# Patient Record
Sex: FEMALE | Race: BLACK OR AFRICAN AMERICAN | Employment: OTHER | URBAN - METROPOLITAN AREA
[De-identification: names, ages, dates, MRNs, and addresses within clinical notes are randomized per-mention and may not be internally consistent; named-entity substitution may affect disease eponyms.]

---

## 2017-01-26 ENCOUNTER — ANTI-COAG VISIT (OUTPATIENT)
Dept: CARDIOLOGY CLINIC | Age: 57
End: 2017-01-26

## 2017-01-26 DIAGNOSIS — Z79.01 LONG TERM (CURRENT) USE OF ANTICOAGULANTS: Primary | ICD-10-CM

## 2017-01-26 DIAGNOSIS — I48.19 PERSISTENT ATRIAL FIBRILLATION (HCC): ICD-10-CM

## 2017-01-26 LAB
INR BLD: 3.3
PT POC: 39.2 SEC
VALID INTERNAL CONTROL?: YES

## 2017-01-26 NOTE — PROGRESS NOTES
Verbal order and read back per Neida Yao NP  The INR is above the therapeutic range. Ask the patient about bleeding complications. Please make the following adjustments to Coumadin dosing: Hold X 1 then change Coumadin to 5mg daily except 2.5mg on Tues, Thur, and Sat  Repeat the INR in 3 weeks. Patient informed of instructions, read back and verbalized understanding. Dosing calendar also provided.  Howard Nguyen LPN

## 2017-02-15 ENCOUNTER — ANTI-COAG VISIT (OUTPATIENT)
Dept: CARDIOLOGY CLINIC | Age: 57
End: 2017-02-15

## 2017-02-15 DIAGNOSIS — Z79.01 LONG TERM (CURRENT) USE OF ANTICOAGULANTS: ICD-10-CM

## 2017-02-15 DIAGNOSIS — I48.19 PERSISTENT ATRIAL FIBRILLATION (HCC): ICD-10-CM

## 2017-02-15 LAB
INR BLD: 3
PT POC: 35.5 SEC
VALID INTERNAL CONTROL?: YES

## 2017-02-15 NOTE — PROGRESS NOTES
The INR is stable and therapeutic but in upper part of range.    Only 2.5mg today then continue Coumadin to 5mg daily except 2.5mg on Tues, Thur, and Sat  Recheck INR in 3 weeks

## 2017-02-21 DIAGNOSIS — I11.0 BENIGN HYPERTENSIVE HEART DISEASE WITH HEART FAILURE (HCC): ICD-10-CM

## 2017-02-21 NOTE — TELEPHONE ENCOUNTER
Patient's last OV: 9/20/16  Patient's next OV: none scheduled  Medication(s) last filled on: 9/7/16    Scheduled appointment for 2/28/17 with Pt  And updated phone number in system

## 2017-02-22 DIAGNOSIS — I11.0 BENIGN HYPERTENSIVE HEART DISEASE WITH HEART FAILURE (HCC): ICD-10-CM

## 2017-02-23 RX ORDER — FUROSEMIDE 40 MG/1
TABLET ORAL
Qty: 30 TAB | Refills: 6 | OUTPATIENT
Start: 2017-02-23

## 2017-02-23 RX ORDER — FUROSEMIDE 40 MG/1
TABLET ORAL
Qty: 30 TAB | Refills: 3 | Status: SHIPPED | OUTPATIENT
Start: 2017-02-23 | End: 2017-03-03 | Stop reason: SDUPTHER

## 2017-03-03 DIAGNOSIS — I11.0 BENIGN HYPERTENSIVE HEART DISEASE WITH HEART FAILURE (HCC): ICD-10-CM

## 2017-03-03 RX ORDER — FUROSEMIDE 40 MG/1
TABLET ORAL
Qty: 30 TAB | Refills: 3 | Status: SHIPPED | OUTPATIENT
Start: 2017-03-03 | End: 2017-06-15 | Stop reason: SDUPTHER

## 2017-03-03 NOTE — TELEPHONE ENCOUNTER
Requested Prescriptions     Pending Prescriptions Disp Refills    furosemide (LASIX) 40 mg tablet 30 Tab 3     Patient is completely out of medication

## 2017-03-08 ENCOUNTER — HOSPITAL ENCOUNTER (OUTPATIENT)
Dept: LAB | Age: 57
Discharge: HOME | End: 2017-03-08
Payer: MEDICARE

## 2017-03-08 DIAGNOSIS — E78.5 HYPERLIPIDEMIA, UNSPECIFIED HYPERLIPIDEMIA TYPE: ICD-10-CM

## 2017-03-08 LAB
CHOLEST SERPL-MCNC: 148 MG/DL
HDLC SERPL-MCNC: 56 MG/DL (ref 40–60)
HDLC SERPL: 2.6 {RATIO} (ref 0–5)
LDLC SERPL CALC-MCNC: 66.4 MG/DL (ref 0–100)
LIPID PROFILE,FLP: NORMAL
TRIGL SERPL-MCNC: 128 MG/DL (ref ?–150)
VLDLC SERPL CALC-MCNC: 25.6 MG/DL

## 2017-03-08 PROCEDURE — 80061 LIPID PANEL: CPT | Performed by: INTERNAL MEDICINE

## 2017-03-08 PROCEDURE — 36415 COLL VENOUS BLD VENIPUNCTURE: CPT | Performed by: INTERNAL MEDICINE

## 2017-03-09 ENCOUNTER — ANTI-COAG VISIT (OUTPATIENT)
Dept: CARDIOLOGY CLINIC | Age: 57
End: 2017-03-09

## 2017-03-09 ENCOUNTER — OFFICE VISIT (OUTPATIENT)
Dept: FAMILY MEDICINE CLINIC | Age: 57
End: 2017-03-09

## 2017-03-09 VITALS
TEMPERATURE: 98.1 F | SYSTOLIC BLOOD PRESSURE: 125 MMHG | OXYGEN SATURATION: 99 % | HEIGHT: 67 IN | WEIGHT: 191 LBS | DIASTOLIC BLOOD PRESSURE: 92 MMHG | BODY MASS INDEX: 29.98 KG/M2 | RESPIRATION RATE: 20 BRPM | HEART RATE: 89 BPM

## 2017-03-09 DIAGNOSIS — Z13.39 SCREENING FOR ALCOHOLISM: ICD-10-CM

## 2017-03-09 DIAGNOSIS — Z71.89 ADVANCED DIRECTIVES, COUNSELING/DISCUSSION: ICD-10-CM

## 2017-03-09 DIAGNOSIS — K21.9 GASTROESOPHAGEAL REFLUX DISEASE WITHOUT ESOPHAGITIS: Primary | ICD-10-CM

## 2017-03-09 DIAGNOSIS — I48.19 PERSISTENT ATRIAL FIBRILLATION (HCC): ICD-10-CM

## 2017-03-09 DIAGNOSIS — I11.0 BENIGN HYPERTENSIVE HEART DISEASE WITH HEART FAILURE (HCC): ICD-10-CM

## 2017-03-09 DIAGNOSIS — Z79.01 LONG TERM (CURRENT) USE OF ANTICOAGULANTS: Primary | ICD-10-CM

## 2017-03-09 DIAGNOSIS — Z12.31 ENCOUNTER FOR SCREENING MAMMOGRAM FOR MALIGNANT NEOPLASM OF BREAST: ICD-10-CM

## 2017-03-09 DIAGNOSIS — Z00.00 ROUTINE GENERAL MEDICAL EXAMINATION AT A HEALTH CARE FACILITY: ICD-10-CM

## 2017-03-09 DIAGNOSIS — E78.2 MIXED HYPERLIPIDEMIA: ICD-10-CM

## 2017-03-09 LAB
INR BLD: 2.3
PT POC: 27.5 SEC
VALID INTERNAL CONTROL?: YES

## 2017-03-09 RX ORDER — HYDROGEN PEROXIDE 3 %
20 SOLUTION, NON-ORAL MISCELLANEOUS DAILY
Qty: 30 CAP | Refills: 3 | Status: ON HOLD | OUTPATIENT
Start: 2017-03-09 | End: 2019-01-07

## 2017-03-09 RX ORDER — HYDROCHLOROTHIAZIDE 25 MG/1
25 TABLET ORAL DAILY
Qty: 30 TAB | Refills: 6 | Status: CANCELLED | OUTPATIENT
Start: 2017-03-09

## 2017-03-09 NOTE — ACP (ADVANCE CARE PLANNING)
Advance Care Planning    Advance Care Planning (ACP) Provider Conversation Snapshot    Date of ACP Conversation: 03/09/17  Persons included in Conversation:  patient  Length of ACP Conversation in minutes:  16 minutes    Authorized Decision Maker (if patient is incapable of making informed decisions): This person is: Other Legally Authorized Decision Maker (e.g. Next of Kin)          For Patients with Decision Making Capacity: \"In these circumstances, what matters most to you? \"  Care focused more on comfort or quality of life.     Conversation Outcomes / Follow-Up Plan:   Other Treatment or Goals of Care Decisions:

## 2017-03-09 NOTE — PROGRESS NOTES
Verbal order and read back per Dr. Raynald Kehr  The INR is stable and therapeutic. Continue same dose of coumadin and recheck in 5 weeks with Dr. Cole Smith appointment  Continue Coumadin 5mg daily except 2.5mg on Tues, Thur, and Sat  Patient informed of instructions, read back and verbalized understanding. Dosing calendar also provided.  Fallon Barrett LPN

## 2017-03-09 NOTE — PATIENT INSTRUCTIONS
Medicare Part B Preventive Services Limitations Recommendation Scheduled   Bone Mass Measurement  (age 72 & older, biennial) Requires diagnosis related to osteoporosis or estrogen deficiency. Biennial benefit unless patient has history of long-term glucocorticoid tx or baseline is needed because initial test was by other method Recommend at age 72    Cardiovascular Screening Blood Tests (every 5 years)  Total cholesterol, HDL, Triglycerides Order as a panel if possible Up todate    Colorectal Cancer Screening  -Fecal occult blood test (annual)  -Flexible sigmoidoscopy (5y)  -Screening colonoscopy (10y)  -Barium Enema  recommend    Counseling to Prevent Tobacco Use (up to 8 sessions per year)  - Counseling greater than 3 and up to 10 minutes  - Counseling greater than 10 minutes Patients must be asymptomatic of tobacco-related conditions to receive as preventive service     Diabetes Screening Tests (at least every 3 years, Medicare covers annually or at 6-month intervals for prediabetic patients)    Fasting blood sugar (FBS) or glucose tolerance test (GTT) Patient must be diagnosed with one of the following:  -Hypertension, Dyslipidemia, obesity, previous impaired FBS or GTT  Or any two of the following: overweight, FH of diabetes, age ? 72, history of gestational diabetes, birth of baby weighing more than 9 pounds Up to date    Diabetes Self-Management Training (DSMT) (no USPSTF recommendation) Requires referral by treating physician for patient with diabetes or renal disease. 10 hours of initial DSMT session of no less than 30 minutes each in a continuous 12-month period. 2 hours of follow-up DSMT in subsequent years.  Not indicated    Glaucoma Screening (no USPSTF recommendation) Diabetes mellitus, family history, , age 48 or over,  American, age 72 or over recommend    Human Immunodeficiency Virus (HIV) Screening (annually for increased risk patients)  HIV-1 and HIV-2 by EIA, AMADOU, rapid antibody test, or oral mucosa transudate Patient must be at increased risk for HIV infection per USPSTF guidelines or pregnant. Tests covered annually for patients at increased risk. Pregnant patients may receive up to 3 test during pregnancy. Medical Nutrition Therapy (MNT) (for diabetes or renal disease not recommended schedule) Requires referral by treating physician for patient with diabetes or renal disease. Can be provided in same year as diabetes self-management training (DSMT), and CMS recommends medical nutrition therapy take place after DSMT. Up to 3 hours for initial year and 2 hours in subsequent years. Not indicated    Shingles Vaccination A shingles vaccine is also recommended once in a lifetime after age 61 recommend    Seasonal Influenza Vaccination (annually)  recommend    Pneumococcal Vaccination (once after 72)      Hepatitis B Vaccinations (if medium/high risk) Medium/high risk factors:  End-stage renal disease,  Hemophiliacs who received Factor VIII or IX concentrates, Clients of institutions for the mentally retarded, Persons who live in the same house as a HepB virus carrier, Homosexual men, Illicit injectable drug abusers. Screening Mammography (biennial age 54-69) Annually (age 36 or over) Recommend and ordered    Screening Pap Tests and Pelvic Examination (up to age 79 and after 79 if unknown history or abnormal study last 10 years) Every 25 months except high risk recommend    Ultrasound Screening for Abdominal Aortic Aneurysm (AAA) (once) Patient must be referred through IPPE and not have had a screening for abdominal aortic aneurysm before under Medicare.   Limited to patients who meet one of the following criteria:  - Men who are 73-68 years old and have smoked more than 100 cigarettes in their lifetime.  -Anyone with a FH of AAA  -Anyone recommended for screening by USPSTF Not indicated

## 2017-03-09 NOTE — PROGRESS NOTES
This is an Initial Medicare Annual Wellness Exam (AWV) (Performed 12 months after IPPE or effective date of Medicare Part B enrollment, Once in a lifetime)    I have reviewed the patient's medical history in detail and updated the computerized patient record. History     Past Medical History:   Diagnosis Date    Aortic valve disorders 1/13/2011    Atrial fibrillation ([address] 1/13/2011    Congestive heart failure, unspecified March 2005    Cleared quickly with Lasix therapy    Echocardiogram 01/10/2012    A-fib. EF 45%. Mild RVE. RVSP 45-50. Massive LAE. Mod TAMI. Mod MS. Severe MR (mean grad 10). Mild-mod AI. Mild TR. Pulmonic systolic flow reversal.  Mild IVCE.  Hypertension     Iron deficiency anemias     Long term (current) use of anticoagulants 2/23/2011    Murmur 1/2011    Grade I-II/VI systolic ejection murmur along left sternal border, with radiation to the pulmonic area.  S/P cardiac cath 02/24/2012    Minimal coronary artery disease. Mild LVE. EF 55%. Mod MR. Mod-severe MS. RA 15.  RV 55/16. PA 58/34. W 36.  CO/CI 3.5/1.9 (TD); 3.61/1.9 ([place]). R to L shunt suggested.  Wears glasses     Weight gain       Past Surgical History:   Procedure Laterality Date    HX HEART CATHETERIZATION  2/24/2012    HX HYSTERECTOMY  2006    HX MITRAL VALVE REPLACEMENT  05/22/2013    25/33 mm On-X mechanical valve with bilateral pulmonary vein isolation and resection of LA appendage     Current Outpatient Prescriptions   Medication Sig Dispense Refill    furosemide (LASIX) 40 mg tablet TAKE 1 TABLET DAILY 30 Tab 3    albuterol (VENTOLIN HFA) 90 mcg/actuation inhaler Take 2 Puffs by inhalation daily. 1 Inhaler 3    potassium chloride (K-DUR, KLOR-CON) 20 mEq tablet Take 1 Tab by mouth daily. 30 Tab 6    warfarin (COUMADIN) 5 mg tablet TAKE 1 TABLET BY MOUTH DAILY 30 Tab 6    fluticasone (FLONASE) 50 mcg/actuation nasal spray 2 Sprays by Both Nostrils route daily.  1 Bottle 3    fluticasone-salmeterol (ADVAIR) 250-50 mcg/dose diskus inhaler Take 1 Puff by inhalation every twelve (12) hours. 1 Inhaler 3    simvastatin (ZOCOR) 20 mg tablet Take 1 Tab by mouth nightly. 30 Tab 6    metoprolol succinate (TOPROL-XL) 100 mg tablet Take 1 Tab by mouth daily. 30 Tab 6    hydrochlorothiazide (HYDRODIURIL) 25 mg tablet Take 1 Tab by mouth daily. 30 Tab 6    ascorbic acid (VITAMIN C) 500 mg tablet Take 500 mg by mouth daily.  multivitamin (ONE A DAY) tablet Take 1 Tab by mouth daily.  aspirin 81 mg tablet Take 81 mg by mouth daily.  melatonin tab tablet Take 10 mg by mouth nightly.  temazepam (RESTORIL) 15 mg capsule Take 15 mg by mouth nightly as needed.  traMADol (ULTRAM) 50 mg tablet Take 50 mg by mouth every six (6) hours as needed.        Allergies   Allergen Reactions    Morphine Rash and Itching     Family History   Problem Relation Age of Onset    Heart Surgery Father      Open-Heart Surgery    Other Father      Venous Thromboembolism    Heart Disease Mother      Enlarged Heart    Hypertension Mother     Diabetes Mother      Social History   Substance Use Topics    Smoking status: Former Smoker     Packs/day: 0.50     Years: 30.00     Quit date: 8/5/2006    Smokeless tobacco: Never Used    Alcohol use 0.6 oz/week     1 Glasses of wine per week      Comment: Rarely     Patient Active Problem List   Diagnosis Code    Rheumatic valvular disease I09.1    Chronic diastolic congestive heart failure (HCC) I50.32    Atrial fibrillation (HCC) I48.91    Long term (current) use of anticoagulants Z79.01    Benign hypertensive heart disease with heart failure (HCC) I11.0, I50.9    Microcytic anemia D50.9    Hyperlipidemia E78.5         Depression Risk Factor Screening:     PHQ 2 / 9, over the last two weeks 3/9/2017   Little interest or pleasure in doing things Not at all   Feeling down, depressed or hopeless Not at all   Total Score PHQ 2 0     Alcohol Risk Factor Screening: On any occasion during the past 3 months, have you had more than 3 drinks containing alcohol? No    Do you average more than 7 drinks per week? No    Functional Ability and Level of Safety:     Hearing Loss   normal-to-mild    Activities of Daily Living   Self-care. Requires assistance with: no ADLs    Fall Risk   No flowsheet data found. Abuse Screen   Patient is not abused    Review of Systems   Constitutional: negative for fevers, chills, sweats and fatigue  Respiratory: negative for cough, sputum or wheezing  Cardiovascular: negative for chest pain, chest pressure/discomfort, dyspnea  Gastrointestinal: negative for nausea, vomiting, diarrhea, constipation and abdominal pain    Physical Examination     Evaluation of Cognitive Function:  Mood/affect:  happy  Appearance: age appropriate  Family member/caregiver input: present alone    General NAD  Heart RRR  Lungs CTAB    Patient Care Team:  Walter Saab MD as PCP - General (Family Practice)  Nicolas Rich DO as Physician (Cardiology)  Nicolas Rich DO (Cardiology)    Advice/Referrals/Counseling   Education and counseling provided:  End-of-Life planning (with patient's consent)  Pneumococcal Vaccine  Influenza Vaccine  Screening Mammography  Screening Pap and pelvic (covered once every 2 years)      Assessment/Plan   current treatment plan is effective, no change in therapy.

## 2017-03-09 NOTE — MR AVS SNAPSHOT
Visit Information Date & Time Provider Department Dept. Phone Encounter #  
 3/9/2017  3:45 PM Josh Mercedes [phone] [id number] Follow-up Instructions Return in about 7 months (around 9/25/2017). Your Appointments 4/14/2017 10:20 AM  
Follow Up with Joretta Favre, DO [facility] ([facility]) Appt Note: f/u  
 [address] [place] [address] [id number] [phone] [address] 4/14/2017 10:40 AM  
ANTICOAG NURSE with Pt Inr Hv Csi [facility] ([facility]) Appt Note: 5 week INR [facility] Dr[facility] [place] [id number] [phone] [facility] [phone] [id number]  
  
    
 9/27/2018 10:00 AM  
[facility]: [facility]/u  
 [address] [facility] [address]  
[facility] [address] Upcoming Health Maintenance Date Due Hepatitis C Screening 1960 Pneumococcal 19-64 Medium Risk (1 of 1 - PPSV23) 7/4/1977 DTaP/Tdap/Td series (1 - Tdap) 7/4/1979 FOBT Q 1 YEAR AGE 50-75 7/4/2008 BREAST CANCER SCRN MAMMOGRAM 12/21/2014 PAP AKA CERVICAL CYTOLOGY 3/9/2020 Allergies as of 3/9/2017  Review Complete On: 3/9/2017 By: Kennedy Villarreal MD  
  
 Severity Noted Reaction Type Reactions Morphine  02/02/2012    Rash, Itching Current Immunizations  Reviewed on 2/15/2013 Name Date Influenza Vaccine 12/15/2012 Influenza Vaccine (Quad) PF 9/20/2016 Not reviewed this visit You Were Diagnosed With   
  
 Codes Comments Gastroesophageal reflux disease without esophagitis    -  Primary ICD-10-CM: K21.9 ICD-9-CM: 530.81   
 Routine general medical examination at a health care facility     ICD-10-CM: Z00.00 ICD-9-CM: V70.0 Screening for alcoholism     ICD-10-CM: Z13.89 ICD-9-CM: V79.1 Advanced directives, counseling/discussion     ICD-10-CM: Z71.89 ICD-9-CM: V65.49 Encounter for screening mammogram for malignant neoplasm of breast     ICD-10-CM: Z12.31 
ICD-9-CM: V76.12 Benign hypertensive heart disease with heart failure (HCC)     ICD-10-CM: I11.0, I50.9 ICD-9-CM: 402.11, 428.9 Mixed hyperlipidemia     ICD-10-CM: E78.2 ICD-9-CM: 272.2 Vitals BP Pulse Temp Resp Height(growth percentile) Weight(growth percentile) (!) 125/92 (BP 1 Location: Left arm, BP Patient Position: Sitting) 89 98.1 °F (36.7 °C) (Oral) 20 5' 7\" (1.702 m) 191 lb (86.6 kg) SpO2 BMI OB Status Smoking Status 99% 29.91 kg/m2 Hysterectomy Former Smoker Vitals History BMI and BSA Data Body Mass Index Body Surface Area  
 29.91 kg/m 2 2.02 m 2 Preferred Pharmacy Pharmacy Name Phone [id number], [address] [place] [address],[address] [facility] [phone] Your Updated Medication List  
  
   
This list is accurate as of: 3/9/17  4:18 PM.  Always use your most recent med list.  
  
  
  
  
 albuterol 90 mcg/actuation inhaler Commonly known as:  VENTOLIN HFA Take 2 Puffs by inhalation daily. aspirin 81 mg tablet Take 81 mg by mouth daily. esomeprazole 20 mg capsule Commonly known as:  NexIUM Take 1 Cap by mouth daily. fluticasone 50 mcg/actuation nasal spray Commonly known as:  Jose R Metro 2 Sprays by Both Nostrils route daily. fluticasone-salmeterol 250-50 mcg/dose diskus inhaler Commonly known as:  ADVAIR Take 1 Puff by inhalation every twelve (12) hours. furosemide 40 mg tablet Commonly known as:  LASIX TAKE 1 TABLET DAILY  
  
 hydroCHLOROthiazide 25 mg tablet Commonly known as:  HYDRODIURIL Take 1 Tab by mouth daily. melatonin Tab tablet Take 10 mg by mouth nightly. metoprolol succinate 100 mg tablet Commonly known as:  TOPROL-XL Take 1 Tab by mouth daily. multivitamin tablet Commonly known as:  ONE A DAY Take 1 Tab by mouth daily. potassium chloride 20 mEq tablet Commonly known as:  K-DUR, KLOR-CON Take 1 Tab by mouth daily. simvastatin 20 mg tablet Commonly known as:  ZOCOR Take 1 Tab by mouth nightly. temazepam 15 mg capsule Commonly known as:  RESTORIL Take 15 mg by mouth nightly as needed. traMADol 50 mg tablet Commonly known as:  ULTRAM  
Take 50 mg by mouth every six (6) hours as needed. VITAMIN C 500 mg tablet Generic drug:  ascorbic acid (vitamin C) Take 500 mg by mouth daily. warfarin 5 mg tablet Commonly known as:  COUMADIN  
TAKE 1 TABLET BY MOUTH DAILY Prescriptions Sent to Pharmacy Refills  
 esomeprazole (NEXIUM) 20 mg capsule 3 Sig: Take 1 Cap by mouth daily. Class: Normal  
 Pharmacy: [facility]/pharmacy [address] [place] [address], [address] [place] [address],[address] [place] [address] Ph #: [phone] Route: Oral  
  
We Performed the Following ADVANCE CARE PLANNING FIRST 30 MINS [47994 CPT(R)] FULL CODE [COD2 Custom] Comments:  
 Discussed and packet given Follow-up Instructions Return in about 7 months (around 9/25/2017). To-Do List   
 03/12/2017 Imaging:  W[facility]OW MAMMO BI SCREENING INCL CAD Patient Instructions Medicare Part B Preventive Services Limitations Recommendation Scheduled Bone Mass Measurement 
(age 72 & older, biennial) Requires diagnosis related to osteoporosis or estrogen deficiency. Biennial benefit unless patient has history of long-term glucocorticoid tx or baseline is needed because initial test was by other method Recommend at age 72 Cardiovascular Screening Blood Tests (every 5 years) Total cholesterol, HDL, Triglycerides Order as a panel if possible Up todate Colorectal Cancer Screening 
-Fecal occult blood test (annual) -Flexible sigmoidoscopy (5y) 
-Screening colonoscopy (10y) -Barium Enema  recommend Counseling to Prevent Tobacco Use (up to 8 sessions per year) - Counseling greater than 3 and up to 10 minutes - Counseling greater than 10 minutes Patients must be asymptomatic of tobacco-related conditions to receive as preventive service Diabetes Screening Tests (at least every 3 years, Medicare covers annually or at 6-month intervals for prediabetic patients) Fasting blood sugar (FBS) or glucose tolerance test (GTT) Patient must be diagnosed with one of the following: 
-Hypertension, Dyslipidemia, obesity, previous impaired FBS or GTT 
Or any two of the following: overweight, FH of diabetes, age ? 72, history of gestational diabetes, birth of baby weighing more than 9 pounds Up to date Diabetes Self-Management Training (DSMT) (no USPSTF recommendation) Requires referral by treating physician for patient with diabetes or renal disease. 10 hours of initial DSMT session of no less than 30 minutes each in a continuous 12-month period. 2 hours of follow-up DSMT in subsequent years. Not indicated Glaucoma Screening (no USPSTF recommendation) Diabetes mellitus, family history, , age 48 or over,  American, age 72 or over recommend Human Immunodeficiency Virus (HIV) Screening (annually for increased risk patients) HIV-1 and HIV-2 by EIA, AMADOU, rapid antibody test, or oral mucosa transudate Patient must be at increased risk for HIV infection per USPSTF guidelines or pregnant. Tests covered annually for patients at increased risk. Pregnant patients may receive up to 3 test during pregnancy. Medical Nutrition Therapy (MNT) (for diabetes or renal disease not recommended schedule) Requires referral by treating physician for patient with diabetes or renal disease.   Can be provided in same year as diabetes self-management training (DSMT), and CMS recommends medical nutrition therapy take place after DSMT. Up to 3 hours for initial year and 2 hours in subsequent years. Not indicated Shingles Vaccination A shingles vaccine is also recommended once in a lifetime after age 61 recommend Seasonal Influenza Vaccination (annually)  recommend Pneumococcal Vaccination (once after 65) Hepatitis B Vaccinations (if medium/high risk) Medium/high risk factors:  End-stage renal disease, Hemophiliacs who received Factor VIII or IX concentrates, Clients of institutions for the mentally retarded, Persons who live in the same house as a HepB virus carrier, Homosexual men, Illicit injectable drug abusers. Screening Mammography (biennial age 54-69) Annually (age 36 or over) Recommend and ordered Screening Pap Tests and Pelvic Examination (up to age 79 and after 79 if unknown history or abnormal study last 10 years) Every 24 months except high risk recommend Ultrasound Screening for Abdominal Aortic Aneurysm (AAA) (once) Patient must be referred through IPPE and not have had a screening for abdominal aortic aneurysm before under Medicare. Limited to patients who meet one of the following criteria: 
- Men who are 73-68 years old and have smoked more than 100 cigarettes in their lifetime. 
-Anyone with a FH of AAA 
-Anyone recommended for screening by USPSTF Not indicated Introducing [facility]! Jennifer Mcmillan introduces [employer] patient portal. Now you can access parts of your medical record, email your doctor's office, and request medication refills online. 1. In your internet browser, go to [email or web address] 2. Click on the First Time User? Click Here link in the Sign In box. You will see the New Member Sign Up page. 3. Enter your [employer] Access Code exactly as it appears below. You will not need to use this code after youve completed the sign-up process.  If you do not sign up before the expiration date, you must request a new code. · [employer] Access Code: [email or web address] Expires: 5/16/2017 10:45 AM 
 
4. Enter the last four digits of your Social Security Number (xxxx) and Date of Birth (mm/dd/yyyy) as indicated and click Submit. You will be taken to the next sign-up page. 5. Create a [employer] ID. This will be your [employer] login ID and cannot be changed, so think of one that is secure and easy to remember. 6. Create a [employer] password. You can change your password at any time. 7. Enter your Password Reset Question and Answer. This can be used at a later time if you forget your password. 8. Enter your e-mail address. You will receive e-mail notification when new information is available in [address] 9. Click Sign Up. You can now view and download portions of your medical record. 10. Click the Download Summary menu link to download a portable copy of your medical information. If you have questions, please visit the Frequently Asked Questions section of the [employer] website. Remember, [employer] is NOT to be used for urgent needs. For medical emergencies, dial 911. Now available from your iPhone and Android! Please provide this summary of care documentation to your next provider. Your primary care clinician is listed as Sandra Shell. If you have any questions after today's visit, please call [phone].

## 2017-03-10 NOTE — PROGRESS NOTES
Chronic Illness Visit    Today's Date: 3/9/2017  Patient's Name: Jay Jay Dawkins   Patient's :  1960     History:     Chief Complaint   Patient presents with   [facility] Visit      pt here for medicare wellness visit    Medication Refill     pt needs medication refill       Jay Jay Dawkins is a 60 y.o. female presenting for a follow up of Chronic Illness. Hypertension/Hyperlipidemia    BP today is at goal and less qmpr988/90. Patients risk factors include: hx of valve replacement  Patient is not checking home BP   Reports compliance and denies side effects to medications  Daily exercise and diet are as follows: report healthy diet and trying to stay active  Weight is stable   Takes the below meds regularly with no side effects. Last LDL: 66    Asthma    Reports: wheezing, cough, SOB. Denies: LIZARRAGA, night time symtpoms  Onset of symptoms: worse with weather changes  Alleviating/aggraving factors: worse with activity  Albuterol inhaler used about 3 times per day  Smoking history: denies  Also takes flonase for allergies    GERD    Reports:  Heartburn. Denies abdominal pain, nausea/vomiting, excessive bleching. Currently not taking any meds  Reports lifestyle modifications  No recent endoscopy or colonoscopy      Past Medical History:   Diagnosis Date    Aortic valve disorders 2011    Atrial fibrillation ([address] 2011    Congestive heart failure, unspecified 2005    Cleared quickly with Lasix therapy    Echocardiogram 01/10/2012    A-fib. EF 45%. Mild RVE. RVSP 45-50. Massive LAE. Mod TAMI. Mod MS. Severe MR (mean grad 10). Mild-mod AI. Mild TR. Pulmonic systolic flow reversal.  Mild IVCE.  Hypertension     Iron deficiency anemias     Long term (current) use of anticoagulants 2011    Murmur 2011    Grade I-II/VI systolic ejection murmur along left sternal border, with radiation to the pulmonic area.     S/P cardiac cath 2012    Minimal coronary artery disease. Mild LVE. EF 55%. Mod MR. Mod-severe MS. RA 15.  RV 55/16. PA 58/34. W 36.  CO/CI 3.5/1.9 (TD); 3.61/1.9 (Dequan). R to L shunt suggested.  Wears glasses     Weight gain      Past Surgical History:   Procedure Laterality Date    HX HEART CATHETERIZATION  2/24/2012    HX HYSTERECTOMY  2006    HX MITRAL VALVE REPLACEMENT  05/22/2013    25/33 mm On-X mechanical valve with bilateral pulmonary vein isolation and resection of LA appendage     Social History     Social History    Marital status:      Spouse name: N/A    Number of children: N/A    Years of education: N/A     Social History Main Topics    Smoking status: Former Smoker     Packs/day: 0.50     Years: 30.00     Quit date: 8/5/2006    Smokeless tobacco: Never Used    Alcohol use 0.6 oz/week     1 Glasses of wine per week      Comment: Rarely    Drug use: No    Sexual activity: Yes     Partners: Male     Birth control/ protection: None     Other Topics Concern    None     Social History Narrative     Family History   Problem Relation Age of Onset    Heart Surgery Father      Open-Heart Surgery    Other Father      Venous Thromboembolism    Heart Disease Mother      Enlarged Heart    Hypertension Mother     Diabetes Mother      Allergies   Allergen Reactions    Morphine Rash and Itching       Problem List:      Patient Active Problem List   Diagnosis Code    Rheumatic valvular disease I09.1    Chronic diastolic congestive heart failure (HCC) I50.32    Atrial fibrillation (HCC) I48.91    Long term (current) use of anticoagulants Z79.01    Benign hypertensive heart disease with heart failure (HCC) I11.0, I50.9    Microcytic anemia D50.9    Hyperlipidemia E78.5    Advanced directives, counseling/discussion Z71.89       Medications:     Current Outpatient Prescriptions   Medication Sig    esomeprazole (NEXIUM) 20 mg capsule Take 1 Cap by mouth daily.     furosemide (LASIX) 40 mg tablet TAKE 1 TABLET DAILY    albuterol (VENTOLIN HFA) 90 mcg/actuation inhaler Take 2 Puffs by inhalation daily.  potassium chloride (K-DUR, KLOR-CON) 20 mEq tablet Take 1 Tab by mouth daily.  warfarin (COUMADIN) 5 mg tablet TAKE 1 TABLET BY MOUTH DAILY    fluticasone (FLONASE) 50 mcg/actuation nasal spray 2 Sprays by Both Nostrils route daily.  fluticasone-salmeterol (ADVAIR) 250-50 mcg/dose diskus inhaler Take 1 Puff by inhalation every twelve (12) hours.  simvastatin (ZOCOR) 20 mg tablet Take 1 Tab by mouth nightly.  metoprolol succinate (TOPROL-XL) 100 mg tablet Take 1 Tab by mouth daily.  hydrochlorothiazide (HYDRODIURIL) 25 mg tablet Take 1 Tab by mouth daily.  ascorbic acid (VITAMIN C) 500 mg tablet Take 500 mg by mouth daily.  multivitamin (ONE A DAY) tablet Take 1 Tab by mouth daily.  aspirin 81 mg tablet Take 81 mg by mouth daily.  melatonin tab tablet Take 10 mg by mouth nightly.  temazepam (RESTORIL) 15 mg capsule Take 15 mg by mouth nightly as needed.  traMADol (ULTRAM) 50 mg tablet Take 50 mg by mouth every six (6) hours as needed. No current facility-administered medications for this visit.           Constitutional: negative for fevers, chills, sweats and fatigue  Respiratory: positive for cough, negative for sputum or wheezing  Cardiovascular: negative for chest pain, chest pressure/discomfort, dyspnea  Gastrointestinal: negative for nausea, vomiting, diarrhea, constipation and abdominal pain  Musculoskeletal:negative for myalgias and arthralgias  Neurological: negative for headaches and dizziness  Behavioral/Psych: negative for anxiety and depression    Physical Assessment:   VS:    Visit Vitals    BP (!) 125/92 (BP 1 Location: Left arm, BP Patient Position: Sitting)    Pulse 89    Temp 98.1 °F (36.7 °C) (Oral)    Resp 20    Ht 5' 7\" (1.702 m)    Wt 191 lb (86.6 kg)    SpO2 99%    BMI 29.91 kg/m2       Lab Results   Component Value Date/Time    Sodium 140 08/22/2016 08:18 AM Potassium 3.6 08/22/2016 08:18 AM    Chloride 103 08/22/2016 08:18 AM    CO2 30 08/22/2016 08:18 AM    Anion gap 7 08/22/2016 08:18 AM    Glucose 93 08/22/2016 08:18 AM    BUN 16 08/22/2016 08:18 AM    Creatinine 0.88 08/22/2016 08:18 AM    BUN/Creatinine ratio 18 08/22/2016 08:18 AM    GFR est AA >60 08/22/2016 08:18 AM    GFR est non-AA >60 08/22/2016 08:18 AM    Calcium 9.3 08/22/2016 08:18 AM       General:   Well-groomed, well-nourished, in no distress, pleasant, alert, appropriate and conversant. Mouth:  Good dentition, oropharynx WNL without membranes, exudates, petechiae or ulcers  Neck:   Neck supple, no swelling, mass or tenderness, no thyromegaly  Cardiovasc:   Irr, Irreg, w/ murmur (stable findings). Pulses 2+ and symmetric at distal extremities. Pulmonary:   Lungs clear bilaterally. Normal respiratory effort. Abdomen:   Abdomen soft, NT, ND, NAB. Extremities:   No edema, no TTP bilateral calves. LEs warm and well-perfused. Neuro:   Alert and oriented, no focal deficits. No facial asymmetry noted. Skin:    No rashes or jaundice  Psych:  No pressured speech or abnormal thought content        Assessment/Plan & Orders:       1. Gastroesophageal reflux disease without esophagitis    2. Routine general medical examination at a health care facility    3. Screening for alcoholism    4. Advanced directives, counseling/discussion    5. Encounter for screening mammogram for malignant neoplasm of breast    6. Benign hypertensive heart disease with heart failure ([address]    7.  Mixed hyperlipidemia        Orders Placed This Encounter    ADVANCE CARE PLANNING FIRST 30 MINS    Bilateral Digital Screening Mammography    FULL CODE    esomeprazole (NEXIUM) 20 mg capsule     HTN/Hyperlipidemia stable continue with current meds  GERD will start nexium  Moderate Asthma  -will add Advair so patient will not have to use rescue inhaler as often    Follow up in 2-3 months    Lm Monterroso MD  Family Medicine  3/10/2017  12:45 PM

## 2017-03-16 ENCOUNTER — TELEPHONE (OUTPATIENT)
Dept: CARDIOLOGY CLINIC | Age: 57
End: 2017-03-16

## 2017-03-16 ENCOUNTER — HOSPITAL ENCOUNTER (OUTPATIENT)
Dept: MAMMOGRAPHY | Age: 57
Discharge: HOME | End: 2017-03-16
Attending: FAMILY MEDICINE
Payer: MEDICARE

## 2017-03-16 DIAGNOSIS — J45.40 MODERATE PERSISTENT ASTHMA WITHOUT COMPLICATION: ICD-10-CM

## 2017-03-16 DIAGNOSIS — Z12.31 ENCOUNTER FOR SCREENING MAMMOGRAM FOR MALIGNANT NEOPLASM OF BREAST: ICD-10-CM

## 2017-03-16 PROCEDURE — 77063 BREAST TOMOSYNTHESIS BI: CPT

## 2017-03-16 PROCEDURE — 77067 SCR MAMMO BI INCL CAD: CPT

## 2017-03-16 RX ORDER — ALBUTEROL SULFATE 90 UG/1
2 AEROSOL, METERED RESPIRATORY (INHALATION) DAILY
Qty: 1 INHALER | Refills: 3 | Status: SHIPPED | OUTPATIENT
Start: 2017-03-16 | End: 2017-08-24 | Stop reason: SDUPTHER

## 2017-03-16 NOTE — TELEPHONE ENCOUNTER
Requested Prescriptions     Pending Prescriptions Disp Refills    albuterol (VENTOLIN HFA) 90 mcg/actuation inhaler 1 Inhaler 3     Sig: Take 2 Puffs by inhalation daily.

## 2017-03-23 ENCOUNTER — OFFICE VISIT (OUTPATIENT)
Dept: FAMILY MEDICINE CLINIC | Age: 57
End: 2017-03-23

## 2017-03-23 VITALS
HEART RATE: 88 BPM | DIASTOLIC BLOOD PRESSURE: 88 MMHG | TEMPERATURE: 97 F | WEIGHT: 197 LBS | OXYGEN SATURATION: 98 % | SYSTOLIC BLOOD PRESSURE: 117 MMHG | HEIGHT: 67 IN | RESPIRATION RATE: 16 BRPM | BODY MASS INDEX: 30.92 KG/M2

## 2017-03-23 DIAGNOSIS — J06.9 VIRAL UPPER RESPIRATORY TRACT INFECTION: Primary | ICD-10-CM

## 2017-03-23 NOTE — MR AVS SNAPSHOT
Visit Information Date & Time Provider Department Dept. Phone Encounter #  
 3/23/2017 11:15 AM Josh Robles [phone] [id number] Your Appointments 4/14/2017 10:20 AM  
Follow Up with Sheila Soto DO [facility] ([facility]) Appt Note: f/u  
 [address] [place] [address] [place] [address]  
[phone] [address] [place] [address] [address] 4/14/2017 10:40 AM  
ANTICOAG NURSE with Pt Inr Hv Csi [facility] ([facility]) Appt Note: 5 week INR with Dr. Emile Mobley appt [address] [address]  
[phone] [place] [id number]  
  
    
 9/27/2018 10:00 AM  
FOLLOW UP EXAM with Damaris Navarro MD  
[facility] Appt Note: 6 month f/u  
 [address] [facility] [address]  
[facility] [address] Upcoming Health Maintenance Date Due Hepatitis C Screening 1960 Pneumococcal 19-64 Medium Risk (1 of 1 - PPSV23) 7/4/1977 DTaP/Tdap/Td series (1 - Tdap) 7/4/1979 FOBT Q 1 YEAR AGE 50-75 7/4/2008 BREAST CANCER SCRN MAMMOGRAM 12/21/2014 PAP AKA CERVICAL CYTOLOGY 3/9/2020 Allergies as of 3/23/2017  Review Complete On: 3/23/2017 By: Seda Prado Severity Noted Reaction Type Reactions Morphine  02/02/2012    Rash, Itching Current Immunizations  Reviewed on 2/15/2013 Name Date Influenza Vaccine 12/15/2012 Influenza Vaccine (Quad) PF 9/20/2016 Not reviewed this visit Vitals BP Pulse Temp Resp Height(growth percentile) Weight(growth percentile) 117/88 88 97 °F (36.1 °C) (Oral) 16 5' 7\" (1.702 m) 197 lb (89.4 kg) SpO2 BMI OB Status Smoking Status 98% 30.85 kg/m2 Hysterectomy Former Smoker Vitals History BMI and BSA Data Body Mass Index Body Surface Area  
 30.85 kg/m 2 2.06 m 2 Preferred Pharmacy Pharmacy Name Phone [id number] Simone Herrera, [address] [facility] [facility] [phone] Your Updated Medication List  
  
   
This list is accurate as of: 3/23/17 11:20 AM.  Always use your most recent med list.  
  
  
  
  
 albuterol 90 mcg/actuation inhaler Commonly known as:  VENTOLIN HFA Take 2 Puffs by inhalation daily. aspirin 81 mg tablet Take 81 mg by mouth daily. esomeprazole 20 mg capsule Commonly known as:  NexIUM Take 1 Cap by mouth daily. fluticasone 50 mcg/actuation nasal spray Commonly known as:  Imagene Poot 2 Sprays by Both Nostrils route daily. fluticasone-salmeterol 250-50 mcg/dose diskus inhaler Commonly known as:  ADVAIR Take 1 Puff by inhalation every twelve (12) hours. furosemide 40 mg tablet Commonly known as:  LASIX TAKE 1 TABLET DAILY  
  
 hydroCHLOROthiazide 25 mg tablet Commonly known as:  HYDRODIURIL Take 1 Tab by mouth daily. melatonin Tab tablet Take 10 mg by mouth nightly. metoprolol succinate 100 mg tablet Commonly known as:  TOPROL-XL Take 1 Tab by mouth daily. multivitamin tablet Commonly known as:  ONE A DAY Take 1 Tab by mouth daily. potassium chloride 20 mEq tablet Commonly known as:  K-DUR, KLOR-CON Take 1 Tab by mouth daily. simvastatin 20 mg tablet Commonly known as:  ZOCOR Take 1 Tab by mouth nightly. temazepam 15 mg capsule Commonly known as:  RESTORIL Take 15 mg by mouth nightly as needed. traMADol 50 mg tablet Commonly known as:  ULTRAM  
Take 50 mg by mouth every six (6) hours as needed. VITAMIN C 500 mg tablet Generic drug:  ascorbic acid (vitamin C) Take 500 mg by mouth daily. warfarin 5 mg tablet Commonly known as:  COUMADIN  
TAKE 1 TABLET BY MOUTH DAILY Introducing [facility]! Eri Garzon introduces [employer] patient portal. Now you can access parts of your medical record, email your doctor's office, and request medication refills online. 1. In your internet browser, go to [email or web address] 2. Click on the First Time User? Click Here link in the Sign In box. You will see the New Member Sign Up page. 3. Enter your [employer] Access Code exactly as it appears below. You will not need to use this code after youve completed the sign-up process. If you do not sign up before the expiration date, you must request a new code. · [employer] Access Code: [email or web address] Expires: 5/16/2017 11:45 AM 
 
4. Enter the last four digits of your Social Security Number (xxxx) and Date of Birth (mm/dd/yyyy) as indicated and click Submit. You will be taken to the next sign-up page. 5. Create a [employer] ID. This will be your [employer] login ID and cannot be changed, so think of one that is secure and easy to remember. 6. Create a [employer] password. You can change your password at any time. 7. Enter your Password Reset Question and Answer. This can be used at a later time if you forget your password. 8. Enter your e-mail address. You will receive e-mail notification when new information is available in [address] 9. Click Sign Up. You can now view and download portions of your medical record. 10. Click the Download Summary menu link to download a portable copy of your medical information. If you have questions, please visit the Frequently Asked Questions section of the [employer] website. Remember, [employer] is NOT to be used for urgent needs. For medical emergencies, dial 911. Now available from your iPhone and Android! Please provide this summary of care documentation to your next provider. Your primary care clinician is listed as Hany Lindsey. If you have any questions after today's visit, please call [phone].

## 2017-03-23 NOTE — PROGRESS NOTES
Patient: Reer Hoffman MRN: [id number]  SSN: [id number]    YOB: 1960  Age: 60 y.o. Sex: female      Date of Service: 3/23/2017   Provider: SAMANTHA Connors   Office Location:   [address] [place] [facility], [address] [place] [address], [address]  Office Phone: [phone]  Office Fax: [phone]        REASON FOR VISIT:   Chief Complaint   Patient presents with    Cold Symptoms     pt states \" that when pt woke up yesterday throat was scratchy pt states \" that pt feel tired pt states \" that pt feels like pt has congestion and pressure in chest area\" pt states \" that pt had pnuemonia about 8 years ago and these are some of the same symptoms\"        VITALS:   Visit Vitals    /88    Pulse 88    Temp 97 °F (36.1 °C) (Oral)    Resp 16    Ht 5' 7\" (1.702 m)    Wt 197 lb (89.4 kg)    SpO2 98%    BMI 30.85 kg/m2       MEDICATIONS:   Current Outpatient Prescriptions   Medication Sig Dispense Refill    albuterol (VENTOLIN HFA) 90 mcg/actuation inhaler Take 2 Puffs by inhalation daily. 1 Inhaler 3    esomeprazole (NEXIUM) 20 mg capsule Take 1 Cap by mouth daily. 30 Cap 3    furosemide (LASIX) 40 mg tablet TAKE 1 TABLET DAILY 30 Tab 3    potassium chloride (K-DUR, KLOR-CON) 20 mEq tablet Take 1 Tab by mouth daily. 30 Tab 6    warfarin (COUMADIN) 5 mg tablet TAKE 1 TABLET BY MOUTH DAILY 30 Tab 6    fluticasone (FLONASE) 50 mcg/actuation nasal spray 2 Sprays by Both Nostrils route daily. 1 Bottle 3    fluticasone-salmeterol (ADVAIR) 250-50 mcg/dose diskus inhaler Take 1 Puff by inhalation every twelve (12) hours. 1 Inhaler 3    simvastatin (ZOCOR) 20 mg tablet Take 1 Tab by mouth nightly. 30 Tab 6    metoprolol succinate (TOPROL-XL) 100 mg tablet Take 1 Tab by mouth daily. 30 Tab 6    hydrochlorothiazide (HYDRODIURIL) 25 mg tablet Take 1 Tab by mouth daily. 30 Tab 6    melatonin tab tablet Take 10 mg by mouth nightly.       temazepam (RESTORIL) 15 mg capsule Take 15 mg by mouth nightly as needed.  traMADol (ULTRAM) 50 mg tablet Take 50 mg by mouth every six (6) hours as needed.  ascorbic acid (VITAMIN C) 500 mg tablet Take 500 mg by mouth daily.  multivitamin (ONE A DAY) tablet Take 1 Tab by mouth daily.  aspirin 81 mg tablet Take 81 mg by mouth daily. ALLERGIES:   Allergies   Allergen Reactions    Morphine Rash and Itching        ACTIVE MEDICAL PROBLEMS:  Patient Active Problem List   Diagnosis Code    Rheumatic valvular disease I09.1    Chronic diastolic congestive heart failure (HCC) I50.32    Atrial fibrillation (HCC) I48.91    Long term (current) use of anticoagulants Z79.01    Benign hypertensive heart disease with heart failure (HCC) I11.0, I50.9    Microcytic anemia D50.9    Hyperlipidemia E78.5    Advanced directives, counseling/discussion Z71.89        MEDICAL/SURGICAL HISTORY:  Past Medical History:   Diagnosis Date    Aortic valve disorders 1/13/2011    Atrial fibrillation ([facility] [address] 1/13/2011    Congestive heart failure, unspecified March 2005    Cleared quickly with Lasix therapy    Echocardiogram 01/10/2012    A-fib. EF 45%. Mild RVE. RVSP 45-50. Massive LAE. Mod TAMI. Mod MS. Severe MR (mean grad 10). Mild-mod AI. Mild TR. Pulmonic systolic flow reversal.  Mild IVCE.  Hypertension     Iron deficiency anemias     Long term (current) use of anticoagulants 2/23/2011    Murmur 1/2011    Grade I-II/VI systolic ejection murmur along left sternal border, with radiation to the pulmonic area.  S/P cardiac cath 02/24/2012    Minimal coronary artery disease. Mild LVE. EF 55%. Mod MR. Mod-severe MS. RA 15.  RV 55/16. PA 58/34. W 36.  CO/CI 3.5/1.9 (TD); 3.61/1.9 (Dequan). R to L shunt suggested.     Wears glasses     Weight gain       Past Surgical History:   Procedure Laterality Date    HX HEART CATHETERIZATION  2/24/2012    HX HYSTERECTOMY  2006    HX MITRAL VALVE REPLACEMENT  05/22/2013    25/33 mm On-X mechanical valve with bilateral pulmonary vein isolation and resection of LA appendage        FAMILY HISTORY:  Family History   Problem Relation Age of Onset    Heart Surgery Father      Open-Heart Surgery    Other Father      Venous Thromboembolism    Heart Disease Mother      Enlarged Heart    Hypertension Mother     Diabetes Mother         SOCIAL HISTORY:  Social History   Substance Use Topics    Smoking status: Former Smoker     Packs/day: 0.50     Years: 30.00     Quit date: 8/5/2006    Smokeless tobacco: Never Used    Alcohol use 0.6 oz/week     1 Glasses of wine per week      Comment: Rarely            HISTORY OF PRESENT ILLNESS:   Anai Rosales is a 60 y.o. female who presents to the office for evaluation of cough, congestion, and post-nasal drip x 2 days. Patient reports symptoms started yesterday morning with sore, scratchy throat and post-nasal drainage. Now reports she feels the drainage \"moving into her lungs,\" and has a slight cough productive of yellow-clear sputum. She has been using her regular allergy medications with minimal relief. She has had pneumonia in the past, so would like to rule this out. Denies fevers, chills, chest pain, SOB, wheezing, n/v/d. She is a former smoker  Had flu shot this season  Has never had pneumovax    REVIEW OF SYSTEMS:  Review of Systems   Constitutional: Negative for chills and fever. HENT: Positive for congestion and sore throat. Negative for ear pain. Respiratory: Positive for cough and sputum production. Negative for hemoptysis, shortness of breath and wheezing. Cardiovascular: Negative for chest pain and palpitations. Gastrointestinal: Negative for abdominal pain, diarrhea, nausea and vomiting. Neurological: Negative for dizziness and headaches. PHYSICAL EXAMINATION:  Physical Exam   Constitutional: She is well-developed, well-nourished, and in no distress. HENT:   Head: Normocephalic and atraumatic.    Right Ear: External ear normal.   Left Ear: External ear normal.   Nose: Mucosal edema and rhinorrhea present. Right sinus exhibits no maxillary sinus tenderness. Left sinus exhibits no maxillary sinus tenderness. Mouth/Throat: Uvula is midline, oropharynx is clear and moist and mucous membranes are normal.   Eyes: Conjunctivae are normal.   Neck: Neck supple. Cardiovascular: Normal rate, regular rhythm and normal heart sounds. Exam reveals no gallop and no friction rub. No murmur heard. Pulmonary/Chest: Effort normal and breath sounds normal. She has no wheezes. She has no rales. Lymphadenopathy:     She has cervical adenopathy. RESULTS:  No results found for this visit on 03/23/17. ASSESSMENT/PLAN:  Florecita Castellon was seen today for cold symptoms. Diagnoses and all orders for this visit:    Viral upper respiratory tract infection  - Reassured patient symptoms likely represent viral URI  - Encouraged continued supportive care with fluids, rest, nasal spray, antihistamines, and mucinex  - Follow up as scheduled or PRN new/worsening symptoms     Patient expresses understanding and is agreeable with the above plan.         PATIENT CARE TEAM:   Patient Care Team:  Asia Poole MD as PCP - General (Family Practice)  Esperanza Melgoza DO as Physician (Cardiology)  Esperanza Melgoza DO (Cardiology)       [place], [place]   March 23, 2017    11:25 AM

## 2017-03-26 ENCOUNTER — HOSPITAL ENCOUNTER (EMERGENCY)
Age: 57
Discharge: HOME | End: 2017-03-26
Attending: EMERGENCY MEDICINE
Payer: MEDICARE

## 2017-03-26 ENCOUNTER — APPOINTMENT (OUTPATIENT)
Dept: GENERAL RADIOLOGY | Age: 57
End: 2017-03-26
Attending: EMERGENCY MEDICINE
Payer: MEDICARE

## 2017-03-26 VITALS
HEART RATE: 76 BPM | SYSTOLIC BLOOD PRESSURE: 127 MMHG | HEIGHT: 67 IN | WEIGHT: 191 LBS | BODY MASS INDEX: 29.98 KG/M2 | TEMPERATURE: 98 F | DIASTOLIC BLOOD PRESSURE: 93 MMHG | RESPIRATION RATE: 18 BRPM | OXYGEN SATURATION: 99 %

## 2017-03-26 DIAGNOSIS — J06.9 ACUTE UPPER RESPIRATORY INFECTION: Primary | ICD-10-CM

## 2017-03-26 LAB
FLUAV AG NPH QL IA: NEGATIVE
FLUBV AG NOSE QL IA: NEGATIVE

## 2017-03-26 PROCEDURE — 87081 CULTURE SCREEN ONLY: CPT | Performed by: EMERGENCY MEDICINE

## 2017-03-26 PROCEDURE — 87804 INFLUENZA ASSAY W/OPTIC: CPT | Performed by: EMERGENCY MEDICINE

## 2017-03-26 PROCEDURE — 99282 EMERGENCY DEPT VISIT SF MDM: CPT

## 2017-03-26 PROCEDURE — 71020 XR CHEST PA LAT: CPT

## 2017-03-26 RX ORDER — CODEINE PHOSPHATE AND GUAIFENESIN 10; 100 MG/5ML; MG/5ML
5 SOLUTION ORAL
Qty: 236 ML | Refills: 0 | Status: SHIPPED | OUTPATIENT
Start: 2017-03-26 | End: 2017-04-04 | Stop reason: SDUPTHER

## 2017-03-26 NOTE — ED PROVIDER NOTES
HPI Comments: 60year old female presents with a 4 day history of a sore throat, nasal congestion, cough productive of scant yellow phlegm. No fever was admitted to. No known exposures. She had a flu shot this year. The patient saw her PCP one day post the onset of symptoms, with Mucinex being prescribed. She has also been taking Claritin with not relief from either medication. There is a history of asthma and mitral valve replacement for which she takes Coumadin. She has used Losartan for years. Patient is a 60 y.o. female presenting with nasal congestion and cough. The history is provided by the patient. Nasal Congestion    Associated symptoms include cough. Pertinent negatives include no congestion, no ear pain, no sinus pressure, no shortness of breath, no neck pain, no neck pain, no headaches and no chest pain. Cough   Pertinent negatives include no chest pain, no eye redness, no ear pain, no headaches, no myalgias, no shortness of breath, no wheezing, no nausea and no vomiting. Past Medical History:   Diagnosis Date    Aortic valve disorders 1/13/2011    Atrial fibrillation ([facility] [address] 1/13/2011    Congestive heart failure, unspecified March 2005    Cleared quickly with Lasix therapy    Echocardiogram 01/10/2012    A-fib. EF 45%. Mild RVE. RVSP 45-50. Massive LAE. Mod TAMI. Mod MS. Severe MR (mean grad 10). Mild-mod AI. Mild TR. Pulmonic systolic flow reversal.  Mild IVCE.  Hypertension     Iron deficiency anemias     Long term (current) use of anticoagulants 2/23/2011    Murmur 1/2011    Grade I-II/VI systolic ejection murmur along left sternal border, with radiation to the pulmonic area.  S/P cardiac cath 02/24/2012    Minimal coronary artery disease. Mild LVE. EF 55%. Mod MR. Mod-severe MS. RA 15.  RV 55/16. PA 58/34. W 36.  CO/CI 3.5/1.9 (TD); 3.61/1.9 (Dequan). R to L shunt suggested.     Wears glasses     Weight gain        Past Surgical History:   Procedure Laterality Date    HX HEART CATHETERIZATION  2/24/2012    HX HYSTERECTOMY  2006    HX MITRAL VALVE REPLACEMENT  05/22/2013    25/33 mm On-X mechanical valve with bilateral pulmonary vein isolation and resection of LA appendage         Family History:   Problem Relation Age of Onset    Heart Surgery Father      Open-Heart Surgery    Other Father      Venous Thromboembolism    Heart Disease Mother      Enlarged Heart    Hypertension Mother     Diabetes Mother        Social History     Social History    Marital status:      Spouse name: N/A    Number of children: N/A    Years of education: N/A     Occupational History    Not on file. Social History Main Topics    Smoking status: Former Smoker     Packs/day: 0.50     Years: 30.00     Quit date: 8/5/2006    Smokeless tobacco: Never Used    Alcohol use 0.6 oz/week     1 Glasses of wine per week      Comment: occassionally    Drug use: No    Sexual activity: Yes     Partners: Male     Birth control/ protection: None     Other Topics Concern    Not on file     Social History Narrative         ALLERGIES: Morphine    Review of Systems   Constitutional: Negative. Negative for activity change, appetite change and fever. HENT: Negative for congestion, ear discharge, ear pain, facial swelling, nosebleeds, postnasal drip, sinus pressure, sneezing and tinnitus. Eyes: Negative for pain, discharge, redness and visual disturbance. Respiratory: Positive for cough. Negative for apnea, choking, chest tightness, shortness of breath, wheezing and stridor. Cardiovascular: Negative for chest pain, palpitations and leg swelling. Gastrointestinal: Negative for abdominal distention, abdominal pain, anal bleeding, blood in stool, constipation, diarrhea, nausea and vomiting.    Genitourinary: Negative for decreased urine volume, difficulty urinating, dyspareunia, dysuria, flank pain, frequency, hematuria, pelvic pain, urgency, vaginal bleeding and vaginal discharge. Musculoskeletal: Negative for arthralgias, back pain, gait problem, joint swelling, myalgias, neck pain and neck stiffness. Skin: Negative for color change. Neurological: Negative for dizziness, tremors, seizures, speech difficulty, weakness, numbness and headaches. Hematological: Negative for adenopathy. Does not bruise/bleed easily. Psychiatric/Behavioral: Negative for agitation, dysphoric mood and self-injury. The patient is not nervous/anxious. Vitals:    03/26/17 0006   BP: (!) 124/93   Pulse: 84   Resp: 18   Temp: 97.4 °F (36.3 °C)   SpO2: 98%   Weight: 86.6 kg (191 lb)   Height: 5' 7\" (1.702 m)            Physical Exam   Constitutional: She is oriented to person, place, and time. She appears well-developed and well-nourished. HENT:   Head: Normocephalic and atraumatic. Right Ear: External ear normal.   Left Ear: External ear normal.   Nose: Nose normal.   Mouth/Throat: Oropharynx is clear and moist.   Eyes: Conjunctivae and EOM are normal. Pupils are equal, round, and reactive to light. Neck: Normal range of motion. Neck supple. Cardiovascular: Regular rhythm, normal heart sounds and intact distal pulses. Midline chest scar   Pulmonary/Chest: Effort normal and breath sounds normal. No respiratory distress. She has no wheezes. She has no rales. She exhibits no tenderness. Abdominal: Soft. Bowel sounds are normal. She exhibits no distension and no mass. There is no tenderness. There is no rebound and no guarding. Musculoskeletal: Normal range of motion. She exhibits deformity. She exhibits no edema or tenderness. Neurological: She is alert and oriented to person, place, and time. Skin: Skin is warm and dry. No rash noted. No erythema. Psychiatric: She has a normal mood and affect. Her behavior is normal. Judgment normal.   Nursing note and vitals reviewed.        MDM  Number of Diagnoses or Management Options  Diagnosis management comments:  cough , not thus far responding to treatment. Differential includes, not limited to viral, bacterial, atypical or higher bacterial infection,tumor, PE, other         Amount and/or Complexity of Data Reviewed  Clinical lab tests: ordered  Tests in the radiology section of CPT®: ordered    Risk of Complications, Morbidity, and/or Mortality  Presenting problems: moderate      ED Course       Procedures      Vitals:  Patient Vitals for the past 12 hrs:   Temp Pulse Resp BP SpO2   03/26/17 0006 97.4 °F (36.3 °C) 84 18 (!) 124/93 98 %     98% on RA, indicating adequate oxygenation. Medications ordered:   Medications - No data to display      Lab findings:  Recent Results (from the past 12 hour(s))   STREP THROAT SCREEN    Collection Time: 03/26/17 12:50 AM   Result Value Ref Range    Special Requests: NO SPECIAL REQUESTS      Strep Screen NEGATIVE       Culture result: PENDING    INFLUENZA A & B AG (RAPID TEST)    Collection Time: 03/26/17 12:50 AM   Result Value Ref Range    Influenza A Antigen NEGATIVE  NEG      Influenza B Antigen NEGATIVE  NEG         X-Ray, CT or other radiology findings or impressions:  XR CHEST PA LAT    (Results Pending)   XR CHEST PA LAT  1:52 AM Independently interpreted by Sabrina Hutchinson MD which shows:  No acute changes    Reevaluation of patient:   1:54 AM Pt rechecked, discussed all results results, diagnosis, and plan for discharge. Pt understands and agrees to plan. All questions addressed. Disposition:  Diagnosis:   1. Acute upper respiratory infection        Disposition: Discharge    Follow-up Information     None            Patient's Medications   Start Taking    No medications on file   Continue Taking    ALBUTEROL (VENTOLIN HFA) 90 MCG/ACTUATION INHALER    Take 2 Puffs by inhalation daily. ASCORBIC ACID (VITAMIN C) 500 MG TABLET    Take 500 mg by mouth daily. ESOMEPRAZOLE (NEXIUM) 20 MG CAPSULE    Take 1 Cap by mouth daily.     FLUTICASONE (FLONASE) 50 MCG/ACTUATION NASAL SPRAY    2 Sprays by Both Nostrils route daily. FLUTICASONE-SALMETEROL (ADVAIR) 250-50 MCG/DOSE DISKUS INHALER    Take 1 Puff by inhalation every twelve (12) hours. FUROSEMIDE (LASIX) 40 MG TABLET    TAKE 1 TABLET DAILY    HYDROCHLOROTHIAZIDE (HYDRODIURIL) 25 MG TABLET    Take 1 Tab by mouth daily. MELATONIN TAB TABLET    Take 10 mg by mouth nightly. METOPROLOL SUCCINATE (TOPROL-XL) 100 MG TABLET    Take 1 Tab by mouth daily. MULTIVITAMIN (ONE A DAY) TABLET    Take 1 Tab by mouth daily. POTASSIUM CHLORIDE (K-DUR, KLOR-CON) 20 MEQ TABLET    Take 1 Tab by mouth daily. SIMVASTATIN (ZOCOR) 20 MG TABLET    Take 1 Tab by mouth nightly. TEMAZEPAM (RESTORIL) 15 MG CAPSULE    Take 15 mg by mouth nightly as needed. TRAMADOL (ULTRAM) 50 MG TABLET    Take 50 mg by mouth every six (6) hours as needed. WARFARIN (COUMADIN) 5 MG TABLET    TAKE 1 TABLET BY MOUTH DAILY   These Medications have changed    No medications on file   Stop Taking    ASPIRIN 81 MG TABLET    Take 81 mg by mouth daily.      Scribe Attestation:   Kailee Mullen am scribing for and in the presence of,  Herbie Petersen MD March 26, 2017 at 1:52 AM     Physician Attestation   I personally performed the services described in this documentation, reviewed and edited the documentation which was dictated to the scribe in my presence, and it accurately records my words and actions  Herbie Petersen MD March 26, 2017 at 1:52 AM

## 2017-03-26 NOTE — ED NOTES
I have reviewed discharge instructions with the patient. The patient verbalized understanding. Patient armband removed and shredded. Pt is ambulatory with no acute distress noted at this time, the patient is alert, oriented and stable at time of discharge. Vital Signs stable. Patient is being discharged with 1 prescription at this time.

## 2017-03-26 NOTE — ED TRIAGE NOTES
Complains of congestions and productive cough of yellowish sputum that started Thursday morning.   mercy was seen by PCP Thursday and told to take Mucinex but it is not workingl

## 2017-03-28 DIAGNOSIS — I11.0 BENIGN HYPERTENSIVE HEART DISEASE WITH HEART FAILURE (HCC): ICD-10-CM

## 2017-03-28 DIAGNOSIS — E78.2 MIXED HYPERLIPIDEMIA: ICD-10-CM

## 2017-03-28 LAB
B-HEM STREP THROAT QL CULT: NEGATIVE
BACTERIA SPEC CULT: NORMAL
SERVICE CMNT-IMP: NORMAL

## 2017-03-28 NOTE — TELEPHONE ENCOUNTER
Requested Prescriptions     Pending Prescriptions Disp Refills    hydroCHLOROthiazide (HYDRODIURIL) 25 mg tablet [Pharmacy Med Name: HYDROCHLOROTHIAZIDE 25 MG TAB] 30 Tab 6     Sig: TAKE 1 TAB BY MOUTH DAILY.  metoprolol succinate (TOPROL-XL) 100 mg tablet 30 Tab 6     Sig: Take 1 Tab by mouth daily.

## 2017-03-30 RX ORDER — HYDROCHLOROTHIAZIDE 25 MG/1
TABLET ORAL
Qty: 30 TAB | Refills: 6 | Status: SHIPPED | OUTPATIENT
Start: 2017-03-30 | End: 2017-09-18 | Stop reason: SDUPTHER

## 2017-03-30 RX ORDER — METOPROLOL SUCCINATE 100 MG/1
100 TABLET, EXTENDED RELEASE ORAL DAILY
Qty: 30 TAB | Refills: 6 | Status: SHIPPED | OUTPATIENT
Start: 2017-03-30 | End: 2017-03-31 | Stop reason: SDUPTHER

## 2017-03-31 RX ORDER — METOPROLOL SUCCINATE 100 MG/1
TABLET, EXTENDED RELEASE ORAL
Qty: 30 TAB | Refills: 6 | Status: SHIPPED | OUTPATIENT
Start: 2017-03-31 | End: 2017-09-18 | Stop reason: SDUPTHER

## 2017-04-04 ENCOUNTER — OFFICE VISIT (OUTPATIENT)
Dept: FAMILY MEDICINE CLINIC | Age: 57
End: 2017-04-04

## 2017-04-04 VITALS
DIASTOLIC BLOOD PRESSURE: 91 MMHG | RESPIRATION RATE: 20 BRPM | BODY MASS INDEX: 30.29 KG/M2 | SYSTOLIC BLOOD PRESSURE: 136 MMHG | HEIGHT: 67 IN | WEIGHT: 193 LBS | TEMPERATURE: 97.9 F | HEART RATE: 81 BPM | OXYGEN SATURATION: 95 %

## 2017-04-04 DIAGNOSIS — J40 BRONCHITIS: Primary | ICD-10-CM

## 2017-04-04 RX ORDER — CODEINE PHOSPHATE AND GUAIFENESIN 10; 100 MG/5ML; MG/5ML
5 SOLUTION ORAL
Qty: 118 ML | Refills: 0 | Status: SHIPPED | OUTPATIENT
Start: 2017-04-10 | End: 2017-07-12 | Stop reason: ALTCHOICE

## 2017-04-04 NOTE — PROGRESS NOTES
Chronic Illness Visit    Today's Date:  2017   Patient's Name: Thomas Andres   Patient's :  1960     History:     Chief Complaint   Patient presents with   [facility] Follow Up     pt here for ER follow up SHIVI       Thomas Andres is a 60 y.o. female presenting for Acute Care    Bronchitis/URI    Onset: 3/23/17 she was started on antihistamine and flonase, then she went to ED on 3/25 and was given Cheratussin symptoms are improving gradually. Reports productive cough w/  Sputum, nasal/chest congestion  Aggravating/Alleviating factors: see above  Treatment at home:  See above  SIck contacts: denies  Smoking history: denies      Past Medical History:   Diagnosis Date    Aortic valve disorders 2011    Atrial fibrillation ([address] 2011    Congestive heart failure, unspecified 2005    Cleared quickly with Lasix therapy    Echocardiogram 01/10/2012    A-fib. EF 45%. Mild RVE. RVSP 45-50. Massive LAE. Mod TAMI. Mod MS. Severe MR (mean grad 10). Mild-mod AI. Mild TR. Pulmonic systolic flow reversal.  Mild IVCE.  Hypertension     Iron deficiency anemias     Long term (current) use of anticoagulants 2011    Murmur 2011    Grade I-II/VI systolic ejection murmur along left sternal border, with radiation to the pulmonic area.  S/P cardiac cath 2012    Minimal coronary artery disease. Mild LVE. EF 55%. Mod MR. Mod-severe MS. RA 15.  RV 55/16. PA 58/34. W 36.  CO/CI 3.5/1.9 (TD); 3.61/1.9 ([place]an). R to L shunt suggested.     Wears glasses     Weight gain      Past Surgical History:   Procedure Laterality Date    HX HEART CATHETERIZATION  2012    HX HYSTERECTOMY  2006    HX MITRAL VALVE REPLACEMENT  2013    25/33 mm On-X mechanical valve with bilateral pulmonary vein isolation and resection of LA appendage     Social History     Social History    Marital status:      Spouse name: N/A    Number of children: N/A    Years of education: N/A Social History Main Topics    Smoking status: Former Smoker     Packs/day: 0.50     Years: 30.00     Quit date: 8/5/2006    Smokeless tobacco: Never Used    Alcohol use 0.6 oz/week     1 Glasses of wine per week      Comment: occassionally    Drug use: No    Sexual activity: Yes     Partners: Male     Birth control/ protection: None     Other Topics Concern    None     Social History Narrative     Family History   Problem Relation Age of Onset    Heart Surgery Father      Open-Heart Surgery    Other Father      Venous Thromboembolism    Heart Disease Mother      Enlarged Heart    Hypertension Mother     Diabetes Mother      Allergies   Allergen Reactions    Morphine Rash and Itching       Problem List:      Patient Active Problem List   Diagnosis Code    Rheumatic valvular disease I09.1    Chronic diastolic congestive heart failure (HCC) I50.32    Atrial fibrillation (HCC) I48.91    Long term (current) use of anticoagulants Z79.01    Benign hypertensive heart disease with heart failure (HCC) I11.0, I50.9    Microcytic anemia D50.9    Hyperlipidemia E78.5    Advanced directives, counseling/discussion Z71.89       Medications:     Current Outpatient Prescriptions   Medication Sig    [START ON 4/10/2017] guaiFENesin-codeine (CHERATUSSIN AC) 100-10 mg/5 mL solution Take 5 mL by mouth three (3) times daily as needed for Cough. Max Daily Amount: 15 mL.  metoprolol succinate (TOPROL-XL) 100 mg tablet TAKE 1 TAB BY MOUTH DAILY.  hydroCHLOROthiazide (HYDRODIURIL) 25 mg tablet TAKE 1 TAB BY MOUTH DAILY.  albuterol (VENTOLIN HFA) 90 mcg/actuation inhaler Take 2 Puffs by inhalation daily.  esomeprazole (NEXIUM) 20 mg capsule Take 1 Cap by mouth daily.  furosemide (LASIX) 40 mg tablet TAKE 1 TABLET DAILY    potassium chloride (K-DUR, KLOR-CON) 20 mEq tablet Take 1 Tab by mouth daily.     warfarin (COUMADIN) 5 mg tablet TAKE 1 TABLET BY MOUTH DAILY    fluticasone (FLONASE) 50 mcg/actuation nasal spray 2 Sprays by Both Nostrils route daily.  fluticasone-salmeterol (ADVAIR) 250-50 mcg/dose diskus inhaler Take 1 Puff by inhalation every twelve (12) hours.  simvastatin (ZOCOR) 20 mg tablet Take 1 Tab by mouth nightly.  melatonin tab tablet Take 10 mg by mouth nightly.  temazepam (RESTORIL) 15 mg capsule Take 15 mg by mouth nightly as needed.  traMADol (ULTRAM) 50 mg tablet Take 50 mg by mouth every six (6) hours as needed.  ascorbic acid (VITAMIN C) 500 mg tablet Take 500 mg by mouth daily.  multivitamin (ONE A DAY) tablet Take 1 Tab by mouth daily. No current facility-administered medications for this visit. Constitutional: negative for fevers, chills and sweats  Ears, nose, mouth, throat, and face: positive for earaches and nasal congestion, negative for sore throat  Respiratory: positive for cough or sputum  Cardiovascular: negative for chest pain, chest pressure/discomfort, dyspnea  Gastrointestinal: negative for nausea, vomiting, constipation and abdominal pain    Physical Assessment:   VS:    Visit Vitals    BP (!) 136/91 (BP 1 Location: Left arm, BP Patient Position: Sitting)    Pulse 81    Temp 97.9 °F (36.6 °C) (Oral)    Resp 20    Ht 5' 7\" (1.702 m)    Wt 193 lb (87.5 kg)    SpO2 95%    BMI 30.23 kg/m2       Lab Results   Component Value Date/Time    Sodium 140 08/22/2016 08:18 AM    Potassium 3.6 08/22/2016 08:18 AM    Chloride 103 08/22/2016 08:18 AM    CO2 30 08/22/2016 08:18 AM    Anion gap 7 08/22/2016 08:18 AM    Glucose 93 08/22/2016 08:18 AM    BUN 16 08/22/2016 08:18 AM    Creatinine 0.88 08/22/2016 08:18 AM    BUN/Creatinine ratio 18 08/22/2016 08:18 AM    GFR est AA >60 08/22/2016 08:18 AM    GFR est non-AA >60 08/22/2016 08:18 AM    Calcium 9.3 08/22/2016 08:18 AM       General:   Well-groomed, well-nourished, in no distress, pleasant, alert, appropriate and conversant.    ENT:  Bilateral cerumen impaction  Mouth:  Good dentition, oropharynx WNL without membranes, exudates, petechiae or ulcers  Neck:   Neck supple, no swelling, mass or tenderness, no thyromegaly  Cardiovasc:   RRR, no MRG. Pulses 2+ and symmetric at distal extremities. Pulmonary:   Lungs clear bilaterally. Normal respiratory effort. Extremities:   No edema, no TTP bilateral calves. LEs warm and well-perfused. Neuro:   Alert and oriented, no focal deficits. No facial asymmetry noted. Skin:    No rashes or jaundice  Psych:  No pressured speech or abnormal thought content        Assessment/Plan & Orders:       1. Bronchitis        Orders Placed This Encounter    guaiFENesin-codeine (CHERATUSSIN AC) 100-10 mg/5 mL solution       Bronchitis  -symptoms improving on antihistamine, cough suppressant and cheratussin at bedtime. Would like patient to continue with current Tx plan cheratussin refill done for next week. Cerumen impaction resolved today s/p warm water lavage    Follow up as needed    *Plan of care reviewed with patient. Patient in agreement with plan and expresses understanding. All questions answered and patient encouraged to call or RTO if further questions or concerns.     Denise Goldberg MD  Family Medicine  4/4/2017  10:30 AM

## 2017-04-14 ENCOUNTER — ANTI-COAG VISIT (OUTPATIENT)
Dept: CARDIOLOGY CLINIC | Age: 57
End: 2017-04-14

## 2017-04-14 ENCOUNTER — OFFICE VISIT (OUTPATIENT)
Dept: CARDIOLOGY CLINIC | Age: 57
End: 2017-04-14

## 2017-04-14 VITALS
SYSTOLIC BLOOD PRESSURE: 118 MMHG | DIASTOLIC BLOOD PRESSURE: 82 MMHG | BODY MASS INDEX: 30.45 KG/M2 | HEIGHT: 67 IN | OXYGEN SATURATION: 98 % | HEART RATE: 70 BPM | WEIGHT: 194 LBS

## 2017-04-14 DIAGNOSIS — E78.5 HYPERLIPIDEMIA, UNSPECIFIED HYPERLIPIDEMIA TYPE: ICD-10-CM

## 2017-04-14 DIAGNOSIS — I50.32 CHRONIC DIASTOLIC CONGESTIVE HEART FAILURE (HCC): Primary | ICD-10-CM

## 2017-04-14 DIAGNOSIS — I48.19 PERSISTENT ATRIAL FIBRILLATION (HCC): ICD-10-CM

## 2017-04-14 DIAGNOSIS — Z79.01 LONG TERM (CURRENT) USE OF ANTICOAGULANTS: Primary | ICD-10-CM

## 2017-04-14 DIAGNOSIS — I09.1 RHEUMATIC VALVULAR DISEASE: ICD-10-CM

## 2017-04-14 LAB
INR BLD: 2.1
PT POC: 25.7 SEC
VALID INTERNAL CONTROL?: YES

## 2017-04-14 NOTE — PROGRESS NOTES
HPI: I saw Laron Stovall in my office today in cardiovascular evaluation regarding her mitral valve disease. Ms. Hilario Wheeler is a very pleasant 60year old  female whom I first saw back in March of 2005 at [facility] with congestive heart failure. Her heart failure cleared quickly at that time with Lasix therapy and subsequent echocardiogram demonstrated mild concentric left ventricular hypertrophy without ventricular dilatation, and a low normal overall left function with ejection fraction in the 55% range, as well as mild to moderate mitral stenosis and moderate mitral regurgitation, with some signs of rheumatic aortic valvular disease as well. She subsequently did reasonably well on Diovan and beta blockers, but she was lost to follow up for some time, and when I saw her again in 2012 she was having signs of worsening mitral stenosis and ultimately underwent a cardiac catheterization by my associate Dr. Jean-Paul Cotton on 2/24/12, which demonstrated what appeared to be moderate to severe mitral stenosis with moderate mitral regurgitation, mild aortic insufficiency and only very mild coronary artery disease. She did have an episode of hypoxemia during that cardiac catheterization and had a cardiac MRI and was further worked up by Dr. Gideon Walker prior to her ultimately getting surgery, which actually was put off for another year because of some personal issues. I saw her last on 3/23/13 and she was reasonably well compensated at that time, and she ultimately was referred for surgery and that was completed by Dr. Gideon Walker on 5/22/13, and consisted of a mitral valve replacement with a 25/23 mm On-X mechanical valve together with bilateral pulmonary vein isolation and resection of the left atrial appendage. She comes in today relates that she is doing quite well currently.   She has had an upper respiratory tract infection but is recently gotten over that and she tells me that from a cardiovascular vantage she is really not having any symptomatology except for her chronic 2 pillow orthopnea. Encounter Diagnoses   Name Primary?  Chronic atrial fibrillation (HCC)     Rheumatic valvular disease, status post mitral valve replacement with an On-X mechanical valve on May 22, 2013     Chronic diastolic congestive heart failure (HCC) Yes    Hyperlipidemia, unspecified hyperlipidemia type        Discussion: This patient appears to be doing about as well as we could expect that I really have no recommendations for change at this time. She appears to be well compensated from a cardiovascular viewpoint and we did do an echocardiogram on her back in August 2016 which demonstrated normal left ventricular function with a normally functioning prosthetic valve in the mitral position. Her latest lipid profile which was completed on March 8, 2017 was reasonably good with total cholesterol 148, triglycerides 129, HDL of 56, LDL of 66.4, and VLDL of 25.6 which I think is good control on Zocor 20 mg daily. We are managing her anticoagulation through the office and her INR today was 2.1, but in general her INRs have been in the 2.5-3.0 range. Her blood pressure is well-controlled today and she is otherwise doing well so I will plan to see her again in several months or as needed if any new cardiovascular symptoms surface. PCP: Regla Arnold MD       Past Medical History:   Diagnosis Date    Aortic valve disorders 1/13/2011    Atrial fibrillation ([address] 1/13/2011    Congestive heart failure, unspecified March 2005    Cleared quickly with Lasix therapy    Echocardiogram 01/10/2012    A-fib. EF 45%. Mild RVE. RVSP 45-50. Massive LAE. Mod TAMI. Mod MS. Severe MR (mean grad 10). Mild-mod AI. Mild TR. Pulmonic systolic flow reversal.  Mild IVCE.     Hypertension     Iron deficiency anemias     Long term (current) use of anticoagulants 2/23/2011    Murmur 1/2011    Grade I-II/VI systolic ejection murmur along left sternal border, with radiation to the pulmonic area.  S/P cardiac cath 02/24/2012    Minimal coronary artery disease. Mild LVE. EF 55%. Mod MR. Mod-severe MS. RA 15.  RV 55/16. PA 58/34. W 36.  CO/CI 3.5/1.9 (TD); 3.61/1.9 ([place]). R to L shunt suggested.  Wears glasses     Weight gain          Past Surgical History:   Procedure Laterality Date    HX HEART CATHETERIZATION  2/24/2012    HX HYSTERECTOMY  2006    HX MITRAL VALVE REPLACEMENT  05/22/2013    25/33 mm On-X mechanical valve with bilateral pulmonary vein isolation and resection of LA appendage         Current Outpatient Rx   Name Route Sig Dispense Refill    LORATADINE 10 MG TAB Oral Take 10 mg by mouth daily.  ASCORBIC ACID 500 MG TAB Oral Take 500 mg by mouth daily.  FLUTICASONE 50 MCG/ACTUATION NASAL SPRAY, SUSP Both Nostrils 2 Sprays by Both Nostrils route daily. 1 Bottle 3    FERROUS SULFATE 325 MG (65 MG ELEMENTAL IRON) TAB Oral Take 65 mg by mouth Daily (before breakfast).  POTASSIUM CHLORIDE SR 20 MEQ TAB, PARTICLES/CRYSTALS Oral Take 2 Tabs by mouth daily. 60 Tab 8    LOSARTAN 100 MG TAB Oral Take 1 Tab by mouth daily. 30 Tab 6    METOPROLOL 50 MG TAB Oral Take 1 Tab by mouth two (2) times a day. 60 Tab 8    FUROSEMIDE 40 MG TAB Oral Take 2 Tabs by mouth daily. 60 Tab 11    AMOXICILLIN 500 MG CAP  Take 4 capsules 1 hour prior to dental appointment 4 Cap 1    MULTIVITAMIN TAB Oral Take 1 Tab by mouth daily.  ASPIRIN 81 MG TAB Oral Take 81 mg by mouth daily.  WARFARIN 5 MG TAB Oral Take 1 Tab by mouth daily. 45 Tab 6       Allergies   Allergen Reactions    Morphine Rash and Itching       Social History:   reports that she quit smoking about 10 years ago. She has a 15.00 pack-year smoking history. She has never used smokeless tobacco. She reports that she drinks about 0.6 oz of alcohol per week  She reports that she does not use illicit drugs.     Family History Problem Relation Age of Onset    Heart Surgery Father      Open-Heart Surgery    Other Father      Venous Thromboembolism    Heart Disease Mother      Enlarged Heart    Hypertension Mother     Diabetes Mother      Review of Systems:    Constitutional: Negative. Respiratory: Positive for sputum production. Negative for cough, hemoptysis, shortness of breath and wheezing. Cardiovascular: Positive for orthopnea. Negative for chest pain, palpitations, claudication, leg swelling and PND. Gastrointestinal: Negative. Musculoskeletal: Negative. Physical Exam:   Visit Vitals    /82    Pulse 70    Ht 5' 7\" (1.702 m)    Wt 88 kg (194 lb)    SpO2 98%    BMI 30.38 kg/m2       The patient is an alert, oriented, well developed, well nourished 60 y.o.  female who was in no acute distress at the time of my examination. HEENT: No icterus or xanthelasmas. Conjunctivae white, mucosa moist, no pallor or cyanosis. Neck: Supple without masses, tenderness or thyromegaly. No jugular venous distention. Carotid upstrokes are full bilaterally, without bruits. Cardiovascular: Chest is symmetrical with good excursion. There is a well healed, but widened mid sternotomy scar. [place] is not displaced. No lifts, heaves or thrills. Mechanical S1 with normal S2 without appreciable murmurs, rubs, clicks or gallops auscultated. Lungs: Clear to auscultation bilaterally. Abdomen: Soft. No masses, tenderness or organomegaly. No abdominal bruits. Extremities: No edema, with full peripheral pulses. No clubbing or cyanosis. Review of Data: Please refer to past medical history for most recent cardiac testing.   Lab Results   Component Value Date/Time    Cholesterol, total 148 03/08/2017 07:26 AM    HDL Cholesterol 56 03/08/2017 07:26 AM    LDL, calculated 66.4 03/08/2017 07:26 AM    Triglyceride 128 03/08/2017 07:26 AM    CHOL/HDL Ratio 2.6 03/08/2017 07:26 AM     Results for orders placed or performed in visit on 08/11/16   AMB POC EKG ROUTINE W/ 12 LEADS, INTER & REP     Status: None    Narrative    Atrial fibrillation with rate in the mid 80's. One PVC on the tracing. Mild diffuse ST-T flattening. This EKG is similar to the EKG of 3/20/2013 although the PVC is new. No Sanchez D.O., F.A.C.C. [facility]  [facility]  [address] [place] [address]. [address]    PLEASE NOTE:  This document has been produced using voice recognition software. Unrecognized errors in transcription may be present.

## 2017-04-14 NOTE — MR AVS SNAPSHOT
Visit Information Date & Time Provider Department Dept. Phone Encounter #  
 4/14/2017 10:20 AM Shameka Bruce DO [facility] [id number] Follow-up Instructions Return in about 6 months (around 10/14/2017), or if symptoms worsen or fail to improve. Your Appointments 5/15/2017 10:40 AM  
ANTICOAG NURSE with Pt Inr [facility]v [facility] [address] ([address] [place] [address]) Appt Note: 4 week INR  
 Kathrin Redd [id number]  
[phone] [address] [facility] [address]  [address] 3/15/2018 11:00 AM  
Office Visit with Bert Bansal MD  
[address]) Appt Note: [address] [facility] [address]  
[facility] [address]  
  
    
 9/27/2018 10:00 AM  
FOLLOW UP EXAM with Bert Bansal MD  
[address]) Appt Note: 6 month f/u  
 [address] [facility] [address]  
[facility] [address] Upcoming Health Maintenance Date Due Hepatitis C Screening 1960 Pneumococcal 19-64 Medium Risk (1 of 1 - PPSV23) 7/4/1977 DTaP/Tdap/Td series (1 - Tdap) 7/4/1979 FOBT Q 1 YEAR AGE 50-75 7/4/2008 BREAST CANCER SCRN MAMMOGRAM 3/16/2019 PAP AKA CERVICAL CYTOLOGY 3/9/2020 Allergies as of 4/14/2017  Review Complete On: 4/4/2017 By: Bert Bansal MD  
  
 Severity Noted Reaction Type Reactions Morphine  02/02/2012    Rash, Itching Current Immunizations  Reviewed on 2/15/2013 Name Date Influenza Vaccine 12/15/2012 Influenza Vaccine (Quad) PF 9/20/2016 Not reviewed this visit You Were Diagnosed With   
  
 Codes Comments Chronic diastolic congestive heart failure (HCC)    -  Primary ICD-10-CM: I50.32 
ICD-9-CM: 428.32, 428.0 Hyperlipidemia, unspecified hyperlipidemia type     ICD-10-CM: E78.5 ICD-9-CM: 272.4 Persistent atrial fibrillation (HCC)     ICD-10-CM: I48.1 ICD-9-CM: 427.31 Rheumatic valvular disease     ICD-10-CM: I09.1 ICD-9-CM: 397.9 Vitals BP Pulse Height(growth percentile) Weight(growth percentile) SpO2 BMI  
 118/82 70 5' 7\" (1.702 m) 194 lb (88 kg) 98% 30.38 kg/m2 OB Status Smoking Status Hysterectomy Former Smoker Vitals History BMI and BSA Data Body Mass Index Body Surface Area  
 30.38 kg/m 2 2.04 m 2 Preferred Pharmacy Pharmacy Name Phone [id number] [address], [address] [facility] [facility] [phone] Your Updated Medication List  
  
   
This list is accurate as of: 4/14/17 12:05 PM.  Always use your most recent med list.  
  
  
  
  
 albuterol 90 mcg/actuation inhaler Commonly known as:  VENTOLIN HFA Take 2 Puffs by inhalation daily. esomeprazole 20 mg capsule Commonly known as:  NexIUM Take 1 Cap by mouth daily. fluticasone 50 mcg/actuation nasal spray Commonly known as:  [employer] Mad 2 Sprays by Both Nostrils route daily. fluticasone-salmeterol 250-50 mcg/dose diskus inhaler Commonly known as:  ADVAIR Take 1 Puff by inhalation every twelve (12) hours. furosemide 40 mg tablet Commonly known as:  LASIX TAKE 1 TABLET DAILY  
  
 guaiFENesin-codeine 100-10 mg/5 mL solution Commonly known as:  Tharon Valdemar Take 5 mL by mouth three (3) times daily as needed for Cough. Max Daily Amount: 15 mL.  
  
 hydroCHLOROthiazide 25 mg tablet Commonly known as:  HYDRODIURIL  
TAKE 1 TAB BY MOUTH DAILY. melatonin Tab tablet Take 10 mg by mouth nightly. metoprolol succinate 100 mg tablet Commonly known as:  TOPROL-XL  
TAKE 1 TAB BY MOUTH DAILY. multivitamin tablet Commonly known as:  ONE A DAY Take 1 Tab by mouth daily. potassium chloride 20 mEq tablet Commonly known as:  K-DUR, KLOR-CON Take 1 Tab by mouth daily. simvastatin 20 mg tablet Commonly known as:  ZOCOR Take 1 Tab by mouth nightly. temazepam 15 mg capsule Commonly known as:  RESTORIL Take 15 mg by mouth nightly as needed. traMADol 50 mg tablet Commonly known as:  ULTRAM  
Take 50 mg by mouth every six (6) hours as needed. VITAMIN C 500 mg tablet Generic drug:  ascorbic acid (vitamin C) Take 500 mg by mouth daily. warfarin 5 mg tablet Commonly known as:  COUMADIN  
TAKE 1 TABLET BY MOUTH DAILY We Performed the Following AMB POC EKG ROUTINE W/ 12 LEADS, INTER & REP [15564 CPT(R)] Follow-up Instructions Return in about 6 months (around 10/14/2017), or if symptoms worsen or fail to improve. Introducing [facility]! [address] [facility] introduces [employer] patient portal. Now you can access parts of your medical record, email your doctor's office, and request medication refills online. 1. In your internet browser, go to [email or web address] 2. Click on the First Time User? Click Here link in the Sign In box. You will see the New Member Sign Up page. 3. Enter your [employer] Access Code exactly as it appears below. You will not need to use this code after youve completed the sign-up process. If you do not sign up before the expiration date, you must request a new code. · [employer] Access Code: [email or web address] Expires: 5/16/2017 11:45 AM 
 
4. Enter the last four digits of your Social Security Number (xxxx) and Date of Birth (mm/dd/yyyy) as indicated and click Submit. You will be taken to the next sign-up page. 5. Create a [employer] ID. This will be your [employer] login ID and cannot be changed, so think of one that is secure and easy to remember. 6. Create a [employer] password. You can change your password at any time. 7. Enter your Password Reset Question and Answer.  This can be used at a later time if you forget your password. 8. Enter your e-mail address. You will receive e-mail notification when new information is available in [address] 9. Click Sign Up. You can now view and download portions of your medical record. 10. Click the Download Summary menu link to download a portable copy of your medical information. If you have questions, please visit the Frequently Asked Questions section of the [employer] website. Remember, [employer] is NOT to be used for urgent needs. For medical emergencies, dial 911. Now available from your iPhone and Android! Please provide this summary of care documentation to your next provider. Your primary care clinician is listed as Cheryl Garcia. If you have any questions after today's visit, please call [phone].

## 2017-04-14 NOTE — PROGRESS NOTES
Review of Systems   Constitutional: Negative. Respiratory: Positive for sputum production. Negative for cough, hemoptysis, shortness of breath and wheezing. Cardiovascular: Positive for orthopnea. Negative for chest pain, palpitations, claudication, leg swelling and PND. Gastrointestinal: Negative. Musculoskeletal: Negative.

## 2017-04-14 NOTE — PROGRESS NOTES
Verbal order and read back per Elaine Freire NP  The INR is stable and therapeutic. Continue same dose of coumadin and recheck in 1 month.   Continue Coumadin 5mg daily except 2.5mg on Tues, Thur, and Sat  Patient informed of instructions, read back and verbalized understanding David Redding LPN

## 2017-04-21 DIAGNOSIS — E78.2 MIXED HYPERLIPIDEMIA: ICD-10-CM

## 2017-04-21 DIAGNOSIS — I11.0 BENIGN HYPERTENSIVE HEART DISEASE WITH HEART FAILURE (HCC): ICD-10-CM

## 2017-04-21 RX ORDER — SIMVASTATIN 20 MG/1
TABLET, FILM COATED ORAL
Qty: 30 TAB | Refills: 6 | Status: SHIPPED | OUTPATIENT
Start: 2017-04-21 | End: 2017-09-18 | Stop reason: SDUPTHER

## 2017-04-25 ENCOUNTER — TELEPHONE (OUTPATIENT)
Dept: FAMILY MEDICINE CLINIC | Age: 57
End: 2017-04-25

## 2017-04-25 DIAGNOSIS — E78.5 HYPERLIPIDEMIA, UNSPECIFIED HYPERLIPIDEMIA TYPE: Primary | ICD-10-CM

## 2017-04-25 DIAGNOSIS — I10 ESSENTIAL HYPERTENSION: ICD-10-CM

## 2017-04-25 NOTE — TELEPHONE ENCOUNTER
Patient states that during her last visit losartan was suppose to be added to her prescription list and it was not. She states that she is now out and needs a refill.

## 2017-04-28 NOTE — TELEPHONE ENCOUNTER
Received call from pt. I asked if this medication was discussed with Dr Sima Piedra during last visit, pt stated \"NO\" I advised pt that because this medication was not prescribed by Dr Sima Piedra and it was not discussed during last visit this may require a office visit. I advised pt that I would send this request to Dr Sima Piedra and if denied or approved I would give her a call back. Pt verbalized understanding.

## 2017-05-01 ENCOUNTER — TELEPHONE (OUTPATIENT)
Dept: FAMILY MEDICINE CLINIC | Age: 57
End: 2017-05-01

## 2017-05-01 RX ORDER — LOSARTAN POTASSIUM 100 MG/1
100 TABLET ORAL DAILY
Qty: 30 TAB | Refills: 1 | Status: SHIPPED | OUTPATIENT
Start: 2017-05-01 | End: 2017-06-15 | Stop reason: SDUPTHER

## 2017-05-01 NOTE — TELEPHONE ENCOUNTER
Pt called today to check status of her Losartan. I verified the dosage, which is 100 mg. I advised pt that I would send request to Dr Governor Vasquez.

## 2017-05-18 ENCOUNTER — HOSPITAL ENCOUNTER (OUTPATIENT)
Dept: LAB | Age: 57
Discharge: HOME | End: 2017-05-18
Payer: MEDICARE

## 2017-05-18 ENCOUNTER — ANTI-COAG VISIT (OUTPATIENT)
Dept: CARDIOLOGY CLINIC | Age: 57
End: 2017-05-18

## 2017-05-18 DIAGNOSIS — I48.19 PERSISTENT ATRIAL FIBRILLATION (HCC): ICD-10-CM

## 2017-05-18 DIAGNOSIS — Z79.01 LONG TERM (CURRENT) USE OF ANTICOAGULANTS: Primary | ICD-10-CM

## 2017-05-18 LAB
ALBUMIN SERPL BCP-MCNC: 3.5 G/DL (ref 3.4–5)
ALBUMIN/GLOB SERPL: 0.8 {RATIO} (ref 0.8–1.7)
ALP SERPL-CCNC: 79 U/L (ref 45–117)
ALT SERPL-CCNC: 22 U/L (ref 13–56)
ANION GAP BLD CALC-SCNC: 6 MMOL/L (ref 3–18)
AST SERPL W P-5'-P-CCNC: 24 U/L (ref 15–37)
BILIRUB SERPL-MCNC: 0.5 MG/DL (ref 0.2–1)
BUN SERPL-MCNC: 13 MG/DL (ref 7–18)
BUN/CREAT SERPL: 14 (ref 12–20)
CALCIUM SERPL-MCNC: 9 MG/DL (ref 8.5–10.1)
CHLORIDE SERPL-SCNC: 101 MMOL/L (ref 100–108)
CHOLEST SERPL-MCNC: 166 MG/DL
CO2 SERPL-SCNC: 33 MMOL/L (ref 21–32)
CREAT SERPL-MCNC: 0.91 MG/DL (ref 0.6–1.3)
GLOBULIN SER CALC-MCNC: 4.3 G/DL (ref 2–4)
GLUCOSE SERPL-MCNC: 97 MG/DL (ref 74–99)
HDLC SERPL-MCNC: 61 MG/DL (ref 40–60)
HDLC SERPL: 2.7 {RATIO} (ref 0–5)
INR BLD: 2.6
LDLC SERPL CALC-MCNC: 80.2 MG/DL (ref 0–100)
LIPID PROFILE,FLP: ABNORMAL
POTASSIUM SERPL-SCNC: 3.4 MMOL/L (ref 3.5–5.5)
PROT SERPL-MCNC: 7.8 G/DL (ref 6.4–8.2)
PT POC: 30.7 SECONDS
SODIUM SERPL-SCNC: 140 MMOL/L (ref 136–145)
TRIGL SERPL-MCNC: 124 MG/DL (ref ?–150)
VALID INTERNAL CONTROL?: YES
VLDLC SERPL CALC-MCNC: 24.8 MG/DL

## 2017-05-18 PROCEDURE — 80053 COMPREHEN METABOLIC PANEL: CPT | Performed by: FAMILY MEDICINE

## 2017-05-18 PROCEDURE — 80061 LIPID PANEL: CPT | Performed by: FAMILY MEDICINE

## 2017-05-18 PROCEDURE — 36415 COLL VENOUS BLD VENIPUNCTURE: CPT | Performed by: FAMILY MEDICINE

## 2017-05-18 NOTE — PROGRESS NOTES
Verbal order and read back per Rob Sullivan NP  The INR is stable and therapeutic. Continue same dose of coumadin and recheck in 1 month.   Continue Coumadin 5mg daily except 2.5mg on Tues, Thur, and Sat  Patient informed of instructions, read back and verbalized understanding Sae Mcclellan LPN

## 2017-06-15 ENCOUNTER — ANTI-COAG VISIT (OUTPATIENT)
Dept: CARDIOLOGY CLINIC | Age: 57
End: 2017-06-15

## 2017-06-15 DIAGNOSIS — I48.19 PERSISTENT ATRIAL FIBRILLATION (HCC): Primary | ICD-10-CM

## 2017-06-15 DIAGNOSIS — I11.0 BENIGN HYPERTENSIVE HEART DISEASE WITH HEART FAILURE (HCC): ICD-10-CM

## 2017-06-15 DIAGNOSIS — I10 ESSENTIAL HYPERTENSION: ICD-10-CM

## 2017-06-15 DIAGNOSIS — E78.5 HYPERLIPIDEMIA, UNSPECIFIED HYPERLIPIDEMIA TYPE: ICD-10-CM

## 2017-06-15 DIAGNOSIS — Z79.01 LONG TERM (CURRENT) USE OF ANTICOAGULANTS: ICD-10-CM

## 2017-06-15 LAB
INR BLD: 2.2
PT POC: 25.9 SECONDS
VALID INTERNAL CONTROL?: YES

## 2017-06-15 NOTE — PROGRESS NOTES
Verbal order and read back per Harley Grant NP  The INR is stable and therapeutic. Continue same dose of coumadin and recheck in 1 month.   Continue Coumadin 5mg daily except 2.5mg on Tues, Thur, and Sat  Patient informed of instructions, read back and verbalized understanding Tiera Bourgeois LPN

## 2017-06-16 RX ORDER — WARFARIN SODIUM 5 MG/1
TABLET ORAL
Qty: 30 TAB | Refills: 6 | Status: SHIPPED | OUTPATIENT
Start: 2017-06-16 | End: 2018-01-26 | Stop reason: SDUPTHER

## 2017-06-16 RX ORDER — FUROSEMIDE 40 MG/1
TABLET ORAL
Qty: 30 TAB | Refills: 3 | Status: SHIPPED | OUTPATIENT
Start: 2017-06-16 | End: 2017-09-18 | Stop reason: SDUPTHER

## 2017-06-16 RX ORDER — LOSARTAN POTASSIUM 100 MG/1
100 TABLET ORAL DAILY
Qty: 30 TAB | Refills: 3 | Status: SHIPPED | OUTPATIENT
Start: 2017-06-16 | End: 2017-09-18 | Stop reason: SDUPTHER

## 2017-06-23 ENCOUNTER — PATIENT OUTREACH (OUTPATIENT)
Dept: FAMILY MEDICINE CLINIC | Age: 57
End: 2017-06-23

## 2017-06-26 ENCOUNTER — TELEPHONE (OUTPATIENT)
Dept: FAMILY MEDICINE CLINIC | Age: 57
End: 2017-06-26

## 2017-06-26 ENCOUNTER — PATIENT OUTREACH (OUTPATIENT)
Dept: FAMILY MEDICINE CLINIC | Age: 57
End: 2017-06-26

## 2017-06-26 DIAGNOSIS — Z12.11 ENCOUNTER FOR FIT (FECAL IMMUNOCHEMICAL TEST) SCREENING: Primary | ICD-10-CM

## 2017-06-26 NOTE — PROGRESS NOTES
[facility] Screening:    Has had total hysterectomy in the past so no more pap smears required. Mammogram up to date and HM updated. Discussed FIT testing vs colonoscopy. Mrs. Misha Toledo wishes to proceed with FIT testing this year and may revisit colonoscopy for next year. Reviewed the protocol for collection of stool sample. Did offer gas card for colonoscopy incentive but she has chosen FIT for now. Is aware should fit come back positive would require colonoscopy.

## 2017-06-30 DIAGNOSIS — E78.5 HYPERLIPIDEMIA, UNSPECIFIED HYPERLIPIDEMIA TYPE: ICD-10-CM

## 2017-06-30 DIAGNOSIS — I10 ESSENTIAL HYPERTENSION: ICD-10-CM

## 2017-06-30 RX ORDER — LOSARTAN POTASSIUM 100 MG/1
100 TABLET ORAL DAILY
Qty: 30 TAB | Refills: 3 | OUTPATIENT
Start: 2017-06-30

## 2017-06-30 NOTE — TELEPHONE ENCOUNTER
Requested Prescriptions     Pending Prescriptions Disp Refills    losartan (COZAAR) 100 mg tablet 30 Tab 3     Sig: Take 1 Tab by mouth daily.

## 2017-06-30 NOTE — TELEPHONE ENCOUNTER
6/30/2017  5:42 PM    Chief Complaint   Patient presents with    Medication Refill       Noted refill request for Losartan. This was sent to the pharmacy on 6/16 by Dr Timothy Ferguson. Patient only needs to call pharmacy to get medication.

## 2017-07-09 ENCOUNTER — APPOINTMENT (OUTPATIENT)
Dept: GENERAL RADIOLOGY | Age: 57
End: 2017-07-09
Attending: PHYSICIAN ASSISTANT
Payer: MEDICARE

## 2017-07-09 ENCOUNTER — HOSPITAL ENCOUNTER (EMERGENCY)
Age: 57
Discharge: HOME | End: 2017-07-09
Attending: EMERGENCY MEDICINE
Payer: MEDICARE

## 2017-07-09 VITALS
RESPIRATION RATE: 16 BRPM | HEIGHT: 67 IN | TEMPERATURE: 97.7 F | DIASTOLIC BLOOD PRESSURE: 79 MMHG | HEART RATE: 72 BPM | WEIGHT: 188 LBS | OXYGEN SATURATION: 99 % | SYSTOLIC BLOOD PRESSURE: 132 MMHG | BODY MASS INDEX: 29.51 KG/M2

## 2017-07-09 DIAGNOSIS — I80.01 THROMBOPHLEBITIS OF SUPERFICIAL VEINS OF RIGHT LOWER EXTREMITY: ICD-10-CM

## 2017-07-09 DIAGNOSIS — M79.671 RIGHT FOOT PAIN: Primary | ICD-10-CM

## 2017-07-09 PROCEDURE — 73630 X-RAY EXAM OF FOOT: CPT

## 2017-07-09 PROCEDURE — 99282 EMERGENCY DEPT VISIT SF MDM: CPT

## 2017-07-09 RX ORDER — HYDROCODONE BITARTRATE AND ACETAMINOPHEN 5; 325 MG/1; MG/1
1 TABLET ORAL
Qty: 12 TAB | Refills: 0 | Status: SHIPPED | OUTPATIENT
Start: 2017-07-09 | End: 2017-07-28 | Stop reason: ALTCHOICE

## 2017-07-09 RX ORDER — HYDROCODONE BITARTRATE AND ACETAMINOPHEN 5; 325 MG/1; MG/1
1 TABLET ORAL
Qty: 12 TAB | Refills: 0 | OUTPATIENT
Start: 2017-07-09

## 2017-07-09 NOTE — ED NOTES
Glenn Rahman is a 59 y.o. female that was discharged in stable condition. The patients diagnosis, condition and treatment were explained to  patient and aftercare instructions were given. The patient verbalized understanding. Patient armband removed and shredded.

## 2017-07-09 NOTE — DISCHARGE INSTRUCTIONS
Foot Pain: Care Instructions  Your Care Instructions  Foot injuries that cause pain and swelling are fairly common. Almost all sports or home repair projects can cause a misstep that ends up as foot pain. Normal wear and tear, especially as you get older, also can cause foot pain. Most minor foot injuries will heal on their own, and home treatment is usually all you need to do. If you have a severe injury, you may need tests and treatment. Follow-up care is a key part of your treatment and safety. Be sure to make and go to all appointments, and call your doctor if you are having problems. Its also a good idea to know your test results and keep a list of the medicines you take. How can you care for yourself at home? · Take pain medicines exactly as directed. ¨ If the doctor gave you a prescription medicine for pain, take it as prescribed. ¨ If you are not taking a prescription pain medicine, ask your doctor if you can take an over-the-counter medicine. · Rest and protect your foot. Take a break from any activity that may cause pain. · Put ice or a cold pack on your foot for 10 to 20 minutes at a time. Put a thin cloth between the ice and your skin. · Prop up the sore foot on a pillow when you ice it or anytime you sit or lie down during the next 3 days. Try to keep it above the level of your heart. This will help reduce swelling. · Your doctor may recommend that you wrap your foot with an elastic bandage. Keep your foot wrapped for as long as your doctor advises. · If your doctor recommends crutches, use them as directed. · Wear roomy footwear. · As soon as pain and swelling end, begin gentle exercises of your foot. Your doctor can tell you which exercises will help. When should you call for help? Call 911 anytime you think you may need emergency care. For example, call if:  · Your foot turns pale, white, blue, or cold.   Call your doctor now or seek immediate medical care if:  · You cannot move or stand on your foot. · Your foot looks twisted or out of its normal position. · Your foot is not stable when you step down. · You have signs of infection, such as:  ¨ Increased pain, swelling, warmth, or redness. ¨ Red streaks leading from the sore area. ¨ Pus draining from a place on your foot. ¨ A fever. · Your foot is numb or tingly. Watch closely for changes in your health, and be sure to contact your doctor if:  · You do not get better as expected. · You have bruises from an injury that last longer than 2 weeks. Where can you learn more? Go to http://greyson-andrea.info/. Enter T608 in the search box to learn more about \"Foot Pain: Care Instructions. \"  Current as of: March 21, 2017  Content Version: 11.3  © 1649-6111 [employer]. Care instructions adapted under license by [employer] (which disclaims liability or warranty for this information). If you have questions about a medical condition or this instruction, always ask your healthcare professional. [place] [address] any warranty or liability for your use of this information. Superficial Thrombophlebitis: Care Instructions  Your Care Instructions  Superficial thrombophlebitis is inflammation in a vein where a blood clot has formed close to the surface of the skin. You may be able to feel the clot as a firm lump under the skin. The skin over the clot can become red, tender, and warm to the touch. Blood clots in veins close to the skin's surface usually are not serious and often can be treated at home. Sometimes superficial thrombophlebitis spreads to a deeper vein (deep vein thrombosis, or DVT). These deeper clots can be serious, even life-threatening. It is very important that you follow your doctor's instructions, keep all follow-up appointments, and watch for new or worsening symptoms of a clot. Follow-up care is a key part of your treatment and safety.  Be sure to make and go to all appointments, and call your doctor if you are having problems. It's also a good idea to know your test results and keep a list of the medicines you take. How can you care for yourself at home? · Take your medicines exactly as prescribed. Call your doctor if you think you are having a problem with your medicine. You will get more details on the specific medicines your doctor prescribes. · Prop up the sore leg or arm on a pillow anytime you sit or lie down. Try to keep it above the level of your heart. To prevent thrombophlebitis  · Exercise. Keep blood moving in your legs to keep new clots from forming. · Get up out of bed as soon as possible after an illness or surgery. · Do not smoke. If you need help quitting, talk to your doctor about stop-smoking programs and medicines. These can increase your chances of quitting for good. · Ask your doctor about compression stockings. These may help prevent blood clots from forming in your legs. But there are different types of stockings, and they need to fit right. So your doctor will recommend what you need. When should you call for help? Call 911 anytime you think you may need emergency care. For example, call if:  · You have sudden chest pain and shortness of breath, or you cough up blood. · You vomit blood or what looks like coffee grounds. · You pass maroon or very bloody stools. · You passed out (lost consciousness). Call your doctor now or seek immediate medical care if:  · You have signs of a blood clot, such as:  ¨ Pain in your calf, back of the knee, thigh, or groin. ¨ Redness and swelling in your leg or groin. · You notice a new hard, red, or tender area in your leg. · Your stools are black and tarlike or have streaks of blood. Watch closely for changes in your health, and be sure to contact your doctor if:  · You do not get better as expected. Where can you learn more? Go to http://greyson-andrea.info/.   Enter [phone] in the search box to learn more about \"Superficial Thrombophlebitis: Care Instructions. \"  Current as of: March 20, 2017  Content Version: 11.3  © 8434-4275 [employer]. Care instructions adapted under license by [employer] (which disclaims liability or warranty for this information). If you have questions about a medical condition or this instruction, always ask your healthcare professional. [place] [address] any warranty or liability for your use of this information.

## 2017-07-09 NOTE — ED PROVIDER NOTES
HPI Comments: Pt is a 61yo female presenting to ED for evaluation of right foot pain x1 week. Pt denies trauma or known injury, states pain is located at top of right foot and is worse with palpation and weight bearing. Pt denies swelling in foot, ankle or calf. States she noted small area of redness on top of foot this morning and is concerned about a spider bite. Denies f/c, weakness, dizziness/headache, LOC, confusion, neck pain, stiff neck, cp, palps, cough, abd pain, n/v/d, back pain, extremity (unilateral) numbness/tingling/weakness, calf swelling/pain. Has used ice and tylenol for pain. +anticoagulation. Past Medical History:  1/13/2011: Aortic valve disorders  1/13/2011: Atrial fibrillation [facility])  March 2005: Congestive heart failure, unspecified      Comment: Cleared quickly with Lasix therapy  01/10/2012: Echocardiogram      Comment: A-fib. EF 45%. Mild RVE. RVSP 45-50. Massive LAE. Mod TAMI. Mod MS. Severe MR                (mean grad 10). Mild-mod AI. Mild TR. Pulmonic systolic flow reversal.  Mild IVCE. No date: Hypertension  No date: Iron deficiency anemias  2/23/2011: Long term (current) use of anticoagulants  1/2011: Murmur      Comment: Grade I-II/VI systolic ejection murmur along                left sternal border, with radiation to the                pulmonic area. 02/24/2012: S/P cardiac cath      Comment: Minimal coronary artery disease. Mild LVE. EF 55%. Mod MR. Mod-severe MS. RA 15.  RV                55/16. PA 58/34. W 36.  CO/CI 3.5/1.9 (TD);                3.61/1.9 (Dequan). R to L shunt suggested. No date: Wears glasses  No date: Weight gain   Eliazar Wakefield MD PCP      The history is provided by the patient.         Past Medical History:   Diagnosis Date    Aortic valve disorders 1/13/2011    Atrial fibrillation ([address] 1/13/2011    Congestive heart failure, unspecified March 2005    Cleared quickly with Lasix therapy  Echocardiogram 01/10/2012    A-fib. EF 45%. Mild RVE. RVSP 45-50. Massive LAE. Mod TAMI. Mod MS. Severe MR (mean grad 10). Mild-mod AI. Mild TR. Pulmonic systolic flow reversal.  Mild IVCE.  Hypertension     Iron deficiency anemias     Long term (current) use of anticoagulants 2/23/2011    Murmur 1/2011    Grade I-II/VI systolic ejection murmur along left sternal border, with radiation to the pulmonic area.  S/P cardiac cath 02/24/2012    Minimal coronary artery disease. Mild LVE. EF 55%. Mod MR. Mod-severe MS. RA 15.  RV 55/16. PA 58/34. W 36.  CO/CI 3.5/1.9 (TD); 3.61/1.9 (Dequan). R to L shunt suggested.  Wears glasses     Weight gain        Past Surgical History:   Procedure Laterality Date    HX HEART CATHETERIZATION  2/24/2012    HX HYSTERECTOMY  2006    HX MITRAL VALVE REPLACEMENT  05/22/2013    25/33 mm On-X mechanical valve with bilateral pulmonary vein isolation and resection of LA appendage         Family History:   Problem Relation Age of Onset    Heart Surgery Father      Open-Heart Surgery    Other Father      Venous Thromboembolism    Heart Disease Mother      Enlarged Heart    Hypertension Mother     Diabetes Mother        Social History     Social History    Marital status:      Spouse name: N/A    Number of children: N/A    Years of education: N/A     Occupational History    Not on file. Social History Main Topics    Smoking status: Former Smoker     Packs/day: 0.50     Years: 30.00     Quit date: 8/5/2006    Smokeless tobacco: Never Used    Alcohol use 0.6 oz/week     1 Glasses of wine per week      Comment: occassionally    Drug use: No    Sexual activity: Yes     Partners: Male     Birth control/ protection: None     Other Topics Concern    Not on file     Social History Narrative         ALLERGIES: Morphine    Review of Systems   Constitutional: Negative for chills and fever. HENT: Negative.   Negative for congestion and facial swelling. Eyes: Negative for discharge and redness. Respiratory: Negative for cough and shortness of breath. Cardiovascular: Negative for chest pain. Gastrointestinal: Negative for abdominal pain, nausea and vomiting. Endocrine: Negative. Genitourinary: Negative. Musculoskeletal: Negative for back pain, gait problem and neck pain. Foot pain   Skin: Negative for rash and wound. Allergic/Immunologic: Negative. Neurological: Negative for dizziness, light-headedness and headaches. Hematological: Negative. Psychiatric/Behavioral: Negative. Vitals:    07/09/17 1054   BP: 132/79   Pulse: 72   Resp: 16   Temp: 97.7 °F (36.5 °C)   SpO2: 99%   Weight: 85.3 kg (188 lb)   Height: 5' 7\" (1.702 m)            Physical Exam   Constitutional: She is oriented to person, place, and time. She appears well-developed and well-nourished. No distress. HENT:   Head: Normocephalic and atraumatic. Mouth/Throat: Oropharynx is clear and moist.   Eyes: Conjunctivae are normal.   Neck: Normal range of motion. Neck supple. Cardiovascular: Normal rate and regular rhythm. Pulmonary/Chest: Effort normal. No respiratory distress. She has no wheezes. She exhibits no tenderness. Abdominal: Soft. She exhibits no distension. There is no tenderness. Musculoskeletal:        Right foot: There is bony tenderness. There is normal range of motion, no tenderness, no swelling, normal capillary refill, no crepitus, no deformity and no laceration. Feet:    Local TTP to dorsum for foot. Very mild erythema noted to area that is tender (over blood vessel ). No swelling, no redness, no warmth to ankle. Pt has no calf tenderness. B/l ankles same size. FROM of all LE joints. Normal sensation, normal cap refill. No signs of trauma or insect bite. Neurological: She is alert and oriented to person, place, and time. No cranial nerve deficit. Coordination normal.   Skin: Skin is warm and dry.  No rash noted. She is not diaphoretic. Psychiatric: She has a normal mood and affect. Nursing note and vitals reviewed. MDM  Number of Diagnoses or Management Options  Diagnosis management comments: Xray unremarkable by self. Based upon the patients presentation with noted HPI and PE, along with the work up done in the emergency department today, I believe the patient's symptoms are a result of superficial thrombophlebitis vs contusion. No concern for cellulitis, DVT (wells criteria 0). No indication for further ED work up. Pt instructed to ice, will give norco and pcp f/u by wed.            Amount and/or Complexity of Data Reviewed  Tests in the radiology section of CPT®: ordered and reviewed      ED Course       Procedures

## 2017-07-12 ENCOUNTER — OFFICE VISIT (OUTPATIENT)
Dept: FAMILY MEDICINE CLINIC | Age: 57
End: 2017-07-12

## 2017-07-12 VITALS
RESPIRATION RATE: 18 BRPM | SYSTOLIC BLOOD PRESSURE: 132 MMHG | WEIGHT: 190 LBS | HEART RATE: 65 BPM | OXYGEN SATURATION: 95 % | BODY MASS INDEX: 29.82 KG/M2 | HEIGHT: 67 IN | TEMPERATURE: 96.6 F | DIASTOLIC BLOOD PRESSURE: 89 MMHG

## 2017-07-12 DIAGNOSIS — M79.671 RIGHT FOOT PAIN: Primary | ICD-10-CM

## 2017-07-12 RX ORDER — TRAMADOL HYDROCHLORIDE 50 MG/1
50 TABLET ORAL
Qty: 15 TAB | Refills: 0 | Status: SHIPPED | OUTPATIENT
Start: 2017-07-12 | End: 2017-07-28 | Stop reason: SDUPTHER

## 2017-07-12 RX ORDER — PREDNISONE 20 MG/1
20 TABLET ORAL DAILY
Qty: 5 TAB | Refills: 0 | Status: SHIPPED | OUTPATIENT
Start: 2017-07-12 | End: 2017-07-17

## 2017-07-12 NOTE — PROGRESS NOTES
Patient: Carline Brown MRN: [id number]  SSN: [id number]    YOB: 1960  Age: 59 y.o. Sex: female      Date of Service: 7/12/2017   Provider: SAMANTHA Rose   Office Location:   [address] [facility], [address] [place] [address], [address]  Office Phone: [phone]  Office Fax: [phone]        REASON FOR VISIT:   Chief Complaint   Patient presents with    Follow-up     pt presents for follow up for right foot pain pt states \" that pt is having a little pain today pt was given Hydrocodone at ER but pt states \" that makes her itch and would like to get Tramadol refilled\"    Medication Refill     Tramadol Simivasatin        VITALS:   Visit Vitals    /89    Pulse 65    Temp 96.6 °F (35.9 °C) (Oral)    Resp 18    Ht 5' 7\" (1.702 m)    Wt 190 lb (86.2 kg)    SpO2 95%    BMI 29.76 kg/m2       MEDICATIONS:   Current Outpatient Prescriptions   Medication Sig Dispense Refill    HYDROcodone-acetaminophen (NORCO) 5-325 mg per tablet Take 1 Tab by mouth every four (4) hours as needed for Pain. Max Daily Amount: 6 Tabs. 12 Tab 0    losartan (COZAAR) 100 mg tablet Take 1 Tab by mouth daily. 30 Tab 3    furosemide (LASIX) 40 mg tablet TAKE 1 TABLET DAILY 30 Tab 3    warfarin (COUMADIN) 5 mg tablet Take 5mg daily except 2.5mg on Tues, Thur, and Sat. Or as directed by our office 30 Tab 6    simvastatin (ZOCOR) 20 mg tablet TAKE 1 TAB BY MOUTH NIGHTLY. 30 Tab 6    metoprolol succinate (TOPROL-XL) 100 mg tablet TAKE 1 TAB BY MOUTH DAILY. 30 Tab 6    hydroCHLOROthiazide (HYDRODIURIL) 25 mg tablet TAKE 1 TAB BY MOUTH DAILY. 30 Tab 6    albuterol (VENTOLIN HFA) 90 mcg/actuation inhaler Take 2 Puffs by inhalation daily. 1 Inhaler 3    esomeprazole (NEXIUM) 20 mg capsule Take 1 Cap by mouth daily. 30 Cap 3    potassium chloride (K-DUR, KLOR-CON) 20 mEq tablet Take 1 Tab by mouth daily.  30 Tab 6    fluticasone (FLONASE) 50 mcg/actuation nasal spray 2 Sprays by Both Nostrils route daily. 1 Bottle 3    fluticasone-salmeterol (ADVAIR) 250-50 mcg/dose diskus inhaler Take 1 Puff by inhalation every twelve (12) hours. 1 Inhaler 3    melatonin tab tablet Take 10 mg by mouth nightly.  traMADol (ULTRAM) 50 mg tablet Take 50 mg by mouth every six (6) hours as needed.  ascorbic acid (VITAMIN C) 500 mg tablet Take 500 mg by mouth daily.  multivitamin (ONE A DAY) tablet Take 1 Tab by mouth daily. ALLERGIES:   Allergies   Allergen Reactions    Morphine Rash and Itching        ACTIVE MEDICAL PROBLEMS:  Patient Active Problem List   Diagnosis Code    Rheumatic valvular disease I09.1    Chronic diastolic congestive heart failure (HCC) I50.32    Atrial fibrillation (HCC) I48.91    Long term (current) use of anticoagulants Z79.01    Benign hypertensive heart disease with heart failure (HCC) I11.0, I50.9    Microcytic anemia D50.9    Hyperlipidemia E78.5    Advanced directives, counseling/discussion Z71.89        MEDICAL/SURGICAL HISTORY:  Past Medical History:   Diagnosis Date    Aortic valve disorders 1/13/2011    Atrial fibrillation [address] [address] 1/13/2011    Congestive heart failure, unspecified March 2005    Cleared quickly with Lasix therapy    Echocardiogram 01/10/2012    A-fib. EF 45%. Mild RVE. RVSP 45-50. Massive LAE. Mod TAMI. Mod MS. Severe MR (mean grad 10). Mild-mod AI. Mild TR. Pulmonic systolic flow reversal.  Mild IVCE.  Hypertension     Iron deficiency anemias     Long term (current) use of anticoagulants 2/23/2011    Murmur 1/2011    Grade I-II/VI systolic ejection murmur along left sternal border, with radiation to the pulmonic area.  S/P cardiac cath 02/24/2012    Minimal coronary artery disease. Mild LVE. EF 55%. Mod MR. Mod-severe MS. RA 15.  RV 55/16. PA 58/34. W 36.  CO/CI 3.5/1.9 (TD); 3.61/1.9 (Dequan). R to L shunt suggested.     Wears glasses     Weight gain       Past Surgical History:   Procedure Laterality Date    HX HEART CATHETERIZATION  2/24/2012    HX HYSTERECTOMY  2006    HX MITRAL VALVE REPLACEMENT  05/22/2013    25/33 mm On-X mechanical valve with bilateral pulmonary vein isolation and resection of LA appendage        FAMILY HISTORY:  Family History   Problem Relation Age of Onset    Heart Surgery Father      Open-Heart Surgery    Other Father      Venous Thromboembolism    Heart Disease Mother      Enlarged Heart    Hypertension Mother     Diabetes Mother         SOCIAL HISTORY:  Social History   Substance Use Topics    Smoking status: Former Smoker     Packs/day: 0.50     Years: 30.00     Quit date: 8/5/2006    Smokeless tobacco: Never Used    Alcohol use 0.6 oz/week     1 Glasses of wine per week      Comment: occassionally            HISTORY OF PRESENT ILLNESS:   Rita Burroughs is a 59 y.o. female who presents to the office for acute care. PCP: Dr. Alicia Cheney. Patient is here today in follow up of recent ER visit to [facility] on 7/9/17 for acute R foot pain. X-ray was normal, and patient was ultimately diagnosed with a contusion vs. possible superficial thrombophlebitis. She was treated with Norco, and advised to follow up with her PCP. Today, patient reports pain is unchanged. She has not been taking the Norco, as it caused side effects of itching. She has done well with tramadol in the past, and is requesting this instead. REVIEW OF SYSTEMS:  Review of Systems   Constitutional: Negative for chills and fever. Respiratory: Negative for cough, shortness of breath and wheezing. Cardiovascular: Negative for chest pain and palpitations. Skin: Positive for itching ( reaction to norco). Negative for rash. Neurological: Negative for tingling and focal weakness. PHYSICAL EXAMINATION:  Physical Exam   Constitutional: She is well-developed, well-nourished, and in no distress. Cardiovascular: Normal rate and regular rhythm. Exam reveals no gallop and no friction rub.     No murmur heard.  Pulmonary/Chest: Effort normal and breath sounds normal. She has no wheezes. She has no rales. Musculoskeletal: She exhibits no edema. R  to palpation over dorsal aspect, mid-foot. Full active ROM of foot and ankle, but patient reports pain with dorsiflexion. Antalgic gait noted, favoring the right. Skin: Skin is warm and dry. No rash noted. No erythema. Psychiatric: Mood, memory and affect normal.        RESULTS:  No results found for this visit on 07/12/17. ASSESSMENT/PLAN:  Chanel Sharma was seen today for follow-up and medication refill. Diagnoses and all orders for this visit:    Right foot pain  - Exact etiology unclear, I see no evidence of infection at this point. Suspect possible tendonitis  - Encouraged rest, ice, elevation. Will send tramadol for pain, and short course of medium dose prednisone as anti-inflammatory as pt cannot take NSAIDs  Orders:   -     traMADol (ULTRAM) 50 mg tablet; Take 1 Tab by mouth every six (6) hours as needed. Max Daily Amount: 200 mg.  -     predniSONE (DELTASONE) 20 mg tablet; Take 1 Tab by mouth daily for 5 days. Follow up if symptoms persist or worsen    Otherwise, follow up as scheduled with PCP for routine follow up visit. Patient expresses understanding and is agreeable with the above plan.       PATIENT CARE TEAM:   Patient Care Team:  Ming Baird MD as PCP - General (Family Practice)  Alisia Clarke DO as Physician (Cardiology)  Alisia Clarke DO (Cardiology)       SAMANTHA Rose   July 12, 2017    8:50 AM

## 2017-07-12 NOTE — MR AVS SNAPSHOT
Visit Information Date & Time Provider Department Dept. Phone Encounter #  
 7/12/2017  8:00 AM Erin Lazo, [address] [facility] [id number] Your Appointments 7/14/2017  9:20 AM  
ANTICOAG NURSE with [facility]"" ([facility]) Appt Note: 4 week INR; Rescheduling Appointment From 07/10/2017 [place] [address] [address] [address] [phone] [address] [facility] [address] 3/15/2018 11:00 AM  
Office Visit with Dangelo Saha MD  
[facility]) Appt Note: [address] [facility] [address]  
[facility] [address]  
  
    
 9/27/2018 10:00 AM  
FOLLOW UP EXAM with Dangelo Saha MD  
[facility]) Appt Note: 6 month f/u  
 [address] [facility] [address]  
[facility] [address] Upcoming Health Maintenance Date Due Hepatitis C Screening 1960 Pneumococcal 19-64 Medium Risk (1 of 1 - PPSV23) 7/4/1977 DTaP/Tdap/Td series (1 - Tdap) 7/4/1979 FOBT Q 1 YEAR AGE 50-75 7/4/2008 INFLUENZA AGE 9 TO ADULT 8/1/2017 BREAST CANCER SCRN MAMMOGRAM 3/16/2019 PAP AKA CERVICAL CYTOLOGY 3/9/2020 Allergies as of 7/12/2017  Review Complete On: 7/12/2017 By: Reji Cabrera Severity Noted Reaction Type Reactions Morphine  02/02/2012    Rash, Itching Current Immunizations  Reviewed on 2/15/2013 Name Date Influenza Vaccine 12/15/2012 Influenza Vaccine (Quad) PF 9/20/2016 Not reviewed this visit You Were Diagnosed With   
  
 Codes Comments Right foot pain    -  Primary ICD-10-CM: H45.784 ICD-9-CM: 729.5 Vitals BP Pulse Temp Resp Height(growth percentile) Weight(growth percentile) 132/89 65 96.6 °F (35.9 °C) (Oral) 18 5' 7\" (1.702 m) 190 lb (86.2 kg) SpO2 BMI OB Status Smoking Status 95% 29.76 kg/m2 Hysterectomy Former Smoker Vitals History BMI and BSA Data Body Mass Index Body Surface Area  
 29.76 kg/m 2 2.02 m 2 Preferred Pharmacy Pharmacy Name Phone [facility] #[id number] Kenji Deleon, [address] [facility] [facility] [phone] Your Updated Medication List  
  
   
This list is accurate as of: 7/12/17  8:28 AM.  Always use your most recent med list.  
  
  
  
  
 albuterol 90 mcg/actuation inhaler Commonly known as:  VENTOLIN HFA Take 2 Puffs by inhalation daily. esomeprazole 20 mg capsule Commonly known as:  NexIUM Take 1 Cap by mouth daily. fluticasone 50 mcg/actuation nasal spray Commonly known as:  Cheryl Coffee 2 Sprays by Both Nostrils route daily. fluticasone-salmeterol 250-50 mcg/dose diskus inhaler Commonly known as:  ADVAIR Take 1 Puff by inhalation every twelve (12) hours. furosemide 40 mg tablet Commonly known as:  LASIX TAKE 1 TABLET DAILY  
  
 hydroCHLOROthiazide 25 mg tablet Commonly known as:  HYDRODIURIL  
TAKE 1 TAB BY MOUTH DAILY. HYDROcodone-acetaminophen 5-325 mg per tablet Commonly known as:  Meghana Leyland Take 1 Tab by mouth every four (4) hours as needed for Pain. Max Daily Amount: 6 Tabs. losartan 100 mg tablet Commonly known as:  COZAAR Take 1 Tab by mouth daily. melatonin Tab tablet Take 10 mg by mouth nightly. metoprolol succinate 100 mg tablet Commonly known as:  TOPROL-XL  
TAKE 1 TAB BY MOUTH DAILY. multivitamin tablet Commonly known as:  ONE A DAY Take 1 Tab by mouth daily. potassium chloride 20 mEq tablet Commonly known as:  K-DUR, KLOR-CON Take 1 Tab by mouth daily. predniSONE 20 mg tablet Commonly known as:  Annabelle Pierre Take 1 Tab by mouth daily for 5 days. simvastatin 20 mg tablet Commonly known as:  ZOCOR  
 TAKE 1 TAB BY MOUTH NIGHTLY.  
  
 traMADol 50 mg tablet Commonly known as:  ULTRAM  
Take 1 Tab by mouth every six (6) hours as needed. Max Daily Amount: 200 mg. VITAMIN C 500 mg tablet Generic drug:  ascorbic acid (vitamin C) Take 500 mg by mouth daily. warfarin 5 mg tablet Commonly known as:  COUMADIN Take 5mg daily except 2.5mg on Tues, Thur, and Sat. Or as directed by our office Prescriptions Printed Refills  
 traMADol (ULTRAM) 50 mg tablet 0 Sig: Take 1 Tab by mouth every six (6) hours as needed. Max Daily Amount: 200 mg. Class: Print Route: Oral  
  
Prescriptions Sent to Pharmacy Refills  
 predniSONE (DELTASONE) 20 mg tablet 0 Sig: Take 1 Tab by mouth daily for 5 days. Class: Normal  
 Pharmacy: [facility]/pharmacy [address] [place] [address],[address] #: [phone] Route: Oral  
  
Introducing [facility]! [facility] introduces Quick2LAUNCH patient portal. Now you can access parts of your medical record, email your doctor's office, and request medication refills online. 1. In your internet browser, go to [email or web address] 2. Click on the First Time User? Click Here link in the Sign In box. You will see the New Member Sign Up page. 3. Enter your Quick2LAUNCH Access Code exactly as it appears below. You will not need to use this code after youve completed the sign-up process. If you do not sign up before the expiration date, you must request a new code. · Quick2LAUNCH Access Code: [email or web address] Expires: 8/16/2017 10:25 AM 
 
4. Enter the last four digits of your Social Security Number (xxxx) and Date of Birth (mm/dd/yyyy) as indicated and click Submit. You will be taken to the next sign-up page. 5. Create a Psonart ID. This will be your Psonart login ID and cannot be changed, so think of one that is secure and easy to remember. 6. Create a Psonart password. You can change your password at any time. 7. Enter your Password Reset Question and Answer. This can be used at a later time if you forget your password. 8. Enter your e-mail address. You will receive e-mail notification when new information is available in [address] 9. Click Sign Up. You can now view and download portions of your medical record. 10. Click the Download Summary menu link to download a portable copy of your medical information. If you have questions, please visit the Frequently Asked Questions section of the [employer] website. Remember, [employer] is NOT to be used for urgent needs. For medical emergencies, dial 911. Now available from your iPhone and Android! Please provide this summary of care documentation to your next provider. Your primary care clinician is listed as Bailey Shipley. If you have any questions after today's visit, please call [phone].

## 2017-07-14 ENCOUNTER — ANTI-COAG VISIT (OUTPATIENT)
Dept: CARDIOLOGY CLINIC | Age: 57
End: 2017-07-14

## 2017-07-14 ENCOUNTER — PATIENT OUTREACH (OUTPATIENT)
Dept: FAMILY MEDICINE CLINIC | Age: 57
End: 2017-07-14

## 2017-07-14 DIAGNOSIS — I48.19 PERSISTENT ATRIAL FIBRILLATION (HCC): Primary | ICD-10-CM

## 2017-07-14 DIAGNOSIS — Z79.01 LONG TERM (CURRENT) USE OF ANTICOAGULANTS: ICD-10-CM

## 2017-07-14 LAB
INR BLD: 2.6
PT POC: 30.9 SECONDS
VALID INTERNAL CONTROL?: YES

## 2017-07-14 NOTE — PROGRESS NOTES
[facility] ED follow up  Patient admitted to [facility] ED, 7/9/17 to 7/9/17. Presenting symptoms:   Right foot pain      ED admissions in the past 6 months:   7/9/17  3/26/17        Patient reports:  Patient reports that the top of her foot is still sore. Patient reports she wears sneakers and loosen the strings over the top of her foot. Patient states that her balance is good. Patient reports that she has obtained her new medications. Patient reports that she takes pain medication at night and Prednisone in the morning. Patient states that she is resting, elevating foot, and using ice as needed. ADL's:  Feeds self: Independently  Ambulates: Independently     Self grooming: Independ  Toileting: Independent    DME:     None       Support:     Sister    Appointment(s):    Patient followed up with PCP office, [address] [place] [address],  on 7-12-17. Offered scheduling of another follow up appointment; patient declined. Patient stated that she will finish taking her medication and see how she is feeling and will follow up with PCP as needed. Nurse Navigator encouraged patient to follow up with PCP/Emergency services as needed as needed. Patient agreeable with continued follow up via telephone call. Staff message sent to HIPOLITO Salas, [phone] with update.

## 2017-07-14 NOTE — Clinical Note
Hi Ms. Michelle Mayfield: I spoke with Ms. Edgar Ott today. She states she is still having soreness on the top of her foot. She is taking her new meds. Prefers to wait until finishes her med. and will see how she feels. She stated she will call for appt. If needed. Thank you  Tarik Leahy - Nurse Navigator.

## 2017-07-14 NOTE — PROGRESS NOTES
Nurse Navigator attempted to contact patient post discharge from [facility] ED 7/9/17  for c/o right foot pain. Message left with a female who stated she was patient's sister, and giving my contact information. Female indicated that this was patient's phone number. Nurse Navigator indicated this was not an emergency telephone call. Per review of EMR patient  has attended a follow up appointment with a  health care provider at [facility] on 7/12/17.

## 2017-07-17 ENCOUNTER — HOSPITAL ENCOUNTER (OUTPATIENT)
Dept: LAB | Age: 57
Discharge: HOME | End: 2017-07-17
Payer: MEDICARE

## 2017-07-17 ENCOUNTER — PATIENT OUTREACH (OUTPATIENT)
Dept: FAMILY MEDICINE CLINIC | Age: 57
End: 2017-07-17

## 2017-07-17 DIAGNOSIS — Z12.11 ENCOUNTER FOR FIT (FECAL IMMUNOCHEMICAL TEST) SCREENING: ICD-10-CM

## 2017-07-17 PROCEDURE — 82274 ASSAY TEST FOR BLOOD FECAL: CPT | Performed by: NURSE PRACTITIONER

## 2017-07-17 NOTE — PROGRESS NOTES
[facility] ED Follow UP    Patient DC from [facility] ED for evaluation of Right foot pain on 7-9-17. [facility] on 7-12-17      Received telephone call from patient on 7-17-17. Patient provided update as follows:     Patient stated that she has been staying off of her foot. The inflammation is gone but her foot is still sore. Patient states that her foot is not red anymore. Patient has taken the Prednisone and has some pain pills left. Patient stated that \"it seems to be going away\". Nurse Navigator inquired if patient would like to have a follow up appointment scheduled with her Primary Care Physician's office.  Patient stated \"No I will call if it is not getting better\"

## 2017-07-21 LAB — HEMOCCULT STL QL IA: NEGATIVE

## 2017-07-25 ENCOUNTER — PATIENT OUTREACH (OUTPATIENT)
Dept: FAMILY MEDICINE CLINIC | Age: 57
End: 2017-07-25

## 2017-07-26 RX ORDER — POTASSIUM CHLORIDE 1500 MG/1
TABLET, EXTENDED RELEASE ORAL
Qty: 30 TAB | Refills: 6 | Status: SHIPPED | OUTPATIENT
Start: 2017-07-26 | End: 2018-02-23 | Stop reason: SDUPTHER

## 2017-07-27 ENCOUNTER — PATIENT OUTREACH (OUTPATIENT)
Dept: FAMILY MEDICINE CLINIC | Age: 57
End: 2017-07-27

## 2017-07-28 ENCOUNTER — OFFICE VISIT (OUTPATIENT)
Dept: FAMILY MEDICINE CLINIC | Age: 57
End: 2017-07-28

## 2017-07-28 VITALS
DIASTOLIC BLOOD PRESSURE: 86 MMHG | BODY MASS INDEX: 29.47 KG/M2 | RESPIRATION RATE: 18 BRPM | OXYGEN SATURATION: 95 % | HEART RATE: 72 BPM | TEMPERATURE: 97.7 F | SYSTOLIC BLOOD PRESSURE: 126 MMHG | HEIGHT: 67 IN | WEIGHT: 187.8 LBS

## 2017-07-28 DIAGNOSIS — M79.671 RIGHT FOOT PAIN: Primary | ICD-10-CM

## 2017-07-28 DIAGNOSIS — M25.474 SWELLING OF FOOT JOINT, RIGHT: ICD-10-CM

## 2017-07-28 RX ORDER — TRAMADOL HYDROCHLORIDE 50 MG/1
50 TABLET ORAL
Qty: 20 TAB | Refills: 0 | Status: SHIPPED | OUTPATIENT
Start: 2017-07-28 | End: 2017-08-21 | Stop reason: SDUPTHER

## 2017-07-28 NOTE — PROGRESS NOTES
HISTORY OF PRESENT ILLNESS  Gina Alejandra is a 59 y.o. female. 7/28/2017  3:05 PM    Chief Complaint   Patient presents with    Foot Pain     Pt presents today for right foot and leg pain. completed corese of antiinflamatory and pain medication but the pain came back     Foot Swelling     right foot swelling       HPI: Here today for persisting right foot pain that has been happening for about a month. PCP Dr Raza Mohan. Saw HIPOLITO Salas PA-C recently for pain and given steroid and pain medication- pain resolved; however, no recurring once steroid stopped. Having swelling of the top of right foot as well. No recent travel or bed ridden status. Evaluated in ER on 7/9 and xray normal- diagnosed with contusion vs. Possible superficial thrombophlebitis. No new symptoms today. Review of Systems   Gastrointestinal: Negative for abdominal pain, diarrhea, nausea and vomiting. Genitourinary: Negative for dysuria, frequency, hematuria and urgency. Musculoskeletal: Positive for joint pain and myalgias. Negative for back pain. Neurological: Negative for dizziness, tingling, sensory change, weakness and headaches. PHQ Screening   PHQ over the last two weeks 4/4/2017   Little interest or pleasure in doing things Not at all   Feeling down, depressed or hopeless Not at all   Total Score PHQ 2 0         History  Past Medical History:   Diagnosis Date    Aortic valve disorders 1/13/2011    Atrial fibrillation ([facility] [address] 1/13/2011    Congestive heart failure, unspecified March 2005    Cleared quickly with Lasix therapy    Echocardiogram 01/10/2012    A-fib. EF 45%. Mild RVE. RVSP 45-50. Massive LAE. Mod TAMI. Mod MS. Severe MR (mean grad 10). Mild-mod AI. Mild TR. Pulmonic systolic flow reversal.  Mild IVCE.     Hypertension     Iron deficiency anemias     Long term (current) use of anticoagulants 2/23/2011    Murmur 1/2011    Grade I-II/VI systolic ejection murmur along left sternal border, with radiation to the pulmonic area.  S/P cardiac cath 02/24/2012    Minimal coronary artery disease. Mild LVE. EF 55%. Mod MR. Mod-severe MS. RA 15.  RV 55/16. PA 58/34. W 36.  CO/CI 3.5/1.9 (TD); 3.61/1.9 (Dequan). R to L shunt suggested.  Wears glasses     Weight gain        Past Surgical History:   Procedure Laterality Date    HX HEART CATHETERIZATION  2/24/2012    HX HYSTERECTOMY  2006    HX MITRAL VALVE REPLACEMENT  05/22/2013    25/33 mm On-X mechanical valve with bilateral pulmonary vein isolation and resection of LA appendage       Social History     Social History    Marital status:      Spouse name: N/A    Number of children: N/A    Years of education: N/A     Occupational History    Not on file. Social History Main Topics    Smoking status: Former Smoker    Smokeless tobacco: Never Used    Alcohol use 0.6 oz/week     1 Glasses of wine per week      Comment: occassionally    Drug use: No    Sexual activity: Yes     Partners: Male     Birth control/ protection: None     Other Topics Concern    Not on file     Social History Narrative       Allergies   Allergen Reactions    Morphine Rash and Itching    Norco [Hydrocodone-Acetaminophen] Itching       Current Outpatient Prescriptions   Medication Sig Dispense Refill    KLOR-CON M20 20 mEq tablet TAKE 1 TAB BY MOUTH DAILY. 30 Tab 6    traMADol (ULTRAM) 50 mg tablet Take 1 Tab by mouth every six (6) hours as needed. Max Daily Amount: 200 mg. 15 Tab 0    losartan (COZAAR) 100 mg tablet Take 1 Tab by mouth daily. 30 Tab 3    furosemide (LASIX) 40 mg tablet TAKE 1 TABLET DAILY 30 Tab 3    warfarin (COUMADIN) 5 mg tablet Take 5mg daily except 2.5mg on Tues, Thur, and Sat. Or as directed by our office 30 Tab 6    simvastatin (ZOCOR) 20 mg tablet TAKE 1 TAB BY MOUTH NIGHTLY. 30 Tab 6    metoprolol succinate (TOPROL-XL) 100 mg tablet TAKE 1 TAB BY MOUTH DAILY.  30 Tab 6    hydroCHLOROthiazide (HYDRODIURIL) 25 mg tablet TAKE 1 TAB BY MOUTH DAILY. 30 Tab 6    albuterol (VENTOLIN HFA) 90 mcg/actuation inhaler Take 2 Puffs by inhalation daily. 1 Inhaler 3    esomeprazole (NEXIUM) 20 mg capsule Take 1 Cap by mouth daily. 30 Cap 3    fluticasone (FLONASE) 50 mcg/actuation nasal spray 2 Sprays by Both Nostrils route daily. 1 Bottle 3    fluticasone-salmeterol (ADVAIR) 250-50 mcg/dose diskus inhaler Take 1 Puff by inhalation every twelve (12) hours. 1 Inhaler 3    melatonin tab tablet Take 10 mg by mouth nightly.  ascorbic acid (VITAMIN C) 500 mg tablet Take 500 mg by mouth daily.  multivitamin (ONE A DAY) tablet Take 1 Tab by mouth daily. Patient Care Team:  Patient Care Team:  Kiera Felix MD as PCP - General (Family Practice)  Elvira Elena DO as Physician (Cardiology)  Elvira Elena DO (Cardiology)  Lesley Pedro, RN as Ambulatory Care Navigator        LABS:  None new to review    RADIOLOGY:    Right foot, 3 views:  No fracture or dislocation. Minimal Achilles enthesopathy. No erosion. No focal  soft tissue swelling. Mild hyperflexion of the metatarsophalangeal joints  IMPRESSION  IMPRESSION: No acute abnormalities      Physical Exam   Constitutional: She is oriented to person, place, and time. She appears well-developed and well-nourished. No distress. Cardiovascular:   Pulses:       Dorsalis pedis pulses are 2+ on the right side, and 2+ on the left side. Pulmonary/Chest: Effort normal. No respiratory distress. Musculoskeletal:        Right foot: There is tenderness and swelling. There is normal range of motion, normal capillary refill, no crepitus and no deformity. Feet:    -dorsal aspect of right foot with mild swelling, TTP; PPP; no erythema   Neurological: She is alert and oriented to person, place, and time. She exhibits normal muscle tone. Coordination normal.   Skin: Skin is warm and dry.         Vitals:    07/28/17 1501   BP: 126/86   Pulse: 72   Resp: 18   Temp: 97.7 °F (36.5 °C)   TempSrc: Oral   SpO2: 95%   Weight: 187 lb 12.8 oz (85.2 kg)   Height: 5' 7\" (1.702 m)   PainSc:   5   PainLoc: Foot     Acute Pain Review  Lindsey Evangelista presents due to pain of her right foot that is secondary to unknown cause. Since this event her pain has not changed. She describes the pain as aching. Since this event she is able to do  her normal daily activities. She reports the following adverse side effects from treatment: none. Least pain over the last week has been 0/10. Worst pain over the last week has been 7/10. Prior records: available records have been reviewed. Personal or family history of psychiatric, addiction, or substance abuse: no    Aberrant behaviors: None. Urine Drug Screen: Not needed- acute treatment  Pain Management Contract: Not of file       reviewed: yes. Concomitant use of a benzodiazepine: no        ASSESSMENT and PLAN  Diagnoses and all orders for this visit:    Right foot pain/ Swelling of foot joint, right  *Discussed with patient. Advised on venous US to r/o DVT, as well as MRI foot to evaluate further- declines. Will refer to orthopedics for further evaluation and management- she is in agreement. *Refilled on tramadol for acute pain.  reviewed- noted 7/9 Rosalia #12 tabs from ER, 7/12 Tramadol #15 tabs from HIPOLITO Salas PA-C.   -     REFERRAL TO ORTHOPEDICS  -     traMADol (ULTRAM) 50 mg tablet; Take 1 Tab by mouth every eight (8) hours as needed. Max Daily Amount: 150 mg.      *Plan of care reviewed with patient. Patient in agreement with plan and expresses understanding. All questions answered and patient encouraged to call or RTO if further questions or concerns. Follow-up Disposition:  Return if symptoms worsen or fail to improve.

## 2017-07-28 NOTE — MR AVS SNAPSHOT
Visit Information Date & Time Provider Department Dept. Phone Encounter #  
 7/28/2017  3:00 PM Gerber Chadwick [facility] [phone] [id number] Follow-up Instructions Return if symptoms worsen or fail to improve. Your Appointments 8/15/2017  9:00 AM  
ANTICOAG NURSE with Pt [facility] ([facility]) Appt Note: 4 week INR  
 [place] [address] [address] [address] [phone] 1212 [facility] [address] 3/15/2018 11:00 AM  
Office Visit with Fanta Rivero MD  
[facility], located [facility]) Appt Note: [address] [facility] [address]  
[facility] [address]  
  
    
 9/27/2018 10:00 AM  
FOLLOW UP EXAM with Fanta Rivero MD  
[facility]) Appt Note: 6 month f/u  
 [address] [facility] [address]  
[facility] [address] Upcoming Health Maintenance Date Due Hepatitis C Screening 1960 Pneumococcal 19-64 Medium Risk (1 of 1 - PPSV23) 7/4/1977 DTaP/Tdap/Td series (1 - Tdap) 7/4/1979 INFLUENZA AGE 9 TO ADULT 8/1/2017 FOBT Q 1 YEAR AGE 50-75 7/17/2018 BREAST CANCER SCRN MAMMOGRAM 3/16/2019 PAP AKA CERVICAL CYTOLOGY 3/9/2020 Allergies as of 7/28/2017  Review Complete On: 7/28/2017 By: Gerber Chadwick NP Severity Noted Reaction Type Reactions Morphine  02/02/2012    Rash, Itching Norco [Hydrocodone-acetaminophen]  07/12/2017    Itching Current Immunizations  Reviewed on 2/15/2013 Name Date Influenza Vaccine 12/15/2012 Influenza Vaccine (Quad) PF 9/20/2016 Not reviewed this visit You Were Diagnosed With   
  
 Codes Comments Right foot pain    -  Primary ICD-10-CM: I97.522 ICD-9-CM: 729.5 Swelling of foot joint, right     ICD-10-CM: M25.474 ICD-9-CM: 719.07 Vitals BP Pulse Temp Resp Height(growth percentile) Weight(growth percentile) 126/86 (BP 1 Location: Right arm, BP Patient Position: Sitting) 72 97.7 °F (36.5 °C) (Oral) 18 5' 7\" (1.702 m) 187 lb 12.8 oz (85.2 kg) SpO2 BMI OB Status Smoking Status 95% 29.41 kg/m2 Hysterectomy Former Smoker BMI and BSA Data Body Mass Index Body Surface Area  
 29.41 kg/m 2 2.01 m 2 Preferred Pharmacy Pharmacy Name Phone [id number] Aleisha [facility], [address] [facility] [phone] Your Updated Medication List  
  
   
This list is accurate as of: 7/28/17  3:15 PM.  Always use your most recent med list.  
  
  
  
  
 albuterol 90 mcg/actuation inhaler Commonly known as:  VENTOLIN HFA Take 2 Puffs by inhalation daily. esomeprazole 20 mg capsule Commonly known as:  NexIUM Take 1 Cap by mouth daily. fluticasone 50 mcg/actuation nasal spray Commonly known as:  Elvira Sale 2 Sprays by Both Nostrils route daily. fluticasone-salmeterol 250-50 mcg/dose diskus inhaler Commonly known as:  ADVAIR Take 1 Puff by inhalation every twelve (12) hours. furosemide 40 mg tablet Commonly known as:  LASIX TAKE 1 TABLET DAILY  
  
 hydroCHLOROthiazide 25 mg tablet Commonly known as:  HYDRODIURIL  
TAKE 1 TAB BY MOUTH DAILY. KLOR-CON M20 20 mEq tablet Generic drug:  potassium chloride TAKE 1 TAB BY MOUTH DAILY. losartan 100 mg tablet Commonly known as:  COZAAR Take 1 Tab by mouth daily. melatonin Tab tablet Take 10 mg by mouth nightly. metoprolol succinate 100 mg tablet Commonly known as:  TOPROL-XL  
TAKE 1 TAB BY MOUTH DAILY. multivitamin tablet Commonly known as:  ONE A DAY Take 1 Tab by mouth daily. simvastatin 20 mg tablet Commonly known as:  ZOCOR  
TAKE 1 TAB BY MOUTH NIGHTLY.  
  
 traMADol 50 mg tablet Commonly known as:  ULTRAM  
Take 1 Tab by mouth every eight (8) hours as needed. Max Daily Amount: 150 mg. VITAMIN C 500 mg tablet Generic drug:  ascorbic acid (vitamin C) Take 500 mg by mouth daily. warfarin 5 mg tablet Commonly known as:  COUMADIN Take 5mg daily except 2.5mg on Tues, Thur, and Sat. Or as directed by our office Prescriptions Printed Refills  
 traMADol (ULTRAM) 50 mg tablet 0 Sig: Take 1 Tab by mouth every eight (8) hours as needed. Max Daily Amount: 150 mg.  
 Class: Print Route: Oral  
  
We Performed the Following REFERRAL TO ORTHOPEDICS [NXJ703 Custom] Comments:  
 Please evaluate for persistent right foot pain and swelling. Follow-up Instructions Return if symptoms worsen or fail to improve. Referral Information Referral ID Referred By Referred To  
  
 [id number] [address], [place]   
   [facility] [address] [place], [facility] [address] Phone: [phone] Fax: [phone] Visits Status Start Date End Date 1 New Request 7/28/17 7/28/18 If your referral has a status of pending review or denied, additional information will be sent to support the outcome of this decision. Introducing [facility]! Katie Schroeder introduces Quosis patient portal. Now you can access parts of your medical record, email your doctor's office, and request medication refills online. 1. In your internet browser, go to [email or web address]t 2. Click on the First Time User? Click Here link in the Sign In box. You will see the New Member Sign Up page. 3. Enter your Quosis Access Code exactly as it appears below. You will not need to use this code after youve completed the sign-up process. If you do not sign up before the expiration date, you must request a new code. · Quosis Access Code: [email or web address] Expires: 8/16/2017 10:25 AM 
 
 4. Enter the last four digits of your Social Security Number (xxxx) and Date of Birth (mm/dd/yyyy) as indicated and click Submit. You will be taken to the next sign-up page. 5. Create a Open Silicon ID. This will be your Open Silicon login ID and cannot be changed, so think of one that is secure and easy to remember. 6. Create a Open Silicon password. You can change your password at any time. 7. Enter your Password Reset Question and Answer. This can be used at a later time if you forget your password. 8. Enter your e-mail address. You will receive e-mail notification when new information is available in [address] 9. Click Sign Up. You can now view and download portions of your medical record. 10. Click the Download Summary menu link to download a portable copy of your medical information. If you have questions, please visit the Frequently Asked Questions section of the Open Silicon website. Remember, Open Silicon is NOT to be used for urgent needs. For medical emergencies, dial 911. Now available from your iPhone and Android! Please provide this summary of care documentation to your next provider. Your primary care clinician is listed as Coy Hawkins. If you have any questions after today's visit, please call [phone].

## 2017-08-11 ENCOUNTER — PATIENT OUTREACH (OUTPATIENT)
Dept: FAMILY MEDICINE CLINIC | Age: 57
End: 2017-08-11

## 2017-08-14 ENCOUNTER — PATIENT OUTREACH (OUTPATIENT)
Dept: FAMILY MEDICINE CLINIC | Age: 57
End: 2017-08-14

## 2017-08-14 NOTE — PROGRESS NOTES
Transition of Care  ED To Home     Patient admitted to [facility] ED on 7-9-17 with complaint of right foot pain. Discharge diagnosis, per ED note by SAMANTHA Russo as follows :     Right foot pain [address], [place] 7/9/2017 12:22 PM Physician Assistant Emergency Medicine   Thrombophlebitis of superficial veins of right lower extremity [I80.01]  Betzy Workman PA-C 7/9/2017 12:24 PM Physician Assistant Emergency Medicine       PCP: Follow up held 7-28-17 with YOSSI Villavicencio    Subjective:   Patient states - \"I am pretty much the same\"\"  \"Taking It one Day at a time\"  \"Taking pain medication at night\"   \"Taking it easy\"      Medication Reconciliation completed with patient. Follow Up Appointments:     8/15/17 - Cardiology follow up -  to provide transportation  9/5/17   - Orthopedic Follow Up   Patient states that orthopedic appointment was rescheduld by the doctor's office. Patient stated she will call the orthopedic office to see if she can arrange a sooner appointment with another provider. Patient states she follows with Dr. Ciro Jacobsen  (cardiologist) monthly re: Coumadin lab work. Nurse Navigator encouraged patient to call primary care office if symptoms worsen or do not improve. Patient voiced agreement with this plan.

## 2017-08-16 DIAGNOSIS — M79.671 RIGHT FOOT PAIN: ICD-10-CM

## 2017-08-16 DIAGNOSIS — M25.474 SWELLING OF FOOT JOINT, RIGHT: ICD-10-CM

## 2017-08-16 NOTE — TELEPHONE ENCOUNTER
Requested Prescriptions     Pending Prescriptions Disp Refills    traMADol (ULTRAM) 50 mg tablet 20 Tab 0     Sig: Take 1 Tab by mouth every eight (8) hours as needed. Max Daily Amount: 150 mg. Explained to patient that we typically do not call in refills on pain medication. She would need to be seen. Patient states she has an appointment with her Podiatrist 9/5/17 and no appointment here was necessary. I told patient I would put in her refill as requested however, it was likely to be denied. Patient understood.

## 2017-08-18 ENCOUNTER — PATIENT OUTREACH (OUTPATIENT)
Dept: FAMILY MEDICINE CLINIC | Age: 57
End: 2017-08-18

## 2017-08-18 RX ORDER — TRAMADOL HYDROCHLORIDE 50 MG/1
50 TABLET ORAL
Qty: 20 TAB | Refills: 0 | OUTPATIENT
Start: 2017-08-18

## 2017-08-18 NOTE — TELEPHONE ENCOUNTER
Call made to pt to confirm appointment for Monday and that she needed to be assessed by the provider. Pt understands. Encouraged pt to keep foot elevated as much as possible and apply ice alternated with heat. Pt states she can not take aspirin product, encouraged to take Tylenol as needed. Pt has an appointment for 8/21.

## 2017-08-18 NOTE — PROGRESS NOTES
Transition of Care  ED Follow UP     Nurse Navigator (SNEHA) received telephone call from patient stating that she had tried to reschedule her orthopedic appointment with another provider at the practice. There was not another provider available. Patient stated she will \"have to wait it out\". Patient stated she called PCP office, [facility], and requested a refill of her Tramadol. Patient stated she was told that she would have to be seen in the office. Patient stated she could come in on Monday. NN scheduled follow up appointment for patient for Monday 8/21/17 @ 3:15. Patient c/o foot swollen and throbbing at night.

## 2017-08-21 ENCOUNTER — OFFICE VISIT (OUTPATIENT)
Dept: FAMILY MEDICINE CLINIC | Age: 57
End: 2017-08-21

## 2017-08-21 VITALS
OXYGEN SATURATION: 97 % | HEART RATE: 82 BPM | SYSTOLIC BLOOD PRESSURE: 119 MMHG | TEMPERATURE: 97.1 F | WEIGHT: 188 LBS | BODY MASS INDEX: 29.51 KG/M2 | RESPIRATION RATE: 20 BRPM | HEIGHT: 67 IN | DIASTOLIC BLOOD PRESSURE: 77 MMHG

## 2017-08-21 DIAGNOSIS — M79.671 RIGHT FOOT PAIN: ICD-10-CM

## 2017-08-21 DIAGNOSIS — M25.474 SWELLING OF FOOT JOINT, RIGHT: ICD-10-CM

## 2017-08-21 RX ORDER — TRAMADOL HYDROCHLORIDE 50 MG/1
50 TABLET ORAL
Qty: 20 TAB | Refills: 0 | Status: SHIPPED | OUTPATIENT
Start: 2017-08-21 | End: 2017-09-14 | Stop reason: SDUPTHER

## 2017-08-22 ENCOUNTER — TELEPHONE (OUTPATIENT)
Dept: FAMILY MEDICINE CLINIC | Age: 57
End: 2017-08-22

## 2017-08-22 ENCOUNTER — ANTI-COAG VISIT (OUTPATIENT)
Dept: CARDIOLOGY CLINIC | Age: 57
End: 2017-08-22

## 2017-08-22 DIAGNOSIS — M79.671 FOOT PAIN, RIGHT: Primary | ICD-10-CM

## 2017-08-22 DIAGNOSIS — Z79.01 LONG TERM (CURRENT) USE OF ANTICOAGULANTS: Primary | ICD-10-CM

## 2017-08-22 DIAGNOSIS — I48.19 PERSISTENT ATRIAL FIBRILLATION (HCC): ICD-10-CM

## 2017-08-22 LAB
INR BLD: 2.4
PT POC: 28.4 SECONDS
VALID INTERNAL CONTROL?: YES

## 2017-08-22 NOTE — PROGRESS NOTES
Patient: Gabby Munoz MRN: [id number]  SSN: [id number]    YOB: 1960  Age: 59 y.o. Sex: female      Date of Service: 8/22/2017   Provider: SAMANTHA Serrato   Office Location:   [address] [place] Syed [facility], [facility], [address]  Office Phone: [phone]  Office Fax: [phone]        REASON FOR VISIT:   Chief Complaint   Patient presents with    Foot Pain     pt here with c/o right foot pain    Medication Refill     pt here for medication refill        VITALS:   Visit Vitals    /77 (BP 1 Location: Right arm, BP Patient Position: Sitting)    Pulse 82    Temp 97.1 °F (36.2 °C) (Oral)    Resp 20    Ht 5' 7\" (1.702 m)    Wt 188 lb (85.3 kg)    SpO2 97%    BMI 29.44 kg/m2       MEDICATIONS:   Current Outpatient Prescriptions   Medication Sig Dispense Refill    traMADol (ULTRAM) 50 mg tablet Take 1 Tab by mouth every eight (8) hours as needed. Max Daily Amount: 150 mg. 20 Tab 0    KLOR-CON M20 20 mEq tablet TAKE 1 TAB BY MOUTH DAILY. 30 Tab 6    losartan (COZAAR) 100 mg tablet Take 1 Tab by mouth daily. 30 Tab 3    furosemide (LASIX) 40 mg tablet TAKE 1 TABLET DAILY 30 Tab 3    warfarin (COUMADIN) 5 mg tablet Take 5mg daily except 2.5mg on Tues, Thur, and Sat. Or as directed by our office 30 Tab 6    simvastatin (ZOCOR) 20 mg tablet TAKE 1 TAB BY MOUTH NIGHTLY. 30 Tab 6    metoprolol succinate (TOPROL-XL) 100 mg tablet TAKE 1 TAB BY MOUTH DAILY. 30 Tab 6    hydroCHLOROthiazide (HYDRODIURIL) 25 mg tablet TAKE 1 TAB BY MOUTH DAILY. 30 Tab 6    albuterol (VENTOLIN HFA) 90 mcg/actuation inhaler Take 2 Puffs by inhalation daily. 1 Inhaler 3    esomeprazole (NEXIUM) 20 mg capsule Take 1 Cap by mouth daily. 30 Cap 3    fluticasone (FLONASE) 50 mcg/actuation nasal spray 2 Sprays by Both Nostrils route daily. 1 Bottle 3    fluticasone-salmeterol (ADVAIR) 250-50 mcg/dose diskus inhaler Take 1 Puff by inhalation every twelve (12) hours.  1 Inhaler 3  melatonin tab tablet Take 10 mg by mouth nightly.  ascorbic acid (VITAMIN C) 500 mg tablet Take 500 mg by mouth daily.  multivitamin (ONE A DAY) tablet Take 1 Tab by mouth daily. ALLERGIES:   Allergies   Allergen Reactions    Morphine Rash and Itching    Norco [Hydrocodone-Acetaminophen] Itching        ACTIVE MEDICAL PROBLEMS:  Patient Active Problem List   Diagnosis Code    Rheumatic valvular disease I09.1    Chronic diastolic congestive heart failure (HCC) I50.32    Atrial fibrillation (HCC) I48.91    Long term (current) use of anticoagulants Z79.01    Benign hypertensive heart disease with heart failure (HCC) I11.0, I50.9    Microcytic anemia D50.9    Hyperlipidemia E78.5    Advanced directives, counseling/discussion Z71.89        MEDICAL/SURGICAL HISTORY:  Past Medical History:   Diagnosis Date    Aortic valve disorders 1/13/2011    Atrial fibrillation ([facility] [address] 1/13/2011    Congestive heart failure, unspecified March 2005    Cleared quickly with Lasix therapy    Echocardiogram 01/10/2012    A-fib. EF 45%. Mild RVE. RVSP 45-50. Massive LAE. Mod TAMI. Mod MS. Severe MR (mean grad 10). Mild-mod AI. Mild TR. Pulmonic systolic flow reversal.  Mild IVCE.  Hypertension     Iron deficiency anemias     Long term (current) use of anticoagulants 2/23/2011    Murmur 1/2011    Grade I-II/VI systolic ejection murmur along left sternal border, with radiation to the pulmonic area.  S/P cardiac cath 02/24/2012    Minimal coronary artery disease. Mild LVE. EF 55%. Mod MR. Mod-severe MS. RA 15.  RV 55/16. PA 58/34. W 36.  CO/CI 3.5/1.9 (TD); 3.61/1.9 (Dequan). R to L shunt suggested.     Wears glasses     Weight gain       Past Surgical History:   Procedure Laterality Date    HX HEART CATHETERIZATION  2/24/2012    HX HYSTERECTOMY  2006    HX MITRAL VALVE REPLACEMENT  05/22/2013    25/33 mm On-X mechanical valve with bilateral pulmonary vein isolation and resection of LA appendage        FAMILY HISTORY:  Family History   Problem Relation Age of Onset    Heart Surgery Father      Open-Heart Surgery    Other Father      Venous Thromboembolism    Heart Disease Mother      Enlarged Heart    Hypertension Mother     Diabetes Mother         SOCIAL HISTORY:  Social History   Substance Use Topics    Smoking status: Former Smoker    Smokeless tobacco: Never Used    Alcohol use 0.6 oz/week     1 Glasses of wine per week      Comment: occassionally        HISTORY OF PRESENT ILLNESS:   Yolie Boyer is a 59 y.o. female who presents to the office for acute care. PCP: Dr. Naz Boyle. Patient presents complaining of persistent pain and swelling along the medial dorsal aspect of her right foot. She was initially seen in the emergency room for this on 7/9. X-rays were performed at that time and were WNL. Pain was thought to be due to a sprain/strain or possibly superficial thrombophlebitis. Patient was treated with NSAIDs and percocet, which she could not tolerate due to adverse side effects. She then saw me on 7/12, and was given prednisone and tramadol, which she took with some relief. However, once she had completed these medications, the pain returned, and she was again evaluated in our office on 7/28. At that time, ultrasound and/or MRI were recommended for further evaluation of the foot, but patient declined. She was given another small supply of tramadol, and referred to orthopedics. She presents today requesting a refill of tramadol to last until her ortho appt scheduled for 9/5. She reports pain is essentially unchanged. She continues to experience some minor swelling and redness which worsen throughout the day. Pain seems to be worse at night, and she reports that tramadol helps her at bedtime. She has also been applying ice to her foot in the evening. She admits to some intermittent \"tingling\" in her foot but no numbness or weakness.     REVIEW OF SYSTEMS:  Review of Systems   Constitutional: Negative for chills and fever. Respiratory: Negative for cough, shortness of breath and wheezing. Cardiovascular: Negative for chest pain and palpitations. Musculoskeletal: Positive for joint pain ( R foot). Negative for back pain. Neurological: Positive for tingling. PHYSICAL EXAMINATION:  Physical Exam   Constitutional: She is well-developed, well-nourished, and in no distress. Cardiovascular: Normal rate, regular rhythm and normal heart sounds. Exam reveals no gallop and no friction rub. No murmur heard. Pulmonary/Chest: Effort normal and breath sounds normal. She has no wheezes. She has no rales. Musculoskeletal: She exhibits no edema. Feet:    Tenderness to palpation noted over dorsal aspect of R mid foot - roughly 1st-3rd metatarsals. Pain reproducible with plantar and dorsiflexion. Slight dusky discoloration of R foot as compared to L. Pulses 2+ bilaterally. Skin: Skin is warm and dry. Psychiatric: Mood, memory and affect normal.        RESULTS:  No results found for this visit on 08/21/17. ASSESSMENT/PLAN:  Diagnoses and all orders for this visit:    1. Right foot pain  2. Swelling of foot joint, right  - Cause of patient's pain remains unclear. Will refill tramadol as below and order ultrasound to r/o possible thrombus though clinical suspicion is low. Agree with ortho evaluation as scheduled   -     traMADol (ULTRAM) 50 mg tablet; Take 1 Tab by mouth every eight (8) hours as needed. Max Daily Amount: 150 mg.  -     DUPLEX LOWER EXT VENOUS LEFT; Future      Follow up here as scheduled/as needed    Patient expresses understanding and is agreeable with the above plan.       PATIENT CARE TEAM:   Patient Care Team:  Sherine Callahan MD as PCP - General (Family Practice)  Екатерина Ashley DO as Physician (Cardiology)  Екатерина Ashley DO (Cardiology)  Rahat Montelongo, RN as Ambulatory Care Navigator       Shirley Sanchez, [address]   August 22, 2017 7:55 AM

## 2017-08-22 NOTE — TELEPHONE ENCOUNTER
Central scheduling called the patient to schedule the imaging. Patient informed the young lady that the imaging was suppose to be done on the right and not the left. Central scheduling states that it needs to be corrected before they can schedule the patient.

## 2017-08-22 NOTE — PROGRESS NOTES
Verbal order and read back per Elaine Garcia NP  The INR is stable and therapeutic. Continue same dose of coumadin and recheck in 1 month.   Continue Coumadin 5mg daily except 2.5mg on Tues, Thur, and Sat  Patient informed of instructions, read back and verbalized understanding Marcine Boast, LPN

## 2017-08-24 DIAGNOSIS — J45.40 MODERATE PERSISTENT ASTHMA WITHOUT COMPLICATION: ICD-10-CM

## 2017-08-24 RX ORDER — ALBUTEROL SULFATE 90 UG/1
2 AEROSOL, METERED RESPIRATORY (INHALATION) DAILY
Qty: 1 INHALER | Refills: 3 | Status: SHIPPED | OUTPATIENT
Start: 2017-08-24 | End: 2018-08-10 | Stop reason: SDUPTHER

## 2017-08-24 NOTE — TELEPHONE ENCOUNTER
Call made to Central Scheduling and they were made aware that the order has been correct in the system and they verbalized understanding.

## 2017-08-24 NOTE — TELEPHONE ENCOUNTER
Received a faxed refill request from [facility] for refill on Ventolin HFA 90MCG Inhaler.     Patient's last OV: 8/21/17  Patient's next OV: 9/18/17  Medication(s) last filled on: 3/9/17

## 2017-08-24 NOTE — TELEPHONE ENCOUNTER
Noted. Noted that pt is correct looking at the last office notes. Message forwarded to the provider to get order changed.

## 2017-08-31 ENCOUNTER — HOSPITAL ENCOUNTER (OUTPATIENT)
Dept: VASCULAR SURGERY | Age: 57
Discharge: HOME | End: 2017-08-31
Attending: PHYSICIAN ASSISTANT
Payer: MEDICARE

## 2017-08-31 DIAGNOSIS — M79.671 FOOT PAIN, RIGHT: ICD-10-CM

## 2017-08-31 PROCEDURE — 93971 EXTREMITY STUDY: CPT

## 2017-09-05 ENCOUNTER — OFFICE VISIT (OUTPATIENT)
Dept: ORTHOPEDIC SURGERY | Age: 57
End: 2017-09-05

## 2017-09-05 VITALS
OXYGEN SATURATION: 99 % | TEMPERATURE: 97.8 F | DIASTOLIC BLOOD PRESSURE: 79 MMHG | WEIGHT: 190.4 LBS | HEIGHT: 67 IN | SYSTOLIC BLOOD PRESSURE: 119 MMHG | BODY MASS INDEX: 29.88 KG/M2 | HEART RATE: 61 BPM

## 2017-09-05 DIAGNOSIS — M79.671 RIGHT FOOT PAIN: Primary | ICD-10-CM

## 2017-09-05 NOTE — PATIENT INSTRUCTIONS
Please follow up after CT. You are advised to contact us if your condition worsens. An MRI or CT has been ordered for you. A [employer] will be contacting you to schedule the appointment as soon as it has been approved with your insurance company. Please schedule an appointment to follow up with the doctor or the physicians assistant after the MRI or CT has been conducted. Foot Pain: Care Instructions  Your Care Instructions  Foot injuries that cause pain and swelling are fairly common. Almost all sports or home repair projects can cause a misstep that ends up as foot pain. Normal wear and tear, especially as you get older, also can cause foot pain. Most minor foot injuries will heal on their own, and home treatment is usually all you need to do. If you have a severe injury, you may need tests and treatment. Follow-up care is a key part of your treatment and safety. Be sure to make and go to all appointments, and call your doctor if you are having problems. Its also a good idea to know your test results and keep a list of the medicines you take. How can you care for yourself at home? · Take pain medicines exactly as directed. ¨ If the doctor gave you a prescription medicine for pain, take it as prescribed. ¨ If you are not taking a prescription pain medicine, ask your doctor if you can take an over-the-counter medicine. · Rest and protect your foot. Take a break from any activity that may cause pain. · Put ice or a cold pack on your foot for 10 to 20 minutes at a time. Put a thin cloth between the ice and your skin. · Prop up the sore foot on a pillow when you ice it or anytime you sit or lie down during the next 3 days. Try to keep it above the level of your heart. This will help reduce swelling. · Your doctor may recommend that you wrap your foot with an elastic bandage. Keep your foot wrapped for as long as your doctor advises.   · If your doctor recommends crutches, use them as directed. · Wear roomy footwear. · As soon as pain and swelling end, begin gentle exercises of your foot. Your doctor can tell you which exercises will help. When should you call for help? Call 911 anytime you think you may need emergency care. For example, call if:  · Your foot turns pale, white, blue, or cold. Call your doctor now or seek immediate medical care if:  · You cannot move or stand on your foot. · Your foot looks twisted or out of its normal position. · Your foot is not stable when you step down. · You have signs of infection, such as:  ¨ Increased pain, swelling, warmth, or redness. ¨ Red streaks leading from the sore area. ¨ Pus draining from a place on your foot. ¨ A fever. · Your foot is numb or tingly. Watch closely for changes in your health, and be sure to contact your doctor if:  · You do not get better as expected. · You have bruises from an injury that last longer than 2 weeks. Where can you learn more? Go to http://greyson-andrea.info/. Enter P048 in the search box to learn more about \"Foot Pain: Care Instructions. \"  Current as of: March 21, 2017  Content Version: 11.3  © 2855-8907 [employer]. Care instructions adapted under license by [employer] (which disclaims liability or warranty for this information). If you have questions about a medical condition or this instruction, always ask your healthcare professional. [place] [address] any warranty or liability for your use of this information.

## 2017-09-05 NOTE — PROGRESS NOTES
AMBULATORY PROGRESS NOTE      Patient: Wanda Pavon             MRN: [id number]     SSN: [id number] Body mass index is 29.82 kg/(m^2). YOB: 1960     AGE: 59 y.o. EX: female    PCP: Tony Krishnan MD    IMPRESSION/DIAGNOSIS AND TREATMENT PLAN     DIAGNOSES  1. Right foot pain        No orders of the defined types were placed in this encounter. Wanda Pavon understands her diagnoses and the proposed plan. In this individual who has reproducible point tenderness to the second, third, and fourth metatarsal basilar regions, the first metatarsal basilar region, plantar medially, as well as the abductor hallucis muscle region of her right foot, in this individual who has had pain and discomfort for about a month and a half now, recommendation is for a CT scan of her right foot. This study is an item of medical necessity and is allowing me to assess her midfoot, TMT joints, Lisfranc ligaments, and first metatarsal base, as well as her tarsal tunnel component of her hindfoot. She cannot have an MRI due to her having a metal mitral valve replacement that she had in place for the last three or four years. Plan:    1) Continue activity as tolerated    RTO - After CT    HPI AND EXAMINATION     Wanda Pavon IS A 59 y.o. female who presents to my outpatient office complaining of right foot pain. The patient presents to the office today with a CC of right foot pain. Patient recalls that on July 5, 2017 she noticed that her foot was red and swollen. Patient saw her PCP, who prescribed antibiotics and pain medication to treat it. She had an PVL venous study done on 9/1/2017. Patient notes that by night time her right foot is swollen and causes enough pain where she has to take pain medication. Patient reports that her skin has darkened over time since her foot condition has started.      X ray 7/9/17: HV: foot Xray: neg for fx: right foot           PVL 8/31/17: negative    [facility] FINAL REPORT       Name: Juma Drake  MRN: [id number]    Outpatient  : 1960  HIS Order #: [id number]  [id number] Visit #: [id number]  Date: 31 Aug 2017     TYPE OF TEST: Peripheral Venous Testing     REASON FOR TEST  Limb swelling     Right Leg:-  Deep venous thrombosis:           No  Superficial venous thrombosis:    No  Deep venous insufficiency:        Not examined  Superficial venous insufficiency: Not examined        INTERPRETATION/FINDINGS    Duplex images were obtained using 2-D gray scale, color flow, and  spectral Doppler analysis. Right leg :  1. Deep vein(s) visualized include the common femoral, proximal  femoral, mid femoral, distal femoral, popliteal(above knee),  popliteal(fossa), popliteal(below knee), posterior tibial and peroneal   veins. 2. No evidence of deep venous thrombosis detected in the veins  visualized. 3. No evidence of deep vein thrombosis in the contralateral common  femoral vein. 4. Superficial vein(s) visualized include the great saphenous vein. 5. No evidence of superficial thrombosis detected.            Patient reports taking Coumadin. Patient has a h/o of Mitral Valve replacement. Patient has a history of high blood pressure and takes medication for it. 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     ANKLE/FOOT right    Psychiatry: Alert, Oriented x 3; Speech normal in context and clarity,            Memory intact grossly, no involuntary movements - tremors, no dementia  Gait: slow  Tenderness: mild tenderness to dorsal aspect of foot (Proximal second metatarsal)>> 3/4 dorsal met base regions  Cutaneous: Darkened skin over dorsal aspect of foot  Joint Motion: WNL  Joint / Tendon Stability: No Ankle or Subtalar instability or joint laxity.                        No peroneal sublux ability or dislocation  Alignment:  Normal Foot Alignment and  Flexible  Neuro Motor/Sensory: NL/NL,   Vascular: NL foot/ankle pulses  Lymphatics: No extremity lymphedema, No calf swelling, no tenderness to calf muscles. CHART REVIEW     Past Medical History:   Diagnosis Date    Aortic valve disorders 1/13/2011    Atrial fibrillation ([address] 1/13/2011    Congestive heart failure, unspecified March 2005    Cleared quickly with Lasix therapy    Echocardiogram 01/10/2012    A-fib. EF 45%. Mild RVE. RVSP 45-50. Massive LAE. Mod TAMI. Mod MS. Severe MR (mean grad 10). Mild-mod AI. Mild TR. Pulmonic systolic flow reversal.  Mild IVCE.  Hypertension     Iron deficiency anemias     Long term (current) use of anticoagulants 2/23/2011    Murmur 1/2011    Grade I-II/VI systolic ejection murmur along left sternal border, with radiation to the pulmonic area.  S/P cardiac cath 02/24/2012    Minimal coronary artery disease. Mild LVE. EF 55%. Mod MR. Mod-severe MS. RA 15.  RV 55/16. PA 58/34. W 36.  CO/CI 3.5/1.9 (TD); 3.61/1.9 ([place]). R to L shunt suggested.  Wears glasses     Weight gain      Current Outpatient Prescriptions   Medication Sig    albuterol (VENTOLIN HFA) 90 mcg/actuation inhaler Take 2 Puffs by inhalation daily.  traMADol (ULTRAM) 50 mg tablet Take 1 Tab by mouth every eight (8) hours as needed. Max Daily Amount: 150 mg.    KLOR-CON M20 20 mEq tablet TAKE 1 TAB BY MOUTH DAILY.  losartan (COZAAR) 100 mg tablet Take 1 Tab by mouth daily.  furosemide (LASIX) 40 mg tablet TAKE 1 TABLET DAILY    warfarin (COUMADIN) 5 mg tablet Take 5mg daily except 2.5mg on Tues, Thur, and Sat. Or as directed by our office    simvastatin (ZOCOR) 20 mg tablet TAKE 1 TAB BY MOUTH NIGHTLY.  metoprolol succinate (TOPROL-XL) 100 mg tablet TAKE 1 TAB BY MOUTH DAILY.  hydroCHLOROthiazide (HYDRODIURIL) 25 mg tablet TAKE 1 TAB BY MOUTH DAILY.  esomeprazole (NEXIUM) 20 mg capsule Take 1 Cap by mouth daily.  fluticasone (FLONASE) 50 mcg/actuation nasal spray 2 Sprays by Both Nostrils route daily.     fluticasone-salmeterol (ADVAIR) 250-50 mcg/dose diskus inhaler Take 1 Puff by inhalation every twelve (12) hours.  melatonin tab tablet Take 10 mg by mouth nightly.  ascorbic acid (VITAMIN C) 500 mg tablet Take 500 mg by mouth daily.  multivitamin (ONE A DAY) tablet Take 1 Tab by mouth daily. No current facility-administered medications for this visit. Allergies   Allergen Reactions    Morphine Rash and Itching    Norco [Hydrocodone-Acetaminophen] Itching     Past Surgical History:   Procedure Laterality Date    HX HEART CATHETERIZATION  2/24/2012    HX HYSTERECTOMY  2006    HX MITRAL VALVE REPLACEMENT  05/22/2013    25/33 mm On-X mechanical valve with bilateral pulmonary vein isolation and resection of LA appendage     Social History     Occupational History    Not on file. Social History Main Topics    Smoking status: Former Smoker    Smokeless tobacco: Never Used    Alcohol use 0.6 oz/week     1 Glasses of wine per week      Comment: occassionally    Drug use: No    Sexual activity: Yes     Partners: Male     Birth control/ protection: None     Family History   Problem Relation Age of Onset    Heart Surgery Father      Open-Heart Surgery    Other Father      Venous Thromboembolism    Heart Disease Mother      Enlarged Heart    Hypertension Mother     Diabetes Mother        REVIEW OF SYSTEMS : 9/5/2017  ALL BELOW ARE Negative except : SEE HPI       Constitutional: Negative for fever, chills and weight loss. Neg Weigh Loss  Cardiovascular: Negative for chest pain, claudication and leg swelling. SOB, LIZARRAGA   Gastrointestinal: Negative for  pain, N/V/D/C, Blood in stool or urine,dysuria, hematuria,        Incontinence, pelvic pain  Musculoskeletal: see HPI. Neurological: Negative for dizziness and weakness. Negative for headaches,Visual Changes, Confusion, Seizures,   Psychiatric/Behavioral: Negative for depression, memory loss and substance abuse.    Extremities:  Negative for  hair changes, rash or skin lesion changes. Hematologic: Negative for Bleeding problems, bruising, pallor or swollen lymph nodes. Peripheral Vascular: No calf pain, vascular vein tenderness to calf pain              No calf throbbing, posterior knee throbbing pain    DIAGNOSTIC IMAGING     XR Results (most recent):    Results from Hospital Encounter encounter on 17   XR FOOT RT MIN 3 V   Narrative Right foot, 3 views:    No fracture or dislocation. Minimal Achilles enthesopathy. No erosion. No focal  soft tissue swelling. Mild hyperflexion of the metatarsophalangeal joints         Impression IMPRESSION: No acute abnormalities         [address]    FINAL REPORT       Name: Yanet Lara  MRN: [id number]    Outpatient  : 1960  HIS Order #: [id number]  [id number] [facility] Visit #: [id number]  Date: 31 Aug 2017     TYPE OF TEST: Peripheral Venous Testing     REASON FOR TEST  Limb swelling     Right Leg:-  Deep venous thrombosis:           No  Superficial venous thrombosis:    No  Deep venous insufficiency:        Not examined  Superficial venous insufficiency: Not examined        INTERPRETATION/FINDINGS  Duplex images were obtained using 2-D gray scale, color flow, and  spectral Doppler analysis. Right leg :  1. Deep vein(s) visualized include the common femoral, proximal  femoral, mid femoral, distal femoral, popliteal(above knee),  popliteal(fossa), popliteal(below knee), posterior tibial and peroneal   veins. 2. No evidence of deep venous thrombosis detected in the veins  visualized. 3. No evidence of deep vein thrombosis in the contralateral common  femoral vein. 4. Superficial vein(s) visualized include the great saphenous vein. 5. No evidence of superficial thrombosis detected.     Written by Fran Neumann, as dictated by Marybeth Allen MD. Dr. CHERYL, Marybeth Allen MD, confirm that all documentation is accurate.

## 2017-09-05 NOTE — MR AVS SNAPSHOT
Visit Information Date & Time Provider Department Dept. Phone Encounter #  
 9/5/2017  1:30 PM Sarai Ross, [address] [facility] [phone] Your Appointments 9/18/2017 10:30 AM  
FOLLOW UP EXAM with Glo Kidd MD  
[facility]) Appt Note: chronic disease follow up; chronic disease follow up  
 [address] [facility] [address]  
[facility] [address] 9/19/2017 10:20 AM  
ANTICOAG NURSE with Pt Inr Hv Csi [facility] ([facility]) Appt Note: 4 week INR  
 [place] [address] [address] [address] [phone] Fransisco Dooley  
  
    
 10/20/2017  1:00 PM  
Follow Up with Zulma Mello DO [facility] ([facility]) Appt Note: 6 mth f/up [place] [address] [address] [id number] [phone] [address] [facility] [address] 3/15/2018 11:00 AM  
Office Visit with Glo Kidd MD  
[facility]) Appt Note: [address] [facility] [address]  
[facility] [address] Upcoming Health Maintenance Date Due Hepatitis C Screening 1960 Pneumococcal 19-64 Medium Risk (1 of 1 - PPSV23) 7/4/1977 DTaP/Tdap/Td series (1 - Tdap) 7/4/1979 INFLUENZA AGE 9 TO ADULT 8/1/2017 FOBT Q 1 YEAR AGE 50-75 7/17/2018 BREAST CANCER SCRN MAMMOGRAM 3/16/2019 PAP AKA CERVICAL CYTOLOGY 3/9/2020 Allergies as of 9/5/2017  Review Complete On: 9/5/2017 By: Sarai Ross MD  
  
 Severity Noted Reaction Type Reactions Morphine  02/02/2012    Rash, Itching Norco [Hydrocodone-acetaminophen]  07/12/2017    Itching Current Immunizations  Reviewed on 2/15/2013 Name Date Influenza Vaccine 12/15/2012 Influenza Vaccine (Quad) PF 9/20/2016 Not reviewed this visit You Were Diagnosed With   
  
 Codes Comments Right foot pain    -  Primary ICD-10-CM: G24.482 ICD-9-CM: 729.5 Vitals BP Pulse Temp Height(growth percentile) Weight(growth percentile) SpO2  
 119/79 (BP 1 Location: Left arm, BP Patient Position: Sitting) 61 97.8 °F (36.6 °C) (Oral) 5' 7\" (1.702 m) 190 lb 6.4 oz (86.4 kg) 99% BMI OB Status Smoking Status 29.82 kg/m2 Hysterectomy Former Smoker BMI and BSA Data Body Mass Index Body Surface Area  
 29.82 kg/m 2 2.02 m 2 Preferred Pharmacy Pharmacy Name Phone [id number] [place] [address] [facility] [phone] Your Updated Medication List  
  
   
This list is accurate as of: 9/5/17  1:53 PM.  Always use your most recent med list.  
  
  
  
  
 albuterol 90 mcg/actuation inhaler Commonly known as:  VENTOLIN HFA Take 2 Puffs by inhalation daily. esomeprazole 20 mg capsule Commonly known as:  NexIUM Take 1 Cap by mouth daily. fluticasone 50 mcg/actuation nasal spray Commonly known as:  Genie Bassam 2 Sprays by Both Nostrils route daily. fluticasone-salmeterol 250-50 mcg/dose diskus inhaler Commonly known as:  ADVAIR Take 1 Puff by inhalation every twelve (12) hours. furosemide 40 mg tablet Commonly known as:  LASIX TAKE 1 TABLET DAILY  
  
 hydroCHLOROthiazide 25 mg tablet Commonly known as:  HYDRODIURIL  
TAKE 1 TAB BY MOUTH DAILY. KLOR-CON M20 20 mEq tablet Generic drug:  potassium chloride TAKE 1 TAB BY MOUTH DAILY. losartan 100 mg tablet Commonly known as:  COZAAR Take 1 Tab by mouth daily. melatonin Tab tablet Take 10 mg by mouth nightly. metoprolol succinate 100 mg tablet Commonly known as:  TOPROL-XL  
TAKE 1 TAB BY MOUTH DAILY. multivitamin tablet Commonly known as:  ONE A DAY Take 1 Tab by mouth daily. simvastatin 20 mg tablet Commonly known as:  ZOCOR  
TAKE 1 TAB BY MOUTH NIGHTLY.  
  
 traMADol 50 mg tablet Commonly known as:  ULTRAM  
Take 1 Tab by mouth every eight (8) hours as needed. Max Daily Amount: 150 mg. VITAMIN C 500 mg tablet Generic drug:  ascorbic acid (vitamin C) Take 500 mg by mouth daily. warfarin 5 mg tablet Commonly known as:  COUMADIN Take 5mg daily except 2.5mg on Tues, Thur, and Sat. Or as directed by our office To-Do List   
 09/05/2017 Imaging:  CT FOOT RT WO CONT Patient Instructions Please follow up after CT. You are advised to contact us if your condition worsens. An MRI or CT has been ordered for you. A [email or web address] will be contacting you to schedule the appointment as soon as it has been approved with your insurance company. Please schedule an appointment to follow up with the doctor or the physicians assistant after the MRI or CT has been conducted. Foot Pain: Care Instructions Your Care Instructions Foot injuries that cause pain and swelling are fairly common. Almost all sports or home repair projects can cause a misstep that ends up as foot pain. Normal wear and tear, especially as you get older, also can cause foot pain. Most minor foot injuries will heal on their own, and home treatment is usually all you need to do. If you have a severe injury, you may need tests and treatment. Follow-up care is a key part of your treatment and safety. Be sure to make and go to all appointments, and call your doctor if you are having problems. Its also a good idea to know your test results and keep a list of the medicines you take. How can you care for yourself at home? · Take pain medicines exactly as directed. ¨ If the doctor gave you a prescription medicine for pain, take it as prescribed. ¨ If you are not taking a prescription pain medicine, ask your doctor if you can take an over-the-counter medicine. · Rest and protect your foot. Take a break from any activity that may cause pain. · Put ice or a cold pack on your foot for 10 to 20 minutes at a time. Put a thin cloth between the ice and your skin. · Prop up the sore foot on a pillow when you ice it or anytime you sit or lie down during the next 3 days. Try to keep it above the level of your heart. This will help reduce swelling. · Your doctor may recommend that you wrap your foot with an elastic bandage. Keep your foot wrapped for as long as your doctor advises. · If your doctor recommends crutches, use them as directed. · Wear roomy footwear. · As soon as pain and swelling end, begin gentle exercises of your foot. Your doctor can tell you which exercises will help. When should you call for help? Call 911 anytime you think you may need emergency care. For example, call if: 
· Your foot turns pale, white, blue, or cold. Call your doctor now or seek immediate medical care if: 
· You cannot move or stand on your foot. · Your foot looks twisted or out of its normal position. · Your foot is not stable when you step down. · You have signs of infection, such as: 
¨ Increased pain, swelling, warmth, or redness. ¨ Red streaks leading from the sore area. ¨ Pus draining from a place on your foot. ¨ A fever. · Your foot is numb or tingly. Watch closely for changes in your health, and be sure to contact your doctor if: 
· You do not get better as expected. · You have bruises from an injury that last longer than 2 weeks. Where can you learn more? Go to http://greyson-andrea.info/. Enter Z839 in the search box to learn more about \"Foot Pain: Care Instructions. \" Current as of: March 21, 2017 Content Version: 11.3 © 3546-8617 [employer].  Care instructions adapted under license by [address] (which disclaims liability or warranty for this information). If you have questions about a medical condition or this instruction, always ask your healthcare professional. [address] any warranty or liability for your use of this information. Introducing [facility] [place] [facility]! Michael Vasquez introduces [employer] patient portal. Now you can access parts of your medical record, email your doctor's office, and request medication refills online. 1. In your internet browser, go to [email or web address] 2. Click on the First Time User? Click Here link in the Sign In box. You will see the New Member Sign Up page. 3. Enter your [employer] Access Code exactly as it appears below. You will not need to use this code after youve completed the sign-up process. If you do not sign up before the expiration date, you must request a new code. · [employer] Access Code: [email or web address] Expires: 11/20/2017  9:56 AM 
 
4. Enter the last four digits of your Social Security Number (xxxx) and Date of Birth (mm/dd/yyyy) as indicated and click Submit. You will be taken to the next sign-up page. 5. Create a [employer] ID. This will be your [employer] login ID and cannot be changed, so think of one that is secure and easy to remember. 6. Create a [employer] password. You can change your password at any time. 7. Enter your Password Reset Question and Answer. This can be used at a later time if you forget your password. 8. Enter your e-mail address. You will receive e-mail notification when new information is available in [address] 9. Click Sign Up. You can now view and download portions of your medical record. 10. Click the Download Summary menu link to download a portable copy of your medical information. If you have questions, please visit the Frequently Asked Questions section of the [employer] website.  Remember, [employer] is NOT to be used for urgent needs. For medical emergencies, dial 911. Now available from your iPhone and Android! Please provide this summary of care documentation to your next provider. Your primary care clinician is listed as Prabhjot Mendoza. If you have any questions after today's visit, please call [phone].

## 2017-09-06 ENCOUNTER — PATIENT OUTREACH (OUTPATIENT)
Dept: FAMILY MEDICINE CLINIC | Age: 57
End: 2017-09-06

## 2017-09-06 NOTE — PROGRESS NOTES
ED Follow Up     Per EMR review, patient has not been admitted to Emergency Department or the ED since  7-9-17. Patient has attended PCP and specialty follow up appointments. Goal resolved. Episode Closed for further ambulatory care navigation services at this time.

## 2017-09-11 ENCOUNTER — HOSPITAL ENCOUNTER (OUTPATIENT)
Dept: CT IMAGING | Age: 57
Discharge: HOME | End: 2017-09-11
Attending: ORTHOPAEDIC SURGERY
Payer: MEDICARE

## 2017-09-11 DIAGNOSIS — M79.671 RIGHT FOOT PAIN: ICD-10-CM

## 2017-09-11 PROCEDURE — 73700 CT LOWER EXTREMITY W/O DYE: CPT

## 2017-09-14 ENCOUNTER — OFFICE VISIT (OUTPATIENT)
Dept: FAMILY MEDICINE CLINIC | Age: 57
End: 2017-09-14

## 2017-09-14 VITALS
OXYGEN SATURATION: 95 % | HEART RATE: 67 BPM | TEMPERATURE: 96.8 F | HEIGHT: 67 IN | SYSTOLIC BLOOD PRESSURE: 130 MMHG | RESPIRATION RATE: 18 BRPM | DIASTOLIC BLOOD PRESSURE: 89 MMHG | WEIGHT: 187 LBS | BODY MASS INDEX: 29.35 KG/M2

## 2017-09-14 DIAGNOSIS — M25.474 SWELLING OF FOOT JOINT, RIGHT: ICD-10-CM

## 2017-09-14 DIAGNOSIS — M79.671 RIGHT FOOT PAIN: ICD-10-CM

## 2017-09-14 RX ORDER — TRAMADOL HYDROCHLORIDE 50 MG/1
50 TABLET ORAL
Qty: 30 TAB | Refills: 0 | Status: SHIPPED | OUTPATIENT
Start: 2017-09-14 | End: 2018-08-17

## 2017-09-14 NOTE — PATIENT INSTRUCTIONS

## 2017-09-14 NOTE — PROGRESS NOTES
Acute Care Visit    Today's Date:  2017   Patient's Name: Gina Alejandra   Patient's :  1960     History:     Chief Complaint   Patient presents with    Follow-up     pt presents fro follow up for foot pain pt states \" that pt is having pain in right foot \"        Gina Alejandra is a 59 y.o. female presenting for Acute Care    Right Foot Pain     Onset:   Acute onset no provoking factors. Soreness/swelling presented. Worst after excessive walking or extended standing. Pain is worse at the end of day. Character: achy/sore  Since last visit she has been followed by Dr. Robyn Carranza scan shows no specific findings  Denies acute injuries or falls. Patient usually takes pain meds and NSAID for pain relief  Home Treatment: ice pack at bedtime and tramadol no improvements  At worst pain can be 10/10. Past Medical History:   Diagnosis Date    Aortic valve disorders 2011    Atrial fibrillation ([address] 2011    Congestive heart failure, unspecified 2005    Cleared quickly with Lasix therapy    Echocardiogram 01/10/2012    A-fib. EF 45%. Mild RVE. RVSP 45-50. Massive LAE. Mod TAMI. Mod MS. Severe MR (mean grad 10). Mild-mod AI. Mild TR. Pulmonic systolic flow reversal.  Mild IVCE.  Hypertension     Iron deficiency anemias     Long term (current) use of anticoagulants 2011    Murmur 2011    Grade I-II/VI systolic ejection murmur along left sternal border, with radiation to the pulmonic area.  S/P cardiac cath 2012    Minimal coronary artery disease. Mild LVE. EF 55%. Mod MR. Mod-severe MS. RA 15.  RV 55/16. PA 58/34. W 36.  CO/CI 3.5/1.9 (TD); 3.61/1.9 (Dequan). R to L shunt suggested.     Wears glasses     Weight gain      Past Surgical History:   Procedure Laterality Date    HX HEART CATHETERIZATION  2012    HX HYSTERECTOMY      HX MITRAL VALVE REPLACEMENT  2013    25/33 mm On-X mechanical valve with bilateral pulmonary vein isolation and resection of LA appendage     Social History     Social History    Marital status:      Spouse name: N/A    Number of children: N/A    Years of education: N/A     Social History Main Topics    Smoking status: Former Smoker    Smokeless tobacco: Never Used    Alcohol use 0.6 oz/week     1 Glasses of wine per week      Comment: occassionally    Drug use: No    Sexual activity: Yes     Partners: Male     Birth control/ protection: None     Other Topics Concern    None     Social History Narrative     Family History   Problem Relation Age of Onset    Heart Surgery Father      Open-Heart Surgery    Other Father      Venous Thromboembolism    Heart Disease Mother      Enlarged Heart    Hypertension Mother     Diabetes Mother      Allergies   Allergen Reactions    Morphine Rash and Itching    Norco [Hydrocodone-Acetaminophen] Itching       Problem List:      Patient Active Problem List   Diagnosis Code    Rheumatic valvular disease I09.1    Chronic diastolic congestive heart failure (HCC) I50.32    Atrial fibrillation (HCC) I48.91    Long term (current) use of anticoagulants Z79.01    Benign hypertensive heart disease with heart failure (HCC) I11.0, I50.9    Microcytic anemia D50.9    Hyperlipidemia E78.5    Advanced directives, counseling/discussion Z71.89       Medications:     Current Outpatient Prescriptions   Medication Sig    traMADol (ULTRAM) 50 mg tablet Take 1 Tab by mouth every eight (8) hours as needed. Max Daily Amount: 150 mg.    albuterol (VENTOLIN HFA) 90 mcg/actuation inhaler Take 2 Puffs by inhalation daily.  KLOR-CON M20 20 mEq tablet TAKE 1 TAB BY MOUTH DAILY.  losartan (COZAAR) 100 mg tablet Take 1 Tab by mouth daily.  furosemide (LASIX) 40 mg tablet TAKE 1 TABLET DAILY    warfarin (COUMADIN) 5 mg tablet Take 5mg daily except 2.5mg on Tues, Thur, and Sat.  Or as directed by our office    simvastatin (ZOCOR) 20 mg tablet TAKE 1 TAB BY MOUTH NIGHTLY.  metoprolol succinate (TOPROL-XL) 100 mg tablet TAKE 1 TAB BY MOUTH DAILY.  hydroCHLOROthiazide (HYDRODIURIL) 25 mg tablet TAKE 1 TAB BY MOUTH DAILY.  esomeprazole (NEXIUM) 20 mg capsule Take 1 Cap by mouth daily.  fluticasone (FLONASE) 50 mcg/actuation nasal spray 2 Sprays by Both Nostrils route daily.  fluticasone-salmeterol (ADVAIR) 250-50 mcg/dose diskus inhaler Take 1 Puff by inhalation every twelve (12) hours.  melatonin tab tablet Take 10 mg by mouth nightly.  ascorbic acid (VITAMIN C) 500 mg tablet Take 500 mg by mouth daily.  multivitamin (ONE A DAY) tablet Take 1 Tab by mouth daily. No current facility-administered medications for this visit. Constitutional: negative for fevers, chills, sweats and fatigue  Respiratory: negative for cough, sputum or wheezing  Cardiovascular: negative for chest pain, chest pressure/discomfort, dyspnea  Gastrointestinal: negative for nausea, vomiting, diarrhea, constipation and abdominal pain  Musculoskeletal:positive for myalgias and arthralgias    Physical Assessment:   VS:    Visit Vitals    /89    Pulse 67    Temp 96.8 °F (36 °C) (Oral)    Resp 18    Ht 5' 7\" (1.702 m)    Wt 187 lb (84.8 kg)    SpO2 95%    BMI 29.29 kg/m2       Lab Results   Component Value Date/Time    Sodium 140 05/18/2017 09:27 AM    Potassium 3.4 05/18/2017 09:27 AM    Chloride 101 05/18/2017 09:27 AM    CO2 33 05/18/2017 09:27 AM    Anion gap 6 05/18/2017 09:27 AM    Glucose 97 05/18/2017 09:27 AM    BUN 13 05/18/2017 09:27 AM    Creatinine 0.91 05/18/2017 09:27 AM    BUN/Creatinine ratio 14 05/18/2017 09:27 AM    GFR est AA >60 05/18/2017 09:27 AM    GFR est non-AA >60 05/18/2017 09:27 AM    Calcium 9.0 05/18/2017 09:27 AM       General:   Well-groomed, well-nourished, in no distress, pleasant, alert, appropriate and conversant.    Mouth:  Good dentition, oropharynx WNL without membranes, exudates, petechiae or ulcers  Neck:   Neck supple, no swelling, mass or tenderness, no thyromegaly  Cardiovasc:   irregular, with murmur. Pulses 2+ and symmetric at distal extremities. Pulmonary:   Lungs clear bilaterally. Normal respiratory effort. Extremities:   No edema, no TTP bilateral calves. LEs warm and well-perfused. Neuro:   Alert and oriented, no focal deficits. No facial asymmetry noted. Skin:    No rashes or jaundice  MSK:   Normal ROM, 5/5 muscle strength, tenderness of the right dorsal portion of the foot. Psych:  No pressured speech or abnormal thought content        Assessment/Plan & Orders:       1. Right foot pain    2. Swelling of foot joint, right        Orders Placed This Encounter    traMADol (ULTRAM) 50 mg tablet       Right Foot Pain/Swelling  -will give 7 day supply of tramadol and have patient continue with Ortho. Unsure what is causes pain maybe musculoskeletal. No specific pathology on CT report. Follow up as needed    *Plan of care reviewed with patient. Patient in agreement with plan and expresses understanding. All questions answered and patient encouraged to call or RTO if further questions or concerns.     Dangelo Saha MD  Family Medicine  9/14/2017  8:13 AM

## 2017-09-18 ENCOUNTER — OFFICE VISIT (OUTPATIENT)
Dept: FAMILY MEDICINE CLINIC | Age: 57
End: 2017-09-18

## 2017-09-18 VITALS
RESPIRATION RATE: 18 BRPM | WEIGHT: 189 LBS | TEMPERATURE: 97.1 F | HEART RATE: 79 BPM | HEIGHT: 67 IN | OXYGEN SATURATION: 98 % | BODY MASS INDEX: 29.66 KG/M2 | SYSTOLIC BLOOD PRESSURE: 126 MMHG | DIASTOLIC BLOOD PRESSURE: 89 MMHG

## 2017-09-18 DIAGNOSIS — E78.5 HYPERLIPIDEMIA, UNSPECIFIED HYPERLIPIDEMIA TYPE: ICD-10-CM

## 2017-09-18 DIAGNOSIS — I48.19 PERSISTENT ATRIAL FIBRILLATION (HCC): ICD-10-CM

## 2017-09-18 DIAGNOSIS — I10 ESSENTIAL HYPERTENSION: ICD-10-CM

## 2017-09-18 DIAGNOSIS — Z23 ENCOUNTER FOR IMMUNIZATION: ICD-10-CM

## 2017-09-18 DIAGNOSIS — E78.2 MIXED HYPERLIPIDEMIA: ICD-10-CM

## 2017-09-18 DIAGNOSIS — I11.0 BENIGN HYPERTENSIVE HEART DISEASE WITH HEART FAILURE (HCC): Primary | ICD-10-CM

## 2017-09-18 RX ORDER — FUROSEMIDE 40 MG/1
TABLET ORAL
Qty: 30 TAB | Refills: 3 | Status: SHIPPED | OUTPATIENT
Start: 2017-09-18 | End: 2018-02-26 | Stop reason: SDUPTHER

## 2017-09-18 RX ORDER — HYDROCHLOROTHIAZIDE 25 MG/1
25 TABLET ORAL DAILY
Qty: 30 TAB | Refills: 6 | Status: SHIPPED | OUTPATIENT
Start: 2017-09-18 | End: 2018-04-27 | Stop reason: SDUPTHER

## 2017-09-18 RX ORDER — METOPROLOL SUCCINATE 100 MG/1
100 TABLET, EXTENDED RELEASE ORAL DAILY
Qty: 30 TAB | Refills: 6 | Status: SHIPPED | OUTPATIENT
Start: 2017-09-18 | End: 2018-04-27 | Stop reason: SDUPTHER

## 2017-09-18 RX ORDER — LOSARTAN POTASSIUM 100 MG/1
100 TABLET ORAL DAILY
Qty: 30 TAB | Refills: 3 | Status: SHIPPED | OUTPATIENT
Start: 2017-09-18 | End: 2018-02-26 | Stop reason: SDUPTHER

## 2017-09-18 RX ORDER — SIMVASTATIN 20 MG/1
20 TABLET, FILM COATED ORAL
Qty: 30 TAB | Refills: 6 | Status: SHIPPED | OUTPATIENT
Start: 2017-09-18 | End: 2019-02-01 | Stop reason: ALTCHOICE

## 2017-09-18 NOTE — PROGRESS NOTES
Patient presents today for Influenza vaccine. Administered without complaints, patient observed for  5 minutes no adverse reactions noted. Patient left office in stable condition.

## 2017-09-18 NOTE — MR AVS SNAPSHOT
Visit Information Date & Time Provider Department Dept. Phone Encounter #  
 9/18/2017 10:30 AM Maris Carlton MD [facility] [phone] [id number] Your Appointments 9/19/2017 10:20 AM  
ANTICOAG NURSE with Pt Inr Hv [facility] ([facility]) Appt Note: 4 week INR  
 [place] [address] [address] [address] [phone] [phone]  
  
    
 9/20/2017 10:20 AM  
DIAG TEST F/U with Immanuel Kirk MD  
[address] ([facility]) Appt Note: CT RT FOOT F/U  
 [address], [address] [facility]  
[phone] [address], [address]  
  
    
 10/20/2017  1:00 PM  
Follow Up with Laisha Jensen DO [facility] ([facility]) Appt Note: 6 mth f/up [place] [address] [address] [address] [phone] [address] [facility] [address] [facility] [address] 3/15/2018 11:00 AM  
Office Visit with Maris Carlton MD  
[facility]) Appt Note: [address] [facility] [address]  
[facility] [address] Upcoming Health Maintenance Date Due Hepatitis C Screening 1960 Pneumococcal 19-64 Medium Risk (1 of 1 - PPSV23) 7/4/1977 DTaP/Tdap/Td series (1 - Tdap) 7/4/1979 INFLUENZA AGE 9 TO ADULT 8/1/2017 FOBT Q 1 YEAR AGE 50-75 7/17/2018 BREAST CANCER SCRN MAMMOGRAM 3/16/2019 PAP AKA CERVICAL CYTOLOGY 3/9/2020 Allergies as of 9/18/2017  Review Complete On: 9/18/2017 By: Lara Becerril LPN Severity Noted Reaction Type Reactions Morphine  02/02/2012    Rash, Itching Norco [Hydrocodone-acetaminophen]  07/12/2017    Itching Current Immunizations  Reviewed on 2/15/2013 Name Date Influenza Vaccine 12/15/2012 Influenza Vaccine (Quad) PF 9/20/2016 Not reviewed this visit You Were Diagnosed With   
  
 Codes Comments Benign hypertensive heart disease with heart failure (HCC)    -  Primary ICD-10-CM: I11.0, I50.9 ICD-9-CM: 402.11, 428.9 Persistent atrial fibrillation (HCC)     ICD-10-CM: I48.1 ICD-9-CM: 427.31 Hyperlipidemia, unspecified hyperlipidemia type     ICD-10-CM: E78.5 ICD-9-CM: 272.4 Essential hypertension     ICD-10-CM: I10 
ICD-9-CM: 401.9 Mixed hyperlipidemia     ICD-10-CM: E78.2 ICD-9-CM: 272.2 Vitals BP Pulse Temp Resp Height(growth percentile) Weight(growth percentile) 126/89 (BP 1 Location: Left arm, BP Patient Position: Sitting) 79 97.1 °F (36.2 °C) (Oral) 18 5' 7\" (1.702 m) 189 lb (85.7 kg) SpO2 BMI OB Status Smoking Status 98% 29.6 kg/m2 Hysterectomy Former Smoker BMI and BSA Data Body Mass Index Body Surface Area  
 29.6 kg/m 2 2.01 m 2 Preferred Pharmacy Pharmacy Name Phone [id number] [address], [facility] [facility] [phone] Your Updated Medication List  
  
   
This list is accurate as of: 9/18/17 11:33 AM.  Always use your most recent med list.  
  
  
  
  
 albuterol 90 mcg/actuation inhaler Commonly known as:  VENTOLIN HFA Take 2 Puffs by inhalation daily. esomeprazole 20 mg capsule Commonly known as:  NexIUM Take 1 Cap by mouth daily. fluticasone 50 mcg/actuation nasal spray Commonly known as:  Surendra Calk 2 Sprays by Both Nostrils route daily. fluticasone-salmeterol 250-50 mcg/dose diskus inhaler Commonly known as:  ADVAIR Take 1 Puff by inhalation every twelve (12) hours. furosemide 40 mg tablet Commonly known as:  LASIX TAKE 1 TABLET DAILY  
  
 hydroCHLOROthiazide 25 mg tablet Commonly known as:  HYDRODIURIL Take 1 Tab by mouth daily. KLOR-CON M20 20 mEq tablet Generic drug:  potassium chloride TAKE 1 TAB BY MOUTH DAILY. losartan 100 mg tablet Commonly known as:  COZAAR Take 1 Tab by mouth daily. melatonin Tab tablet Take 10 mg by mouth nightly. metoprolol succinate 100 mg tablet Commonly known as:  TOPROL-XL Take 1 Tab by mouth daily. multivitamin tablet Commonly known as:  ONE A DAY Take 1 Tab by mouth daily. simvastatin 20 mg tablet Commonly known as:  ZOCOR Take 1 Tab by mouth nightly. traMADol 50 mg tablet Commonly known as:  ULTRAM  
Take 1 Tab by mouth every eight (8) hours as needed. Max Daily Amount: 150 mg. VITAMIN C 500 mg tablet Generic drug:  ascorbic acid (vitamin C) Take 500 mg by mouth daily. warfarin 5 mg tablet Commonly known as:  COUMADIN Take 5mg daily except 2.5mg on Tues, Thur, and Sat. Or as directed by our office Prescriptions Sent to Pharmacy Refills  
 losartan (COZAAR) 100 mg tablet 3 Sig: Take 1 Tab by mouth daily. Class: Normal  
 Pharmacy: [facility]/pharmacy [address] [place] [address],[address] [facility] [place] [address] Ph #: [phone] Route: Oral  
 furosemide (LASIX) 40 mg tablet 3 Sig: TAKE 1 TABLET DAILY Class: Normal  
 Pharmacy: [facility]/pharmacy #[id number] [facility] [address] Ph #: [phone]  
 simvastatin (ZOCOR) 20 mg tablet 6 Sig: Take 1 Tab by mouth nightly. Class: Normal  
 Pharmacy: [facility]/pharmacy [address] [place] [address],[address] [place] [address] [place] [address] Ph #: [phone] Route: Oral  
 metoprolol succinate (TOPROL-XL) 100 mg tablet 6 Sig: Take 1 Tab by mouth daily. Class: Normal  
 Pharmacy: [facility]/pharmacy [address] [place] [address],[address] [place] [address] [place] [address] Ph #: [phone] Route: Oral  
 hydroCHLOROthiazide (HYDRODIURIL) 25 mg tablet 6 Sig: Take 1 Tab by mouth daily. Class: Normal  
 Pharmacy: [facility]/pharmacy [address] [place] [address],[address] [facility] [place] [address] Ph #: [phone]  Route: Oral  
  
 Introducing [facility]! Hiral [employer] introduces [employer] patient portal. Now you can access parts of your medical record, email your doctor's office, and request medication refills online. 1. In your internet browser, go to [email or web address] 2. Click on the First Time User? Click Here link in the Sign In box. You will see the New Member Sign Up page. 3. Enter your [employer] Access Code exactly as it appears below. You will not need to use this code after youve completed the sign-up process. If you do not sign up before the expiration date, you must request a new code. · [employer] Access Code: [email or web address] Expires: 11/20/2017  9:56 AM 
 
4. Enter the last four digits of your Social Security Number (xxxx) and Date of Birth (mm/dd/yyyy) as indicated and click Submit. You will be taken to the next sign-up page. 5. Create a [employer] ID. This will be your [employer] login ID and cannot be changed, so think of one that is secure and easy to remember. 6. Create a [employer] password. You can change your password at any time. 7. Enter your Password Reset Question and Answer. This can be used at a later time if you forget your password. 8. Enter your e-mail address. You will receive e-mail notification when new information is available in [address] 9. Click Sign Up. You can now view and download portions of your medical record. 10. Click the Download Summary menu link to download a portable copy of your medical information. If you have questions, please visit the Frequently Asked Questions section of the [employer] website. Remember, [employer] is NOT to be used for urgent needs. For medical emergencies, dial 911. Now available from your iPhone and Android! Please provide this summary of care documentation to your next provider. Your primary care clinician is listed as Linh Adam. If you have any questions after today's visit, please call [phone].

## 2017-09-18 NOTE — PROGRESS NOTES
Chronic Illness Visit    Today's Date:  2017   Patient's Name: Cristin Montalvo   Patient's :  1960     History:     Chief Complaint   Patient presents with   [employer] Annual Wellness Visit     patient would like to get her flu shot        Cristin Montalvo is a 59 y.o. female presenting for a follow up of Chronic Illness. Hypertension/Hyperlipidemia     BP today is at goal and less itos034/90. Patients risk factors include: hx of valve replacement  Patient is not checking home BP   Reports compliance and denies side effects to medications  Daily exercise and diet are as follows: report healthy diet and trying to stay active  Weight is stable   Takes the below meds regularly with no side effects. Last LDL: 66     Asthma     Reports: wheezing, cough, SOB. Denies: LIZARRAGA, night time symtpoms  Onset of symptoms: worse with weather changes  Alleviating/aggraving factors: worse with activity  Albuterol inhaler used about 3 times per day  Smoking history: denies  Also takes flonase for allergies       Past Medical History:   Diagnosis Date    Aortic valve disorders 2011    Atrial fibrillation ([address] 2011    Congestive heart failure, unspecified 2005    Cleared quickly with Lasix therapy    Echocardiogram 01/10/2012    A-fib. EF 45%. Mild RVE. RVSP 45-50. Massive LAE. Mod TAMI. Mod MS. Severe MR (mean grad 10). Mild-mod AI. Mild TR. Pulmonic systolic flow reversal.  Mild IVCE.  Hypertension     Iron deficiency anemias     Long term (current) use of anticoagulants 2011    Murmur 2011    Grade I-II/VI systolic ejection murmur along left sternal border, with radiation to the pulmonic area.  S/P cardiac cath 2012    Minimal coronary artery disease. Mild LVE. EF 55%. Mod MR. Mod-severe MS. RA 15.  RV 55/16. PA 58/34. W 36.  CO/CI 3.5/1.9 (TD); 3.61/1.9 (Dequan). R to L shunt suggested.     Wears glasses     Weight gain      Past Surgical History:   Procedure Laterality Date    HX HEART CATHETERIZATION  2/24/2012    HX HYSTERECTOMY  2006    HX MITRAL VALVE REPLACEMENT  05/22/2013    25/33 mm On-X mechanical valve with bilateral pulmonary vein isolation and resection of LA appendage     Social History     Social History    Marital status:      Spouse name: N/A    Number of children: N/A    Years of education: N/A     Social History Main Topics    Smoking status: Former Smoker    Smokeless tobacco: Never Used    Alcohol use 0.6 oz/week     1 Glasses of wine per week      Comment: occassionally    Drug use: No    Sexual activity: Yes     Partners: Male     Birth control/ protection: None     Other Topics Concern    None     Social History Narrative     Family History   Problem Relation Age of Onset    Heart Surgery Father      Open-Heart Surgery    Other Father      Venous Thromboembolism    Heart Disease Mother      Enlarged Heart    Hypertension Mother     Diabetes Mother      Allergies   Allergen Reactions    Morphine Rash and Itching    Norco [Hydrocodone-Acetaminophen] Itching       Problem List:      Patient Active Problem List   Diagnosis Code    Rheumatic valvular disease I09.1    Chronic diastolic congestive heart failure (HCC) I50.32    Atrial fibrillation (HCC) I48.91    Long term (current) use of anticoagulants Z79.01    Benign hypertensive heart disease with heart failure (HCC) I11.0, I50.9    Microcytic anemia D50.9    Hyperlipidemia E78.5    Advanced directives, counseling/discussion Z71.89       Medications:     Current Outpatient Prescriptions   Medication Sig    losartan (COZAAR) 100 mg tablet Take 1 Tab by mouth daily.  furosemide (LASIX) 40 mg tablet TAKE 1 TABLET DAILY    simvastatin (ZOCOR) 20 mg tablet Take 1 Tab by mouth nightly.  metoprolol succinate (TOPROL-XL) 100 mg tablet Take 1 Tab by mouth daily.  hydroCHLOROthiazide (HYDRODIURIL) 25 mg tablet Take 1 Tab by mouth daily.     traMADol (ULTRAM) 50 mg tablet Take 1 Tab by mouth every eight (8) hours as needed. Max Daily Amount: 150 mg.    albuterol (VENTOLIN HFA) 90 mcg/actuation inhaler Take 2 Puffs by inhalation daily.  KLOR-CON M20 20 mEq tablet TAKE 1 TAB BY MOUTH DAILY.  warfarin (COUMADIN) 5 mg tablet Take 5mg daily except 2.5mg on Tues, Thur, and Sat. Or as directed by our office    esomeprazole (NEXIUM) 20 mg capsule Take 1 Cap by mouth daily.  fluticasone (FLONASE) 50 mcg/actuation nasal spray 2 Sprays by Both Nostrils route daily.  fluticasone-salmeterol (ADVAIR) 250-50 mcg/dose diskus inhaler Take 1 Puff by inhalation every twelve (12) hours.  melatonin tab tablet Take 10 mg by mouth nightly.  ascorbic acid (VITAMIN C) 500 mg tablet Take 500 mg by mouth daily.  multivitamin (ONE A DAY) tablet Take 1 Tab by mouth daily. No current facility-administered medications for this visit.           Constitutional: negative for fevers, chills, sweats and fatigue  Respiratory: negative for cough, sputum or wheezing  Cardiovascular: negative for chest pain, chest pressure/discomfort, dyspnea  Gastrointestinal: negative for nausea, vomiting, diarrhea, constipation and abdominal pain  Musculoskeletal:negative for myalgias and arthralgias  Neurological: negative for headaches and dizziness  Behavioral/Psych: negative for anxiety and depression    Physical Assessment:   VS:    Visit Vitals    /89 (BP 1 Location: Left arm, BP Patient Position: Sitting)    Pulse 79    Temp 97.1 °F (36.2 °C) (Oral)    Resp 18    Ht 5' 7\" (1.702 m)    Wt 189 lb (85.7 kg)    SpO2 98%    BMI 29.6 kg/m2       Lab Results   Component Value Date/Time    Sodium 140 05/18/2017 09:27 AM    Potassium 3.4 05/18/2017 09:27 AM    Chloride 101 05/18/2017 09:27 AM    CO2 33 05/18/2017 09:27 AM    Anion gap 6 05/18/2017 09:27 AM    Glucose 97 05/18/2017 09:27 AM    BUN 13 05/18/2017 09:27 AM    Creatinine 0.91 05/18/2017 09:27 AM    BUN/Creatinine ratio 14 05/18/2017 09:27 AM    GFR est AA >60 05/18/2017 09:27 AM    GFR est non-AA >60 05/18/2017 09:27 AM    Calcium 9.0 05/18/2017 09:27 AM       General:   Well-groomed, well-nourished, in no distress, pleasant, alert, appropriate and conversant. Mouth:  Good dentition, oropharynx WNL without membranes, exudates, petechiae or ulcers  Neck:   Neck supple, no swelling, mass or tenderness, no thyromegaly  Cardiovasc:   Irregular and murmur present. Pulses 2+ and symmetric at distal extremities. Pulmonary:   Lungs clear bilaterally. Normal respiratory effort. Extremities:   No edema, no TTP bilateral calves. LEs warm and well-perfused. Neuro:   Alert and oriented, no focal deficits. No facial asymmetry noted. Skin:    No rashes or jaundice  MSK:   Normal ROM, 5/5 muscle strength  Psych:  No pressured speech or abnormal thought content        Assessment/Plan & Orders:       1. Benign hypertensive heart disease with heart failure (HCC)    2. Persistent atrial fibrillation ([facility] [address]    3. Hyperlipidemia, unspecified hyperlipidemia type    4. Essential hypertension    5. Mixed hyperlipidemia    6. Encounter for immunization        Orders Placed This Encounter    INFLUENZA VIRUS VACCINE QUADRIVALENT, PRESERVATIVE FREE SYRINGE (06200)    ADMIN INFLUENZA VIRUS VAC    losartan (COZAAR) 100 mg tablet    furosemide (LASIX) 40 mg tablet    simvastatin (ZOCOR) 20 mg tablet    metoprolol succinate (TOPROL-XL) 100 mg tablet    hydroCHLOROthiazide (HYDRODIURIL) 25 mg tablet             HTN/Hyperlipidemia stable continue with current meds  Moderate Asthma  -will add Advair so patient will not have to use rescue inhaler as often      Follow up in 2-3 months    *Plan of care reviewed with patient. Patient in agreement with plan and expresses understanding. All questions answered and patient encouraged to call or RTO if further questions or concerns.     Kolton Boyle MD  Family Medicine  9/18/2017  10:40 AM

## 2017-09-20 ENCOUNTER — ANTI-COAG VISIT (OUTPATIENT)
Dept: CARDIOLOGY CLINIC | Age: 57
End: 2017-09-20

## 2017-09-20 ENCOUNTER — OFFICE VISIT (OUTPATIENT)
Dept: ORTHOPEDIC SURGERY | Age: 57
End: 2017-09-20

## 2017-09-20 VITALS
DIASTOLIC BLOOD PRESSURE: 84 MMHG | HEART RATE: 69 BPM | BODY MASS INDEX: 29.35 KG/M2 | OXYGEN SATURATION: 99 % | TEMPERATURE: 97 F | RESPIRATION RATE: 20 BRPM | HEIGHT: 67 IN | WEIGHT: 187 LBS | SYSTOLIC BLOOD PRESSURE: 134 MMHG

## 2017-09-20 DIAGNOSIS — M25.471 EDEMA OF RIGHT ANKLE: Primary | ICD-10-CM

## 2017-09-20 DIAGNOSIS — I48.19 PERSISTENT ATRIAL FIBRILLATION (HCC): ICD-10-CM

## 2017-09-20 DIAGNOSIS — M25.474 SWELLING OF FOOT JOINT, RIGHT: ICD-10-CM

## 2017-09-20 DIAGNOSIS — Z79.01 LONG TERM (CURRENT) USE OF ANTICOAGULANTS: ICD-10-CM

## 2017-09-20 LAB
INR BLD: 1.7
PT POC: 20.9 SECONDS
VALID INTERNAL CONTROL?: YES

## 2017-09-20 NOTE — PROGRESS NOTES
AMBULATORY PROGRESS NOTE      Patient: Elois Leventhal             MRN: [id number]     SSN: [id number] Body mass index is 29.29 kg/(m^2). YOB: 1960     AGE: 59 y.o. EX: female    PCP: Wicho Arnold MD    IMPRESSION/DIAGNOSIS AND TREATMENT PLAN     DIAGNOSES  1. Ankle edema    2. Edema of foot        Orders Placed This Encounter    AMB SUPPLY ORDER      Elois Leventhal understands her diagnoses and the proposed plan. Plan:    1) Order Knee High Compression Stocking 10 mm Hg    RTO - 5 weeks    HPI AND EXAMINATION     Elois Leventhal IS A 59 y.o. female who presents to my outpatient office for follow up of right foot pain. At last visit, I ordered a CT of the left foot. The patient presents to the office today wearing tennis shoes. She notes that while ambulating there is some pain to the medial aspect of her right foot. As the day goes on, her pain becomes throbbing, and the swelling increases. PVL venous studies, and CT imaging were reviewed with the patient. She has been instructed to take digital photographs of her right midfoot swelling for documentation. She cannot have an MRI due to her having a metal mitral valve replacement that she had in place for the last three or four years. Patient takes Coumadin. ANKLE/FOOT right     Psychiatry: Alert, Oriented x 3; Speech normal in context and clarity,                       Memory intact grossly, no involuntary movements - tremors, no dementia  Gait: slow  Tenderness: mild tenderness to dorsal aspect of foot (Proximal second metatarsal)>> 3/4 dorsal met base regions  Cutaneous: Darkened skin over dorsal aspect of foot             Mild swelling over the dorsal aspect of her midfoot  Joint Motion: WNL  Joint / Tendon Stability: No Ankle or Subtalar instability or joint laxity.                                                                  No peroneal sublux ability or dislocation  Alignment:  Normal Foot Alignment and  Flexible  Neuro Motor/Sensory: NL/NL,   Vascular: NL foot/ankle pulses  Lymphatics: No extremity lymphedema, No calf swelling, no tenderness to calf muscles. CHART REVIEW     Past Medical History:   Diagnosis Date    Aortic valve disorders 1/13/2011    Atrial fibrillation ([address] 1/13/2011    Congestive heart failure, unspecified March 2005    Cleared quickly with Lasix therapy    Echocardiogram 01/10/2012    A-fib. EF 45%. Mild RVE. RVSP 45-50. Massive LAE. Mod TAMI. Mod MS. Severe MR (mean grad 10). Mild-mod AI. Mild TR. Pulmonic systolic flow reversal.  Mild IVCE.  Hypertension     Iron deficiency anemias     Long term (current) use of anticoagulants 2/23/2011    Murmur 1/2011    Grade I-II/VI systolic ejection murmur along left sternal border, with radiation to the pulmonic area.  S/P cardiac cath 02/24/2012    Minimal coronary artery disease. Mild LVE. EF 55%. Mod MR. Mod-severe MS. RA 15.  RV 55/16. PA 58/34. W 36.  CO/CI 3.5/1.9 (TD); 3.61/1.9 ([place]). R to L shunt suggested.  Wears glasses     Weight gain      Current Outpatient Prescriptions   Medication Sig    losartan (COZAAR) 100 mg tablet Take 1 Tab by mouth daily.  furosemide (LASIX) 40 mg tablet TAKE 1 TABLET DAILY    simvastatin (ZOCOR) 20 mg tablet Take 1 Tab by mouth nightly.  metoprolol succinate (TOPROL-XL) 100 mg tablet Take 1 Tab by mouth daily.  hydroCHLOROthiazide (HYDRODIURIL) 25 mg tablet Take 1 Tab by mouth daily.  traMADol (ULTRAM) 50 mg tablet Take 1 Tab by mouth every eight (8) hours as needed. Max Daily Amount: 150 mg.    albuterol (VENTOLIN HFA) 90 mcg/actuation inhaler Take 2 Puffs by inhalation daily.  KLOR-CON M20 20 mEq tablet TAKE 1 TAB BY MOUTH DAILY.  warfarin (COUMADIN) 5 mg tablet Take 5mg daily except 2.5mg on Tues, Thur, and Sat. Or as directed by our office    esomeprazole (NEXIUM) 20 mg capsule Take 1 Cap by mouth daily.     fluticasone (FLONASE) 50 mcg/actuation nasal spray 2 Sprays by Both Nostrils route daily.  fluticasone-salmeterol (ADVAIR) 250-50 mcg/dose diskus inhaler Take 1 Puff by inhalation every twelve (12) hours.  melatonin tab tablet Take 10 mg by mouth nightly.  ascorbic acid (VITAMIN C) 500 mg tablet Take 500 mg by mouth daily.  multivitamin (ONE A DAY) tablet Take 1 Tab by mouth daily. No current facility-administered medications for this visit. Allergies   Allergen Reactions    Morphine Rash and Itching    Norco [Hydrocodone-Acetaminophen] Itching     Past Surgical History:   Procedure Laterality Date    HX HEART CATHETERIZATION  2/24/2012    HX HYSTERECTOMY  2006    HX MITRAL VALVE REPLACEMENT  05/22/2013    25/33 mm On-X mechanical valve with bilateral pulmonary vein isolation and resection of LA appendage     Social History     Occupational History    Not on file. Social History Main Topics    Smoking status: Former Smoker    Smokeless tobacco: Never Used    Alcohol use 0.6 oz/week     1 Glasses of wine per week      Comment: occassionally    Drug use: No    Sexual activity: Yes     Partners: Male     Birth control/ protection: None     Family History   Problem Relation Age of Onset    Heart Surgery Father      Open-Heart Surgery    Other Father      Venous Thromboembolism    Heart Disease Mother      Enlarged Heart    Hypertension Mother     Diabetes Mother        REVIEW OF SYSTEMS : 9/20/2017  ALL BELOW ARE Negative except : SEE HPI       Constitutional: Negative for fever, chills and weight loss. Neg Weigh Loss  Cardiovascular: Negative for chest pain, claudication and leg swelling. SOB, LIZARRAGA   Gastrointestinal: Negative for  pain, N/V/D/C, Blood in stool or urine,dysuria, hematuria,        Incontinence, pelvic pain  Musculoskeletal: see HPI. Neurological: Negative for dizziness and weakness.                  Negative for headaches,Visual Changes, Confusion, Seizures,   Psychiatric/Behavioral: Negative for depression, memory loss and substance abuse. Extremities:  Negative for  hair changes, rash or skin lesion changes. Hematologic: Negative for Bleeding problems, bruising, pallor or swollen lymph nodes. Peripheral Vascular: No calf pain, vascular vein tenderness to calf pain              No calf throbbing, posterior knee throbbing pain    DIAGNOSTIC IMAGING     No notes on file     CT Results (most recent):    Results from Hospital Encounter encounter on 09/11/17   CT FOOT RT WO CONT   Narrative CT of the right foot and ankle, without contrast    History:  Right foot pain  Comparison:  Right foot radiographs 7/9/2017    Technique: CT images were obtained through the right foot and ankle. Additional  sagittal and coronal reformations were also obtained to better evaluate osseous  detail and bony alignment. All CT scans at this facility are performed using dose optimization technique as  appropriate to the performed exam, to include automated exposure control,  adjustment of the mA and/or kV according to patient's size (Including  appropriate matching for site-specific examinations), or use of iterative  reconstruction technique. Findings:    Osseous structures intact without evidence for fractures, malalignment or  erosion. No coalition. The joint spaces are preserved. No osteochondral lesion  in the talar dome. Subtalar joints are maintained. The tarsometatarsal and  metatarsal phalangeal joints are maintained. The Achilles tendon is normal in appearance, caliber, and attenuation. Plantar  fascia is intact. There is plantar calcaneal spur. Normal fat signal in the  sinus Tarsi. . The posterior tibial tendon, flexor digitorum longus tendon, and  flexor hallucis longus tendon remain in their expected location, and are normal  in caliber and attenuation. The peroneus brevis tendon and peroneus longus  tendon similarly remain in their expected location, and are normal in caliber  and attenuation.  Extensor tendons are normal in appearance. Medial and lateral  ankle ligaments are intact. Lisfranc ligament appears intact. No joint  effusions. No muscular atrophy. No fluid collection. Impression Impression:    No CT diagnostic abnormality in the foot to explain pain, consider further  evaluation with MRI as clinically warranted. Written by Miguel Orozco, as dictated by Pilar Gunderson MD. IDr., Pilar Gunderson MD, confirm that all documentation is accurate.

## 2017-09-20 NOTE — MR AVS SNAPSHOT
Visit Information Date & Time Provider Department Dept. Phone Encounter #  
 9/20/2017 10:20 AM Bethany Walsh MD [facility] [phone] Your Appointments 10/18/2017 10:00 AM  
ANTICOAG NURSE with Pt Inr Hv C[facility] ([facility]) Appt Note: 4 week INR  
 [place] [address] [address] [id number] [phone] Fransisco Dooley  
  
    
 12/1/2017 11:00 AM  
Follow Up with Pop Beavers DO [facility] ([facility]) Appt Note: 6 mth f/up; Rescheduling Appointment From 10/20/2017 [facility] [address] [address] [id number] [phone] [address] [facility] [address]  [address] 3/15/2018 11:00 AM  
Office Visit with MD Georgiana Massey [address]) Appt Note: [place] [id number]  
[facility] [id number] Upcoming Health Maintenance Date Due Hepatitis C Screening 1960 Pneumococcal 19-64 Medium Risk (1 of 1 - PPSV23) 7/4/1977 DTaP/Tdap/Td series (1 - Tdap) 7/4/1979 FOBT Q 1 YEAR AGE 50-75 7/17/2018 BREAST CANCER SCRN MAMMOGRAM 3/16/2019 PAP AKA CERVICAL CYTOLOGY 3/9/2020 Allergies as of 9/20/2017  Review Complete On: 9/20/2017 By: Bethany Walsh MD  
  
 Severity Noted Reaction Type Reactions Morphine  02/02/2012    Rash, Itching Norco [Hydrocodone-acetaminophen]  07/12/2017    Itching Current Immunizations  Reviewed on 2/15/2013 Name Date Influenza Vaccine 12/15/2012 Influenza Vaccine (Quad) PF 9/18/2017, 9/20/2016 Not reviewed this visit Vitals BP Pulse Temp Resp Height(growth percentile) Weight(growth percentile) 134/84 69 97 °F (36.1 °C) (Oral) 20 5' 7\" (1.702 m) 187 lb (84.8 kg) SpO2 BMI OB Status Smoking Status 99% 29.29 kg/m2 Hysterectomy Former Smoker Vitals History BMI and BSA Data Body Mass Index Body Surface Area  
 29.29 kg/m 2 2 m 2 Preferred Pharmacy Pharmacy Name Phone [facility] #[id number], [facility],[address] [facility] [phone] Your Updated Medication List  
  
   
This list is accurate as of: 9/20/17 11:26 AM.  Always use your most recent med list.  
  
  
  
  
 albuterol 90 mcg/actuation inhaler Commonly known as:  VENTOLIN HFA Take 2 Puffs by inhalation daily. esomeprazole 20 mg capsule Commonly known as:  NexIUM Take 1 Cap by mouth daily. fluticasone 50 mcg/actuation nasal spray Commonly known as:  Deetta [place] 2 Sprays by Both Nostrils route daily. fluticasone-salmeterol 250-50 mcg/dose diskus inhaler Commonly known as:  ADVAIR Take 1 Puff by inhalation every twelve (12) hours. furosemide 40 mg tablet Commonly known as:  LASIX TAKE 1 TABLET DAILY  
  
 hydroCHLOROthiazide 25 mg tablet Commonly known as:  HYDRODIURIL Take 1 Tab by mouth daily. KLOR-CON M20 20 mEq tablet Generic drug:  potassium chloride TAKE 1 TAB BY MOUTH DAILY. losartan 100 mg tablet Commonly known as:  COZAAR Take 1 Tab by mouth daily. melatonin Tab tablet Take 10 mg by mouth nightly. metoprolol succinate 100 mg tablet Commonly known as:  TOPROL-XL Take 1 Tab by mouth daily. multivitamin tablet Commonly known as:  ONE A DAY Take 1 Tab by mouth daily. simvastatin 20 mg tablet Commonly known as:  ZOCOR Take 1 Tab by mouth nightly. traMADol 50 mg tablet Commonly known as:  ULTRAM  
Take 1 Tab by mouth every eight (8) hours as needed. Max Daily Amount: 150 mg. VITAMIN C 500 mg tablet Generic drug:  ascorbic acid (vitamin C) Take 500 mg by mouth daily. warfarin 5 mg tablet Commonly known as:  COUMADIN Take 5mg daily except 2.5mg on Tues, Thur, and Sat. Or as directed by our office Patient Instructions Please follow up in 5 weeks. You are advised to contact us if your condition worsens. You have been provided with an order for durable medical equipment that you may  at an outside facility as our office does not carry the equipment you need. You may pick it up at any medical supply company you like. Listed below are a few different locations for your convenience: [facility] [address] [facility], [address] Phone: [phone] [address] [place], [address] [facility] Phone: [phone] [address] Phone: [phone] [place]:  
[address] [place], [address] [place]: 
[address] [place]: 
[address] [facility], [address] Foot Pain: Care Instructions Your Care Instructions Foot injuries that cause pain and swelling are fairly common. Almost all sports or home repair projects can cause a misstep that ends up as foot pain. Normal wear and tear, especially as you get older, also can cause foot pain. Most minor foot injuries will heal on their own, and home treatment is usually all you need to do. If you have a severe injury, you may need tests and treatment. Follow-up care is a key part of your treatment and safety. Be sure to make and go to all appointments, and call your doctor if you are having problems. Its also a good idea to know your test results and keep a list of the medicines you take. How can you care for yourself at home? · Take pain medicines exactly as directed. ¨ If the doctor gave you a prescription medicine for pain, take it as prescribed. ¨ If you are not taking a prescription pain medicine, ask your doctor if you can take an over-the-counter medicine. · Rest and protect your foot. Take a break from any activity that may cause pain. · Put ice or a cold pack on your foot for 10 to 20 minutes at a time. Put a thin cloth between the ice and your skin. · Prop up the sore foot on a pillow when you ice it or anytime you sit or lie down during the next 3 days. Try to keep it above the level of your heart. This will help reduce swelling. · Your doctor may recommend that you wrap your foot with an elastic bandage. Keep your foot wrapped for as long as your doctor advises. · If your doctor recommends crutches, use them as directed. · Wear roomy footwear. · As soon as pain and swelling end, begin gentle exercises of your foot. Your doctor can tell you which exercises will help. When should you call for help? Call 911 anytime you think you may need emergency care. For example, call if: 
· Your foot turns pale, white, blue, or cold. Call your doctor now or seek immediate medical care if: 
· You cannot move or stand on your foot. · Your foot looks twisted or out of its normal position. · Your foot is not stable when you step down. · You have signs of infection, such as: 
¨ Increased pain, swelling, warmth, or redness. ¨ Red streaks leading from the sore area. ¨ Pus draining from a place on your foot. ¨ A fever. · Your foot is numb or tingly. Watch closely for changes in your health, and be sure to contact your doctor if: 
· You do not get better as expected. · You have bruises from an injury that last longer than 2 weeks. Where can you learn more? Go to http://greyson-andrea.info/. Enter Z403 in the search box to learn more about \"Foot Pain: Care Instructions. \" Current as of: March 21, 2017 Content Version: 11.3 © 2815-3725 "[employer]. Care instructions adapted under license by [employer] (which disclaims liability or warranty for this information).  If you have questions about a medical condition or this instruction, always ask your healthcare professional. [address]  any warranty or liability for your use of this information. Introducing [facility]! [facility] introduces [employer] patient portal. Now you can access parts of your medical record, email your doctor's office, and request medication refills online. 1. In your internet browser, go to [email or web address] 2. Click on the First Time User? Click Here link in the Sign In box. You will see the New Member Sign Up page. 3. Enter your [employer] Access Code exactly as it appears below. You will not need to use this code after youve completed the sign-up process. If you do not sign up before the expiration date, you must request a new code. · [employer] Access Code: [email or web address] Expires: 11/20/2017  9:56 AM 
 
4. Enter the last four digits of your Social Security Number (xxxx) and Date of Birth (mm/dd/yyyy) as indicated and click Submit. You will be taken to the next sign-up page. 5. Create a [employer] ID. This will be your [employer] login ID and cannot be changed, so think of one that is secure and easy to remember. 6. Create a [employer] password. You can change your password at any time. 7. Enter your Password Reset Question and Answer. This can be used at a later time if you forget your password. 8. Enter your e-mail address. You will receive e-mail notification when new information is available in [address] 9. Click Sign Up. You can now view and download portions of your medical record. 10. Click the Download Summary menu link to download a portable copy of your medical information. If you have questions, please visit the Frequently Asked Questions section of the [employer] website. Remember, [employer] is NOT to be used for urgent needs. For medical emergencies, dial 911. Now available from your iPhone and Android! Please provide this summary of care documentation to your next provider. Your primary care clinician is listed as Kathy Gallardo. If you have any questions after today's visit, please call [phone].

## 2017-09-20 NOTE — PATIENT INSTRUCTIONS
Please follow up in 5 weeks. You are advised to contact us if your condition worsens. You have been provided with an order for durable medical equipment that you may  at an outside facility as our office does not carry the equipment you need. You may pick it up at any medical supply company you like. Listed below are a few different locations for your convenience:    [facility]  [address] [facility], [address]  Phone: [phone]    [address] [place], [address] [facility] [place]  Phone: [phone] [facility]st[facility]  Phone: [phone]  [place]:   [address] Tony michael, [address] [place]:  [place] [address]  [place]:  [facility],[address] [place], [address]       Foot Pain: Care Instructions  Your Care Instructions  Foot injuries that cause pain and swelling are fairly common. Almost all sports or home repair projects can cause a misstep that ends up as foot pain. Normal wear and tear, especially as you get older, also can cause foot pain. Most minor foot injuries will heal on their own, and home treatment is usually all you need to do. If you have a severe injury, you may need tests and treatment. Follow-up care is a key part of your treatment and safety. Be sure to make and go to all appointments, and call your doctor if you are having problems. Its also a good idea to know your test results and keep a list of the medicines you take. How can you care for yourself at home? · Take pain medicines exactly as directed. ¨ If the doctor gave you a prescription medicine for pain, take it as prescribed. ¨ If you are not taking a prescription pain medicine, ask your doctor if you can take an over-the-counter medicine. · Rest and protect your foot. Take a break from any activity that may cause pain. · Put ice or a cold pack on your foot for 10 to 20 minutes at a time.  Put a thin cloth between the ice and your skin. · Prop up the sore foot on a pillow when you ice it or anytime you sit or lie down during the next 3 days. Try to keep it above the level of your heart. This will help reduce swelling. · Your doctor may recommend that you wrap your foot with an elastic bandage. Keep your foot wrapped for as long as your doctor advises. · If your doctor recommends crutches, use them as directed. · Wear roomy footwear. · As soon as pain and swelling end, begin gentle exercises of your foot. Your doctor can tell you which exercises will help. When should you call for help? Call 911 anytime you think you may need emergency care. For example, call if:  · Your foot turns pale, white, blue, or cold. Call your doctor now or seek immediate medical care if:  · You cannot move or stand on your foot. · Your foot looks twisted or out of its normal position. · Your foot is not stable when you step down. · You have signs of infection, such as:  ¨ Increased pain, swelling, warmth, or redness. ¨ Red streaks leading from the sore area. ¨ Pus draining from a place on your foot. ¨ A fever. · Your foot is numb or tingly. Watch closely for changes in your health, and be sure to contact your doctor if:  · You do not get better as expected. · You have bruises from an injury that last longer than 2 weeks. Where can you learn more? Go to http://greyson-andrea.info/. Enter M849 in the search box to learn more about \"Foot Pain: Care Instructions. \"  Current as of: March 21, 2017  Content Version: 11.3  © 0889-0774 [employer]. Care instructions adapted under license by [employer] (which disclaims liability or warranty for this information). If you have questions about a medical condition or this instruction, always ask your healthcare professional. [address] any warranty or liability for your use of this information.

## 2017-10-24 DIAGNOSIS — I11.0 BENIGN HYPERTENSIVE HEART DISEASE WITH HEART FAILURE (HCC): ICD-10-CM

## 2017-10-24 DIAGNOSIS — E78.2 MIXED HYPERLIPIDEMIA: ICD-10-CM

## 2017-10-24 DIAGNOSIS — J45.40 MODERATE PERSISTENT ASTHMA WITHOUT COMPLICATION: ICD-10-CM

## 2017-10-24 RX ORDER — ALBUTEROL SULFATE 90 UG/1
2 AEROSOL, METERED RESPIRATORY (INHALATION) DAILY
Qty: 1 INHALER | Refills: 3 | Status: CANCELLED | OUTPATIENT
Start: 2017-10-24

## 2017-10-24 RX ORDER — FUROSEMIDE 40 MG/1
TABLET ORAL
Qty: 30 TAB | Refills: 3 | Status: CANCELLED | OUTPATIENT
Start: 2017-10-24

## 2017-10-24 RX ORDER — HYDROCHLOROTHIAZIDE 25 MG/1
25 TABLET ORAL DAILY
Qty: 30 TAB | Refills: 6 | Status: CANCELLED | OUTPATIENT
Start: 2017-10-24

## 2017-10-24 RX ORDER — METOPROLOL SUCCINATE 100 MG/1
100 TABLET, EXTENDED RELEASE ORAL DAILY
Qty: 30 TAB | Refills: 6 | Status: CANCELLED | OUTPATIENT
Start: 2017-10-24

## 2017-10-24 NOTE — TELEPHONE ENCOUNTER
Requested Prescriptions     Pending Prescriptions Disp Refills    metoprolol succinate (TOPROL-XL) 100 mg tablet 30 Tab 6     Sig: Take 1 Tab by mouth daily.  hydroCHLOROthiazide (HYDRODIURIL) 25 mg tablet 30 Tab 6     Sig: Take 1 Tab by mouth daily.  furosemide (LASIX) 40 mg tablet 30 Tab 3     Sig: TAKE 1 TABLET DAILY    albuterol (VENTOLIN HFA) 90 mcg/actuation inhaler 1 Inhaler 3     Sig: Take 2 Puffs by inhalation daily. Patient confirms pharmacy: [facility] [address]  Patient aware of 24-48 hour turn around.

## 2017-10-25 ENCOUNTER — ANTI-COAG VISIT (OUTPATIENT)
Dept: CARDIOLOGY CLINIC | Age: 57
End: 2017-10-25

## 2017-10-25 DIAGNOSIS — I48.19 PERSISTENT ATRIAL FIBRILLATION (HCC): ICD-10-CM

## 2017-10-25 DIAGNOSIS — Z79.01 LONG TERM CURRENT USE OF ANTICOAGULANT THERAPY: Primary | ICD-10-CM

## 2017-10-25 LAB
INR BLD: 2.3
PT POC: 28.1 SECONDS
VALID INTERNAL CONTROL?: YES

## 2017-10-25 NOTE — TELEPHONE ENCOUNTER
Call made to pt using two identifiers, name and . Noted her refill request on several medications. Pt made aware that she just need to call her pharmacy because she has refills left. Pt verbalized understanding.

## 2017-10-30 ENCOUNTER — OFFICE VISIT (OUTPATIENT)
Dept: ORTHOPEDIC SURGERY | Age: 57
End: 2017-10-30

## 2017-10-30 VITALS
HEIGHT: 67 IN | WEIGHT: 192.4 LBS | HEART RATE: 79 BPM | TEMPERATURE: 97.3 F | OXYGEN SATURATION: 95 % | DIASTOLIC BLOOD PRESSURE: 106 MMHG | BODY MASS INDEX: 30.2 KG/M2 | SYSTOLIC BLOOD PRESSURE: 137 MMHG

## 2017-10-30 DIAGNOSIS — M25.474 SWELLING OF FOOT JOINT, RIGHT: ICD-10-CM

## 2017-10-30 DIAGNOSIS — M25.471 EDEMA OF RIGHT ANKLE: Primary | ICD-10-CM

## 2017-10-30 NOTE — PROGRESS NOTES
AMBULATORY PROGRESS NOTE      Patient: Luiza Carranza             MRN: [id number]     SSN: [id number] Body mass index is 30.13 kg/(m^2). YOB: 1960     AGE: 59 y.o. EX: female    PCP: Juni Christiansen MD    IMPRESSION/DIAGNOSIS AND TREATMENT PLAN     DIAGNOSES  1. Edema of right ankle    2. Swelling of foot joint, right        No orders of the defined types were placed in this encounter. Luiza Carranza understands her diagnoses and the proposed plan. Plan:    1) Continue using Compression stocking as directed. RTO - PRN, as she is doing well at this time. HPI AND EXAMINATION     Luiza Carranza IS A 59 y.o. female who presents to my outpatient office for follow up of right foot pain, and ankle edema. At last visit, I ordered a Knee high compression stocking 10 mm Hg. The patient presents to the office today rating her right foot pain at 0/10. The patient notes that her condition has improved considerably since she started using knee high compression stockings. Ms. Lashell Kingsley denies any pain, or major swelling along the BLE. The patient worked as a CNA for 30 years, and she has now retired. She cannot have an MRI due to her having a metal mitral valve replacement that she had in place for the last three or four years. Patient takes Coumadin.      ANKLE/FOOT right      Psychiatry: Alert, Oriented x 3; Speech normal in context and clarity,                       Memory intact grossly, no involuntary movements - tremors, no dementia  Gait: slow  Tenderness: no tenderness to dorsal aspect of foot at this time  Cutaneous: slight Darkened skin over dorsal aspect of foot                                  Mild swelling over the dorsal aspect of her midfoot  Joint Motion: WNL  Joint / Tendon Stability: No Ankle or Subtalar instability or joint laxity.                                                                  No peroneal sublux ability or dislocation  Alignment:  Normal Foot Alignment and  Flexible  Neuro Motor/Sensory: NL/NL,   Vascular: NL foot/ankle pulses  Lymphatics: No extremity lymphedema, No calf swelling, no tenderness to calf muscles. CHART REVIEW     Past Medical History:   Diagnosis Date    Aortic valve disorders 1/13/2011    Atrial fibrillation ([address] 1/13/2011    Congestive heart failure, unspecified March 2005    Cleared quickly with Lasix therapy    Echocardiogram 01/10/2012    A-fib. EF 45%. Mild RVE. RVSP 45-50. Massive LAE. Mod TAMI. Mod MS. Severe MR (mean grad 10). Mild-mod AI. Mild TR. Pulmonic systolic flow reversal.  Mild IVCE.  Hypertension     Iron deficiency anemias     Long term (current) use of anticoagulants 2/23/2011    Murmur 1/2011    Grade I-II/VI systolic ejection murmur along left sternal border, with radiation to the pulmonic area.  S/P cardiac cath 02/24/2012    Minimal coronary artery disease. Mild LVE. EF 55%. Mod MR. Mod-severe MS. RA 15.  RV 55/16. PA 58/34. W 36.  CO/CI 3.5/1.9 (TD); 3.61/1.9 (Dequan). R to L shunt suggested.  Wears glasses     Weight gain      Current Outpatient Prescriptions   Medication Sig    losartan (COZAAR) 100 mg tablet Take 1 Tab by mouth daily.  furosemide (LASIX) 40 mg tablet TAKE 1 TABLET DAILY    simvastatin (ZOCOR) 20 mg tablet Take 1 Tab by mouth nightly.  metoprolol succinate (TOPROL-XL) 100 mg tablet Take 1 Tab by mouth daily.  hydroCHLOROthiazide (HYDRODIURIL) 25 mg tablet Take 1 Tab by mouth daily.  traMADol (ULTRAM) 50 mg tablet Take 1 Tab by mouth every eight (8) hours as needed. Max Daily Amount: 150 mg.    albuterol (VENTOLIN HFA) 90 mcg/actuation inhaler Take 2 Puffs by inhalation daily.  KLOR-CON M20 20 mEq tablet TAKE 1 TAB BY MOUTH DAILY.  warfarin (COUMADIN) 5 mg tablet Take 5mg daily except 2.5mg on Tues, Thur, and Sat. Or as directed by our office    esomeprazole (NEXIUM) 20 mg capsule Take 1 Cap by mouth daily.     fluticasone (FLONASE) 50 mcg/actuation nasal spray 2 Sprays by Both Nostrils route daily.  fluticasone-salmeterol (ADVAIR) 250-50 mcg/dose diskus inhaler Take 1 Puff by inhalation every twelve (12) hours.  melatonin tab tablet Take 10 mg by mouth nightly.  ascorbic acid (VITAMIN C) 500 mg tablet Take 500 mg by mouth daily.  multivitamin (ONE A DAY) tablet Take 1 Tab by mouth daily. No current facility-administered medications for this visit. Allergies   Allergen Reactions    Morphine Rash and Itching    Norco [Hydrocodone-Acetaminophen] Itching     Past Surgical History:   Procedure Laterality Date    HX HEART CATHETERIZATION  2/24/2012    HX HYSTERECTOMY  2006    HX MITRAL VALVE REPLACEMENT  05/22/2013    25/33 mm On-X mechanical valve with bilateral pulmonary vein isolation and resection of LA appendage     Social History     Occupational History    Not on file. Social History Main Topics    Smoking status: Former Smoker    Smokeless tobacco: Never Used    Alcohol use 0.6 oz/week     1 Glasses of wine per week      Comment: occassionally    Drug use: No    Sexual activity: Yes     Partners: Male     Birth control/ protection: None     Family History   Problem Relation Age of Onset    Heart Surgery Father      Open-Heart Surgery    Other Father      Venous Thromboembolism    Heart Disease Mother      Enlarged Heart    Hypertension Mother     Diabetes Mother        REVIEW OF SYSTEMS : 10/30/2017  ALL BELOW ARE Negative except : SEE HPI       Constitutional: Negative for fever, chills and weight loss. Neg Weigh Loss  Cardiovascular: Negative for chest pain, claudication and leg swelling. SOB, LIZARRAGA   Gastrointestinal: Negative for  pain, N/V/D/C, Blood in stool or urine,dysuria, hematuria,        Incontinence, pelvic pain  Musculoskeletal: see HPI. Neurological: Negative for dizziness and weakness.                  Negative for headaches,Visual Changes, Confusion, Seizures,   Psychiatric/Behavioral: Negative for depression, memory loss and substance abuse. Extremities:  Negative for  hair changes, rash or skin lesion changes. Hematologic: Negative for Bleeding problems, bruising, pallor or swollen lymph nodes. Peripheral Vascular: No calf pain, vascular vein tenderness to calf pain              No calf throbbing, posterior knee throbbing pain    DIAGNOSTIC IMAGING     No notes on file    Written by Tacos Mcgrath, as dictated by Eliz Chand MD. IDr., Eliz Chand MD, confirm that all documentation is accurate.

## 2017-10-30 NOTE — PATIENT INSTRUCTIONS
Please follow up as needed. You are advised to contact us if your condition worsens. Foot Pain: Care Instructions  Your Care Instructions  Foot injuries that cause pain and swelling are fairly common. Almost all sports or home repair projects can cause a misstep that ends up as foot pain. Normal wear and tear, especially as you get older, also can cause foot pain. Most minor foot injuries will heal on their own, and home treatment is usually all you need to do. If you have a severe injury, you may need tests and treatment. Follow-up care is a key part of your treatment and safety. Be sure to make and go to all appointments, and call your doctor if you are having problems. It's also a good idea to know your test results and keep a list of the medicines you take. How can you care for yourself at home? · Take pain medicines exactly as directed. ¨ If the doctor gave you a prescription medicine for pain, take it as prescribed. ¨ If you are not taking a prescription pain medicine, ask your doctor if you can take an over-the-counter medicine. · Rest and protect your foot. Take a break from any activity that may cause pain. · Put ice or a cold pack on your foot for 10 to 20 minutes at a time. Put a thin cloth between the ice and your skin. · Prop up the sore foot on a pillow when you ice it or anytime you sit or lie down during the next 3 days. Try to keep it above the level of your heart. This will help reduce swelling. · Your doctor may recommend that you wrap your foot with an elastic bandage. Keep your foot wrapped for as long as your doctor advises. · If your doctor recommends crutches, use them as directed. · Wear roomy footwear. · As soon as pain and swelling end, begin gentle exercises of your foot. Your doctor can tell you which exercises will help. When should you call for help? Call 911 anytime you think you may need emergency care.  For example, call if:  ? · Your foot turns pale, white, blue, or cold. ?Call your doctor now or seek immediate medical care if:  ? · You cannot move or stand on your foot. ? · Your foot looks twisted or out of its normal position. ? · Your foot is not stable when you step down. ? · You have signs of infection, such as:  ¨ Increased pain, swelling, warmth, or redness. ¨ Red streaks leading from the sore area. ¨ Pus draining from a place on your foot. ¨ A fever. ? · Your foot is numb or tingly. ? Watch closely for changes in your health, and be sure to contact your doctor if:  ? · You do not get better as expected. ? · You have bruises from an injury that last longer than 2 weeks. Where can you learn more? Go to http://rgeyson-andrea.info/. Enter M633 in the search box to learn more about \"Foot Pain: Care Instructions. \"  Current as of: March 21, 2017  Content Version: 11.4  © 3923-0505 [employer]. Care instructions adapted under license by [employer] (which disclaims liability or warranty for this information). If you have questions about a medical condition or this instruction, always ask your healthcare professional. [place] [address] any warranty or liability for your use of this information.

## 2017-12-12 ENCOUNTER — HOSPITAL ENCOUNTER (EMERGENCY)
Age: 57
Discharge: HOME | End: 2017-12-13
Attending: EMERGENCY MEDICINE | Admitting: EMERGENCY MEDICINE
Payer: MEDICARE

## 2017-12-12 VITALS
OXYGEN SATURATION: 97 % | TEMPERATURE: 98.3 F | HEART RATE: 77 BPM | WEIGHT: 190 LBS | HEIGHT: 67 IN | DIASTOLIC BLOOD PRESSURE: 86 MMHG | RESPIRATION RATE: 18 BRPM | SYSTOLIC BLOOD PRESSURE: 128 MMHG | BODY MASS INDEX: 29.82 KG/M2

## 2017-12-12 DIAGNOSIS — J06.9 ACUTE UPPER RESPIRATORY INFECTION: Primary | ICD-10-CM

## 2017-12-12 PROCEDURE — 99282 EMERGENCY DEPT VISIT SF MDM: CPT

## 2017-12-13 RX ORDER — PROMETHAZINE HYDROCHLORIDE, PHENYLEPHRINE HYDROCHLORIDE AND CODEINE PHOSPHATE 6.25; 5; 1 MG/5ML; MG/5ML; MG/5ML
5 SOLUTION ORAL
Qty: 118 ML | Refills: 0 | Status: SHIPPED | OUTPATIENT
Start: 2017-12-13 | End: 2018-02-09 | Stop reason: ALTCHOICE

## 2017-12-13 NOTE — DISCHARGE INSTRUCTIONS
Upper Respiratory Infection (Cold): Care Instructions  Your Care Instructions    An upper respiratory infection, or URI, is an infection of the nose, sinuses, or throat. URIs are spread by coughs, sneezes, and direct contact. The common cold is the most frequent kind of URI. The flu and sinus infections are other kinds of URIs. Almost all URIs are caused by viruses. Antibiotics won't cure them. But you can treat most infections with home care. This may include drinking lots of fluids and taking over-the-counter pain medicine. You will probably feel better in 4 to 10 days. The doctor has checked you carefully, but problems can develop later. If you notice any problems or new symptoms, get medical treatment right away. Follow-up care is a key part of your treatment and safety. Be sure to make and go to all appointments, and call your doctor if you are having problems. It's also a good idea to know your test results and keep a list of the medicines you take. How can you care for yourself at home? · To prevent dehydration, drink plenty of fluids, enough so that your urine is light yellow or clear like water. Choose water and other caffeine-free clear liquids until you feel better. If you have kidney, heart, or liver disease and have to limit fluids, talk with your doctor before you increase the amount of fluids you drink. · Take an over-the-counter pain medicine, such as acetaminophen (Tylenol), ibuprofen (Advil, Motrin), or naproxen (Aleve). Read and follow all instructions on the label. · Before you use cough and cold medicines, check the label. These medicines may not be safe for young children or for people with certain health problems. · Be careful when taking over-the-counter cold or flu medicines and Tylenol at the same time. Many of these medicines have acetaminophen, which is Tylenol. Read the labels to make sure that you are not taking more than the recommended dose.  Too much acetaminophen (Tylenol) can be harmful. · Get plenty of rest.  · Do not smoke or allow others to smoke around you. If you need help quitting, talk to your doctor about stop-smoking programs and medicines. These can increase your chances of quitting for good. When should you call for help? Call 911 anytime you think you may need emergency care. For example, call if:  ? · You have severe trouble breathing. ?Call your doctor now or seek immediate medical care if:  ? · You seem to be getting much sicker. ? · You have new or worse trouble breathing. ? · You have a new or higher fever. ? · You have a new rash. ? Watch closely for changes in your health, and be sure to contact your doctor if:  ? · You have a new symptom, such as a sore throat, an earache, or sinus pain. ? · You cough more deeply or more often, especially if you notice more mucus or a change in the color of your mucus. ? · You do not get better as expected. Where can you learn more? Go to http://greyson-andrea.info/. Enter K333 in the search box to learn more about \"Upper Respiratory Infection (Cold): Care Instructions. \"  Current as of: May 12, 2017  Content Version: 11.4  © 8166-3517 [employer]. Care instructions adapted under license by [employer] (which disclaims liability or warranty for this information). If you have questions about a medical condition or this instruction, always ask your healthcare professional. [place] [address] any warranty or liability for your use of this information.

## 2017-12-13 NOTE — ED PROVIDER NOTES
EMERGENCY DEPARTMENT HISTORY AND PHYSICAL EXAM    12:21 AM      Date: 12/12/2017  Patient Name: Eva Mae    History of Presenting Illness     Chief Complaint   Patient presents with    Flu         History Provided By: Patient    Chief Complaint: URI  Duration:  Days  Timing:  Acute  Location: respiratory  Quality: n/a  Severity: Mild  Modifying Factors: n/a  Associated Symptoms: fever, diarrhea, and dry cough. Additional History (Context): 12:21 AM Eva Mae is a 59 y.o. female with a h/o A-Fib, CHF, HTN, HYST, Heart Cath, Mitral Valve Replacement, and Smoking presents to the ED with c/o URI onset several days ago. Pt states she has been taking OTC cough medication without any relief. Pt reported fever, diarrhea, and dry cough. Pt denied n/v/d, CP, SOB, fever, urinary sx's, numbness, weakness, or cough. All other sx denied. No other complaints at this time. PCP: Jason Monreal MD        Past History     Past Medical History:  Past Medical History:   Diagnosis Date    Aortic valve disorders 1/13/2011    Atrial fibrillation ([address] 1/13/2011    Congestive heart failure, unspecified March 2005    Cleared quickly with Lasix therapy    Echocardiogram 01/10/2012    A-fib. EF 45%. Mild RVE. RVSP 45-50. Massive LAE. Mod TAMI. Mod MS. Severe MR (mean grad 10). Mild-mod AI. Mild TR. Pulmonic systolic flow reversal.  Mild IVCE.  Hypertension     Iron deficiency anemias     Long term (current) use of anticoagulants 2/23/2011    Murmur 1/2011    Grade I-II/VI systolic ejection murmur along left sternal border, with radiation to the pulmonic area.  S/P cardiac cath 02/24/2012    Minimal coronary artery disease. Mild LVE. EF 55%. Mod MR. Mod-severe MS. RA 15.  RV 55/16. PA 58/34. W 36.  CO/CI 3.5/1.9 (TD); 3.61/1.9 (Dequan). R to L shunt suggested.     Wears glasses     Weight gain        Past Surgical History:  Past Surgical History:   Procedure Laterality Date    HX HEART CATHETERIZATION  2/24/2012    HX HYSTERECTOMY  2006    HX MITRAL VALVE REPLACEMENT  05/22/2013    25/33 mm On-X mechanical valve with bilateral pulmonary vein isolation and resection of LA appendage       Family History:  Family History   Problem Relation Age of Onset    Heart Surgery Father      Open-Heart Surgery    Other Father      Venous Thromboembolism    Heart Disease Mother      Enlarged Heart    Hypertension Mother     Diabetes Mother        Social History:  Social History   Substance Use Topics    Smoking status: Former Smoker    Smokeless tobacco: Never Used    Alcohol use 0.6 oz/week     1 Glasses of wine per week      Comment: occassionally       Allergies: Allergies   Allergen Reactions    Morphine Rash and Itching    Norco [Hydrocodone-Acetaminophen] Itching         Review of Systems     Review of Systems   Constitutional: Negative. HENT: Negative. Respiratory: Positive for cough. Cardiovascular: Negative. Gastrointestinal: Negative. Genitourinary: Negative. Musculoskeletal: Negative. Neurological: Negative. Psychiatric/Behavioral: Negative. Physical Exam     Visit Vitals    /86 (BP 1 Location: Left arm, BP Patient Position: At rest)    Pulse 77    Temp 98.3 °F (36.8 °C)    Resp 18    Ht 5' 7\" (1.702 m)    Wt 86.2 kg (190 lb)    SpO2 97%    BMI 29.76 kg/m2       Physical Exam   Constitutional: She is oriented to person, place, and time. She appears well-developed. HENT:   Head: Normocephalic and atraumatic. Mouth/Throat: Oropharynx is clear and moist.   Eyes: Conjunctivae and EOM are normal. Pupils are equal, round, and reactive to light. Neck: Normal range of motion. Neck supple. Cardiovascular: Normal rate and regular rhythm. Pulmonary/Chest: Effort normal and breath sounds normal.   Abdominal: Soft. Musculoskeletal: Normal range of motion. Neurological: She is alert and oriented to person, place, and time.    Skin: Skin is warm and dry. Psychiatric: She has a normal mood and affect. Nursing note and vitals reviewed. Diagnostic Study Results     Labs -  No results found for this or any previous visit (from the past 12 hour(s)). Radiologic Studies -   No orders to display         Medical Decision Making   I am the first provider for this patient. I reviewed the vital signs, available nursing notes, past medical history, past surgical history, family history and social history. Vital Signs-Reviewed the patient's vital signs. Pulse Oximetry Analysis -  97 on room air (Interpretation)NL. Records Reviewed: Nursing Notes (Time of Review: 12:21 AM)    ED Course: Progress Notes, Reevaluation, and Consults:      Provider Notes (Medical Decision Making):   MDM  Number of Diagnoses or Management Options  Acute upper respiratory infection:       Medications - No data to display      Diagnosis     Clinical Impression:   1. Acute upper respiratory infection        Disposition: d/c    Follow-up Information     Follow up With Details Comments Contact Justin Emmanuel MD Go in 1 week [facility]  [phone]      [facility] EMERGENCY DEPT Go to As needed, If symptoms worsen [address] [facility]  [phone]          Patient's Medications   Start Taking    No medications on file   Continue Taking    ALBUTEROL (VENTOLIN HFA) 90 MCG/ACTUATION INHALER    Take 2 Puffs by inhalation daily. ASCORBIC ACID (VITAMIN C) 500 MG TABLET    Take 500 mg by mouth daily. ESOMEPRAZOLE (NEXIUM) 20 MG CAPSULE    Take 1 Cap by mouth daily. FLUTICASONE (FLONASE) 50 MCG/ACTUATION NASAL SPRAY    2 Sprays by Both Nostrils route daily. FLUTICASONE-SALMETEROL (ADVAIR) 250-50 MCG/DOSE DISKUS INHALER    Take 1 Puff by inhalation every twelve (12) hours.     FUROSEMIDE (LASIX) 40 MG TABLET    TAKE 1 TABLET DAILY    HYDROCHLOROTHIAZIDE (HYDRODIURIL) 25 MG TABLET    Take 1 Tab by mouth daily. KLOR-CON M20 20 MEQ TABLET    TAKE 1 TAB BY MOUTH DAILY. LOSARTAN (COZAAR) 100 MG TABLET    Take 1 Tab by mouth daily. MELATONIN TAB TABLET    Take 10 mg by mouth nightly. METOPROLOL SUCCINATE (TOPROL-XL) 100 MG TABLET    Take 1 Tab by mouth daily. MULTIVITAMIN (ONE A DAY) TABLET    Take 1 Tab by mouth daily. SIMVASTATIN (ZOCOR) 20 MG TABLET    Take 1 Tab by mouth nightly. TRAMADOL (ULTRAM) 50 MG TABLET    Take 1 Tab by mouth every eight (8) hours as needed. Max Daily Amount: 150 mg.    WARFARIN (COUMADIN) 5 MG TABLET    Take 5mg daily except 2.5mg on Tues, Thur, and Sat. Or as directed by our office   These Medications have changed    No medications on file   Stop Taking    No medications on file       _______________________________    Attestations:  [address] [place] [address] acting as a scribe for and in the presence of Georgiana Foy MD      December 13, 2017 at 12:25 AM       Provider Attestation:      I personally performed the services described in the documentation, reviewed the documentation, as recorded by the scribe in my presence, and it accurately and completely records my words and actions.  December 13, 2017 at 12:25 AM - Georgiana Foy MD    _______________________________

## 2017-12-21 ENCOUNTER — ANTI-COAG VISIT (OUTPATIENT)
Dept: CARDIOLOGY CLINIC | Age: 57
End: 2017-12-21

## 2017-12-21 DIAGNOSIS — I48.19 PERSISTENT ATRIAL FIBRILLATION (HCC): Primary | ICD-10-CM

## 2017-12-21 DIAGNOSIS — Z79.01 LONG TERM CURRENT USE OF ANTICOAGULANT THERAPY: ICD-10-CM

## 2017-12-21 LAB
INR BLD: 2.4
PT POC: 29 SECONDS
VALID INTERNAL CONTROL?: YES

## 2017-12-21 NOTE — PROGRESS NOTES
Verbal order and read back per Eugene Cabral NP  The INR is stable and therapeutic. Continue same dose of coumadin and recheck in 1 month.   Continue Coumadin 5mg daily except 2.5mg on Tues, Thur, and Sat  Patient informed of instructions, read back and verbalized understanding Edu Monte LPN

## 2018-01-26 ENCOUNTER — ANTI-COAG VISIT (OUTPATIENT)
Dept: CARDIOLOGY CLINIC | Age: 58
End: 2018-01-26

## 2018-01-26 DIAGNOSIS — Z79.01 LONG TERM CURRENT USE OF ANTICOAGULANT THERAPY: ICD-10-CM

## 2018-01-26 DIAGNOSIS — I48.19 PERSISTENT ATRIAL FIBRILLATION (HCC): Primary | ICD-10-CM

## 2018-01-26 LAB
INR BLD: 2.6
PT POC: 30.7 SECONDS
VALID INTERNAL CONTROL?: YES

## 2018-01-26 RX ORDER — WARFARIN SODIUM 5 MG/1
TABLET ORAL
Qty: 30 TAB | Refills: 6 | Status: SHIPPED | OUTPATIENT
Start: 2018-01-26 | End: 2018-08-23 | Stop reason: SDUPTHER

## 2018-01-26 NOTE — PROGRESS NOTES
Verbal order and read back per Toni Newberry NP  The INR is stable and therapeutic. Continue same dose of coumadin and recheck in 1 month.   Continue Coumadin 5mg daily except 2.5mg on Tues, Thur, and Sat  Patient informed of instructions, read back and verbalized understanding Alexa De Jesus LPN

## 2018-02-09 ENCOUNTER — OFFICE VISIT (OUTPATIENT)
Dept: CARDIOLOGY CLINIC | Age: 58
End: 2018-02-09

## 2018-02-09 VITALS
WEIGHT: 193 LBS | SYSTOLIC BLOOD PRESSURE: 130 MMHG | BODY MASS INDEX: 30.29 KG/M2 | HEIGHT: 67 IN | HEART RATE: 85 BPM | DIASTOLIC BLOOD PRESSURE: 76 MMHG | OXYGEN SATURATION: 97 %

## 2018-02-09 DIAGNOSIS — I48.19 PERSISTENT ATRIAL FIBRILLATION (HCC): Primary | ICD-10-CM

## 2018-02-09 DIAGNOSIS — E78.5 HYPERLIPIDEMIA, UNSPECIFIED HYPERLIPIDEMIA TYPE: ICD-10-CM

## 2018-02-09 DIAGNOSIS — Z95.2 S/P MVR (MITRAL VALVE REPLACEMENT): ICD-10-CM

## 2018-02-09 DIAGNOSIS — I50.32 CHRONIC DIASTOLIC CONGESTIVE HEART FAILURE (HCC): ICD-10-CM

## 2018-02-09 NOTE — PROGRESS NOTES
Review of Systems   Constitutional: Positive for malaise/fatigue. Negative for chills, fever and weight loss. Respiratory: Negative for cough, hemoptysis, shortness of breath and wheezing. Cardiovascular: Positive for orthopnea. Negative for chest pain, palpitations and leg swelling. Gastrointestinal: Negative. Musculoskeletal: Negative for falls, joint pain and myalgias. Neurological: Negative for dizziness.

## 2018-02-09 NOTE — MR AVS SNAPSHOT
[address] [address] [address] [id number] [phone] Patient: Debra Osgood MRN: [id number] Visit Information Date & Time Provider Department Dept. Phone Encounter #  
 2/9/2018 10:20 AM Lashaun Wu DO [facility] [id number] Follow-up Instructions Return in about 6 months (around 8/9/2018). Your Appointments 2/27/2018  1:00 PM  
ANTICOAG NURSE with Pt Inr Hv C[facility] [address] [facility] Appt Note: 4 week INR  
 [place] [address] [address] [address] [phone] 2300 [facility] [address]  [address] 3/15/2018 11:00 AM  
Office Visit with MD Josh Murphy [facility]) Appt Note: [address] [facility] [address]  
[facility] [address] Upcoming Health Maintenance Date Due Hepatitis C Screening 1960 Pneumococcal 19-64 Medium Risk (1 of 1 - PPSV23) 7/4/1977 DTaP/Tdap/Td series (1 - Tdap) 7/4/1979 FOBT Q 1 YEAR AGE 50-75 7/17/2018 BREAST CANCER SCRN MAMMOGRAM 3/16/2019 PAP AKA CERVICAL CYTOLOGY 3/9/2020 Allergies as of 2/9/2018  Review Complete On: 2/9/2018 By: Lashaun Wu DO Severity Noted Reaction Type Reactions Morphine  02/02/2012    Rash, Itching Norco [Hydrocodone-acetaminophen]  07/12/2017    Itching Current Immunizations  Reviewed on 2/15/2013 Name Date Influenza Vaccine 12/15/2012 Influenza Vaccine (Quad) PF 9/18/2017, 9/20/2016 Not reviewed this visit You Were Diagnosed With   
  
 Codes Comments Persistent atrial fibrillation (HCC)    -  Primary ICD-10-CM: I48.1 ICD-9-CM: 427.31  Chronic diastolic congestive heart failure (HCC)     ICD-10-CM: I50.32 
 ICD-9-CM: 428.32, 428.0 Fatigue, unspecified type     ICD-10-CM: R53.83 ICD-9-CM: 780.79 Hyperlipidemia, unspecified hyperlipidemia type     ICD-10-CM: E78.5 ICD-9-CM: 272.4 S/P MVR (mitral valve replacement)     ICD-10-CM: Z99.5 ICD-9-CM: V43.3 Vitals BP Pulse Height(growth percentile) Weight(growth percentile) SpO2 BMI  
 130/76 85 5' 7\" (1.702 m) 193 lb (87.5 kg) 97% 30.23 kg/m2 OB Status Smoking Status Hysterectomy Former Smoker Vitals History BMI and BSA Data Body Mass Index Body Surface Area  
 30.23 kg/m 2 2.03 m 2 Preferred Pharmacy Pharmacy Name Phone [id number] [address] [facility] [phone] Your Updated Medication List  
  
   
This list is accurate as of: 2/9/18 11:08 AM.  Always use your most recent med list.  
  
  
  
  
 albuterol 90 mcg/actuation inhaler Commonly known as:  VENTOLIN HFA Take 2 Puffs by inhalation daily. esomeprazole 20 mg capsule Commonly known as:  NexIUM Take 1 Cap by mouth daily. fluticasone 50 mcg/actuation nasal spray Commonly known as:  Aubery Cables 2 Sprays by Both Nostrils route daily. fluticasone-salmeterol 250-50 mcg/dose diskus inhaler Commonly known as:  ADVAIR Take 1 Puff by inhalation every twelve (12) hours. furosemide 40 mg tablet Commonly known as:  LASIX TAKE 1 TABLET DAILY  
  
 hydroCHLOROthiazide 25 mg tablet Commonly known as:  HYDRODIURIL Take 1 Tab by mouth daily. KLOR-CON M20 20 mEq tablet Generic drug:  potassium chloride TAKE 1 TAB BY MOUTH DAILY. losartan 100 mg tablet Commonly known as:  COZAAR Take 1 Tab by mouth daily. melatonin Tab tablet Take 10 mg by mouth nightly. metoprolol succinate 100 mg tablet Commonly known as:  TOPROL-XL Take 1 Tab by mouth daily. multivitamin tablet Commonly known as:  ONE A DAY Take 1 Tab by mouth daily. simvastatin 20 mg tablet Commonly known as:  ZOCOR Take 1 Tab by mouth nightly. traMADol 50 mg tablet Commonly known as:  ULTRAM  
Take 1 Tab by mouth every eight (8) hours as needed. Max Daily Amount: 150 mg. VITAMIN C 500 mg tablet Generic drug:  ascorbic acid (vitamin C) Take 500 mg by mouth daily. warfarin 5 mg tablet Commonly known as:  COUMADIN  
TAKE 5MG DAILY EXCEPT 2.5MG ON TUES, THUR, AND SAT. OR AS DIRECTED BY OUR OFFICE We Performed the Following AMB POC EKG ROUTINE W/ 12 LEADS, INTER & REP [58090 CPT(R)] Follow-up Instructions Return in about 6 months (around 8/9/2018). Introducing [facility]! Gala Castillo introduces "[employer] patient portal. Now you can access parts of your medical record, email your doctor's office, and request medication refills online. 1. In your internet browser, go to [email or web address] Rank 2. Click on the First Time User? Click Here link in the Sign In box. You will see the New Member Sign Up page. 3. Enter your "[employer] Access Code exactly as it appears below. You will not need to use this code after youve completed the sign-up process. If you do not sign up before the expiration date, you must request a new code. · "[employer] Access Code: [email or web address] Expires: 3/13/2018 12:25 AM 
 
4. Enter the last four digits of your Social Security Number (xxxx) and Date of Birth (mm/dd/yyyy) as indicated and click Submit. You will be taken to the next sign-up page. 5. Create a [employer] ID. This will be your "[employer] login ID and cannot be changed, so think of one that is secure and easy to remember. 6. Create a "[employer] password. You can change your password at any time. 7. Enter your Password Reset Question and Answer. This can be used at a later time if you forget your password. 8. Enter your e-mail address. You will receive e-mail notification when new information is available in [address] 9. Click Sign Up. You can now view and download portions of your medical record. 10. Click the Download Summary menu link to download a portable copy of your medical information. If you have questions, please visit the Frequently Asked Questions section of the [employer] website. Remember, [employer] is NOT to be used for urgent needs. For medical emergencies, dial 911. Now available from your iPhone and Android! Please provide this summary of care documentation to your next provider. Your primary care clinician is listed as Nati Cochran. If you have any questions after today's visit, please call [phone].

## 2018-02-09 NOTE — PROGRESS NOTES
1. Have you been to the ER, urgent care clinic since your last visit? Hospitalized since your last visit? DKW,[birth year] Flu, 7-9-17 right foot pain    2. Have you seen or consulted any other health care providers outside of the [facility] since your last visit? Include any pap smears or colon screening.  no

## 2018-02-09 NOTE — PROGRESS NOTES
HPI:  I saw Luz Roque in my office today in cardiovascular evaluation regarding her mitral valve disease. Ms. Salomón Peters is a very pleasant 59year old  female whom I first saw back in March of 2005 at [facility] with congestive heart failure. Her heart failure cleared quickly at that time with Lasix therapy and subsequent echocardiogram demonstrated mild concentric left ventricular hypertrophy without ventricular dilatation, and a low normal overall left function with ejection fraction in the 55% range, as well as mild to moderate mitral stenosis and moderate mitral regurgitation, with some signs of rheumatic aortic valvular disease as well.      She subsequently did reasonably well on Diovan and beta blockers, but she was lost to follow up for some time, and when I saw her again in 2012 she was having signs of worsening mitral stenosis and ultimately underwent a cardiac catheterization by my associate Dr. Mark Lora on 2/24/12, which demonstrated what appeared to be moderate to severe mitral stenosis with moderate mitral regurgitation, mild aortic insufficiency and only very mild coronary artery disease. She did have an episode of hypoxemia during that cardiac catheterization and had a cardiac MRI and was further worked up by Dr. Toña Pardo prior to her ultimately getting surgery, which actually was put off for another year because of some personal issues. I saw her last on 3/23/13 and she was reasonably well compensated at that time, and she ultimately was referred for surgery and that was completed by Dr. Toña Pardo on 5/22/13, and consisted of a mitral valve replacement with a 25/23 mm On-X mechanical valve together with bilateral pulmonary vein isolation and resection of the left atrial appendage.     She comes in today relates that she is doing reasonably well.   She has been remaining fairly active and except for chronic two-pillow orthopnea really denies any cardiovascular symptomatology except for some mild fatigue which is just developed recently in which she thinks may be related to a new diet that she is put herself on. Encounter Diagnoses   Name Primary?  Chronic atrial fibrillation (HCC) Yes    S/P MVR (mitral valve replacement) 5/23/2013     Chronic diastolic congestive heart failure (HCC)     Hyperlipidemia, unspecified hyperlipidemia type        Discussion: This patient appears to be doing about as well as we could expect and really of no recommendations for change at this time. She has a well-controlled rate in atrial fibrillation in the mid 80s and has a crisp first heart sound consistent with a normally functioning prosthetic valve in the mitral position without any significant murmurs. Her latest lipid profile that I have a copy of was completed back on May 18, 2017 was reasonably good with total cholesterol 166, triglycerides 124, HDL of 61, LDL of 80.2, and VLDL of 24.8 which I think is good control on her Zocor 20 mg daily. We did do an echocardiogram on her back in the summer 2016 and that study appeared to be completely normal with normal left ventricular function and normal functioning mechanical prosthesis in the mitral position. Her blood pressure appears to be well-controlled today and since she is otherwise doing well without any signs of overt heart failure I am simply going to plan to see her again in several months. PCP: Albino Esteban MD       Past Medical History:   Diagnosis Date    Aortic valve disorders 1/13/2011    Atrial fibrillation ([address] 1/13/2011    Congestive heart failure, unspecified March 2005    Cleared quickly with Lasix therapy    Echocardiogram 01/10/2012    A-fib. EF 45%. Mild RVE. RVSP 45-50. Massive LAE. Mod TAMI. Mod MS. Severe MR (mean grad 10). Mild-mod AI. Mild TR. Pulmonic systolic flow reversal.  Mild IVCE.     Hypertension     Iron deficiency anemias     Long term (current) use of anticoagulants 2/23/2011    Murmur 1/2011    Grade I-II/VI systolic ejection murmur along left sternal border, with radiation to the pulmonic area.  S/P cardiac cath 02/24/2012    Minimal coronary artery disease. Mild LVE. EF 55%. Mod MR. Mod-severe MS. RA 15.  RV 55/16. PA 58/34. W 36.  CO/CI 3.5/1.9 (TD); 3.61/1.9 ([place]). R to L shunt suggested.  Wears glasses     Weight gain          Past Surgical History:   Procedure Laterality Date    HX HEART CATHETERIZATION  2/24/2012    HX HYSTERECTOMY  2006    HX MITRAL VALVE REPLACEMENT  05/22/2013    25/33 mm On-X mechanical valve with bilateral pulmonary vein isolation and resection of LA appendage         Current Outpatient Rx   Name Route Sig Dispense Refill    LORATADINE 10 MG TAB Oral Take 10 mg by mouth daily.  ASCORBIC ACID 500 MG TAB Oral Take 500 mg by mouth daily.  FLUTICASONE 50 MCG/ACTUATION NASAL SPRAY, SUSP Both Nostrils 2 Sprays by Both Nostrils route daily. 1 Bottle 3    FERROUS SULFATE 325 MG (65 MG ELEMENTAL IRON) TAB Oral Take 65 mg by mouth Daily (before breakfast).  POTASSIUM CHLORIDE SR 20 MEQ TAB, PARTICLES/CRYSTALS Oral Take 2 Tabs by mouth daily. 60 Tab 8    LOSARTAN 100 MG TAB Oral Take 1 Tab by mouth daily. 30 Tab 6    METOPROLOL 50 MG TAB Oral Take 1 Tab by mouth two (2) times a day. 60 Tab 8    FUROSEMIDE 40 MG TAB Oral Take 2 Tabs by mouth daily. 60 Tab 11    AMOXICILLIN 500 MG CAP  Take 4 capsules 1 hour prior to dental appointment 4 Cap 1    MULTIVITAMIN TAB Oral Take 1 Tab by mouth daily.  ASPIRIN 81 MG TAB Oral Take 81 mg by mouth daily.  WARFARIN 5 MG TAB Oral Take 1 Tab by mouth daily. 45 Tab 6       Allergies   Allergen Reactions    Morphine Rash and Itching    Norco [Hydrocodone-Acetaminophen] Itching       Social History:   reports that she has quit smoking.  She has never used smokeless tobacco. She reports that she drinks about 0.6 oz of alcohol per week  She reports that she does not use illicit drugs. Family History   Problem Relation Age of Onset    Heart Surgery Father      Open-Heart Surgery    Other Father      Venous Thromboembolism    Heart Disease Mother      Enlarged Heart    Hypertension Mother     Diabetes Mother      Review of Systems:    Constitutional: Positive for malaise/fatigue. Negative for chills, fever and weight loss. Respiratory: Negative for cough, hemoptysis, shortness of breath and wheezing. Cardiovascular: Positive for orthopnea. Negative for chest pain, palpitations and leg swelling. Gastrointestinal: Negative. Musculoskeletal: Negative for falls, joint pain and myalgias. Neurological: Negative for dizziness. Physical Exam:   Visit Vitals    /76    Pulse 85    Ht 5' 7\" (1.702 m)    Wt 87.5 kg (193 lb)    SpO2 97%    BMI 30.23 kg/m2       The patient is an alert, oriented, well developed, well nourished 59 y.o.  female who was in no acute distress at the time of my examination. HEENT: No icterus or xanthelasmas. Conjunctivae white, mucosa moist, no pallor or cyanosis. Neck: Supple without masses, tenderness or thyromegaly. No jugular venous distention. Carotid upstrokes are full bilaterally, without bruits. Cardiovascular: Chest is symmetrical with good excursion. There is a well healed, but widened mid sternotomy scar. [place] is not displaced. No lifts, heaves or thrills. Mechanical S1 with normal S2 without appreciable murmurs, rubs, clicks or gallops auscultated. Lungs: Clear to auscultation bilaterally. Abdomen: Soft. No masses, tenderness or organomegaly. No abdominal bruits. Extremities: No edema, with full peripheral pulses. No clubbing or cyanosis. Review of Data: Please refer to past medical history for most recent cardiac testing.   Lab Results   Component Value Date/Time    Cholesterol, total 166 05/18/2017 09:27 AM    HDL Cholesterol 61 (H) 05/18/2017 09:27 AM    LDL, calculated 80.2 05/18/2017 09:27 AM    Triglyceride 124 05/18/2017 09:27 AM    CHOL/HDL Ratio 2.7 05/18/2017 09:27 AM     Results for orders placed or performed in visit on 02/09/18   AMB POC EKG ROUTINE W/ 12 LEADS, INTER & REP     Status: None    Narrative    Course atrial fibrillation with an overall ventricular response of 85. Minor nonspecific inferolateral and high lateral ST-T changes. Compared to the EKG of April 14, 2017 there was little interval change. Pina Kaye D.O., F.A.C.C. [facility]  [facility] [place]  [address]    PLEASE NOTE:  This document has been produced using voice recognition software. Unrecognized errors in transcription may be present.

## 2018-02-23 RX ORDER — POTASSIUM CHLORIDE 1500 MG/1
TABLET, EXTENDED RELEASE ORAL
Qty: 30 TAB | Refills: 6 | Status: SHIPPED | OUTPATIENT
Start: 2018-02-23 | End: 2018-09-22 | Stop reason: SDUPTHER

## 2018-02-26 DIAGNOSIS — I11.0 BENIGN HYPERTENSIVE HEART DISEASE WITH HEART FAILURE (HCC): ICD-10-CM

## 2018-02-26 DIAGNOSIS — I10 ESSENTIAL HYPERTENSION: ICD-10-CM

## 2018-02-26 DIAGNOSIS — E78.5 HYPERLIPIDEMIA, UNSPECIFIED HYPERLIPIDEMIA TYPE: ICD-10-CM

## 2018-02-27 RX ORDER — LOSARTAN POTASSIUM 100 MG/1
100 TABLET ORAL DAILY
Qty: 30 TAB | Refills: 3 | OUTPATIENT
Start: 2018-02-27

## 2018-02-27 RX ORDER — FUROSEMIDE 40 MG/1
TABLET ORAL
Qty: 30 TAB | Refills: 1 | Status: SHIPPED | OUTPATIENT
Start: 2018-02-27 | End: 2018-04-24 | Stop reason: SDUPTHER

## 2018-02-27 RX ORDER — LOSARTAN POTASSIUM 100 MG/1
100 TABLET ORAL DAILY
Qty: 30 TAB | Refills: 1 | Status: SHIPPED | OUTPATIENT
Start: 2018-02-27 | End: 2018-04-24 | Stop reason: SDUPTHER

## 2018-02-28 ENCOUNTER — PATIENT OUTREACH (OUTPATIENT)
Dept: FAMILY MEDICINE CLINIC | Age: 58
End: 2018-02-28

## 2018-03-02 ENCOUNTER — ANTI-COAG VISIT (OUTPATIENT)
Dept: CARDIOLOGY CLINIC | Age: 58
End: 2018-03-02

## 2018-03-02 DIAGNOSIS — I48.91 ATRIAL FIBRILLATION, UNSPECIFIED TYPE (HCC): ICD-10-CM

## 2018-03-02 DIAGNOSIS — Z79.01 LONG TERM CURRENT USE OF ANTICOAGULANT THERAPY: ICD-10-CM

## 2018-03-02 LAB
INR BLD: 2.5
PT POC: 30.3 SECONDS
VALID INTERNAL CONTROL?: YES

## 2018-03-02 NOTE — PROGRESS NOTES
Verbal order and read back per Castillo Madison NP  The INR is stable and therapeutic. Continue same dose of coumadin and recheck in 1 month.

## 2018-04-24 DIAGNOSIS — I11.0 BENIGN HYPERTENSIVE HEART DISEASE WITH HEART FAILURE (HCC): ICD-10-CM

## 2018-04-24 DIAGNOSIS — I10 ESSENTIAL HYPERTENSION: ICD-10-CM

## 2018-04-24 DIAGNOSIS — E78.5 HYPERLIPIDEMIA, UNSPECIFIED HYPERLIPIDEMIA TYPE: ICD-10-CM

## 2018-04-24 RX ORDER — LOSARTAN POTASSIUM 100 MG/1
TABLET ORAL
Qty: 30 TAB | Refills: 1 | Status: SHIPPED | OUTPATIENT
Start: 2018-04-24 | End: 2018-06-22 | Stop reason: SDUPTHER

## 2018-04-24 RX ORDER — FUROSEMIDE 40 MG/1
TABLET ORAL
Qty: 30 TAB | Refills: 1 | Status: SHIPPED | OUTPATIENT
Start: 2018-04-24 | End: 2018-06-22 | Stop reason: SDUPTHER

## 2018-04-27 ENCOUNTER — OFFICE VISIT (OUTPATIENT)
Dept: FAMILY MEDICINE CLINIC | Age: 58
End: 2018-04-27

## 2018-04-27 ENCOUNTER — ANTI-COAG VISIT (OUTPATIENT)
Dept: CARDIOLOGY CLINIC | Age: 58
End: 2018-04-27

## 2018-04-27 VITALS
TEMPERATURE: 98.3 F | DIASTOLIC BLOOD PRESSURE: 84 MMHG | HEIGHT: 67 IN | RESPIRATION RATE: 20 BRPM | HEART RATE: 83 BPM | BODY MASS INDEX: 29.98 KG/M2 | WEIGHT: 191 LBS | OXYGEN SATURATION: 97 % | SYSTOLIC BLOOD PRESSURE: 113 MMHG

## 2018-04-27 DIAGNOSIS — I11.0 BENIGN HYPERTENSIVE HEART DISEASE WITH HEART FAILURE (HCC): ICD-10-CM

## 2018-04-27 DIAGNOSIS — Z13.39 SCREENING FOR ALCOHOLISM: ICD-10-CM

## 2018-04-27 DIAGNOSIS — I48.91 ATRIAL FIBRILLATION, UNSPECIFIED TYPE (HCC): ICD-10-CM

## 2018-04-27 DIAGNOSIS — E78.2 MIXED HYPERLIPIDEMIA: ICD-10-CM

## 2018-04-27 DIAGNOSIS — Z79.01 LONG TERM CURRENT USE OF ANTICOAGULANT THERAPY: Primary | ICD-10-CM

## 2018-04-27 DIAGNOSIS — Z12.31 ENCOUNTER FOR SCREENING MAMMOGRAM FOR MALIGNANT NEOPLASM OF BREAST: ICD-10-CM

## 2018-04-27 DIAGNOSIS — Z00.00 MEDICARE ANNUAL WELLNESS VISIT, SUBSEQUENT: Primary | ICD-10-CM

## 2018-04-27 DIAGNOSIS — Z13.31 SCREENING FOR DEPRESSION: ICD-10-CM

## 2018-04-27 DIAGNOSIS — Z71.89 ADVANCED DIRECTIVES, COUNSELING/DISCUSSION: ICD-10-CM

## 2018-04-27 DIAGNOSIS — Z11.59 NEED FOR HEPATITIS C SCREENING TEST: ICD-10-CM

## 2018-04-27 LAB
INR BLD: 2.6
PT POC: 30.8 SECONDS
VALID INTERNAL CONTROL?: YES

## 2018-04-27 RX ORDER — METOPROLOL SUCCINATE 100 MG/1
100 TABLET, EXTENDED RELEASE ORAL DAILY
Qty: 30 TAB | Refills: 6 | Status: SHIPPED | OUTPATIENT
Start: 2018-04-27 | End: 2018-12-18 | Stop reason: SDUPTHER

## 2018-04-27 RX ORDER — HYDROCHLOROTHIAZIDE 25 MG/1
25 TABLET ORAL DAILY
Qty: 30 TAB | Refills: 6 | Status: SHIPPED | OUTPATIENT
Start: 2018-04-27 | End: 2018-12-18 | Stop reason: SDUPTHER

## 2018-04-27 NOTE — ACP (ADVANCE CARE PLANNING)
Advance Care Planning    Advance Care Planning (ACP) Provider Note - Comprehensive     Date of ACP Conversation: 04/27/18  Persons included in Conversation:  patient  Length of ACP Conversation in minutes:  16 minutes    Authorized Decision Maker (if patient is incapable of making informed decisions): This person is:  Healthcare Agent/Medical Power of  under Advance Directive          General ACP for ALL Patients with Decision Making Capacity:   Importance of advance care planning, including choosing a healthcare agent to communicate patient's healthcare decisions if patient lost the ability to make decisions, such as after a sudden illness or accident    Review of Existing Advance Directive:  What information were you given about medical decisions to consider before completing your advance directive? Package    For Serious or Chronic Illness:  Understanding of medical condition    Understanding of CPR, goals and expected outcomes, benefits and burdens discussed.     Interventions Provided:  Recommended completion of Advance Directive form after review of ACP materials and conversation with prospective healthcare agent

## 2018-04-27 NOTE — PROGRESS NOTES
Verbal order and read back per Marvin Rausch NP  Patient is within therapeutic range  Continue Coumadin 5mg daily except 2.5mg on Tues, Thur, and Sat  Recheck 4 weeks  This has been fully explained to the patient, who indicates understanding.

## 2018-04-27 NOTE — MR AVS SNAPSHOT
[address] [address] 
[phone] Patient: Jami Schroeder MRN: [id number]:[id number] Visit Information Date & Time Provider Department Dept. Phone Encounter #  
 4/27/2018  3:00 PM YOSSI Gambino [facility] [phone] [id number] Follow-up Instructions Return in about 1 year (around 4/27/2019), or if symptoms worsen or fail to improve. Your Appointments 5/25/2018  3:30 PM  
ANTICOAG NURSE with Pt Inr Hv C[facility] ([address]) Appt Note: 4 weeks [address] [place] [id number] [phone] [place] [id number]  
  
    
 8/10/2018  8:40 AM  
Follow Up with Rg Jonas DO [facility] ([address]) Appt Note: 6 month f/up [place] [id number]  
[phone] [address] [place] [address] [address] Upcoming Health Maintenance Date Due Hepatitis C Screening 1960 Pneumococcal 19-64 Medium Risk (1 of 1 - PPSV23) 7/4/1977 DTaP/Tdap/Td series (1 - Tdap) 7/4/1979 MEDICARE YEARLY EXAM 3/14/2018 FOBT Q 1 YEAR AGE 50-75 7/17/2018 Influenza Age 5 to Adult 8/1/2018 BREAST CANCER SCRN MAMMOGRAM 3/16/2019 PAP AKA CERVICAL CYTOLOGY 3/9/2020 Allergies as of 4/27/2018  Review Complete On: 4/27/2018 By: YOSSI Gambino Severity Noted Reaction Type Reactions Morphine  02/02/2012    Rash, Itching Norco [Hydrocodone-acetaminophen]  07/12/2017    Itching Current Immunizations  Reviewed on 2/15/2013 Name Date Influenza Vaccine 12/15/2012 Influenza Vaccine (Quad) PF 9/18/2017, 9/20/2016 Not reviewed this visit You Were Diagnosed With   
  
 Codes Comments Medicare annual wellness visit, subsequent    -  Primary ICD-10-CM: Z00.00 ICD-9-CM: V70.0 Benign hypertensive heart disease with heart failure (HCC)     ICD-10-CM: I11.0 ICD-9-CM: 402.11, 428.9 Mixed hyperlipidemia     ICD-10-CM: E78.2 ICD-9-CM: 272.2 Advanced directives, counseling/discussion     ICD-10-CM: Z71.89 ICD-9-CM: V65.49 Screening for alcoholism     ICD-10-CM: Z13.89 ICD-9-CM: V79.1 Screening for depression     ICD-10-CM: Z13.89 ICD-9-CM: V79.0 Need for hepatitis C screening test     ICD-10-CM: Z11.59 
ICD-9-CM: V73.89 Encounter for screening mammogram for malignant neoplasm of breast     ICD-10-CM: Z12.31 
ICD-9-CM: V76.12 Vitals BP Pulse Temp Resp Height(growth percentile) Weight(growth percentile) 113/84 83 98.3 °F (36.8 °C) (Oral) 20 5' 7\" (1.702 m) 191 lb (86.6 kg) SpO2 BMI OB Status Smoking Status 97% 29.91 kg/m2 Hysterectomy Former Smoker Vitals History BMI and BSA Data Body Mass Index Body Surface Area  
 29.91 kg/m 2 2.02 m 2 Preferred Pharmacy Pharmacy Name Phone [id number] [address] [facility] [phone] Your Updated Medication List  
  
   
This list is accurate as of 4/27/18  3:39 PM.  Always use your most recent med list.  
  
  
  
  
 albuterol 90 mcg/actuation inhaler Commonly known as:  VENTOLIN HFA Take 2 Puffs by inhalation daily. esomeprazole 20 mg capsule Commonly known as:  NexIUM Take 1 Cap by mouth daily. fluticasone 50 mcg/actuation nasal spray Commonly known as:  [place] 2 Sprays by Both Nostrils route daily. fluticasone-salmeterol 250-50 mcg/dose diskus inhaler Commonly known as:  ADVAIR Take 1 Puff by inhalation every twelve (12) hours. furosemide 40 mg tablet Commonly known as:  LASIX TAKE 1 TABLET BY MOUTH DAILY  
  
 hydroCHLOROthiazide 25 mg tablet Commonly known as:  HYDRODIURIL  
 Take 1 Tab by mouth daily. KLOR-CON M20 20 mEq tablet Generic drug:  potassium chloride TAKE 1 TAB BY MOUTH DAILY. losartan 100 mg tablet Commonly known as:  COZAAR  
TAKE 1 TAB BY MOUTH DAILY. melatonin Tab tablet Take 10 mg by mouth nightly. metoprolol succinate 100 mg tablet Commonly known as:  TOPROL-XL Take 1 Tab by mouth daily. multivitamin tablet Commonly known as:  ONE A DAY Take 1 Tab by mouth daily. simvastatin 20 mg tablet Commonly known as:  ZOCOR Take 1 Tab by mouth nightly. traMADol 50 mg tablet Commonly known as:  ULTRAM  
Take 1 Tab by mouth every eight (8) hours as needed. Max Daily Amount: 150 mg. VITAMIN C 500 mg tablet Generic drug:  ascorbic acid (vitamin C) Take 500 mg by mouth daily. warfarin 5 mg tablet Commonly known as:  COUMADIN  
TAKE 5MG DAILY EXCEPT 2.5MG ON TUES, THUR, AND SAT. OR AS DIRECTED BY OUR OFFICE Prescriptions Sent to Pharmacy Refills  
 metoprolol succinate (TOPROL-XL) 100 mg tablet 6 Sig: Take 1 Tab by mouth daily. Class: Normal  
 Pharmacy: [facility]/pharmacy [address] [place] [address],[address] [facility] [place] [address] Ph #: [phone] Route: Oral  
 hydroCHLOROthiazide (HYDRODIURIL) 25 mg tablet 6 Sig: Take 1 Tab by mouth daily. Class: Normal  
 Pharmacy: [facility]/pharmacy [address] [place] [address],[address] [place] [address] Ph #: [phone] Route: Oral  
  
We Performed the Following ADVANCE CARE PLANNING FIRST 30 MINS [27219 CPT(R)] [facility] [[id number] \[facility]\""] [place] ANNUAL ALCOHOL SCREEN 15 MIN [id number] \[facility]\""] Follow-up Instructions Return in about 1 year (around 4/27/2019), or if symptoms worsen or fail to improve. To-Do List   
 04/27/2018 Lab:  HEPATITIS C AB   
  
 04/30/2018 Imaging:  WILLOW MAMMO BI SCREENING INCL CAD Patient Instructions Body Mass Index: Care Instructions Your Care Instructions Body mass index (BMI) can help you see if your weight is raising your risk for health problems. It uses a formula to compare how much you weigh with how tall you are. · A BMI lower than 18.5 is considered underweight. · A BMI between 18.5 and 24.9 is considered healthy. · A BMI between 25 and 29.9 is considered overweight. A BMI of 30 or higher is considered obese. If your BMI is in the normal range, it means that you have a lower risk for weight-related health problems. If your BMI is in the overweight or obese range, you may be at increased risk for weight-related health problems, such as high blood pressure, heart disease, stroke, arthritis or joint pain, and diabetes. If your BMI is in the underweight range, you may be at increased risk for health problems such as fatigue, lower protection (immunity) against illness, muscle loss, bone loss, hair loss, and hormone problems. BMI is just one measure of your risk for weight-related health problems. You may be at higher risk for health problems if you are not active, you eat an unhealthy diet, or you drink too much alcohol or use tobacco products. Follow-up care is a key part of your treatment and safety. Be sure to make and go to all appointments, and call your doctor if you are having problems. It's also a good idea to know your test results and keep a list of the medicines you take. How can you care for yourself at home? · Practice healthy eating habits. This includes eating plenty of fruits, vegetables, whole grains, lean protein, and low-fat dairy. · If your doctor recommends it, get more exercise. Walking is a good choice. Bit by bit, increase the amount you walk every day. Try for at least 30 minutes on most days of the week. · Do not smoke. Smoking can increase your risk for health problems. If you need help quitting, talk to your doctor about stop-smoking programs and medicines. These can increase your chances of quitting for good. · Limit alcohol to 2 drinks a day for men and 1 drink a day for women. Too much alcohol can cause health problems. If you have a BMI higher than 25 · Your doctor may do other tests to check your risk for weight-related health problems. This may include measuring the distance around your waist. A waist measurement of more than 40 inches in men or 35 inches in women can increase the risk of weight-related health problems. · Talk with your doctor about steps you can take to stay healthy or improve your health. You may need to make lifestyle changes to lose weight and stay healthy, such as changing your diet and getting regular exercise. If you have a BMI lower than 18.5 · Your doctor may do other tests to check your risk for health problems. · Talk with your doctor about steps you can take to stay healthy or improve your health. You may need to make lifestyle changes to gain or maintain weight and stay healthy, such as getting more healthy foods in your diet and doing exercises to build muscle. Where can you learn more? Go to http://greyson-andrea.info/. Enter S176 in the search box to learn more about \"Body Mass Index: Care Instructions. \" Current as of: October 13, 2016 Content Version: 11.4 © 7341-5769 [employer]. Care instructions adapted under license by [employer] (which disclaims liability or warranty for this information). If you have questions about a medical condition or this instruction, always ask your healthcare professional. [place] [address] any warranty or liability for your use of this information. Medicare Wellness Visit, Female The best way to live healthy is to have a healthy lifestyle by eating a well-balanced diet, exercising regularly, limiting alcohol and stopping smoking. Regular physical exams and screening tests are another way to keep healthy.  Preventive exams provided by your health care provider can find health problems before they become diseases or illnesses. Preventive services including immunizations, screening tests, monitoring and exams can help you take care of your own health. All people over age 72 should have a pneumovax  and and a prevnar shot to prevent pneumonia. These are once in a lifetime unless you and your provider decide differently. All people over 65 should have a yearly flu shot and a tetanus vaccine every 10 years. A bone mass density to screen for osteoporosis or thinning of the bones should be done every 2 years after 65. Screening for diabetes mellitus with a blood sugar test should be done every year. Glaucoma is a disease of the eye due to increased ocular pressure that can lead to blindness and it should be done every year by an eye professional. 
 
Cardiovascular screening tests that check for elevated lipids (fatty part of blood) which can lead to heart disease and strokes should be done every 5 years. Colorectal screening that evaluates for blood or polyps in your colon should be done yearly as a stool test or every five years as a flexible sigmoidoscope or every 10 years as a colonoscopy up to age 76. Breast cancer screening with a mammogram is recommended biennially  for women age 54-69. Screening for cervical cancer with a pap smear and pelvic exam is recommended for women after age 72 years every 2 years up to age 79 or when the provider and patient decide to stop. If there is a history of cervical abnormalities or other increased risk for cancer then the test is recommended yearly. Hepatitis C screening is also recommended for anyone born between 80 through [address]. A shingles vaccine is also recommended once in a lifetime after age 61. Your Medicare Wellness Exam is recommended annually. Here is a list of your current Health Maintenance items with a due date: 
Health Maintenance Due Topic Date Due  
 Hepatitis C Test  1960  Pneumococcal Vaccine (1 of 1 - PPSV23) 07/04/1977  
 DTaP/Tdap/Td  (1 - Tdap) 07/04/1979 Bryson Hood Annual Well Visit  03/14/2018 Plantar Fasciitis: Exercises Your Care Instructions Here are some examples of typical rehabilitation exercises for your condition. Start each exercise slowly. Ease off the exercise if you start to have pain. Your doctor or physical therapist will tell you when you can start these exercises and which ones will work best for you. How to do the exercises Towel stretch 1. Sit with your legs extended and knees straight. 2. Place a towel around your foot just under the toes. 3. Hold each end of the towel in each hand, with your hands above your knees. 4. Pull back with the towel so that your foot stretches toward you. 5. Hold the position for at least 15 to 30 seconds. 6. Repeat 2 to 4 times a session, up to 5 sessions a day. Calf stretch This exercise stretches the muscles at the back of the lower leg (the calf) and the Achilles tendon. Do this exercise 3 or 4 times a day, 5 days a week. 1. Stand facing a wall with your hands on the wall at about eye level. Put the leg you want to stretch about a step behind your other leg. 2. Keeping your back heel on the floor, bend your front knee until you feel a stretch in the back leg. 3. Hold the stretch for 15 to 30 seconds. Repeat 2 to 4 times. Plantar fascia and calf stretch Stretching the plantar fascia and calf muscles can increase flexibility and decrease heel pain. You can do this exercise several times each day and before and after activity. 1. Stand on a step as shown above. Be sure to hold on to the banister. 2. Slowly let your heels down over the edge of the step as you relax your calf muscles. You should feel a gentle stretch across the bottom of your foot and up the back of your leg to your knee.  
3. Hold the stretch about 15 to 30 seconds, and then tighten your calf muscle a little to bring your heel back up to the level of the step. Repeat 2 to 4 times. Towel curls Make this exercise more challenging by placing a weighted object, such as a soup can, on the other end of the towel. 1. While sitting, place your foot on a towel on the floor and scrunch the towel toward you with your toes. 2. Then, also using your toes, push the towel away from you. [place] pickups 1. Put marbles on the floor next to a cup. 
2. Using your toes, try to lift the marbles up from the floor and put them in the cup. Follow-up care is a key part of your treatment and safety. Be sure to make and go to all appointments, and call your doctor if you are having problems. It's also a good idea to know your test results and keep a list of the medicines you take. Where can you learn more? Go to http://greyson-andrea.info/. Lamont Doyle in the search box to learn more about \"Plantar Fasciitis: Exercises. \" Current as of: March 21, 2017 Content Version: 11.4 © 3129-8115 [employer]. Care instructions adapted under license by [employer] (which disclaims liability or warranty for this information). If you have questions about a medical condition or this instruction, always ask your healthcare professional. [address] any warranty or liability for your use of this information. Introducing hospitals & HEALTH SERVICES! [employer] introduces [employer] patient portal. Now you can access parts of your medical record, email your doctor's office, and request medication refills online. 1. In your internet browser, go to [email or web address] 2. Click on the First Time User? Click Here link in the Sign In box. You will see the New Member Sign Up page. 3. Enter your [employer] Access Code exactly as it appears below. You will not need to use this code after youve completed the sign-up process.  If you do not sign up before the expiration date, you must request a new code. · [employer] Access Code: [email or web address] Expires: 7/26/2018 10:47 AM 
 
4. Enter the last four digits of your Social Security Number (xxxx) and Date of Birth (mm/dd/yyyy) as indicated and click Submit. You will be taken to the next sign-up page. 5. Create a [employer] ID. This will be your [employer] login ID and cannot be changed, so think of one that is secure and easy to remember. 6. Create a [employer] password. You can change your password at any time. 7. Enter your Password Reset Question and Answer. This can be used at a later time if you forget your password. 8. Enter your e-mail address. You will receive e-mail notification when new information is available in [address] 9. Click Sign Up. You can now view and download portions of your medical record. 10. Click the Download Summary menu link to download a portable copy of your medical information. If you have questions, please visit the Frequently Asked Questions section of the [employer] website. Remember, [employer] is NOT to be used for urgent needs. For medical emergencies, dial 911. Now available from your iPhone and Android! Please provide this summary of care documentation to your next provider. Your primary care clinician is listed as Demond Javed. If you have any questions after today's visit, please call [phone].

## 2018-04-27 NOTE — PROGRESS NOTES
This is the Subsequent Medicare Annual Wellness Exam, performed 12 months or more after the Initial AWV or the last Subsequent AWV    I have reviewed the patient's medical history in detail and updated the computerized patient record. History     Past Medical History:   Diagnosis Date    Aortic valve disorders 1/13/2011    Atrial fibrillation ([address] 1/13/2011    Congestive heart failure, unspecified March 2005    Cleared quickly with Lasix therapy    Echocardiogram 01/10/2012    A-fib. EF 45%. Mild RVE. RVSP 45-50. Massive LAE. Mod TAMI. Mod MS. Severe MR (mean grad 10). Mild-mod AI. Mild TR. Pulmonic systolic flow reversal.  Mild IVCE.  Hypertension     Iron deficiency anemias     Long term (current) use of anticoagulants 2/23/2011    Murmur 1/2011    Grade I-II/VI systolic ejection murmur along left sternal border, with radiation to the pulmonic area.  S/P cardiac cath 02/24/2012    Minimal coronary artery disease. Mild LVE. EF 55%. Mod MR. Mod-severe MS. RA 15.  RV 55/16. PA 58/34. W 36.  CO/CI 3.5/1.9 (TD); 3.61/1.9 ([place]). R to L shunt suggested.  Wears glasses     Weight gain       Past Surgical History:   Procedure Laterality Date    HX HEART CATHETERIZATION  2/24/2012    HX HYSTERECTOMY  2006    HX MITRAL VALVE REPLACEMENT  05/22/2013    25/33 mm On-X mechanical valve with bilateral pulmonary vein isolation and resection of LA appendage     Current Outpatient Prescriptions   Medication Sig Dispense Refill    furosemide (LASIX) 40 mg tablet TAKE 1 TABLET BY MOUTH DAILY 30 Tab 1    losartan (COZAAR) 100 mg tablet TAKE 1 TAB BY MOUTH DAILY. 30 Tab 1    KLOR-CON M20 20 mEq tablet TAKE 1 TAB BY MOUTH DAILY. 30 Tab 6    warfarin (COUMADIN) 5 mg tablet TAKE 5MG DAILY EXCEPT 2.5MG ON TUES, THUR, AND SAT. OR AS DIRECTED BY OUR OFFICE 30 Tab 6    simvastatin (ZOCOR) 20 mg tablet Take 1 Tab by mouth nightly.  30 Tab 6    metoprolol succinate (TOPROL-XL) 100 mg tablet Take 1 Tab by mouth daily. 30 Tab 6    hydroCHLOROthiazide (HYDRODIURIL) 25 mg tablet Take 1 Tab by mouth daily. 30 Tab 6    traMADol (ULTRAM) 50 mg tablet Take 1 Tab by mouth every eight (8) hours as needed. Max Daily Amount: 150 mg. 30 Tab 0    albuterol (VENTOLIN HFA) 90 mcg/actuation inhaler Take 2 Puffs by inhalation daily. 1 Inhaler 3    esomeprazole (NEXIUM) 20 mg capsule Take 1 Cap by mouth daily. 30 Cap 3    fluticasone (FLONASE) 50 mcg/actuation nasal spray 2 Sprays by Both Nostrils route daily. 1 Bottle 3    fluticasone-salmeterol (ADVAIR) 250-50 mcg/dose diskus inhaler Take 1 Puff by inhalation every twelve (12) hours. 1 Inhaler 3    melatonin tab tablet Take 10 mg by mouth nightly.  ascorbic acid (VITAMIN C) 500 mg tablet Take 500 mg by mouth daily.  multivitamin (ONE A DAY) tablet Take 1 Tab by mouth daily.          Allergies   Allergen Reactions    Morphine Rash and Itching    Norco [Hydrocodone-Acetaminophen] Itching     Family History   Problem Relation Age of Onset    Heart Surgery Father      Open-Heart Surgery    Other Father      Venous Thromboembolism    Heart Disease Mother      Enlarged Heart    Hypertension Mother     Diabetes Mother      Social History   Substance Use Topics    Smoking status: Former Smoker    Smokeless tobacco: Never Used    Alcohol use 0.6 oz/week     1 Glasses of wine per week      Comment: occassionally     Patient Active Problem List   Diagnosis Code    Rheumatic valvular disease I09.1    Chronic diastolic congestive heart failure (HCC) I50.32    Atrial fibrillation (HCC) I48.91    Long term current use of anticoagulant therapy Z79.01    Benign hypertensive heart disease with heart failure (HCC) I11.0    Microcytic anemia D50.9    Hyperlipidemia E78.5    Advanced directives, counseling/discussion Z71.89       Depression Risk Factor Screening:     PHQ over the last two weeks 4/27/2018   Little interest or pleasure in doing things Not at all Feeling down, depressed or hopeless Not at all   Total Score PHQ 2 0     Alcohol Risk Factor Screening: On any occasion in the past three months you have had more than 3 drinks containing alcohol. Functional Ability and Level of Safety:   Hearing Loss  Hearing is good. Activities of Daily Living  The home contains: no safety equipment. Patient does total self care    Fall Risk  No flowsheet data found. Abuse Screen  Patient is not abused    Cognitive Screening   Evaluation of Cognitive Function:  Has your family/caregiver stated any concerns about your memory: no  Normal    Patient Care Team   Patient Care Team:  Josh Hurst MD as PCP - General (Family Practice)  Fernandez Burr DO as Physician (Cardiology)  Fernandez Burr DO (Cardiology)  Naseem Mehta RN as Ambulatory Care Navigator    Assessment/Plan   Education and counseling provided:  Are appropriate based on today's review and evaluation  Pneumococcal Vaccine  Screening Mammography    Diagnoses and all orders for this visit:    1. Medicare annual wellness visit, subsequent    2. Benign hypertensive heart disease with heart failure (HCC)  -     hydroCHLOROthiazide (HYDRODIURIL) 25 mg tablet; Take 1 Tab by mouth daily. 3. Mixed hyperlipidemia  -     hydroCHLOROthiazide (HYDRODIURIL) 25 mg tablet; Take 1 Tab by mouth daily. 4. Advanced directives, counseling/discussion  -     ADVANCE CARE PLANNING FIRST 30 MINS    5. Screening for alcoholism  -     Annual  Alcohol Screen 15 min ()    6. Screening for depression  -     Depression Screen Annual    7. Need for hepatitis C screening test  -     HEPATITIS C AB; Future    8. Encounter for screening mammogram for malignant neoplasm of breast  -     Bilateral Digital Screening Mammography; Future    Other orders  -     metoprolol succinate (TOPROL-XL) 100 mg tablet; Take 1 Tab by mouth daily.    - Discussed the patient's BMI with her.   The BMI follow up plan is as follows:   dietary management education, guidance, and counseling  encourage exercise  monitor weight  prescribed dietary intake  An After Visit Summary was printed and given to the patient. Health Maintenance Due   Topic Date Due    Hepatitis C Screening  1960    Pneumococcal 19-64 Medium Risk (1 of 1 - PPSV23) 07/04/1977    DTaP/Tdap/Td series (1 - Tdap) 07/04/1979    MEDICARE YEARLY EXAM  03/14/2018       Plan was reviewed with patient, education material was explained and given. Patient verbalized understanding. Follow-up Disposition:  Return in about 1 year (around 4/27/2019), or if symptoms worsen or fail to improve.   YOSSI Meek  4/27/2018  3:34 PM

## 2018-04-27 NOTE — PATIENT INSTRUCTIONS
Body Mass Index: Care Instructions  Your Care Instructions    Body mass index (BMI) can help you see if your weight is raising your risk for health problems. It uses a formula to compare how much you weigh with how tall you are. · A BMI lower than 18.5 is considered underweight. · A BMI between 18.5 and 24.9 is considered healthy. · A BMI between 25 and 29.9 is considered overweight. A BMI of 30 or higher is considered obese. If your BMI is in the normal range, it means that you have a lower risk for weight-related health problems. If your BMI is in the overweight or obese range, you may be at increased risk for weight-related health problems, such as high blood pressure, heart disease, stroke, arthritis or joint pain, and diabetes. If your BMI is in the underweight range, you may be at increased risk for health problems such as fatigue, lower protection (immunity) against illness, muscle loss, bone loss, hair loss, and hormone problems. BMI is just one measure of your risk for weight-related health problems. You may be at higher risk for health problems if you are not active, you eat an unhealthy diet, or you drink too much alcohol or use tobacco products. Follow-up care is a key part of your treatment and safety. Be sure to make and go to all appointments, and call your doctor if you are having problems. It's also a good idea to know your test results and keep a list of the medicines you take. How can you care for yourself at home? · Practice healthy eating habits. This includes eating plenty of fruits, vegetables, whole grains, lean protein, and low-fat dairy. · If your doctor recommends it, get more exercise. Walking is a good choice. Bit by bit, increase the amount you walk every day. Try for at least 30 minutes on most days of the week. · Do not smoke. Smoking can increase your risk for health problems. If you need help quitting, talk to your doctor about stop-smoking programs and medicines. These can increase your chances of quitting for good. · Limit alcohol to 2 drinks a day for men and 1 drink a day for women. Too much alcohol can cause health problems. If you have a BMI higher than 25  · Your doctor may do other tests to check your risk for weight-related health problems. This may include measuring the distance around your waist. A waist measurement of more than 40 inches in men or 35 inches in women can increase the risk of weight-related health problems. · Talk with your doctor about steps you can take to stay healthy or improve your health. You may need to make lifestyle changes to lose weight and stay healthy, such as changing your diet and getting regular exercise. If you have a BMI lower than 18.5  · Your doctor may do other tests to check your risk for health problems. · Talk with your doctor about steps you can take to stay healthy or improve your health. You may need to make lifestyle changes to gain or maintain weight and stay healthy, such as getting more healthy foods in your diet and doing exercises to build muscle. Where can you learn more? Go to http://greyson-andrea.info/. Enter S176 in the search box to learn more about \"Body Mass Index: Care Instructions. \"  Current as of: October 13, 2016  Content Version: 11.4  © 9885-8818 Healthwise, Incorporated. Care instructions adapted under license by "[employer]" (which disclaims liability or warranty for this information). If you have questions about a medical condition or this instruction, always ask your healthcare professional. [place] [address] any warranty or liability for your use of this information. Medicare Wellness Visit, Female    The best way to live healthy is to have a healthy lifestyle by eating a well-balanced diet, exercising regularly, limiting alcohol and stopping smoking. Regular physical exams and screening tests are another way to keep healthy.  Preventive exams provided by your health care provider can find health problems before they become diseases or illnesses. Preventive services including immunizations, screening tests, monitoring and exams can help you take care of your own health. All people over age 72 should have a pneumovax  and and a prevnar shot to prevent pneumonia. These are once in a lifetime unless you and your provider decide differently. All people over 65 should have a yearly flu shot and a tetanus vaccine every 10 years. A bone mass density to screen for osteoporosis or thinning of the bones should be done every 2 years after 65. Screening for diabetes mellitus with a blood sugar test should be done every year. Glaucoma is a disease of the eye due to increased ocular pressure that can lead to blindness and it should be done every year by an eye professional.    Cardiovascular screening tests that check for elevated lipids (fatty part of blood) which can lead to heart disease and strokes should be done every 5 years. Colorectal screening that evaluates for blood or polyps in your colon should be done yearly as a stool test or every five years as a flexible sigmoidoscope or every 10 years as a colonoscopy up to age 76. Breast cancer screening with a mammogram is recommended biennially  for women age 54-69. Screening for cervical cancer with a pap smear and pelvic exam is recommended for women after age 72 years every 2 years up to age 79 or when the provider and patient decide to stop. If there is a history of cervical abnormalities or other increased risk for cancer then the test is recommended yearly. Hepatitis C screening is also recommended for anyone born between [birth year] through [address]. A shingles vaccine is also recommended once in a lifetime after age 61. Your Medicare Wellness Exam is recommended annually.     Here is a list of your current Health Maintenance items with a due date:  Health Maintenance Due   Topic Date Due    Hepatitis C Test  1960    Pneumococcal Vaccine (1 of 1 - PPSV23) 07/04/1977    DTaP/Tdap/Td  (1 - Tdap) 07/04/1979    Annual Well Visit  03/14/2018          Plantar Fasciitis: Exercises  Your Care Instructions  Here are some examples of typical rehabilitation exercises for your condition. Start each exercise slowly. Ease off the exercise if you start to have pain. Your doctor or physical therapist will tell you when you can start these exercises and which ones will work best for you. How to do the exercises  Towel stretch    1. Sit with your legs extended and knees straight. 2. Place a towel around your foot just under the toes. 3. Hold each end of the towel in each hand, with your hands above your knees. 4. Pull back with the towel so that your foot stretches toward you. 5. Hold the position for at least 15 to 30 seconds. 6. Repeat 2 to 4 times a session, up to 5 sessions a day. Calf stretch    This exercise stretches the muscles at the back of the lower leg (the calf) and the Achilles tendon. Do this exercise 3 or 4 times a day, 5 days a week. 1. Stand facing a wall with your hands on the wall at about eye level. Put the leg you want to stretch about a step behind your other leg. 2. Keeping your back heel on the floor, bend your front knee until you feel a stretch in the back leg. 3. Hold the stretch for 15 to 30 seconds. Repeat 2 to 4 times. Plantar fascia and calf stretch    Stretching the plantar fascia and calf muscles can increase flexibility and decrease heel pain. You can do this exercise several times each day and before and after activity. 1. Stand on a step as shown above. Be sure to hold on to the banister. 2. Slowly let your heels down over the edge of the step as you relax your calf muscles. You should feel a gentle stretch across the bottom of your foot and up the back of your leg to your knee.   3. Hold the stretch about 15 to 30 seconds, and then tighten your calf muscle a little to bring your heel back up to the level of the step. Repeat 2 to 4 times. Towel curls    Make this exercise more challenging by placing a weighted object, such as a soup can, on the other end of the towel. 1. While sitting, place your foot on a towel on the floor and scrunch the towel toward you with your toes. 2. Then, also using your toes, push the towel away from you. [place] pickups    1. Put marbles on the floor next to a cup.  2. Using your toes, try to lift the marbles up from the floor and put them in the cup. Follow-up care is a key part of your treatment and safety. Be sure to make and go to all appointments, and call your doctor if you are having problems. It's also a good idea to know your test results and keep a list of the medicines you take. Where can you learn more? Go to http://greyson-andrea.info/. Deion Mathews in the search box to learn more about \"Plantar Fasciitis: Exercises. \"  Current as of: March 21, 2017  Content Version: 11.4  © 0151-5404 Healthwise, [employer]. Care instructions adapted under license by [employer] (which disclaims liability or warranty for this information). If you have questions about a medical condition or this instruction, always ask your healthcare professional. [address] any warranty or liability for your use of this information.

## 2018-05-18 ENCOUNTER — HOSPITAL ENCOUNTER (OUTPATIENT)
Dept: MAMMOGRAPHY | Age: 58
Discharge: HOME | End: 2018-05-18
Attending: FAMILY MEDICINE
Payer: MEDICARE

## 2018-05-18 DIAGNOSIS — Z12.39 BREAST CANCER SCREENING: ICD-10-CM

## 2018-05-18 PROCEDURE — 77063 BREAST TOMOSYNTHESIS BI: CPT

## 2018-06-15 ENCOUNTER — ANTI-COAG VISIT (OUTPATIENT)
Dept: CARDIOLOGY CLINIC | Age: 58
End: 2018-06-15

## 2018-06-15 DIAGNOSIS — Z79.01 LONG TERM CURRENT USE OF ANTICOAGULANT THERAPY: Primary | ICD-10-CM

## 2018-06-15 DIAGNOSIS — I48.91 ATRIAL FIBRILLATION, UNSPECIFIED TYPE (HCC): ICD-10-CM

## 2018-06-15 LAB
INR BLD: 1.8
PT POC: 21.8 SECONDS
VALID INTERNAL CONTROL?: YES

## 2018-06-15 NOTE — PROGRESS NOTES
Verbal order and read back per Dr Nohemi Mckinley  Patient is not with therapeutic range  Take 7.5 mg today then Continue Coumadin 5mg daily except 2.5mg on Tues, Thur, and Sat  Recheck in 3 weeks  Will submit info for home monitor  This has been fully explained to the patient, who indicates understanding.

## 2018-08-02 DIAGNOSIS — J45.40 MODERATE PERSISTENT ASTHMA WITHOUT COMPLICATION: ICD-10-CM

## 2018-08-02 NOTE — TELEPHONE ENCOUNTER
Requested Prescriptions     Pending Prescriptions Disp Refills    fluticasone (FLONASE) 50 mcg/actuation nasal spray 1 Bottle 3     Si Sprays by Both Nostrils route daily.

## 2018-08-03 ENCOUNTER — HOSPITAL ENCOUNTER (EMERGENCY)
Age: 58
Discharge: HOME | End: 2018-08-03
Attending: EMERGENCY MEDICINE
Payer: MEDICARE

## 2018-08-03 VITALS
RESPIRATION RATE: 18 BRPM | OXYGEN SATURATION: 97 % | WEIGHT: 190 LBS | BODY MASS INDEX: 29.82 KG/M2 | TEMPERATURE: 98.7 F | HEIGHT: 67 IN | HEART RATE: 93 BPM | SYSTOLIC BLOOD PRESSURE: 119 MMHG | DIASTOLIC BLOOD PRESSURE: 88 MMHG

## 2018-08-03 DIAGNOSIS — J01.01 ACUTE RECURRENT MAXILLARY SINUSITIS: Primary | ICD-10-CM

## 2018-08-03 PROCEDURE — 99282 EMERGENCY DEPT VISIT SF MDM: CPT

## 2018-08-03 RX ORDER — AMOXICILLIN 875 MG/1
875 TABLET, FILM COATED ORAL 2 TIMES DAILY
Qty: 14 TAB | Refills: 0 | Status: SHIPPED | OUTPATIENT
Start: 2018-08-03 | End: 2018-08-10

## 2018-08-03 NOTE — ED PROVIDER NOTES
EMERGENCY DEPARTMENT HISTORY AND PHYSICAL EXAM 
 
2:21 PM 
 
 
Date: 8/3/2018 Patient Name: Asher Boudreaux History of Presenting Illness Chief Complaint Patient presents with  Sinus Pain  Sneezing History Provided By: Patient Chief Complaint: sinus pain Duration: 1 Weeks Timing:  Gradual 
Location: facial  
Quality: Aching and Pressure Severity: 6 out of 10 Modifying Factors: nasal spray, inhaler, sudafed Associated Symptoms:  headache, sneezing, rhinorrhea, post nasal drip, dry cough Additional History (Context): Asher Boudreaux is a 59 y.o. female with hypertension and CHF and Afib  who presents with sinus pain and pressure x 1 week. This is recurrent. She was seen by PCP early in the week and tried flonase and allergy medicine without improvement. She has pain and headaches due to the sinus pressure. She denies fevers. She has postnasal drip and sore throat which is only present in the morning. Dry cough in the morning also. PCP: Saul Keyes MD 
 
Current Outpatient Prescriptions Medication Sig Dispense Refill  amoxicillin (AMOXIL) 875 mg tablet Take 1 Tab by mouth two (2) times a day for 7 days. 14 Tab 0  
 furosemide (LASIX) 40 mg tablet TAKE 1 TABLET BY MOUTH DAILY 30 Tab 1  
 losartan (COZAAR) 100 mg tablet TAKE 1 TAB BY MOUTH DAILY. 30 Tab 1  
 metoprolol succinate (TOPROL-XL) 100 mg tablet Take 1 Tab by mouth daily. 30 Tab 6  
 hydroCHLOROthiazide (HYDRODIURIL) 25 mg tablet Take 1 Tab by mouth daily. 30 Tab 6  
 KLOR-CON M20 20 mEq tablet TAKE 1 TAB BY MOUTH DAILY. 30 Tab 6  warfarin (COUMADIN) 5 mg tablet TAKE 5MG DAILY EXCEPT 2.5MG ON TUES, THUR, AND SAT. OR AS DIRECTED BY OUR OFFICE 30 Tab 6  
 simvastatin (ZOCOR) 20 mg tablet Take 1 Tab by mouth nightly. 30 Tab 6  
 traMADol (ULTRAM) 50 mg tablet Take 1 Tab by mouth every eight (8) hours as needed.  Max Daily Amount: 150 mg. 30 Tab 0  
 albuterol (VENTOLIN HFA) 90 mcg/actuation inhaler Take 2 Puffs by inhalation daily. 1 Inhaler 3  
 esomeprazole (NEXIUM) 20 mg capsule Take 1 Cap by mouth daily. 30 Cap 3  
 fluticasone (FLONASE) 50 mcg/actuation nasal spray 2 Sprays by Both Nostrils route daily. 1 Bottle 3  
 fluticasone-salmeterol (ADVAIR) 250-50 mcg/dose diskus inhaler Take 1 Puff by inhalation every twelve (12) hours. 1 Inhaler 3  
 melatonin tab tablet Take 10 mg by mouth nightly.  ascorbic acid (VITAMIN C) 500 mg tablet Take 500 mg by mouth daily.  multivitamin (ONE A DAY) tablet Take 1 Tab by mouth daily. Past History Past Medical History: 
Past Medical History:  
Diagnosis Date  Aortic valve disorders 1/13/2011  Atrial fibrillation ([facility] [address] 1/13/2011  Congestive heart failure, unspecified March 2005 Cleared quickly with Lasix therapy  Echocardiogram 01/10/2012 A-fib. EF 45%. Mild RVE. RVSP 45-50. Massive LAE. Mod TAMI. Mod MS. Severe MR (mean grad 10). Mild-mod AI. Mild TR. Pulmonic systolic flow reversal.  Mild IVCE.  Hypertension  Iron deficiency anemias  Long term (current) use of anticoagulants 2/23/2011  Murmur 1/2011 Grade I-II/VI systolic ejection murmur along left sternal border, with radiation to the pulmonic area.  S/P cardiac cath 02/24/2012 Minimal coronary artery disease. Mild LVE. EF 55%. Mod MR. Mod-severe MS. RA 15.  RV 55/16. PA 58/34. W 36.  CO/CI 3.5/1.9 (TD); 3.61/1.9 ([place]). R to L shunt suggested.  Wears glasses  Weight gain Past Surgical History: 
Past Surgical History:  
Procedure Laterality Date  HX HEART CATHETERIZATION  2/24/2012  HX HYSTERECTOMY  2006  HX MITRAL VALVE REPLACEMENT  05/22/2013  
 25/33 mm On-X mechanical valve with bilateral pulmonary vein isolation and resection of LA appendage Family History: 
Family History Problem Relation Age of Onset  Heart Surgery Father Open-Heart Surgery  Other Father   Venous Thromboembolism  Heart Disease Mother Enlarged Heart  Hypertension Mother  Diabetes Mother Social History: 
Social History Substance Use Topics  Smoking status: Former Smoker  Smokeless tobacco: Never Used  Alcohol use 0.6 oz/week 1 Glasses of wine per week Comment: occassionally Allergies: Allergies Allergen Reactions  Morphine Rash and Itching  Norco [Hydrocodone-Acetaminophen] Itching Review of Systems Review of Systems Constitutional: Negative for fever. HENT: Positive for congestion, postnasal drip, rhinorrhea, sinus pressure and sore throat. Negative for facial swelling. Eyes: Negative for visual disturbance. Respiratory: Positive for cough. Negative for shortness of breath. Cardiovascular: Negative for chest pain. Gastrointestinal: Negative for abdominal pain, diarrhea and vomiting. Genitourinary: Negative for dysuria. Musculoskeletal: Negative for neck pain. Skin: Negative for rash. Neurological: Positive for headaches. Negative for dizziness. Psychiatric/Behavioral: Negative for confusion. All other systems reviewed and are negative. Physical Exam  
 
Visit Vitals  /88 (BP 1 Location: Left arm, BP Patient Position: At rest)  Pulse 93  Temp 98.7 °F (37.1 °C)  Resp 18  Ht 5' 7\" (1.702 m)  Wt 86.2 kg (190 lb)  SpO2 97%  BMI 29.76 kg/m2 Physical Exam  
Constitutional: She is oriented to person, place, and time. She appears well-developed and well-nourished. No distress. HENT:  
Head: Normocephalic and atraumatic. Right Ear: Tympanic membrane, external ear and ear canal normal.  
Left Ear: Tympanic membrane, external ear and ear canal normal.  
Nose: Right sinus exhibits maxillary sinus tenderness. Right sinus exhibits no frontal sinus tenderness. Left sinus exhibits maxillary sinus tenderness. Left sinus exhibits no frontal sinus tenderness.   
Mouth/Throat: Uvula is midline, oropharynx is clear and moist and mucous membranes are normal. No oropharyngeal exudate, posterior oropharyngeal edema, posterior oropharyngeal erythema or tonsillar abscesses. Eyes: Conjunctivae are normal.  
Neck: Normal range of motion. Neck supple. Cardiovascular: Normal rate, regular rhythm and normal heart sounds. Pulmonary/Chest: Effort normal and breath sounds normal.  
Abdominal: She exhibits no distension. Musculoskeletal: Normal range of motion. Lymphadenopathy:  
  She has no cervical adenopathy. Neurological: She is alert and oriented to person, place, and time. Skin: Skin is warm and dry. She is not diaphoretic. Psychiatric: She has a normal mood and affect. Nursing note and vitals reviewed. Diagnostic Study Results Labs - No results found for this or any previous visit (from the past 12 hour(s)). Radiologic Studies - No orders to display Medical Decision Making I am the first provider for this patient. I reviewed the vital signs, available nursing notes, past medical history, past surgical history, family history and social history. Vital Signs-Reviewed the patient's vital signs. Records Reviewed: Nursing Notes (Time of Review: 2:21 PM) 
 
ED Course: Progress Notes, Reevaluation, and Consults: 
 
 
Provider Notes (Medical Decision Making): MDM Number of Diagnoses or Management Options Acute recurrent maxillary sinusitis:  
Diagnosis management comments: Sinus tenderness and pain x 1 week. No relief with conservative measures. Will prescribe abx due to duration of symptoms and tenderness. Afebrile, vitals normal.  Lungs clear. ENT exam otherwise normal.   
 
  
 
Diagnosis Clinical Impression: 1. Acute recurrent maxillary sinusitis Disposition:  
 
Follow-up Information Follow up With Details Comments Contact Info Hany Lindsey MD In 3 days If symptoms do not improve  [facility] EMERGENCY DEPT  Immediately if symptoms worsen [address] [place] [id number] [phone] Patient's Medications Start Taking AMOXICILLIN (AMOXIL) 875 MG TABLET    Take 1 Tab by mouth two (2) times a day for 7 days. Continue Taking ALBUTEROL (VENTOLIN HFA) 90 MCG/ACTUATION INHALER    Take 2 Puffs by inhalation daily. ASCORBIC ACID (VITAMIN C) 500 MG TABLET    Take 500 mg by mouth daily. ESOMEPRAZOLE (NEXIUM) 20 MG CAPSULE    Take 1 Cap by mouth daily. FLUTICASONE (FLONASE) 50 MCG/ACTUATION NASAL SPRAY    2 Sprays by Both Nostrils route daily. FLUTICASONE-SALMETEROL (ADVAIR) 250-50 MCG/DOSE DISKUS INHALER    Take 1 Puff by inhalation every twelve (12) hours. FUROSEMIDE (LASIX) 40 MG TABLET    TAKE 1 TABLET BY MOUTH DAILY HYDROCHLOROTHIAZIDE (HYDRODIURIL) 25 MG TABLET    Take 1 Tab by mouth daily. KLOR-CON M20 20 MEQ TABLET    TAKE 1 TAB BY MOUTH DAILY. LOSARTAN (COZAAR) 100 MG TABLET    TAKE 1 TAB BY MOUTH DAILY. MELATONIN TAB TABLET    Take 10 mg by mouth nightly. METOPROLOL SUCCINATE (TOPROL-XL) 100 MG TABLET    Take 1 Tab by mouth daily. MULTIVITAMIN (ONE A DAY) TABLET    Take 1 Tab by mouth daily. SIMVASTATIN (ZOCOR) 20 MG TABLET    Take 1 Tab by mouth nightly. TRAMADOL (ULTRAM) 50 MG TABLET    Take 1 Tab by mouth every eight (8) hours as needed. Max Daily Amount: 150 mg.  
 WARFARIN (COUMADIN) 5 MG TABLET    TAKE 5MG DAILY EXCEPT 2.5MG ON TUES, THUR, AND SAT. OR AS DIRECTED BY OUR OFFICE These Medications have changed No medications on file Stop Taking No medications on file  
 
_______________________________ Attestations: 
Scribe Attestation Joe Hi PA-C acting as a scribe for and in the presence of [employer], [employer] August 03, 2018 at 2:34 PM 
    
Provider Attestation:     
I personally performed the services described in the documentation, reviewed the documentation, as recorded by the scribe in my presence, and it accurately and completely records my words and actions. August 03, 2018 at 2:34 PM - Nat Pearson PA-C 
_______________________________

## 2018-08-03 NOTE — DISCHARGE INSTRUCTIONS
Sinusitis: Care Instructions  Your Care Instructions    Sinusitis is an infection of the lining of the sinus cavities in your head. Sinusitis often follows a cold. It causes pain and pressure in your head and face. In most cases, sinusitis gets better on its own in 1 to 2 weeks. But some mild symptoms may last for several weeks. Sometimes antibiotics are needed. Follow-up care is a key part of your treatment and safety. Be sure to make and go to all appointments, and call your doctor if you are having problems. It's also a good idea to know your test results and keep a list of the medicines you take. How can you care for yourself at home? · Take an over-the-counter pain medicine, such as acetaminophen (Tylenol), ibuprofen (Advil, Motrin), or naproxen (Aleve). Read and follow all instructions on the label. · If the doctor prescribed antibiotics, take them as directed. Do not stop taking them just because you feel better. You need to take the full course of antibiotics. · Be careful when taking over-the-counter cold or flu medicines and Tylenol at the same time. Many of these medicines have acetaminophen, which is Tylenol. Read the labels to make sure that you are not taking more than the recommended dose. Too much acetaminophen (Tylenol) can be harmful. · Breathe warm, moist air from a steamy shower, a hot bath, or a sink filled with hot water. Avoid cold, dry air. Using a humidifier in your home may help. Follow the directions for cleaning the machine. · Use saline (saltwater) nasal washes to help keep your nasal passages open and wash out mucus and bacteria. You can buy saline nose drops at a grocery store or drugstore. Or you can make your own at home by adding 1 teaspoon of salt and 1 teaspoon of baking soda to 2 cups of distilled water. If you make your own, fill a bulb syringe with the solution, insert the tip into your nostril, and squeeze gently. Premilyn Ply your nose.   · Put a hot, wet towel or a warm gel pack on your face 3 or 4 times a day for 5 to 10 minutes each time. · Try a decongestant nasal spray like oxymetazoline (Afrin). Do not use it for more than 3 days in a row. Using it for more than 3 days can make your congestion worse. When should you call for help? Call your doctor now or seek immediate medical care if:    · You have new or worse swelling or redness in your face or around your eyes.     · You have a new or higher fever.    Watch closely for changes in your health, and be sure to contact your doctor if:    · You have new or worse facial pain.     · The mucus from your nose becomes thicker (like pus) or has new blood in it.     · You are not getting better as expected. Where can you learn more? Go to http://greyson-andrea.info/. Enter C136 in the search box to learn more about \"Sinusitis: Care Instructions. \"  Current as of: May 12, 2017  Content Version: 11.7  © 1888-3367 [employer]. Care instructions adapted under license by [employer] (which disclaims liability or warranty for this information). If you have questions about a medical condition or this instruction, always ask your healthcare professional. [place] [address] any warranty or liability for your use of this information. Saline Nasal Washes: Care Instructions  Your Care Instructions  Saline nasal washes help keep the nasal passages open by washing out thick or dried mucus. This simple remedy can help relieve symptoms of allergies, sinusitis, and colds. It also can make the nose feel more comfortable by keeping the mucous membranes moist. You may notice a little burning sensation in your nose the first few times you use the solution, but this usually gets better in a few days. Follow-up care is a key part of your treatment and safety. Be sure to make and go to all appointments, and call your doctor if you are having problems.  It's also a good idea to know your test results and keep a list of the medicines you take. How can you care for yourself at home? · You can buy premixed saline solution in a squeeze bottle or other sinus rinse products at a drugstore. Read and follow the instructions on the label. · You also can make your own saline solution by adding 1 teaspoon of salt and 1 teaspoon of baking soda to 2 cups of distilled water. · If you use a homemade solution, pour a small amount into a clean bowl. Using a rubber bulb syringe, squeeze the syringe and place the tip in the salt water. Pull a small amount of the salt water into the syringe by relaxing your hand. · Sit down with your head tilted slightly back. Do not lie down. Put the tip of the bulb syringe or the squeeze bottle a little way into one of your nostrils. Gently drip or squirt a few drops into the nostril. Repeat with the other nostril. Some sneezing and gagging are normal at first.  · Gently blow your nose. · Wipe the syringe or bottle tip clean after each use. · Repeat this 2 or 3 times a day. · Use nasal washes gently if you have nosebleeds often. When should you call for help? Watch closely for changes in your health, and be sure to contact your doctor if:    · You often get nosebleeds.     · You have problems doing the nasal washes. Where can you learn more? Go to http://greyson-andrea.info/. Enter [id number] in the search box to learn more about \"Saline Nasal Washes: Care Instructions. \"  Current as of: May 12, 2017  Content Version: 11.7  © 0060-1856 [employer]. Care instructions adapted under license by [employer] (which disclaims liability or warranty for this information). If you have questions about a medical condition or this instruction, always ask your healthcare professional. [address] any warranty or liability for your use of this information.

## 2018-08-03 NOTE — ED NOTES
Current Discharge Medication List  
  
START taking these medications Details  
amoxicillin (AMOXIL) 875 mg tablet Take 1 Tab by mouth two (2) times a day for 7 days. Qty: 14 Tab, Refills: 0 CONTINUE these medications which have NOT CHANGED Details  
furosemide (LASIX) 40 mg tablet TAKE 1 TABLET BY MOUTH DAILY Qty: 30 Tab, Refills: 1 Associated Diagnoses: Benign hypertensive heart disease with heart failure (HCC)  
  
losartan (COZAAR) 100 mg tablet TAKE 1 TAB BY MOUTH DAILY. Qty: 30 Tab, Refills: 1 Associated Diagnoses: Hyperlipidemia, unspecified hyperlipidemia type; Essential hypertension  
  
metoprolol succinate (TOPROL-XL) 100 mg tablet Take 1 Tab by mouth daily. Qty: 30 Tab, Refills: 6 Associated Diagnoses: Benign hypertensive heart disease with heart failure (HCC)  
  
hydroCHLOROthiazide (HYDRODIURIL) 25 mg tablet Take 1 Tab by mouth daily. Qty: 30 Tab, Refills: 6 Associated Diagnoses: Benign hypertensive heart disease with heart failure ([facility] [address] KLOR-CON M20 20 mEq tablet TAKE 1 TAB BY MOUTH DAILY. Qty: 30 Tab, Refills: 6  
  
warfarin (COUMADIN) 5 mg tablet TAKE 5MG DAILY EXCEPT 2.5MG ON TUES, THUR, AND SAT. OR AS DIRECTED BY OUR OFFICE Qty: 30 Tab, Refills: 6  
  
simvastatin (ZOCOR) 20 mg tablet Take 1 Tab by mouth nightly. Qty: 30 Tab, Refills: 6 Associated Diagnoses: Benign hypertensive heart disease with heart failure ([address] Mixed hyperlipidemia  
  
traMADol (ULTRAM) 50 mg tablet Take 1 Tab by mouth every eight (8) hours as needed. Max Daily Amount: 150 mg. 
Qty: 30 Tab, Refills: 0 Associated Diagnoses: Right foot pain; Swelling of foot joint, right  
  
albuterol (VENTOLIN HFA) 90 mcg/actuation inhaler Take 2 Puffs by inhalation daily. Qty: 1 Inhaler, Refills: 3 Associated Diagnoses: Moderate persistent asthma without complication  
  
esomeprazole (NEXIUM) 20 mg capsule Take 1 Cap by mouth daily. Qty: 30 Cap, Refills: 3  Associated Diagnoses: Gastroesophageal reflux disease without esophagitis  
  
fluticasone (FLONASE) 50 mcg/actuation nasal spray 2 Sprays by Both Nostrils route daily. Qty: 1 Bottle, Refills: 3 Associated Diagnoses: Moderate persistent asthma without complication  
  
fluticasone-salmeterol (ADVAIR) 250-50 mcg/dose diskus inhaler Take 1 Puff by inhalation every twelve (12) hours. Qty: 1 Inhaler, Refills: 3 Associated Diagnoses: Moderate persistent asthma without complication  
  
melatonin tab tablet Take 10 mg by mouth nightly. Associated Diagnoses: Benign hypertensive heart disease with heart failure ([facility] [address] Mixed hyperlipidemia  
  
ascorbic acid (VITAMIN C) 500 mg tablet Take 500 mg by mouth daily. multivitamin (ONE A DAY) tablet Take 1 Tab by mouth daily. Prescriptions was sent to pharmacy patient is aware.

## 2018-08-06 ENCOUNTER — PATIENT OUTREACH (OUTPATIENT)
Dept: FAMILY MEDICINE CLINIC | Age: 58
End: 2018-08-06

## 2018-08-06 RX ORDER — FLUTICASONE PROPIONATE 50 MCG
2 SPRAY, SUSPENSION (ML) NASAL DAILY
Qty: 1 BOTTLE | Refills: 0 | Status: ON HOLD | OUTPATIENT
Start: 2018-08-06 | End: 2019-01-07

## 2018-08-09 ENCOUNTER — PATIENT OUTREACH (OUTPATIENT)
Dept: FAMILY MEDICINE CLINIC | Age: 58
End: 2018-08-09

## 2018-08-09 RX ORDER — LORATADINE AND PSEUDOEPHEDRINE 10; 240 MG/1; MG/1
1 TABLET, EXTENDED RELEASE ORAL DAILY
COMMUNITY
End: 2019-01-11

## 2018-08-10 ENCOUNTER — ANTI-COAG VISIT (OUTPATIENT)
Dept: CARDIOLOGY CLINIC | Age: 58
End: 2018-08-10

## 2018-08-10 ENCOUNTER — OFFICE VISIT (OUTPATIENT)
Dept: CARDIOLOGY CLINIC | Age: 58
End: 2018-08-10

## 2018-08-10 ENCOUNTER — PATIENT OUTREACH (OUTPATIENT)
Dept: FAMILY MEDICINE CLINIC | Age: 58
End: 2018-08-10

## 2018-08-10 VITALS
WEIGHT: 193 LBS | HEART RATE: 73 BPM | BODY MASS INDEX: 30.29 KG/M2 | DIASTOLIC BLOOD PRESSURE: 84 MMHG | SYSTOLIC BLOOD PRESSURE: 132 MMHG | HEIGHT: 67 IN | OXYGEN SATURATION: 97 %

## 2018-08-10 DIAGNOSIS — I48.91 ATRIAL FIBRILLATION, UNSPECIFIED TYPE (HCC): ICD-10-CM

## 2018-08-10 DIAGNOSIS — I09.1 RHEUMATIC VALVULAR DISEASE: ICD-10-CM

## 2018-08-10 DIAGNOSIS — Z79.01 LONG TERM CURRENT USE OF ANTICOAGULANT THERAPY: Primary | ICD-10-CM

## 2018-08-10 DIAGNOSIS — J45.40 MODERATE PERSISTENT ASTHMA WITHOUT COMPLICATION: ICD-10-CM

## 2018-08-10 DIAGNOSIS — I50.32 CHRONIC DIASTOLIC CONGESTIVE HEART FAILURE (HCC): Primary | ICD-10-CM

## 2018-08-10 DIAGNOSIS — Z79.01 LONG TERM CURRENT USE OF ANTICOAGULANT THERAPY: ICD-10-CM

## 2018-08-10 LAB
INR BLD: 2.8
PT POC: NORMAL SECONDS
VALID INTERNAL CONTROL?: YES

## 2018-08-10 RX ORDER — ALBUTEROL SULFATE 90 UG/1
2 AEROSOL, METERED RESPIRATORY (INHALATION) DAILY
Qty: 1 INHALER | Refills: 0 | Status: SHIPPED | OUTPATIENT
Start: 2018-08-10 | End: 2018-10-22 | Stop reason: SDUPTHER

## 2018-08-10 NOTE — PROGRESS NOTES
1. Have you been to the ER, urgent care clinic since your last visit? Hospitalized since your last visit?no    2. Have you seen or consulted any other health care providers outside of the [address] [place] [address] since your last visit? Include any pap smears or colon screening.  no

## 2018-08-10 NOTE — TELEPHONE ENCOUNTER
Requested Prescriptions     Pending Prescriptions Disp Refills    albuterol (VENTOLIN HFA) 90 mcg/actuation inhaler 1 Inhaler 3     Sig: Take 2 Puffs by inhalation daily.      Pt was advice to get new PCP in June 2018, she states she has a appt on 08/17/18 with new PCP

## 2018-08-10 NOTE — MR AVS SNAPSHOT
[address] [address] [place] [address] 
[phone] Patient: Larissa Berry MRN: [id number] ENS:[birth year] Visit Information Date & Time Provider Department Dept. Phone Encounter #  
 8/10/2018  8:40 AM Josselin Grace DO [facility] [address] [id number] Your Appointments 8/17/2018  1:30 PM  
New Patient with Guzman Zaragoza MD  
[facility] Appt Note: np est care please be sure to bring pic id insurance info and any list of meds [address] [facility] [address]  
[address] [facility] [address] [facility] [address] Upcoming Health Maintenance Date Due Hepatitis C Screening 1960 Pneumococcal 19-64 Medium Risk (1 of 1 - PPSV23) 7/4/1977 DTaP/Tdap/Td series (1 - Tdap) 7/4/1979 ZOSTER VACCINE AGE 60> 5/4/2018 FOBT Q 1 YEAR AGE 50-75 7/17/2018 Influenza Age 5 to Adult 8/1/2018 MEDICARE YEARLY EXAM 4/28/2019 PAP AKA CERVICAL CYTOLOGY 3/9/2020 BREAST CANCER SCRN MAMMOGRAM 5/18/2020 Allergies as of 8/10/2018  Review Complete On: 8/10/2018 By: Josselin Grace DO Severity Noted Reaction Type Reactions Morphine  02/02/2012    Rash, Itching Norco [Hydrocodone-acetaminophen]  07/12/2017    Itching Current Immunizations  Reviewed on 2/15/2013 Name Date Influenza Vaccine 12/15/2012 Influenza Vaccine (Quad) PF 9/18/2017, 9/20/2016 Not reviewed this visit You Were Diagnosed With   
  
 Codes Comments Chronic diastolic congestive heart failure (HCC)    -  Primary ICD-10-CM: I50.32 
ICD-9-CM: 428.32, 428.0 Atrial fibrillation, unspecified type ([facility] [address]     ICD-10-CM: I48.91 
ICD-9-CM: 427.31 Long term current use of anticoagulant therapy     ICD-10-CM: Z79.01 
ICD-9-CM: V58.61 Rheumatic valvular disease     ICD-10-CM: I09.1 ICD-9-CM: 397.9 Vitals BP Pulse Height(growth percentile) Weight(growth percentile) SpO2 BMI  
 132/84 73 5' 7\" (1.702 m) 193 lb (87.5 kg) 97% 30.23 kg/m2 OB Status Smoking Status Hysterectomy Former Smoker Vitals History BMI and BSA Data Body Mass Index Body Surface Area  
 30.23 kg/m 2 2.03 m 2 Preferred Pharmacy Pharmacy Name Phone [id number] [address] [facility] [phone] Your Updated Medication List  
  
   
This list is accurate as of 8/10/18  9:43 AM.  Always use your most recent med list.  
  
  
  
  
 albuterol 90 mcg/actuation inhaler Commonly known as:  VENTOLIN HFA Take 2 Puffs by inhalation daily. amoxicillin 875 mg tablet Commonly known as:  AMOXIL Take 1 Tab by mouth two (2) times a day for 7 days. CLARITIN-D 24 HOUR  mg per tablet Generic drug:  loratadine-pseudoephedrine Take 1 Tab by mouth daily. esomeprazole 20 mg capsule Commonly known as:  NexIUM Take 1 Cap by mouth daily. fluticasone 50 mcg/actuation nasal spray Commonly known as:  Jarret Merles 2 Sprays by Both Nostrils route daily. fluticasone-salmeterol 250-50 mcg/dose diskus inhaler Commonly known as:  ADVAIR Take 1 Puff by inhalation every twelve (12) hours. furosemide 40 mg tablet Commonly known as:  LASIX TAKE 1 TABLET BY MOUTH DAILY  
  
 hydroCHLOROthiazide 25 mg tablet Commonly known as:  HYDRODIURIL Take 1 Tab by mouth daily. KLOR-CON M20 20 mEq tablet Generic drug:  potassium chloride TAKE 1 TAB BY MOUTH DAILY. losartan 100 mg tablet Commonly known as:  COZAAR  
TAKE 1 TAB BY MOUTH DAILY. melatonin Tab tablet Take 10 mg by mouth nightly. metoprolol succinate 100 mg tablet Commonly known as:  TOPROL-XL Take 1 Tab by mouth daily. multivitamin tablet Commonly known as:  ONE A DAY Take 1 Tab by mouth daily. simvastatin 20 mg tablet Commonly known as:  ZOCOR Take 1 Tab by mouth nightly. traMADol 50 mg tablet Commonly known as:  ULTRAM  
Take 1 Tab by mouth every eight (8) hours as needed. Max Daily Amount: 150 mg. VITAMIN C 500 mg tablet Generic drug:  ascorbic acid (vitamin C) Take 500 mg by mouth daily. warfarin 5 mg tablet Commonly known as:  COUMADIN  
TAKE 5MG DAILY EXCEPT 2.5MG ON TUES, THUR, AND SAT. OR AS DIRECTED BY OUR OFFICE We Performed the Following AMB POC EKG ROUTINE W/ 12 LEADS, INTER & REP [31137 CPT(R)] To-Do List   
 08/10/2018 Echocardiography:  ECHO ADULT COMPLETE Introducing [facility]! [facility] [employer] introduces [employer] patient portal. Now you can access parts of your medical record, email your doctor's office, and request medication refills online. 1. In your internet browser, go to [email or web address] 2. Click on the First Time User? Click Here link in the Sign In box. You will see the New Member Sign Up page. 3. Enter your [employer] Access Code exactly as it appears below. You will not need to use this code after youve completed the sign-up process. If you do not sign up before the expiration date, you must request a new code. · [employer] Access Code: [email or web address] Expires: 11/1/2018  2:23 PM 
 
4. Enter the last four digits of your Social Security Number (xxxx) and Date of Birth (mm/dd/yyyy) as indicated and click Submit. You will be taken to the next sign-up page. 5. Create a mohchit ID. This will be your [employer] login ID and cannot be changed, so think of one that is secure and easy to remember. 6. Create a [employer] password. You can change your password at any time. 7. Enter your Password Reset Question and Answer. This can be used at a later time if you forget your password. 8. Enter your e-mail address. You will receive e-mail notification when new information is available in [address] 9. Click Sign Up. You can now view and download portions of your medical record. 10. Click the Download Summary menu link to download a portable copy of your medical information. If you have questions, please visit the Frequently Asked Questions section of the [employer] website. Remember, [employer] is NOT to be used for urgent needs. For medical emergencies, dial 911. Now available from your iPhone and Android! Please provide this summary of care documentation to your next provider. Your primary care clinician is listed as Elena Ang. If you have any questions after today's visit, please call [phone].

## 2018-08-10 NOTE — PROGRESS NOTES
HPI:  I saw Jareth Ferreira in my office today in cardiovascular evaluation regarding her mitral valve disease. Ms. Glenis Harman is a very pleasant 59year old  female whom I first saw back in March of 2005 at [facility] with congestive heart failure. Her heart failure cleared quickly at that time with Lasix therapy and subsequent echocardiogram demonstrated mild concentric left ventricular hypertrophy without ventricular dilatation, and a low normal overall left function with ejection fraction in the 55% range, as well as mild to moderate mitral stenosis and moderate mitral regurgitation, with some signs of rheumatic aortic valvular disease as well. She subsequently did reasonably well on Diovan and beta blockers, but she was lost to follow up for some time, and when I saw her again in 2012 she was having signs of worsening mitral stenosis and ultimately underwent a cardiac catheterization by my associate Dr. Jae Wallace on 2/24/12, which demonstrated what appeared to be moderate to severe mitral stenosis with moderate mitral regurgitation, mild aortic insufficiency and only very mild coronary artery disease.      She did have an episode of hypoxemia during that cardiac catheterization and had a cardiac MRI and was further worked up by Dr. Karyle Clear prior to her ultimately getting surgery, which actually was put off for another year because of some personal issues. I saw her last on 3/23/13 and she was reasonably well compensated at that time, and she ultimately was referred for surgery and that was completed by Dr. Karyle Clear on 5/22/13, and consisted of a mitral valve replacement with a 25/23 mm On-X mechanical valve together with bilateral pulmonary vein isolation and resection of the left atrial appendage. She comes in today and relates that she is doing reasonably well. She is staying fairly active and denies any cardiovascular symptomatology at this time.     Encounter Diagnoses   Name Primary? 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 It is been a couple of years since we have done an echocardiogram on her and I would like to get one completed if her co-pay is not too high. Her latest lipid profile that I have a copy of was actually done back in May 2017 and showed total cholesterol 166 with triglycerides of 124, HDL of 61, LDL of 80.2 and VLDL of 24.8 which I think is mildly elevated on Zocor 20 mg daily. Will check with her family physician and get a copy of her latest lipid profile. I should note that her INR's for the most part have been doing well and is been in the 2.5 range for the most part although it was 1.8 back on June 2018 and we just checked it again today and it was 2.8 well within the therapeutic range. Her blood pressure is well-controlled today and her EKG shows a controlled ventricular response and atrial fibrillation and since she is otherwise doing well I will simply plan to see her again in several months. PCP: Zhao Gutierrez MD       Past Medical History:   Diagnosis Date    Aortic valve disorders 1/13/2011    Atrial fibrillation ([address] 1/13/2011    Congestive heart failure, unspecified March 2005    Cleared quickly with Lasix therapy    Echocardiogram 01/10/2012    A-fib. EF 45%. Mild RVE. RVSP 45-50. Massive LAE. Mod TAMI. Mod MS. Severe MR (mean grad 10). Mild-mod AI. Mild TR. Pulmonic systolic flow reversal.  Mild IVCE.     Hypertension     Iron deficiency anemias     Long term (current) use of anticoagulants 2/23/2011    Murmur 1/2011    Grade I-II/VI systolic ejection murmur along left sternal border, with radiation to the pulmonic area.  S/P cardiac cath 02/24/2012    Minimal coronary artery disease. Mild LVE. EF 55%. Mod MR. Mod-severe MS. RA 15.  RV 55/16. PA 58/34. W 36.  CO/CI 3.5/1.9 (TD); 3.61/1.9 ([place]). R to L shunt suggested.  Wears glasses     Weight gain          Past Surgical History:   Procedure Laterality Date    HX HEART CATHETERIZATION  2/24/2012    HX HYSTERECTOMY  2006    HX MITRAL VALVE REPLACEMENT  05/22/2013    25/33 mm On-X mechanical valve with bilateral pulmonary vein isolation and resection of LA appendage         Current Outpatient Rx   Name Route Sig Dispense Refill    LORATADINE 10 MG TAB Oral Take 10 mg by mouth daily.  ASCORBIC ACID 500 MG TAB Oral Take 500 mg by mouth daily.  FLUTICASONE 50 MCG/ACTUATION NASAL SPRAY, SUSP Both Nostrils 2 Sprays by Both Nostrils route daily. 1 Bottle 3    FERROUS SULFATE 325 MG (65 MG ELEMENTAL IRON) TAB Oral Take 65 mg by mouth Daily (before breakfast).  POTASSIUM CHLORIDE SR 20 MEQ TAB, PARTICLES/CRYSTALS Oral Take 2 Tabs by mouth daily. 60 Tab 8    LOSARTAN 100 MG TAB Oral Take 1 Tab by mouth daily. 30 Tab 6    METOPROLOL 50 MG TAB Oral Take 1 Tab by mouth two (2) times a day. 60 Tab 8    FUROSEMIDE 40 MG TAB Oral Take 2 Tabs by mouth daily. 60 Tab 11    AMOXICILLIN 500 MG CAP  Take 4 capsules 1 hour prior to dental appointment 4 Cap 1    MULTIVITAMIN TAB Oral Take 1 Tab by mouth daily.  ASPIRIN 81 MG TAB Oral Take 81 mg by mouth daily.  WARFARIN 5 MG TAB Oral Take 1 Tab by mouth daily. 45 Tab 6       Allergies   Allergen Reactions    Morphine Rash and Itching    Norco [Hydrocodone-Acetaminophen] Itching       Social History:   reports that she has quit smoking.  She has never used smokeless tobacco. She reports that she drinks about 0.6 oz of alcohol per week  She reports that she does not use illicit drugs. Family History   Problem Relation Age of Onset    Heart Surgery Father      Open-Heart Surgery    Other Father      Venous Thromboembolism    Heart Disease Mother      Enlarged Heart    Hypertension Mother     Diabetes Mother      Review of Systems:    Constitutional: Negative for chills, fever, malaise/fatigue and weight loss. Respiratory: Negative for cough, hemoptysis, shortness of breath and wheezing. Cardiovascular: Negative for chest pain, palpitations, orthopnea and leg swelling. Gastrointestinal: Negative. Musculoskeletal: Negative for falls, joint pain and myalgias. Neurological: Negative for dizziness. Physical Exam:   Visit Vitals    /84    Pulse 73    Ht 5' 7\" (1.702 m)    Wt 87.5 kg (193 lb)    SpO2 97%    BMI 30.23 kg/m2       The patient is an alert, oriented, well developed, well nourished 59 y.o.  female who was in no acute distress at the time of my examination. HEENT: No icterus or xanthelasmas. Conjunctivae white, mucosa moist, no pallor or cyanosis. Neck: Supple without masses, tenderness or thyromegaly. No jugular venous distention. Carotid upstrokes are full bilaterally, without bruits. Cardiovascular: Chest is symmetrical with good excursion. There is a well healed, but widened mid sternotomy scar. [place] is not displaced. No lifts, heaves or thrills. Mechanical S1 with normal S2 without appreciable murmurs, rubs, clicks or gallops auscultated. Lungs: Clear to auscultation bilaterally. Abdomen: Soft. No masses, tenderness or organomegaly. No abdominal bruits. Extremities: No edema, with full peripheral pulses. No clubbing or cyanosis. Review of Data: Please refer to past medical history for most recent cardiac testing.   Lab Results   Component Value Date/Time    Cholesterol, total 166 05/18/2017 09:27 AM    HDL Cholesterol 61 (H) 05/18/2017 09:27 AM    LDL, calculated 80.2 05/18/2017 09:27 AM    Triglyceride 124 05/18/2017 09:27 AM    CHOL/HDL Ratio 2.7 05/18/2017 09:27 AM     Results for orders placed or performed in visit on 08/10/18   AMB POC EKG ROUTINE W/ 12 LEADS, INTER & REP     Status: None    Narrative    Course atrial fibrillation with controlled ventricular response rate in the mid 70's. There is some mild T-wave inversion in the high lateral leads consistent with ischemia or strain. This EKG is similar to the EKG of February 9, 2018. Eli Tomas D.O., F.A.C.C. [facility]  [facility] [place]  [facility]. [address]    PLEASE NOTE:  This document has been produced using voice recognition software. Unrecognized errors in transcription may be present.

## 2018-08-10 NOTE — PROGRESS NOTES
Patient was seen by Dr Vanessa Love today  Verbal order and read back per Jaydon Martinez,   Patient is within therapeutic range  Continue Coumadin 5mg daily except 2.5mg on Tues, Thur, and Sat    Patient was not given an INR follow up after office visit, called patient to schedule a 4 week INR follow up

## 2018-08-15 ENCOUNTER — HOSPITAL ENCOUNTER (EMERGENCY)
Age: 58
Discharge: HOME | End: 2018-08-15
Attending: EMERGENCY MEDICINE
Payer: MEDICARE

## 2018-08-15 VITALS
TEMPERATURE: 98.6 F | DIASTOLIC BLOOD PRESSURE: 92 MMHG | HEART RATE: 91 BPM | BODY MASS INDEX: 29.98 KG/M2 | OXYGEN SATURATION: 94 % | WEIGHT: 191 LBS | RESPIRATION RATE: 16 BRPM | HEIGHT: 67 IN | SYSTOLIC BLOOD PRESSURE: 130 MMHG

## 2018-08-15 DIAGNOSIS — J01.11 ACUTE RECURRENT FRONTAL SINUSITIS: Primary | ICD-10-CM

## 2018-08-15 PROCEDURE — 99282 EMERGENCY DEPT VISIT SF MDM: CPT

## 2018-08-15 RX ORDER — AMOXICILLIN AND CLAVULANATE POTASSIUM 875; 125 MG/1; MG/1
1 TABLET, FILM COATED ORAL 2 TIMES DAILY
Qty: 20 TAB | Refills: 0 | Status: SHIPPED | OUTPATIENT
Start: 2018-08-15 | End: 2018-08-25

## 2018-08-15 NOTE — ED TRIAGE NOTES
Patient states being evaluated and tx on August 3rd for sinus infection. States using abx, claritin, and saline spray without relief of pain.

## 2018-08-15 NOTE — ED NOTES
Abdulkadir Cruz is a 59 y.o. female that was discharged in stable condition. The patients diagnosis, condition and treatment were explained to  patient and aftercare instructions were given. The patient verbalized understanding. Patient armband removed and shredded.

## 2018-08-15 NOTE — DISCHARGE INSTRUCTIONS
Sinusitis: Care Instructions  Your Care Instructions    Sinusitis is an infection of the lining of the sinus cavities in your head. Sinusitis often follows a cold. It causes pain and pressure in your head and face. In most cases, sinusitis gets better on its own in 1 to 2 weeks. But some mild symptoms may last for several weeks. Sometimes antibiotics are needed. Follow-up care is a key part of your treatment and safety. Be sure to make and go to all appointments, and call your doctor if you are having problems. It's also a good idea to know your test results and keep a list of the medicines you take. How can you care for yourself at home? · Take an over-the-counter pain medicine, such as acetaminophen (Tylenol), ibuprofen (Advil, Motrin), or naproxen (Aleve). Read and follow all instructions on the label. · If the doctor prescribed antibiotics, take them as directed. Do not stop taking them just because you feel better. You need to take the full course of antibiotics. · Be careful when taking over-the-counter cold or flu medicines and Tylenol at the same time. Many of these medicines have acetaminophen, which is Tylenol. Read the labels to make sure that you are not taking more than the recommended dose. Too much acetaminophen (Tylenol) can be harmful. · Breathe warm, moist air from a steamy shower, a hot bath, or a sink filled with hot water. Avoid cold, dry air. Using a humidifier in your home may help. Follow the directions for cleaning the machine. · Use saline (saltwater) nasal washes to help keep your nasal passages open and wash out mucus and bacteria. You can buy saline nose drops at a grocery store or drugstore. Or you can make your own at home by adding 1 teaspoon of salt and 1 teaspoon of baking soda to 2 cups of distilled water. If you make your own, fill a bulb syringe with the solution, insert the tip into your nostril, and squeeze gently. Premilyn Ply your nose.   · Put a hot, wet towel or a warm gel pack on your face 3 or 4 times a day for 5 to 10 minutes each time. · Try a decongestant nasal spray like oxymetazoline (Afrin). Do not use it for more than 3 days in a row. Using it for more than 3 days can make your congestion worse. When should you call for help? Call your doctor now or seek immediate medical care if:    · You have new or worse swelling or redness in your face or around your eyes.     · You have a new or higher fever.    Watch closely for changes in your health, and be sure to contact your doctor if:    · You have new or worse facial pain.     · The mucus from your nose becomes thicker (like pus) or has new blood in it.     · You are not getting better as expected. Where can you learn more? Go to http://greyson-andrea.info/. Enter W039 in the search box to learn more about \"Sinusitis: Care Instructions. \"  Current as of: May 12, 2017  Content Version: 11.7  © 8943-9595 [employer]. Care instructions adapted under license by [employer] (which disclaims liability or warranty for this information). If you have questions about a medical condition or this instruction, always ask your healthcare professional. [place] [address] any warranty or liability for your use of this information. [employer] Activation    Thank you for requesting access to [employer]. Please follow the instructions below to securely access and download your online medical record. [employer] allows you to send messages to your doctor, view your test results, renew your prescriptions, schedule appointments, and more. How Do I Sign Up? 1. In your internet browser, go to [email or web address]  2. Click on the First Time User? Click Here link in the Sign In box. You will be redirect to the New Member Sign Up page. 3. Enter your [employer] Access Code exactly as it appears below. You will not need to use this code after youve completed the sign-up process.  If you do not sign up before the expiration date, you must request a new code. [employer] Access Code: [email or web address]  Expires: 2018  2:23 PM (This is the date your [employer] access code will )    4. Enter the last four digits of your Social Security Number (xxxx) and Date of Birth (mm/dd/yyyy) as indicated and click Submit. You will be taken to the next sign-up page. 5. Create a [employer] ID. This will be your [employer] login ID and cannot be changed, so think of one that is secure and easy to remember. 6. Create a [employer] password. You can change your password at any time. 7. Enter your Password Reset Question and Answer. This can be used at a later time if you forget your password. 8. Enter your e-mail address. You will receive e-mail notification when new information is available in [address] 9. Click Sign Up. You can now view and download portions of your medical record. 10. Click the Download Summary menu link to download a portable copy of your medical information. Additional Information    If you have questions, please visit the Frequently Asked Questions section of the [employer] website at [email or web address] Remember, [employer] is NOT to be used for urgent needs. For medical emergencies, dial 911.

## 2018-08-15 NOTE — ED PROVIDER NOTES
EMERGENCY DEPARTMENT HISTORY AND PHYSICAL EXAM    3:02 PM      Date: 8/15/2018  Patient Name: Franklyn Greenfield    History of Presenting Illness     Chief Complaint   Patient presents with    Sinus Pain         History Provided By: Patient    Chief Complaint: sinus congestion, rhinorrhea  Duration:  Days  Timing:  Progressive  Location: frontal, maxillary  Quality: Pressure  Severity: Moderate  Modifying Factors: improved with abx and returned over the last few days  Associated Symptoms: headache, frontal pressure      Additional History (Context): Franklyn Greenfield is a 59 y.o. female with noted [address] who presents with c/o frontal and maxillary sinus congestion x 2 weeks. Pt notes she was evaluated 8/3 in the ED and diagnosed with a sinus infection. Notes she completed the abx but the symptoms returned. Notes she has been using Flonase as well for the symptoms. Notes frontal HA and copious nasal discharge. Notes hx of recurrent sinus infection, this feels  Similar. Denies fever/chills, ear pain, sore throat, dizziness, neck apin. Colleen Lawton PCP: Sage Bahena MD    Current Outpatient Prescriptions   Medication Sig Dispense Refill    amoxicillin-clavulanate (AUGMENTIN) 875-125 mg per tablet Take 1 Tab by mouth two (2) times a day for 10 days. 20 Tab 0    albuterol (VENTOLIN HFA) 90 mcg/actuation inhaler Take 2 Puffs by inhalation daily. 1 Inhaler 0    loratadine-pseudoephedrine (CLARITIN-D 24 HOUR)  mg per tablet Take 1 Tab by mouth daily.  fluticasone (FLONASE) 50 mcg/actuation nasal spray 2 Sprays by Both Nostrils route daily. 1 Bottle 0    furosemide (LASIX) 40 mg tablet TAKE 1 TABLET BY MOUTH DAILY 30 Tab 1    losartan (COZAAR) 100 mg tablet TAKE 1 TAB BY MOUTH DAILY. 30 Tab 1    metoprolol succinate (TOPROL-XL) 100 mg tablet Take 1 Tab by mouth daily. 30 Tab 6    hydroCHLOROthiazide (HYDRODIURIL) 25 mg tablet Take 1 Tab by mouth daily. 30 Tab 6    KLOR-CON M20 20 mEq tablet TAKE 1 TAB BY MOUTH DAILY. 30 Tab 6    warfarin (COUMADIN) 5 mg tablet TAKE 5MG DAILY EXCEPT 2.5MG ON TUES, THUR, AND SAT. OR AS DIRECTED BY OUR OFFICE 30 Tab 6    simvastatin (ZOCOR) 20 mg tablet Take 1 Tab by mouth nightly. 30 Tab 6    traMADol (ULTRAM) 50 mg tablet Take 1 Tab by mouth every eight (8) hours as needed. Max Daily Amount: 150 mg. 30 Tab 0    esomeprazole (NEXIUM) 20 mg capsule Take 1 Cap by mouth daily. 30 Cap 3    fluticasone-salmeterol (ADVAIR) 250-50 mcg/dose diskus inhaler Take 1 Puff by inhalation every twelve (12) hours. 1 Inhaler 3    melatonin tab tablet Take 10 mg by mouth nightly.  ascorbic acid (VITAMIN C) 500 mg tablet Take 500 mg by mouth daily.  multivitamin (ONE A DAY) tablet Take 1 Tab by mouth daily. Past History     Past Medical History:  Past Medical History:   Diagnosis Date    Aortic valve disorders 1/13/2011    Atrial fibrillation ([address] 1/13/2011    Congestive heart failure, unspecified March 2005    Cleared quickly with Lasix therapy    Echocardiogram 01/10/2012    A-fib. EF 45%. Mild RVE. RVSP 45-50. Massive LAE. Mod TAMI. Mod MS. Severe MR (mean grad 10). Mild-mod AI. Mild TR. Pulmonic systolic flow reversal.  Mild IVCE.  Hypertension     Iron deficiency anemias     Long term (current) use of anticoagulants 2/23/2011    Murmur 1/2011    Grade I-II/VI systolic ejection murmur along left sternal border, with radiation to the pulmonic area.  S/P cardiac cath 02/24/2012    Minimal coronary artery disease. Mild LVE. EF 55%. Mod MR. Mod-severe MS. RA 15.  RV 55/16. PA 58/34. W 36.  CO/CI 3.5/1.9 (TD); 3.61/1.9 ([place]). R to L shunt suggested.     Wears glasses     Weight gain        Past Surgical History:  Past Surgical History:   Procedure Laterality Date    HX HEART CATHETERIZATION  2/24/2012    HX HYSTERECTOMY  2006    HX MITRAL VALVE REPLACEMENT  05/22/2013    25/33 mm On-X mechanical valve with bilateral pulmonary vein isolation and resection of LA appendage       Family History:  Family History   Problem Relation Age of Onset    Heart Surgery Father      Open-Heart Surgery    Other Father      Venous Thromboembolism    Heart Disease Mother      Enlarged Heart    Hypertension Mother     Diabetes Mother        Social History:  Social History   Substance Use Topics    Smoking status: Former Smoker    Smokeless tobacco: Never Used    Alcohol use 0.6 oz/week     1 Glasses of wine per week      Comment: occassionally       Allergies: Allergies   Allergen Reactions    Morphine Rash and Itching    Norco [Hydrocodone-Acetaminophen] Itching         Review of Systems       Review of Systems   Constitutional: Negative for chills and fever. HENT: Positive for facial swelling, postnasal drip, rhinorrhea, sinus pain and sinus pressure. Negative for ear discharge and ear pain. Respiratory: Negative for shortness of breath. Cardiovascular: Negative for chest pain. Gastrointestinal: Negative for abdominal pain, nausea and vomiting. Skin: Negative for rash. Neurological: Negative for weakness. All other systems reviewed and are negative. Physical Exam     Visit Vitals    BP (!) 130/92 (BP 1 Location: Left arm, BP Patient Position: At rest)    Pulse 91    Temp 98.6 °F (37 °C)    Resp 16    Ht 5' 7\" (1.702 m)    Wt 86.6 kg (191 lb)    SpO2 94%    BMI 29.91 kg/m2         Physical Exam   Constitutional: She appears well-developed and well-nourished. No distress. HENT:   Head: Normocephalic and atraumatic. Right Ear: Tympanic membrane, external ear and ear canal normal.   Left Ear: Tympanic membrane, external ear and ear canal normal.   Nose: Mucosal edema and rhinorrhea present. Right sinus exhibits maxillary sinus tenderness and frontal sinus tenderness. Left sinus exhibits maxillary sinus tenderness and frontal sinus tenderness (most significant). Mouth/Throat: Uvula is midline and oropharynx is clear and moist. Normal dentition. No oropharyngeal exudate. Neck: Normal range of motion. Neck supple. Cardiovascular: Normal rate, regular rhythm, normal heart sounds and intact distal pulses. Exam reveals no gallop and no friction rub. No murmur heard. Pulmonary/Chest: Effort normal and breath sounds normal. No stridor. No respiratory distress. She has no wheezes. She has no rales. Lymphadenopathy:     She has no cervical adenopathy. Neurological: She is alert. Skin: Skin is warm. No rash noted. She is not diaphoretic. Nursing note and vitals reviewed. Diagnostic Study Results     Labs -  No results found for this or any previous visit (from the past 12 hour(s)). Radiologic Studies -   No orders to display         Medical Decision Making   I am the first provider for this patient. I reviewed the vital signs, available nursing notes, past medical history, past surgical history, family history and social history. Vital Signs-Reviewed the patient's vital signs. Records Reviewed: Nursing Notes and Old Medical Records (Time of Review: 3:02 PM)    ED Course: Progress Notes, Reevaluation, and Consults:  14:50: Reviewed plan with patient. Discussed importance of close outpatient follow-up with ENT as well. Discussed strict return precautions for any new, worsening, or persistent symptoms, including fever, increased pain, or any other medical concerns. Provider Notes (Medical Decision Making): 58 yo F with hx of recurrent sinus infections who presents due to nasal drainage, frontal HA, and sinus pressure x weeks. Afebrile, VS stable, looks well. Pain reproducible with palpation of sinuses. Moderate nasal edema. Will prescribe Augmentin and have patient continue Flonase. Referral provided to ENT as well. Diagnosis     Clinical Impression:   1.  Acute recurrent frontal sinusitis        Disposition: home     Follow-up Information     Follow up With Details Comments Contact Info    [facility] EMERGENCY DEPT  If symptoms worsen [address] Escobar MD Schedule an appointment as soon as possible for a visit  [facility]  [phone]             Patient's Medications   Start Taking    AMOXICILLIN-CLAVULANATE (AUGMENTIN) 875-125 MG PER TABLET    Take 1 Tab by mouth two (2) times a day for 10 days. Continue Taking    ALBUTEROL (VENTOLIN HFA) 90 MCG/ACTUATION INHALER    Take 2 Puffs by inhalation daily. ASCORBIC ACID (VITAMIN C) 500 MG TABLET    Take 500 mg by mouth daily. ESOMEPRAZOLE (NEXIUM) 20 MG CAPSULE    Take 1 Cap by mouth daily. FLUTICASONE (FLONASE) 50 MCG/ACTUATION NASAL SPRAY    2 Sprays by Both Nostrils route daily. FLUTICASONE-SALMETEROL (ADVAIR) 250-50 MCG/DOSE DISKUS INHALER    Take 1 Puff by inhalation every twelve (12) hours. FUROSEMIDE (LASIX) 40 MG TABLET    TAKE 1 TABLET BY MOUTH DAILY    HYDROCHLOROTHIAZIDE (HYDRODIURIL) 25 MG TABLET    Take 1 Tab by mouth daily. KLOR-CON M20 20 MEQ TABLET    TAKE 1 TAB BY MOUTH DAILY. LORATADINE-PSEUDOEPHEDRINE (CLARITIN-D 24 HOUR)  MG PER TABLET    Take 1 Tab by mouth daily. LOSARTAN (COZAAR) 100 MG TABLET    TAKE 1 TAB BY MOUTH DAILY. MELATONIN TAB TABLET    Take 10 mg by mouth nightly. METOPROLOL SUCCINATE (TOPROL-XL) 100 MG TABLET    Take 1 Tab by mouth daily. MULTIVITAMIN (ONE A DAY) TABLET    Take 1 Tab by mouth daily. SIMVASTATIN (ZOCOR) 20 MG TABLET    Take 1 Tab by mouth nightly. TRAMADOL (ULTRAM) 50 MG TABLET    Take 1 Tab by mouth every eight (8) hours as needed. Max Daily Amount: 150 mg.    WARFARIN (COUMADIN) 5 MG TABLET    TAKE 5MG DAILY EXCEPT 2.5MG ON TUES, THUR, AND SAT.  OR AS DIRECTED BY OUR OFFICE   These Medications have changed    No medications on file   Stop Taking    No medications on file

## 2018-08-17 ENCOUNTER — OFFICE VISIT (OUTPATIENT)
Dept: FAMILY MEDICINE CLINIC | Age: 58
End: 2018-08-17

## 2018-08-17 VITALS
RESPIRATION RATE: 16 BRPM | TEMPERATURE: 98.5 F | HEIGHT: 67 IN | SYSTOLIC BLOOD PRESSURE: 128 MMHG | BODY MASS INDEX: 29.82 KG/M2 | WEIGHT: 190 LBS | DIASTOLIC BLOOD PRESSURE: 86 MMHG | OXYGEN SATURATION: 98 % | HEART RATE: 85 BPM

## 2018-08-17 DIAGNOSIS — Z12.11 COLON CANCER SCREENING: ICD-10-CM

## 2018-08-17 DIAGNOSIS — E78.5 HYPERLIPIDEMIA, UNSPECIFIED HYPERLIPIDEMIA TYPE: ICD-10-CM

## 2018-08-17 DIAGNOSIS — J45.20 MILD INTERMITTENT ASTHMA WITHOUT COMPLICATION: ICD-10-CM

## 2018-08-17 DIAGNOSIS — I48.91 ATRIAL FIBRILLATION, UNSPECIFIED TYPE (HCC): ICD-10-CM

## 2018-08-17 DIAGNOSIS — I11.0 BENIGN HYPERTENSIVE HEART DISEASE WITH HEART FAILURE (HCC): Primary | ICD-10-CM

## 2018-08-17 DIAGNOSIS — I09.1 RHEUMATIC VALVULAR DISEASE: ICD-10-CM

## 2018-08-17 DIAGNOSIS — Z11.59 ENCOUNTER FOR HEPATITIS C SCREENING TEST FOR LOW RISK PATIENT: ICD-10-CM

## 2018-08-17 DIAGNOSIS — Z86.2 HISTORY OF ANEMIA: ICD-10-CM

## 2018-08-17 DIAGNOSIS — Z79.01 LONG TERM CURRENT USE OF ANTICOAGULANT THERAPY: ICD-10-CM

## 2018-08-17 NOTE — MR AVS SNAPSHOT
[address] 
[phone] Patient: Jose Monte MRN: [id number] MBO:[id number] Visit Information Date & Time Provider Department Dept. Phone Encounter #  
 8/17/2018  1:30 PM Satya Rich, [address] [id number] Follow-up Instructions Return if symptoms worsen or fail to improve. Your Appointments 9/7/2018  2:00 PM  
ANTICOAG NURSE with Pt In[facility] ([address]) Appt Note: [address] [address]  
[phone] Moody Soumya  
  
    
 2/18/2019 10:00 AM  
Follow Up with Greg Beavers DO [facility] ([facility]) Appt Note: 6 month follow up [address] [address]  
[phone] [address] [facility] [address]  [address] Upcoming Health Maintenance Date Due Hepatitis C Screening 1960 Pneumococcal 19-64 Medium Risk (1 of 1 - PPSV23) 7/4/1977 DTaP/Tdap/Td series (1 - Tdap) 7/4/1979 ZOSTER VACCINE AGE 60> 5/4/2018 FOBT Q 1 YEAR AGE 50-75 7/17/2018 Influenza Age 5 to Adult 8/1/2018 MEDICARE YEARLY EXAM 4/28/2019 PAP AKA CERVICAL CYTOLOGY 3/9/2020 BREAST CANCER SCRN MAMMOGRAM 5/18/2020 Allergies as of 8/17/2018  Review Complete On: 8/17/2018 By: Satya Rich MD  
  
 Severity Noted Reaction Type Reactions Morphine  02/02/2012    Rash, Itching Norco [Hydrocodone-acetaminophen]  07/12/2017    Itching Current Immunizations  Reviewed on 2/15/2013 Name Date Influenza Vaccine 12/15/2012 Influenza Vaccine (Quad) PF 9/18/2017, 9/20/2016 Not reviewed this visit You Were Diagnosed With   
  
 Codes Comments Benign hypertensive heart disease with heart failure (HCC)    -  Primary ICD-10-CM: I11.0 ICD-9-CM: 402.11, 428.9 Atrial fibrillation, unspecified type ([facility]     ICD-10-CM: I48.91 
ICD-9-CM: 427.31 Microcytic anemia     ICD-10-CM: D50.9 ICD-9-CM: 280.9 Hyperlipidemia, unspecified hyperlipidemia type     ICD-10-CM: E78.5 ICD-9-CM: 272.4 Long term current use of anticoagulant therapy     ICD-10-CM: Z79.01 
ICD-9-CM: V58.61 Rheumatic valvular disease     ICD-10-CM: I09.1 ICD-9-CM: 397.9 History of anemia     ICD-10-CM: Z86.2 ICD-9-CM: V12.3 Colon cancer screening     ICD-10-CM: Z12.11 ICD-9-CM: V76.51 Vitals BP Pulse Temp Resp Height(growth percentile) Weight(growth percentile) 128/86 (BP 1 Location: Left arm, BP Patient Position: At rest) 85 98.5 °F (36.9 °C) (Oral) 16 5' 7\" (1.702 m) 190 lb (86.2 kg) SpO2 BMI OB Status Smoking Status 98% 29.76 kg/m2 Hysterectomy Former Smoker Vitals History BMI and BSA Data Body Mass Index Body Surface Area  
 29.76 kg/m 2 2.02 m 2 Preferred Pharmacy Pharmacy Name Phone [id number] [place] , [facility] [facility] [phone] Your Updated Medication List  
  
   
This list is accurate as of 8/17/18  2:23 PM.  Always use your most recent med list.  
  
  
  
  
 albuterol 90 mcg/actuation inhaler Commonly known as:  VENTOLIN HFA Take 2 Puffs by inhalation daily. amoxicillin-clavulanate 875-125 mg per tablet Commonly known as:  AUGMENTIN Take 1 Tab by mouth two (2) times a day for 10 days. CLARITIN-D 24 HOUR  mg per tablet Generic drug:  loratadine-pseudoephedrine Take 1 Tab by mouth daily. esomeprazole 20 mg capsule Commonly known as:  NexIUM Take 1 Cap by mouth daily. fluticasone 50 mcg/actuation nasal spray Commonly known as:  Zaid Manriquez 2 Sprays by Both Nostrils route daily. furosemide 40 mg tablet Commonly known as:  LASIX TAKE 1 TABLET BY MOUTH DAILY  
  
 hydroCHLOROthiazide 25 mg tablet Commonly known as:  HYDRODIURIL Take 1 Tab by mouth daily. KLOR-CON M20 20 mEq tablet Generic drug:  potassium chloride TAKE 1 TAB BY MOUTH DAILY. losartan 100 mg tablet Commonly known as:  COZAAR  
TAKE 1 TAB BY MOUTH DAILY. melatonin Tab tablet Take 10 mg by mouth nightly. metoprolol succinate 100 mg tablet Commonly known as:  TOPROL-XL Take 1 Tab by mouth daily. SALINE NASAL 0.65 % nasal squeeze bottle Generic drug:  sodium chloride 1 Spray as needed for Congestion. simvastatin 20 mg tablet Commonly known as:  ZOCOR Take 1 Tab by mouth nightly. warfarin 5 mg tablet Commonly known as:  COUMADIN  
TAKE 5MG DAILY EXCEPT 2.5MG ON TUES, THUR, AND SAT. OR AS DIRECTED BY OUR OFFICE Follow-up Instructions Return if symptoms worsen or fail to improve. To-Do List   
 08/17/2018 Lab:  CBC WITH AUTOMATED DIFF   
  
 08/17/2018 Lab:  LIPID PANEL   
  
 08/17/2018 Lab:  METABOLIC PANEL, COMPREHENSIVE   
  
 08/17/2018 Lab:  OCCULT BLOOD IMMUNOASSAY,DIAGNOSTIC   
  
 08/31/2018 9:00 AM  
  Appointment with [employer] S70 at [facility] NON-INVASIVE CARD ([phone]) Age Limit for ALL Heart procedures @ [facility] facilities: 18 yrs and older only. Under the age of 25, refer to [address] [place] [address] ([phone]). Wt Limit: 350lbs. This study requires patient to bring a written physician's order or MD office may fax the order to Central Scheduling at [phone]. Patient needs to bring a current list of all medications. No preparation is required for this study. Patients should report 15 minutes prior to their appointment time to the [facility], [address] [place] [address]/[address]. Patient Instructions Heart-Healthy Diet: Care Instructions Your Care Instructions A heart-healthy diet has lots of vegetables, fruits, nuts, beans, and whole grains, and is low in salt. It limits foods that are high in saturated fat, such as meats, cheeses, and fried foods. It may be hard to change your diet, but even small changes can lower your risk of heart attack and heart disease. Follow-up care is a key part of your treatment and safety. Be sure to make and go to all appointments, and call your doctor if you are having problems. It's also a good idea to know your test results and keep a list of the medicines you take. How can you care for yourself at home? Watch your portions · Learn what a serving is. A \"serving\" and a \"portion\" are not always the same thing. Make sure that you are not eating larger portions than are recommended. For example, a serving of pasta is ½ cup. A serving size of meat is 2 to 3 ounces. A 3-ounce serving is about the size of a deck of cards. Measure serving sizes until you are good at [place]" them. Keep in mind that restaurants often serve portions that are 2 or 3 times the size of one serving. · To keep your energy level up and keep you from feeling hungry, eat often but in smaller portions. · Eat only the number of calories you need to stay at a healthy weight. If you need to lose weight, eat fewer calories than your body burns (through exercise and other physical activity). Eat more fruits and vegetables · Eat a variety of fruit and vegetables every day. Dark green, deep orange, red, or yellow fruits and vegetables are especially good for you. Examples include spinach, carrots, peaches, and berries. · Keep carrots, celery, and other veggies handy for snacks. Buy fruit that is in season and store it where you can see it so that you will be tempted to eat it. · Cook dishes that have a lot of veggies in them, such as stir-fries and soups. Limit saturated and trans fat · Read food labels, and try to avoid saturated and trans fats.  They increase your risk of heart disease. Trans fat is found in many processed foods such as cookies and crackers. · Use olive or canola oil when you cook. Try cholesterol-lowering spreads, such as Benecol or Take Control. · Bake, broil, grill, or steam foods instead of frying them. · Choose lean meats instead of high-fat meats such as hot dogs and sausages. Cut off all visible fat when you prepare meat. · Eat fish, skinless poultry, and meat alternatives such as soy products instead of high-fat meats. Soy products, such as tofu, may be especially good for your heart. · Choose low-fat or fat-free milk and dairy products. Eat fish · Eat at least two servings of fish a week. Certain fish, such as salmon and tuna, contain omega-3 fatty acids, which may help reduce your risk of heart attack. Eat foods high in fiber · Eat a variety of grain products every day. Include whole-grain foods that have lots of fiber and nutrients. Examples of whole-grain foods include oats, whole wheat bread, and brown rice. · Buy whole-grain breads and cereals, instead of white bread or pastries. Limit salt and sodium · Limit how much salt and sodium you eat to help lower your blood pressure. · Taste food before you salt it. Add only a little salt when you think you need it. With time, your taste buds will adjust to less salt. · Eat fewer snack items, fast foods, and other high-salt, processed foods. Check food labels for the amount of sodium in packaged foods. · Choose low-sodium versions of canned goods (such as soups, vegetables, and beans). Limit sugar · Limit drinks and foods with added sugar. These include candy, desserts, and soda pop. Limit alcohol · Limit alcohol to no more than 2 drinks a day for men and 1 drink a day for women. Too much alcohol can cause health problems. When should you call for help? Watch closely for changes in your health, and be sure to contact your doctor if:   · You would like help planning heart-healthy meals. Where can you learn more? Go to http://greyson-andrea.info/. Enter V137 in the search box to learn more about \"Heart-Healthy Diet: Care Instructions. \" Current as of: December 6, 2017 Content Version: 11.7 © 7193-4499 [facility]. Care instructions adapted under license by [employer] (which disclaims liability or warranty for this information). If you have questions about a medical condition or this instruction, always ask your healthcare professional. [address] any warranty or liability for your use of this information. Introducing [facility]! Litzy Villanueva introduces [employer] patient portal. Now you can access parts of your medical record, email your doctor's office, and request medication refills online. 1. In your internet browser, go to [email or web address] [employer] 2. Click on the First Time User? Click Here link in the Sign In box. You will see the New Member Sign Up page. 3. Enter your [employer] Access Code exactly as it appears below. You will not need to use this code after youve completed the sign-up process. If you do not sign up before the expiration date, you must request a new code. · [employer] Access Code: [email or web address] Expires: 11/1/2018  2:23 PM 
 
4. Enter the last four digits of your Social Security Number (xxxx) and Date of Birth (mm/dd/yyyy) as indicated and click Submit. You will be taken to the next sign-up page. 5. Create a [employer] ID. This will be your [employer] login ID and cannot be changed, so think of one that is secure and easy to remember. 6. Create a [employer] password. You can change your password at any time. 7. Enter your Password Reset Question and Answer. This can be used at a later time if you forget your password. 8. Enter your e-mail address.  You will receive e-mail notification when new information is available in [employer]. 9. Click Sign Up. You can now view and download portions of your medical record. 10. Click the Download Summary menu link to download a portable copy of your medical information. If you have questions, please visit the Frequently Asked Questions section of the [employer] website. Remember, [employer] is NOT to be used for urgent needs. For medical emergencies, dial 911. Now available from your iPhone and Android! Please provide this summary of care documentation to your next provider. Your primary care clinician is listed as Isaac Rahman. If you have any questions after today's visit, please call [phone].

## 2018-08-17 NOTE — PROGRESS NOTES
Rere Bernabeman, 59 y.o.,  female    SUBJECTIVE  Establish care    Pt with significant cardiac history. Chronic Afib, MVstenosis s/p  valve replacement 2013, CHF. Doing fairly well, denies cp, sob, leg edema, syncope. She is on chronic anticoagulation with warfarin, and rate controlled on BB. Reviewed dr. Bravo Hiss note 2/18, no changes. Asthma- reports to be fairly well controlled, says uses albuterol during URI's, sinus infections. She did mention wheezing episode yesterday. She reports to recent dx of sinusitis, currently on augmentin, she states to be feeling better with less congestion, cough past few days already. She was on advair few years ago, reports no longer using. GERD- takes nexium otc prn for heartburn    ROS:  See HPI, all others negative        Patient Active Problem List   Diagnosis Code    Rheumatic valvular disease I09.1    Chronic diastolic congestive heart failure (HCC) I50.32    Atrial fibrillation (HCC) I48.91    Long term current use of anticoagulant therapy Z79.01    Benign hypertensive heart disease with heart failure (HCC) I11.0    Hyperlipidemia E78.5    Advanced directives, counseling/discussion Z71.89    Mild intermittent asthma without complication V21.94       Current Outpatient Prescriptions   Medication Sig Dispense Refill    sodium chloride (SALINE NASAL) 0.65 % nasal squeeze bottle 1 Spray as needed for Congestion.  amoxicillin-clavulanate (AUGMENTIN) 875-125 mg per tablet Take 1 Tab by mouth two (2) times a day for 10 days. 20 Tab 0    albuterol (VENTOLIN HFA) 90 mcg/actuation inhaler Take 2 Puffs by inhalation daily. 1 Inhaler 0    loratadine-pseudoephedrine (CLARITIN-D 24 HOUR)  mg per tablet Take 1 Tab by mouth daily.  fluticasone (FLONASE) 50 mcg/actuation nasal spray 2 Sprays by Both Nostrils route daily.  1 Bottle 0    furosemide (LASIX) 40 mg tablet TAKE 1 TABLET BY MOUTH DAILY 30 Tab 1    losartan (COZAAR) 100 mg tablet TAKE 1 TAB BY MOUTH DAILY. 30 Tab 1    metoprolol succinate (TOPROL-XL) 100 mg tablet Take 1 Tab by mouth daily. 30 Tab 6    hydroCHLOROthiazide (HYDRODIURIL) 25 mg tablet Take 1 Tab by mouth daily. 30 Tab 6    KLOR-CON M20 20 mEq tablet TAKE 1 TAB BY MOUTH DAILY. 30 Tab 6    warfarin (COUMADIN) 5 mg tablet TAKE 5MG DAILY EXCEPT 2.5MG ON TUES, THUR, AND SAT. OR AS DIRECTED BY OUR OFFICE 30 Tab 6    simvastatin (ZOCOR) 20 mg tablet Take 1 Tab by mouth nightly. 30 Tab 6    esomeprazole (NEXIUM) 20 mg capsule Take 1 Cap by mouth daily. 30 Cap 3    melatonin tab tablet Take 10 mg by mouth nightly. Allergies   Allergen Reactions    Morphine Rash and Itching    Norco [Hydrocodone-Acetaminophen] Itching       Past Medical History:   Diagnosis Date    Aortic valve disorders 1/13/2011    Asthma     Atrial fibrillation ([facility] [address] 1/13/2011    Congestive heart failure, unspecified March 2005    Cleared quickly with Lasix therapy    Echocardiogram 01/10/2012    A-fib. EF 45%. Mild RVE. RVSP 45-50. Massive LAE. Mod TAMI. Mod MS. Severe MR (mean grad 10). Mild-mod AI. Mild TR. Pulmonic systolic flow reversal.  Mild IVCE.  Hypertension     Iron deficiency anemias     Long term (current) use of anticoagulants 2/23/2011    Murmur 1/2011    Grade I-II/VI systolic ejection murmur along left sternal border, with radiation to the pulmonic area.  S/P cardiac cath 02/24/2012    Minimal coronary artery disease. Mild LVE. EF 55%. Mod MR. Mod-severe MS. RA 15.  RV 55/16. PA 58/34. W 36.  CO/CI 3.5/1.9 (TD); 3.61/1.9 ([place]). R to L shunt suggested.  Wears glasses     Weight gain        Social History     Social History    Marital status:      Spouse name: N/A    Number of children: N/A    Years of education: N/A     Occupational History    Not on file.      Social History Main Topics    Smoking status: Former Smoker    Smokeless tobacco: Never Used    Alcohol use 0.6 oz/week     1 Glasses of wine per week      Comment: occassionally    Drug use: No    Sexual activity: Yes     Partners: Male     Birth control/ protection: None     Other Topics Concern    Not on file     Social History Narrative       Family History   Problem Relation Age of Onset    Heart Surgery Father      Open-Heart Surgery    Other Father      Venous Thromboembolism    Heart Disease Mother      Enlarged Heart    Hypertension Mother     Diabetes Mother          OBJECTIVE    Physical Exam:     Visit Vitals    /86 (BP 1 Location: Left arm, BP Patient Position: At rest)    Pulse 85    Temp 98.5 °F (36.9 °C) (Oral)    Resp 16    Ht 5' 7\" (1.702 m)    Wt 190 lb (86.2 kg)    SpO2 98%    BMI 29.76 kg/m2       General: alert, well-appearing,  in no apparent distress or pain  Head: atraumatic. Non-tender maxillary and frontal sinuses  Eyes: Lids with no discharge, no matting, conjunctivae clear and non injected, full EOMs, PERLLA  Ears: pinna non-tender, external auditory canal patent, TM intact  Mouth/throat:tonsils non enlarged, pharynx non erythematous and no lesion, nasal mucosa normal  Neck: supple, no adenopathy palpated  CVS: irregular, mech valve click, + murmur  Lungs:clear to ausculation bilaterally, no crackles, wheezing or rhonchi noted  Abdomen: normoactive bowel sounds, soft, non-tender  Extremities: no edema, no cyanosis, MSK grossly normal  Skin: warm, no lesions, rashes noted  Psych:  mood and affect normal        ASSESSMENT/PLAN  Diagnoses and all orders for this visit:    Benign hypertensive heart disease with heart failure (HCC)  Controlled  Cont cozaar, metoprolol, hctz  -     METABOLIC PANEL, COMPREHENSIVE; Future  -     LIPID PANEL; Future    Atrial fibrillation, unspecified type ([address]  Rate controlled, INR therapeutic 2.8  (8.18)  On BB, coumadin    Hyperlipidemia, unspecified hyperlipidemia type  Currently on zocor  -     LIPID PANEL;  Future    Long term current use of anticoagulant therapy  Cards following    Rheumatic valvular disease     Mild intermittent asthma without complication  On prn albuterol  Will monitor     History of anemia  -     CBC WITH AUTOMATED DIFF; Future    Colon cancer screening  -     OCCULT BLOOD IMMUNOASSAY,DIAGNOSTIC; Future     Encounter for hepatitis C screening test for low risk patient  -     HEPATITIS C AB; Future    BMI 29  Encourage wt loss    Follow-up Disposition:  Return if symptoms worsen or fail to improve. Patient understands plan of care. Patient has provided input and agrees with goals.

## 2018-08-17 NOTE — PATIENT INSTRUCTIONS

## 2018-08-17 NOTE — PROGRESS NOTES
1. Have you been to the ER, urgent care clinic since your last visit? Hospitalized since your last visit? No    2. Have you seen or consulted any other health care providers outside of the [address] [place] [address] since your last visit? Include any pap smears or colon screening.  No

## 2018-08-20 ENCOUNTER — PATIENT OUTREACH (OUTPATIENT)
Dept: FAMILY MEDICINE CLINIC | Age: 58
End: 2018-08-20

## 2018-08-20 NOTE — PROGRESS NOTES
Goals Addressed      Establish PCP relationships and regularly scheduled appointments. Plan:  Establish care with Dr. Merly Hernandes with [facility]  Target Date:  8-17-18 8-20-18 Update  Patient attended PCP appointment and established care with Dr. Merly Hernandes @ [facility] to follow case.

## 2018-08-21 ENCOUNTER — HOSPITAL ENCOUNTER (OUTPATIENT)
Dept: LAB | Age: 58
Discharge: HOME | End: 2018-08-21
Payer: MEDICARE

## 2018-08-21 DIAGNOSIS — Z12.11 COLON CANCER SCREENING: ICD-10-CM

## 2018-08-21 PROCEDURE — 82274 ASSAY TEST FOR BLOOD FECAL: CPT | Performed by: FAMILY MEDICINE

## 2018-08-23 RX ORDER — WARFARIN SODIUM 5 MG/1
TABLET ORAL
Qty: 30 TAB | Refills: 5 | Status: SHIPPED | OUTPATIENT
Start: 2018-08-23 | End: 2019-02-01 | Stop reason: SDUPTHER

## 2018-08-24 ENCOUNTER — HOSPITAL ENCOUNTER (OUTPATIENT)
Dept: LAB | Age: 58
Discharge: HOME | End: 2018-08-24
Payer: MEDICARE

## 2018-08-24 DIAGNOSIS — Z86.2 HISTORY OF ANEMIA: ICD-10-CM

## 2018-08-24 DIAGNOSIS — E78.5 HYPERLIPIDEMIA, UNSPECIFIED HYPERLIPIDEMIA TYPE: ICD-10-CM

## 2018-08-24 DIAGNOSIS — I11.0 BENIGN HYPERTENSIVE HEART DISEASE WITH HEART FAILURE (HCC): ICD-10-CM

## 2018-08-24 DIAGNOSIS — Z11.59 ENCOUNTER FOR HEPATITIS C SCREENING TEST FOR LOW RISK PATIENT: ICD-10-CM

## 2018-08-24 LAB
ALBUMIN SERPL-MCNC: 3.6 G/DL (ref 3.4–5)
ALBUMIN/GLOB SERPL: 0.8 {RATIO} (ref 0.8–1.7)
ALP SERPL-CCNC: 81 U/L (ref 45–117)
ALT SERPL-CCNC: 35 U/L (ref 13–56)
ANION GAP SERPL CALC-SCNC: 6 MMOL/L (ref 3–18)
AST SERPL-CCNC: 29 U/L (ref 15–37)
BASOPHILS # BLD: 0 K/UL (ref 0–0.1)
BASOPHILS NFR BLD: 1 % (ref 0–2)
BILIRUB SERPL-MCNC: 1.3 MG/DL (ref 0.2–1)
BUN SERPL-MCNC: 16 MG/DL (ref 7–18)
BUN/CREAT SERPL: 17 (ref 12–20)
CALCIUM SERPL-MCNC: 9 MG/DL (ref 8.5–10.1)
CHLORIDE SERPL-SCNC: 103 MMOL/L (ref 100–108)
CHOLEST SERPL-MCNC: 202 MG/DL
CO2 SERPL-SCNC: 32 MMOL/L (ref 21–32)
CREAT SERPL-MCNC: 0.92 MG/DL (ref 0.6–1.3)
DIFFERENTIAL METHOD BLD: ABNORMAL
EOSINOPHIL # BLD: 0.1 K/UL (ref 0–0.4)
EOSINOPHIL NFR BLD: 2 % (ref 0–5)
ERYTHROCYTE [DISTWIDTH] IN BLOOD BY AUTOMATED COUNT: 13.2 % (ref 11.6–14.5)
GLOBULIN SER CALC-MCNC: 4.5 G/DL (ref 2–4)
GLUCOSE SERPL-MCNC: 100 MG/DL (ref 74–99)
HCT VFR BLD AUTO: 38.2 % (ref 35–45)
HCV AB SER IA-ACNC: 0.13 INDEX
HCV AB SERPL QL IA: NEGATIVE
HCV COMMENT,HCGAC: NORMAL
HDLC SERPL-MCNC: 52 MG/DL (ref 40–60)
HDLC SERPL: 3.9 {RATIO} (ref 0–5)
HGB BLD-MCNC: 12.2 G/DL (ref 12–16)
LDLC SERPL CALC-MCNC: 122.6 MG/DL (ref 0–100)
LIPID PROFILE,FLP: ABNORMAL
LYMPHOCYTES # BLD: 2.4 K/UL (ref 0.9–3.6)
LYMPHOCYTES NFR BLD: 38 % (ref 21–52)
MCH RBC QN AUTO: 26.6 PG (ref 24–34)
MCHC RBC AUTO-ENTMCNC: 31.9 G/DL (ref 31–37)
MCV RBC AUTO: 83.4 FL (ref 74–97)
MONOCYTES # BLD: 0.7 K/UL (ref 0.05–1.2)
MONOCYTES NFR BLD: 11 % (ref 3–10)
NEUTS SEG # BLD: 3.1 K/UL (ref 1.8–8)
NEUTS SEG NFR BLD: 48 % (ref 40–73)
PLATELET # BLD AUTO: 183 K/UL (ref 135–420)
PMV BLD AUTO: 12 FL (ref 9.2–11.8)
POTASSIUM SERPL-SCNC: 3.7 MMOL/L (ref 3.5–5.5)
PROT SERPL-MCNC: 8.1 G/DL (ref 6.4–8.2)
RBC # BLD AUTO: 4.58 M/UL (ref 4.2–5.3)
SODIUM SERPL-SCNC: 141 MMOL/L (ref 136–145)
TRIGL SERPL-MCNC: 137 MG/DL (ref ?–150)
VLDLC SERPL CALC-MCNC: 27.4 MG/DL
WBC # BLD AUTO: 6.4 K/UL (ref 4.6–13.2)

## 2018-08-24 PROCEDURE — 80061 LIPID PANEL: CPT | Performed by: FAMILY MEDICINE

## 2018-08-24 PROCEDURE — 80053 COMPREHEN METABOLIC PANEL: CPT | Performed by: FAMILY MEDICINE

## 2018-08-24 PROCEDURE — 36415 COLL VENOUS BLD VENIPUNCTURE: CPT | Performed by: FAMILY MEDICINE

## 2018-08-24 PROCEDURE — 86803 HEPATITIS C AB TEST: CPT | Performed by: FAMILY MEDICINE

## 2018-08-24 PROCEDURE — 85025 COMPLETE CBC W/AUTO DIFF WBC: CPT | Performed by: FAMILY MEDICINE

## 2018-08-28 NOTE — PROGRESS NOTES
Contacted patient and verified identity using name and date of birth (2- identifiers). Patient advised cholesterol and fasting glucose slightly elevated. Appointment scheduled for 9/7 for follow-up.

## 2018-08-29 LAB — HEMOCCULT STL QL IA: NEGATIVE

## 2018-08-30 ENCOUNTER — PATIENT OUTREACH (OUTPATIENT)
Dept: FAMILY MEDICINE CLINIC | Age: 58
End: 2018-08-30

## 2018-08-30 NOTE — PROGRESS NOTES
Patient attended follow up appointment with Dr. Lorenza Downey to establish care as a new patient. Patient has graduated from the Transitions of Care Coordination  program on 8-30-18. Care management goals have been completed at this time. No further nurse navigator follow up scheduled. Goals Addressed  COMPLETED: Establish PCP relationships and regularly scheduled appointments. Plan:  Establish care with Dr. Lorenza Downey with [facility] Target Date:  8-17-18 8-20-18 Update Patient attended PCP appointment and established care with Dr. Lorenza Downey @ [facility]  COMPLETED: Reduce ED Utilization Plan:  Review Red Flags with patient Target Date:  8-8-18 and ongoing as needed 8-8-18 Update: - Red Flags reviewed with patient as follows: - Fever - Increased facial pain - Redness or swelling of face -  The mucus in your nose becomes thicker (like pus) or has blood in it. - Not getting better as expected. Patient encouraged to follow up with Urgent Care or ED as needed prior to PCP new patient  follow up. Patient advised she can still call [facility] for assistance and they can offer referrals or assist her as they are able. Patient referred to her pharmacy for refill of any home medications. Pt has nurse navigator's contact information for any further questions, concerns, or needs. Patients upcoming visits:   
Future Appointments Date Time Provider Manpreet Stoddard 8/31/2018 9:00 AM [facility]  
9/7/2018 11:30 AM Robert Figueroa MD [facility] SO SCHED  
9/7/2018 2:00 PM Pt Inr Hv [facility]  
2/18/2019 10:00 AM Robbie Randall DO [address] [place] [address]

## 2018-08-31 ENCOUNTER — HOSPITAL ENCOUNTER (OUTPATIENT)
Dept: NON INVASIVE DIAGNOSTICS | Age: 58
Discharge: HOME | End: 2018-08-31
Attending: INTERNAL MEDICINE
Payer: MEDICARE

## 2018-08-31 VITALS
WEIGHT: 190 LBS | BODY MASS INDEX: 29.82 KG/M2 | SYSTOLIC BLOOD PRESSURE: 128 MMHG | HEIGHT: 67 IN | DIASTOLIC BLOOD PRESSURE: 86 MMHG

## 2018-08-31 DIAGNOSIS — I09.1 RHEUMATIC VALVULAR DISEASE: ICD-10-CM

## 2018-08-31 LAB
ECHO AO ROOT DIAM: 3.21 CM
ECHO LA VOL BP: 124.99 ML (ref 22–52)
ECHO LA VOL/BSA BIPLANE: 63.18 ML/M2

## 2018-08-31 PROCEDURE — 93306 TTE W/DOPPLER COMPLETE: CPT

## 2018-09-02 NOTE — PROGRESS NOTES
This lady's echocardiogram looks fine her prosthetic valve in the mitral position is working normally. Please let her know that this looks good and we probably will not have to repeat it for couple of years unless she has new cardiac issues.  ES

## 2018-09-06 ENCOUNTER — TELEPHONE (OUTPATIENT)
Dept: CARDIOLOGY CLINIC | Age: 58
End: 2018-09-06

## 2018-09-06 NOTE — TELEPHONE ENCOUNTER
----- Message from Greg Osullivan DO sent at 9/2/2018  9:32 AM EDT -----  This lady's echocardiogram looks fine her prosthetic valve in the mitral position is working normally. Please let her know that this looks good and we probably will not have to repeat it for couple of years unless she has new cardiac issues.  ES

## 2018-09-24 RX ORDER — POTASSIUM CHLORIDE 1500 MG/1
TABLET, EXTENDED RELEASE ORAL
Qty: 30 TAB | Refills: 6 | Status: SHIPPED | OUTPATIENT
Start: 2018-09-24 | End: 2019-04-29 | Stop reason: SDUPTHER

## 2018-10-03 ENCOUNTER — ANTI-COAG VISIT (OUTPATIENT)
Dept: CARDIOLOGY CLINIC | Age: 58
End: 2018-10-03

## 2018-10-03 DIAGNOSIS — I48.91 ATRIAL FIBRILLATION, UNSPECIFIED TYPE (HCC): ICD-10-CM

## 2018-10-03 DIAGNOSIS — Z79.01 LONG TERM CURRENT USE OF ANTICOAGULANT THERAPY: ICD-10-CM

## 2018-10-03 LAB
INR BLD: 2
PT POC: 23.9 SECONDS
VALID INTERNAL CONTROL?: YES

## 2018-10-03 NOTE — PROGRESS NOTES
The INR is stable and therapeutic. Continue Coumadin 5mg daily except 2.5mg on Tues, Thur, and Sat Recheck INR in 5 weeks

## 2018-10-05 ENCOUNTER — OFFICE VISIT (OUTPATIENT)
Dept: FAMILY MEDICINE CLINIC | Age: 58
End: 2018-10-05

## 2018-10-05 VITALS
HEART RATE: 83 BPM | OXYGEN SATURATION: 98 % | SYSTOLIC BLOOD PRESSURE: 128 MMHG | DIASTOLIC BLOOD PRESSURE: 74 MMHG | TEMPERATURE: 97.8 F | HEIGHT: 67 IN | BODY MASS INDEX: 29.82 KG/M2 | WEIGHT: 190 LBS | RESPIRATION RATE: 16 BRPM

## 2018-10-05 DIAGNOSIS — I11.0 BENIGN HYPERTENSIVE HEART DISEASE WITH HEART FAILURE (HCC): Primary | ICD-10-CM

## 2018-10-05 DIAGNOSIS — J45.20 MILD INTERMITTENT ASTHMA WITHOUT COMPLICATION: ICD-10-CM

## 2018-10-05 DIAGNOSIS — I50.32 CHRONIC DIASTOLIC CONGESTIVE HEART FAILURE (HCC): ICD-10-CM

## 2018-10-05 DIAGNOSIS — Z23 ENCOUNTER FOR IMMUNIZATION: ICD-10-CM

## 2018-10-05 DIAGNOSIS — E78.5 HYPERLIPIDEMIA, UNSPECIFIED HYPERLIPIDEMIA TYPE: ICD-10-CM
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
## 2018-10-05 DIAGNOSIS — I48.91 ATRIAL FIBRILLATION, UNSPECIFIED TYPE (HCC): ICD-10-CM

## 2018-10-05 RX ORDER — TRIAMCINOLONE ACETONIDE 0.25 MG/G
OINTMENT TOPICAL 2 TIMES DAILY
Qty: 30 G | Refills: 0 | Status: ON HOLD | OUTPATIENT
Start: 2018-10-05 | End: 2019-01-07

## 2018-10-05 RX ORDER — FLUTICASONE PROPIONATE 110 UG/1
2 AEROSOL, METERED RESPIRATORY (INHALATION) EVERY 12 HOURS
Qty: 1 INHALER | Refills: 0 | Status: SHIPPED | OUTPATIENT
Start: 2018-10-05 | End: 2018-10-11

## 2018-10-05 NOTE — MR AVS SNAPSHOT
[address] 
[phone] Patient: Alysia Mendes MRN: [id number] Visit Information Date & Time Provider Department Dept. Phone Encounter #  
 10/5/2018  2:45 PM Jesse Mosher, [facility] [id number] Follow-up Instructions Return in about 4 weeks (around 11/2/2018), or if symptoms worsen or fail to improve. Your Appointments 11/9/2018  1:00 PM  
ANTICOAG NURSE with Pt Inr H[facility] [place] ([facility]) Appt Note: 5 week INR  
 [place] [address] [address] [address] [phone] Moody Soumya  
  
    
 2/18/2019 10:00 AM  
Follow Up with Néstor Burdick DO [facility] [place] ([facility]) Appt Note: 6 month follow up [facility] [address] [address] [address] [phone] [address] Upcoming Health Maintenance Date Due Pneumococcal 19-64 Medium Risk (1 of 1 - PPSV23) 7/4/1977 DTaP/Tdap/Td series (1 - Tdap) 7/4/1979 Shingrix Vaccine Age 50> (1 of 2) 7/4/2008 Influenza Age 5 to Adult 8/1/2018 MEDICARE YEARLY EXAM 4/28/2019 FOBT Q 1 YEAR AGE 50-75 8/21/2019 PAP AKA CERVICAL CYTOLOGY 3/9/2020 BREAST CANCER SCRN MAMMOGRAM 5/18/2020 Allergies as of 10/5/2018  Review Complete On: 10/5/2018 By: Jesse Mosher MD  
  
 Severity Noted Reaction Type Reactions Morphine  02/02/2012    Rash, Itching Norco [Hydrocodone-acetaminophen]  07/12/2017    Itching Current Immunizations  Reviewed on 10/5/2018 Name Date Influenza Vaccine 11/10/2014 12:00 AM, 12/15/2012, 10/30/2012 12:00 AM  
 Influenza Vaccine (Quad) PF 9/18/2017, 9/20/2016 Influenza Vaccine (Tri) Adjuvanted 10/5/2018 Reviewed by Lexi Emmanuel LPN on 49/5/2239 at  3:43 PM  
You Were Diagnosed With   
  
 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 
ICD-9-CM: 428.32, 428.0 Atrial fibrillation, unspecified type ([facility] [address]     ICD-10-CM: I48.91 
ICD-9-CM: 427.31 Mild intermittent asthma without complication     [facility]60-Icelandic: J45.20 ICD-9-CM: 493.90 Hyperlipidemia, unspecified hyperlipidemia type     ICD-10-CM: E78.5 ICD-9-CM: 272.4 Insect bite, initial encounter     ICD-10-CM: W57. Rex Ramos ICD-9-CM: 919.4, E906.4 Vitals BP Pulse Temp Resp Height(growth percentile) Weight(growth percentile) 128/74 (BP 1 Location: Left arm, BP Patient Position: Sitting) 83 97.8 °F (36.6 °C) (Oral) 16 5' 7\" (1.702 m) 190 lb (86.2 kg) SpO2 BMI OB Status Smoking Status 98% 29.76 kg/m2 Hysterectomy Former Smoker Vitals History BMI and BSA Data Body Mass Index Body Surface Area  
 29.76 kg/m 2 2.02 m 2 Preferred Pharmacy Pharmacy Name Phone [facility]/PHARMACY #[id number] [address] [facility] [phone] Your Updated Medication List  
  
   
This list is accurate as of 10/5/18  3:54 PM.  Always use your most recent med list.  
  
  
  
  
 albuterol 90 mcg/actuation inhaler Commonly known as:  VENTOLIN HFA Take 2 Puffs by inhalation daily. CLARITIN-D 24 HOUR  mg per tablet Generic drug:  loratadine-pseudoephedrine Take 1 Tab by mouth daily. esomeprazole 20 mg capsule Commonly known as:  NexIUM Take 1 Cap by mouth daily. * fluticasone 50 mcg/actuation nasal spray Commonly known as:  Kenji Lei 2 Sprays by Both Nostrils route daily. * fluticasone 110 mcg/actuation inhaler Commonly known as:  FLOVENT HFA  
 Take 2 Puffs by inhalation every twelve (12) hours. furosemide 40 mg tablet Commonly known as:  LASIX TAKE 1 TABLET BY MOUTH EVERY DAY  
  
 hydroCHLOROthiazide 25 mg tablet Commonly known as:  HYDRODIURIL Take 1 Tab by mouth daily. KLOR-CON M20 20 mEq tablet Generic drug:  potassium chloride TAKE 1 TAB BY MOUTH DAILY. losartan 100 mg tablet Commonly known as:  COZAAR  
TAKE 1 TABLET BY MOUTH EVERY DAY  
  
 melatonin Tab tablet Take 10 mg by mouth nightly. metoprolol succinate 100 mg tablet Commonly known as:  TOPROL-XL Take 1 Tab by mouth daily. SALINE NASAL 0.65 % nasal squeeze bottle Generic drug:  sodium chloride 1 Spray as needed for Congestion. simvastatin 20 mg tablet Commonly known as:  ZOCOR Take 1 Tab by mouth nightly. triamcinolone acetonide 0.025 % ointment Commonly known as:  KENALOG Apply  to affected area two (2) times a day. use thin layer  
  
 warfarin 5 mg tablet Commonly known as:  COUMADIN  
TAKE 5MG DAILY EXCEPT 2.5MG ON TUES, THUR, AND SAT. OR AS DIRECTED BY OUR OFFICE * Notice: This list has 2 medication(s) that are the same as other medications prescribed for you. Read the directions carefully, and ask your doctor or other care provider to review them with you. Prescriptions Sent to Pharmacy Refills  
 triamcinolone acetonide (KENALOG) 0.025 % ointment 0 Sig: Apply  to affected area two (2) times a day. use thin layer Class: Normal  
 Pharmacy: [facility]/pharmacy [address] [place] [address],[address] [place] [address] Ph #: [phone] Route: Topical  
 fluticasone (FLOVENT HFA) 110 mcg/actuation inhaler 0 Sig: Take 2 Puffs by inhalation every twelve (12) hours. Class: Normal  
 Pharmacy: [facility]/pharmacy [address] [place] [address],[address] [place] [address] Ph #: [phone] Route: Inhalation We Performed the Following  INFLUENZA VACCINE INACTIVATED (IIV), SUBUNIT, ADJUVANTED, IM [id number] CPT(R)] Follow-up Instructions Return in about 4 weeks (around 11/2/2018), or if symptoms worsen or fail to improve. Patient Instructions Vaccine Information Statement Influenza (Flu) Vaccine (Inactivated or Recombinant): What you need to know Many Vaccine Information Statements are available in Croatian and other languages. See www.immunize.org/vis Hojas de Información Sobre Vacunas están disponibles en Español y en muchos otros idiomas. Visite www.immunize.org/vis 1. Why get vaccinated? Influenza (flu) is a contagious disease that spreads around the [place] every year, usually between October and May. Flu is caused by influenza viruses, and is spread mainly by coughing, sneezing, and close contact. Anyone can get flu. Flu strikes suddenly and can last several days. Symptoms vary by age, but can include: 
 fever/chills  sore throat  muscle aches  fatigue  cough  headache  runny or stuffy nose Flu can also lead to pneumonia and blood infections, and cause diarrhea and seizures in children. If you have a medical condition, such as heart or lung disease, flu can make it worse. Flu is more dangerous for some people. Infants and young children, people 72years of age and older, pregnant women, and people with certain health conditions or a weakened immune system are at greatest risk. Each year thousands of people in the [facility] die from flu, and many more are hospitalized. Flu vaccine can: 
 keep you from getting flu, 
 make flu less severe if you do get it, and 
 keep you from spreading flu to your family and other people. 2. Inactivated and recombinant flu vaccines A dose of flu vaccine is recommended every flu season. Children 6 months through 6years of age may need two doses during the same flu season. Everyone else needs only one dose each flu season. Some inactivated flu vaccines contain a very small amount of a mercury-based preservative called thimerosal. Studies have not shown thimerosal in vaccines to be harmful, but flu vaccines that do not contain thimerosal are available. There is no live flu virus in flu shots. They cannot cause the flu. There are many flu viruses, and they are always changing. Each year a new flu vaccine is made to protect against three or four viruses that are likely to cause disease in the upcoming flu season. But even when the vaccine doesnt exactly match these viruses, it may still provide some protection Flu vaccine cannot prevent: 
 flu that is caused by a virus not covered by the vaccine, or 
 illnesses that look like flu but are not. It takes about 2 weeks for protection to develop after vaccination, and protection lasts through the flu season. 3. Some people should not get this vaccine Tell the person who is giving you the vaccine:  If you have any severe, life-threatening allergies. If you ever had a life-threatening allergic reaction after a dose of flu vaccine, or have a severe allergy to any part of this vaccine, you may be advised not to get vaccinated. Most, but not all, types of flu vaccine contain a small amount of egg protein.  If you ever had Guillain-Barré Syndrome (also called GBS). Some people with a history of GBS should not get this vaccine. This should be discussed with your doctor.  If you are not feeling well. It is usually okay to get flu vaccine when you have a mild illness, but you might be asked to come back when you feel better. 4. Risks of a vaccine reaction With any medicine, including vaccines, there is a chance of reactions. These are usually mild and go away on their own, but serious reactions are also possible. Most people who get a flu shot do not have any problems with it. Minor problems following a flu shot include:  soreness, redness, or swelling where the shot was given  hoarseness  sore, red or itchy eyes  cough  fever  aches  headache  itching  fatigue If these problems occur, they usually begin soon after the shot and last 1 or 2 days. More serious problems following a flu shot can include the following:  There may be a small increased risk of Guillain-Barré Syndrome (GBS) after inactivated flu vaccine. This risk has been estimated at 1 or 2 additional cases per million people vaccinated. This is much lower than the risk of severe complications from flu, which can be prevented by flu vaccine.  Young children who get the flu shot along with pneumococcal vaccine (PCV13) and/or DTaP vaccine at the same time might be slightly more likely to have a seizure caused by fever. Ask your doctor for more information. Tell your doctor if a child who is getting flu vaccine has ever had a seizure. Problems that could happen after any injected vaccine:  People sometimes faint after a medical procedure, including vaccination. Sitting or lying down for about 15 minutes can help prevent fainting, and injuries caused by a fall. Tell your doctor if you feel dizzy, or have vision changes or ringing in the ears.  Some people get severe pain in the shoulder and have difficulty moving the arm where a shot was given. This happens very rarely.  Any medication can cause a severe allergic reaction. Such reactions from a vaccine are very rare, estimated at about 1 in a million doses, and would happen within a few minutes to a few hours after the vaccination. As with any medicine, there is a very remote chance of a vaccine causing a serious injury or death. The safety of vaccines is always being monitored. For more information, visit: www.cdc.gov/vaccinesafety/ 
 
5. What if there is a serious reaction? What should I look for?  Look for anything that concerns you, such as signs of a severe allergic reaction, very high fever, or unusual behavior. Signs of a severe allergic reaction can include hives, swelling of the face and throat, difficulty breathing, a fast heartbeat, dizziness, and weakness  usually within a few minutes to a few hours after the vaccination. What should I do?  If you think it is a severe allergic reaction or other emergency that cant wait, call 9-1-1 and get the person to the nearest hospital. Otherwise, call your doctor.  Reactions should be reported to the Vaccine Adverse Event Reporting System (VAERS). Your doctor should file this report, or you can do it yourself through  the VAERS web site at www.vaers. [facility].gov, or by calling 2-227.960.1757. VAERS does not give medical advice. 6. The National Vaccine Injury Compensation Program 
 
The [facility] Vaccine Injury Compensation Program (VICP) is a federal program that was created to compensate people who may have been injured by certain vaccines. Persons who believe they may have been injured by a vaccine can learn about the program and about filing a claim by calling 1-881.261.8873 or visiting the [employer] [place] website at www.[facility].gov/vaccinecompensation. There is a time limit to file a claim for compensation. 7. How can I learn more?  Ask your healthcare provider. He or she can give you the vaccine package insert or suggest other sources of information.  Call your local or state health department.  Contact the Centers for Disease Control and Prevention (CDC): 
- Call 3-520.533.8634 (1-800-CDC-INFO) or 
- Visit CDCs website at www.cdc.gov/flu Vaccine Information Statement Inactivated Influenza Vaccine 8/7/2015 
[address] [place] [place] [address]-20 Department of [facility] and [employer] Centers for Disease Control and Prevention Office Use Only [facility] SERVICES! [facility] introduces Collective IP patient portal. Now you can access parts of your medical record, email your doctor's office, and request medication refills online. 1. In your internet browser, go to [email or web address] 2. Click on the First Time User? Click Here link in the Sign In box. You will see the New Member Sign Up page. 3. Enter your Collective IP Access Code exactly as it appears below. You will not need to use this code after youve completed the sign-up process. If you do not sign up before the expiration date, you must request a new code. · Collective IP Access Code: [email or web address] Expires: 11/1/2018  2:23 PM 
 
4. Enter the last four digits of your Social Security Number (xxxx) and Date of Birth (mm/dd/yyyy) as indicated and click Submit. You will be taken to the next sign-up page. 5. Create a Collective IP ID. This will be your Collective IP login ID and cannot be changed, so think of one that is secure and easy to remember. 6. Create a Collective IP password. You can change your password at any time. 7. Enter your Password Reset Question and Answer. This can be used at a later time if you forget your password. 8. Enter your e-mail address. You will receive e-mail notification when new information is available in [address] 9. Click Sign Up. You can now view and download portions of your medical record. 10. Click the Download Summary menu link to download a portable copy of your medical information. If you have questions, please visit the Frequently Asked Questions section of the Collect[employer] website. Remember, Collective IP is NOT to be used for urgent needs. For medical emergencies, dial 911. Now available from your iPhone and Android! Please provide this summary of care documentation to your next provider. Your primary care clinician is listed as Caffie Slice. If you have any questions after today's visit, please call [phone].

## 2018-10-05 NOTE — PROGRESS NOTES
1. Have you been to the ER, urgent care clinic since your last visit? Hospitalized since your last visit? No    2. Have you seen or consulted any other health care providers outside of the [address] [place] [address] since your last visit? Include any pap smears or colon screening. No  Physician order obtained. Patient completed adult immunization consent form. Allergies, contraindications and recommendations reviewed with patient. High dose seasonal influenza vaccine administered IM right deltoid. Patient tolerated well. Patient remained in office for 15 minutes after injection and no adverse reactions were noted.     Lot# [id number]  Exp: 05/31/2019     NDC# [id number]

## 2018-10-05 NOTE — PATIENT INSTRUCTIONS
Vaccine Information Statement    Influenza (Flu) Vaccine (Inactivated or Recombinant): What you need to know    Many Vaccine Information Statements are available in Slovak and other languages. See www.immunize.org/vis  Hojas de Información Sobre Vacunas están disponibles en Español y en muchos otros idiomas. Visite www.immunize.org/vis    1. Why get vaccinated? Influenza (flu) is a contagious disease that spreads around the [place] every year, usually between October and May. Flu is caused by influenza viruses, and is spread mainly by coughing, sneezing, and close contact. Anyone can get flu. Flu strikes suddenly and can last several days. Symptoms vary by age, but can include:   fever/chills   sore throat   muscle aches   fatigue   cough   headache    runny or stuffy nose    Flu can also lead to pneumonia and blood infections, and cause diarrhea and seizures in children. If you have a medical condition, such as heart or lung disease, flu can make it worse. Flu is more dangerous for some people. Infants and young children, people 72years of age and older, pregnant women, and people with certain health conditions or a weakened immune system are at greatest risk. Each year thousands of people in the [facility] die from flu, and many more are hospitalized. Flu vaccine can:   keep you from getting flu,   make flu less severe if you do get it, and   keep you from spreading flu to your family and other people. 2. Inactivated and recombinant flu vaccines    A dose of flu vaccine is recommended every flu season. Children 6 months through 6years of age may need two doses during the same flu season. Everyone else needs only one dose each flu season.        Some inactivated flu vaccines contain a very small amount of a mercury-based preservative called thimerosal. Studies have not shown thimerosal in vaccines to be harmful, but flu vaccines that do not contain thimerosal are available. There is no live flu virus in flu shots. They cannot cause the flu. There are many flu viruses, and they are always changing. Each year a new flu vaccine is made to protect against three or four viruses that are likely to cause disease in the upcoming flu season. But even when the vaccine doesnt exactly match these viruses, it may still provide some protection    Flu vaccine cannot prevent:   flu that is caused by a virus not covered by the vaccine, or   illnesses that look like flu but are not. It takes about 2 weeks for protection to develop after vaccination, and protection lasts through the flu season. 3. Some people should not get this vaccine    Tell the person who is giving you the vaccine:     If you have any severe, life-threatening allergies. If you ever had a life-threatening allergic reaction after a dose of flu vaccine, or have a severe allergy to any part of this vaccine, you may be advised not to get vaccinated. Most, but not all, types of flu vaccine contain a small amount of egg protein.  If you ever had Guillain-Barré Syndrome (also called GBS). Some people with a history of GBS should not get this vaccine. This should be discussed with your doctor.  If you are not feeling well. It is usually okay to get flu vaccine when you have a mild illness, but you might be asked to come back when you feel better. 4. Risks of a vaccine reaction    With any medicine, including vaccines, there is a chance of reactions. These are usually mild and go away on their own, but serious reactions are also possible. Most people who get a flu shot do not have any problems with it.      Minor problems following a flu shot include:    soreness, redness, or swelling where the shot was given     hoarseness   sore, red or itchy eyes   cough   fever   aches   headache   itching   fatigue  If these problems occur, they usually begin soon after the shot and last 1 or 2 days. More serious problems following a flu shot can include the following:     There may be a small increased risk of Guillain-Barré Syndrome (GBS) after inactivated flu vaccine. This risk has been estimated at 1 or 2 additional cases per million people vaccinated. This is much lower than the risk of severe complications from flu, which can be prevented by flu vaccine.  Young children who get the flu shot along with pneumococcal vaccine (PCV13) and/or DTaP vaccine at the same time might be slightly more likely to have a seizure caused by fever. Ask your doctor for more information. Tell your doctor if a child who is getting flu vaccine has ever had a seizure. Problems that could happen after any injected vaccine:      People sometimes faint after a medical procedure, including vaccination. Sitting or lying down for about 15 minutes can help prevent fainting, and injuries caused by a fall. Tell your doctor if you feel dizzy, or have vision changes or ringing in the ears.  Some people get severe pain in the shoulder and have difficulty moving the arm where a shot was given. This happens very rarely.  Any medication can cause a severe allergic reaction. Such reactions from a vaccine are very rare, estimated at about 1 in a million doses, and would happen within a few minutes to a few hours after the vaccination. As with any medicine, there is a very remote chance of a vaccine causing a serious injury or death. The safety of vaccines is always being monitored. For more information, visit: www.cdc.gov/vaccinesafety/    5. What if there is a serious reaction? What should I look for?  Look for anything that concerns you, such as signs of a severe allergic reaction, very high fever, or unusual behavior.     Signs of a severe allergic reaction can include hives, swelling of the face and throat, difficulty breathing, a fast heartbeat, dizziness, and weakness - usually within a few minutes to a few hours after the vaccination. What should I do?  If you think it is a severe allergic reaction or other emergency that cant wait, call 9-1-1 and get the person to the nearest hospital. Otherwise, call your doctor.  Reactions should be reported to the Vaccine Adverse Event Reporting System (VAERS). Your doctor should file this report, or you can do it yourself through  the VAERS web site at www.vaers. [facility].gov, or by calling 1-771.765.9783. VAERS does not give medical advice. 6. The National Vaccine Injury Compensation Program    The [facility] Vaccine Injury Compensation Program (VICP) is a federal program that was created to compensate people who may have been injured by certain vaccines. Persons who believe they may have been injured by a vaccine can learn about the program and about filing a claim by calling 3-266.419.3578 or visiting the [employer] website at www.[facility].gov/vaccinecompensation. There is a time limit to file a claim for compensation. 7. How can I learn more?  Ask your healthcare provider. He or she can give you the vaccine package insert or suggest other sources of information.  Call your local or state health department.  Contact the Centers for Disease Control and Prevention (CDC):  - Call 9-524.659.5104 (1-800-CDC-INFO) or  - Visit CDCs website at www.cdc.gov/flu    Vaccine Information Statement   Inactivated Influenza Vaccine   8/7/2015  42 JAD Juarez 437GV-50    Department of Health and Human Services  Centers for Disease Control and Prevention    Office Use Only

## 2018-10-05 NOTE — PROGRESS NOTES
Portia Marks, 59 y.o.,  female    SUBJECTIVE  Ff-up, 2nd visit. Pt with significant cardiac history. Chronic Afib, MVstenosis s/p  valve replacement 2013, CHF. Doing fairly well, denies cp, sob, leg edema, syncope. She is on chronic anticoagulation with warfarin, and rate controlled on BB. Following dr. Pieter Angeles. Asthma- she reports using albuterol about twice a day, has wheezing episodes and sob. She was on unrecalled disk maintenance inhaler previously which she discontinued due to sensation of granules in her mouth. GERD- takes nexium otc prn for heartburn    HL- reports some med noncompliance with simvastatin, forgets to take, LDL trending up    C/o mosquito bites on R chest with itching, reports otc hydrocortisone ineffective. ROS:  See HPI, all others negative        Patient Active Problem List   Diagnosis Code    Rheumatic valvular disease I09.1    Chronic diastolic congestive heart failure (HCC) I50.32    Atrial fibrillation (HCC) I48.91    Long term current use of anticoagulant therapy Z79.01    Benign hypertensive heart disease with heart failure (HCC) I11.0    Hyperlipidemia E78.5    Advanced directives, counseling/discussion Z71.89    Mild intermittent asthma without complication L83.02       Current Outpatient Prescriptions   Medication Sig Dispense Refill    triamcinolone acetonide (KENALOG) 0.025 % ointment Apply  to affected area two (2) times a day. use thin layer 30 g 0    fluticasone (FLOVENT HFA) 110 mcg/actuation inhaler Take 2 Puffs by inhalation every twelve (12) hours. 1 Inhaler 0    KLOR-CON M20 20 mEq tablet TAKE 1 TAB BY MOUTH DAILY. 30 Tab 6    losartan (COZAAR) 100 mg tablet TAKE 1 TABLET BY MOUTH EVERY DAY 90 Tab 0    furosemide (LASIX) 40 mg tablet TAKE 1 TABLET BY MOUTH EVERY DAY 90 Tab 0    warfarin (COUMADIN) 5 mg tablet TAKE 5MG DAILY EXCEPT 2.5MG ON TUES, THUR, AND SAT.  OR AS DIRECTED BY OUR OFFICE 30 Tab 5    sodium chloride (SALINE NASAL) 0.65 % nasal squeeze bottle 1 Spray as needed for Congestion.  albuterol (VENTOLIN HFA) 90 mcg/actuation inhaler Take 2 Puffs by inhalation daily. 1 Inhaler 0    loratadine-pseudoephedrine (CLARITIN-D 24 HOUR)  mg per tablet Take 1 Tab by mouth daily.  metoprolol succinate (TOPROL-XL) 100 mg tablet Take 1 Tab by mouth daily. 30 Tab 6    hydroCHLOROthiazide (HYDRODIURIL) 25 mg tablet Take 1 Tab by mouth daily. 30 Tab 6    simvastatin (ZOCOR) 20 mg tablet Take 1 Tab by mouth nightly. 30 Tab 6    esomeprazole (NEXIUM) 20 mg capsule Take 1 Cap by mouth daily. 30 Cap 3    fluticasone (FLONASE) 50 mcg/actuation nasal spray 2 Sprays by Both Nostrils route daily. 1 Bottle 0    melatonin tab tablet Take 10 mg by mouth nightly. Allergies   Allergen Reactions    Morphine Rash and Itching    Norco [Hydrocodone-Acetaminophen] Itching       Past Medical History:   Diagnosis Date    Aortic valve disorders 1/13/2011    Asthma     Atrial fibrillation ([facility] [address] 1/13/2011    Congestive heart failure, unspecified March 2005    Cleared quickly with Lasix therapy    Echocardiogram 01/10/2012    A-fib. EF 45%. Mild RVE. RVSP 45-50. Massive LAE. Mod TAMI. Mod MS. Severe MR (mean grad 10). Mild-mod AI. Mild TR. Pulmonic systolic flow reversal.  Mild IVCE.  Hypertension     Iron deficiency anemias     Long term (current) use of anticoagulants 2/23/2011    Murmur 1/2011    Grade I-II/VI systolic ejection murmur along left sternal border, with radiation to the pulmonic area.  S/P cardiac cath 02/24/2012    Minimal coronary artery disease. Mild LVE. EF 55%. Mod MR. Mod-severe MS. RA 15.  RV 55/16. PA 58/34. W 36.  CO/CI 3.5/1.9 (TD); 3.61/1.9 ([place]). R to L shunt suggested.     Wears glasses     Weight gain        Social History     Social History    Marital status:      Spouse name: N/A    Number of children: N/A    Years of education: N/A     Occupational History    Not on file. Social History Main Topics    Smoking status: Former Smoker    Smokeless tobacco: Never Used    Alcohol use 0.6 oz/week     1 Glasses of wine per week      Comment: occassionally    Drug use: No    Sexual activity: Yes     Partners: Male     Birth control/ protection: None     Other Topics Concern    Not on file     Social History Narrative       Family History   Problem Relation Age of Onset    Heart Surgery Father      Open-Heart Surgery    Other Father      Venous Thromboembolism    Heart Disease Mother      Enlarged Heart    Hypertension Mother     Diabetes Mother          OBJECTIVE    Physical Exam:     Visit Vitals    /74 (BP 1 Location: Left arm, BP Patient Position: Sitting)    Pulse 83    Temp 97.8 °F (36.6 °C) (Oral)    Resp 16    Ht 5' 7\" (1.702 m)    Wt 190 lb (86.2 kg)    SpO2 98%    BMI 29.76 kg/m2       General: alert, well-appearing,  in no apparent distress or pain  Head: atraumatic.  Non-tender maxillary and frontal sinuses  Eyes: Lids with no discharge, no matting, conjunctivae clear and non injected, full EOMs, PERLLA  Ears: pinna non-tender, external auditory canal patent, TM intact  Mouth/throat:tonsils non enlarged, pharynx non erythematous and no lesion, nasal mucosa normal  Neck: supple, no adenopathy palpated  CVS: irregular, mech valve click, + murmur  Lungs:clear to ausculation bilaterally, no crackles, wheezing or rhonchi noted  Abdomen: normoactive bowel sounds, soft, non-tender  Extremities: no edema, no cyanosis, MSK grossly normal  Skin:+ pinkish papules on R breast  Psych:  mood and affect normal        ASSESSMENT/PLAN  Diagnoses and all orders for this visit:    Benign hypertensive heart disease with heart failure (HCC)  Controlled  Cont cozaar, metoprolol, hctz    Atrial fibrillation, unspecified type (HCC)  Rate controlled, INR therapeutic 2.8  (8.18)  On BB, coumadin    Hyperlipidemia, unspecified hyperlipidemia type  Suboptimal, med compliance  Reminded to take simvastatin nightly  Will plan to recheck in 3 months 1/2019    Long term current use of anticoagulant therapy  Cards following    Rheumatic valvular disease     Moderate persistent asthma without complication  stast ICS flovent   Cont prn albuterol  Will monitor    Impaired fasting glucose  Tlcs, monitoring  Plan for a1c/cmp in 3 months, to be ordered on next visit    Encounter for vaccine  Flu vaccine given    Insect bite  Triamcinolone ointment bid    BMI 29  Encourage wt loss    Follow-up Disposition:  Return in about 4 weeks (around 11/2/2018), or if symptoms worsen or fail to improve. for asthma ff-up     Patient understands plan of care. Patient has provided input and agrees with goals.

## 2018-10-11 ENCOUNTER — TELEPHONE (OUTPATIENT)
Dept: FAMILY MEDICINE CLINIC | Age: 58
End: 2018-10-11

## 2018-10-11 NOTE — TELEPHONE ENCOUNTER
Pt states that the RX flovent is too expensive and would like to know if there is something cheaper that can be called in. Please advise.

## 2018-10-12 NOTE — TELEPHONE ENCOUNTER
Patient identified with 2 identifiers (name and ).   Patient aware that medication was switched to qvar

## 2018-10-17 NOTE — TELEPHONE ENCOUNTER
[facility] pharmacy faxed request stating alternative requested for QVAR as not available. Request to change to Redihaler.

## 2018-10-18 NOTE — TELEPHONE ENCOUNTER
ST DAILY TREATMENT NOTE    Patient Name: Suellen Luna  Date:2017  : 1933  [x]  Patient  Verified  [place]or: [place] MED[place] / Plan: [place] MEDICARE PART A & B / Product Type: Medicare /   In time:11:04  Out time: 11:30     Patient was late for her 10:45 appointment  Total Treatment Time (min): 24  Visit #: 3 of 16    Treatment Diagnosis: Dysphagia, unspecified [R13.10]    SUBJECTIVE  Pain Level (0-10 scale): 0  Any medication changes, allergies to medications, adverse drug reactions, diagnosis change, or new procedure performed?: [] No    [x] Yes (see summary sheet for update)    Subjective functional status/changes:   [] No changes reported  She completed her antibiotic and she said that she did not think that it did not do any good. She said that she has lost 20 pounds since Dec 1st. She had greatly decreased citrus and is not eating spicy foods she stated. OBJECTIVE  Treatment provided includes:  Increase/Improve:  []  Voice Quality []  Cognitive Linguistic Skills []  Laryngeal/Pharyngeal Exercises   []  Vocal Loudness []  Reading Comprehension [x]  Swallowing Skills    []  Vocal Cord Function []  Auditory Comprehension []  Oral Motor Skills   []  Resonance []  Writing Skills [x]  Compensatory strategies    []  Speech Intelligibility []  Expressive Language []     []  Breath Support/Coord. []  Receptive language []    []  Articulation [x]  Safety Awareness []    []  Fluency []  Word Retrieval []        Treatment Provided:  Teaching about GERD/LPR along with handouts and answering her questions about individual foods. Teaching about safe swallowing guidelines. Patient/Caregiver  Education: [] Review HEP      Teaching about GERD/LPR along with handouts and answering her questions about individual foods     HEP/Handouts given:  Use the safe swallowing guidelines and continue to follow GERD guidelines.    Other Objective/Functional Measures:    Pain Level (0-10 scale) post treatment: erx redihaler. pls notify pt. 0    ASSESSMENT  Patient is making positive changes regarding her diet to decrease foods that aggravate GERD. All of her questions were answered today. []  See Plan of Care  []  See progress note/recertification  []  See Discharge Summary    Patient will continue to benefit from skilled therapy to address remaining functional deficits: pharyngeal dysphagia    Progress towards goals / Updated goals:  1. Patient will be able to state and follow compensatory swallowing strategies (sitting upright for all oral intake and for at least 30 minutes afterward meals, decrease bolus size, extras dry swallows alternate solids and liquids, meds in applesauce cyclical ingestion, ) for po intake for liquids and solids with 8/10 trials - 80% accuracy with min cues    progressing  3. Patient will be able to state GERD diet and lifestyle guidelines with 80% acc with questions and answer format with min cues. progressing       Long Term Goals: To be accomplished in 10-12 weeks   patient will:          1. Tolerate the least restrictive diet consistency utilizing compensatory swallow techniques((sitting upright for all oral intake and for at least 30 minutes afterward meals, decrease bolus size, extras dry swallows alternate solids and liquids, meds in applesauce cyclical ingestion) , without overt s/sx aspiration/distress in 8/10 trials with mod I        2. Demonstrate the ability to adequately self-monitor swallowing skills and perform   appropriate compensatory techniques to reduce s/sx of aspiration and to safely consume least-restrictive diet 8/10 trials 80% accuracy.       PLAN  [x]  Continue plan of care  []  Modify Goals/Treatment Plan      []  Discharge due to:  [] Other: slp called Dr. Fermin Fabian office regarding POC not signed and second request for [facility]ANNA vu. MD is still out of   town from the holidays and won't be back until next week. The message will be given to her.    Aaron Le, CRIS 1/4/2017  11:05 AM    Future Appointments  Date Time Provider Roverto Meza   1/6/2017 11:45 AM CRIS Rocha [facility]   1/12/2017 10:00 AM CRIS Rocha [facility]   1/18/2017 10:00 AM CRIS Rocha [facility]   1/25/2017 10:00 AM CRIS Rocha [facility]   2/1/2017 10:00 AM CRIS Rocha [facility]   2/8/2017 10:00 AM CRIS Rocha [facility]   2/15/2017 10:00 AM CRIS Rocha [facility]   2/22/2017 10:00 AM CRIS Rocha [facility]

## 2018-10-19 ENCOUNTER — TELEPHONE (OUTPATIENT)
Dept: FAMILY MEDICINE CLINIC | Age: 58
End: 2018-10-19

## 2018-10-19 NOTE — TELEPHONE ENCOUNTER
Pt states that the inhaler that was sent in for her is out of stock and would like to know if something else can be called in. [phone].  Please advise

## 2018-10-22 DIAGNOSIS — J45.40 MODERATE PERSISTENT ASTHMA WITHOUT COMPLICATION: ICD-10-CM

## 2018-10-22 RX ORDER — ALBUTEROL SULFATE 90 UG/1
2 AEROSOL, METERED RESPIRATORY (INHALATION) DAILY
Qty: 1 INHALER | Refills: 0 | Status: SHIPPED | OUTPATIENT
Start: 2018-10-22 | End: 2019-02-20 | Stop reason: SDUPTHER

## 2018-10-22 NOTE — TELEPHONE ENCOUNTER
Patient requesting refill of her Ventolin inhaler. Please send to [facility] pharmacy. LOV: 10/5/18. Next appt: 11/2/2018.

## 2018-10-22 NOTE — TELEPHONE ENCOUNTER
Contacted patient and verified identity using name and date of birth (2- identifiers). Patient advised Martha Spears rx sent to pharmacy. Patient voiced understanding.

## 2018-11-02 ENCOUNTER — OFFICE VISIT (OUTPATIENT)
Dept: FAMILY MEDICINE CLINIC | Age: 58
End: 2018-11-02

## 2018-11-02 VITALS
RESPIRATION RATE: 16 BRPM | WEIGHT: 189 LBS | HEART RATE: 75 BPM | DIASTOLIC BLOOD PRESSURE: 62 MMHG | OXYGEN SATURATION: 98 % | HEIGHT: 67 IN | BODY MASS INDEX: 29.66 KG/M2 | TEMPERATURE: 98.2 F | SYSTOLIC BLOOD PRESSURE: 106 MMHG

## 2018-11-02 DIAGNOSIS — E78.5 HYPERLIPIDEMIA, UNSPECIFIED HYPERLIPIDEMIA TYPE: ICD-10-CM

## 2018-11-02 DIAGNOSIS — I50.32 CHRONIC DIASTOLIC CONGESTIVE HEART FAILURE (HCC): ICD-10-CM

## 2018-11-02 DIAGNOSIS — J45.20 MILD INTERMITTENT ASTHMA WITHOUT COMPLICATION: Primary | ICD-10-CM

## 2018-11-02 DIAGNOSIS — I48.91 ATRIAL FIBRILLATION, UNSPECIFIED TYPE (HCC): ICD-10-CM

## 2018-11-02 DIAGNOSIS — I09.1 RHEUMATIC VALVULAR DISEASE: ICD-10-CM

## 2018-11-02 NOTE — PATIENT INSTRUCTIONS
Hyperlipidemia: After Your Visit  Your Care Instructions  Hyperlipidemia is too much fat in your blood. The body has several kinds of fat, including cholesterol and triglycerides. Your body needs fat for many things, such as making new cells. But too much fat in your blood increases your chances of having a heart attack or stroke. You may be able to lower your cholesterol and triglycerides with a heart-healthy diet, exercise, and if needed, medicine. Your doctor may want you to try lifestyle changes first to see whether they lower the fat in your blood. You may need to take medicine if lifestyle changes do not lower the fat in your blood enough. Follow-up care is a key part of your treatment and safety. Be sure to make and go to all appointments, and call your doctor if you are having problems. Its also a good idea to know your test results and keep a list of the medicines you take. How can you care for yourself at home? Take your medicines  · Take your medicines exactly as prescribed. Call your doctor if you think you are having a problem with your medicine. · If you take medicine to lower your cholesterol, go to follow-up visits. You will need to have blood tests. · Do not take large doses of niacin, which is a B vitamin, while taking medicine called statins. It may increase the chance of muscle pain and liver problems. · Talk to your doctor about avoiding grapefruit juice if you are taking statins. Grapefruit juice can raise the level of this medicine in your blood. This could increase side effects. Eat more fruits, vegetables, and fiber  · Fruits and vegetables have lots of nutrients that help protect against heart disease, and they have little--if any--fat. Try to eat at least five servings a day. Dark green, deep orange, or yellow fruits and vegetables are healthy choices. · Keep carrots, celery, and other veggies handy for snacks.  Buy fruit that is in season and store it where you can see it so that you will be tempted to eat it. Cook dishes that have a lot of veggies in them, such as stir-fries and soups. · Foods high in fiber may reduce your cholesterol and provide important vitamins and minerals. High-fiber foods include whole-grain cereals and breads, oatmeal, beans, brown rice, citrus fruits, and apples. · Buy whole-grain breads and cereals instead of white bread and pastries. Limit saturated fat  · Read food labels and try to avoid saturated fat and trans fat. They increase your risk of heart disease. · Use olive or canola oil when you cook. Try cholesterol-lowering spreads, such as Benecol or Take Control. · Bake, broil, grill, or steam foods instead of frying them. · Limit the amount of high-fat meats you eat, including hot dogs and sausages. Cut out all visible fat when you prepare meat. · Eat fish, skinless poultry, and soy products such as tofu instead of high-fat meats. Soybeans may be especially good for your heart. Eat at least two servings of fish a week. Certain fish, such as salmon, contain omega-3 fatty acids, which may help reduce your risk of heart attack. · Choose low-fat or fat-free milk and dairy products. Get exercise, limit alcohol, and quit smoking  · Get more exercise. Work with your doctor to set up an exercise program. Even if you can do only a small amount, exercise will help you get stronger, have more energy, and manage your weight and your stress. Walking is an easy way to get exercise. Gradually increase the amount you walk every day. Aim for at least 30 minutes on most days of the week. You also may want to swim, bike, or do other activities. · Limit alcohol to no more than 2 drinks a day for men and 1 drink a day for women. · Do not smoke. If you need help quitting, talk to your doctor about stop-smoking programs and medicines. These can increase your chances of quitting for good. When should you call for help?   Call 911 anytime you think you may need emergency care. For example, call if:  · You have symptoms of a heart attack. These may include:  ¨ Chest pain or pressure, or a strange feeling in the chest.  ¨ Sweating. ¨ Shortness of breath. ¨ Nausea or vomiting. ¨ Pain, pressure, or a strange feeling in the back, neck, jaw, or upper belly or in one or both shoulders or arms. ¨ Lightheadedness or sudden weakness. ¨ A fast or irregular heartbeat. After you call 911, the  may tell you to chew 1 adult-strength or 2 to 4 low-dose aspirin. Wait for an ambulance. Do not try to drive yourself. · You have signs of a stroke. These may include:  ¨ Sudden numbness, paralysis, or weakness in your face, arm, or leg, especially on only one side of your body. ¨ New problems with walking or balance. ¨ Sudden vision changes. ¨ Drooling or slurred speech. ¨ New problems speaking or understanding simple statements, or feeling confused. ¨ A sudden, severe headache that is different from past headaches. · You passed out (lost consciousness). Call your doctor now or seek immediate medical care if:  · You have muscle pain or weakness. Watch closely for changes in your health, and be sure to contact your doctor if:  · You are very tired. · You have an upset stomach, gas, constipation, or belly pain or cramps. Where can you learn more? Go to REALTIME.CO.be  Enter C406 in the search box to learn more about \"Hyperlipidemia: After Your Visit. \"   © 3001-6166 [employer]. Care instructions adapted under license by [employer] (which disclaims liability or warranty for this information). This care instruction is for use with your licensed healthcare professional. If you have questions about a medical condition or this instruction, always ask your healthcare professional. [place] [address] any warranty or liability for your use of this information.   Content Version: 0.0.803065; Last Revised: October 13, 2011

## 2018-11-02 NOTE — PROGRESS NOTES
[place], 59 y.o.,  female    SUBJECTIVE  Ff-up     Asthma- started qvar for maintenance and feels much better, less frequent albuterol. Pt with significant cardiac history. Chronic Afib, MVstenosis s/p  valve replacement 2013, CHF. Doing fairly well, denies cp, sob, leg edema, syncope. She is on chronic anticoagulation with warfarin, and rate controlled on BB. Following dr. Antonina Tamayo. GERD- takes nexium otc prn for heartburn    HL- reports better compliance with simvastatin    ROS:  See HPI, all others negative        Patient Active Problem List   Diagnosis Code    Rheumatic valvular disease I09.1    Chronic diastolic congestive heart failure (HCC) I50.32    Atrial fibrillation (HCC) I48.91    Long term current use of anticoagulant therapy Z79.01    Benign hypertensive heart disease with heart failure (HCC) I11.0    Hyperlipidemia E78.5    Advanced directives, counseling/discussion Z71.89    Mild intermittent asthma without complication K68.80       Current Outpatient Medications   Medication Sig Dispense Refill    flunisolide (AEROSPAN) 80 mcg/actuation HFAA Take 2 Puffs by inhalation two (2) times a day. 1 Inhaler 0    albuterol (VENTOLIN HFA) 90 mcg/actuation inhaler Take 2 Puffs by inhalation daily. 1 Inhaler 0    beclomethasone dipropionate (QVAR REDIHALER HFA) 40 mcg/actuation HFAb inhaler Take 2 Puffs by inhalation two (2) times a day. 1 Inhaler 0    triamcinolone acetonide (KENALOG) 0.025 % ointment Apply  to affected area two (2) times a day. use thin layer 30 g 0    KLOR-CON M20 20 mEq tablet TAKE 1 TAB BY MOUTH DAILY. 30 Tab 6    losartan (COZAAR) 100 mg tablet TAKE 1 TABLET BY MOUTH EVERY DAY 90 Tab 0    furosemide (LASIX) 40 mg tablet TAKE 1 TABLET BY MOUTH EVERY DAY 90 Tab 0    warfarin (COUMADIN) 5 mg tablet TAKE 5MG DAILY EXCEPT 2.5MG ON TUES, THUR, AND SAT.  OR AS DIRECTED BY OUR OFFICE 30 Tab 5    sodium chloride (SALINE NASAL) 0.65 % nasal squeeze bottle 1 Spray as needed for Congestion.  loratadine-pseudoephedrine (CLARITIN-D 24 HOUR)  mg per tablet Take 1 Tab by mouth daily.  fluticasone (FLONASE) 50 mcg/actuation nasal spray 2 Sprays by Both Nostrils route daily. 1 Bottle 0    metoprolol succinate (TOPROL-XL) 100 mg tablet Take 1 Tab by mouth daily. 30 Tab 6    hydroCHLOROthiazide (HYDRODIURIL) 25 mg tablet Take 1 Tab by mouth daily. 30 Tab 6    simvastatin (ZOCOR) 20 mg tablet Take 1 Tab by mouth nightly. 30 Tab 6    esomeprazole (NEXIUM) 20 mg capsule Take 1 Cap by mouth daily. 30 Cap 3    melatonin tab tablet Take 10 mg by mouth nightly.  simvastatin (ZOCOR) 20 mg tablet TAKE 1 TAB BY MOUTH NIGHTLY. 90 Tab 0       Allergies   Allergen Reactions    Morphine Rash and Itching    Norco [Hydrocodone-Acetaminophen] Itching       Past Medical History:   Diagnosis Date    Aortic valve disorders 1/13/2011    Asthma     Atrial fibrillation ([facility] [address] 1/13/2011    Congestive heart failure, unspecified March 2005    Cleared quickly with Lasix therapy    Echocardiogram 01/10/2012    A-fib. EF 45%. Mild RVE. RVSP 45-50. Massive LAE. Mod TAMI. Mod MS. Severe MR (mean grad 10). Mild-mod AI. Mild TR. Pulmonic systolic flow reversal.  Mild IVCE.  Hypertension     Iron deficiency anemias     Long term (current) use of anticoagulants 2/23/2011    Murmur 1/2011    Grade I-II/VI systolic ejection murmur along left sternal border, with radiation to the pulmonic area.  S/P cardiac cath 02/24/2012    Minimal coronary artery disease. Mild LVE. EF 55%. Mod MR. Mod-severe MS. RA 15.  RV 55/16. PA 58/34. W 36.  CO/CI 3.5/1.9 (TD); 3.61/1.9 (Dequan). R to L shunt suggested.     Wears glasses     Weight gain        Social History     Socioeconomic History    Marital status:      Spouse name: Not on file    Number of children: Not on file    Years of education: Not on file    Highest education level: Not on file   Social Needs    Financial resource strain: Not on file    Food insecurity - worry: Not on file    Food insecurity - inability: Not on file   Speakap needs - medical: Not on file   Speakap needs - non-medical: Not on file   Occupational History    Not on file   Tobacco Use    Smoking status: Former Smoker    Smokeless tobacco: Never Used   Substance and Sexual Activity    Alcohol use:  Yes     Alcohol/week: 0.6 oz     Types: 1 Glasses of wine per week     Comment: occassionally    Drug use: No    Sexual activity: Yes     Partners: Male     Birth control/protection: None   Other Topics Concern    Not on file   Social History Narrative    Not on file       Family History   Problem Relation Age of Onset    Heart Surgery Father         Open-Heart Surgery    Other Father         Venous Thromboembolism    Heart Disease Mother         Enlarged Heart    Hypertension Mother     Diabetes Mother          OBJECTIVE    Physical Exam:     Visit Vitals  /62 (BP 1 Location: Left arm, BP Patient Position: Sitting)   Pulse 75   Temp 98.2 °F (36.8 °C) (Oral)   Resp 16   Ht 5' 7\" (1.702 m)   Wt 189 lb (85.7 kg)   SpO2 98%   BMI 29.60 kg/m²       General: alert, well-appearing,  in no apparent distress or pain  CVS: irregular, mech valve click, + murmur  Lungs:clear to ausculation bilaterally, no crackles, wheezing or rhonchi noted  Extremities: no edema, no cyanosis, MSK grossly normal  Psych:  mood and affect normal      ASSESSMENT/PLAN  Diagnoses and all orders for this visit:    Asthma mild intermittent   Improved   Cont qvar, prn albuterol  Flu vaccine utd  Offer PCV vaccine on next visit    Benign hypertensive heart disease with heart failure (HCC)  Controlled  Cont cozaar, metoprolol, hctz    Atrial fibrillation, unspecified type (HCC)  Rate controlled, INR therapeutic 2.8  (8.18)  On BB, coumadin    Hyperlipidemia, unspecified hyperlipidemia type  Suboptimal, improved  med compliance  Cont simvastatin  Recheck cmp/lipid panel in 3 months    Long term current use of anticoagulant therapy  Cards following    Rheumatic valvular disease     Moderate persistent asthma without complication  stast ICS flovent   Cont prn albuterol  Will monitor    Impaired fasting glucose  Tlcs, monitoring  CMP in 3 months    BMI 29  Encourage wt loss    Follow-up Disposition:  Return in about 3 months (around 2/2/2019), or if symptoms worsen or fail to improve. for asthma ff-up     Patient understands plan of care. Patient has provided input and agrees with goals.

## 2018-11-09 ENCOUNTER — ANTI-COAG VISIT (OUTPATIENT)
Dept: CARDIOLOGY CLINIC | Age: 58
End: 2018-11-09

## 2018-11-09 DIAGNOSIS — Z79.01 LONG TERM CURRENT USE OF ANTICOAGULANT THERAPY: Primary | ICD-10-CM

## 2018-11-09 DIAGNOSIS — I48.91 ATRIAL FIBRILLATION, UNSPECIFIED TYPE (HCC): ICD-10-CM

## 2018-11-09 LAB
INR BLD: 2.3
PT POC: 27.7 SECONDS
VALID INTERNAL CONTROL?: YES

## 2018-11-09 NOTE — PROGRESS NOTES
Verbal order and read back per Eliana Phan NP Patient is within therapeutic range Continue Coumadin 5mg daily except 2.5mg on Tues, Thur, and Sat Recheck 4 weeks This has been fully explained to the patient, who indicates understanding.

## 2018-12-14 ENCOUNTER — TELEPHONE (OUTPATIENT)
Dept: CARDIOLOGY CLINIC | Age: 58
End: 2018-12-14

## 2018-12-14 DIAGNOSIS — Z79.01 LONG TERM CURRENT USE OF ANTICOAGULANT THERAPY: Primary | ICD-10-CM

## 2018-12-14 DIAGNOSIS — I48.91 ATRIAL FIBRILLATION, UNSPECIFIED TYPE (HCC): ICD-10-CM

## 2018-12-18 DIAGNOSIS — I11.0 BENIGN HYPERTENSIVE HEART DISEASE WITH HEART FAILURE (HCC): ICD-10-CM

## 2018-12-18 NOTE — TELEPHONE ENCOUNTER
This patient contacted office for the following prescriptions to be filled:    Medication requested :   Requested Prescriptions     Pending Prescriptions Disp Refills    metoprolol succinate (TOPROL-XL) 100 mg tablet 30 Tab 6     Sig: Take 1 Tab by mouth daily.  hydroCHLOROthiazide (HYDRODIURIL) 25 mg tablet 30 Tab 6     Sig: Take 1 Tab by mouth daily. PCP:  [address] [place] or Print: [facility]  Mail order or Local pharmacy: [phone]    Scheduled appointment if not seen by current providers in office: LOV 11/2/18, next appt 2/1/19    Patient states she does not know the NP who last filled it, she cherri she was in the middle of transitioning between doctors and believes that was a covering provider.

## 2018-12-20 RX ORDER — HYDROCHLOROTHIAZIDE 25 MG/1
25 TABLET ORAL DAILY
Qty: 30 TAB | Refills: 6 | Status: SHIPPED | OUTPATIENT
Start: 2018-12-20 | End: 2019-01-11

## 2018-12-20 RX ORDER — METOPROLOL SUCCINATE 100 MG/1
100 TABLET, EXTENDED RELEASE ORAL DAILY
Qty: 30 TAB | Refills: 6 | Status: SHIPPED | OUTPATIENT
Start: 2018-12-20 | End: 2019-07-25 | Stop reason: SDUPTHER

## 2018-12-20 NOTE — PROGRESS NOTES
1. Have you been to the ER, urgent care clinic since your last visit? Hospitalized since your last visit? No     2. Have you seen or consulted any other health care providers outside of the [address] [place] [address] since your last visit? Include any pap smears or colon screening.  No No

## 2018-12-20 NOTE — TELEPHONE ENCOUNTER
Patient called again to check on this refill, she is not completely out but only has 4 or 5 left but is going out of town and this will not last her.

## 2019-01-05 ENCOUNTER — APPOINTMENT (OUTPATIENT)
Dept: GENERAL RADIOLOGY | Age: 59
DRG: 292 | End: 2019-01-05
Attending: EMERGENCY MEDICINE
Payer: MEDICARE

## 2019-01-05 ENCOUNTER — HOSPITAL ENCOUNTER (INPATIENT)
Age: 59
LOS: 6 days | Discharge: HOME HEALTH | DRG: 292 | End: 2019-01-11
Attending: EMERGENCY MEDICINE | Admitting: INTERNAL MEDICINE
Payer: MEDICARE

## 2019-01-05 DIAGNOSIS — I48.20 CHRONIC ATRIAL FIBRILLATION (HCC): ICD-10-CM

## 2019-01-05 DIAGNOSIS — I21.4 NSTEMI (NON-ST ELEVATED MYOCARDIAL INFARCTION) (HCC): Primary | ICD-10-CM

## 2019-01-05 PROBLEM — R07.9 CHEST PAIN: Status: ACTIVE | Noted: 2019-01-05

## 2019-01-05 LAB
ALBUMIN SERPL-MCNC: 3.6 G/DL (ref 3.4–5)
ALBUMIN/GLOB SERPL: 0.8 {RATIO} (ref 0.8–1.7)
ALP SERPL-CCNC: 69 U/L (ref 45–117)
ALT SERPL-CCNC: 35 U/L (ref 13–56)
ANION GAP SERPL CALC-SCNC: 7 MMOL/L (ref 3–18)
APTT PPP: 32.7 SEC (ref 23–36.4)
AST SERPL-CCNC: 33 U/L (ref 15–37)
ATRIAL RATE: 375 BPM
BASOPHILS # BLD: 0 K/UL (ref 0–0.1)
BASOPHILS NFR BLD: 1 % (ref 0–2)
BILIRUB SERPL-MCNC: 0.6 MG/DL (ref 0.2–1)
BUN SERPL-MCNC: 17 MG/DL (ref 7–18)
BUN/CREAT SERPL: 15 (ref 12–20)
CALCIUM SERPL-MCNC: 9.1 MG/DL (ref 8.5–10.1)
CALCULATED R AXIS, ECG10: 53 DEGREES
CALCULATED T AXIS, ECG11: 128 DEGREES
CHLORIDE SERPL-SCNC: 101 MMOL/L (ref 100–108)
CK MB CFR SERPL CALC: 0.7 % (ref 0–4)
CK MB SERPL-MCNC: 1.2 NG/ML (ref 5–25)
CK SERPL-CCNC: 162 U/L (ref 26–192)
CO2 SERPL-SCNC: 30 MMOL/L (ref 21–32)
CREAT SERPL-MCNC: 1.11 MG/DL (ref 0.6–1.3)
DIAGNOSIS, 93000: NORMAL
DIFFERENTIAL METHOD BLD: ABNORMAL
EOSINOPHIL # BLD: 0.1 K/UL (ref 0–0.4)
EOSINOPHIL NFR BLD: 2 % (ref 0–5)
ERYTHROCYTE [DISTWIDTH] IN BLOOD BY AUTOMATED COUNT: 13.8 % (ref 11.6–14.5)
GLOBULIN SER CALC-MCNC: 4.6 G/DL (ref 2–4)
GLUCOSE SERPL-MCNC: 120 MG/DL (ref 74–99)
HCT VFR BLD AUTO: 36.4 % (ref 35–45)
HGB BLD-MCNC: 11.6 G/DL (ref 12–16)
INR PPP: 1.6 (ref 0.8–1.2)
LYMPHOCYTES # BLD: 1.8 K/UL (ref 0.9–3.6)
LYMPHOCYTES NFR BLD: 31 % (ref 21–52)
MCH RBC QN AUTO: 27 PG (ref 24–34)
MCHC RBC AUTO-ENTMCNC: 31.9 G/DL (ref 31–37)
MCV RBC AUTO: 84.8 FL (ref 74–97)
MONOCYTES # BLD: 0.9 K/UL (ref 0.05–1.2)
MONOCYTES NFR BLD: 16 % (ref 3–10)
NEUTS SEG # BLD: 2.9 K/UL (ref 1.8–8)
NEUTS SEG NFR BLD: 50 % (ref 40–73)
PLATELET # BLD AUTO: 167 K/UL (ref 135–420)
PMV BLD AUTO: 11.9 FL (ref 9.2–11.8)
POTASSIUM SERPL-SCNC: 3.6 MMOL/L (ref 3.5–5.5)
PROT SERPL-MCNC: 8.2 G/DL (ref 6.4–8.2)
PROTHROMBIN TIME: 18.8 SEC (ref 11.5–15.2)
Q-T INTERVAL, ECG07: 408 MS
QRS DURATION, ECG06: 98 MS
QTC CALCULATION (BEZET), ECG08: 485 MS
RBC # BLD AUTO: 4.29 M/UL (ref 4.2–5.3)
SODIUM SERPL-SCNC: 138 MMOL/L (ref 136–145)
TROPONIN I SERPL-MCNC: 0.12 NG/ML (ref 0–0.04)
TROPONIN I SERPL-MCNC: 0.14 NG/ML (ref 0–0.06)
TROPONIN I SERPL-MCNC: 0.15 NG/ML (ref 0–0.06)
VENTRICULAR RATE, ECG03: 85 BPM
WBC # BLD AUTO: 5.8 K/UL (ref 4.6–13.2)

## 2019-01-05 PROCEDURE — 82550 ASSAY OF CK (CPK): CPT

## 2019-01-05 PROCEDURE — 71045 X-RAY EXAM CHEST 1 VIEW: CPT

## 2019-01-05 PROCEDURE — 74011250636 HC RX REV CODE- 250/636: Performed by: EMERGENCY MEDICINE

## 2019-01-05 PROCEDURE — 99285 EMERGENCY DEPT VISIT HI MDM: CPT

## 2019-01-05 PROCEDURE — 74011250637 HC RX REV CODE- 250/637: Performed by: INTERNAL MEDICINE

## 2019-01-05 PROCEDURE — 85730 THROMBOPLASTIN TIME PARTIAL: CPT

## 2019-01-05 PROCEDURE — 80053 COMPREHEN METABOLIC PANEL: CPT

## 2019-01-05 PROCEDURE — 93005 ELECTROCARDIOGRAM TRACING: CPT

## 2019-01-05 PROCEDURE — 65660000000 HC RM CCU STEPDOWN

## 2019-01-05 PROCEDURE — 74011250637 HC RX REV CODE- 250/637: Performed by: EMERGENCY MEDICINE

## 2019-01-05 PROCEDURE — 85025 COMPLETE CBC W/AUTO DIFF WBC: CPT

## 2019-01-05 PROCEDURE — 36415 COLL VENOUS BLD VENIPUNCTURE: CPT

## 2019-01-05 PROCEDURE — 85610 PROTHROMBIN TIME: CPT

## 2019-01-05 PROCEDURE — 96360 HYDRATION IV INFUSION INIT: CPT

## 2019-01-05 RX ORDER — ALBUTEROL SULFATE 0.83 MG/ML
2.5 SOLUTION RESPIRATORY (INHALATION) DAILY
Status: DISCONTINUED | OUTPATIENT
Start: 2019-01-06 | End: 2019-01-06

## 2019-01-05 RX ORDER — HYDROCHLOROTHIAZIDE 25 MG/1
25 TABLET ORAL DAILY
Status: DISCONTINUED | OUTPATIENT
Start: 2019-01-06 | End: 2019-01-08

## 2019-01-05 RX ORDER — HYDROCORTISONE 0.5 %
1 OINTMENT (GRAM) TOPICAL 2 TIMES DAILY
Status: DISCONTINUED | OUTPATIENT
Start: 2019-01-05 | End: 2019-01-11 | Stop reason: HOSPADM

## 2019-01-05 RX ORDER — SIMVASTATIN 10 MG/1
20 TABLET, FILM COATED ORAL
Status: DISCONTINUED | OUTPATIENT
Start: 2019-01-05 | End: 2019-01-11 | Stop reason: HOSPADM

## 2019-01-05 RX ORDER — POTASSIUM CHLORIDE 20 MEQ/1
20 TABLET, EXTENDED RELEASE ORAL DAILY
Status: DISCONTINUED | OUTPATIENT
Start: 2019-01-06 | End: 2019-01-11 | Stop reason: HOSPADM

## 2019-01-05 RX ORDER — METOPROLOL SUCCINATE 100 MG/1
100 TABLET, EXTENDED RELEASE ORAL DAILY
Status: DISCONTINUED | OUTPATIENT
Start: 2019-01-06 | End: 2019-01-11 | Stop reason: HOSPADM

## 2019-01-05 RX ORDER — PANTOPRAZOLE SODIUM 40 MG/1
40 TABLET, DELAYED RELEASE ORAL
Status: DISCONTINUED | OUTPATIENT
Start: 2019-01-06 | End: 2019-01-11 | Stop reason: HOSPADM

## 2019-01-05 RX ORDER — ALBUTEROL SULFATE 90 UG/1
2 AEROSOL, METERED RESPIRATORY (INHALATION) DAILY
Status: DISCONTINUED | OUTPATIENT
Start: 2019-01-06 | End: 2019-01-05

## 2019-01-05 RX ORDER — WARFARIN SODIUM 5 MG/1
5 TABLET ORAL ONCE
Status: COMPLETED | OUTPATIENT
Start: 2019-01-05 | End: 2019-01-05

## 2019-01-05 RX ORDER — CHOLECALCIFEROL (VITAMIN D3) 125 MCG
10 CAPSULE ORAL
Status: DISCONTINUED | OUTPATIENT
Start: 2019-01-05 | End: 2019-01-11 | Stop reason: HOSPADM

## 2019-01-05 RX ORDER — FLUTICASONE PROPIONATE 50 MCG
2 SPRAY, SUSPENSION (ML) NASAL DAILY
Status: DISCONTINUED | OUTPATIENT
Start: 2019-01-06 | End: 2019-01-11 | Stop reason: HOSPADM

## 2019-01-05 RX ORDER — FUROSEMIDE 40 MG/1
40 TABLET ORAL DAILY
Status: DISCONTINUED | OUTPATIENT
Start: 2019-01-06 | End: 2019-01-06

## 2019-01-05 RX ORDER — WARFARIN SODIUM 5 MG/1
5 TABLET ORAL EVERY EVENING
Status: DISCONTINUED | OUTPATIENT
Start: 2019-01-05 | End: 2019-01-05

## 2019-01-05 RX ORDER — LOSARTAN POTASSIUM 50 MG/1
100 TABLET ORAL DAILY
Status: DISCONTINUED | OUTPATIENT
Start: 2019-01-06 | End: 2019-01-08

## 2019-01-05 RX ORDER — GUAIFENESIN 100 MG/5ML
324 LIQUID (ML) ORAL
Status: COMPLETED | OUTPATIENT
Start: 2019-01-05 | End: 2019-01-05

## 2019-01-05 RX ORDER — ACETAMINOPHEN 325 MG/1
650 TABLET ORAL
Status: DISCONTINUED | OUTPATIENT
Start: 2019-01-05 | End: 2019-01-11 | Stop reason: HOSPADM

## 2019-01-05 RX ORDER — FAMOTIDINE 10 MG/ML
20 INJECTION INTRAVENOUS
Status: COMPLETED | OUTPATIENT
Start: 2019-01-05 | End: 2019-01-05

## 2019-01-05 RX ADMIN — FAMOTIDINE 20 MG: 10 INJECTION INTRAVENOUS at 14:29

## 2019-01-05 RX ADMIN — ASPIRIN 81 MG 324 MG: 81 TABLET ORAL at 10:41

## 2019-01-05 RX ADMIN — NITROGLYCERIN 0.5 INCH: 20 OINTMENT TOPICAL at 10:41

## 2019-01-05 RX ADMIN — WARFARIN SODIUM 5 MG: 5 TABLET ORAL at 21:57

## 2019-01-05 RX ADMIN — SIMVASTATIN 20 MG: 10 TABLET, FILM COATED ORAL at 21:57

## 2019-01-05 RX ADMIN — SODIUM CHLORIDE 1000 ML: 900 INJECTION, SOLUTION INTRAVENOUS at 10:07

## 2019-01-05 RX ADMIN — MELATONIN TAB 5 MG 10 MG: 5 TAB at 21:57

## 2019-01-05 NOTE — PROGRESS NOTES
recived report on pt coming to 0 from wan ferrera rn. Pt c/o headache. Explained to pt most likely d/t nitro. Will move patch to abdomen. Dr notified that pt arrived to [address] Awaiting orders from dr. Stephany Hernandez in lowest position. and call bell in reach. Bedside shift change report given to Chance (oncoming nurse) by Suresh Mohan (offgoing nurse). Report included the following information SBAR, Kardex and MAR.

## 2019-01-05 NOTE — ED NOTES
Patient c/o epigastric/chest discomfort after eating. Dr. Kerri Lu at bedside. Verbal order given for Pepcid 20mg.

## 2019-01-05 NOTE — ROUTINE PROCESS
TRANSFER - OUT REPORT: 
 
Verbal report given to Manas Dee (name) on Mei Irwin  being transferred to [address] [place] [address] (unit) for routine progression of care Report consisted of patients Situation, Background, Assessment and  
Recommendations(SBAR). Information from the following report(s) ED Summary was reviewed with the receiving nurse. Lines:  
Peripheral IV 01/05/19 Right Antecubital (Active) Site Assessment Clean, dry, & intact 1/5/2019  9:39 AM  
Dressing Status Clean, dry, & intact 1/5/2019  9:39 AM  
Dressing Type Transparent 1/5/2019  9:39 AM  
Hub Color/Line Status Flushed 1/5/2019  9:39 AM  
  
 
Opportunity for questions and clarification was provided. Patient transported with: 
 Monitor

## 2019-01-05 NOTE — ED NOTES
Patient resting comfortably; spouse at bedside. VS stable. Troponin repeat drawn. Call bell within reach. Another warm blanket given.

## 2019-01-05 NOTE — ED PROVIDER NOTES
EMERGENCY DEPARTMENT HISTORY AND PHYSICAL EXAM 
 
9:43 AM 
 
 
Date: 1/5/2019 Patient Name: Lemuel Caballero History of Presenting Illness Chief Complaint Patient presents with  Palpitations  Fatigue  Back Pain History Provided By: Patient Chief Complaint: Palpitations; Back Pain Duration:  Hours Timing:  Intermittent Location: Left upper back Quality: Raghav White Severity: N/A Modifying Factors: None Associated Symptoms: Fatigue Additional History (Context): Lemuel Caballero is a 59 y.o. female with PMHx of HTN, A-fib, and CHF presenting to the ED c/o intermittent palpitations for the past 10 hours. Pt also c/o intermittent sharp left upper back pain. Associated symptoms include fatigue. Reports no modifying factors for her symptoms. Notes she is on Coumadin. Notes her cardiologist is Dr. Mariama Quezada. Denies chest pain and any other symptoms or complaints. PCP: Srinivasan Alvares MD 
 
Current Facility-Administered Medications Medication Dose Route Frequency Provider Last Rate Last Dose  nitroglycerin (NITROBID) 2 % ointment 0.5 Inch  0.5 Inch Topical BID Fabiana March MD   0.5 Inch at 01/05/19 1041 Current Outpatient Medications Medication Sig Dispense Refill  metoprolol succinate (TOPROL-XL) 100 mg tablet Take 1 Tab by mouth daily. 30 Tab 6  
 hydroCHLOROthiazide (HYDRODIURIL) 25 mg tablet Take 1 Tab by mouth daily. 30 Tab 6  
 losartan (COZAAR) 100 mg tablet TAKE 1 TABLET BY MOUTH EVERY DAY 90 Tab 0  
 furosemide (LASIX) 40 mg tablet TAKE 1 TABLET BY MOUTH EVERY DAY 90 Tab 0  
 flunisolide (AEROSPAN) 80 mcg/actuation HFAA Take 2 Puffs by inhalation two (2) times a day. 1 Inhaler 0  
 albuterol (VENTOLIN HFA) 90 mcg/actuation inhaler Take 2 Puffs by inhalation daily. 1 Inhaler 0  
 beclomethasone dipropionate (QVAR REDIHALER HFA) 40 mcg/actuation HFAb inhaler Take 2 Puffs by inhalation two (2) times a day.  1 Inhaler 0  
  triamcinolone acetonide (KENALOG) 0.025 % ointment Apply  to affected area two (2) times a day. use thin layer 30 g 0  
 KLOR-CON M20 20 mEq tablet TAKE 1 TAB BY MOUTH DAILY. 30 Tab 6  warfarin (COUMADIN) 5 mg tablet TAKE 5MG DAILY EXCEPT 2.5MG ON TUES, THUR, AND SAT. OR AS DIRECTED BY OUR OFFICE 30 Tab 5  
 sodium chloride (SALINE NASAL) 0.65 % nasal squeeze bottle 1 Spray as needed for Congestion.  loratadine-pseudoephedrine (CLARITIN-D 24 HOUR)  mg per tablet Take 1 Tab by mouth daily.  fluticasone (FLONASE) 50 mcg/actuation nasal spray 2 Sprays by Both Nostrils route daily. 1 Bottle 0  
 simvastatin (ZOCOR) 20 mg tablet Take 1 Tab by mouth nightly. 30 Tab 6  esomeprazole (NEXIUM) 20 mg capsule Take 1 Cap by mouth daily. 30 Cap 3  
 melatonin tab tablet Take 10 mg by mouth nightly. Past History Past Medical History: 
Past Medical History:  
Diagnosis Date  Aortic valve disorders 1/13/2011  Asthma  Atrial fibrillation ([facility] [address] 1/13/2011  Congestive heart failure, unspecified March 2005 Cleared quickly with Lasix therapy  Echocardiogram 01/10/2012 A-fib. EF 45%. Mild RVE. RVSP 45-50. Massive LAE. Mod TAMI. Mod MS. Severe MR (mean grad 10). Mild-mod AI. Mild TR. Pulmonic systolic flow reversal.  Mild IVCE.  Hypertension  Iron deficiency anemias  Long term (current) use of anticoagulants 2/23/2011  Murmur 1/2011 Grade I-II/VI systolic ejection murmur along left sternal border, with radiation to the pulmonic area.  S/P cardiac cath 02/24/2012 Minimal coronary artery disease. Mild LVE. EF 55%. Mod MR. Mod-severe MS. RA 15.  RV 55/16. PA 58/34. W 36.  CO/CI 3.5/1.9 (TD); 3.61/1.9 (Dequan). R to L shunt suggested.  Wears glasses  Weight gain Past Surgical History: 
Past Surgical History:  
Procedure Laterality Date  HX HEART CATHETERIZATION  2/24/2012  HX HYSTERECTOMY  2006  HX MITRAL VALVE REPLACEMENT  05/22/2013  
 25/33 mm On-X mechanical valve with bilateral pulmonary vein isolation and resection of LA appendage Family History: 
Family History Problem Relation Age of Onset  Heart Surgery Father Open-Heart Surgery  Other Father Venous Thromboembolism  Heart Disease Mother Enlarged Heart  Hypertension Mother  Diabetes Mother Social History: 
Social History Tobacco Use  Smoking status: Former Smoker  Smokeless tobacco: Never Used Substance Use Topics  Alcohol use: Yes Alcohol/week: 0.6 oz Types: 1 Glasses of wine per week Comment: occassionally  Drug use: No  
 
 
Allergies: Allergies Allergen Reactions  Morphine Rash and Itching  Norco [Hydrocodone-Acetaminophen] Itching Review of Systems Review of Systems Constitutional: Positive for fatigue. Negative for fever. HENT: Negative. Eyes: Negative. Respiratory: Negative. Cardiovascular: Positive for palpitations. Negative for chest pain. Gastrointestinal: Negative. Endocrine: Negative. Genitourinary: Negative. Musculoskeletal: Positive for back pain. Skin: Negative. Allergic/Immunologic: Negative. Neurological: Negative. Hematological: Negative. Psychiatric/Behavioral: Negative. All other systems reviewed and are negative. Physical Exam  
 
Visit Vitals /80 Pulse 72 Resp 18 Ht 5' 7\" (1.702 m) Wt 86.2 kg (190 lb) SpO2 96% BMI 29.76 kg/m² Physical Exam  
Constitutional: She is oriented to person, place, and time. She appears well-developed and well-nourished. No distress. HENT:  
Head: Normocephalic. Right Ear: External ear normal.  
Left Ear: External ear normal.  
Mouth/Throat: No oropharyngeal exudate. Eyes: Conjunctivae and EOM are normal. Pupils are equal, round, and reactive to light. Right eye exhibits no discharge.  Left eye exhibits no discharge. No scleral icterus. Neck: Normal range of motion. Neck supple. No JVD present. No tracheal deviation present. No thyromegaly present. Cardiovascular: Normal rate and intact distal pulses. An irregularly irregular rhythm present. Exam reveals no gallop and no friction rub. Murmur heard. Systolic murmur is present with a grade of 2/6. Pulmonary/Chest: Effort normal and breath sounds normal. No stridor. No respiratory distress. She has no wheezes. She has no rales. She exhibits no tenderness. Abdominal: Soft. Bowel sounds are normal. She exhibits no distension and no mass. There is no tenderness. There is no rebound and no guarding. Musculoskeletal: Normal range of motion. She exhibits no edema or tenderness. Lymphadenopathy:  
  She has no cervical adenopathy. Neurological: She is alert and oriented to person, place, and time. She displays normal reflexes. No cranial nerve deficit. She exhibits normal muscle tone. Coordination normal.  
Skin: Skin is warm and dry. No rash noted. She is not diaphoretic. No erythema. No pallor. Nursing note and vitals reviewed. Diagnostic Study Results Labs - Recent Results (from the past 12 hour(s)) EKG, 12 LEAD, INITIAL Collection Time: 01/05/19  9:41 AM  
Result Value Ref Range Ventricular Rate 85 BPM  
 Atrial Rate 375 BPM  
 QRS Duration 98 ms Q-T Interval 408 ms QTC Calculation (Bezet) 485 ms Calculated R Axis 53 degrees Calculated T Axis 128 degrees Diagnosis Atrial fibrillation Nonspecific T wave abnormality , probably digitalis effect Prolonged QT Abnormal ECG When compared with ECG of 27-AUG-2012 08:23, 
Nonspecific T wave abnormality, improved in Inferior leads Confirmed by Noman Colvin ([id number]) on 1/5/2019 12:11:35 PM 
  
CBC WITH AUTOMATED DIFF Collection Time: 01/05/19  9:45 AM  
Result Value Ref Range WBC 5.8 4.6 - 13.2 K/uL  
 RBC 4.29 4. 20 - 5.30 M/uL  
 HGB 11.6 (L) 12.0 - 16.0 g/dL HCT 36.4 35.0 - 45.0 % MCV 84.8 74.0 - 97.0 FL  
 MCH 27.0 24.0 - 34.0 PG  
 MCHC 31.9 31.0 - 37.0 g/dL  
 RDW 13.8 11.6 - 14.5 % PLATELET 983 108 - 822 K/uL MPV 11.9 (H) 9.2 - 11.8 FL  
 NEUTROPHILS 50 40 - 73 % LYMPHOCYTES 31 21 - 52 % MONOCYTES 16 (H) 3 - 10 % EOSINOPHILS 2 0 - 5 % BASOPHILS 1 0 - 2 %  
 ABS. NEUTROPHILS 2.9 1.8 - 8.0 K/UL  
 ABS. LYMPHOCYTES 1.8 0.9 - 3.6 K/UL  
 ABS. MONOCYTES 0.9 0.05 - 1.2 K/UL  
 ABS. EOSINOPHILS 0.1 0.0 - 0.4 K/UL  
 ABS. BASOPHILS 0.0 0.0 - 0.1 K/UL  
 DF AUTOMATED METABOLIC PANEL, COMPREHENSIVE Collection Time: 01/05/19  9:45 AM  
Result Value Ref Range Sodium 138 136 - 145 mmol/L Potassium 3.6 3.5 - 5.5 mmol/L Chloride 101 100 - 108 mmol/L  
 CO2 30 21 - 32 mmol/L Anion gap 7 3.0 - 18 mmol/L Glucose 120 (H) 74 - 99 mg/dL BUN 17 7.0 - 18 MG/DL Creatinine 1.11 0.6 - 1.3 MG/DL  
 BUN/Creatinine ratio 15 12 - 20 GFR est AA >60 >60 ml/min/1.73m2 GFR est non-AA 50 (L) >60 ml/min/1.73m2 Calcium 9.1 8.5 - 10.1 MG/DL Bilirubin, total 0.6 0.2 - 1.0 MG/DL  
 ALT (SGPT) 35 13 - 56 U/L  
 AST (SGOT) 33 15 - 37 U/L Alk. phosphatase 69 45 - 117 U/L Protein, total 8.2 6.4 - 8.2 g/dL Albumin 3.6 3.4 - 5.0 g/dL Globulin 4.6 (H) 2.0 - 4.0 g/dL A-G Ratio 0.8 0.8 - 1.7 CARDIAC PANEL,(CK, CKMB & TROPONIN) Collection Time: 01/05/19  9:45 AM  
Result Value Ref Range  26 - 192 U/L  
 CK - MB 1.2 <3.6 ng/ml CK-MB Index 0.7 0.0 - 4.0 % Troponin-I, QT 0.14 (H) 0.00 - 0.06 NG/ML  
PROTHROMBIN TIME + INR Collection Time: 01/05/19  9:45 AM  
Result Value Ref Range Prothrombin time 18.8 (H) 11.5 - 15.2 sec INR 1.6 (H) 0.8 - 1.2 PTT Collection Time: 01/05/19  9:45 AM  
Result Value Ref Range aPTT 32.7 23.0 - 36.4 SEC Radiologic Studies -  
XR CHEST PORT Final Result IMPRESSION:   
  
No evidence of acute pulmonary disease. Mild cardiomegaly. Cardiomegaly. Old cardiac surgical scars. Wire from previous open heart surgery. No effusion. Interpreted by Moshe Mobley MD  
 
 
Medical Decision Making I am the first provider for this patient. I reviewed the vital signs, available nursing notes, past medical history, past surgical history, family history and social history. Vital Signs-Reviewed the patient's vital signs. Pulse Oximetry Analysis -  98% on room air, normal 
 
Cardiac Monitor: 
Rate: 82 Rhythm:  Atrial Fibrillation EKG: Interpreted by the EP. Time Interpreted: 9:41 AM  
 Rate: 85 Rhythm: Atrial Fibrillation Interpretation: No STEMI. Records Reviewed: Nursing Notes and Old Medical Records (Time of Review: 9:43 AM) ED Course: Progress Notes, Reevaluation, and Consults: 
10:36 AM Consult: I discussed care with Dr. Damon Bruno (Hospitalist). It was a standard discussion including patient history, chief complaint, available diagnostic results, and predicted treatment course. Accepts patient for admission. 10:45 AM Consult: I discussed care with Dr. Nghia Bnun (Cardiology). It was a standard discussion including patient history, chief complaint, available diagnostic results, and predicted treatment course. Recommended holding heparin at this time and she will see the patient. States if patient starts experiencing chest pain, then start heparin. Provider Notes (Medical Decision Making): atria fib, MI, dehydration, hypoglycemia For Hospitalized Patients: 
 
1. Hospitalization Decision Time: The decision to hospitalize the patient was made by Dr. Adrianne Gilbert at 10:36 AM on 1/5/2019 2. Aspirin: Aspirin was given Diagnosis Clinical Impression: 1. NSTEMI (non-ST elevated myocardial infarction) ([facility] [address] 2. Chronic atrial fibrillation (HCC) Disposition: Admitted Follow-up Information None Medication List  
  
ASK your doctor about these medications albuterol 90 mcg/actuation inhaler Commonly known as:  VENTOLIN HFA Take 2 Puffs by inhalation daily. beclomethasone dipropionate 40 mcg/actuation Hfab inhaler Commonly known as:  QVAR REDIHALER HFA Take 2 Puffs by inhalation two (2) times a day. CLARITIN-D 24 HOUR  mg per tablet Generic drug:  loratadine-pseudoephedrine 
  
esomeprazole 20 mg capsule Commonly known as:  NexIUM Take 1 Cap by mouth daily. flunisolide 80 mcg/actuation Hfaa Commonly known as:  [place] Plaster Take 2 Puffs by inhalation two (2) times a day. fluticasone 50 mcg/actuation nasal spray Commonly known as:  Deetta [place] 2 Sprays by Both Nostrils route daily. furosemide 40 mg tablet Commonly known as:  LASIX TAKE 1 TABLET BY MOUTH EVERY DAY 
  
hydroCHLOROthiazide 25 mg tablet Commonly known as:  HYDRODIURIL Take 1 Tab by mouth daily. KLOR-CON M20 20 mEq tablet Generic drug:  potassium chloride TAKE 1 TAB BY MOUTH DAILY. losartan 100 mg tablet Commonly known as:  COZAAR 
TAKE 1 TABLET BY MOUTH EVERY DAY 
  
melatonin Tab tablet 
  
metoprolol succinate 100 mg tablet Commonly known as:  TOPROL-XL Take 1 Tab by mouth daily. SALINE NASAL 0.65 % nasal squeeze bottle Generic drug:  sodium chloride 
  
simvastatin 20 mg tablet Commonly known as:  ZOCOR Take 1 Tab by mouth nightly. triamcinolone acetonide 0.025 % ointment Commonly known as:  KENALOG Apply  to affected area two (2) times a day. use thin layer 
  
warfarin 5 mg tablet Commonly known as:  COUMADIN 
TAKE 5MG DAILY EXCEPT 2.5MG ON TUES, THUR, AND SAT. OR AS DIRECTED BY OUR OFFICE 
  
  
 
_______________________________ Scribe Attestation Bryan Harmon acting as a scribe for and in the presence of Destiny Morales MD     
January 05, 2019 at 9:43 AM 
    
Provider Attestation:     
I personally performed the services described in the documentation, reviewed the documentation, as recorded by the scribe in my presence, and it accurately and completely records my words and actions. January 05, 2019 at 9:43 AM - Fabiana March MD   
 
_______________________________

## 2019-01-05 NOTE — ED TRIAGE NOTES
Pt c/o sharp left upper back pain with palpitations that started at midnight. Also feels tired. Has hx of valve replacement in 2013

## 2019-01-05 NOTE — ED NOTES
Patient resting comfortably, spouse at bedside. VS stable. Patient has no complaints at this time. Call bell within reach. Diet gingerale given per patient request; spouse is going to [employer] for meal for patient.

## 2019-01-06 LAB
ALBUMIN SERPL-MCNC: 3.1 G/DL (ref 3.4–5)
ALBUMIN/GLOB SERPL: 0.8 {RATIO} (ref 0.8–1.7)
ALP SERPL-CCNC: 60 U/L (ref 45–117)
ALT SERPL-CCNC: 25 U/L (ref 13–56)
ANION GAP SERPL CALC-SCNC: 5 MMOL/L (ref 3–18)
APTT PPP: 132 SEC (ref 23–36.4)
APTT PPP: >180 SEC (ref 23–36.4)
AST SERPL-CCNC: 24 U/L (ref 15–37)
BASOPHILS # BLD: 0 K/UL (ref 0–0.1)
BASOPHILS NFR BLD: 0 % (ref 0–2)
BILIRUB SERPL-MCNC: 0.4 MG/DL (ref 0.2–1)
BNP SERPL-MCNC: 1530 PG/ML (ref 0–900)
BUN SERPL-MCNC: 18 MG/DL (ref 7–18)
BUN/CREAT SERPL: 20 (ref 12–20)
CALCIUM SERPL-MCNC: 8.1 MG/DL (ref 8.5–10.1)
CHLORIDE SERPL-SCNC: 108 MMOL/L (ref 100–108)
CO2 SERPL-SCNC: 28 MMOL/L (ref 21–32)
CREAT SERPL-MCNC: 0.92 MG/DL (ref 0.6–1.3)
DIFFERENTIAL METHOD BLD: ABNORMAL
EOSINOPHIL # BLD: 0.1 K/UL (ref 0–0.4)
EOSINOPHIL NFR BLD: 1 % (ref 0–5)
ERYTHROCYTE [DISTWIDTH] IN BLOOD BY AUTOMATED COUNT: 13.9 % (ref 11.6–14.5)
GLOBULIN SER CALC-MCNC: 3.8 G/DL (ref 2–4)
GLUCOSE SERPL-MCNC: 85 MG/DL (ref 74–99)
HCT VFR BLD AUTO: 32.6 % (ref 35–45)
HGB BLD-MCNC: 10.4 G/DL (ref 12–16)
INR PPP: 1.6 (ref 0.8–1.2)
INR PPP: 1.8 (ref 0.8–1.2)
LYMPHOCYTES # BLD: 1.9 K/UL (ref 0.9–3.6)
LYMPHOCYTES NFR BLD: 34 % (ref 21–52)
MCH RBC QN AUTO: 26.7 PG (ref 24–34)
MCHC RBC AUTO-ENTMCNC: 31.9 G/DL (ref 31–37)
MCV RBC AUTO: 83.6 FL (ref 74–97)
MONOCYTES # BLD: 0.8 K/UL (ref 0.05–1.2)
MONOCYTES NFR BLD: 14 % (ref 3–10)
NEUTS SEG # BLD: 2.8 K/UL (ref 1.8–8)
NEUTS SEG NFR BLD: 51 % (ref 40–73)
PLATELET # BLD AUTO: 163 K/UL (ref 135–420)
PMV BLD AUTO: 12.2 FL (ref 9.2–11.8)
POTASSIUM SERPL-SCNC: 3.6 MMOL/L (ref 3.5–5.5)
PROT SERPL-MCNC: 6.9 G/DL (ref 6.4–8.2)
PROTHROMBIN TIME: 19.3 SEC (ref 11.5–15.2)
PROTHROMBIN TIME: 20.6 SEC (ref 11.5–15.2)
RBC # BLD AUTO: 3.9 M/UL (ref 4.2–5.3)
SODIUM SERPL-SCNC: 141 MMOL/L (ref 136–145)
TROPONIN I SERPL-MCNC: 0.13 NG/ML (ref 0–0.04)
TROPONIN I SERPL-MCNC: 0.13 NG/ML (ref 0–0.04)
WBC # BLD AUTO: 5.5 K/UL (ref 4.6–13.2)

## 2019-01-06 PROCEDURE — 36415 COLL VENOUS BLD VENIPUNCTURE: CPT

## 2019-01-06 PROCEDURE — 74011250637 HC RX REV CODE- 250/637: Performed by: PHYSICIAN ASSISTANT

## 2019-01-06 PROCEDURE — 85730 THROMBOPLASTIN TIME PARTIAL: CPT

## 2019-01-06 PROCEDURE — 80053 COMPREHEN METABOLIC PANEL: CPT

## 2019-01-06 PROCEDURE — 65660000000 HC RM CCU STEPDOWN

## 2019-01-06 PROCEDURE — 85025 COMPLETE CBC W/AUTO DIFF WBC: CPT

## 2019-01-06 PROCEDURE — 74011000250 HC RX REV CODE- 250: Performed by: INTERNAL MEDICINE

## 2019-01-06 PROCEDURE — 84484 ASSAY OF TROPONIN QUANT: CPT

## 2019-01-06 PROCEDURE — 85610 PROTHROMBIN TIME: CPT

## 2019-01-06 PROCEDURE — 94761 N-INVAS EAR/PLS OXIMETRY MLT: CPT

## 2019-01-06 PROCEDURE — 74011250637 HC RX REV CODE- 250/637: Performed by: INTERNAL MEDICINE

## 2019-01-06 PROCEDURE — 74011250636 HC RX REV CODE- 250/636: Performed by: PHYSICIAN ASSISTANT

## 2019-01-06 PROCEDURE — 94640 AIRWAY INHALATION TREATMENT: CPT

## 2019-01-06 PROCEDURE — 83880 ASSAY OF NATRIURETIC PEPTIDE: CPT

## 2019-01-06 RX ORDER — FUROSEMIDE 10 MG/ML
40 INJECTION INTRAMUSCULAR; INTRAVENOUS EVERY 12 HOURS
Status: DISCONTINUED | OUTPATIENT
Start: 2019-01-06 | End: 2019-01-08

## 2019-01-06 RX ORDER — ASPIRIN 81 MG/1
81 TABLET ORAL DAILY
Status: DISCONTINUED | OUTPATIENT
Start: 2019-01-06 | End: 2019-01-11 | Stop reason: HOSPADM

## 2019-01-06 RX ORDER — HEPARIN SODIUM 1000 [USP'U]/ML
80 INJECTION, SOLUTION INTRAVENOUS; SUBCUTANEOUS ONCE
Status: COMPLETED | OUTPATIENT
Start: 2019-01-06 | End: 2019-01-06

## 2019-01-06 RX ORDER — WARFARIN 6 MG/1
6 TABLET ORAL ONCE
Status: ACTIVE | OUTPATIENT
Start: 2019-01-06 | End: 2019-01-07

## 2019-01-06 RX ORDER — CODEINE PHOSPHATE AND GUAIFENESIN 10; 100 MG/5ML; MG/5ML
5 SOLUTION ORAL
Status: DISCONTINUED | OUTPATIENT
Start: 2019-01-06 | End: 2019-01-11 | Stop reason: HOSPADM

## 2019-01-06 RX ORDER — ALBUTEROL SULFATE 0.83 MG/ML
2.5 SOLUTION RESPIRATORY (INHALATION)
Status: DISCONTINUED | OUTPATIENT
Start: 2019-01-06 | End: 2019-01-11 | Stop reason: HOSPADM

## 2019-01-06 RX ORDER — HEPARIN SODIUM 10000 [USP'U]/100ML
18-36 INJECTION, SOLUTION INTRAVENOUS
Status: DISCONTINUED | OUTPATIENT
Start: 2019-01-06 | End: 2019-01-11

## 2019-01-06 RX ADMIN — ALBUTEROL SULFATE 2.5 MG: 2.5 SOLUTION RESPIRATORY (INHALATION) at 09:53

## 2019-01-06 RX ADMIN — PANTOPRAZOLE SODIUM 40 MG: 40 TABLET, DELAYED RELEASE ORAL at 10:11

## 2019-01-06 RX ADMIN — SIMVASTATIN 20 MG: 10 TABLET, FILM COATED ORAL at 21:39

## 2019-01-06 RX ADMIN — MELATONIN TAB 5 MG 10 MG: 5 TAB at 23:19

## 2019-01-06 RX ADMIN — FUROSEMIDE 40 MG: 10 INJECTION, SOLUTION INTRAVENOUS at 12:03

## 2019-01-06 RX ADMIN — FUROSEMIDE 40 MG: 10 INJECTION, SOLUTION INTRAVENOUS at 21:39

## 2019-01-06 RX ADMIN — LOSARTAN POTASSIUM 100 MG: 50 TABLET ORAL at 10:11

## 2019-01-06 RX ADMIN — HEPARIN SODIUM AND DEXTROSE 18 UNITS/KG/HR: 10000; 5 INJECTION INTRAVENOUS at 11:07

## 2019-01-06 RX ADMIN — ASPIRIN 81 MG: 81 TABLET, COATED ORAL at 11:01

## 2019-01-06 RX ADMIN — HYDROCHLOROTHIAZIDE 25 MG: 25 TABLET ORAL at 10:11

## 2019-01-06 RX ADMIN — METOPROLOL SUCCINATE 100 MG: 100 TABLET, EXTENDED RELEASE ORAL at 10:11

## 2019-01-06 RX ADMIN — POTASSIUM CHLORIDE 20 MEQ: 20 TABLET, EXTENDED RELEASE ORAL at 10:11

## 2019-01-06 RX ADMIN — FLUTICASONE FUROATE 2 PUFF: 100 POWDER RESPIRATORY (INHALATION) at 09:53

## 2019-01-06 RX ADMIN — HEPARIN SODIUM 6900 UNITS: 1000 INJECTION INTRAVENOUS; SUBCUTANEOUS at 11:01

## 2019-01-06 RX ADMIN — ACETAMINOPHEN 650 MG: 325 TABLET ORAL at 14:45

## 2019-01-06 NOTE — CONSULTS
[facility] - Consult Note    Consultation request by Darby Ma MD for advice/opinion related to evaluating NSTEMI (non-ST elevated myocardial infarction) [facility])  Atrial fibrillation ([facility] [address]  Chest pain    Date of  Admission: 1/5/2019  9:36 AM   Primary Care Physician:  Cindi Ames MD     Assessment:     Patient Active Problem List   Diagnosis Code    Rheumatic valvular disease I09.1    Chronic diastolic congestive heart failure ([address] I50.32    Atrial fibrillation ([address] I48.91    Long term current use of anticoagulant therapy Z79.01    Benign hypertensive heart disease with heart failure (HCC) I11.0    Hyperlipidemia E78.5    Advanced directives, counseling/discussion Z71.89    Mild intermittent asthma without complication X14.40    NSTEMI (non-ST elevated myocardial infarction) ([facility] [address] I21.4    Chest pain R07.9         -Acute on chronic heart failure with preserved EF 55% by echo 8/2018. Although CXR without significant vascular congestion, patient presents with symptoms consistent with heart failure in setting of recent dietary changes.  -Chronic atrial fibrillation with controlled ventricular response, presented with palpitations. On lopressor and warfarin as outpatient.  -Chest pain atypical with indeterminate troponin (not consistent with ACS). Cath 2/2012 with minimal coronary artery disease. No recent ischemic evaluation.  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 Echo 8/2018 with normal function. Mild mitral valve stenosis. Mean transmitral gradient of 5 mmHg. No significant regurgitation. Mild to moderate AI. -Hypertension. Stable. -Dyslipidemia. On statin. -Recent stress. Primary cardiologist Dr. Igor Ruvalcaba. Plan: Will diurese with IV lasix as renal function and hemodynamics allow. Will check BNP.   Will review echocardiogram once complete. Do not suspect ACS, will tentatively plan nuclear stress test tomorrow AM pending above testing. Patient is anticoagulated with warfarin, but INR subtherapeutic 1.6, will cover with heparin drip given mechanical valve. Continue ASA, statin, BB. History of Present Illness: This is a 59 y.o. female admitted for NSTEMI (non-ST elevated myocardial infarction) ([facility] [address]  Atrial fibrillation ([facility] [address]  Chest pain. Patient complains of:  CP, SOB. Patient is a 59year old female who presents with above complaints since Friday. Patient reports she had left sided rib sharp discomfort on Friday. She felt mildly SOB. She could not sleep due to orthopnea. She had palpitations. She reports the next day the symptoms continued. She went to ER. Hemodynamics were stable. She was treated with nitro paste and IVF. She does not feel the left chest discomfort at present. She feels abdominal and chest fullness this AM. She reports she has had recent weight gain. She admits to poor diet recently as she was traveling during the holidays. She also had recent loss of her sister and has had increased stress.       Cardiac risk factors: dyslipidemia, hypertension      Review of Symptoms:   Constitutional: negative for fevers and chills  Eyes: negative for visual disturbance  Ears, nose, mouth, throat, and face: positive for nasal congestion  Respiratory: positive for cough  Cardiovascular: positive for chest pressure/discomfort, dyspnea, palpitations, orthopnea  Gastrointestinal: negative for vomiting and diarrhea  Genitourinary:negative for dysuria  Hematologic/lymphatic: negative for bleeding  Musculoskeletal:negative for muscle weakness  Neurological: negative for dizziness     Past Medical History:     Past Medical History:   Diagnosis Date    Aortic valve disorders 1/13/2011    Asthma     Atrial fibrillation ([facility] [address] 1/13/2011    Congestive heart failure, unspecified March 2005    Cleared quickly with Lasix therapy    Echocardiogram 01/10/2012    A-fib. EF 45%. Mild RVE. RVSP 45-50. Massive LAE. Mod TAMI. Mod MS. Severe MR (mean grad 10). Mild-mod AI. Mild TR. Pulmonic systolic flow reversal.  Mild IVCE.  Hypertension     Iron deficiency anemias     Long term (current) use of anticoagulants 2/23/2011    Murmur 1/2011    Grade I-II/VI systolic ejection murmur along left sternal border, with radiation to the pulmonic area.  S/P cardiac cath 02/24/2012    Minimal coronary artery disease. Mild LVE. EF 55%. Mod MR. Mod-severe MS. RA 15.  RV 55/16. PA 58/34. W 36.  CO/CI 3.5/1.9 (TD); 3.61/1.9 (Dequan). R to L shunt suggested.  Wears glasses     Weight gain          Social History:     Social History     Socioeconomic History    Marital status:      Spouse name: Not on file    Number of children: Not on file    Years of education: Not on file    Highest education level: Not on file   Tobacco Use    Smoking status: Former Smoker    Smokeless tobacco: Never Used   Substance and Sexual Activity    Alcohol use: Yes     Alcohol/week: 0.6 oz     Types: 1 Glasses of wine per week     Comment: occassionally    Drug use: No    Sexual activity: Yes     Partners: Male     Birth control/protection: None        Family History:     Family History   Problem Relation Age of Onset    Heart Surgery Father         Open-Heart Surgery    Other Father         Venous Thromboembolism    Heart Disease Mother         Enlarged Heart    Hypertension Mother     Diabetes Mother         Medications:      Allergies   Allergen Reactions    Morphine Rash and Itching    Norco [Hydrocodone-Acetaminophen] Itching        Current Facility-Administered Medications   Medication Dose Route Frequency    melatonin tablet 10 mg  10 mg Oral QHS    pantoprazole (PROTONIX) tablet 40 mg  40 mg Oral ACB    simvastatin (ZOCOR) tablet 20 mg  20 mg Oral QHS    fluticasone (FLONASE) 50 mcg/actuation nasal spray 2 Spray  2 Spray Both Nostrils DAILY    potassium chloride (K-DUR, KLOR-CON) SR tablet 20 mEq  20 mEq Oral DAILY    hydrocortisone (CORTIZONE) 0.5 % ointment 1 Each  1 Each Topical BID    fluticasone furoate (ARNUITY ELLIPTA) 100 mcg/puff  2 Puff Inhalation DAILY    losartan (COZAAR) tablet 100 mg  100 mg Oral DAILY    furosemide (LASIX) tablet 40 mg  40 mg Oral DAILY    metoprolol succinate (TOPROL-XL) tablet 100 mg  100 mg Oral DAILY    hydroCHLOROthiazide (HYDRODIURIL) tablet 25 mg  25 mg Oral DAILY    acetaminophen (TYLENOL) tablet 650 mg  650 mg Oral Q4H PRN    WARFARIN INFORMATION NOTE-PHYSICIAN DOSING   Other Q24H    albuterol (PROVENTIL VENTOLIN) nebulizer solution 2.5 mg  2.5 mg Nebulization DAILY         Physical Exam:     Visit Vitals  /80 (BP 1 Location: Right arm)   Pulse 73   Temp 97.7 °F (36.5 °C)   Resp 16   Ht 5' 7\" (1.702 m)   Wt 86.2 kg (190 lb)   SpO2 93%   BMI 29.76 kg/m²     BP Readings from Last 3 Encounters:   01/06/19 130/80   11/02/18 106/62   10/05/18 128/74     Pulse Readings from Last 3 Encounters:   01/06/19 73   11/02/18 75   10/05/18 83     Wt Readings from Last 3 Encounters:   01/05/19 86.2 kg (190 lb)   11/02/18 85.7 kg (189 lb)   10/05/18 86.2 kg (190 lb)       General:  alert, cooperative, no distress, appears stated age  Neck:  mild JVD  Lungs:  clear to auscultation bilaterally  Heart:  regular rate and rhythm crisp mechanical valve sounds. Abdomen:  abdomen is soft without significant tenderness, masses, organomegaly or guarding  Extremities:  extremities normal, atraumatic, no cyanosis or edema  Skin: Warm and dry.  no hyperpigmentation, vitiligo, or suspicious lesions  Neuro: alert, oriented x3, affect appropriate  Psych: non focal     Data Review:     Recent Labs     01/05/19  0945   WBC 5.8   HGB 11.6*   HCT 36.4        Recent Labs     01/06/19  0109 01/05/19  0945    138   K 3.6 3.6    101   CO2 28 30   GLU 85 120*   BUN 18 17   CREA 0.92 1.11   CA 8.1* 9.1 ALB 3.1* 3.6   SGOT 24 33   ALT 25 35   INR 1.6* 1.6*       Results for orders placed or performed during the hospital encounter of 01/05/19   EKG, 12 LEAD, INITIAL   Result Value Ref Range    Ventricular Rate 85 BPM    Atrial Rate 375 BPM    QRS Duration 98 ms    Q-T Interval 408 ms    QTC Calculation (Bezet) 485 ms    Calculated R Axis 53 degrees    Calculated T Axis 128 degrees    Diagnosis       Atrial fibrillation  Nonspecific T wave abnormality , probably digitalis effect  Prolonged QT  Abnormal ECG  When compared with ECG of 27-AUG-2012 08:23,  Nonspecific T wave abnormality, improved in Inferior leads  Confirmed by Jim Castro ([id number]) on 1/5/2019 12:11:35 PM     Results for orders placed or performed in visit on 08/10/18   AMB POC EKG ROUTINE W/ 12 LEADS, INTER & REP    Narrative    Course atrial fibrillation with controlled ventricular response rate in the mid 70's. There is some mild T-wave inversion in the high lateral leads consistent with ischemia or strain. This EKG is similar to the EKG of February 9, 2018.        All Cardiac Markers in the last 24 hours:    Lab Results   Component Value Date/Time     01/05/2019 09:45 AM    CKMB 1.2 01/05/2019 09:45 AM    CKND1 0.7 01/05/2019 09:45 AM    TROIQ 0.13 (H) 01/06/2019 01:09 AM    TROIQ 0.12 (H) 01/05/2019 07:57 PM    TROIQ 0.15 (H) 01/05/2019 02:00 PM    TROIQ 0.14 (H) 01/05/2019 09:45 AM       Last Lipid:    Lab Results   Component Value Date/Time    Cholesterol, total 202 (H) 08/24/2018 08:16 AM    HDL Cholesterol 52 08/24/2018 08:16 AM    LDL, calculated 122.6 (H) 08/24/2018 08:16 AM    Triglyceride 137 08/24/2018 08:16 AM    CHOL/HDL Ratio 3.9 08/24/2018 08:16 AM       Signed By: SAMANTHA Harper     January 6, 2019

## 2019-01-06 NOTE — PROGRESS NOTES
Bedside shift change report given to [address] [place] [address] Avenue (oncoming nurse) by  Eunice esquivel). Report included the following information SBAR and Kardex.

## 2019-01-06 NOTE — H&P
History & Physical 
Patient: Lidia Flowers MRN: [id number]  CSN: [id number] YOB: 1960  Age: 59 y.o. Sex: female DOA: 1/5/2019 Chief Complaint Patient presents with  Palpitations  Fatigue  Back Pain HPI:  
 
Lidia Flowers is a 59 y.o. female with PMHx of HTN, A-fib, and CHF presenting to the ED c/o intermittent palpitations for the past 10 hours. Pt also c/o intermittent sharp left upper back pain. Associated symptoms include fatigue. Reports no modifying factors for her symptoms. Notes she is on Coumadin. Notes her cardiologist is Dr. Emmy White. Denies chest pain and any other symptoms or complaints. She also had severe cold sweats. Her troponin level was 0.14 Past Medical History:  
Diagnosis Date  Aortic valve disorders 1/13/2011  Asthma  Atrial fibrillation ([facility] [address] 1/13/2011  Congestive heart failure, unspecified March 2005 Cleared quickly with Lasix therapy  Echocardiogram 01/10/2012 A-fib. EF 45%. Mild RVE. RVSP 45-50. Massive LAE. Mod TAMI. Mod MS. Severe MR (mean grad 10). Mild-mod AI. Mild TR. Pulmonic systolic flow reversal.  Mild IVCE.  Hypertension  Iron deficiency anemias  Long term (current) use of anticoagulants 2/23/2011  Murmur 1/2011 Grade I-II/VI systolic ejection murmur along left sternal border, with radiation to the pulmonic area.  S/P cardiac cath 02/24/2012 Minimal coronary artery disease. Mild LVE. EF 55%. Mod MR. Mod-severe MS. RA 15.  RV 55/16. PA 58/34. W 36.  CO/CI 3.5/1.9 (TD); 3.61/1.9 (Dequan). R to L shunt suggested.  Wears glasses  Weight gain Past Surgical History:  
Procedure Laterality Date  HX HEART CATHETERIZATION  2/24/2012  HX HYSTERECTOMY  2006  HX MITRAL VALVE REPLACEMENT  05/22/2013  
 25/33 mm On-X mechanical valve with bilateral pulmonary vein isolation and resection of LA appendage Family History Problem Relation Age of Onset  Heart Surgery Father Open-Heart Surgery  Other Father Venous Thromboembolism  Heart Disease Mother Enlarged Heart  Hypertension Mother  Diabetes Mother Social History Socioeconomic History  Marital status:  Spouse name: Not on file  Number of children: Not on file  Years of education: Not on file  Highest education level: Not on file Tobacco Use  Smoking status: Former Smoker  Smokeless tobacco: Never Used Substance and Sexual Activity  Alcohol use: Yes Alcohol/week: 0.6 oz Types: 1 Glasses of wine per week Comment: occassionally  Drug use: No  
 Sexual activity: Yes  
  Partners: Male Birth control/protection: None Prior to Admission medications Medication Sig Start Date End Date Taking? Authorizing Provider  
metoprolol succinate (TOPROL-XL) 100 mg tablet Take 1 Tab by mouth daily. 12/20/18   Gerson Bunn MD  
hydroCHLOROthiazide (HYDRODIURIL) 25 mg tablet Take 1 Tab by mouth daily. 12/20/18   Gerson Bunn MD  
losartan (COZAAR) 100 mg tablet TAKE 1 TABLET BY MOUTH EVERY DAY 11/26/18   Gerson Bunn MD  
furosemide (LASIX) 40 mg tablet TAKE 1 TABLET BY MOUTH EVERY DAY 11/26/18   Gerson Bunn MD  
flunisolide (AEROSPAN) 80 mcg/actuation HFAA Take 2 Puffs by inhalation two (2) times a day. 10/22/18   Gerson Bunn MD  
albuterol (VENTOLIN HFA) 90 mcg/actuation inhaler Take 2 Puffs by inhalation daily. 10/22/18   Gerson Bunn MD  
beclomethasone dipropionate (QVAR REDIHALER HFA) 40 mcg/actuation HFAb inhaler Take 2 Puffs by inhalation two (2) times a day. 10/18/18   Gerson Bunn MD  
triamcinolone acetonide (KENALOG) 0.025 % ointment Apply  to affected area two (2) times a day. use thin layer 10/5/18   David Coronado MD  
KLOR-CON M20 20 mEq tablet TAKE 1 TAB BY MOUTH DAILY.  9/24/18   Candance Heal, DO  
 warfarin (COUMADIN) 5 mg tablet TAKE 5MG DAILY EXCEPT 2.5MG ON TUES, THUR, AND SAT. OR AS DIRECTED BY OUR OFFICE 8/23/18   Mark Morel, DO  
sodium chloride (SALINE NASAL) 0.65 % nasal squeeze bottle 1 Spray as needed for Congestion. Provider, Historical  
loratadine-pseudoephedrine (CLARITIN-D 24 HOUR)  mg per tablet Take 1 Tab by mouth daily. Provider, Historical  
fluticasone (FLONASE) 50 mcg/actuation nasal spray 2 Sprays by Both Nostrils route daily. 8/6/18   YOSSI Jimenez  
simvastatin (ZOCOR) 20 mg tablet Take 1 Tab by mouth nightly. 9/18/17   Gisela Quezada MD  
esomeprazole (NEXIUM) 20 mg capsule Take 1 Cap by mouth daily. 3/9/17   Gisela Quezada MD  
melatonin tab tablet Take 10 mg by mouth nightly. Provider, Historical  
 
 
Allergies Allergen Reactions  Morphine Rash and Itching  Norco [Hydrocodone-Acetaminophen] Itching Review of Systems GENERAL: No fevers or chills. HEENT: No change in vision, no earache, tinnitus, sore throat or sinus congestion. NECK: No pain or stiffness. CARDIOVASCULAR: + chest pain or pressure. and palpitations. PULMONARY: No shortness of breath, cough or wheeze. GASTROINTESTINAL: No abdominal pain, nausea, vomiting or diarrhea, melena or       bright red blood per rectum. GENITOURINARY: No urinary frequency, urgency, hesitancy or dysuria. MUSCULOSKELETAL: No joint or muscle pain, no back pain, no recent trauma. DERMATOLOGIC: No rash, no itching, no lesions. ENDOCRINE: No polyuria, polydipsia, no heat or cold intolerance. No recent change in    weight. HEMATOLOGICAL: No anemia or easy bruising or bleeding. NEUROLOGIC: No headache, seizures, numbness, tingling or weakness. PSYCHIATRIC: No depression, anxiety, mood disorder, no loss of interest in normal       activity or change in sleep pattern. Physical Exam:  
 
Physical Exam: 
Visit Vitals /77 (BP 1 Location: Left arm) Pulse 75 Temp 97.7 °F (36.5 °C) Resp 16 Ht 5' 7\" (1.702 m) Wt 86.2 kg (190 lb) SpO2 96% BMI 29.76 kg/m² O2 Device: Room air Temp (24hrs), Av.9 °F (36.6 °C), Min:97.7 °F (36.5 °C), Max:98 °F (36.7 °C) No intake/output data recorded. No intake/output data recorded. General:  Alert, cooperative, no distress, appears stated age. Head:  Normocephalic, without obvious abnormality, atraumatic. Eyes:  Conjunctivae/corneas clear. PERRL, EOMs intact. Nose: Nares normal. No drainage or sinus tenderness. Throat: Lips, mucosa, and tongue normal. Teeth and gums normal.  
Neck: Supple, symmetrical, trachea midline, no adenopathy, thyroid: no enlargement/tenderness/nodules, no carotid bruit and no JVD. Back:   ROM normal. No CVA tenderness. Lungs:   Clear to auscultation bilaterally. Chest wall:  No tenderness or deformity. Heart:  Regular rate and rhythm, S1, S2 normal, no murmur, click, rub or gallop. Abdomen: Soft, non-tender. Bowel sounds normal. No masses,  No organomegaly. Extremities: Extremities normal, atraumatic, no cyanosis or edema. Pulses: 2+ and symmetric all extremities. Skin: Skin color, texture, turgor normal. No rashes or lesions Neurologic: CNII-XII intact. No focal motor or sensory deficit. Labs Reviewed: 
 
Recent Results (from the past 24 hour(s)) EKG, 12 LEAD, INITIAL Collection Time: 19  9:41 AM  
Result Value Ref Range Ventricular Rate 85 BPM  
 Atrial Rate 375 BPM  
 QRS Duration 98 ms Q-T Interval 408 ms QTC Calculation (Bezet) 485 ms Calculated R Axis 53 degrees Calculated T Axis 128 degrees Diagnosis Atrial fibrillation Nonspecific T wave abnormality , probably digitalis effect Prolonged QT Abnormal ECG When compared with ECG of 27-AUG-2012 08:23, 
Nonspecific T wave abnormality, improved in Inferior leads Confirmed by Yuval Mac ([id number]) on 2019 12:11:35 PM 
  
CBC WITH AUTOMATED DIFF  
 Collection Time: 01/05/19  9:45 AM  
Result Value Ref Range WBC 5.8 4.6 - 13.2 K/uL  
 RBC 4.29 4. 20 - 5.30 M/uL  
 HGB 11.6 (L) 12.0 - 16.0 g/dL HCT 36.4 35.0 - 45.0 % MCV 84.8 74.0 - 97.0 FL  
 MCH 27.0 24.0 - 34.0 PG  
 MCHC 31.9 31.0 - 37.0 g/dL  
 RDW 13.8 11.6 - 14.5 % PLATELET 185 060 - 727 K/uL MPV 11.9 (H) 9.2 - 11.8 FL  
 NEUTROPHILS 50 40 - 73 % LYMPHOCYTES 31 21 - 52 % MONOCYTES 16 (H) 3 - 10 % EOSINOPHILS 2 0 - 5 % BASOPHILS 1 0 - 2 %  
 ABS. NEUTROPHILS 2.9 1.8 - 8.0 K/UL  
 ABS. LYMPHOCYTES 1.8 0.9 - 3.6 K/UL  
 ABS. MONOCYTES 0.9 0.05 - 1.2 K/UL  
 ABS. EOSINOPHILS 0.1 0.0 - 0.4 K/UL  
 ABS. BASOPHILS 0.0 0.0 - 0.1 K/UL  
 DF AUTOMATED METABOLIC PANEL, COMPREHENSIVE Collection Time: 01/05/19  9:45 AM  
Result Value Ref Range Sodium 138 136 - 145 mmol/L Potassium 3.6 3.5 - 5.5 mmol/L Chloride 101 100 - 108 mmol/L  
 CO2 30 21 - 32 mmol/L Anion gap 7 3.0 - 18 mmol/L Glucose 120 (H) 74 - 99 mg/dL BUN 17 7.0 - 18 MG/DL Creatinine 1.11 0.6 - 1.3 MG/DL  
 BUN/Creatinine ratio 15 12 - 20 GFR est AA >60 >60 ml/min/1.73m2 GFR est non-AA 50 (L) >60 ml/min/1.73m2 Calcium 9.1 8.5 - 10.1 MG/DL Bilirubin, total 0.6 0.2 - 1.0 MG/DL  
 ALT (SGPT) 35 13 - 56 U/L  
 AST (SGOT) 33 15 - 37 U/L Alk. phosphatase 69 45 - 117 U/L Protein, total 8.2 6.4 - 8.2 g/dL Albumin 3.6 3.4 - 5.0 g/dL Globulin 4.6 (H) 2.0 - 4.0 g/dL A-G Ratio 0.8 0.8 - 1.7 CARDIAC PANEL,(CK, CKMB & TROPONIN) Collection Time: 01/05/19  9:45 AM  
Result Value Ref Range  26 - 192 U/L  
 CK - MB 1.2 <3.6 ng/ml CK-MB Index 0.7 0.0 - 4.0 % Troponin-I, QT 0.14 (H) 0.00 - 0.06 NG/ML  
PROTHROMBIN TIME + INR Collection Time: 01/05/19  9:45 AM  
Result Value Ref Range Prothrombin time 18.8 (H) 11.5 - 15.2 sec INR 1.6 (H) 0.8 - 1.2 PTT Collection Time: 01/05/19  9:45 AM  
Result Value Ref Range  aPTT 32.7 23.0 - 36.4 SEC  
TROPONIN I  
 Collection Time: 01/05/19  2:00 PM  
Result Value Ref Range Troponin-I, QT 0.15 (H) 0.00 - 0.06 NG/ML  
TROPONIN I Collection Time: 01/05/19  7:57 PM  
Result Value Ref Range Troponin-I, QT 0.12 (H) 0.0 - 0.045 NG/ML Procedures/imaging: see electronic medical records for all procedures/Xrays and details which were not copied into this note but were reviewed prior to creation of Plan Assessment/Plan Active Problems: 1. Atrial fibrillation with RVR ([facility] [address] (1/20/2011) - continue metoprolol. 2. Chest pain with elevated troponin (1/5/2019) - cardiology consult. - serial troponin levels. 3. HTN 
- on metoprolol, losartan and HCTZ 
- monitor the BP. 4. Palpitations - due to rapid A. Fib 
- controlled at this time. 5. Code status 
- full code 6. DVT prophylaxis 
- on coumadin Seth Christianson MD 
January 5, 2019

## 2019-01-06 NOTE — PROGRESS NOTES
Albuterol HHN was therapeutically interchanged for albuterol MDI per the P&T Committee approved Therapeutic Interchanges Policy.

## 2019-01-06 NOTE — PROGRESS NOTES
Bedside shift change report given to Andres Higuera (oncoming nurse) by Rex Ross (offgoing nurse). Report included the following information SBAR, Kardex and MAR.  
0800 Patient resting in room with  at bedside. C/o abdominal fullness which she states is from not receiving her dose of lasix yet this am. No c/o pain or distress. 56 Pt voiding clear yellow urine. Continue strict I and O per protocol. heparin drip started at 15/units/kg/hr. Resulting PTT was greater then 180 so decreased rate and will hold infusion for one hour . Pt says her feeling of fullness has decreased substantially since receiving lasix. Ambulated to the bathroom X2 . Bed in lowest position and call bell in reach. [address] [place] [facility] at bedside. Educated on heparin. Pt has headache which was treated with tylenol. No other c./o pain or distress Bedside shift change report given to Libra Silva (oncoming nurse) by Andres Higuera (offgoing nurse). Report included the following information SBAR, Kardex and MAR.

## 2019-01-06 NOTE — PROGRESS NOTES
Problem: Falls - Risk of 
Goal: *Absence of Falls Document Celestina Feliz Fall Risk and appropriate interventions in the flowsheet. Outcome: Progressing Towards Goal 
Fall Risk Interventions: 
  
 
  
 
Medication Interventions: Teach patient to arise slowly

## 2019-01-06 NOTE — PROGRESS NOTES
SUBJECTIVE: 
 
Feeling better. No chest pain currently. LIZARRAGA better. No nausea or vomiting. No cough. OBJECTIVE: 
 
/84   Pulse 85   Temp 97.8 °F (36.6 °C)   Resp 18   Ht 5' 7\" (1.702 m)   Wt 86.2 kg (190 lb)   SpO2 98%   BMI 29.76 kg/m² CVS: RRR 
RS: CTA Bilaterally GI: NT, BS + Extremities: no pedal edema CNS: moves all extremities well General: NAD, Awake ASSESSMENT: 
 
1. Acute on chronic diastolic heart failure with preserved EF 55%  2. indeterminate elevation of troponin sec to demand ischemia due to CHF 3. chronic atrial fibrillation, rate controlled 4. Chest pain atypical, resolved 5. 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 6. Hypertension. 7. Dyslipidemia PLAN: 
 
Cont current management Cont heparin drip per protocol for # 5 as has subtherapeutic INR Cardiology input noted about ECHO and Lexiscan in am 
 
BMP:  
Lab Results Component Value Date/Time  01/06/2019 01:09 AM  
 K 3.6 01/06/2019 01:09 AM  
  01/06/2019 01:09 AM  
 CO2 28 01/06/2019 01:09 AM  
 AGAP 5 01/06/2019 01:09 AM  
 GLU 85 01/06/2019 01:09 AM  
 BUN 18 01/06/2019 01:09 AM  
 CREA 0.92 01/06/2019 01:09 AM  
 GFRAA >60 01/06/2019 01:09 AM  
 GFRNA >60 01/06/2019 01:09 AM  
 
CBC:  
Lab Results Component Value Date/Time  WBC 5.5 01/06/2019 11:10 AM  
 HGB 10.4 (L) 01/06/2019 11:10 AM  
 HCT 32.6 (L) 01/06/2019 11:10 AM  
  01/06/2019 11:10 AM

## 2019-01-07 ENCOUNTER — APPOINTMENT (OUTPATIENT)
Dept: NON INVASIVE DIAGNOSTICS | Age: 59
DRG: 292 | End: 2019-01-07
Attending: PHYSICIAN ASSISTANT
Payer: MEDICARE

## 2019-01-07 LAB
ANION GAP SERPL CALC-SCNC: 7 MMOL/L (ref 3–18)
APTT PPP: 126.7 SEC (ref 23–36.4)
APTT PPP: 71.7 SEC (ref 23–36.4)
APTT PPP: >180 SEC (ref 23–36.4)
BASOPHILS # BLD: 0 K/UL (ref 0–0.1)
BASOPHILS NFR BLD: 0 % (ref 0–2)
BUN SERPL-MCNC: 16 MG/DL (ref 7–18)
BUN/CREAT SERPL: 16 (ref 12–20)
CALCIUM SERPL-MCNC: 8.7 MG/DL (ref 8.5–10.1)
CHLORIDE SERPL-SCNC: 103 MMOL/L (ref 100–108)
CO2 SERPL-SCNC: 30 MMOL/L (ref 21–32)
CREAT SERPL-MCNC: 1.01 MG/DL (ref 0.6–1.3)
DIFFERENTIAL METHOD BLD: ABNORMAL
ECHO AO ROOT DIAM: 3.56 CM
ECHO LA AREA 4C: 42 CM2
ECHO LA VOL 4C: 168.75 ML (ref 22–52)
ECHO LA VOLUME INDEX A4C: 83.63 ML/M2 (ref 16–28)
ECHO LV INTERNAL DIMENSION DIASTOLIC: 4.79 CM (ref 3.9–5.3)
ECHO LV INTERNAL DIMENSION SYSTOLIC: 3.67 CM
ECHO LV IVSD: 1.44 CM (ref 0.6–0.9)
ECHO LV MASS 2D: 335.2 G (ref 67–162)
ECHO LV MASS INDEX 2D: 166.1 G/M2 (ref 43–95)
ECHO LV POSTERIOR WALL DIASTOLIC: 1.4 CM (ref 0.6–0.9)
ECHO LVOT DIAM: 2.01 CM
ECHO LVOT PEAK GRADIENT: 2 MMHG
ECHO LVOT PEAK VELOCITY: 71.3 CM/S
ECHO LVOT VTI: 15.55 CM
ECHO MV AREA VTI: 1.4 CM2
ECHO MV MAX VELOCITY: 173.55 CM/S
ECHO MV MEAN GRADIENT: 4.4 MMHG
ECHO MV PEAK GRADIENT: 12 MMHG
ECHO MV VTI: 35.84 CM
ECHO TV REGURGITANT MAX VELOCITY: 242.16 CM/S
ECHO TV REGURGITANT PEAK GRADIENT: 23.5 MMHG
EOSINOPHIL # BLD: 0.1 K/UL (ref 0–0.4)
EOSINOPHIL NFR BLD: 2 % (ref 0–5)
ERYTHROCYTE [DISTWIDTH] IN BLOOD BY AUTOMATED COUNT: 13.6 % (ref 11.6–14.5)
GLUCOSE SERPL-MCNC: 98 MG/DL (ref 74–99)
HCT VFR BLD AUTO: 35.3 % (ref 35–45)
HGB BLD-MCNC: 11.4 G/DL (ref 12–16)
INR PPP: 1.7 (ref 0.8–1.2)
LYMPHOCYTES # BLD: 2.4 K/UL (ref 0.9–3.6)
LYMPHOCYTES NFR BLD: 36 % (ref 21–52)
MAGNESIUM SERPL-MCNC: 2.1 MG/DL (ref 1.6–2.6)
MCH RBC QN AUTO: 26.8 PG (ref 24–34)
MCHC RBC AUTO-ENTMCNC: 32.3 G/DL (ref 31–37)
MCV RBC AUTO: 82.9 FL (ref 74–97)
MONOCYTES # BLD: 0.8 K/UL (ref 0.05–1.2)
MONOCYTES NFR BLD: 12 % (ref 3–10)
NEUTS SEG # BLD: 3.5 K/UL (ref 1.8–8)
NEUTS SEG NFR BLD: 50 % (ref 40–73)
PLATELET # BLD AUTO: 184 K/UL (ref 135–420)
PMV BLD AUTO: 12.4 FL (ref 9.2–11.8)
POTASSIUM SERPL-SCNC: 3.5 MMOL/L (ref 3.5–5.5)
PROTHROMBIN TIME: 20.1 SEC (ref 11.5–15.2)
RBC # BLD AUTO: 4.26 M/UL (ref 4.2–5.3)
SODIUM SERPL-SCNC: 140 MMOL/L (ref 136–145)
STRESS BASELINE HR: 85 BPM
STRESS ESTIMATED WORKLOAD: 1 METS
STRESS EXERCISE DUR MIN: NORMAL
STRESS PEAK DIAS BP: 92 MMHG
STRESS PEAK SYS BP: 132 MMHG
STRESS PERCENT HR ACHIEVED: 74 %
STRESS POST PEAK HR: 101 BPM
STRESS RATE PRESSURE PRODUCT: NORMAL BPM*MMHG
STRESS ST DEPRESSION: 0 MM
STRESS ST ELEVATION: 0 MM
STRESS TARGET HR: 136 BPM
WBC # BLD AUTO: 6.9 K/UL (ref 4.6–13.2)

## 2019-01-07 PROCEDURE — 74011250637 HC RX REV CODE- 250/637: Performed by: INTERNAL MEDICINE

## 2019-01-07 PROCEDURE — 74011250637 HC RX REV CODE- 250/637: Performed by: PHYSICIAN ASSISTANT

## 2019-01-07 PROCEDURE — 85025 COMPLETE CBC W/AUTO DIFF WBC: CPT

## 2019-01-07 PROCEDURE — 74011250636 HC RX REV CODE- 250/636: Performed by: PHYSICIAN ASSISTANT

## 2019-01-07 PROCEDURE — 36415 COLL VENOUS BLD VENIPUNCTURE: CPT

## 2019-01-07 PROCEDURE — A9500 TC99M SESTAMIBI: HCPCS

## 2019-01-07 PROCEDURE — 85730 THROMBOPLASTIN TIME PARTIAL: CPT

## 2019-01-07 PROCEDURE — 74011250637 HC RX REV CODE- 250/637: Performed by: HOSPITALIST

## 2019-01-07 PROCEDURE — 80048 BASIC METABOLIC PNL TOTAL CA: CPT

## 2019-01-07 PROCEDURE — 83735 ASSAY OF MAGNESIUM: CPT

## 2019-01-07 PROCEDURE — 93308 TTE F-UP OR LMTD: CPT

## 2019-01-07 PROCEDURE — 65660000000 HC RM CCU STEPDOWN

## 2019-01-07 PROCEDURE — 74011250636 HC RX REV CODE- 250/636: Performed by: INTERNAL MEDICINE

## 2019-01-07 PROCEDURE — 74011250637 HC RX REV CODE- 250/637: Performed by: EMERGENCY MEDICINE

## 2019-01-07 PROCEDURE — 85610 PROTHROMBIN TIME: CPT

## 2019-01-07 RX ORDER — WARFARIN 1 MG/1
1 TABLET ORAL
Status: COMPLETED | OUTPATIENT
Start: 2019-01-07 | End: 2019-01-07

## 2019-01-07 RX ORDER — FLUTICASONE PROPIONATE 50 MCG
2 SPRAY, SUSPENSION (ML) NASAL
COMMUNITY
End: 2020-01-23 | Stop reason: SDUPTHER

## 2019-01-07 RX ORDER — HYDROGEN PEROXIDE 3 %
20 SOLUTION, NON-ORAL MISCELLANEOUS
COMMUNITY

## 2019-01-07 RX ORDER — WARFARIN SODIUM 5 MG/1
5 TABLET ORAL ONCE
Status: COMPLETED | OUTPATIENT
Start: 2019-01-07 | End: 2019-01-07

## 2019-01-07 RX ORDER — SODIUM CHLORIDE 9 MG/ML
250 INJECTION, SOLUTION INTRAVENOUS ONCE
Status: COMPLETED | OUTPATIENT
Start: 2019-01-07 | End: 2019-01-07

## 2019-01-07 RX ORDER — WARFARIN 3 MG/1
6 TABLET ORAL
Status: DISCONTINUED | OUTPATIENT
Start: 2019-01-07 | End: 2019-01-07

## 2019-01-07 RX ADMIN — HYDROCHLOROTHIAZIDE 25 MG: 25 TABLET ORAL at 10:31

## 2019-01-07 RX ADMIN — HEPARIN SODIUM AND DEXTROSE 12 UNITS/KG/HR: 10000; 5 INJECTION INTRAVENOUS at 13:10

## 2019-01-07 RX ADMIN — LOSARTAN POTASSIUM 100 MG: 50 TABLET ORAL at 10:31

## 2019-01-07 RX ADMIN — GUAIFENESIN AND CODEINE PHOSPHATE 5 ML: 100; 10 SOLUTION ORAL at 10:39

## 2019-01-07 RX ADMIN — MELATONIN TAB 5 MG 10 MG: 5 TAB at 21:48

## 2019-01-07 RX ADMIN — SODIUM CHLORIDE 250 ML: 900 INJECTION, SOLUTION INTRAVENOUS at 08:30

## 2019-01-07 RX ADMIN — GUAIFENESIN AND CODEINE PHOSPHATE 5 ML: 100; 10 SOLUTION ORAL at 01:59

## 2019-01-07 RX ADMIN — METOPROLOL SUCCINATE 100 MG: 100 TABLET, EXTENDED RELEASE ORAL at 10:30

## 2019-01-07 RX ADMIN — SIMVASTATIN 20 MG: 10 TABLET, FILM COATED ORAL at 21:49

## 2019-01-07 RX ADMIN — GUAIFENESIN AND CODEINE PHOSPHATE 5 ML: 100; 10 SOLUTION ORAL at 16:04

## 2019-01-07 RX ADMIN — WARFARIN SODIUM 5 MG: 5 TABLET ORAL at 17:55

## 2019-01-07 RX ADMIN — PANTOPRAZOLE SODIUM 40 MG: 40 TABLET, DELAYED RELEASE ORAL at 10:31

## 2019-01-07 RX ADMIN — FUROSEMIDE 40 MG: 10 INJECTION, SOLUTION INTRAVENOUS at 10:31

## 2019-01-07 RX ADMIN — ACETAMINOPHEN 650 MG: 325 TABLET ORAL at 16:04

## 2019-01-07 RX ADMIN — FUROSEMIDE 40 MG: 10 INJECTION, SOLUTION INTRAVENOUS at 21:49

## 2019-01-07 RX ADMIN — WARFARIN SODIUM 1 MG: 1 TABLET ORAL at 21:48

## 2019-01-07 RX ADMIN — POTASSIUM CHLORIDE 20 MEQ: 20 TABLET, EXTENDED RELEASE ORAL at 10:31

## 2019-01-07 RX ADMIN — ASPIRIN 81 MG: 81 TABLET, COATED ORAL at 10:31

## 2019-01-07 RX ADMIN — GUAIFENESIN AND CODEINE PHOSPHATE 5 ML: 100; 10 SOLUTION ORAL at 20:13

## 2019-01-07 RX ADMIN — REGADENOSON 0.4 MG: 0.08 INJECTION, SOLUTION INTRAVENOUS at 08:30

## 2019-01-07 NOTE — PROGRESS NOTES
[facility]  -  Progress Note Patient: Yolie Boyer MRN: [id number]  SSN: [id number] YOB: 1960  Age: 59 y.o. Sex: female Admit Date: 1/5/2019 Assessment:  
 
Hospital Problems  Date Reviewed: 11/2/2018 Codes Class Noted POA  
 NSTEMI (non-ST elevated myocardial infarction) ([facility] ICD-10-CM: I21.4 ICD-9-CM: 410.70  1/5/2019 Unknown Chest pain ICD-10-CM: R07.9 ICD-9-CM: 786.50  1/5/2019 Unknown Atrial fibrillation [facility]) ICD-10-CM: I48.91 
ICD-9-CM: 427.31  1/20/2011 Unknown  
   
  
  
-Acute on chronic heart failure with preserved EF 55% by echo 8/2018. Although CXR without significant vascular congestion, patient presents with symptoms consistent with heart failure in setting of recent dietary changes. 
-Chronic atrial fibrillation with controlled ventricular response, presented with palpitations. On lopressor and warfarin as outpatient. 
-Chest pain atypical with indeterminate troponin (not consistent with ACS). Cath 2/2012 with minimal coronary artery disease. No recent ischemic evaluation. 
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 Echo 8/2018 with normal function. Mild mitral valve stenosis. Mean transmitral gradient of 5 mmHg. No significant regurgitation. Mild to moderate AI. -Hypertension. Stable. -Dyslipidemia. On statin. -Recent stress. 
  
Primary cardiologist Dr. Bishop Crow. Plan:  
 
Stable Her volume status appears to be reasonable this am but may benefit with an additional day of IV diuretics and switching to po Lasix tomorrow. Nuclear stress done and echo today with results pending. More recommendations pending results. Activity as tolerated. Subjective:  
 
Patient without complaints when lying flat but does get short of breath when ambulating still. No chest pain. Objective: Patient Vitals for the past 8 hrs: 
 Temp Pulse Resp BP SpO2  
01/07/19 0910    103/67   
01/07/19 0835 99.2 °F (37.3 °C) 75 18 103/67 96 % 01/07/19 0425 98 °F (36.7 °C) 78 18 (!) 141/99 98 % Patient Vitals for the past 96 hrs: 
 Weight 01/07/19 0910 90.3 kg (199 lb) 01/07/19 0425 90.3 kg (199 lb 1.2 oz) 01/05/19 0940 86.2 kg (190 lb) Intake/Output Summary (Last 24 hours) at 1/7/2019 6360 Last data filed at 1/7/2019 5423 Gross per 24 hour Intake 560 ml Output 1900 ml Net -1340 ml Physical Exam: 
General:  alert, cooperative, no distress, appears stated age Neck:  nontender, no JVD Lungs:  clear to auscultation bilaterally Heart:  regular rate and rhythm, S1, S2 normal, ejection click present Abdomen:  abdomen is soft without significant tenderness, masses, organomegaly or guarding Extremities:  extremities normal, atraumatic, no cyanosis or edema Data Review:  
 
Labs: Results:  
   
Chemistry Recent Labs 01/07/19 
0120 01/06/19 
0109 01/05/19 
0945 GLU 98 85 120*  141 138  
K 3.5 3.6 3.6  108 101 CO2 30 28 30 BUN 16 18 17 CREA 1.01 0.92 1.11  
CA 8.7 8.1* 9.1 MG 2.1  --   --   
AGAP 7 5 7 BUCR 16 20 15 AP  --  60 69 TP  --  6.9 8.2 ALB  --  3.1* 3.6 GLOB  --  3.8 4.6* AGRAT  --  0.8 0.8 CBC w/Diff Recent Labs 01/07/19 
0120 01/06/19 
1110 01/05/19 
0945 WBC 6.9 5.5 5.8  
RBC 4.26 3.90* 4.29 HGB 11.4* 10.4* 11.6* HCT 35.3 32.6* 36.4  163 167 GRANS 50 51 50 LYMPH 36 34 31 EOS 2 1 2 Cardiac Enzymes No results found for: CPK, CK, CKMMB, CKMB, RCK3, CKMBT, CKNDX, CKND1, GONZÁLEZ, TROPT, TROIQ, RUTH, TROPT, TNIPOC, BNP, BNPP Coagulation Recent Labs 01/07/19 
0120 01/06/19 
1705 PTP 20.1* 20.6* INR 1.7* 1.8* APTT >180.0* 132.0* Lipid Panel Lab Results Component Value Date/Time  Cholesterol, total 202 (H) 08/24/2018 08:16 AM  
 HDL Cholesterol 52 08/24/2018 08:16 AM  
 LDL, calculated 122.6 (H) 08/24/2018 08:16 AM  
 VLDL, calculated 27.4 08/24/2018 08:16 AM  
 Triglyceride 137 08/24/2018 08:16 AM  
 CHOL/HDL Ratio 3.9 08/24/2018 08:16 AM  
  
BNP No results found for: BNP, BNPP, XBNPT Liver Enzymes Recent Labs 01/06/19 
0109 TP 6.9 ALB 3.1* AP 60 SGOT 24  
  
Digoxin Thyroid Studies Lab Results Component Value Date/Time  TSH 3.950 01/25/2013 12:00 AM

## 2019-01-07 NOTE — PROGRESS NOTES
conducted an initial consultation and Spiritual Assessment for Elder Nicolas, who is a 59 y.o.,female. Patients Primary Language is: Minerva Mckinnon. According to the patients EMR Religion Affiliation is: Jorgei. The reason the Patient came to the hospital is:  
Patient Active Problem List  
 Diagnosis Date Noted  NSTEMI (non-ST elevated myocardial infarction) ([facility] 01/05/2019  Chest pain 01/05/2019  Mild intermittent asthma without complication 92/28/1262  Advanced directives, counseling/discussion 03/09/2017  Hyperlipidemia 02/02/2012  Benign hypertensive heart [facility] with [facility] 08/05/2011  Long term current use of anticoagulant therapy 02/23/2011  Atrial fibrillation ([facility] 01/20/2011  Rheumatic valvular disease  Chronic diastolic congestive heart failure ([facility] 03/01/2005 The  provided the following Interventions: 
Initiated a relationship of care and support. Explored issues of orquidea, belief, spirituality and Mandaeism/ritual needs while hospitalized. Listened empathically. Provided chaplaincy education. Provided information about Spiritual Care Services. Offered prayer and assurance of continued prayers on patient's behalf. Chart reviewed. The following outcomes where achieved: 
Patient shared limited information about both their medical narrative and spiritual journey/beliefs.  confirmed Patient's Religion Affiliation. Patient processed feeling about current hospitalization. Patient expressed gratitude for 's visit. Assessment: 
Patient does not have any Mandaeism/cultural needs that will affect patients preferences in health care. There are no spiritual or Mandaeism issues which require intervention at this time. Plan: 
Chaplains will continue to follow and will provide pastoral care on an as needed/requested basis.  
 recommends bedside caregivers page  on duty if patient shows signs of acute spiritual or emotional distress.  [facility] Spiritual Care  
[phone]

## 2019-01-07 NOTE — PROGRESS NOTES
Patient was given 10.97 mCi of Sestamibi for the Resting pictures. Patient received 0.4 mg of Lexiscan for the exercise portion of the Stress test. Patient was then given 33 mCi of Sestamibi for the Stress pictures. Patient went back to room with armband still on.

## 2019-01-07 NOTE — PROGRESS NOTES
Reason for Admission:  NSTEMI (non-ST elevated myocardial infarction) ([facility] Atrial fibrillation ([facility] Chest pain RRAT Score:    13 Plan for utilizing home health: There are no home health orders in place for patient. This writer will continue to closely monitor for potential home health needs. Likelihood of Readmission:   Moderate Do you (patient/family) have any concerns for transition/discharge? Patient has no concerns regarding her discharge. Transition of Care Plan:   Patient plans to return home with help from her family. Family will transport home at the time of discharge. Initial assessment completed with patient. Face sheet information confirmed:  YES. The patient requests we communicate with her , in reference to medical care. This patient lives in a house with her . Patient is able to navigate steps as needed. Prior to hospitalization, patient was considered to be independent : YES. If not independent,  patient needs assist with : N/A. Cognitive status of patient:   Alert and oriented. Patient has a current ACP document on file: NO 
 
Patient's family will be available to transport patient home upon discharge. The patient has no DME available in the home. Patient is not currently active with home health. If active, agency name is N/A. Patient has never stayed in a skilled nursing facility or rehab. Currently, the discharge plan is to return home with help from her family. Family will transport home at the time of discharge. Patient's  is Melody Orr, MR#[phone]). Patient's PCP is Dr. Eli Shahid. Patient is insured through [facility]. This writer will continue to closely monitor for discharge planning to ensure a safe discharge home from [facility]. Care Management Interventions PCP Verified by CM: Yes(Dr. Eli Shahid) Palliative Care Criteria Met (RRAT>21 & CHF Dx)?: No 
 Mode of Transport at Discharge: Other (see comment)(Family will transport home at the time of discharge.) Transition of Care Consult (CM Consult): Discharge Planning Current Support Network: Lives with Spouse, Family Lives [place] Confirm Follow Up Transport: Family Plan discussed with Pt/Family/Caregiver: Yes Discharge Location Discharge Placement: Home with family assistance Lennox Chino. MSW Care Manager [phone]

## 2019-01-07 NOTE — ROUTINE PROCESS
Received report from [facility]. Pt AAOx3, NAD, breathing non labored, on room air, HOB up. IV site clean, dry and intact. Heparin drip going per order. Rate verified. Pt's for stress test in the morning. Instructed pt to have nothing by mouth after midnight for the test. Pt verbalized understanding of instruction given. Bed at the lowest level on lock position, call bell w/i reach. Bedside and Verbal shift change report given to CUONG Goldstein (oncoming nurse) by me (offgoing nurse).  Report included the following information SBAR, Kardex, Intake/Output, MAR, Recent Results and Cardiac Rhythm SR.

## 2019-01-07 NOTE — PROGRESS NOTES
Problem: Falls - Risk of 
Goal: *Absence of Falls Document Ayush Vogel Fall Risk and appropriate interventions in the flowsheet. Outcome: Progressing Towards Goal 
Fall Risk Interventions: 
  
 
  
 
Medication Interventions: Teach patient to arise slowly Elimination Interventions: Call light in reach

## 2019-01-08 LAB
ANION GAP SERPL CALC-SCNC: 8 MMOL/L (ref 3–18)
APTT PPP: 114.2 SEC (ref 23–36.4)
APTT PPP: 82.3 SEC (ref 23–36.4)
BASOPHILS # BLD: 0 K/UL (ref 0–0.1)
BASOPHILS NFR BLD: 0 % (ref 0–2)
BUN SERPL-MCNC: 20 MG/DL (ref 7–18)
BUN/CREAT SERPL: 19 (ref 12–20)
CALCIUM SERPL-MCNC: 8.7 MG/DL (ref 8.5–10.1)
CHLORIDE SERPL-SCNC: 101 MMOL/L (ref 100–108)
CO2 SERPL-SCNC: 29 MMOL/L (ref 21–32)
CREAT SERPL-MCNC: 1.03 MG/DL (ref 0.6–1.3)
DIFFERENTIAL METHOD BLD: ABNORMAL
EOSINOPHIL # BLD: 0 K/UL (ref 0–0.4)
EOSINOPHIL NFR BLD: 1 % (ref 0–5)
ERYTHROCYTE [DISTWIDTH] IN BLOOD BY AUTOMATED COUNT: 13.5 % (ref 11.6–14.5)
GLUCOSE SERPL-MCNC: 106 MG/DL (ref 74–99)
HCT VFR BLD AUTO: 36.8 % (ref 35–45)
HGB BLD-MCNC: 11.8 G/DL (ref 12–16)
INR PPP: 1.4 (ref 0.8–1.2)
LYMPHOCYTES # BLD: 1.2 K/UL (ref 0.9–3.6)
LYMPHOCYTES NFR BLD: 21 % (ref 21–52)
MAGNESIUM SERPL-MCNC: 2 MG/DL (ref 1.6–2.6)
MCH RBC QN AUTO: 26.3 PG (ref 24–34)
MCHC RBC AUTO-ENTMCNC: 32.1 G/DL (ref 31–37)
MCV RBC AUTO: 82.1 FL (ref 74–97)
MONOCYTES # BLD: 1 K/UL (ref 0.05–1.2)
MONOCYTES NFR BLD: 18 % (ref 3–10)
NEUTS SEG # BLD: 3.4 K/UL (ref 1.8–8)
NEUTS SEG NFR BLD: 60 % (ref 40–73)
PHOSPHATE SERPL-MCNC: 3.5 MG/DL (ref 2.5–4.9)
PLATELET # BLD AUTO: 172 K/UL (ref 135–420)
PMV BLD AUTO: 11.8 FL (ref 9.2–11.8)
POTASSIUM SERPL-SCNC: 3.3 MMOL/L (ref 3.5–5.5)
PROTHROMBIN TIME: 17.4 SEC (ref 11.5–15.2)
RBC # BLD AUTO: 4.48 M/UL (ref 4.2–5.3)
SODIUM SERPL-SCNC: 138 MMOL/L (ref 136–145)
WBC # BLD AUTO: 5.7 K/UL (ref 4.6–13.2)

## 2019-01-08 PROCEDURE — 97162 PT EVAL MOD COMPLEX 30 MIN: CPT

## 2019-01-08 PROCEDURE — 84100 ASSAY OF PHOSPHORUS: CPT

## 2019-01-08 PROCEDURE — 85730 THROMBOPLASTIN TIME PARTIAL: CPT

## 2019-01-08 PROCEDURE — 74011250637 HC RX REV CODE- 250/637: Performed by: EMERGENCY MEDICINE

## 2019-01-08 PROCEDURE — 83735 ASSAY OF MAGNESIUM: CPT

## 2019-01-08 PROCEDURE — 74011250637 HC RX REV CODE- 250/637: Performed by: HOSPITALIST

## 2019-01-08 PROCEDURE — 80048 BASIC METABOLIC PNL TOTAL CA: CPT

## 2019-01-08 PROCEDURE — 85610 PROTHROMBIN TIME: CPT

## 2019-01-08 PROCEDURE — 36415 COLL VENOUS BLD VENIPUNCTURE: CPT

## 2019-01-08 PROCEDURE — 85025 COMPLETE CBC W/AUTO DIFF WBC: CPT

## 2019-01-08 PROCEDURE — 65660000000 HC RM CCU STEPDOWN

## 2019-01-08 PROCEDURE — 94640 AIRWAY INHALATION TREATMENT: CPT

## 2019-01-08 PROCEDURE — 74011250637 HC RX REV CODE- 250/637: Performed by: PHYSICIAN ASSISTANT

## 2019-01-08 PROCEDURE — 74011250637 HC RX REV CODE- 250/637: Performed by: INTERNAL MEDICINE

## 2019-01-08 PROCEDURE — 74011250636 HC RX REV CODE- 250/636: Performed by: PHYSICIAN ASSISTANT

## 2019-01-08 RX ORDER — BENZONATATE 100 MG/1
100 CAPSULE ORAL
Status: DISCONTINUED | OUTPATIENT
Start: 2019-01-08 | End: 2019-01-11 | Stop reason: HOSPADM

## 2019-01-08 RX ORDER — FUROSEMIDE 40 MG/1
40 TABLET ORAL DAILY
Status: DISCONTINUED | OUTPATIENT
Start: 2019-01-09 | End: 2019-01-11 | Stop reason: HOSPADM

## 2019-01-08 RX ORDER — LOSARTAN POTASSIUM 50 MG/1
50 TABLET ORAL DAILY
Status: DISCONTINUED | OUTPATIENT
Start: 2019-01-09 | End: 2019-01-11 | Stop reason: HOSPADM

## 2019-01-08 RX ORDER — WARFARIN 7.5 MG/1
7.5 TABLET ORAL ONCE
Status: COMPLETED | OUTPATIENT
Start: 2019-01-08 | End: 2019-01-08

## 2019-01-08 RX ADMIN — SIMVASTATIN 20 MG: 10 TABLET, FILM COATED ORAL at 21:25

## 2019-01-08 RX ADMIN — MELATONIN TAB 5 MG 10 MG: 5 TAB at 21:25

## 2019-01-08 RX ADMIN — GUAIFENESIN AND CODEINE PHOSPHATE 5 ML: 100; 10 SOLUTION ORAL at 14:17

## 2019-01-08 RX ADMIN — METOPROLOL SUCCINATE 100 MG: 100 TABLET, EXTENDED RELEASE ORAL at 08:37

## 2019-01-08 RX ADMIN — BENZONATATE 100 MG: 100 CAPSULE ORAL at 18:53

## 2019-01-08 RX ADMIN — LOSARTAN POTASSIUM 100 MG: 50 TABLET ORAL at 08:37

## 2019-01-08 RX ADMIN — HYDROCHLOROTHIAZIDE 25 MG: 25 TABLET ORAL at 08:37

## 2019-01-08 RX ADMIN — POTASSIUM CHLORIDE 20 MEQ: 20 TABLET, EXTENDED RELEASE ORAL at 08:36

## 2019-01-08 RX ADMIN — PANTOPRAZOLE SODIUM 40 MG: 40 TABLET, DELAYED RELEASE ORAL at 08:37

## 2019-01-08 RX ADMIN — FUROSEMIDE 40 MG: 10 INJECTION, SOLUTION INTRAVENOUS at 08:40

## 2019-01-08 RX ADMIN — HEPARIN SODIUM AND DEXTROSE 8 UNITS/KG/HR: 10000; 5 INJECTION INTRAVENOUS at 03:30

## 2019-01-08 RX ADMIN — GUAIFENESIN AND CODEINE PHOSPHATE 5 ML: 100; 10 SOLUTION ORAL at 00:26

## 2019-01-08 RX ADMIN — GUAIFENESIN AND CODEINE PHOSPHATE 5 ML: 100; 10 SOLUTION ORAL at 18:53

## 2019-01-08 RX ADMIN — GUAIFENESIN AND CODEINE PHOSPHATE 5 ML: 100; 10 SOLUTION ORAL at 04:28

## 2019-01-08 RX ADMIN — GUAIFENESIN AND CODEINE PHOSPHATE 5 ML: 100; 10 SOLUTION ORAL at 08:36

## 2019-01-08 RX ADMIN — FLUTICASONE FUROATE 2 PUFF: 100 POWDER RESPIRATORY (INHALATION) at 07:45

## 2019-01-08 RX ADMIN — HEPARIN SODIUM AND DEXTROSE 8 UNITS/KG/HR: 10000; 5 INJECTION INTRAVENOUS at 21:31

## 2019-01-08 RX ADMIN — ASPIRIN 81 MG: 81 TABLET, COATED ORAL at 08:37

## 2019-01-08 RX ADMIN — WARFARIN SODIUM 7.5 MG: 7.5 TABLET ORAL at 18:53

## 2019-01-08 RX ADMIN — ACETAMINOPHEN 650 MG: 325 TABLET ORAL at 09:10

## 2019-01-08 NOTE — ROUTINE PROCESS
Received report and Assumed patient care. Patient in bed, awake, alert and oriented x4. Denies pain at this time. Heparin drip infusing, rate changed and verified with off going RN [address] Cheng. 7:40 AM - Bedside shift change report given to [address] Cheng (oncoming nurse) by Betty Choudhury RN (offgoing nurse). Report given with SBAR, Kardex, Intake/Output, MAR and Recent Results.

## 2019-01-08 NOTE — PROGRESS NOTES
Discharge planning: 
 
Patient now has home health orders in place. This writer completed freedom of choice with patient. She has elected to use [facility]. Referral was made to [facility]. This writer will continue to monitor for discharge planning. Estuardo Alvarez. MS Care Manager [phone]

## 2019-01-08 NOTE — PROGRESS NOTES
Problem: Falls - Risk of 
Goal: *Absence of Falls Document [facility] Fall Risk and appropriate interventions in the flowsheet. Outcome: Progressing Towards Goal 
Fall Risk Interventions: 
  
 
  
 
Medication Interventions: Teach patient to arise slowly Elimination Interventions: Call light in reach

## 2019-01-08 NOTE — PROGRESS NOTES
SUBJECTIVE: 
 
Patient sitting in bed in NAD, awake, alert, denies cp. Has cough OBJECTIVE: 
 
/67   Pulse 77   Temp 98 °F (36.7 °C)   Resp 18   Ht 5' 7\" (1.702 m)   Wt 89.9 kg (198 lb 1.6 oz)   SpO2 98%   BMI 31.03 kg/m² General:  Awake, alert Cardiovascular:  S1S2+, RRR Pulmonary:  CTA b/l GI:  Soft, BS+, NT, ND Extremities:  No edema ASSESSMENT: 
 
1. Acute on chronic diastolic heart failure with preserved EF 55%  2. indeterminate elevation of troponin sec to demand ischemia due to CHF 3. chronic atrial fibrillation, rate controlled 4. Chest pain atypical, resolved 5. 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 6. Hypertension. 7. Dyslipidemia PLAN: 
 
Cardio input noted, po diuretics Adjust BP meds On coumadin, heparin while subtherapeutic PT, OT Replete K Discharge planning D/w patient BMP:  
Lab Results Component Value Date/Time  01/08/2019 01:58 AM  
 K 3.3 (L) 01/08/2019 01:58 AM  
  01/08/2019 01:58 AM  
 CO2 29 01/08/2019 01:58 AM  
 AGAP 8 01/08/2019 01:58 AM  
  (H) 01/08/2019 01:58 AM  
 BUN 20 (H) 01/08/2019 01:58 AM  
 CREA 1.03 01/08/2019 01:58 AM  
 GFRAA >60 01/08/2019 01:58 AM  
 GFRNA 55 (L) 01/08/2019 01:58 AM  
 
CBC:  
Lab Results Component Value Date/Time  WBC 5.7 01/08/2019 01:58 AM  
 HGB 11.8 (L) 01/08/2019 01:58 AM  
 HCT 36.8 01/08/2019 01:58 AM  
  01/08/2019 01:58 AM  
 
 
 
Basiilo Funez MD

## 2019-01-08 NOTE — PROGRESS NOTES
[facility]  -  Progress Note Patient: Edwar Vance MRN: [id number]  SSN: [id number] YOB: 1960  Age: 59 y.o. Sex: female Admit Date: 1/5/2019 Assessment:  
 
-Acute on chronic heart failure with preserved EF 56-60% by echo 01/2019. Although CXR without significant vascular congestion, patient presents with symptoms consistent with heart failure in setting of recent dietary changes. 
-Chronic atrial fibrillation with controlled ventricular response, presented with palpitations. On lopressor and warfarin as outpatient. 
-Chest pain atypical with indeterminate troponin (not consistent with ACS). Cath 2/2012 with minimal coronary artery disease.  Nuclear stress test 1/7/19 with small reversible defect with a mild reduction in uptake in inferolateral region, medical management recommended unless significant recurrent exertional angina. 
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 Echo 8/2018 with normal function. Mild mitral valve stenosis. Mean transmitral gradient of 5 mmHg. No significant regurgitation. Mild to moderate AI. -Hypertension. Stable. -Dyslipidemia. On statin. -Recent stress. 
  
Primary cardiologist Dr. Fay Delatorre. Plan:  
 
-May consider switch to PO Lasix today or tomorrow, Cr remains stable. -Continue medical management with ASA, Toprol, Zocor, Cozaar.   
-Continue Coumadin with Heparin gtts with subtherapeutic INR 1.4 today. ADDENDUM: Pt became lightheaded and weak while on her way back to her room with PT this morning. Denies shortness of breath with ambulation. Received both Toprol and Losartan, along with IV Lasix as scheduled this AM.  Will d/c IV Lasix. Further recommendations per attending.  
 
Subjective:  
 
Continues to have some shortness of breath when walking back from bathroom, no c/o chest pain/discomfort. Reports productive cough, intermittent headaches. Asking for something for constipation. Objective:  
  
Patient Vitals for the past 8 hrs: 
 Temp Pulse Resp BP SpO2  
01/08/19 0805 97.9 °F (36.6 °C) 91 19 107/76 96 % 01/08/19 0334 98.3 °F (36.8 °C) 95 18 118/76 94 % Patient Vitals for the past 96 hrs: 
 Weight 01/08/19 0334 198 lb 1.6 oz (89.9 kg) 01/07/19 0952 199 lb (90.3 kg) 01/07/19 0910 199 lb (90.3 kg) 01/07/19 0425 199 lb 1.2 oz (90.3 kg) 01/05/19 0940 190 lb (86.2 kg) Intake/Output Summary (Last 24 hours) at 1/8/2019 8153 Last data filed at 1/8/2019 3904 Gross per 24 hour Intake 987.8 ml Output 2800 ml Net -1812.2 ml Physical Exam: 
General:  alert, cooperative, no distress, appears stated age Neck:  No JVD Lungs:  clear to auscultation bilaterally Heart:  Regular rate and rhythm Abdomen:  abdomen is soft without significant tenderness, masses, organomegaly or guarding Extremities:  Atraumatic, no edema Data Review:  
 
Labs: Results:  
   
Chemistry Recent Labs 01/08/19 0158 01/07/19 
0120 01/06/19 
0109 * 98 85  140 141  
K 3.3* 3.5 3.6  103 108 CO2 29 30 28 BUN 20* 16 18 CREA 1.03 1.01 0.92  
CA 8.7 8.7 8.1*  
MG 2.0 2.1  --   
PHOS 3.5  --   --   
AGAP 8 7 5 BUCR 19 16 20 AP  --   --  60  
TP  --   --  6.9 ALB  --   --  3.1*  
GLOB  --   --  3.8 AGRAT  --   --  0.8 CBC w/Diff Recent Labs 01/08/19 0158 01/07/19 
0120 01/06/19 
1110 WBC 5.7 6.9 5.5  
RBC 4.48 4.26 3.90* HGB 11.8* 11.4* 10.4* HCT 36.8 35.3 32.6*  
 184 163 GRANS 60 50 51 LYMPH 21 36 34 EOS 1 2 1 Coagulation Recent Labs 01/08/19 
0158 01/07/19 
1909 01/07/19 
0120 PTP 17.4*  --   --  20.1* INR 1.4*  --   --  1.7* APTT 114.2* 126.7*   < > >180.0*  
 < > = values in this interval not displayed. Lipid Panel Lab Results Component Value Date/Time Cholesterol, total 202 (H) 08/24/2018 08:16 AM  
 HDL Cholesterol 52 08/24/2018 08:16 AM  
 LDL, calculated 122.6 (H) 08/24/2018 08:16 AM  
 VLDL, calculated 27.4 08/24/2018 08:16 AM  
 Triglyceride 137 08/24/2018 08:16 AM  
 CHOL/HDL Ratio 3.9 08/24/2018 08:16 AM  
  
Liver Enzymes Recent Labs 01/06/19 
0109 TP 6.9 ALB 3.1* AP 60 SGOT 24 Thyroid Studies Lab Results Component Value Date/Time  TSH 3.950 01/25/2013 12:00 AM

## 2019-01-08 NOTE — PROGRESS NOTES
SUBJECTIVE: 
 
Patient sitting in bed in NAD, awake, follows commands, denies cp or sob OBJECTIVE: 
 
BP (!) 131/97 (BP 1 Location: Right arm, BP Patient Position: At rest)   Pulse 82   Temp 98 °F (36.7 °C)   Resp 18   Ht 5' 7\" (1.702 m)   Wt 90.3 kg (199 lb)   SpO2 94%   BMI 31.17 kg/m² General:  Awake, alert Cardiovascular:  S1S2+, RRR Pulmonary:  CTA b/l GI:  Soft, BS+, NT, ND Extremities:  No edema ASSESSMENT: 
 
1. Acute on chronic diastolic heart failure with preserved EF 55%  2. indeterminate elevation of troponin sec to demand ischemia due to CHF 3. chronic atrial fibrillation, rate controlled 4. Chest pain atypical, resolved 5. 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 6. Hypertension. 7. Dyslipidemia PLAN: 
 
Cardio input noted, diuretics per caardio Medical management per cardio On coumadin, heparin while subtherapeutic PT, OT Discharge planning D/w patient BMP:  
Lab Results Component Value Date/Time  01/07/2019 01:20 AM  
 K 3.5 01/07/2019 01:20 AM  
  01/07/2019 01:20 AM  
 CO2 30 01/07/2019 01:20 AM  
 AGAP 7 01/07/2019 01:20 AM  
 GLU 98 01/07/2019 01:20 AM  
 BUN 16 01/07/2019 01:20 AM  
 CREA 1.01 01/07/2019 01:20 AM  
 GFRAA >60 01/07/2019 01:20 AM  
 GFRNA 56 (L) 01/07/2019 01:20 AM  
 
CBC:  
Lab Results Component Value Date/Time  WBC 6.9 01/07/2019 01:20 AM  
 HGB 11.4 (L) 01/07/2019 01:20 AM  
 HCT 35.3 01/07/2019 01:20 AM  
  01/07/2019 01:20 AM  
 
 
 
Adrianne Hanson MD

## 2019-01-08 NOTE — PROGRESS NOTES
Problem: Mobility Impaired (Adult and Pediatric) Goal: *Acute Goals and Plan of Care (Insert Text) Physical Therapy Goals Initiated 1/8/2019 and to be accomplished within 7 day(s) 1. Patient will move from supine to sit and sit to supine , scoot up and down and roll side to side in bed with modified independence. 2.  Patient will transfer from bed to chair and chair to bed with supervision/set-up using the least restrictive device. 3.  Patient will perform sit to stand with supervision/set-up. 4.  Patient will ambulate with supervision/set-up for 250feet with the least restrictive device. 5.  Patient will ascend/descend 10 stairs with 1 handrail(s) with supervision/set-up. Outcome: Progressing Towards Goal 
physical Therapy EVALUATION Patient: Gina Alejandra [de-identified]59 y.o. female) Date: 1/8/2019 Primary Diagnosis: NSTEMI (non-ST elevated myocardial infarction) ([facility] [address] Atrial fibrillation ([facility] [address] Chest pain Precautions:   Fall ASSESSMENT : 
PT orders received and patient cleared by nursing to participate with therapy. Patient is a 59 y.o. female admitted to the hospital due to palpations, fatigue, and back pain. Patient consents to PT evaluation and treatment. Pt performs bed mobility SBA. Sit to stands CGA for increase safety. Gait training using RW 40 feet with CGA/min A for increased safety. Pt had narrow MARY and decreased balance at times which RW improved. Pt usually ambulates with no AD. Pt also feeling lightheaded. BP checked after PT and 91/63. Advised pt to be OOB with nursing assistance for increased safety at this time. Pt ended therapy supine in bed with all needs met. Patient will benefit from skilled intervention to address the above impairments and increase functional independence. Patients rehabilitation potential is considered to be Good Factors which may influence rehabilitation potential include:  
[]         None noted 
[]         Mental ability/status []         Medical condition 
[]         Home/family situation and support systems 
[]         Safety awareness 
[]         Pain tolerance/management 
[]         Other: PLAN : 
Recommendations and Planned Interventions: 
[x]           Bed Mobility Training             [x]    Neuromuscular Re-Education 
[x]           Transfer Training                   []    Orthotic/Prosthetic Training 
[x]           Gait Training                          []    Modalities [x]           Therapeutic Exercises          []    Edema Management/Control 
[x]           Therapeutic Activities            [x]    Patient and Family Training/Education 
[]           Other (comment): Frequency/Duration: Patient will be followed by physical therapy 1-2 times per day to address goals. Discharge Recommendations: Home Health Further Equipment Recommendations for Discharge: rolling walker SUBJECTIVE:  
Patient stated I've been to the bathroom.  OBJECTIVE DATA SUMMARY:  
 
Past Medical History:  
Diagnosis Date  Aortic valve disorders 1/13/2011  Asthma  Atrial fibrillation ([facility] [address] 1/13/2011  Congestive heart failure, unspecified March 2005 Cleared quickly with Lasix therapy  Echocardiogram 01/10/2012 A-fib. EF 45%. Mild RVE. RVSP 45-50. Massive LAE. Mod TAMI. Mod MS. Severe MR (mean grad 10). Mild-mod AI. Mild TR. Pulmonic systolic flow reversal.  Mild IVCE.  Hypertension  Iron deficiency anemias  Long term (current) use of anticoagulants 2/23/2011  Murmur 1/2011 Grade I-II/VI systolic ejection murmur along left sternal border, with radiation to the pulmonic area.  S/P cardiac cath 02/24/2012 Minimal coronary artery disease. Mild LVE. EF 55%. Mod MR. Mod-severe MS. RA 15.  RV 55/16. PA 58/34. W 36.  CO/CI 3.5/1.9 (TD); 3.61/1.9 (Dequan). R to L shunt suggested.  Wears glasses  Weight gain Past Surgical History:  
Procedure Laterality Date  HX HEART CATHETERIZATION  2/24/2012  HX HYSTERECTOMY  2006  HX MITRAL VALVE REPLACEMENT  05/22/2013  
 25/33 mm On-X mechanical valve with bilateral pulmonary vein isolation and resection of LA appendage Barriers to Learning/Limitations: None Compensate with: N/A Prior Level of Function/Home Situation: Independent with all mobility including gait no AD. Home Situation Home Environment: Private residence # Steps to Enter: 2 Rails to Enter: No 
One/Two Story Residence: Two story # of Interior Steps: 15 Interior Rails: Right Living Alone: No 
Support Systems: (lives with ) Patient Expects to be Discharged to[de-identified] Private residence Current DME Used/Available at Home: NoneCritical Behavior: 
Neurologic State: Alert;Drowsy Psychosocial 
Patient Behaviors: Calm; CooperativeStrength:   
Strength: Generally decreased, functional(B LE) Tone & Sensation:  
Tone: Normal(B LE) Sensation: Intact(B LE) Range Of Motion: 
AROM: Within functional limits(B LE) Functional Mobility: 
Bed Mobility: 
Supine to Sit: Stand-by assistance Sit to Supine: Stand-by assistance Transfers: 
Sit to Stand: Contact guard assistance Stand to Sit: Contact guard assistance Balance:  
Sitting: Intact Standing: Impaired; With support Standing - Static: Good Standing - Dynamic : FairAmbulation/Gait Training: 
Distance (ft): 40 Feet (ft) Assistive Device: Walker, rolling Ambulation - Level of Assistance: Contact guard assistance;Minimal assistance Base of Support: Center of gravity altered Speed/Silvia: Pace decreased (<100 feet/min) Therapeutic Exercises:  
Reviewed ankle pumps Pain: 
Pre: 0/10 Post: 0/10 Activity Tolerance:  
fair Please refer to the flowsheet for vital signs taken during this treatment. After treatment:  
[] Patient left in no apparent distress sitting up in chair 
[] Patient left sitting on EOB [x] Patient left in no apparent distress in bed [] Patient declined to be OOB at this time due to   
[x] Call bell left within reach [x] Nursing notified(Belle) 
[] Caregiver present 
[] Bed alarm activated 
[x] Personal items in reach COMMUNICATION/EDUCATION:  
[x]         Fall prevention education was provided and the patient/caregiver indicated understanding. [x]         Patient/family have participated as able in goal setting and plan of care. [x]         Patient/family agree to work toward stated goals and plan of care. []         Patient understands intent and goals of therapy, but is neutral about his/her participation. []         Patient is unable to participate in goal setting and plan of care. Thank you for this referral. 
Yasmin Winn, PT, DPT Time Calculation: 17 mins Mobility [id number] Current  CJ= 20-39%   Goal  CI= 1-19%. The severity rating is based on the Level of Assistance required for Functional Mobility and ADLs.  
 
Eval Complexity: History: MEDIUM  Complexity : 1-2 comorbidities / personal factors will impact the outcome/ POC Exam:HIGH Complexity : 4+ Standardized tests and measures addressing body structure, function, activity limitation and / or participation in recreation  Presentation: MEDIUM Complexity : Evolving with changing characteristics  Clinical Decision Making:Medium Complexity   Overall Complexity:MEDIUM

## 2019-01-09 ENCOUNTER — HOME HEALTH ADMISSION (OUTPATIENT)
Dept: HOME HEALTH SERVICES | Facility: HOME HEALTH | Age: 59
End: 2019-01-09
Payer: MEDICARE

## 2019-01-09 LAB
ANION GAP SERPL CALC-SCNC: 8 MMOL/L (ref 3–18)
APTT PPP: 130.3 SEC (ref 23–36.4)
APTT PPP: 67.1 SEC (ref 23–36.4)
APTT PPP: >180 SEC (ref 23–36.4)
BASOPHILS # BLD: 0 K/UL (ref 0–0.1)
BASOPHILS NFR BLD: 0 % (ref 0–2)
BUN SERPL-MCNC: 21 MG/DL (ref 7–18)
BUN/CREAT SERPL: 18 (ref 12–20)
CALCIUM SERPL-MCNC: 8.6 MG/DL (ref 8.5–10.1)
CHLORIDE SERPL-SCNC: 104 MMOL/L (ref 100–108)
CO2 SERPL-SCNC: 27 MMOL/L (ref 21–32)
CREAT SERPL-MCNC: 1.15 MG/DL (ref 0.6–1.3)
DIFFERENTIAL METHOD BLD: ABNORMAL
EOSINOPHIL # BLD: 0.1 K/UL (ref 0–0.4)
EOSINOPHIL NFR BLD: 1 % (ref 0–5)
ERYTHROCYTE [DISTWIDTH] IN BLOOD BY AUTOMATED COUNT: 13.7 % (ref 11.6–14.5)
GLUCOSE SERPL-MCNC: 103 MG/DL (ref 74–99)
HCT VFR BLD AUTO: 36.3 % (ref 35–45)
HGB BLD-MCNC: 12 G/DL (ref 12–16)
INR PPP: 1.6 (ref 0.8–1.2)
LYMPHOCYTES # BLD: 1.6 K/UL (ref 0.9–3.6)
LYMPHOCYTES NFR BLD: 33 % (ref 21–52)
MAGNESIUM SERPL-MCNC: 1.9 MG/DL (ref 1.6–2.6)
MCH RBC QN AUTO: 27.1 PG (ref 24–34)
MCHC RBC AUTO-ENTMCNC: 33.1 G/DL (ref 31–37)
MCV RBC AUTO: 82.1 FL (ref 74–97)
MONOCYTES # BLD: 1 K/UL (ref 0.05–1.2)
MONOCYTES NFR BLD: 20 % (ref 3–10)
NEUTS SEG # BLD: 2.2 K/UL (ref 1.8–8)
NEUTS SEG NFR BLD: 46 % (ref 40–73)
PLATELET # BLD AUTO: 162 K/UL (ref 135–420)
PMV BLD AUTO: 12.3 FL (ref 9.2–11.8)
POTASSIUM SERPL-SCNC: 3.5 MMOL/L (ref 3.5–5.5)
PROTHROMBIN TIME: 18.8 SEC (ref 11.5–15.2)
RBC # BLD AUTO: 4.42 M/UL (ref 4.2–5.3)
SODIUM SERPL-SCNC: 139 MMOL/L (ref 136–145)
WBC # BLD AUTO: 4.9 K/UL (ref 4.6–13.2)

## 2019-01-09 PROCEDURE — 97165 OT EVAL LOW COMPLEX 30 MIN: CPT

## 2019-01-09 PROCEDURE — 77030012890

## 2019-01-09 PROCEDURE — 74011250637 HC RX REV CODE- 250/637: Performed by: PHYSICIAN ASSISTANT

## 2019-01-09 PROCEDURE — 74011250637 HC RX REV CODE- 250/637: Performed by: INTERNAL MEDICINE

## 2019-01-09 PROCEDURE — 94640 AIRWAY INHALATION TREATMENT: CPT

## 2019-01-09 PROCEDURE — 85025 COMPLETE CBC W/AUTO DIFF WBC: CPT

## 2019-01-09 PROCEDURE — 97535 SELF CARE MNGMENT TRAINING: CPT

## 2019-01-09 PROCEDURE — 74011250637 HC RX REV CODE- 250/637: Performed by: EMERGENCY MEDICINE

## 2019-01-09 PROCEDURE — 85730 THROMBOPLASTIN TIME PARTIAL: CPT

## 2019-01-09 PROCEDURE — 74011250637 HC RX REV CODE- 250/637: Performed by: HOSPITALIST

## 2019-01-09 PROCEDURE — 65660000000 HC RM CCU STEPDOWN

## 2019-01-09 PROCEDURE — 85610 PROTHROMBIN TIME: CPT

## 2019-01-09 PROCEDURE — 83735 ASSAY OF MAGNESIUM: CPT

## 2019-01-09 PROCEDURE — 97116 GAIT TRAINING THERAPY: CPT

## 2019-01-09 PROCEDURE — 36415 COLL VENOUS BLD VENIPUNCTURE: CPT

## 2019-01-09 PROCEDURE — 74011000250 HC RX REV CODE- 250: Performed by: EMERGENCY MEDICINE

## 2019-01-09 PROCEDURE — 80048 BASIC METABOLIC PNL TOTAL CA: CPT

## 2019-01-09 PROCEDURE — 74011250636 HC RX REV CODE- 250/636: Performed by: INTERNAL MEDICINE

## 2019-01-09 PROCEDURE — 74011250636 HC RX REV CODE- 250/636: Performed by: PHYSICIAN ASSISTANT

## 2019-01-09 RX ORDER — DOCUSATE SODIUM 100 MG/1
100 CAPSULE, LIQUID FILLED ORAL 2 TIMES DAILY
Status: DISCONTINUED | OUTPATIENT
Start: 2019-01-09 | End: 2019-01-11 | Stop reason: HOSPADM

## 2019-01-09 RX ORDER — WARFARIN 3 MG/1
9 TABLET ORAL
Status: COMPLETED | OUTPATIENT
Start: 2019-01-09 | End: 2019-01-09

## 2019-01-09 RX ORDER — HEPARIN SODIUM 1000 [USP'U]/ML
40 INJECTION, SOLUTION INTRAVENOUS; SUBCUTANEOUS ONCE
Status: COMPLETED | OUTPATIENT
Start: 2019-01-09 | End: 2019-01-09

## 2019-01-09 RX ORDER — POLYETHYLENE GLYCOL 3350 17 G/17G
17 POWDER, FOR SOLUTION ORAL DAILY
Status: DISCONTINUED | OUTPATIENT
Start: 2019-01-09 | End: 2019-01-11 | Stop reason: HOSPADM

## 2019-01-09 RX ADMIN — GUAIFENESIN AND CODEINE PHOSPHATE 5 ML: 100; 10 SOLUTION ORAL at 09:14

## 2019-01-09 RX ADMIN — LOSARTAN POTASSIUM 50 MG: 50 TABLET ORAL at 09:14

## 2019-01-09 RX ADMIN — BENZONATATE 100 MG: 100 CAPSULE ORAL at 12:54

## 2019-01-09 RX ADMIN — WARFARIN SODIUM 9 MG: 3 TABLET ORAL at 18:12

## 2019-01-09 RX ADMIN — POLYETHYLENE GLYCOL 3350 17 G: 17 POWDER, FOR SOLUTION ORAL at 17:17

## 2019-01-09 RX ADMIN — POTASSIUM CHLORIDE 20 MEQ: 20 TABLET, EXTENDED RELEASE ORAL at 09:15

## 2019-01-09 RX ADMIN — GUAIFENESIN AND CODEINE PHOSPHATE 5 ML: 100; 10 SOLUTION ORAL at 12:54

## 2019-01-09 RX ADMIN — FUROSEMIDE 40 MG: 40 TABLET ORAL at 09:14

## 2019-01-09 RX ADMIN — METOPROLOL SUCCINATE 100 MG: 100 TABLET, EXTENDED RELEASE ORAL at 09:14

## 2019-01-09 RX ADMIN — DOCUSATE SODIUM 100 MG: 100 CAPSULE, LIQUID FILLED ORAL at 17:16

## 2019-01-09 RX ADMIN — GUAIFENESIN AND CODEINE PHOSPHATE 5 ML: 100; 10 SOLUTION ORAL at 17:16

## 2019-01-09 RX ADMIN — HEPARIN SODIUM 3580 UNITS: 1000 INJECTION INTRAVENOUS; SUBCUTANEOUS at 14:37

## 2019-01-09 RX ADMIN — BENZONATATE 100 MG: 100 CAPSULE ORAL at 22:38

## 2019-01-09 RX ADMIN — GUAIFENESIN AND CODEINE PHOSPHATE 5 ML: 100; 10 SOLUTION ORAL at 03:55

## 2019-01-09 RX ADMIN — PANTOPRAZOLE SODIUM 40 MG: 40 TABLET, DELAYED RELEASE ORAL at 09:15

## 2019-01-09 RX ADMIN — FLUTICASONE PROPIONATE 2 SPRAY: 50 SPRAY, METERED NASAL at 09:14

## 2019-01-09 RX ADMIN — HEPARIN SODIUM AND DEXTROSE 8 UNITS/KG/HR: 10000; 5 INJECTION INTRAVENOUS at 14:38

## 2019-01-09 RX ADMIN — FLUTICASONE FUROATE 2 PUFF: 100 POWDER RESPIRATORY (INHALATION) at 07:40

## 2019-01-09 RX ADMIN — SIMVASTATIN 20 MG: 10 TABLET, FILM COATED ORAL at 22:38

## 2019-01-09 RX ADMIN — ASPIRIN 81 MG: 81 TABLET, COATED ORAL at 09:14

## 2019-01-09 RX ADMIN — BENZONATATE 100 MG: 100 CAPSULE ORAL at 03:55

## 2019-01-09 NOTE — PROGRESS NOTES
Problem: Falls - Risk of 
Goal: *Absence of Falls Document Hiro Leon Fall Risk and appropriate interventions in the flowsheet. Outcome: Progressing Towards Goal 
Fall Risk Interventions: 
  
 
  
 
Medication Interventions: Bed/chair exit alarm, Evaluate medications/consider consulting pharmacy, Patient to call before getting OOB, Teach patient to arise slowly Elimination Interventions: Call light in reach, Patient to call for help with toileting needs, Toilet paper/wipes in reach, Toileting schedule/hourly rounds

## 2019-01-09 NOTE — HOME CARE
Received HH referral, [facility] will follow for SN,PT,OT;  DME: RW ordered from DME rep Adore Washburn) of [employer]; [facility] will follow. JEREMIAH MURILLO.

## 2019-01-09 NOTE — PROGRESS NOTES
Problem: Falls - Risk of 
Goal: *Absence of Falls Document Meghana White Fall Risk and appropriate interventions in the flowsheet. Fall Risk Interventions: 
  
 
  
 
Medication Interventions: Teach patient to arise slowly Elimination Interventions: Call light in reach

## 2019-01-09 NOTE — ROUTINE PROCESS
1931 Assumed care of patient from off going nurse. Patient resting in bed. No distress noted. Call bell within reach, siderails up x 3, bed in lowest position, and patient instructed to use call bell for assistance. Heparin gtt verified with off going nurse, Gigi Powers RN. Infusing at 8 units/kg/hr at 6.9 ml/hr based on a wt of 86.2 kg in RAC. No bleeding or infiltration noted. Will continue to monitor. [id number] Bedside and Verbal shift change report given to [address] [place] [address] (oncoming nurse) by Thais Hoang RN(offgoing nurse). Report included the following information Kardex, Intake/Output, MAR, Recent Results and Cardiac Rhythm Afib/aflutter tele box#[id number].

## 2019-01-09 NOTE — PROGRESS NOTES
Problem: Self Care Deficits Care Plan (Adult) Goal: *Acute Goals and Plan of Care (Insert Text) Occupational Therapy Goals Initiated 1/9/2019 within7 day(s). 1.  Patient will perform toileting with modified independence 2. Patient will perform toilet transfer with modified independence. 3.  Patient will participate in upper extremity therapeutic exercise/activities with supervision/set-up for 5-7 minutes. 4.  Patient will utilize energy conservation techniques during functional activities with minimal verbal cues. Outcome: Progressing Towards Goal 
Occupational Therapy EVALUATION Patient: Edwar Vance [de-identified]59 y.o. female) Date: 1/9/2019 Primary Diagnosis: NSTEMI (non-ST elevated myocardial infarction) ([facility] [address] Atrial fibrillation ([facility] [address] Chest pain Precautions:  Fall, Aspiration ASSESSMENT : 
Pt cleared to participate in OT evaluation by RN. Upon entering the room, pt was supine with HOB elevated, alert, and agreeable to participate in OT evaluation with family members present. Based on the objective data described below, the patient presents with decreased energy, decreased strength, decreased endurance, decreased functional balance, and decreased functional mobility affecting her performance in basic self-care/ADL tasks. Pt's BUEs ROM demonstrate to be [place] [place], but with decreased strength however still function. Pt demonstrates Fair dynamic standing balance with use of a RW, although she states she does not have a RW at home. Pt was able to ambulate to the bathroom for toileting tasks with CGA during transfers. After toileting, pt returned to bed and stated her legs felt weak. Educated the patient on the role of OT, evaluation process, adaptive equipment (shower chair for added safety as she states her legs feel weak and a rolling walker for stability during mobility to the bathroom) with pt demonstrating good understanding. The pt lives with her  at home who can assist her PRN. Patient will benefit from skilled intervention to address the above impairments. Patients rehabilitation potential is considered to be Good Factors which may influence rehabilitation potential include:  
[x]             None noted []             Mental ability/status []             Medical condition []             Home/family situation and support systems []             Safety awareness []             Pain tolerance/management 
[]             Other: PLAN : 
Recommendations and Planned Interventions: 
[x]               Self Care Training                  [x]        Therapeutic Activities [x]               Functional Mobility Training    []        Cognitive Retraining 
[x]               Therapeutic Exercises           [x]        Endurance Activities [x]               Balance Training                   []        Neuromuscular Re-Education []               Visual/Perceptual Training     [x]   Home Safety Training 
[x]               Patient Education                 [x]        Family Training/Education []               Other (comment): Frequency/Duration: Patient will be followed by occupational therapy 1-2 times per day/4-7 days per week to address goals. Discharge Recommendations: Home Health Further Equipment Recommendations for Discharge: shower chair for added safety Barriers to Learning/Limitations: None Compensate with: visual, verbal, tactile, kinesthetic cues/model PATIENT COMPLEXITY Eval Complexity: History: MEDIUM Complexity : Expanded review of history including physical, cognitive and psychosocial  history ; Examination: LOW Complexity : 1-3 performance deficits relating to physical, cognitive , or psychosocial skils that result in activity limitations and / or participation restrictions ; Decision Making:LOW Complexity : No comorbidities that affect functional and no verbal or physical assistance needed to complete eval tasks  Assessment: Low Complexity G-CODES:  
 
 Self Care  Current  CJ= 20-39%  Goal  CI= 1-19%. The severity rating is based on the Level of Assistance required for Functional Mobility and ADLs. SUBJECTIVE:  
Patient stated My legs feel weak OBJECTIVE DATA SUMMARY:  
 
Past Medical History:  
Diagnosis Date  Aortic valve disorders 1/13/2011  Asthma  Atrial fibrillation ([facility] [address] 1/13/2011  Congestive heart failure, unspecified March 2005 Cleared quickly with Lasix therapy  Echocardiogram 01/10/2012 A-fib. EF 45%. Mild RVE. RVSP 45-50. Massive LAE. Mod TAMI. Mod MS. Severe MR (mean grad 10). Mild-mod AI. Mild TR. Pulmonic systolic flow reversal.  Mild IVCE.  Hypertension  Iron deficiency anemias  Long term (current) use of anticoagulants 2/23/2011  Murmur 1/2011 Grade I-II/VI systolic ejection murmur along left sternal border, with radiation to the pulmonic area.  S/P cardiac cath 02/24/2012 Minimal coronary artery disease. Mild LVE. EF 55%. Mod MR. Mod-severe MS. RA 15.  RV 55/16. PA 58/34. W 36.  CO/CI 3.5/1.9 (TD); 3.61/1.9 (Dequan). R to L shunt suggested.  Wears glasses  Weight gain Past Surgical History:  
Procedure Laterality Date  HX HEART CATHETERIZATION  2/24/2012  HX HYSTERECTOMY  2006  HX MITRAL VALVE REPLACEMENT  05/22/2013  
 25/33 mm On-X mechanical valve with bilateral pulmonary vein isolation and resection of LA appendage Prior Level of Function/Home Situation: Pt lives in a [facility] with her  and was (I) with ADL/IADLs (cooking, cleaning, laundry, and driving) PTA. Home Situation Home Environment: Private residence # Steps to Enter: 2 Rails to Enter: No 
One/Two Story Residence: Two story # of Interior Steps: 15 Interior Rails: Right Living Alone: No 
Support Systems: (lives with ) Patient Expects to be Discharged to[de-identified] Private residence Current DME Used/Available at Home: [employer] Tub or Shower Type: Tub/Shower combination [x]  Right hand dominant   []  Left hand dominantCognitive/Behavioral Status: 
Neurologic State: Alert Orientation Level: Oriented X4 Cognition: Follows commands; Appropriate decision making Safety/Judgement: Awareness of environment; Fall prevention Skin: Visible skin appeared intact Edema: None noted Coordination: 
Coordination: Within functional limits(BUEs) Fine Motor Skills-Upper: Left Intact; Right Intact Gross Motor Skills-Upper: Left Intact; Right Intact(BUEs) Balance: 
Sitting: Intact Standing: Impaired; With support Standing - Static: Good Standing - Dynamic : Fair Strength: 
Strength: Generally decreased, functional(BUEs) Tone & Sensation: 
Tone: Normal(BUEs) Sensation: Intact(BUEs) Range of Motion: 
AROM: Within functional limits(BUEs) Functional Mobility and Transfers for ADLs: 
Bed Mobility: 
Supine to Sit: Independent Sit to Supine: Independent Transfers: 
Sit to Stand: Supervision Bathroom Mobility: Contact guard assistance(with RW) ADL Assessment: 
Feeding: Independent Oral Facial Hygiene/Grooming: Modified Independent (Standing at sink level) Bathing: Stand-by assistance Upper Body Dressing: Stand-by assistance Lower Body Dressing: Stand-by assistance Toileting: Contact guard assistance ADL Intervention: Toileting Toileting Assistance: Contact guard assistance Bladder Hygiene: Supervision/set-up Cognitive Retraining Safety/Judgement: Awareness of environment; Fall prevention Pain: 
Pt reports 0/10 pain or discomfort prior to treatment.   
Pt reports 0/10 pain or discomfort post treatment. Activity Tolerance:  
Fair Please refer to the flowsheet for vital signs taken during this treatment. After treatment:  
[] Patient left in no apparent distress sitting up in chair 
[x] Patient left in no apparent distress in bed 
[x] Call bell left within reach 
[] Nursing notified 
[x] Caregiver present 
[] Bed alarm activated COMMUNICATION/EDUCATION:  
[x] Home safety education was provided and the patient/caregiver indicated understanding. [x] Patient/family have participated as able in goal setting and plan of care. [x] Patient/family agree to work toward stated goals and plan of care. [] Patient understands intent and goals of therapy, but is neutral about his/her participation. [] Patient is unable to participate in goal setting and plan of care. Thank you for this referral. 
Matias Lund OT Time Calculation: 24 mins

## 2019-01-09 NOTE — PROGRESS NOTES
[facility] - Progress Note Admit Date: 1/5/2019 Assessment:  
 
Hospital Problems  Date Reviewed: 11/2/2018 Codes Class Noted POA  
 [facility]STEMI (non-ST elevated [facility] ICD-10-CM: I21.4 ICD-9-CM: 410.70  1/5/2019 Unknown Chest pain ICD-10-CM: R07.9 ICD-9-CM: 786.50  1/5/2019 Unknown Atrial fibrillation [facility]) ICD-10-CM: I48.91 
ICD-9-CM: 427.31  1/20/2011 Unknown  
   
  
 
 
-Acute on chronic heart failure with preserved EF 56-60% by echo 01/2019. Although CXR without significant vascular congestion, patient presents with symptoms consistent with heart failure in setting of recent dietary changes. 
-Chronic atrial fibrillation with controlled ventricular response, presented with palpitations. On lopressor and warfarin as outpatient. 
-Chest pain atypical with indeterminate troponin (not consistent with ACS). Cath 2/2012 with minimal coronary artery disease.  Nuclear stress test 1/7/19 with small reversible defect with a mild reduction in uptake in inferolateral region, medical management recommended unless significant recurrent exertional angina. 
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 Echo 8/2018 with normal function. Mild mitral valve stenosis. Mean transmitral gradient of 5 mmHg. No significant regurgitation. Mild to moderate AI. -Hypertension. Stable. -Dyslipidemia. On statin. -Recent stress. 
  
Primary cardiologist Dr. Ciro Jacobsen. Plan:  
 
Hemodynamics improved, IV lasix stopped and antihypertensives decreased. Continued on maintenance lasix. Continued on BB and Arb. Continued on ASA, statin. Continued on warfarin with hep bridge, unfortunately INR remains subtherapeutic 1.6, would continue hep until INR 2.5. Subjective:  
 
Complains of cough. Up out of bed this AM, took it slow but denied any dizziness. Objective: Patient Vitals for the past 8 hrs: 
 Temp Pulse Resp BP SpO2  
01/09/19 0809 97.9 °F (36.6 °C) 84 16 114/81 93 % 01/09/19 0350 98.3 °F (36.8 °C) 87 19 126/85 94 % Patient Vitals for the past 96 hrs: 
 Weight 01/09/19 0350 89.6 kg (197 lb 8 oz) 01/08/19 0334 89.9 kg (198 lb 1.6 oz) 01/07/19 0952 90.3 kg (199 lb) 01/07/19 0910 90.3 kg (199 lb) 01/07/19 0425 90.3 kg (199 lb 1.2 oz) 01/05/19 0940 86.2 kg (190 lb) Intake/Output Summary (Last 24 hours) at 1/9/2019 0830 Last data filed at 1/9/2019 4634 Gross per 24 hour Intake 681.55 ml Output 1500 ml Net -818.45 ml Physical Exam: 
General:  alert, cooperative, no distress, appears stated age Neck:  no JVD Lungs:  clear to auscultation bilaterally Heart:  irregular rate and rhythm crisp mechanical valve sounds Abdomen:  abdomen is soft without significant tenderness, masses, organomegaly or guarding Extremities:  extremities normal, atraumatic, no cyanosis or edema Data Review:  
 
Labs: Results:  
   
Chemistry Recent Labs 01/09/19 0450 01/08/19 0158 01/07/19 
0120 * 106* 98  138 140  
K 3.5 3.3* 3.5  101 103 CO2 27 29 30 BUN 21* 20* 16 CREA 1.15 1.03 1.01  
CA 8.6 8.7 8.7 MG 1.9 2.0 2.1 PHOS  --  3.5  --   
AGAP 8 8 7 BUCR 18 19 16 CBC w/Diff Recent Labs 01/09/19 0450 01/08/19 
0158 01/07/19 
0120 WBC 4.9 5.7 6.9  
RBC 4.42 4.48 4.26  
HGB 12.0 11.8* 11.4* HCT 36.3 36.8 35.3  172 184 GRANS 46 60 50 LYMPH 33 21 36 EOS 1 1 2 Cardiac Enzymes No results found for: CPK, CK, CKMMB, CKMB, RCK3, CKMBT, CKNDX, CKND1, GONZÁLEZ, TROPT, TROIQ, RUTH, TROPT, TNIPOC, BNP, BNPP Coagulation Recent Labs 01/09/19 
0450 01/08/19 
1003 01/08/19 
0158 PTP 18.8*  --  17.4* INR 1.6*  --  1.4* APTT 130.3* 82.3* 114.2* Lipid Panel Lab Results Component Value Date/Time  Cholesterol, total 202 (H) 08/24/2018 08:16 AM  
 HDL Cholesterol 52 08/24/2018 08:16 AM  
 LDL, calculated 122.6 (H) 08/24/2018 08:16 AM  
 VLDL, calculated 27.4 08/24/2018 08:16 AM  
 Triglyceride 137 08/24/2018 08:16 AM  
 CHOL/HDL Ratio 3.9 08/24/2018 08:16 AM  
  
BNP No results found for: BNP, BNPP, XBNPT Liver Enzymes No results for input(s): TP, ALB, TBIL, AP, SGOT, GPT in the last 72 hours. No lab exists for component: DBIL Digoxin Thyroid Studies Lab Results Component Value Date/Time TSH 3.950 01/25/2013 12:00 AM  
    
 
Signed By: SAMANTHA Jacinto   
 January 9, 2019

## 2019-01-09 NOTE — PROGRESS NOTES
Warfarin dosage discussed with Dr. Johanna Mcintosh. 9mg dose to be given. MD to see patient. Signed By: Mariela Chawla RN   
 January 9, 2019

## 2019-01-09 NOTE — PROGRESS NOTES
Problem: Mobility Impaired (Adult and Pediatric) Goal: *Acute Goals and Plan of Care (Insert Text) Physical Therapy Goals Initiated 1/8/2019 and to be accomplished within 7 day(s) 1. Patient will move from supine to sit and sit to supine , scoot up and down and roll side to side in bed with modified independence. (MET 1/9/2019) 2. Patient will transfer from bed to chair and chair to bed with supervision/set-up using the least restrictive device. (MET 1/9/2019) 3. Patient will perform sit to stand with supervision/set-up. (MET 1/9/2019) 4. Patient will ambulate with supervision/set-up for 250feet with the least restrictive device. 5.  Patient will ascend/descend 10 stairs with 1 handrail(s) with supervision/set-up. physical Therapy TREATMENT Patient: Shahzad Kebede [de-identified]59 y.o. female) Date: 1/9/2019 Diagnosis: NSTEMI (non-ST elevated myocardial infarction) ([address] Atrial fibrillation ([address] Chest pain <principal problem not specified> Precautions: Fall, Aspiration Chart, physical therapy assessment, plan of care and goals were reviewed. ASSESSMENT: 
Patient presents today alert and agreeable to therapy and was supine in bed upon arrival. Patient transferred to sitting EOB and was donned pants and gown in standing. Patient then ambulated at slow pace 110ft before taking seated rest break. Patient administered meds from nursing and was educated on role and goals of therapy and equipment and therapy recc for D/C. Patient agreeable to 2nd ambulation trial and ambulated 70ft with RW at supervision and negotiated 8 steps with RHR at slow pace, supervision. At conclusion of session patient transferred to supine in bed and was left resting with call bell by her side and denied further need for assist.  
Progression toward goals: 
[x]      Improving appropriately and progressing toward goals 
[]      Improving slowly and progressing toward goals []      Not making progress toward goals and plan of care will be adjusted PLAN: 
Patient continues to benefit from skilled intervention to address the above impairments. Continue treatment per established plan of care. Discharge Recommendations:  Home Health Further Equipment Recommendations for Discharge:  rolling walker G-CODES:  
 
Mobility [id number] Current  CJ= 20-39%   Goal  CI= 1-19%. The severity rating is based on the Level of Assistance required for Functional Mobility and ADLs. SUBJECTIVE:  
Patient stated I feel good knowing I'll do that.  OBJECTIVE DATA SUMMARY:  
Critical Behavior: 
Neurologic State: Alert Orientation Level: Oriented X4 Cognition: Follows commands, Appropriate decision making Safety/Judgement: Awareness of environment, Fall prevention Functional Mobility Training: 
Bed Mobility: 
 Supine to Sit: Independent Sit to Supine: Independent Transfers: 
Sit to Stand: Modified independent(x2 trasnfers) Stand to Sit: Modified independent Balance: 
Sitting: Intact Standing: Impaired; With support Standing - Static: Good Standing - Dynamic : FairAmbulation/Gait Training: 
Distance (ft): 180 Feet (ft)(110ft, 70ft) Assistive Device: Walker, rolling Ambulation - Level of Assistance: Supervision Gait Abnormalities: Decreased step clearance Speed/Silvia: Slow Step Length: Left shortened;Right shortened Interventions: Verbal cues; Visual/Demos Stairs: 
Number of Stairs Trained: 8 Stairs - Level of Assistance: Supervision Rail Use: Right Education: 
[x]         Bed mobility [x]         Transfers 
[x]         Ambulation 
[x]         Assistive device management 
[x]         Stairs [x]         Body mechanics [x]         Position change 
[x]         Activity pacing/energy conservation 
[]         Other: 
Pain: 
Pt reports 0/10 pain or discomfort prior to treatment.   
Pt reports 0/10 pain or discomfort post treatment. Activity Tolerance: Patient tolerated activity well and was pleased with progress during session. Please refer to the flowsheet for vital signs taken during this treatment. After treatment:  
[] Patient left in no apparent distress sitting up in chair 
[x] Patient left in no apparent distress in bed 
[x] Call bell left within reach [x] Nursing notified 
[] Caregiver present 
[] Bed alarm activated Armond Wilson PT Time Calculation: 32 mins

## 2019-01-09 NOTE — PROGRESS NOTES
SUBJECTIVE: 
 
Patient sitting in bed in NAD, awake, alert, denies sob, denies any bleeding, c/o constipation OBJECTIVE: 
 
BP 99/65 (BP 1 Location: Right arm, BP Patient Position: At rest)   Pulse 85   Temp 97.9 °F (36.6 °C)   Resp 19   Ht 5' 7\" (1.702 m)   Wt 89.6 kg (197 lb 8 oz)   SpO2 95%   BMI 30.93 kg/m² General:  Awake, alert Cardiovascular:  S1S2+, RRR Pulmonary:  CTA b/l GI:  Soft, BS+, NT, ND Extremities:  No edema ASSESSMENT: 
 
1. Acute on chronic diastolic heart failure with preserved EF 55%  2. indeterminate elevation of troponin sec to demand ischemia due to CHF 3. chronic atrial fibrillation, rate controlled 4. Chest pain atypical, resolved 5. 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 6. Hypertension. 7. Dyslipidemia PLAN: 
 
Po lasix, cardio following On cozaar On coumadin, heparin while subtherapeutic PT, OT Bowel regimen Discharge planning D/w patient BMP:  
Lab Results Component Value Date/Time  01/09/2019 04:50 AM  
 K 3.5 01/09/2019 04:50 AM  
  01/09/2019 04:50 AM  
 CO2 27 01/09/2019 04:50 AM  
 AGAP 8 01/09/2019 04:50 AM  
  (H) 01/09/2019 04:50 AM  
 BUN 21 (H) 01/09/2019 04:50 AM  
 CREA 1.15 01/09/2019 04:50 AM  
 GFRAA 58 (L) 01/09/2019 04:50 AM  
 GFRNA 48 (L) 01/09/2019 04:50 AM  
 
CBC:  
Lab Results Component Value Date/Time  WBC 4.9 01/09/2019 04:50 AM  
 HGB 12.0 01/09/2019 04:50 AM  
 HCT 36.3 01/09/2019 04:50 AM  
  01/09/2019 04:50 AM  
 
 
 
You Delaney MD

## 2019-01-10 LAB
APTT PPP: 64.9 SEC (ref 23–36.4)
APTT PPP: 70.9 SEC (ref 23–36.4)
APTT PPP: 84.7 SEC (ref 23–36.4)
INR PPP: 1.9 (ref 0.8–1.2)
PROTHROMBIN TIME: 21.9 SEC (ref 11.5–15.2)

## 2019-01-10 PROCEDURE — 65660000000 HC RM CCU STEPDOWN

## 2019-01-10 PROCEDURE — 74011250636 HC RX REV CODE- 250/636: Performed by: PHYSICIAN ASSISTANT

## 2019-01-10 PROCEDURE — 74011250637 HC RX REV CODE- 250/637: Performed by: PHYSICIAN ASSISTANT

## 2019-01-10 PROCEDURE — 85730 THROMBOPLASTIN TIME PARTIAL: CPT

## 2019-01-10 PROCEDURE — 94640 AIRWAY INHALATION TREATMENT: CPT

## 2019-01-10 PROCEDURE — 74011250637 HC RX REV CODE- 250/637: Performed by: INTERNAL MEDICINE

## 2019-01-10 PROCEDURE — 74011000250 HC RX REV CODE- 250: Performed by: EMERGENCY MEDICINE

## 2019-01-10 PROCEDURE — 85610 PROTHROMBIN TIME: CPT

## 2019-01-10 PROCEDURE — 36415 COLL VENOUS BLD VENIPUNCTURE: CPT

## 2019-01-10 PROCEDURE — 74011250636 HC RX REV CODE- 250/636

## 2019-01-10 PROCEDURE — 74011250637 HC RX REV CODE- 250/637: Performed by: EMERGENCY MEDICINE

## 2019-01-10 PROCEDURE — 74011250636 HC RX REV CODE- 250/636: Performed by: EMERGENCY MEDICINE

## 2019-01-10 PROCEDURE — 74011250637 HC RX REV CODE- 250/637: Performed by: HOSPITALIST

## 2019-01-10 PROCEDURE — 97530 THERAPEUTIC ACTIVITIES: CPT

## 2019-01-10 RX ORDER — HEPARIN SODIUM 1000 [USP'U]/ML
40 INJECTION, SOLUTION INTRAVENOUS; SUBCUTANEOUS ONCE
Status: COMPLETED | OUTPATIENT
Start: 2019-01-10 | End: 2019-01-10

## 2019-01-10 RX ORDER — HEPARIN SODIUM 1000 [USP'U]/ML
INJECTION, SOLUTION INTRAVENOUS; SUBCUTANEOUS
Status: COMPLETED
Start: 2019-01-10 | End: 2019-01-10

## 2019-01-10 RX ORDER — WARFARIN 10 MG/1
10 TABLET ORAL ONCE
Status: COMPLETED | OUTPATIENT
Start: 2019-01-10 | End: 2019-01-10

## 2019-01-10 RX ADMIN — GUAIFENESIN AND CODEINE PHOSPHATE 5 ML: 100; 10 SOLUTION ORAL at 00:04

## 2019-01-10 RX ADMIN — DOCUSATE SODIUM 100 MG: 100 CAPSULE, LIQUID FILLED ORAL at 10:51

## 2019-01-10 RX ADMIN — WARFARIN SODIUM 10 MG: 10 TABLET ORAL at 18:44

## 2019-01-10 RX ADMIN — POLYETHYLENE GLYCOL 3350 17 G: 17 POWDER, FOR SOLUTION ORAL at 10:52

## 2019-01-10 RX ADMIN — HEPARIN SODIUM 3650 UNITS: 1000 INJECTION INTRAVENOUS; SUBCUTANEOUS at 20:52

## 2019-01-10 RX ADMIN — MELATONIN TAB 5 MG 10 MG: 5 TAB at 00:04

## 2019-01-10 RX ADMIN — BENZONATATE 100 MG: 100 CAPSULE ORAL at 13:56

## 2019-01-10 RX ADMIN — FLUTICASONE FUROATE 2 PUFF: 100 POWDER RESPIRATORY (INHALATION) at 08:08

## 2019-01-10 RX ADMIN — HEPARIN SODIUM 3650 UNITS: 1000 INJECTION, SOLUTION INTRAVENOUS; SUBCUTANEOUS at 20:52

## 2019-01-10 RX ADMIN — METOPROLOL SUCCINATE 100 MG: 100 TABLET, EXTENDED RELEASE ORAL at 10:51

## 2019-01-10 RX ADMIN — LOSARTAN POTASSIUM 50 MG: 50 TABLET ORAL at 10:51

## 2019-01-10 RX ADMIN — HEPARIN SODIUM AND DEXTROSE 5 UNITS/KG/HR: 10000; 5 INJECTION INTRAVENOUS at 07:22

## 2019-01-10 RX ADMIN — HEPARIN SODIUM 3650 UNITS: 1000 INJECTION INTRAVENOUS; SUBCUTANEOUS at 11:26

## 2019-01-10 RX ADMIN — POTASSIUM CHLORIDE 20 MEQ: 20 TABLET, EXTENDED RELEASE ORAL at 10:51

## 2019-01-10 RX ADMIN — FLUTICASONE PROPIONATE 2 SPRAY: 50 SPRAY, METERED NASAL at 11:26

## 2019-01-10 RX ADMIN — SIMVASTATIN 20 MG: 10 TABLET, FILM COATED ORAL at 21:01

## 2019-01-10 RX ADMIN — PANTOPRAZOLE SODIUM 40 MG: 40 TABLET, DELAYED RELEASE ORAL at 10:52

## 2019-01-10 RX ADMIN — ASPIRIN 81 MG: 81 TABLET, COATED ORAL at 10:51

## 2019-01-10 RX ADMIN — GUAIFENESIN AND CODEINE PHOSPHATE 5 ML: 100; 10 SOLUTION ORAL at 13:56

## 2019-01-10 RX ADMIN — ACETAMINOPHEN 650 MG: 325 TABLET ORAL at 06:25

## 2019-01-10 RX ADMIN — FUROSEMIDE 40 MG: 40 TABLET ORAL at 10:52

## 2019-01-10 RX ADMIN — MELATONIN TAB 5 MG 10 MG: 5 TAB at 21:01

## 2019-01-10 RX ADMIN — GUAIFENESIN AND CODEINE PHOSPHATE 5 ML: 100; 10 SOLUTION ORAL at 21:01

## 2019-01-10 NOTE — PROGRESS NOTES
SUBJECTIVE: 
 
Patient sitting in be din NAD, awake, alert, denies cp, cough is improving, therapist in room OBJECTIVE: 
 
/81 (BP 1 Location: Right arm, BP Patient Position: At rest)   Pulse 87   Temp 97.4 °F (36.3 °C)   Resp 18   Ht 5' 7\" (1.702 m)   Wt 91.2 kg (201 lb 1.6 oz)   SpO2 93%   BMI 31.50 kg/m² General:  Awake, alert Cardiovascular:  S1S2+, RRR Pulmonary:  CTA b/l GI:  Soft, BS+, NT, ND Extremities:  No edema ASSESSMENT: 
 
1. Acute on chronic diastolic heart failure with preserved EF 55%  2. indeterminate elevation of troponin sec to demand ischemia due to CHF 3. chronic atrial fibrillation, rate controlled 4. Chest pain atypical, resolved 5. 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 6. Hypertension. 7. Dyslipidemia PLAN: 
 
Lasix, po, cardio following INR subtherapeutic On coumadin, heparin drip PT, OT Bowel regimen Discharge planning D/w patient Dispo- home soon when INR therapeutic BMP:  
No results found for: NA, K, CL, CO2, AGAP, GLU, BUN, CREA, GFRAA, GFRNA 
CBC:  
No results found for: WBC, HGB, HGBEXT, HCT, HCTEXT, PLT, PLTEXT, HGBEXT, HCTEXT, PLTEXT Justin Choi MD

## 2019-01-10 NOTE — PROGRESS NOTES
Problem: Falls - Risk of 
Goal: *Absence of Falls Document Garcia Quick Fall Risk and appropriate interventions in the flowsheet. Outcome: Progressing Towards Goal 
Fall Risk Interventions: 
  
 
  
 
Medication Interventions: Assess postural VS orthostatic hypotension, Bed/chair exit alarm, Evaluate medications/consider consulting pharmacy, Patient to call before getting OOB, Teach patient to arise slowly Elimination Interventions: Bed/chair exit alarm, Call light in reach, Elevated toilet seat, Patient to call for help with toileting needs, Toilet paper/wipes in reach, Toileting schedule/hourly rounds

## 2019-01-10 NOTE — ROUTINE PROCESS
Received report from [facility]. Pt AAOx3, NAD, breathing non labored, on room air, HOB up. IV site clean, dry and intact. Heparin drip going per ordered protocol-rate verified. Bed at the lowest level on lock position, call bell w/i reach. Bedside and Verbal shift change report given to CUONG Goldstein (oncoming nurse) by me (offgoing nurse). Report included the following information SBAR, Kardex, Intake/Output, MAR, Recent Results and Cardiac Rhythm A-Fib.

## 2019-01-10 NOTE — PROGRESS NOTES
Problem: Mobility Impaired (Adult and Pediatric) Goal: *Acute Goals and Plan of Care (Insert Text) Physical Therapy Goals Initiated 1/8/2019 and to be accomplished within 7 day(s) 1. Patient will move from supine to sit and sit to supine , scoot up and down and roll side to side in bed with modified independence. (MET 1/9/2019) 2. Patient will transfer from bed to chair and chair to bed with supervision/set-up using the least restrictive device. (MET 1/9/2019) 3. Patient will perform sit to stand with supervision/set-up. (MET 1/9/2019) 4. Patient will ambulate with supervision/set-up for 250feet with the least restrictive device. 5.  Patient will ascend/descend 10 stairs with 1 handrail(s) with supervision/set-up. 1406 - Pt heading to bathroom at this time. Will f/u later in day.

## 2019-01-10 NOTE — PROGRESS NOTES
[facility] - Progress Note Admit Date: 1/5/2019 Assessment:  
 
Hospital Problems  Date Reviewed: 11/2/2018 Codes Class Noted POA  
 N[facility]non[facility]ST elevated [facility] ICD-10-CM: I21.4 ICD-9-CM: 410.70  1/5/2019 Unknown Chest pain ICD-10-CM: R07.9 ICD-9-CM: 786.50  1/5/2019 Unknown Atrial fibrillation [facility]) ICD-10-CM: I48.91 
ICD-9-CM: 427.31  1/20/2011 Unknown  
   
  
 
 
  
  
  
-Acute on chronic heart failure with preserved EF 56-60% by echo 01/2019. Although CXR without significant vascular congestion, patient presents with symptoms consistent with heart failure in setting of recent dietary changes. 
-Chronic atrial fibrillation with controlled ventricular response, presented with palpitations. On lopressor and warfarin as outpatient. 
-Chest pain atypical with indeterminate troponin (not consistent with ACS). Cath 2/2012 with minimal coronary artery disease.  Nuclear stress test 1/7/19 with small reversible defect with a mild reduction in uptake in inferolateral region, medical management recommended unless significant recurrent exertional angina. 
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 Echo 8/2018 with normal function. Mild mitral valve stenosis. Mean transmitral gradient of 5 mmHg. No significant regurgitation. Mild to moderate AI. -Hypertension. Stable. -Dyslipidemia. On statin. -Recent stress. 
  
Primary cardiologist Dr. Maria L Campbell. Plan:  
 
Hemodynamics stable on current regimen. Volume status stable on maintenance diuretics. Continue warfarin and hep bridge until INR therapeutic, goal 2.5-3.5, INR 1.9 today. Subjective:  
 
Still with cough but overall feeling better. Increasing activity. Objective:  
  
Patient Vitals for the past 8 hrs: 
 Temp Pulse Resp BP SpO2 01/10/19 0836 97.4 °F (36.3 °C) 87 18 118/81 93 % 01/10/19 0425 98.9 °F (37.2 °C) 70 20 124/81 93 % Patient Vitals for the past 96 hrs: 
 Weight 01/10/19 0425 91.2 kg (201 lb 1.6 oz) 01/09/19 0350 89.6 kg (197 lb 8 oz) 01/08/19 0334 89.9 kg (198 lb 1.6 oz) 01/07/19 0952 90.3 kg (199 lb) 01/07/19 0910 90.3 kg (199 lb) 01/07/19 0425 90.3 kg (199 lb 1.2 oz) Intake/Output Summary (Last 24 hours) at 1/10/2019 1934 Last data filed at 1/10/2019 9343 Gross per 24 hour Intake 372.67 ml Output 1300 ml Net -927.33 ml Physical Exam: 
General:  alert, cooperative, no distress, appears stated age Neck:  no JVD Lungs:  clear to auscultation bilaterally Heart:  irregularly irregular rhythm crisp mech valve sound Abdomen:  abdomen is soft without significant tenderness, masses, organomegaly or guarding Extremities:  extremities normal, atraumatic, no cyanosis or edema Data Review:  
 
Labs: Results:  
   
Chemistry Recent Labs 01/09/19 
0450 01/08/19 
0158 * 106*  138  
K 3.5 3.3*  
 101 CO2 27 29 BUN 21* 20* CREA 1.15 1.03  
CA 8.6 8.7 MG 1.9 2.0 PHOS  --  3.5 AGAP 8 8 BUCR 18 19 CBC w/Diff Recent Labs 01/09/19 
0450 01/08/19 
0158 WBC 4.9 5.7  
RBC 4.42 4.48 HGB 12.0 11.8* HCT 36.3 36.8  172 GRANS 46 60 LYMPH 33 21 EOS 1 1 Cardiac Enzymes No results found for: CPK, CK, CKMMB, CKMB, RCK3, CKMBT, CKNDX, CKND1, GONZÁLEZ, TROPT, TROIQ, RUTH, TROPT, TNIPOC, BNP, BNPP Coagulation Recent Labs  
  01/10/19 
0248 01/09/19 
2100  01/09/19 
0450 PTP 21.9*  --   --  18.8* INR 1.9*  --   --  1.6* APTT 84.7* >180.0*   < > 130.3*  
 < > = values in this interval not displayed. Lipid Panel Lab Results Component Value Date/Time  Cholesterol, total 202 (H) 08/24/2018 08:16 AM  
 HDL Cholesterol 52 08/24/2018 08:16 AM  
 LDL, calculated 122.6 (H) 08/24/2018 08:16 AM  
 VLDL, calculated 27.4 08/24/2018 08:16 AM  
 Triglyceride 137 08/24/2018 08:16 AM  
 CHOL/HDL Ratio 3.9 08/24/2018 08:16 AM  
  
BNP No results found for: BNP, BNPP, XBNPT Liver Enzymes No results for input(s): TP, ALB, TBIL, AP, SGOT, GPT in the last 72 hours. No lab exists for component: DBIL Digoxin Thyroid Studies Lab Results Component Value Date/Time TSH 3.950 01/25/2013 12:00 AM  
    
 
Signed By: SAMANTHA Brandt   
 January 10, 2019

## 2019-01-11 VITALS
TEMPERATURE: 97.3 F | OXYGEN SATURATION: 92 % | HEIGHT: 67 IN | RESPIRATION RATE: 18 BRPM | SYSTOLIC BLOOD PRESSURE: 126 MMHG | HEART RATE: 73 BPM | DIASTOLIC BLOOD PRESSURE: 85 MMHG | BODY MASS INDEX: 30.83 KG/M2 | WEIGHT: 196.4 LBS

## 2019-01-11 LAB
APTT PPP: >180 SEC (ref 23–36.4)
INR PPP: 2.6 (ref 0.8–1.2)
PROTHROMBIN TIME: 28.2 SEC (ref 11.5–15.2)

## 2019-01-11 PROCEDURE — 74011000250 HC RX REV CODE- 250: Performed by: EMERGENCY MEDICINE

## 2019-01-11 PROCEDURE — 74011250637 HC RX REV CODE- 250/637: Performed by: INTERNAL MEDICINE

## 2019-01-11 PROCEDURE — 36415 COLL VENOUS BLD VENIPUNCTURE: CPT

## 2019-01-11 PROCEDURE — 74011250637 HC RX REV CODE- 250/637: Performed by: HOSPITALIST

## 2019-01-11 PROCEDURE — 74011250636 HC RX REV CODE- 250/636: Performed by: PHYSICIAN ASSISTANT

## 2019-01-11 PROCEDURE — 74011250637 HC RX REV CODE- 250/637: Performed by: EMERGENCY MEDICINE

## 2019-01-11 PROCEDURE — 85730 THROMBOPLASTIN TIME PARTIAL: CPT

## 2019-01-11 PROCEDURE — 94640 AIRWAY INHALATION TREATMENT: CPT

## 2019-01-11 PROCEDURE — 74011250637 HC RX REV CODE- 250/637: Performed by: PHYSICIAN ASSISTANT

## 2019-01-11 PROCEDURE — 85610 PROTHROMBIN TIME: CPT

## 2019-01-11 RX ORDER — HYDROCORTISONE 0.5 %
1 OINTMENT (GRAM) TOPICAL 2 TIMES DAILY
Qty: 29 G | Refills: 0 | Status: SHIPPED
Start: 2019-01-11 | End: 2019-02-01 | Stop reason: ALTCHOICE

## 2019-01-11 RX ORDER — LOSARTAN POTASSIUM 50 MG/1
50 TABLET ORAL DAILY
Qty: 30 TAB | Refills: 0 | Status: SHIPPED | OUTPATIENT
Start: 2019-01-11 | End: 2019-02-01 | Stop reason: DRUGHIGH

## 2019-01-11 RX ORDER — POLYETHYLENE GLYCOL 3350 17 G/17G
17 POWDER, FOR SOLUTION ORAL DAILY
Qty: 30 PACKET | Refills: 0 | Status: SHIPPED | OUTPATIENT
Start: 2019-01-11

## 2019-01-11 RX ORDER — DOCUSATE SODIUM 100 MG/1
100 CAPSULE, LIQUID FILLED ORAL 2 TIMES DAILY
Qty: 60 CAP | Refills: 0 | Status: SHIPPED | OUTPATIENT
Start: 2019-01-11 | End: 2019-04-11

## 2019-01-11 RX ORDER — ASPIRIN 81 MG/1
81 TABLET ORAL DAILY
Qty: 30 TAB | Refills: 0 | Status: SHIPPED | OUTPATIENT
Start: 2019-01-11

## 2019-01-11 RX ADMIN — HEPARIN SODIUM AND DEXTROSE 6.96 UNITS/KG/HR: 10000; 5 INJECTION INTRAVENOUS at 03:13

## 2019-01-11 RX ADMIN — GUAIFENESIN AND CODEINE PHOSPHATE 5 ML: 100; 10 SOLUTION ORAL at 07:08

## 2019-01-11 RX ADMIN — FUROSEMIDE 40 MG: 40 TABLET ORAL at 09:15

## 2019-01-11 RX ADMIN — ASPIRIN 81 MG: 81 TABLET, COATED ORAL at 09:14

## 2019-01-11 RX ADMIN — DOCUSATE SODIUM 100 MG: 100 CAPSULE, LIQUID FILLED ORAL at 09:14

## 2019-01-11 RX ADMIN — PANTOPRAZOLE SODIUM 40 MG: 40 TABLET, DELAYED RELEASE ORAL at 09:15

## 2019-01-11 RX ADMIN — FLUTICASONE FUROATE 2 PUFF: 100 POWDER RESPIRATORY (INHALATION) at 08:14

## 2019-01-11 RX ADMIN — GUAIFENESIN AND CODEINE PHOSPHATE 5 ML: 100; 10 SOLUTION ORAL at 02:30

## 2019-01-11 RX ADMIN — POTASSIUM CHLORIDE 20 MEQ: 20 TABLET, EXTENDED RELEASE ORAL at 09:15

## 2019-01-11 RX ADMIN — METOPROLOL SUCCINATE 100 MG: 100 TABLET, EXTENDED RELEASE ORAL at 09:15

## 2019-01-11 RX ADMIN — LOSARTAN POTASSIUM 50 MG: 50 TABLET ORAL at 09:15

## 2019-01-11 RX ADMIN — POLYETHYLENE GLYCOL 3350 17 G: 17 POWDER, FOR SOLUTION ORAL at 09:15

## 2019-01-11 NOTE — PROGRESS NOTES
[facility] - Progress Note Admit Date: 1/5/2019 Assessment:  
 
Hospital Problems  Date Reviewed: 11/2/2018 Codes Class Noted POA  
 [facility] ICD-10-CM: I21.4 ICD-9-CM: 410.70  1/5/2019 Unknown Chest pain ICD-10-CM: R07.9 ICD-9-CM: 786.50  1/5/2019 Unknown Atrial fibrillation [facility]) ICD-10-CM: I48.91 
ICD-9-CM: 427.31  1/20/2011 Unknown  
   
  
 
 
  
-Acute on chronic heart failure with preserved EF 56-60% by echo 01/2019. Although CXR without significant vascular congestion, patient presents with symptoms consistent with heart failure in setting of recent dietary changes. 
-Chronic atrial fibrillation with controlled ventricular response, presented with palpitations. On lopressor and warfarin as outpatient. 
-Chest pain atypical with indeterminate troponin (not consistent with ACS). Cath 2/2012 with minimal coronary artery disease.  Nuclear stress test 1/7/19 with small reversible defect with a mild reduction in uptake in inferolateral region, medical management recommended unless significant recurrent exertional angina. 
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 Echo 8/2018 with normal function. Mild mitral valve stenosis. Mean transmitral gradient of 5 mmHg. No significant regurgitation. Mild to moderate AI. -Hypertension. Stable. -Dyslipidemia. On statin. -Recent stress. 
  
Primary cardiologist Dr. Leland Deleon. Plan:  
 
INR 2.6 today. Continue warfarin for goal INR 2.5-3.5. Cardiac status stable. Continued on maintenance lasix. Continued on cozaar and toprol. Continued on ASA and statin. Plan for discharge home today. Subjective: No CP. No SOB Objective:  
  
Patient Vitals for the past 8 hrs: 
 Temp Pulse Resp BP SpO2  
01/11/19 0319 97.3 °F (36.3 °C) 73 18 126/85 92 % Patient Vitals for the past 96 hrs: 
 Weight 01/11/19 0319 89.1 kg (196 lb 6.4 oz) 01/10/19 0425 91.2 kg (201 lb 1.6 oz) 01/09/19 0350 89.6 kg (197 lb 8 oz) 01/08/19 0334 89.9 kg (198 lb 1.6 oz) Intake/Output Summary (Last 24 hours) at 1/11/2019 1018 Last data filed at 1/11/2019 0326 Gross per 24 hour Intake 900.91 ml Output 1700 ml Net -799.09 ml Physical Exam: 
General:  alert, cooperative, no distress, appears stated age Neck:  no JVD Lungs:  clear to auscultation bilaterally Heart:  regular rate and rhythm crisp mechanical valave sounds Abdomen:  abdomen is soft without significant tenderness, masses, organomegaly or guarding Extremities:  extremities normal, atraumatic, no cyanosis or edema Data Review:  
 
Labs: Results:  
   
Chemistry Recent Labs 01/09/19 
0450 *   
K 3.5  CO2 27 BUN 21* CREA 1.15  
CA 8.6 MG 1.9 AGAP 8  
BUCR 18 CBC w/Diff Recent Labs 01/09/19 
0450 WBC 4.9  
RBC 4.42  
HGB 12.0 HCT 36.3  GRANS 46 LYMPH 33 EOS 1 Cardiac Enzymes No results found for: CPK, CK, CKMMB, CKMB, RCK3, CKMBT, CKNDX, CKND1, GONZÁLEZ, TROPT, TROIQ, RUTH, TROPT, TNIPOC, BNP, BNPP Coagulation Recent Labs  
  01/11/19 
0243 01/10/19 
1818  01/10/19 
0248 PTP 28.2*  --   --  21.9* INR 2.6*  --   --  1.9* APTT >180.0* 70.9*   < > 84.7*  
 < > = values in this interval not displayed. Lipid Panel Lab Results Component Value Date/Time Cholesterol, total 202 (H) 08/24/2018 08:16 AM  
 HDL Cholesterol 52 08/24/2018 08:16 AM  
 LDL, calculated 122.6 (H) 08/24/2018 08:16 AM  
 VLDL, calculated 27.4 08/24/2018 08:16 AM  
 Triglyceride 137 08/24/2018 08:16 AM  
 CHOL/HDL Ratio 3.9 08/24/2018 08:16 AM  
  
BNP No results found for: BNP, BNPP, XBNPT Liver Enzymes No results for input(s): TP, ALB, TBIL, AP, SGOT, GPT in the last 72 hours. No lab exists for component: DBIL Digoxin Thyroid Studies Lab Results Component Value Date/Time TSH 3.950 01/25/2013 12:00 AM  
    
 
Signed By: SAMANTHA Avendano   
 January 11, 2019

## 2019-01-11 NOTE — PROGRESS NOTES
Patient discharged home via private vehicle,discharge instructions,medications and follow up appointments reviewed. patient verbalizes understanding,belongings packed and sent with patient.

## 2019-01-11 NOTE — ROUTINE PROCESS
Received report from [facility]. Pt AAOx3, NAD, breathing non labored, on room air, HOB up. IV site clean, dry and intact. Heparin drip going per ordered protocol-rate verified. Bed at the lowest level on lock position, call bell w/i reach. Bedside and Verbal shift change report given to CUONG Brody (oncoming nurse) by me (offgoing nurse). Report included the following information SBAR, Kardex, Intake/Output, MAR, Recent Results and Cardiac Rhythm A-Fib.

## 2019-01-11 NOTE — PROGRESS NOTES
Problem: Falls - Risk of 
Goal: *Absence of Falls Document Addie Villanueva Fall Risk and appropriate interventions in the flowsheet. Outcome: Progressing Towards Goal 
Fall Risk Interventions: 
  
 
  
 
Medication Interventions: Assess postural VS orthostatic hypotension, Evaluate medications/consider consulting pharmacy, Teach patient to arise slowly Elimination Interventions: Call light in reach, Elevated toilet seat, Patient to call for help with toileting needs, Toilet paper/wipes in reach, Toileting schedule/hourly rounds

## 2019-01-11 NOTE — PROGRESS NOTES
Bedside and Verbal shift change report given to Iveth Traore RN (oncoming nurse) by Indu Cardenas RN (offgoing nurse). Report given with Navid KIM and MAR.

## 2019-01-11 NOTE — DISCHARGE INSTRUCTIONS
DISCHARGE SUMMARY from Nurse    PATIENT INSTRUCTIONS:    After general anesthesia or intravenous sedation, for 24 hours or while taking prescription Narcotics:  · Limit your activities  · Do not drive and operate hazardous machinery  · Do not make important personal or business decisions  · Do  not drink alcoholic beverages  · If you have not urinated within 8 hours after discharge, please contact your surgeon on call. Report the following to your surgeon:  · Excessive pain, swelling, redness or odor of or around the surgical area  · Temperature over 100.5  · Nausea and vomiting lasting longer than 4 hours or if unable to take medications  · Any signs of decreased circulation or nerve impairment to extremity: change in color, persistent  numbness, tingling, coldness or increase pain  · Any questions    What to do at Home:  Recommended activity: Activity as tolerated,     If you experience any of the following symptoms increased chest pain,shortness of breath, please follow up with emergency department. *  Please give a list of your current medications to your Primary Care Provider. *  Please update this list whenever your medications are discontinued, doses are      changed, or new medications (including over-the-counter products) are added. *  Please carry medication information at all times in case of emergency situations. These are general instructions for a healthy lifestyle:    No smoking/ No tobacco products/ Avoid exposure to second hand smoke  Surgeon General's Warning:  Quitting smoking now greatly reduces serious risk to your health.     Obesity, smoking, and sedentary lifestyle greatly increases your risk for illness    A healthy diet, regular physical exercise & weight monitoring are important for maintaining a healthy lifestyle    You may be retaining fluid if you have a history of heart failure or if you experience any of the following symptoms:  Weight gain of 3 pounds or more overnight or 5 pounds in a week, increased swelling in our hands or feet or shortness of breath while lying flat in bed. Please call your doctor as soon as you notice any of these symptoms; do not wait until your next office visit. Recognize signs and symptoms of STROKE:    F-face looks uneven    A-arms unable to move or move unevenly    S-speech slurred or non-existent    T-time-call 911 as soon as signs and symptoms begin-DO NOT go       Back to bed or wait to see if you get better-TIME IS BRAIN. Warning Signs of HEART ATTACK     Call 911 if you have these symptoms:   Chest discomfort. Most heart attacks involve discomfort in the center of the chest that lasts more than a few minutes, or that goes away and comes back. It can feel like uncomfortable pressure, squeezing, fullness, or pain.  Discomfort in other areas of the upper body. Symptoms can include pain or discomfort in one or both arms, the back, neck, jaw, or stomach.  Shortness of breath with or without chest discomfort.  Other signs may include breaking out in a cold sweat, nausea, or lightheadedness. Don't wait more than five minutes to call 911 - MINUTES MATTER! Fast action can save your life. Calling 911 is almost always the fastest way to get lifesaving treatment. Emergency Medical Services staff can begin treatment when they arrive -- up to an hour sooner than if someone gets to the hospital by car. The discharge information has been reviewed with the patient. The patient verbalized understanding. Discharge medications reviewed with the patient and appropriate educational materials and side effects teaching were provided.   ___________________________________________________________________________________________________________________________________

## 2019-01-11 NOTE — PROGRESS NOTES
This writer spoke with Yanelis COBIAN to notify of today's d/c order. [facility] notified of rolling walker.

## 2019-01-12 ENCOUNTER — HOME CARE VISIT (OUTPATIENT)
Dept: HOME HEALTH SERVICES | Facility: HOME HEALTH | Age: 59
End: 2019-01-12

## 2019-01-14 ENCOUNTER — PATIENT OUTREACH (OUTPATIENT)
Dept: FAMILY MEDICINE CLINIC | Age: 59
End: 2019-01-14

## 2019-01-14 ENCOUNTER — HOME CARE VISIT (OUTPATIENT)
Dept: SCHEDULING | Facility: HOME HEALTH | Age: 59
End: 2019-01-14
Payer: MEDICARE

## 2019-01-14 ENCOUNTER — HOME CARE VISIT (OUTPATIENT)
Dept: HOME HEALTH SERVICES | Facility: HOME HEALTH | Age: 59
End: 2019-01-14

## 2019-01-14 ENCOUNTER — TELEPHONE ANTICOAG (OUTPATIENT)
Dept: CARDIOLOGY CLINIC | Age: 59
End: 2019-01-14

## 2019-01-14 DIAGNOSIS — I48.91 ATRIAL FIBRILLATION, UNSPECIFIED TYPE (HCC): ICD-10-CM

## 2019-01-14 DIAGNOSIS — Z79.01 LONG TERM CURRENT USE OF ANTICOAGULANT THERAPY: ICD-10-CM

## 2019-01-14 LAB — INR, EXTERNAL: 3.9

## 2019-01-14 PROCEDURE — 400013 HH SOC

## 2019-01-14 PROCEDURE — G0299 HHS/HOSPICE OF RN EA 15 MIN: HCPCS

## 2019-01-14 PROCEDURE — 3331090002 HH PPS REVENUE DEBIT

## 2019-01-14 PROCEDURE — 3331090001 HH PPS REVENUE CREDIT

## 2019-01-14 NOTE — PROGRESS NOTES
Verbal order and read back per Whitney Mcdowell NP  The INR is above the therapeutic range. Ask the patient about bleeding complications. Please make the following adjustments to Coumadin dosing: HOLD TODAY ONLY (1/14/2019) then Continue Coumadin 5mg daily except 2.5mg on Tues, Thur, and Sat. Repeat the INR in 1 week. This has been fully explained to the patient's home health nurse hemant, who indicates understanding.

## 2019-01-14 NOTE — PROGRESS NOTES
Hospital Discharge Follow-Up      Date/Time:  1/14/2019 12:36 PM    Patient was admitted to [facility] on 1/[facility]/19 and discharged on 1/11/19 for NSTEMI. The physician discharge summary was not available at the time of outreach. Nurse Navigator attempted to contact patient. Message left introducing myself, the purpose of the call and giving my contact information. Requested that patient call back at her earliest convenience    Current Outpatient Medications   Medication Sig    fluticasone furoate (ARNUITY ELLIPTA) 100 mcg/actuation dsdv inhaler Take 2 Puffs by inhalation daily.  losartan (COZAAR) 50 mg tablet Take 1 Tab by mouth daily.  hydrocortisone (CORTIZONE) 0.5 % ointment Apply 1 Each to affected area two (2) times a day. use thin layer    aspirin delayed-release 81 mg tablet Take 1 Tab by mouth daily.  docusate sodium (COLACE) 100 mg capsule Take 1 Cap by mouth two (2) times a day for 90 days.  polyethylene glycol (MIRALAX) 17 gram packet Take 1 Packet by mouth daily.  OTHER Check PT, INR on 01/14/19, results to cardiologist immediately, Diagnosis- HTN    OTHER Graded compression Stockings b/l LE- Use as directed    OTHER Incentive Spirometry- use as directed    esomeprazole (NEXIUM) 20 mg capsule Take 20 mg by mouth daily as needed.  fluticasone (FLONASE ALLERGY RELIEF) 50 mcg/actuation nasal spray 2 Sprays by Both Nostrils route daily as needed for Rhinitis.  metoprolol succinate (TOPROL-XL) 100 mg tablet Take 1 Tab by mouth daily.  furosemide (LASIX) 40 mg tablet TAKE 1 TABLET BY MOUTH EVERY DAY    albuterol (VENTOLIN HFA) 90 mcg/actuation inhaler Take 2 Puffs by inhalation daily.  KLOR-CON M20 20 mEq tablet TAKE 1 TAB BY MOUTH DAILY.  warfarin (COUMADIN) 5 mg tablet TAKE 5MG DAILY EXCEPT 2.5MG ON TUES, THUR, AND SAT. OR AS DIRECTED BY OUR OFFICE    sodium chloride (SALINE NASAL) 0.65 % nasal squeeze bottle 1 Spray as needed for Congestion.     simvastatin (ZOCOR) 20 mg tablet Take 1 Tab by mouth nightly.  melatonin tab tablet Take 10 mg by mouth nightly. No current facility-administered medications for this visit. There are no discontinued medications.     [facility] follow up appointment(s):   Future Appointments   Date Time Provider Manpreet Arlyn   1/18/2019  1:45 PM Wong Kim MD hospitals SO SCHED   1/21/2019 10:15 AM Csi, Pt [facility] SO SCHED   1/25/2019 10:30 AM Bhavin Mazariegos, NP [address] [place] [address]   2/1/2019 11:00 AM Wong Kim MD hospitals SO SCHED   2/18/2019 10:00 AM Ratna García DO [address] [place] [address]

## 2019-01-15 ENCOUNTER — HOME CARE VISIT (OUTPATIENT)
Dept: HOME HEALTH SERVICES | Facility: HOME HEALTH | Age: 59
End: 2019-01-15
Payer: MEDICARE

## 2019-01-15 ENCOUNTER — PATIENT OUTREACH (OUTPATIENT)
Dept: CARDIOLOGY CLINIC | Age: 59
End: 2019-01-15

## 2019-01-15 PROCEDURE — 3331090001 HH PPS REVENUE CREDIT

## 2019-01-15 PROCEDURE — 3331090002 HH PPS REVENUE DEBIT

## 2019-01-15 NOTE — PROGRESS NOTES
Heart Failure Follow Up Call NN contacted the patient by telephone to perform CHF Follow Up. Verified  and address as identifiers. Jamshid Cancer reports \"I'm maintaining\". Daily Weight (document daily weights in flowsheets):   Patient has not weighed since discharge from hospital  
Amount:  196 lbs last hospital weight Provider Notified:   No  
 
Zone:(Pt Reported)  green Goals  Maintains daily weight. Patient to provide teach back on daily weights and what to do when gain is 3 lbs in a day or 5 lbs in a week on/by 19  Patient verbalizes understanding of self-management goals of living with Congestive Heart Failure Patient will verbalize heart failure zones and report red flags to physician/NN on/by 3/15/19.  Understands and adheres to diet. Patient to be able to teach back cardiac diet of no salt added, on/by 19. Needs addressed from pathway:   
24-48 Hours- or initial contact Review/Verification: 
? Identify care team 
? Disposition (Home, SNF, IRF LTC, JOSE, Hospice) ? DC Instructions, Education, and HF Zones ? Pablito Quintana in place. [facility], Dialysis center) ? Evaluate DME needs; arrange home equipment ? Type of monitoring ? Labs/Diagnostics to follow ? Follow-up apts are arranged ? Identify transportation Med Rec* ? Ace/Arb,  
? Diuretic,  
? Beta Blocker,  
? Potassium,  
? Anticoagulant Baseline Labs* 
? BMP 
? BUN/Creat. 
? PT/INR 
? Hgb/Hct ? WBC 
 
TIEN Documentation:? Goals ? Challenges/Plan ? Weight   
? Edema ? Symptoms Education Disease Management: 
? Cardiac Low-sodium Diet (No added sodium; 1500mg as indicated). If Diabetic:  
?  include carb-controlled ? Fluid restriction (if indicated) ? Comorbidity Management ? Daily Weights PCP/Specialist follow up:  
Future Appointments Date Time Provider Manpreet Stoddard 1/17/2019 To Be Determined Buzz Aranda LPN [facility] HR [facility] Camila [facility]  
1/18/2019  1:45 PM Pasha Monk MD [facility] SCHED  
1/21/2019 To Be Determined Buzz [facility]EREMIAH [facility]  
1/21/2019 10:15 AM Csi, Pt [facility] SCHED  
1/24/2019 To Be Determined Buzz JEREMIAH [facility]anda [address] [facility]  
1/25/2019 10:30 AM Thor Mazariegos,  [facility]  
1/28/2019 To Be Determined Buzz Aranda LPN [facility] Camila [facility]  
2/1/2019 11:00 AM Pasha Monk MD [facility]  
2/18/2019 10:00 AM Jacob Owens  [facility] Cardiologist consult while IP: yes Palliative consult while IP:    No  
 
EF: 56-60% on 1/2019 Type of HF:   HFpEF Cardiac Device present: none Heart Failure Medications: ACE/ARB, Betablocker, Diuretic, Potassium, Anticoagulat Disposition of patient:  Home, Home Health [facility] Services/Tele Monitoring:  Home Health/[address] Social support: family Do you have a Scale:    yes How often do you weigh: Will begin weighing daily Does patient have an Advance Directive:  not on file Advance Directive scanned into patients chart:  No  
 
Patient reminded that there are physicians on call 24 hours a day / 7 days a week (M-F 5pm to 8am and from Friday 5pm until Monday 8a for the weekend) should the patient have questions or concerns. Patient reminded to call 911 if situation is emergent or patient feels the situation is emergent. Pt verbalizes understanding.

## 2019-01-16 ENCOUNTER — HOME CARE VISIT (OUTPATIENT)
Dept: SCHEDULING | Facility: HOME HEALTH | Age: 59
End: 2019-01-16
Payer: MEDICARE

## 2019-01-16 VITALS
HEART RATE: 86 BPM | RESPIRATION RATE: 20 BRPM | DIASTOLIC BLOOD PRESSURE: 68 MMHG | SYSTOLIC BLOOD PRESSURE: 126 MMHG | OXYGEN SATURATION: 98 % | TEMPERATURE: 97.6 F

## 2019-01-16 VITALS
OXYGEN SATURATION: 97 % | HEART RATE: 58 BPM | TEMPERATURE: 97.6 F | DIASTOLIC BLOOD PRESSURE: 82 MMHG | SYSTOLIC BLOOD PRESSURE: 100 MMHG

## 2019-01-16 PROCEDURE — G0151 HHCP-SERV OF PT,EA 15 MIN: HCPCS

## 2019-01-16 PROCEDURE — 3331090001 HH PPS REVENUE CREDIT

## 2019-01-16 PROCEDURE — 3331090002 HH PPS REVENUE DEBIT

## 2019-01-17 ENCOUNTER — HOME CARE VISIT (OUTPATIENT)
Dept: HOME HEALTH SERVICES | Facility: HOME HEALTH | Age: 59
End: 2019-01-17
Payer: MEDICARE

## 2019-01-17 ENCOUNTER — HOME CARE VISIT (OUTPATIENT)
Dept: SCHEDULING | Facility: HOME HEALTH | Age: 59
End: 2019-01-17
Payer: MEDICARE

## 2019-01-17 VITALS
HEART RATE: 96 BPM | SYSTOLIC BLOOD PRESSURE: 132 MMHG | TEMPERATURE: 97.8 F | OXYGEN SATURATION: 97 % | DIASTOLIC BLOOD PRESSURE: 82 MMHG

## 2019-01-17 VITALS
OXYGEN SATURATION: 97 % | DIASTOLIC BLOOD PRESSURE: 94 MMHG | TEMPERATURE: 97.3 F | SYSTOLIC BLOOD PRESSURE: 137 MMHG | HEART RATE: 68 BPM

## 2019-01-17 PROCEDURE — G0300 HHS/HOSPICE OF LPN EA 15 MIN: HCPCS

## 2019-01-17 PROCEDURE — G0157 HHC PT ASSISTANT EA 15: HCPCS

## 2019-01-17 PROCEDURE — 3331090001 HH PPS REVENUE CREDIT

## 2019-01-17 PROCEDURE — 3331090002 HH PPS REVENUE DEBIT

## 2019-01-17 PROCEDURE — G0152 HHCP-SERV OF OT,EA 15 MIN: HCPCS

## 2019-01-18 ENCOUNTER — OFFICE VISIT (OUTPATIENT)
Dept: FAMILY MEDICINE CLINIC | Age: 59
End: 2019-01-18

## 2019-01-18 VITALS
RESPIRATION RATE: 16 BRPM | TEMPERATURE: 97.9 F | DIASTOLIC BLOOD PRESSURE: 78 MMHG | WEIGHT: 188 LBS | HEIGHT: 67 IN | HEART RATE: 79 BPM | BODY MASS INDEX: 29.51 KG/M2 | SYSTOLIC BLOOD PRESSURE: 110 MMHG | OXYGEN SATURATION: 98 %

## 2019-01-18 DIAGNOSIS — J45.20 MILD INTERMITTENT ASTHMA WITHOUT COMPLICATION: ICD-10-CM

## 2019-01-18 DIAGNOSIS — E78.5 HYPERLIPIDEMIA, UNSPECIFIED HYPERLIPIDEMIA TYPE: ICD-10-CM

## 2019-01-18 DIAGNOSIS — I50.32 CHRONIC DIASTOLIC CONGESTIVE HEART FAILURE (HCC): ICD-10-CM

## 2019-01-18 DIAGNOSIS — I11.0 BENIGN HYPERTENSIVE HEART DISEASE WITH HEART FAILURE (HCC): ICD-10-CM

## 2019-01-18 DIAGNOSIS — Z79.01 LONG TERM CURRENT USE OF ANTICOAGULANT THERAPY: ICD-10-CM

## 2019-01-18 DIAGNOSIS — I48.91 ATRIAL FIBRILLATION, UNSPECIFIED TYPE (HCC): Primary | ICD-10-CM

## 2019-01-18 PROCEDURE — 3331090001 HH PPS REVENUE CREDIT

## 2019-01-18 PROCEDURE — 3331090002 HH PPS REVENUE DEBIT

## 2019-01-18 RX ORDER — AZITHROMYCIN 250 MG/1
TABLET, FILM COATED ORAL
Qty: 6 TAB | Refills: 0 | Status: SHIPPED | OUTPATIENT
Start: 2019-01-18 | End: 2019-02-01 | Stop reason: ALTCHOICE

## 2019-01-18 NOTE — PROGRESS NOTES
1. Have you been to the ER, urgent care clinic since your last visit? Hospitalized since your last visit? No    2. Have you seen or consulted any other health care providers outside of the [address] [place] [address] since your last visit? Include any pap smears or colon screening.  No

## 2019-01-18 NOTE — PATIENT INSTRUCTIONS
DASH Diet: Care Instructions  Your Care Instructions    The DASH diet is an eating plan that can help lower your blood pressure. DASH stands for Dietary Approaches to Stop Hypertension. Hypertension is high blood pressure. The DASH diet focuses on eating foods that are high in calcium, potassium, and magnesium. These nutrients can lower blood pressure. The foods that are highest in these nutrients are fruits, vegetables, low-fat dairy products, nuts, seeds, and legumes. But taking calcium, potassium, and magnesium supplements instead of eating foods that are high in those nutrients does not have the same effect. The DASH diet also includes whole grains, fish, and poultry. The DASH diet is one of several lifestyle changes your doctor may recommend to lower your high blood pressure. Your doctor may also want you to decrease the amount of sodium in your diet. Lowering sodium while following the DASH diet can lower blood pressure even further than just the DASH diet alone. Follow-up care is a key part of your treatment and safety. Be sure to make and go to all appointments, and call your doctor if you are having problems. It's also a good idea to know your test results and keep a list of the medicines you take. How can you care for yourself at home? Following the DASH diet  · Eat 4 to 5 servings of fruit each day. A serving is 1 medium-sized piece of fruit, ½ cup chopped or canned fruit, 1/4 cup dried fruit, or 4 ounces (½ cup) of fruit juice. Choose fruit more often than fruit juice. · Eat 4 to 5 servings of vegetables each day. A serving is 1 cup of lettuce or raw leafy vegetables, ½ cup of chopped or cooked vegetables, or 4 ounces (½ cup) of vegetable juice. Choose vegetables more often than vegetable juice. · Get 2 to 3 servings of low-fat and fat-free dairy each day. A serving is 8 ounces of milk, 1 cup of yogurt, or 1 ½ ounces of cheese. · Eat 6 to 8 servings of grains each day.  A serving is 1 slice of bread, 1 ounce of dry cereal, or ½ cup of cooked rice, pasta, or cooked cereal. Try to choose whole-grain products as much as possible. · Limit lean meat, poultry, and fish to 2 servings each day. A serving is 3 ounces, about the size of a deck of cards. · Eat 4 to 5 servings of nuts, seeds, and legumes (cooked dried beans, lentils, and split peas) each week. A serving is 1/3 cup of nuts, 2 tablespoons of seeds, or ½ cup of cooked beans or peas. · Limit fats and oils to 2 to 3 servings each day. A serving is 1 teaspoon of vegetable oil or 2 tablespoons of salad dressing. · Limit sweets and added sugars to 5 servings or less a week. A serving is 1 tablespoon jelly or jam, ½ cup sorbet, or 1 cup of lemonade. · Eat less than 2,300 milligrams (mg) of sodium a day. If you limit your sodium to 1,500 mg a day, you can lower your blood pressure even more. Tips for success  · Start small. Do not try to make dramatic changes to your diet all at once. You might feel that you are missing out on your favorite foods and then be more likely to not follow the plan. Make small changes, and stick with them. Once those changes become habit, add a few more changes. · Try some of the following:  ? Make it a goal to eat a fruit or vegetable at every meal and at snacks. This will make it easy to get the recommended amount of fruits and vegetables each day. ? Try yogurt topped with fruit and nuts for a snack or healthy dessert. ? Add lettuce, tomato, cucumber, and onion to sandwiches. ? Combine a ready-made pizza crust with low-fat mozzarella cheese and lots of vegetable toppings. Try using tomatoes, squash, spinach, broccoli, carrots, cauliflower, and onions. ? Have a variety of cut-up vegetables with a low-fat dip as an appetizer instead of chips and dip. ? Sprinkle sunflower seeds or chopped almonds over salads. Or try adding chopped walnuts or almonds to cooked vegetables.   ? Try some vegetarian meals using beans and peas. Add garbanzo or kidney beans to salads. Make burritos and tacos with mashed curtis beans or black beans. Where can you learn more? Go to http://greyson-andrea.info/. Enter W510 in the search box to learn more about \"DASH Diet: Care Instructions. \"  Current as of: July 22, 2018  Content Version: 11.9  © 7993-2470 [employer]. Care instructions adapted under license by [employer] (which disclaims liability or warranty for this information). If you have questions about a medical condition or this instruction, always ask your healthcare professional. [address] any warranty or liability for your use of this information.

## 2019-01-19 PROCEDURE — 3331090001 HH PPS REVENUE CREDIT

## 2019-01-19 PROCEDURE — 3331090002 HH PPS REVENUE DEBIT

## 2019-01-20 PROCEDURE — 3331090001 HH PPS REVENUE CREDIT

## 2019-01-20 PROCEDURE — 3331090002 HH PPS REVENUE DEBIT

## 2019-01-21 ENCOUNTER — HOME CARE VISIT (OUTPATIENT)
Dept: SCHEDULING | Facility: HOME HEALTH | Age: 59
End: 2019-01-21
Payer: MEDICARE

## 2019-01-21 ENCOUNTER — HOME CARE VISIT (OUTPATIENT)
Dept: HOME HEALTH SERVICES | Facility: HOME HEALTH | Age: 59
End: 2019-01-21
Payer: MEDICARE

## 2019-01-21 ENCOUNTER — PATIENT OUTREACH (OUTPATIENT)
Dept: CARDIOLOGY CLINIC | Age: 59
End: 2019-01-21

## 2019-01-21 ENCOUNTER — TELEPHONE ANTICOAG (OUTPATIENT)
Dept: CARDIOLOGY CLINIC | Age: 59
End: 2019-01-21

## 2019-01-21 DIAGNOSIS — E78.2 MIXED HYPERLIPIDEMIA: ICD-10-CM

## 2019-01-21 DIAGNOSIS — I11.0 BENIGN HYPERTENSIVE HEART DISEASE WITH HEART FAILURE (HCC): ICD-10-CM

## 2019-01-21 DIAGNOSIS — I48.91 ATRIAL FIBRILLATION, UNSPECIFIED TYPE (HCC): ICD-10-CM

## 2019-01-21 DIAGNOSIS — Z79.01 LONG TERM CURRENT USE OF ANTICOAGULANT THERAPY: ICD-10-CM

## 2019-01-21 LAB — INR, EXTERNAL: 1.5

## 2019-01-21 PROCEDURE — 3331090002 HH PPS REVENUE DEBIT

## 2019-01-21 PROCEDURE — 3331090001 HH PPS REVENUE CREDIT

## 2019-01-21 PROCEDURE — G0299 HHS/HOSPICE OF RN EA 15 MIN: HCPCS

## 2019-01-21 NOTE — PROGRESS NOTES
Verbal order and read back per Lidia Meredith, DO The INR is below the therapeutic range. Please make the following adjustments to Coumadin dosin.5mg today, then continue Coumadin 5mg daily except 2.5mg on Tues, [address], and Sat Repeat the INR on 19. This has been fully explained to the patient's home health nurse, who indicates understanding. jt Saunders home health nurse stated the patient reported to her she started the z pack on 19 and her last day will be 19.

## 2019-01-21 NOTE — PROGRESS NOTES
NN contacted the patient by telephone to perform CHF follow Up. Noted Priorities/Plan: To attend cardiology appointment on Friday 1/25/19. Daily Weight: 186 (stable) Zone: green Signs/Symptoms: Edema none at this time; SOB none at this time; orthopnea no reports sleeping with 2 pillows Goals  Maintains daily weight. Patient to provide teach back on daily weights and what to do when gain is 3 lbs in a day or 5 lbs in a week on/by 2/27/19  Patient verbalizes understanding of self-management goals of living with Congestive Heart Failure Patient will verbalize heart failure zones and report red flags to physician/NN on/by 3/15/19.  Understands and adheres to diet. Patient to be able to teach back cardiac diet of no salt added, on/by 2/11/19. Needs addressed from pathway:  
Week 1-4 Provide Daily Disease Management 
(NN initiated) ? Daily weight (Before Breakfast- 
Daily Zone Identification (symptom management; weight, edema, SOB, activity/sleep changes)-notify provider immediately as indicated ? Cardiac Low-sodium Diet (No added sodium; 1500mg as indicated). If 
Diabetic: include carbohydrate controlled ? Fluid restriction (if indicated) ? Comorbidity Management ? Confirm follow-up appointments/transportation. Reschedule if needed. Additional assessment ? Fall precautions ? Activity tolerance assessment  
(eg: Vital signs; level of consciousness; dyspnea on exertion; pillow usage; recliner vs bed) ? Energy conservation management (balance activity with rest) ? Labs/diagnostics *as indicated ? Medication Therapy *as directed ? Diet/appetite assessment 
? ED/Hospital utilization ? Home assessment/modifications * as indicated ? Home health services Psychosocial: Reassurance/emotional support Monitoring: ? Home health ? My Chart Education: 
? Advanced care planning status ? Support system identification ( eg: Caregiver, meal planning, community resources, family, friends, Synagogue, support group) ? Health literacy for heart failure Have you been to an ER/Hospital since discharge from [facility]? No   
 
Have you followed up with PCP/Cardiologist/Specialist? Has attended PCP appt on 1/18/19. Cardiology appt is on 1/25/19. Transportation: self/spouse Diet: cardiac/no salt added Activity/ADLs: able to perform ADL's. Medications Reconciled at this time:  at PCP on 1/18/19 Home health:  Company/Completion: [employer] [facility] started on 1/14/19 Social Support: family Advanced Care Plan: (not addressed) Patient reminded that there is a physician on call 24 hours a day / 7 days a week (M-F 5pm to 8am and from Friday 5pm until Monday 8a for the weekend) should the patient have questions or concerns. Patient reminded to call 911 if situation is emergent or patient feels the situation is emergent. Pt verbalizes understanding.

## 2019-01-22 ENCOUNTER — HOME CARE VISIT (OUTPATIENT)
Dept: SCHEDULING | Facility: HOME HEALTH | Age: 59
End: 2019-01-22
Payer: MEDICARE

## 2019-01-22 VITALS
TEMPERATURE: 97.4 F | DIASTOLIC BLOOD PRESSURE: 84 MMHG | SYSTOLIC BLOOD PRESSURE: 122 MMHG | OXYGEN SATURATION: 99 % | HEART RATE: 87 BPM

## 2019-01-22 VITALS
RESPIRATION RATE: 18 BRPM | TEMPERATURE: 98.2 F | OXYGEN SATURATION: 98 % | SYSTOLIC BLOOD PRESSURE: 130 MMHG | DIASTOLIC BLOOD PRESSURE: 84 MMHG | HEART RATE: 80 BPM

## 2019-01-22 PROCEDURE — 3331090001 HH PPS REVENUE CREDIT

## 2019-01-22 PROCEDURE — 3331090002 HH PPS REVENUE DEBIT

## 2019-01-22 PROCEDURE — G0157 HHC PT ASSISTANT EA 15: HCPCS

## 2019-01-23 ENCOUNTER — HOME CARE VISIT (OUTPATIENT)
Dept: HOME HEALTH SERVICES | Facility: HOME HEALTH | Age: 59
End: 2019-01-23
Payer: MEDICARE

## 2019-01-23 PROCEDURE — 3331090002 HH PPS REVENUE DEBIT

## 2019-01-23 PROCEDURE — 3331090001 HH PPS REVENUE CREDIT

## 2019-01-24 ENCOUNTER — HOME CARE VISIT (OUTPATIENT)
Dept: SCHEDULING | Facility: HOME HEALTH | Age: 59
End: 2019-01-24
Payer: MEDICARE

## 2019-01-24 PROCEDURE — G0157 HHC PT ASSISTANT EA 15: HCPCS

## 2019-01-24 PROCEDURE — 3331090002 HH PPS REVENUE DEBIT

## 2019-01-24 PROCEDURE — 3331090001 HH PPS REVENUE CREDIT

## 2019-01-24 RX ORDER — SIMVASTATIN 20 MG/1
TABLET, FILM COATED ORAL
Qty: 90 TAB | Refills: 0 | Status: SHIPPED | OUTPATIENT
Start: 2019-01-24 | End: 2019-02-25 | Stop reason: ALTCHOICE

## 2019-01-25 ENCOUNTER — HOME CARE VISIT (OUTPATIENT)
Dept: HOME HEALTH SERVICES | Facility: HOME HEALTH | Age: 59
End: 2019-01-25
Payer: MEDICARE

## 2019-01-25 VITALS
SYSTOLIC BLOOD PRESSURE: 120 MMHG | DIASTOLIC BLOOD PRESSURE: 82 MMHG | HEART RATE: 86 BPM | OXYGEN SATURATION: 96 % | TEMPERATURE: 97.4 F

## 2019-01-25 PROCEDURE — 3331090002 HH PPS REVENUE DEBIT

## 2019-01-25 PROCEDURE — 3331090001 HH PPS REVENUE CREDIT

## 2019-01-26 ENCOUNTER — HOME CARE VISIT (OUTPATIENT)
Dept: SCHEDULING | Facility: HOME HEALTH | Age: 59
End: 2019-01-26
Payer: MEDICARE

## 2019-01-26 PROCEDURE — G0299 HHS/HOSPICE OF RN EA 15 MIN: HCPCS

## 2019-01-26 PROCEDURE — 3331090002 HH PPS REVENUE DEBIT

## 2019-01-26 PROCEDURE — 3331090001 HH PPS REVENUE CREDIT

## 2019-01-26 NOTE — PROGRESS NOTES
Ivan Torres presents today for a post-hospital follow-up. She was hospitalized on 19 for complaints of chest pain, shortness of breath, orthopnea, and palpitations. She admitted to increased stress as her sister recently  and she admitted to a poor diet over the holidays. Her troponins were indeterminate at 0.14, 0.15, and 0.12. Her NT pro-BNP was 1530. She was treated with IV Lasix. She underwent a pharmacologic nuclear stress test on 19 and it showed a small reversible defect with mild reduction in uptake in the inferolateral region. Medical management was recommended unless she has recurrent exertional angina. She also had an echo done which showed an EF of 56-60%, no WMA, and mild concentric LVH. She reports that her losartan was decreased as her blood pressure was low at times in the hospital especially after she was diuresed. She is a 59year old female with a history of rheumatic valvular heart disease (s/p mitral valve replacement with a 25/23 mm On-X mechanical valve in May 2013), chronic atrial fibrillation, CHF, dyslipidemia, and hypertension. She was last seen by Dr Leland Deleon in 2018. She has not had any recurrent chest pain. She admits to increased stressors at home as her mother-in-law and sister-in-law moved in with them prior to the holidays. Denies chest pain, tightness, heaviness, and palpitations. Denies shortness of breath at rest, dyspnea on exertion, orthopnea and PND. Denies abdominal bloating. Denies lightheadedness, dizziness, and syncope. Denies lower extremity edema and claudication. Denies nausea, vomiting, diarrhea, melena, hematochezia. Denies hematuria, urgency, frequency. Denies fever, chills. Past Medical History:  
Diagnosis Date  Aortic valve disorders 2011  Asthma  Atrial fibrillation ([facility] [address] 2011  Congestive heart failure, unspecified 2005 Cleared quickly with Lasix therapy  Echocardiogram 01/10/2012 A-fib. EF 45%. Mild RVE. RVSP 45-50. Massive LAE. Mod TAMI. Mod MS. Severe MR (mean grad 10). Mild-mod AI. Mild TR. Pulmonic systolic flow reversal.  Mild IVCE.  Hypertension  Iron deficiency anemias  Long term (current) use of anticoagulants 2/23/2011  Murmur 1/2011 Grade I-II/VI systolic ejection murmur along left sternal border, with radiation to the pulmonic area.  S/P cardiac cath 02/24/2012 Minimal coronary artery disease. Mild LVE. EF 55%. Mod MR. Mod-severe MS. RA 15.  RV 55/16. PA 58/34. W 36.  CO/CI 3.5/1.9 (TD); 3.61/1.9 ([place]). R to L shunt suggested.  Wears glasses  Weight gain Past Surgical History:  
Procedure Laterality Date  HX HEART CATHETERIZATION  2/24/2012  HX HYSTERECTOMY  2006  HX MITRAL VALVE REPLACEMENT  05/22/2013  
 25/33 mm On-X mechanical valve with bilateral pulmonary vein isolation and resection of LA appendage Family History Problem Relation Age of Onset  Heart Surgery Father Open-Heart Surgery  Other Father Venous Thromboembolism  Heart Disease Mother Enlarged Heart  Hypertension Mother  Diabetes Mother Social History Tobacco Use  Smoking status: Former Smoker  Smokeless tobacco: Never Used Substance Use Topics  Alcohol use: Yes Alcohol/week: 0.6 oz Types: 1 Glasses of wine per week Comment: occassionally  Drug use: No  
 
 
Current Medications: 
Current Outpatient Medications Medication Sig  
 simvastatin (ZOCOR) 20 mg tablet TAKE 1 TABLET BY MOUTH EVERY DAY AT NIGHT  chlorpheniramine-acetaminophen (CORICIDIN HBP COLD AND FLU) 2-325 mg tab Take 325 mg by mouth two (2) times daily as needed for Cough (Cold symptoms).  fluticasone furoate (ARNUITY ELLIPTA) 100 mcg/actuation dsdv inhaler Take 2 Puffs by inhalation daily.  losartan (COZAAR) 50 mg tablet Take 1 Tab by mouth daily.  aspirin delayed-release 81 mg tablet Take 1 Tab by mouth daily.  docusate sodium (COLACE) 100 mg capsule Take 1 Cap by mouth two (2) times a day for 90 days.  polyethylene glycol (MIRALAX) 17 gram packet Take 1 Packet by mouth daily. (Patient taking differently: Take 1 Packet by mouth daily as needed for Other (Constipation). )  esomeprazole (NEXIUM) 20 mg capsule Take 20 mg by mouth daily as needed (heart burn).  fluticasone (FLONASE ALLERGY RELIEF) 50 mcg/actuation nasal spray 2 Sprays by Both Nostrils route daily as needed for Rhinitis.  metoprolol succinate (TOPROL-XL) 100 mg tablet Take 1 Tab by mouth daily.  furosemide (LASIX) 40 mg tablet TAKE 1 TABLET BY MOUTH EVERY DAY  albuterol (VENTOLIN HFA) 90 mcg/actuation inhaler Take 2 Puffs by inhalation daily.  KLOR-CON M20 20 mEq tablet TAKE 1 TAB BY MOUTH DAILY.  warfarin (COUMADIN) 5 mg tablet TAKE 5MG DAILY EXCEPT 2.5MG ON TUES, THUR, AND SAT. OR AS DIRECTED BY OUR OFFICE  
 sodium chloride (SALINE NASAL) 0.65 % nasal squeeze bottle 1 Spray as needed for Congestion.  melatonin tab tablet Take 10 mg by mouth nightly.  OTHER Check PT, INR on 01/14/19, results to cardiologist immediately, Diagnosis- HTN  
 OTHER Graded compression Stockings b/l LE- Use as directed  OTHER Incentive Spirometry- use as directed No current facility-administered medications for this visit. Allergies Allergen Reactions  Morphine Rash and Itching  Norco [Hydrocodone-Acetaminophen] Itching Physical: 
Visit Vitals /84 Pulse 88 Ht 5' 7\" (1.702 m) Wt 86.6 kg (191 lb) SpO2 99% BMI 29.91 kg/m² Her weight is down 2 pounds since her last appointment. Neck:  Supple, no JVD, no carotid bruits CV:  Normal S1 and crisp mechanical valve sounds, no murmurs, rubs, or gallops noted. Irregularly, irregular rhythm Lungs:  Clear to ausculation throughout, no wheezes or rales Abd:  Soft, non-tender, non-distended with good bowel sounds. No hepatosplenomegaly Extremities:  No edema EKG:  Read by Floridalma Rooney, DO - Atrial fibrillation, irregular conduction.  Nonspecific T-abnormality LABS: 
Lab Results Component Value Date/Time Sodium 139 01/09/2019 04:50 AM  
 Potassium 3.5 01/09/2019 04:50 AM  
 Chloride 104 01/09/2019 04:50 AM  
 CO2 27 01/09/2019 04:50 AM  
 Glucose 103 (H) 01/09/2019 04:50 AM  
 BUN 21 (H) 01/09/2019 04:50 AM  
 Creatinine 1.15 01/09/2019 04:50 AM  
 
Lab Results Component Value Date/Time Cholesterol, total 202 (H) 08/24/2018 08:16 AM  
 HDL Cholesterol 52 08/24/2018 08:16 AM  
 LDL, calculated 122.6 (H) 08/24/2018 08:16 AM  
 Triglyceride 137 08/24/2018 08:16 AM  
 CHOL/HDL Ratio 3.9 08/24/2018 08:16 AM  
 
 
No results found for: GPT Impression / Plan: 1. Chronic diastolic heart failure, appears compensated at present 2. Chronic Atrial fibrillation, rate controlled and on Coumadin managed by our office 3. Rheumatic valvular heart disease, s/p mitral valve replacement with a 25/23 mm On-X mechanical valve in May 2013 4. Non-STEMI Mrs. Chava Mcqueen was seen today for a post-hospital follow-up. She states that she is feeling better and has not had any further episodes of chest pain. She admits to increased stress at home since her elderly mother-in-law and sister-in-law moved in with them before the holidays. She admits to some anxiety and is considering asking her PCP for a mild anxiolytic. She states that her blood pressure was well controlled when she was on losartan 100mg daily. Her blood pressure has been somewhat elevated since it was decreased to 50mg daily. Will increase the losartan back to 100mg daily and she will return for follow-up with Dr. Maria L Campbell on 2/18/19, as previously scheduled. Her blood pressure will be re-evaluated at that time. Her INR was checked today and it was 2.2. It will be rechecked when she returns to see Dr Jaison Patrick. Marta Haque MSN, FNP-BC Please note:  Portions of this chart were created with Dragon medical speech to text program.  Unrecognized errors may be present.

## 2019-01-27 VITALS
HEART RATE: 78 BPM | WEIGHT: 186 LBS | OXYGEN SATURATION: 97 % | BODY MASS INDEX: 29.13 KG/M2 | DIASTOLIC BLOOD PRESSURE: 90 MMHG | SYSTOLIC BLOOD PRESSURE: 140 MMHG | TEMPERATURE: 97 F

## 2019-01-27 PROCEDURE — 3331090001 HH PPS REVENUE CREDIT

## 2019-01-27 PROCEDURE — 3331090002 HH PPS REVENUE DEBIT

## 2019-01-28 ENCOUNTER — HOME CARE VISIT (OUTPATIENT)
Dept: HOME HEALTH SERVICES | Facility: HOME HEALTH | Age: 59
End: 2019-01-28
Payer: MEDICARE

## 2019-01-28 PROCEDURE — 3331090002 HH PPS REVENUE DEBIT

## 2019-01-28 PROCEDURE — 3331090001 HH PPS REVENUE CREDIT

## 2019-01-29 ENCOUNTER — TELEPHONE (OUTPATIENT)
Dept: CARDIAC REHAB | Age: 59
End: 2019-01-29

## 2019-01-29 ENCOUNTER — HOME CARE VISIT (OUTPATIENT)
Dept: SCHEDULING | Facility: HOME HEALTH | Age: 59
End: 2019-01-29
Payer: MEDICARE

## 2019-01-29 VITALS
DIASTOLIC BLOOD PRESSURE: 70 MMHG | TEMPERATURE: 98.4 F | SYSTOLIC BLOOD PRESSURE: 100 MMHG | OXYGEN SATURATION: 98 % | HEART RATE: 89 BPM

## 2019-01-29 PROCEDURE — 3331090001 HH PPS REVENUE CREDIT

## 2019-01-29 PROCEDURE — 3331090003 HH PPS REVENUE ADJ

## 2019-01-29 PROCEDURE — G0151 HHCP-SERV OF PT,EA 15 MIN: HCPCS

## 2019-01-29 PROCEDURE — 3331090002 HH PPS REVENUE DEBIT

## 2019-01-30 ENCOUNTER — PATIENT OUTREACH (OUTPATIENT)
Dept: CARDIOLOGY CLINIC | Age: 59
End: 2019-01-30

## 2019-01-30 PROCEDURE — 3331090001 HH PPS REVENUE CREDIT

## 2019-01-30 PROCEDURE — 3331090002 HH PPS REVENUE DEBIT

## 2019-01-30 NOTE — PROGRESS NOTES
NN contacted the patient by telephone to perform CHF follow Up. Noted Priorities/Plan: To attend cardiology appointment on Friday 2/1/19. Daily Weight: 187 (stable) Zone: green Signs/Symptoms: Edema none at this time; SOB none at this time; orthopnea no reports sleeping with 2 pillows Goals  Maintains daily weight. Patient to provide teach back on daily weights and what to do when gain is 3 lbs in a day or 5 lbs in a week on/by 2/27/19  Patient verbalizes understanding of self-management goals of living with Congestive Heart Failure Patient will verbalize heart failure zones and report red flags to physician/NN on/by 3/15/19.  Understands and adheres to diet. Patient to be able to teach back cardiac diet of no salt added, on/by 2/11/19. Needs addressed from pathway:  
Week 1-4 Provide Daily Disease Management 
(NN initiated) ? Daily weight (Before Breakfast- 
Daily Zone Identification (symptom management; weight, edema, SOB, activity/sleep changes)-notify provider immediately as indicated ? Cardiac Low-sodium Diet (No added sodium; 1500mg as indicated). If 
Diabetic: include carbohydrate controlled ? Fluid restriction (if indicated) ? Comorbidity Management ? Confirm follow-up appointments/transportation. Reschedule if needed. Additional assessment ? Fall precautions ? Activity tolerance assessment  
(eg: Vital signs; level of consciousness; dyspnea on exertion; pillow usage; recliner vs bed) ? Energy conservation management (balance activity with rest) ? Labs/diagnostics *as indicated ? Medication Therapy *as directed ? Diet/appetite assessment 
? ED/Hospital utilization ? Home assessment/modifications * as indicated ? Home health services Psychosocial: Reassurance/emotional support Monitoring: ? Home health ? My Chart Education: 
? Advanced care planning status ? Support system identification ( eg: Caregiver, meal planning, community resources, family, friends, Islam, support group) ? Health literacy for heart failure Have you been to an ER/Hospital since discharge from [facility]? No   
 
Have you followed up with PCP/Cardiologist/Specialist? Has attended PCP appt on 1/18/19. Cardiology appt is on 2/1/19. Transportation: self/spouse Diet: cardiac/no salt added Activity/ADLs: able to perform ADL's. Medications Reconciled at this time:  at PCP on 1/18/19 Home health:  Company/Completion: [id number] [employer] started on 1/14/19 and completed on 1/29/19 Social Support: family Advanced Care Plan: (not addressed) Patient reminded that there is a physician on call 24 hours a day / 7 days a week (M-F 5pm to 8am and from Friday 5pm until Monday 8a for the weekend) should the patient have questions or concerns. Patient reminded to call 911 if situation is emergent or patient feels the situation is emergent. Pt verbalizes understanding.

## 2019-01-31 PROCEDURE — 3331090001 HH PPS REVENUE CREDIT

## 2019-01-31 PROCEDURE — 3331090002 HH PPS REVENUE DEBIT

## 2019-02-01 ENCOUNTER — OFFICE VISIT (OUTPATIENT)
Dept: CARDIOLOGY CLINIC | Age: 59
End: 2019-02-01

## 2019-02-01 VITALS
HEIGHT: 67 IN | WEIGHT: 191 LBS | SYSTOLIC BLOOD PRESSURE: 126 MMHG | BODY MASS INDEX: 29.98 KG/M2 | OXYGEN SATURATION: 99 % | HEART RATE: 88 BPM | DIASTOLIC BLOOD PRESSURE: 90 MMHG

## 2019-02-01 DIAGNOSIS — I11.0 BENIGN HYPERTENSIVE HEART DISEASE WITH HEART FAILURE (HCC): ICD-10-CM

## 2019-02-01 DIAGNOSIS — I50.32 CHRONIC DIASTOLIC CONGESTIVE HEART FAILURE (HCC): ICD-10-CM

## 2019-02-01 DIAGNOSIS — Z79.01 LONG TERM CURRENT USE OF ANTICOAGULANT THERAPY: ICD-10-CM

## 2019-02-01 DIAGNOSIS — Z95.2 S/P MVR (MITRAL VALVE REPLACEMENT): ICD-10-CM

## 2019-02-01 DIAGNOSIS — I48.91 ATRIAL FIBRILLATION, UNSPECIFIED TYPE (HCC): Primary | ICD-10-CM

## 2019-02-01 DIAGNOSIS — E78.2 MIXED HYPERLIPIDEMIA: ICD-10-CM

## 2019-02-01 LAB
INR BLD: 2.2
PT POC: 26.2 SECONDS
VALID INTERNAL CONTROL?: YES

## 2019-02-01 RX ORDER — LOSARTAN POTASSIUM 100 MG/1
100 TABLET ORAL DAILY
Qty: 90 TAB | Refills: 3 | Status: SHIPPED | OUTPATIENT
Start: 2019-02-01 | End: 2019-02-06 | Stop reason: SDUPTHER

## 2019-02-01 RX ORDER — WARFARIN SODIUM 5 MG/1
TABLET ORAL
Qty: 60 TAB | Refills: 6 | Status: SHIPPED | OUTPATIENT
Start: 2019-02-01 | End: 2019-02-18

## 2019-02-01 RX ORDER — FUROSEMIDE 40 MG/1
40 TABLET ORAL DAILY
Qty: 90 TAB | Refills: 3 | Status: SHIPPED | OUTPATIENT
Start: 2019-02-01 | End: 2019-11-25 | Stop reason: SDUPTHER

## 2019-02-05 ENCOUNTER — PATIENT OUTREACH (OUTPATIENT)
Dept: CARDIOLOGY CLINIC | Age: 59
End: 2019-02-05

## 2019-02-06 RX ORDER — LOSARTAN POTASSIUM 100 MG/1
100 TABLET ORAL DAILY
Qty: 90 TAB | Refills: 3 | Status: SHIPPED | OUTPATIENT
Start: 2019-02-06 | End: 2020-04-15

## 2019-02-07 ENCOUNTER — TELEPHONE (OUTPATIENT)
Dept: CARDIAC REHAB | Age: 59
End: 2019-02-07

## 2019-02-07 NOTE — TELEPHONE ENCOUNTER
[facility] called patient and left a message. This is the second message left since 1/29/19 without response; no further attempts at contact will be made. If patient is interested in the program, please have her call our office at [phone].     Thank you,  Maria Eugenia Guerin

## 2019-02-15 RX ORDER — WARFARIN SODIUM 5 MG/1
TABLET ORAL
Qty: 30 TAB | Refills: 5 | Status: SHIPPED | OUTPATIENT
Start: 2019-02-15 | End: 2020-02-20

## 2019-02-18 ENCOUNTER — OFFICE VISIT (OUTPATIENT)
Dept: CARDIOLOGY CLINIC | Age: 59
End: 2019-02-18

## 2019-02-18 ENCOUNTER — ANTI-COAG VISIT (OUTPATIENT)
Dept: CARDIOLOGY CLINIC | Age: 59
End: 2019-02-18

## 2019-02-18 VITALS
OXYGEN SATURATION: 97 % | HEART RATE: 86 BPM | DIASTOLIC BLOOD PRESSURE: 86 MMHG | BODY MASS INDEX: 30.13 KG/M2 | WEIGHT: 192 LBS | SYSTOLIC BLOOD PRESSURE: 124 MMHG | HEIGHT: 67 IN

## 2019-02-18 DIAGNOSIS — I48.91 ATRIAL FIBRILLATION, UNSPECIFIED TYPE (HCC): ICD-10-CM

## 2019-02-18 DIAGNOSIS — I50.32 CHRONIC DIASTOLIC CONGESTIVE HEART FAILURE (HCC): Primary | ICD-10-CM

## 2019-02-18 DIAGNOSIS — I09.1 RHEUMATIC VALVULAR DISEASE: ICD-10-CM

## 2019-02-18 DIAGNOSIS — E78.5 HYPERLIPIDEMIA, UNSPECIFIED HYPERLIPIDEMIA TYPE: ICD-10-CM

## 2019-02-18 DIAGNOSIS — I10 ESSENTIAL HYPERTENSION: ICD-10-CM

## 2019-02-18 DIAGNOSIS — I48.91 ATRIAL FIBRILLATION, UNSPECIFIED TYPE (HCC): Primary | ICD-10-CM

## 2019-02-18 DIAGNOSIS — Z79.01 LONG TERM CURRENT USE OF ANTICOAGULANT THERAPY: ICD-10-CM

## 2019-02-18 LAB
INR BLD: 2.6
PT POC: 31.4 SECONDS
VALID INTERNAL CONTROL?: YES

## 2019-02-18 NOTE — PROGRESS NOTES
Shahzad Kebede presents today for   Chief Complaint   Patient presents with    Irregular Heart Beat     6 month follow up - no cardiac complaints        Shahzad Kebede preferred language for health care discussion is english/other. Is someone accompanying this pt? no    Is the patient using any DME equipment during [address]? no    Depression Screening:  3 most recent PHQ Screens 1/15/2019   Little interest or pleasure in doing things Not at all   Feeling down, depressed, irritable, or hopeless Not at all   Total Score PHQ 2 0       Learning Assessment:  Learning Assessment 8/11/2016   PRIMARY LEARNER Patient   PRIMARY LANGUAGE ENGLISH   LEARNER PREFERENCE PRIMARY DEMONSTRATION     READING   ANSWERED BY PATIENT   RELATIONSHIP SELF       Abuse Screening:  Abuse Screening Questionnaire 4/27/2018   Do you ever feel afraid of your partner? N   Are you in a relationship with someone who physically or mentally threatens you? N   Is it safe for you to go home? Y       Fall Risk  No flowsheet data found. Pt currently taking Anticoagulant therapy? ASA 81mg daily & Warfarin     Coordination of Care:  1. Have you been to the ER, urgent care clinic since your last visit? Hospitalized since your last visit? no    2. Have you seen or consulted any other health care providers outside of the [address] [place] [address] since your last visit? Include any pap smears or colon screening.  no

## 2019-02-18 NOTE — PROGRESS NOTES
HPI:   I saw Ivan Torres in my office today in cardiovascular evaluation regarding her mitral valve disease and due to some chest pain that prompted an admission to the hospital about a month ago. Ms. Myrtle Cedillo is a very pleasant 55 year old [facility] American female whom I first saw back in March of 2005 at [facility] with congestive heart failure. Her heart failure cleared quickly at that time with Lasix therapy and subsequent echocardiogram demonstrated mild concentric left ventricular hypertrophy without ventricular dilatation, and a low normal overall left ventricular function with ejection fraction in the 55% range, as well as mild to moderate mitral stenosis and moderate mitral regurgitation, with some signs of rheumatic aortic valvular disease as well.      She subsequently did reasonably well on Diovan and beta blockers, but she was lost to follow up for some time, and when I saw her again in 2012 she was having signs of worsening mitral stenosis and ultimately underwent a cardiac catheterization by my associate Dr. Bambi Durand on 2/24/12, which demonstrated what appeared to be moderate to severe mitral stenosis with moderate mitral regurgitation, mild aortic insufficiency and only very mild coronary artery disease.      She did have an episode of hypoxemia during that cardiac catheterization and had a cardiac MRI and was further worked up by Dr. Jean Bradshaw prior to her ultimately getting surgery, which actually was put off for another year because of some personal issues. She ultimately was referred for surgery and that was completed by Dr. Jean Bradshaw on 5/22/13, and consisted of a mitral valve replacement with a 25/23 mm On-X mechanical valve together with bilateral pulmonary vein isolation and resection of the left atrial appendage.     She comes in today and relates that she is doing reasonably well.   She actually had a severe episode of left scapular pain back in early January which prompted her to go the emergency room and the pain resolved within a couple of hours. She had rather flat troponins which were in the 0.12-0.15 range and a mildly elevated NT proBNP at 1530. She was treated with Lasix and had a nuclear scan which was mildly abnormal suggesting a small reversible defect with mild reduction uptake in the inferolateral region, but since her symptoms sounded noncardiac and was decided to simply treat her medically. He also had an echocardiogram at that time which demonstrated normal left ventricular systolic function and normal functioning of her prosthetic mitral valve. She is in chronic atrial fibrillation with a controlled ventricular response today and remains on Coumadin due to her prosthetic valve with an INR today of 2.6. Encounter Diagnoses   Name Primary? 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 She apparently was on her a great deal of stress at the time she had her scapular pain and she has had no further discomforts that sound at all angina or further back pain issues. Her workup was fairly benign with a very mildly abnormal nuclear myocardial perfusion study so I agree that we should simply continue to treat her medically.     Her latest lipid profile which was completed back in August 24, 2018 was abnormal with a total cholesterol of 202, triglycerides 137, HDL 52, LDL of 122.6, and VLDL 27.4 which I think is suboptimal control on Zocor 20 mg daily and I am going to repeat her lipid profile and I have recommend that we change her cholesterol dosage of her levels are remaining in this same range. Her blood pressure is well controlled today and her heart rate and atrial fibrillation is well controlled and her most recent INR is in the therapeutic range at 2.6 as indicated above so I am simply get a plan to continue her current medical regimen and see her again in several months. PCP: Perley Homans, MD       Past Medical History:   Diagnosis Date    Aortic valve disorders 1/13/2011    Asthma     Atrial fibrillation ([address] 1/13/2011    Congestive heart failure, unspecified March 2005    Cleared quickly with Lasix therapy    Echocardiogram 01/10/2012    A-fib. EF 45%. Mild RVE. RVSP 45-50. Massive LAE. Mod TAMI. Mod MS. Severe MR (mean grad 10). Mild-mod AI. Mild TR. Pulmonic systolic flow reversal.  Mild IVCE.  Hypertension     Iron deficiency anemias     Long term (current) use of anticoagulants 2/23/2011    Murmur 1/2011    Grade I-II/VI systolic ejection murmur along left sternal border, with radiation to the pulmonic area.  S/P cardiac cath 02/24/2012    Minimal coronary artery disease. Mild LVE. EF 55%. Mod MR. Mod-severe MS. RA 15.  RV 55/16. PA 58/34. W 36.  CO/CI 3.5/1.9 (TD); 3.61/1.9 ([place]). R to L shunt suggested.  Wears glasses     Weight gain          Past Surgical History:   Procedure Laterality Date    HX HEART CATHETERIZATION  2/24/2012    HX HYSTERECTOMY  2006    HX MITRAL VALVE REPLACEMENT  05/22/2013    25/33 mm On-X mechanical valve with bilateral pulmonary vein isolation and resection of LA appendage       Current Outpatient Medications   Medication Sig    warfarin (COUMADIN) 5 mg tablet TAKE 5MG DAILY EXCEPT 2.5MG ON TUES, THUR, AND SAT. OR AS DIRECTED BY OUR OFFICE    losartan (COZAAR) 100 mg tablet Take 1 Tab by mouth daily.  furosemide (LASIX) 40 mg tablet Take 1 Tab by mouth daily.     simvastatin (ZOCOR) 20 mg tablet TAKE 1 TABLET BY MOUTH EVERY DAY AT NIGHT    chlorpheniramine-acetaminophen (CORICIDIN HBP COLD AND FLU) 2-325 mg tab Take 325 mg by mouth two (2) times daily as needed for Cough (Cold symptoms).  fluticasone furoate (ARNUITY ELLIPTA) 100 mcg/actuation dsdv inhaler Take 2 Puffs by inhalation daily.  aspirin delayed-release 81 mg tablet Take 1 Tab by mouth daily.  docusate sodium (COLACE) 100 mg capsule Take 1 Cap by mouth two (2) times a day for 90 days.  polyethylene glycol (MIRALAX) 17 gram packet Take 1 Packet by mouth daily. (Patient taking differently: Take 1 Packet by mouth daily as needed for Other (Constipation). )    esomeprazole (NEXIUM) 20 mg capsule Take 20 mg by mouth daily as needed (heart burn).  fluticasone (FLONASE ALLERGY RELIEF) 50 mcg/actuation nasal spray 2 Sprays by Both Nostrils route daily as needed for Rhinitis.  metoprolol succinate (TOPROL-XL) 100 mg tablet Take 1 Tab by mouth daily.  albuterol (VENTOLIN HFA) 90 mcg/actuation inhaler Take 2 Puffs by inhalation daily.  KLOR-CON M20 20 mEq tablet TAKE 1 TAB BY MOUTH DAILY.  sodium chloride (SALINE NASAL) 0.65 % nasal squeeze bottle 1 Spray as needed for Congestion.  melatonin tab tablet Take 10 mg by mouth nightly. No current facility-administered medications for this visit. Allergies   Allergen Reactions    Morphine Rash and Itching    Norco [Hydrocodone-Acetaminophen] Itching       Social History:   reports that she has quit smoking. she has never used smokeless tobacco. She reports that she drinks about 0.6 oz of alcohol per week. She reports that she does not use drugs. Family History   Problem Relation Age of Onset    Heart Surgery Father         Open-Heart Surgery    Other Father         Venous Thromboembolism    Heart Disease Mother         Enlarged Heart    Hypertension Mother     Diabetes Mother      Review of Systems:    Constitutional: Negative. Respiratory: Positive for wheezing.  Negative for cough, hemoptysis, sputum production and shortness of breath. Cardiovascular: Positive for orthopnea. Negative for chest pain, palpitations, claudication, leg swelling and PND. Gastrointestinal: Negative. Musculoskeletal: Positive for myalgias. Negative for back pain, falls, joint pain and neck pain. Neurological: Negative for dizziness. Physical Exam:   Visit Vitals  /86   Pulse 86   Ht 5' 7\" (1.702 m)   Wt 87.1 kg (192 lb)   SpO2 97%   BMI 30.07 kg/m²       The patient is an alert, oriented, well developed, well nourished 59 y.o.  female who was in no acute distress at the time of my examination. HEENT: No icterus or xanthelasmas. Conjunctivae white, mucosa moist, no pallor or cyanosis. Neck: Supple without masses, tenderness or thyromegaly. No jugular venous distention. Carotid upstrokes are full bilaterally, without bruits. Cardiovascular: Chest is symmetrical with good excursion. There is a well healed, but widened mid sternotomy scar. [place] is not displaced. No lifts, heaves or thrills. Mechanical S1 with normal S2 without appreciable murmurs, rubs, clicks or gallops auscultated. Lungs: Clear to auscultation bilaterally. Abdomen: Soft. No masses, tenderness or organomegaly. No abdominal bruits. Extremities: No edema, with full peripheral pulses. No clubbing or cyanosis. Review of Data: Please refer to past medical history for most recent cardiac testing. Lab Results   Component Value Date/Time    Cholesterol, total 202 (H) 08/24/2018 08:16 AM    HDL Cholesterol 52 08/24/2018 08:16 AM    LDL, calculated 122.6 (H) 08/24/2018 08:16 AM    Triglyceride 137 08/24/2018 08:16 AM    CHOL/HDL Ratio 3.9 08/24/2018 08:16 AM     Results for orders placed or performed in visit on 02/18/19   AMB POC EKG ROUTINE W/ 12 LEADS, INTER & REP     Status: None    Narrative    Atrial fibrillation with controlled ventricular response rate in the mid 80s.   Minor nonspecific diffuse ST-T flattening with some very early T wave inversions in the high lateral leads. Compared to the EKG of February 1, 2019 there was little interval change. Rg Jonas D.O., F.A.C.C. [facility]  [facility] [place]  [facility] [address]    PLEASE NOTE:  This document has been produced using voice recognition software. Unrecognized errors in transcription may be present.

## 2019-02-18 NOTE — PROGRESS NOTES
Verbal order and read back per Layton Galvan DO  The INR is stable and therapeutic. Continue same dose of coumadin (continue Coumadin 5mg daily except 2.5mg on Tues, Thur, and Sat )  Recheck in 1 month.

## 2019-02-18 NOTE — PROGRESS NOTES
Review of Systems Constitutional: Negative. Respiratory: Positive for wheezing. Negative for cough, hemoptysis, sputum production and shortness of breath. Cardiovascular: Positive for orthopnea. Negative for chest pain, palpitations, claudication, leg swelling and PND. Gastrointestinal: Negative. Musculoskeletal: Positive for myalgias. Negative for back pain, falls, joint pain and neck pain. Neurological: Negative for dizziness.

## 2019-02-18 NOTE — PATIENT INSTRUCTIONS
Verbal order and read back per Adonis Shirley DO  The INR is stable and therapeutic. Continue same dose of coumadin (continue Coumadin 5mg daily except 2.5mg on Tues, Thur, and Sat )  Recheck in 1 month.

## 2019-02-19 ENCOUNTER — PATIENT OUTREACH (OUTPATIENT)
Dept: CARDIOLOGY CLINIC | Age: 59
End: 2019-02-19

## 2019-02-19 NOTE — PROGRESS NOTES
Attempted to contact patient for Transition of Care follow up. No answer, left message with contact information provided. Will attempt to contact at a later time.

## 2019-02-25 ENCOUNTER — HOSPITAL ENCOUNTER (OUTPATIENT)
Dept: GENERAL RADIOLOGY | Age: 59
Discharge: HOME | End: 2019-02-25
Payer: MEDICARE

## 2019-02-25 ENCOUNTER — OFFICE VISIT (OUTPATIENT)
Dept: FAMILY MEDICINE CLINIC | Age: 59
End: 2019-02-25

## 2019-02-25 VITALS
HEIGHT: 67 IN | HEART RATE: 73 BPM | OXYGEN SATURATION: 98 % | TEMPERATURE: 97.8 F | WEIGHT: 192 LBS | RESPIRATION RATE: 15 BRPM | DIASTOLIC BLOOD PRESSURE: 88 MMHG | SYSTOLIC BLOOD PRESSURE: 124 MMHG | BODY MASS INDEX: 30.13 KG/M2

## 2019-02-25 DIAGNOSIS — J45.20 MILD INTERMITTENT ASTHMA WITHOUT COMPLICATION: ICD-10-CM

## 2019-02-25 DIAGNOSIS — R73.01 IFG (IMPAIRED FASTING GLUCOSE): ICD-10-CM

## 2019-02-25 DIAGNOSIS — I09.1 RHEUMATIC VALVULAR DISEASE: ICD-10-CM

## 2019-02-25 DIAGNOSIS — I48.91 ATRIAL FIBRILLATION, UNSPECIFIED TYPE (HCC): ICD-10-CM

## 2019-02-25 DIAGNOSIS — M79.672 PAIN OF LEFT HEEL: ICD-10-CM

## 2019-02-25 DIAGNOSIS — E78.5 HYPERLIPIDEMIA LDL GOAL <70: ICD-10-CM

## 2019-02-25 DIAGNOSIS — I21.4 NSTEMI (NON-ST ELEVATED MYOCARDIAL INFARCTION) (HCC): ICD-10-CM

## 2019-02-25 DIAGNOSIS — F43.22 ADJUSTMENT DISORDER WITH ANXIOUS MOOD: Primary | ICD-10-CM

## 2019-02-25 PROCEDURE — 73650 X-RAY EXAM OF HEEL: CPT

## 2019-02-25 RX ORDER — ATORVASTATIN CALCIUM 40 MG/1
40 TABLET, FILM COATED ORAL DAILY
Qty: 90 TAB | Refills: 0 | Status: SHIPPED | OUTPATIENT
Start: 2019-02-25 | End: 2019-04-01 | Stop reason: SDUPTHER

## 2019-02-25 RX ORDER — DICLOFENAC SODIUM 10 MG/G
GEL TOPICAL 4 TIMES DAILY
Qty: 1 EACH | Refills: 0 | Status: SHIPPED | OUTPATIENT
Start: 2019-02-25 | End: 2019-08-05

## 2019-02-25 RX ORDER — SERTRALINE HYDROCHLORIDE 25 MG/1
25 TABLET, FILM COATED ORAL DAILY
Qty: 90 TAB | Refills: 0 | Status: SHIPPED | OUTPATIENT
Start: 2019-02-25 | End: 2019-04-01 | Stop reason: SDUPTHER

## 2019-02-25 NOTE — PATIENT INSTRUCTIONS
Adjustment Disorder: Care Instructions  Your Care Instructions    Adjustment disorder means that you have emotional or behavioral problems because of stress. But your response to the stress is far more severe than a normal response. It is severe enough to affect your work or social life and may cause depression and physical pains and problems. Events that may cause this response can include a divorce, money problems, or starting school or a new job. It might be anything that causes some stress. This disorder is most often a short-term problem. It happens within 3 months of the stressful event or change. If the response lasts longer than 6 months after the event ends, you may have a more serious disorder. Follow-up care is a key part of your treatment and safety. Be sure to make and go to all appointments, and call your doctor if you are having problems. It's also a good idea to know your test results and keep a list of the medicines you take. How can you care for yourself at home? · Go to all counseling sessions. Do not skip any because you are feeling better. · If your doctor prescribed medicines, take them exactly as prescribed. Call your doctor if you think you are having a problem with your medicine. You will get more details on the specific medicines your doctor prescribes. · Discuss the causes of your stress with a good friend or family member. Or you can join a support group for people with similar problems. Talking to others sometimes relieves stress. · Get at least 30 minutes of exercise on most days of the week. Walking is a good choice. You also may want to do other activities, such as running, swimming, cycling, or playing tennis or team sports. Relaxation techniques  Do relaxation exercises 10 to 20 minutes a day. You can play soothing, relaxing music while you do them, if you wish. · Tell others in your house that you are going to do your relaxation exercises.  Ask them not to disturb you.  · Find a comfortable, quiet place. · Lie down on your back, or sit with your back straight. · Focus on your breathing. Make it slow and steady. · Breathe in through your nose. Breathe out through either your nose or mouth. · Breathe deeply, filling up the area between your navel and your rib cage. Breathe so that your belly goes up and down. · Do not hold your breath. · Breathe like this for 5 to 10 minutes. Notice the feeling of calmness throughout your whole body. As you continue to breathe slowly and deeply, relax by doing these next steps for another 5 to 10 minutes:  · Tighten and relax each muscle group in your body. Start at your toes, and work your way up to your head. · Imagine your muscle groups relaxing and getting heavy. · Empty your mind of all thoughts. · Let yourself relax more and more deeply. · Be aware of the state of calmness that surrounds you. · When your relaxation time is over, you can bring yourself back to alertness by moving your fingers and toes. Then move your hands and feet. And then move your entire body. Sometimes people fall asleep during relaxation. But they most often wake up soon. · Always give yourself time to return to full alertness before you drive a car. Wait to do anything that might cause an accident if you are not fully alert. Never play a relaxation tape while you drive a car. When should you call for help? Call 911 anytime you think you may need emergency care. For example, call if:    · You feel you cannot stop from hurting yourself or someone else. Keep the numbers for these national suicide hotlines: 3-475-073-TALK (6-609-742-001-007-9627) and 2-422-KCEFRFX (5-391-556-478-395-7283).  If you or someone you know talks about suicide or feeling hopeless, get help right away.    Watch closely for changes in your health, and be sure to contact your doctor if:    · You have new anxiety, or your anxiety gets worse.     · You have been feeling sad, depressed, or hopeless or have lost interest in things that you usually enjoy.     · You do not get better as expected. Where can you learn more? Go to http://greyson-andrea.info/. Enter [id number] in the search box to learn more about \"Adjustment Disorder: Care Instructions. \"  Current as of: June 28, 2018  Content Version: 11.9  © 4670-2971 [employer]. Care instructions adapted under license by [employer] (which disclaims liability or warranty for this information). If you have questions about a medical condition or this instruction, always ask your healthcare professional. [place] [address] any warranty or liability for your use of this information. Plantar Fasciitis: Care Instructions  Your Care Instructions    Plantar fasciitis is pain and inflammation of the plantar fascia, the tissue at the bottom of your foot that connects the heel bone to the toes. The plantar fascia also supports the arch. If you strain the plantar fascia, it can develop small tears and cause heel pain when you stand or walk. Plantar fasciitis can be caused by running or other sports. It also may occur in people who are overweight or who have high arches or flat feet. You may get plantar fasciitis if you walk or stand for long periods, or have a tight Achilles tendon or calf muscles. You can improve your foot pain with rest and other care at home. It might take a few weeks to a few months for your foot to heal completely. Follow-up care is a key part of your treatment and safety. Be sure to make and go to all appointments, and call your doctor if you are having problems. It's also a good idea to know your test results and keep a list of the medicines you take. How can you care for yourself at home? · Rest your feet often. Reduce your activity to a level that lets you avoid pain. If possible, do not run or walk on hard surfaces. · Take pain medicines exactly as directed.   ? If the doctor gave you a prescription medicine for pain, take it as prescribed. ? If you are not taking a prescription pain medicine, take an over-the-counter anti-inflammatory medicine for pain and swelling, such as ibuprofen (Advil, Motrin) or naproxen (Aleve). Read and follow all instructions on the label. · Use ice massage to help with pain and swelling. You can use an ice cube or an ice cup several times a day. To make an ice cup, fill a paper cup with water and freeze it. Cut off the top of the cup until a half-inch of ice shows. Hold onto the remaining paper to use the cup. Rub the ice in small circles over the area for 5 to 7 minutes. · Contrast baths, which alternate hot and cold water, can also help reduce swelling. But because heat alone may make pain and swelling worse, end a contrast bath with a soak in cold water. · Wear a night splint if your doctor suggests it. A night splint holds your foot with the toes pointed up and the foot and ankle at a 90-degree angle. This position gives the bottom of your foot a constant, gentle stretch. · Do simple exercises such as calf stretches and towel stretches 2 to 3 times each day, especially when you first get up in the morning. These can help the plantar fascia become more flexible. They also make the muscles that support your arch stronger. Hold these stretches for 15 to 30 seconds per stretch. Repeat 2 to 4 times. ? Stand about 1 foot from a wall. Place the palms of both hands against the wall at chest level. Lean forward against the wall, keeping one leg with the knee straight and heel on the ground while bending the knee of the other leg.  ? Sit down on the floor or a mat with your feet stretched in front of you. Roll up a towel lengthwise, and loop it over the ball of your foot. Holding the towel at both ends, gently pull the towel toward you to stretch your foot. · Wear shoes with good arch support.  Athletic shoes or shoes with a well-cushioned sole are good choices. · Try heel cups or shoe inserts (orthotics) to help cushion your heel. You can buy these at many shoe stores. · Put on your shoes as soon as you get out of bed. Going barefoot or wearing slippers may make your pain worse. · Reach and stay at a good weight for your height. This puts less strain on your feet. When should you call for help? Call your doctor now or seek immediate medical care if:    · You have heel pain with fever, redness, or warmth in your heel.     · You cannot put weight on the sore foot.    Watch closely for changes in your health, and be sure to contact your doctor if:    · You have numbness or tingling in your heel.     · Your heel pain lasts more than 2 weeks. Where can you learn more? Go to http://greyson-andrea.info/. Yoan De Los Santos in the search box to learn more about \"Plantar Fasciitis: Care Instructions. \"  Current as of: September 20, 2018  Content Version: 11.9  © 3130-8467 [employer]. Care instructions adapted under license by [employer] (which disclaims liability or warranty for this information). If you have questions about a medical condition or this instruction, always ask your healthcare professional. [address] any warranty or liability for your use of this information.

## 2019-02-25 NOTE — PROGRESS NOTES
1. Have you been to the ER, urgent care clinic since your last visit? Hospitalized since your last visit? [facility] 1/5/19 A-fib    2. Have you seen or consulted any other health care providers outside of the [address] [place] [address] since your last visit? Include any pap smears or colon screening.  ENT Dr. Jonathan Naylor 2/12/19    Chief Complaint   Patient presents with    Cholesterol Problem    Hypertension    Other     A-fib    Anxiety     times 3 months    Foot Pain     LT heel pain, times 1 month

## 2019-02-25 NOTE — PROGRESS NOTES
Lemuel Caballero, 59 y.o.,  female    SUBJECTIVE  Ff-up     Anxiety- reports increasing anxiety, easy irritability past few months. Reports stressor of living with elderly in laws, recently moving in with them. She has not had anxiety/depression issues in the past, not tried on medications, interested in doing so. C/o L heel pain x 1 month. Sharp shooting worse with weight bearing. She denies trauma. She has not tried anything for this. Interested in seeing ortho. H/o NSTEMI- on med management, denies cp sob. Following cardiology. Reviewed note recent increase in cozaar dose. She has afib/rheumatic heart disease as well. Asthma- reports to [facility] wheezing, sob. GERD- takes nexium otc prn for heartburn    HL-taking simvastatin, reviewed Lipid 8/18 elevated LDL. ROS:  See HPI, all others negative        Patient Active Problem List   Diagnosis Code    Rheumatic valvular disease I09.1    Chronic diastolic congestive heart failure (HCC) I50.32    Atrial fibrillation (HCC) I48.91    Long term current use of anticoagulant therapy Z79.01    Benign hypertensive heart disease with heart failure (HCC) I11.0    Hyperlipidemia E78.5    Advanced directives, counseling/discussion Z71.89    Mild intermittent asthma without complication J99.00    NSTEMI (non-ST elevated myocardial infarction) (HCC) I21.4    Chest pain R07.9       Current Outpatient Medications   Medication Sig Dispense Refill    sertraline (ZOLOFT) 25 mg tablet Take 1 Tab by mouth daily. 90 Tab 0    atorvastatin (LIPITOR) 40 mg tablet Take 1 Tab by mouth daily. 90 Tab 0    diclofenac (VOLTAREN) 1 % gel Apply  to affected area four (4) times daily. 1 Each 0    VENTOLIN HFA 90 mcg/actuation inhaler TAKE 2 PUFFS BY INHALATION DAILY. 3 Inhaler 1    warfarin (COUMADIN) 5 mg tablet TAKE 5MG DAILY EXCEPT 2.5MG ON TUES, THUR, AND SAT. OR AS DIRECTED BY OUR OFFICE 30 Tab 5    losartan (COZAAR) 100 mg tablet Take 1 Tab by mouth daily.  80 Tab 3    furosemide (LASIX) 40 mg tablet Take 1 Tab by mouth daily. 90 Tab 3    aspirin delayed-release 81 mg tablet Take 1 Tab by mouth daily. 30 Tab 0    docusate sodium (COLACE) 100 mg capsule Take 1 Cap by mouth two (2) times a day for 90 days. 60 Cap 0    polyethylene glycol (MIRALAX) 17 gram packet Take 1 Packet by mouth daily. (Patient taking differently: Take 1 Packet by mouth daily as needed for Other (Constipation). ) 30 Packet 0    esomeprazole (NEXIUM) 20 mg capsule Take 20 mg by mouth daily as needed (heart burn).  fluticasone (FLONASE ALLERGY RELIEF) 50 mcg/actuation nasal spray 2 Sprays by Both Nostrils route daily as needed for Rhinitis.  metoprolol succinate (TOPROL-XL) 100 mg tablet Take 1 Tab by mouth daily. 30 Tab 6    KLOR-CON M20 20 mEq tablet TAKE 1 TAB BY MOUTH DAILY. 30 Tab 6    sodium chloride (SALINE NASAL) 0.65 % nasal squeeze bottle 1 Spray as needed for Congestion.  melatonin tab tablet Take 10 mg by mouth nightly.  chlorpheniramine-acetaminophen (CORICIDIN HBP COLD AND FLU) 2-325 mg tab Take 325 mg by mouth two (2) times daily as needed for Cough (Cold symptoms).  fluticasone furoate (ARNUITY ELLIPTA) 100 mcg/actuation dsdv inhaler Take 2 Puffs by inhalation daily. 1 Inhaler 0       Allergies   Allergen Reactions    Morphine Rash and Itching    Norco [Hydrocodone-Acetaminophen] Itching       Past Medical History:   Diagnosis Date    Aortic valve disorders 1/13/2011    Asthma     Atrial fibrillation ([facility] [address] 1/13/2011    Congestive heart failure, unspecified March 2005    Cleared quickly with Lasix therapy    Echocardiogram 01/10/2012    A-fib. EF 45%. Mild RVE. RVSP 45-50. Massive LAE. Mod TAMI. Mod MS. Severe MR (mean grad 10). Mild-mod AI. Mild TR. Pulmonic systolic flow reversal.  Mild IVCE.     Hypertension     Iron deficiency anemias     Long term (current) use of anticoagulants 2/23/2011    Murmur 1/2011    Grade I-II/VI systolic ejection murmur along left sternal border, with radiation to the pulmonic area.  S/P cardiac cath 02/24/2012    Minimal coronary artery disease. Mild LVE. EF 55%. Mod MR. Mod-severe MS. RA 15.  RV 55/16. PA 58/34. W 36.  CO/CI 3.5/1.9 (TD); 3.61/1.9 ([place]). R to L shunt suggested.  Wears glasses     Weight gain        Social History     Socioeconomic History    Marital status:      Spouse name: Not on file    Number of children: Not on file    Years of education: Not on file    Highest education level: Not on file   Social Needs    Financial resource strain: Not on file    Food insecurity - worry: Not on file    Food insecurity - inability: Not on file    Transportation needs - medical: Not on file   [employer] needs - non-medical: Not on file   Occupational History    Not on file   Tobacco Use    Smoking status: Former Smoker    Smokeless tobacco: Never Used   Substance and Sexual Activity    Alcohol use:  Yes     Alcohol/week: 0.6 oz     Types: 1 Glasses of wine per week     Comment: occassionally    Drug use: No    Sexual activity: Yes     Partners: Male     Birth control/protection: None   Other Topics Concern    Not on file   Social History Narrative    Not on file       Family History   Problem Relation Age of Onset    Heart Surgery Father         Open-Heart Surgery    Other Father         Venous Thromboembolism    Heart Disease Mother         Enlarged Heart    Hypertension Mother     Diabetes Mother          OBJECTIVE    Physical Exam:     Visit Vitals  /88 (BP 1 Location: Left arm, BP Patient Position: Sitting)   Pulse 73   Temp 97.8 °F (36.6 °C) (Oral)   Resp 15   Ht 5' 7\" (1.702 m)   Wt 192 lb (87.1 kg)   SpO2 98%   BMI 30.07 kg/m²       General: alert, well-appearing,  in no apparent distress or pain  HEENT: throat and pharynx normal, eac patent TM intact  CVS: irregular, mech valve click, + murmur  Lungs:clear to ausculation bilaterally, no crackles, wheezing or rhonchi noted  Extremities: + L medial heel tenderness  Psych:  mood and affect normal      ASSESSMENT/PLAN  Diagnoses and all orders for this visit:    Adjustment reaction with anxious mood  Discussed options  Start zoloft 25 mg, may increase to 50 mg in 2 weeks if tolerating    NSTEMI  Asymptomatic  Mild reversible ischemia on stress test 1/2019  On bb, arb, ASA  Advised switch statin to lipitor, agreeable to this   Following dr. Morenita Humphries    Atrial fibrillation, unspecified type ([facility] [address]  Rate controlled, On BB, coumadin  INR therapeutic  Following dr. Morenita Humphries    Hyperlipidemia, unspecified hyperlipidemia type  Suboptimal  LDL goal <70   see above    Long term current use of anticoagulant therapy  Cards following    Rheumatic valvular disease     Mild persistent asthma without complication  Cont prn albuterol  Offer PCV vaccine on next visit    Benign hypertensive heart disease with heart failure ([facility]  Controlled  Cont cozaar, metoprolol, hctz    Impaired fasting glucose  Tlcs, monitoring    Left heel pain  Xray left heel  voltaren gel given  Referral to dr. Cruz Turner    Follow-up Disposition:  Return in about 4 weeks (around 3/25/2019), or if symptoms worsen or fail to improve. Patient understands plan of care. Patient has provided input and agrees with goals.

## 2019-02-27 ENCOUNTER — PATIENT OUTREACH (OUTPATIENT)
Dept: CARDIOLOGY CLINIC | Age: 59
End: 2019-02-27

## 2019-02-27 NOTE — PROGRESS NOTES
Attempted to contact patient for week 7 Transition of Care follow up. No answer, left message with contact information provided. Will attempt to contact at a later time.

## 2019-02-27 NOTE — PROGRESS NOTES
NN contacted the patient by telephone to perform CHF follow Up. Noted Priorities/Plan:  Continued maintenance at baselline Daily Weight: 190 lbs increase Zone: green Signs/Symptoms: Edema none; SOB none; orthopnea none Goals  Maintains daily weight. Patient to provide teach back on daily weights and what to do when gain is 3 lbs in a day or 5 lbs in a week on/by 2/27/19  Patient verbalizes understanding of self-management goals of living with Congestive Heart Failure Patient will verbalize heart failure zones and report red flags to physician/NN on/by 3/15/19.  Understands and adheres to diet. Patient to be able to teach back cardiac diet of no salt added, on/by 2/11/19. Needs addressed from pathway:  
Week 5-8 Provide Daily Disease Management (patient/caregiver initiated) ? Discussed Daily weight ? Discussed Daily Zone Identification: Sudhir Schwartz ? Discussed Cardiac Low-sodium Diet (No added sodium; 1500mg as indicated). If  Diabetic:  include carbohydrate controlled ? Discussed Fluid restriction, pt continues to maintain below 1500 ml/day ? Comorbidity Management ? Confirm follow-up appointments/transportation Additional Assessments ? No new Falls Pt reports baseline Activity tolerance ? Discussed Energy conservation management (balance activity w/ rest) ? Pt reports normal Diet/appetite ? No home modification needs identified. Psychosocial: Reassurance and emotional support; depression screening. Education: 
? Advanced Care Planning status discussed, pt will consider ? Patient/Caregiver verifies support systems (meal planning, medication and transportation needs, community resources) ? Health literacy for heart failure Have you been to an ER/Hospital since discharge from [facility]? No   
 
Have you followed up with PCP/Cardiologist/Specialist? Appt on 2/18 w/ Cardiology Dr Elsie Galvan, no changes made.  Appt on 2/25/19 w/ Pt's PCP,  [place], Zoloft 25 mg every day added. Transportation: self/spouse Diet: cardiac/no salt added Activity/ADLs: able to perform ADL's. Medications Reconciled at this time:  at PCP on 1/18/19 Home health:  Company/Completion: [employer] [facility] started on 1/14/19 and completed on 1/29/19 Social Support: family Patient reminded that there is a physician on call 24 hours a day / 7 days a week (M-F 5pm to 8am and from Friday 5pm until Monday 8a for the weekend) should the patient have questions or concerns. Patient reminded to call 911 if situation is emergent or patient feels the situation is emergent. Pt verbalizes understanding.

## 2019-02-28 ENCOUNTER — APPOINTMENT (OUTPATIENT)
Dept: GENERAL RADIOLOGY | Age: 59
End: 2019-02-28
Attending: PHYSICIAN ASSISTANT
Payer: MEDICARE

## 2019-02-28 ENCOUNTER — HOSPITAL ENCOUNTER (EMERGENCY)
Age: 59
Discharge: HOME | End: 2019-02-28
Attending: EMERGENCY MEDICINE
Payer: MEDICARE

## 2019-02-28 ENCOUNTER — APPOINTMENT (OUTPATIENT)
Dept: CT IMAGING | Age: 59
End: 2019-02-28
Attending: PHYSICIAN ASSISTANT
Payer: MEDICARE

## 2019-02-28 VITALS
HEIGHT: 69 IN | OXYGEN SATURATION: 96 % | BODY MASS INDEX: 28.14 KG/M2 | SYSTOLIC BLOOD PRESSURE: 173 MMHG | DIASTOLIC BLOOD PRESSURE: 100 MMHG | RESPIRATION RATE: 18 BRPM | HEART RATE: 70 BPM | WEIGHT: 190 LBS | TEMPERATURE: 97.9 F

## 2019-02-28 DIAGNOSIS — R51.9 NONINTRACTABLE HEADACHE, UNSPECIFIED CHRONICITY PATTERN, UNSPECIFIED HEADACHE TYPE: ICD-10-CM

## 2019-02-28 DIAGNOSIS — I10 ESSENTIAL HYPERTENSION: Primary | ICD-10-CM

## 2019-02-28 DIAGNOSIS — R77.8 ELEVATED TROPONIN: ICD-10-CM

## 2019-02-28 LAB
ALBUMIN SERPL-MCNC: 3.6 G/DL (ref 3.4–5)
ALBUMIN/GLOB SERPL: 0.8 {RATIO} (ref 0.8–1.7)
ALP SERPL-CCNC: 72 U/L (ref 45–117)
ALT SERPL-CCNC: 34 U/L (ref 13–56)
ANION GAP SERPL CALC-SCNC: 8 MMOL/L (ref 3–18)
AST SERPL-CCNC: 25 U/L (ref 15–37)
BASOPHILS # BLD: 0 K/UL (ref 0–0.1)
BASOPHILS NFR BLD: 1 % (ref 0–2)
BILIRUB SERPL-MCNC: 0.5 MG/DL (ref 0.2–1)
BUN SERPL-MCNC: 12 MG/DL (ref 7–18)
BUN/CREAT SERPL: 13 (ref 12–20)
CALCIUM SERPL-MCNC: 8.7 MG/DL (ref 8.5–10.1)
CHLORIDE SERPL-SCNC: 105 MMOL/L (ref 100–108)
CK MB CFR SERPL CALC: ABNORMAL % (ref 0–4)
CK MB CFR SERPL CALC: ABNORMAL % (ref 0–4)
CK MB SERPL-MCNC: <1 NG/ML (ref 5–25)
CK MB SERPL-MCNC: <1 NG/ML (ref 5–25)
CK SERPL-CCNC: 100 U/L (ref 26–192)
CK SERPL-CCNC: 103 U/L (ref 26–192)
CO2 SERPL-SCNC: 29 MMOL/L (ref 21–32)
CREAT SERPL-MCNC: 0.94 MG/DL (ref 0.6–1.3)
DIFFERENTIAL METHOD BLD: ABNORMAL
EOSINOPHIL # BLD: 0.1 K/UL (ref 0–0.4)
EOSINOPHIL NFR BLD: 2 % (ref 0–5)
ERYTHROCYTE [DISTWIDTH] IN BLOOD BY AUTOMATED COUNT: 13.7 % (ref 11.6–14.5)
GLOBULIN SER CALC-MCNC: 4.5 G/DL (ref 2–4)
GLUCOSE SERPL-MCNC: 88 MG/DL (ref 74–99)
HCT VFR BLD AUTO: 36.2 % (ref 35–45)
HGB BLD-MCNC: 11.3 G/DL (ref 12–16)
LYMPHOCYTES # BLD: 2.1 K/UL (ref 0.9–3.6)
LYMPHOCYTES NFR BLD: 35 % (ref 21–52)
MCH RBC QN AUTO: 26.7 PG (ref 24–34)
MCHC RBC AUTO-ENTMCNC: 31.2 G/DL (ref 31–37)
MCV RBC AUTO: 85.4 FL (ref 74–97)
MONOCYTES # BLD: 0.8 K/UL (ref 0.05–1.2)
MONOCYTES NFR BLD: 13 % (ref 3–10)
NEUTS SEG # BLD: 3.1 K/UL (ref 1.8–8)
NEUTS SEG NFR BLD: 49 % (ref 40–73)
PLATELET # BLD AUTO: 176 K/UL (ref 135–420)
PMV BLD AUTO: 11 FL (ref 9.2–11.8)
POTASSIUM SERPL-SCNC: 3.8 MMOL/L (ref 3.5–5.5)
PROT SERPL-MCNC: 8.1 G/DL (ref 6.4–8.2)
RBC # BLD AUTO: 4.24 M/UL (ref 4.2–5.3)
SODIUM SERPL-SCNC: 142 MMOL/L (ref 136–145)
TROPONIN I SERPL-MCNC: 0.1 NG/ML (ref 0–0.04)
TROPONIN I SERPL-MCNC: 0.1 NG/ML (ref 0–0.04)
WBC # BLD AUTO: 6.1 K/UL (ref 4.6–13.2)

## 2019-02-28 PROCEDURE — 71045 X-RAY EXAM CHEST 1 VIEW: CPT

## 2019-02-28 PROCEDURE — 85025 COMPLETE CBC W/AUTO DIFF WBC: CPT

## 2019-02-28 PROCEDURE — 93005 ELECTROCARDIOGRAM TRACING: CPT

## 2019-02-28 PROCEDURE — 74011250637 HC RX REV CODE- 250/637: Performed by: PHYSICIAN ASSISTANT

## 2019-02-28 PROCEDURE — 99284 EMERGENCY DEPT VISIT MOD MDM: CPT

## 2019-02-28 PROCEDURE — 82550 ASSAY OF CK (CPK): CPT

## 2019-02-28 PROCEDURE — 80053 COMPREHEN METABOLIC PANEL: CPT

## 2019-02-28 PROCEDURE — 70450 CT HEAD/BRAIN W/O DYE: CPT

## 2019-02-28 RX ORDER — GUAIFENESIN 100 MG/5ML
162 LIQUID (ML) ORAL
Status: COMPLETED | OUTPATIENT
Start: 2019-02-28 | End: 2019-02-28

## 2019-02-28 RX ORDER — GUAIFENESIN 100 MG/5ML
324 LIQUID (ML) ORAL
Status: DISCONTINUED | OUTPATIENT
Start: 2019-02-28 | End: 2019-02-28

## 2019-02-28 RX ADMIN — ASPIRIN 81 MG 162 MG: 81 TABLET ORAL at 18:12

## 2019-02-28 NOTE — ED PROVIDER NOTES
EMERGENCY DEPARTMENT HISTORY AND PHYSICAL EXAM 
 
Date: 2/28/2019 Patient Name: Elois Leventhal History of Presenting Illness Chief Complaint Patient presents with  Hypertension  Headache History Provided By: Patient Chief Complaint: headache, high blood pressure Duration: 2 days Timing: Location:  
Quality:dull Severity: mild Modifying Factors: n/a Associated Symptoms: chest discomfort. Additional History (Context): Elois Leventhal is a 59 y.o. female with hypertension, afib  who presents with headache, high blood pressure and slight chest pressure x2 days. States BP was \"high\" for the past 2 days. Has been compliant with meds. States she has had slight dull headache, mild at present but thinks this is due to BP. Denies change in vision, dizziness, sob, abdominal pain, NVD, urinary sx or any other complaints. States she started feeling sliught pressure in chest this am, worse with positions and deep breaths/coughing. Takes anticoagulants for afib. Compliant with meds. Denies recent travel, immobilization, hx of cancer, bleeding disorders, smoking, OCP use. PCP: Jacqueline Agudelo MD 
 
Current Outpatient Medications Medication Sig Dispense Refill  sertraline (ZOLOFT) 25 mg tablet Take 1 Tab by mouth daily. 90 Tab 0  
 atorvastatin (LIPITOR) 40 mg tablet Take 1 Tab by mouth daily. 90 Tab 0  
 diclofenac (VOLTAREN) 1 % gel Apply  to affected area four (4) times daily. 1 Each 0  
 VENTOLIN HFA 90 mcg/actuation inhaler TAKE 2 PUFFS BY INHALATION DAILY. 3 Inhaler 1  
 warfarin (COUMADIN) 5 mg tablet TAKE 5MG DAILY EXCEPT 2.5MG ON TUES, THUR, AND SAT. OR AS DIRECTED BY OUR OFFICE 30 Tab 5  
 losartan (COZAAR) 100 mg tablet Take 1 Tab by mouth daily. 90 Tab 3  furosemide (LASIX) 40 mg tablet Take 1 Tab by mouth daily. 90 Tab 3  chlorpheniramine-acetaminophen (CORICIDIN HBP COLD AND FLU) 2-325 mg tab Take 325 mg by mouth two (2) times daily as needed for Cough (Cold symptoms).  fluticasone furoate (ARNUITY ELLIPTA) 100 mcg/actuation dsdv inhaler Take 2 Puffs by inhalation daily. 1 Inhaler 0  
 aspirin delayed-release 81 mg tablet Take 1 Tab by mouth daily. 30 Tab 0  
 docusate sodium (COLACE) 100 mg capsule Take 1 Cap by mouth two (2) times a day for 90 days. 60 Cap 0  
 polyethylene glycol (MIRALAX) 17 gram packet Take 1 Packet by mouth daily. (Patient taking differently: Take 1 Packet by mouth daily as needed for Other (Constipation). ) 30 Packet 0  
 esomeprazole (NEXIUM) 20 mg capsule Take 20 mg by mouth daily as needed (heart burn).  fluticasone (FLONASE ALLERGY RELIEF) 50 mcg/actuation nasal spray 2 Sprays by Both Nostrils route daily as needed for Rhinitis.  metoprolol succinate (TOPROL-XL) 100 mg tablet Take 1 Tab by mouth daily. 30 Tab 6  
 KLOR-CON M20 20 mEq tablet TAKE 1 TAB BY MOUTH DAILY. 30 Tab 6  
 sodium chloride (SALINE NASAL) 0.65 % nasal squeeze bottle 1 Spray as needed for Congestion.  melatonin tab tablet Take 10 mg by mouth nightly. Past History Past Medical History: 
Past Medical History:  
Diagnosis Date  Aortic valve disorders 1/13/2011  Asthma  Atrial fibrillation ([facility] [address] 1/13/2011  Congestive heart failure, unspecified March 2005 Cleared quickly with Lasix therapy  Echocardiogram 01/10/2012 A-fib. EF 45%. Mild RVE. RVSP 45-50. Massive LAE. Mod TAMI. Mod MS. Severe MR (mean grad 10). Mild-mod AI. Mild TR. Pulmonic systolic flow reversal.  Mild IVCE.  Hypertension  Iron deficiency anemias  Long term (current) use of anticoagulants 2/23/2011  Murmur 1/2011 Grade I-II/VI systolic ejection murmur along left sternal border, with radiation to the pulmonic area.  S/P cardiac cath 02/24/2012 Minimal coronary artery disease. Mild LVE. EF 55%. Mod MR.   Mod-severe MS.  RA 15.  RV 55/16. PA 58/34. W 36.  CO/CI 3.5/1.9 (TD); 3.61/1.9 ([place]). R to L shunt suggested.  Wears glasses  Weight gain Past Surgical History: 
Past Surgical History:  
Procedure Laterality Date  HX HEART CATHETERIZATION  2/24/2012  HX HYSTERECTOMY  2006  HX MITRAL VALVE REPLACEMENT  05/22/2013  
 25/33 mm On-X mechanical valve with bilateral pulmonary vein isolation and resection of LA appendage Family History: 
Family History Problem Relation Age of Onset  Heart Surgery Father Open-Heart Surgery  Other Father Venous Thromboembolism  Heart Disease Mother Enlarged Heart  Hypertension Mother  Diabetes Mother Social History: 
Social History Tobacco Use  Smoking status: Former Smoker  Smokeless tobacco: Never Used Substance Use Topics  Alcohol use: Yes Alcohol/week: 0.6 oz Types: 1 Glasses of wine per week Comment: occassionally  Drug use: No  
 
 
Allergies: Allergies Allergen Reactions  Morphine Rash and Itching  Norco [Hydrocodone-Acetaminophen] Itching Review of Systems Review of Systems Constitutional: Negative for chills and fever. HENT: Negative for nasal congestion, sore throat, rhinorrhea Eyes: Negative. Respiratory: Negative for cough and negative for shortness of breath. Cardiovascular: pos for chest pain and palpitations. Gastrointestinal: Negative for abdominal pain, constipation, diarrhea, nausea and vomiting. Genitourinary: Negative. Negative for difficulty urinating and flank pain. Musculoskeletal: Negative for back pain. Negative for gait problem and neck pain. Skin: Negative. Allergic/Immunologic: Negative. Neurological: Negative for dizziness, weakness, numbness and pos for  headaches. Psychiatric/Behavioral: Negative. All other systems reviewed and are negative. All Other Systems Negative Physical Exam  
 
Vitals: 02/28/19 1604 02/28/19 1735 02/28/19 1736 BP: (!) 147/104 (!) 153/95 Pulse: 90  74 Resp: 18  17 Temp: 97.9 °F (36.6 °C) SpO2: 96%  97% Weight: 86.2 kg (190 lb) Height: 5' 9\" (1.753 m) Physical Exam 
 
 
Diagnostic Study Results Labs - Recent Results (from the past 12 hour(s)) CBC WITH AUTOMATED DIFF Collection Time: 02/28/19  4:42 PM  
Result Value Ref Range WBC 6.1 4.6 - 13.2 K/uL  
 RBC 4.24 4.20 - 5.30 M/uL  
 HGB 11.3 (L) 12.0 - 16.0 g/dL HCT 36.2 35.0 - 45.0 % MCV 85.4 74.0 - 97.0 FL  
 MCH 26.7 24.0 - 34.0 PG  
 MCHC 31.2 31.0 - 37.0 g/dL  
 RDW 13.7 11.6 - 14.5 % PLATELET 221 963 - 527 K/uL MPV 11.0 9.2 - 11.8 FL  
 NEUTROPHILS 49 40 - 73 % LYMPHOCYTES 35 21 - 52 % MONOCYTES 13 (H) 3 - 10 % EOSINOPHILS 2 0 - 5 % BASOPHILS 1 0 - 2 %  
 ABS. NEUTROPHILS 3.1 1.8 - 8.0 K/UL  
 ABS. LYMPHOCYTES 2.1 0.9 - 3.6 K/UL  
 ABS. MONOCYTES 0.8 0.05 - 1.2 K/UL  
 ABS. EOSINOPHILS 0.1 0.0 - 0.4 K/UL  
 ABS. BASOPHILS 0.0 0.0 - 0.1 K/UL  
 DF AUTOMATED METABOLIC PANEL, COMPREHENSIVE Collection Time: 02/28/19  4:42 PM  
Result Value Ref Range Sodium 142 136 - 145 mmol/L Potassium 3.8 3.5 - 5.5 mmol/L Chloride 105 100 - 108 mmol/L  
 CO2 29 21 - 32 mmol/L Anion gap 8 3.0 - 18 mmol/L Glucose 88 74 - 99 mg/dL BUN 12 7.0 - 18 MG/DL Creatinine 0.94 0.6 - 1.3 MG/DL  
 BUN/Creatinine ratio 13 12 - 20 GFR est AA >60 >60 ml/min/1.73m2 GFR est non-AA >60 >60 ml/min/1.73m2 Calcium 8.7 8.5 - 10.1 MG/DL Bilirubin, total 0.5 0.2 - 1.0 MG/DL  
 ALT (SGPT) 34 13 - 56 U/L  
 AST (SGOT) 25 15 - 37 U/L Alk. phosphatase 72 45 - 117 U/L Protein, total 8.1 6.4 - 8.2 g/dL Albumin 3.6 3.4 - 5.0 g/dL Globulin 4.5 (H) 2.0 - 4.0 g/dL A-G Ratio 0.8 0.8 - 1.7 CARDIAC PANEL,(CK, CKMB & TROPONIN) Collection Time: 02/28/19  4:42 PM  
Result Value Ref Range  26 - 192 U/L  
 CK - MB <1.0 <3.6 ng/ml CK-MB Index  0.0 - 4.0 % CALCULATION NOT PERFORMED WHEN RESULT IS BELOW LINEAR LIMIT Troponin-I, QT 0.10 (H) 0.0 - 0.045 NG/ML  
EKG, 12 LEAD, INITIAL Collection Time: 02/28/19  4:50 PM  
Result Value Ref Range Ventricular Rate 85 BPM  
 Atrial Rate 115 BPM  
 QRS Duration 98 ms Q-T Interval 432 ms QTC Calculation (Bezet) 514 ms Calculated R Axis 37 degrees Calculated T Axis 127 degrees Diagnosis Atrial fibrillation Nonspecific T wave abnormality , probably digitalis effect Prolonged QT Abnormal ECG When compared with ECG of 07-JAN-2019 08:17, 
Nonspecific T wave abnormality no longer evident in Anterior leads Radiologic Studies -  
CT HEAD WO CONT Final Result IMPRESSION:  
  
1. No evidence of acute intracranial process 2. Mild chronic small vessel ischemic disease XR CHEST PORT Final Result IMPRESSION:  
  
Stable cardiomegaly and vascular congestion. No overt pulmonary edema. CT Results  (Last 48 hours) 02/28/19 1705  CT HEAD WO CONT Final result Impression:  IMPRESSION:  
   
1. No evidence of acute intracranial process 2. Mild chronic small vessel ischemic disease Narrative:  EXAM: CT HEAD WO CONT  
   
CLINICAL INDICATION/HISTORY : headache. Hypertension and headache for 2 days COMPARISON: None TECHNIQUE: Helical axial scan was obtained from the skull base to the vertex  
without IV contrast administration. All CT scans at this facility are performed using dose optimization of technique  
as appropriate to a performed exam, to include automated exposure control,  
adjustment of the mA and/or kV according to patient size (including appropriate  
matching for site specific examinations), or use of iterative reconstruction  
technique. FINDINGS:  
The ventricles and sulci are normal in size, shape and position for patient's  
age. Scattered low attenuation foci are identified along the periventricular and  
subcortical white matter, a nonspecific finding that is most commonly  
encountered in the setting of chronic microvascular disease. [place] Visualized portion of orbits and sinuses appear unremarkable. No hemorrhage identified. No mass lesion identified. No acute infarction identified. Typical posterior fossa artifacts CXR Results  (Last 48 hours) 02/28/19 1656  XR CHEST PORT Final result Impression:  IMPRESSION:  
   
Stable cardiomegaly and vascular congestion. No overt pulmonary edema. Narrative:  EXAM:  XR CHEST PORT INDICATION:   chest pain, sob, and/or arrhythmia COMPARISON: 1/5/2019. FINDINGS:  
The cardiac silhouette is enlarged and stable. Valve prosthesis and sternal  
wires. Mild vascular congestion. No pneumothorax, pleural effusion or  
consolidation. Stable osseous structures. Medical Decision Making I am the first provider for this patient. I reviewed the vital signs, available nursing notes, past medical history, past surgical history, family history and social history. Vital Signs-Reviewed the patient's vital signs. Records Reviewed: Nursing Notes and Old Medical Records Procedures: 
Procedures Provider Notes (Medical Decision Making): Pt presents ambulatory in NAD, well-hydrated, non-toxic in appearance, and afebrile with mildly elevated BP and otherwise normal vitals. C/o non focal headache, chest pressure associated with positions and inhalation/coughing. Exam including neuro unremarkable. EKG shows afib  at a rate of 85  without ectopy or repolarization abnormalities. Prolonged QT. No ST elevation or depression. Initial troponin 0.10. Per chart review, baseline troponin is 0.12 and higher. D/w Dr. Miki Covington who evaluated labs and findings.  Recommends repeat troponin at hour 3 and d/c home if remains stable. If trending up, admit to obs for cardiac consult. Pt had negative nuclear stress test and echo in January 2019 (1 month ago. ) CP is pleuritic and positional, low risk for ACS. Pt took aspirin PTA. Heart Score 2 [place] 0 
 
8:02 PM 
Pt is comfortable in room, no pain at present. Repeat EKG shows afib at 70, normal QT without ectopy or repol. No stemi. 8:18 PM 
Repeat troponin unchanged. Will d/c home with pcp f/u. 
ED attending, Dr. Brenda Manzano aware and agrees with plan. MED RECONCILIATION: 
No current facility-administered medications for this encounter. Current Outpatient Medications Medication Sig  sertraline (ZOLOFT) 25 mg tablet Take 1 Tab by mouth daily.  atorvastatin (LIPITOR) 40 mg tablet Take 1 Tab by mouth daily.  diclofenac (VOLTAREN) 1 % gel Apply  to affected area four (4) times daily.  VENTOLIN HFA 90 mcg/actuation inhaler TAKE 2 PUFFS BY INHALATION DAILY.  warfarin (COUMADIN) 5 mg tablet TAKE 5MG DAILY EXCEPT 2.5MG ON TUES, THUR, AND SAT. OR AS DIRECTED BY OUR OFFICE  
 losartan (COZAAR) 100 mg tablet Take 1 Tab by mouth daily.  furosemide (LASIX) 40 mg tablet Take 1 Tab by mouth daily.  chlorpheniramine-acetaminophen (CORICIDIN HBP COLD AND FLU) 2-325 mg tab Take 325 mg by mouth two (2) times daily as needed for Cough (Cold symptoms).  fluticasone furoate (ARNUITY ELLIPTA) 100 mcg/actuation dsdv inhaler Take 2 Puffs by inhalation daily.  aspirin delayed-release 81 mg tablet Take 1 Tab by mouth daily.  docusate sodium (COLACE) 100 mg capsule Take 1 Cap by mouth two (2) times a day for 90 days.  polyethylene glycol (MIRALAX) 17 gram packet Take 1 Packet by mouth daily. (Patient taking differently: Take 1 Packet by mouth daily as needed for Other (Constipation). )  esomeprazole (NEXIUM) 20 mg capsule Take 20 mg by mouth daily as needed (heart burn).  fluticasone (FLONASE ALLERGY RELIEF) 50 mcg/actuation nasal spray 2 Sprays by Both Nostrils route daily as needed for Rhinitis.  metoprolol succinate (TOPROL-XL) 100 mg tablet Take 1 Tab by mouth daily.  KLOR-CON M20 20 mEq tablet TAKE 1 TAB BY MOUTH DAILY.  sodium chloride (SALINE NASAL) 0.65 % nasal squeeze bottle 1 Spray as needed for Congestion.  melatonin tab tablet Take 10 mg by mouth nightly. Disposition: TBD 
 
DISCHARGE NOTE:  
 
Pt has been reexamined. Patient has no new complaints, changes, or physical findings. Care plan outlined and precautions discussed. Discussed proper way to take medications. Discussed treatment plan, return precautions, symptomatic relief, and expected time to improvement. All questions answered. Patient is stable for discharge and outpatient management. Patient is ready to go home. Follow-up Information None Current Discharge Medication List  
  
 
 
 
 
 
Diagnosis Clinical Impression: No diagnosis found.

## 2019-03-01 NOTE — DISCHARGE INSTRUCTIONS
Patient Education        Acute High Blood Pressure: Care Instructions  Your Care Instructions    Acute high blood pressure is very high blood pressure. It's a serious problem. Very high blood pressure can damage your brain, heart, eyes, and kidneys. You may have been given medicines to lower your blood pressure. You may have gotten them as pills or through a needle in one of your veins. This is called an IV. And maybe you were given other medicines too. These can be needed when high blood pressure causes other problems. To keep your blood pressure at a lower level, you may need to make healthy lifestyle changes. And you will probably need to take medicines. Be sure to follow up with your doctor about your blood pressure and what you can do about it. Follow-up care is a key part of your treatment and safety. Be sure to make and go to all appointments, and call your doctor if you are having problems. It's also a good idea to know your test results and keep a list of the medicines you take. How can you care for yourself at home? · See your doctor as often as he or she recommends. This is to make sure your blood pressure is under control. You will probably go at least 2 times a year. But it may be more often at first.  · Take your blood pressure medicine exactly as prescribed. You may take one or more types. They include diuretics, beta-blockers, ACE inhibitors, calcium channel blockers, and angiotensin II receptor blockers. Call your doctor if you think you are having a problem with your medicine. · If you take blood pressure medicine, talk to your doctor before you take decongestants or anti-inflammatory medicine, such as ibuprofen. These can raise blood pressure. · Learn how to check your blood pressure at home. Check it often. · Ask your doctor if it's okay to drink alcohol. · Talk to your doctor about lifestyle changes that can help blood pressure. These include being active and not smoking.   When should you call for help? Call 911 anytime you think you may need emergency care. This may mean having symptoms that suggest that your blood pressure is causing a serious heart or blood vessel problem. Your blood pressure may be over 180/120.   For example, call 911 if:    · You have symptoms of a heart attack. These may include:  ? Chest pain or pressure, or a strange feeling in the chest.  ? Sweating. ? Shortness of breath. ? Nausea or vomiting. ? Pain, pressure, or a strange feeling in the back, neck, jaw, or upper belly or in one or both shoulders or arms. ? Lightheadedness or sudden weakness. ? A fast or irregular heartbeat.     · You have symptoms of a stroke. These may include:  ? Sudden numbness, tingling, weakness, or loss of movement in your face, arm, or leg, especially on only one side of your body. ? Sudden vision changes. ? Sudden trouble speaking. ? Sudden confusion or trouble understanding simple statements. ? Sudden problems with walking or balance. ? A sudden, severe headache that is different from past headaches.     · You have severe back or belly pain.    Do not wait until your blood pressure comes down on its own. Get help right away.   Call your doctor now or seek immediate care if:    · Your blood pressure is much higher than normal (such as 180/120 or higher), but you don't have symptoms.     · You think high blood pressure is causing symptoms, such as:  ? Severe headache.  ? Blurry vision.    Watch closely for changes in your health, and be sure to contact your doctor if:    · Your blood pressure measures higher than your doctor recommends at least 2 times. That means the top number is higher or the bottom number is higher, or both.     · You think you may be having side effects from your blood pressure medicine. Where can you learn more? Go to http://greyson-andrea.info/.   Enter X123 in the search box to learn more about \"Acute High Blood Pressure: Care Instructions. \"  Current as of: July 22, 2018  Content Version: 11.9  © 8996-2282 [employer]. Care instructions adapted under license by [employer] (which disclaims liability or warranty for this information). If you have questions about a medical condition or this instruction, always ask your healthcare professional. [address] any warranty or liability for your use of this information. Patient Education        High Blood Pressure: Care Instructions  Overview    It's normal for blood pressure to go up and down throughout the day. But if it stays up, you have high blood pressure. Another name for high blood pressure is hypertension. Despite what a lot of people think, high blood pressure usually doesn't cause headaches or make you feel dizzy or lightheaded. It usually has no symptoms. But it does increase your risk of stroke, heart attack, and other problems. You and your doctor will talk about your risks of these problems based on your blood pressure. Your doctor will give you a goal for your blood pressure. Your goal will be based on your health and your age. Lifestyle changes, such as eating healthy and being active, are always important to help lower blood pressure. You might also take medicine to reach your blood pressure goal.  Follow-up care is a key part of your treatment and safety. Be sure to make and go to all appointments, and call your doctor if you are having problems. It's also a good idea to know your test results and keep a list of the medicines you take. How can you care for yourself at home? Medical treatment  · If you stop taking your medicine, your blood pressure will go back up. You may take one or more types of medicine to lower your blood pressure. Be safe with medicines. Take your medicine exactly as prescribed. Call your doctor if you think you are having a problem with your medicine.   · Talk to your doctor before you start taking aspirin every day. Aspirin can help certain people lower their risk of a heart attack or stroke. But taking aspirin isn't right for everyone, because it can cause serious bleeding. · See your doctor regularly. You may need to see the doctor more often at first or until your blood pressure comes down. · If you are taking blood pressure medicine, talk to your doctor before you take decongestants or anti-inflammatory medicine, such as ibuprofen. Some of these medicines can raise blood pressure. · Learn how to check your blood pressure at home. Lifestyle changes  · Stay at a healthy weight. This is especially important if you put on weight around the waist. Losing even 10 pounds can help you lower your blood pressure. · If your doctor recommends it, get more exercise. Walking is a good choice. Bit by bit, increase the amount you walk every day. Try for at least 30 minutes on most days of the week. You also may want to swim, bike, or do other activities. · Avoid or limit alcohol. Talk to your doctor about whether you can drink any alcohol. · Try to limit how much sodium you eat to less than 2,300 milligrams (mg) a day. Your doctor may ask you to try to eat less than 1,500 mg a day. · Eat plenty of fruits (such as bananas and oranges), vegetables, legumes, whole grains, and low-fat dairy products. · Lower the amount of saturated fat in your diet. Saturated fat is found in animal products such as milk, cheese, and meat. Limiting these foods may help you lose weight and also lower your risk for heart disease. · Do not smoke. Smoking increases your risk for heart attack and stroke. If you need help quitting, talk to your doctor about stop-smoking programs and medicines. These can increase your chances of quitting for good. When should you call for help? Call 911 anytime you think you may need emergency care.  This may mean having symptoms that suggest that your blood pressure is causing a serious heart or blood vessel problem. Your blood pressure may be over 180/120.   For example, call 911 if:    · You have symptoms of a heart attack. These may include:  ? Chest pain or pressure, or a strange feeling in the chest.  ? Sweating. ? Shortness of breath. ? Nausea or vomiting. ? Pain, pressure, or a strange feeling in the back, neck, jaw, or upper belly or in one or both shoulders or arms. ? Lightheadedness or sudden weakness. ? A fast or irregular heartbeat.     · You have symptoms of a stroke. These may include:  ? Sudden numbness, tingling, weakness, or loss of movement in your face, arm, or leg, especially on only one side of your body. ? Sudden vision changes. ? Sudden trouble speaking. ? Sudden confusion or trouble understanding simple statements. ? Sudden problems with walking or balance. ? A sudden, severe headache that is different from past headaches.     · You have severe back or belly pain.    Do not wait until your blood pressure comes down on its own. Get help right away.   Call your doctor now or seek immediate care if:    · Your blood pressure is much higher than normal (such as 180/120 or higher), but you don't have symptoms.     · You think high blood pressure is causing symptoms, such as:  ? Severe headache.  ? Blurry vision.    Watch closely for changes in your health, and be sure to contact your doctor if:    · Your blood pressure measures higher than your doctor recommends at least 2 times. That means the top number is higher or the bottom number is higher, or both.     · You think you may be having side effects from your blood pressure medicine. Where can you learn more? Go to http://greyson-andrea.info/. Enter D495 in the search box to learn more about \"High Blood Pressure: Care Instructions. \"  Current as of: July 22, 2018  Content Version: 11.9  © 8762-9018 [employer].  Care instructions adapted under license by [employer] (which disclaims liability or warranty for this information). If you have questions about a medical condition or this instruction, always ask your healthcare professional. [address] any warranty or liability for your use of this information. Patient Education        Headache: Care Instructions  Your Care Instructions    Headaches have many possible causes. Most headaches aren't a sign of a more serious problem, and they will get better on their own. Home treatment may help you feel better faster. The doctor has checked you carefully, but problems can develop later. If you notice any problems or new symptoms, get medical treatment right away. Follow-up care is a key part of your treatment and safety. Be sure to make and go to all appointments, and call your doctor if you are having problems. It's also a good idea to know your test results and keep a list of the medicines you take. How can you care for yourself at home? · Do not drive if you have taken a prescription pain medicine. · Rest in a quiet, dark room until your headache is gone. Close your eyes and try to relax or go to sleep. Don't watch TV or read. · Put a cold, moist cloth or cold pack on the painful area for 10 to 20 minutes at a time. Put a thin cloth between the cold pack and your skin. · Use a warm, moist towel or a heating pad set on low to relax tight shoulder and neck muscles. · Have someone gently massage your neck and shoulders. · Take pain medicines exactly as directed. ? If the doctor gave you a prescription medicine for pain, take it as prescribed. ? If you are not taking a prescription pain medicine, ask your doctor if you can take an over-the-counter medicine. · Be careful not to take pain medicine more often than the instructions allow, because you may get worse or more frequent headaches when the medicine wears off.   · Do not ignore new symptoms that occur with a headache, such as a fever, weakness or numbness, vision changes, or confusion. These may be signs of a more serious problem. To prevent headaches  · Keep a headache diary so you can figure out what triggers your headaches. Avoiding triggers may help you prevent headaches. Record when each headache began, how long it lasted, and what the pain was like (throbbing, aching, stabbing, or dull). Write down any other symptoms you had with the headache, such as nausea, flashing lights or dark spots, or sensitivity to bright light or loud noise. Note if the headache occurred near your period. List anything that might have triggered the headache, such as certain foods (chocolate, cheese, wine) or odors, smoke, bright light, stress, or lack of sleep. · Find healthy ways to deal with stress. Headaches are most common during or right after stressful times. Take time to relax before and after you do something that has caused a headache in the past.  · Try to keep your muscles relaxed by keeping good posture. Check your jaw, face, neck, and shoulder muscles for tension, and try relaxing them. When sitting at a desk, change positions often, and stretch for 30 seconds each hour. · Get plenty of sleep and exercise. · Eat regularly and well. Long periods without food can trigger a headache. · Treat yourself to a massage. Some people find that regular massages are very helpful in relieving tension. · Limit caffeine by not drinking too much coffee, tea, or soda. But don't quit caffeine suddenly, because that can also give you headaches. · Reduce eyestrain from computers by blinking frequently and looking away from the computer screen every so often. Make sure you have proper eyewear and that your monitor is set up properly, about an arm's length away. · Seek help if you have depression or anxiety. Your headaches may be linked to these conditions. Treatment can both prevent headaches and help with symptoms of anxiety or depression. When should you call for help?   Call [phone] anytime you think you may need emergency care. For example, call if:    · You have signs of a stroke. These may include:  ? Sudden numbness, paralysis, or weakness in your face, arm, or leg, especially on only one side of your body. ? Sudden vision changes. ? Sudden trouble speaking. ? Sudden confusion or trouble understanding simple statements. ? Sudden problems with walking or balance. ? A sudden, severe headache that is different from past headaches.    Call your doctor now or seek immediate medical care if:    · You have a new or worse headache.     · Your headache gets much worse. Where can you learn more? Go to http://greyson-andrea.info/. Enter M271 in the search box to learn more about \"Headache: Care Instructions. \"  Current as of: Tigist 3, 2018  Content Version: 11.9  © 8755-7182 [employer]. Care instructions adapted under license by [employer] (which disclaims liability or warranty for this information). If you have questions about a medical condition or this instruction, always ask your healthcare professional. [place] [address] any warranty or liability for your use of this information.

## 2019-03-02 LAB
ATRIAL RATE: 115 BPM
ATRIAL RATE: 416 BPM
CALCULATED R AXIS, ECG10: 37 DEGREES
CALCULATED R AXIS, ECG10: 39 DEGREES
CALCULATED T AXIS, ECG11: 111 DEGREES
CALCULATED T AXIS, ECG11: 127 DEGREES
DIAGNOSIS, 93000: NORMAL
DIAGNOSIS, 93000: NORMAL
Q-T INTERVAL, ECG07: 432 MS
Q-T INTERVAL, ECG07: 442 MS
QRS DURATION, ECG06: 106 MS
QRS DURATION, ECG06: 98 MS
QTC CALCULATION (BEZET), ECG08: 477 MS
QTC CALCULATION (BEZET), ECG08: 514 MS
VENTRICULAR RATE, ECG03: 70 BPM
VENTRICULAR RATE, ECG03: 85 BPM

## 2019-03-04 NOTE — PROGRESS NOTES
Contacted patient and verified identity using name and date of birth (2- identifiers)  Spoke with patient and she verbalized understanding of no fracture but noted heel spur. Keep appointment with Ortho.

## 2019-03-05 ENCOUNTER — OFFICE VISIT (OUTPATIENT)
Dept: FAMILY MEDICINE CLINIC | Age: 59
End: 2019-03-05

## 2019-03-05 ENCOUNTER — PATIENT OUTREACH (OUTPATIENT)
Dept: CARDIOLOGY CLINIC | Age: 59
End: 2019-03-05

## 2019-03-05 VITALS
TEMPERATURE: 98.2 F | DIASTOLIC BLOOD PRESSURE: 96 MMHG | OXYGEN SATURATION: 97 % | RESPIRATION RATE: 16 BRPM | WEIGHT: 190 LBS | SYSTOLIC BLOOD PRESSURE: 148 MMHG | HEIGHT: 69 IN | HEART RATE: 89 BPM | BODY MASS INDEX: 28.14 KG/M2

## 2019-03-05 DIAGNOSIS — I21.4 NSTEMI (NON-ST ELEVATED MYOCARDIAL INFARCTION) (HCC): ICD-10-CM

## 2019-03-05 DIAGNOSIS — I09.1 RHEUMATIC VALVULAR DISEASE: ICD-10-CM

## 2019-03-05 DIAGNOSIS — I11.0 BENIGN HYPERTENSIVE HEART DISEASE WITH HEART FAILURE (HCC): ICD-10-CM

## 2019-03-05 DIAGNOSIS — R51.9 INTRACTABLE EPISODIC HEADACHE, UNSPECIFIED HEADACHE TYPE: Primary | ICD-10-CM

## 2019-03-05 DIAGNOSIS — F43.22 ADJUSTMENT DISORDER WITH ANXIOUS MOOD: ICD-10-CM

## 2019-03-05 DIAGNOSIS — I48.91 ATRIAL FIBRILLATION, UNSPECIFIED TYPE (HCC): ICD-10-CM

## 2019-03-05 RX ORDER — AMLODIPINE BESYLATE 5 MG/1
5 TABLET ORAL DAILY
Qty: 90 TAB | Refills: 0 | Status: SHIPPED | OUTPATIENT
Start: 2019-03-05 | End: 2019-05-31 | Stop reason: SDUPTHER

## 2019-03-05 NOTE — PATIENT INSTRUCTIONS
DASH Diet: Care Instructions  Your Care Instructions    The DASH diet is an eating plan that can help lower your blood pressure. DASH stands for Dietary Approaches to Stop Hypertension. Hypertension is high blood pressure. The DASH diet focuses on eating foods that are high in calcium, potassium, and magnesium. These nutrients can lower blood pressure. The foods that are highest in these nutrients are fruits, vegetables, low-fat dairy products, nuts, seeds, and legumes. But taking calcium, potassium, and magnesium supplements instead of eating foods that are high in those nutrients does not have the same effect. The DASH diet also includes whole grains, fish, and poultry. The DASH diet is one of several lifestyle changes your doctor may recommend to lower your high blood pressure. Your doctor may also want you to decrease the amount of sodium in your diet. Lowering sodium while following the DASH diet can lower blood pressure even further than just the DASH diet alone. Follow-up care is a key part of your treatment and safety. Be sure to make and go to all appointments, and call your doctor if you are having problems. It's also a good idea to know your test results and keep a list of the medicines you take. How can you care for yourself at home? Following the DASH diet  · Eat 4 to 5 servings of fruit each day. A serving is 1 medium-sized piece of fruit, ½ cup chopped or canned fruit, 1/4 cup dried fruit, or 4 ounces (½ cup) of fruit juice. Choose fruit more often than fruit juice. · Eat 4 to 5 servings of vegetables each day. A serving is 1 cup of lettuce or raw leafy vegetables, ½ cup of chopped or cooked vegetables, or 4 ounces (½ cup) of vegetable juice. Choose vegetables more often than vegetable juice. · Get 2 to 3 servings of low-fat and fat-free dairy each day. A serving is 8 ounces of milk, 1 cup of yogurt, or 1 ½ ounces of cheese. · Eat 6 to 8 servings of grains each day.  A serving is 1 slice of bread, 1 ounce of dry cereal, or ½ cup of cooked rice, pasta, or cooked cereal. Try to choose whole-grain products as much as possible. · Limit lean meat, poultry, and fish to 2 servings each day. A serving is 3 ounces, about the size of a deck of cards. · Eat 4 to 5 servings of nuts, seeds, and legumes (cooked dried beans, lentils, and split peas) each week. A serving is 1/3 cup of nuts, 2 tablespoons of seeds, or ½ cup of cooked beans or peas. · Limit fats and oils to 2 to 3 servings each day. A serving is 1 teaspoon of vegetable oil or 2 tablespoons of salad dressing. · Limit sweets and added sugars to 5 servings or less a week. A serving is 1 tablespoon jelly or jam, ½ cup sorbet, or 1 cup of lemonade. · Eat less than 2,300 milligrams (mg) of sodium a day. If you limit your sodium to 1,500 mg a day, you can lower your blood pressure even more. Tips for success  · Start small. Do not try to make dramatic changes to your diet all at once. You might feel that you are missing out on your favorite foods and then be more likely to not follow the plan. Make small changes, and stick with them. Once those changes become habit, add a few more changes. · Try some of the following:  ? Make it a goal to eat a fruit or vegetable at every meal and at snacks. This will make it easy to get the recommended amount of fruits and vegetables each day. ? Try yogurt topped with fruit and nuts for a snack or healthy dessert. ? Add lettuce, tomato, cucumber, and onion to sandwiches. ? Combine a ready-made pizza crust with low-fat mozzarella cheese and lots of vegetable toppings. Try using tomatoes, squash, spinach, broccoli, carrots, cauliflower, and onions. ? Have a variety of cut-up vegetables with a low-fat dip as an appetizer instead of chips and dip. ? Sprinkle sunflower seeds or chopped almonds over salads. Or try adding chopped walnuts or almonds to cooked vegetables.   ? Try some vegetarian meals using beans and peas. Add garbanzo or kidney beans to salads. Make burritos and tacos with mashed curtis beans or black beans. Where can you learn more? Go to http://greyson-andrea.info/. Enter X110 in the search box to learn more about \"DASH Diet: Care Instructions. \"  Current as of: July 22, 2018  Content Version: 11.9  © 8140-8142 [employer]. Care instructions adapted under license by [employer] (which disclaims liability or warranty for this information). If you have questions about a medical condition or this instruction, always ask your healthcare professional. [address] any warranty or liability for your use of this information.

## 2019-03-05 NOTE — PROGRESS NOTES
Belén Moore, 59 y.o.,  female    SUBJECTIVE  Ff-up ED headache/elevated BP    Reports home readings to be 150-180/'s. She had pounding frontal headache. Reviewed labs/CT showing no acute findings, mild acute changes. ekg without changes afib trop remained at 0.1, similar to her admission in January. Cardiac stress test then showed small reversible ischemic area, medical management was recommended. She is currently on metoprolol and cozaar. Says hctz was d/goran during admission. Reports some increase in salt intake     Anxiety- reports  Improvement with zoloft. H/o NSTEMI- on med management, denies cp sob. Following cardiology. Reviewed note recent increase in cozaar dose. She has afib/rheumatic heart disease as well. Asthma- reports to [facility] wheezing, sob. GERD- takes nexium otc prn for heartburn    HL-switched from zocor to lipitor on last visit and tolerating well. ROS:  See HPI, all others negative        Patient Active Problem List   Diagnosis Code    Rheumatic valvular disease I09.1    Chronic diastolic congestive heart failure (HCC) I50.32    Atrial fibrillation (HCC) I48.91    Long term current use of anticoagulant therapy Z79.01    Benign hypertensive heart disease with heart failure (HCC) I11.0    Hyperlipidemia E78.5    Advanced directives, counseling/discussion Z71.89    Mild intermittent asthma without complication E38.47    NSTEMI (non-ST elevated myocardial infarction) (HCC) I21.4    Chest pain R07.9    IFG (impaired fasting glucose) R73.01       Current Outpatient Medications   Medication Sig Dispense Refill    amLODIPine (NORVASC) 5 mg tablet Take 1 Tab by mouth daily. 90 Tab 0    sertraline (ZOLOFT) 25 mg tablet Take 1 Tab by mouth daily. 90 Tab 0    atorvastatin (LIPITOR) 40 mg tablet Take 1 Tab by mouth daily. 90 Tab 0    VENTOLIN HFA 90 mcg/actuation inhaler TAKE 2 PUFFS BY INHALATION DAILY.  3 Inhaler 1    warfarin (COUMADIN) 5 mg tablet TAKE 5MG DAILY EXCEPT 2.5MG ON TUES, THUR, AND SAT. OR AS DIRECTED BY OUR OFFICE 30 Tab 5    losartan (COZAAR) 100 mg tablet Take 1 Tab by mouth daily. 90 Tab 3    furosemide (LASIX) 40 mg tablet Take 1 Tab by mouth daily. 90 Tab 3    chlorpheniramine-acetaminophen (CORICIDIN HBP COLD AND FLU) 2-325 mg tab Take 325 mg by mouth two (2) times daily as needed for Cough (Cold symptoms).  aspirin delayed-release 81 mg tablet Take 1 Tab by mouth daily. 30 Tab 0    docusate sodium (COLACE) 100 mg capsule Take 1 Cap by mouth two (2) times a day for 90 days. 60 Cap 0    polyethylene glycol (MIRALAX) 17 gram packet Take 1 Packet by mouth daily. (Patient taking differently: Take 1 Packet by mouth daily as needed for Other (Constipation). ) 30 Packet 0    esomeprazole (NEXIUM) 20 mg capsule Take 20 mg by mouth daily as needed (heart burn).  fluticasone (FLONASE ALLERGY RELIEF) 50 mcg/actuation nasal spray 2 Sprays by Both Nostrils route daily as needed for Rhinitis.  metoprolol succinate (TOPROL-XL) 100 mg tablet Take 1 Tab by mouth daily. 30 Tab 6    KLOR-CON M20 20 mEq tablet TAKE 1 TAB BY MOUTH DAILY. 30 Tab 6    sodium chloride (SALINE NASAL) 0.65 % nasal squeeze bottle 1 Spray as needed for Congestion.  melatonin tab tablet Take 10 mg by mouth nightly.  diclofenac (VOLTAREN) 1 % gel Apply  to affected area four (4) times daily. 1 Each 0    fluticasone furoate (ARNUITY ELLIPTA) 100 mcg/actuation dsdv inhaler Take 2 Puffs by inhalation daily. 1 Inhaler 0       Allergies   Allergen Reactions    Morphine Rash and Itching    Norco [Hydrocodone-Acetaminophen] Itching       Past Medical History:   Diagnosis Date    Aortic valve disorders 1/13/2011    Asthma     Atrial fibrillation ([address] 1/13/2011    Congestive heart failure, unspecified March 2005    Cleared quickly with Lasix therapy    Echocardiogram 01/10/2012    A-fib. EF 45%. Mild RVE. RVSP 45-50. Massive LAE. Mod TAMI. Mod MS.   Severe MR (mean grad 10). Mild-mod AI. Mild TR. Pulmonic systolic flow reversal.  Mild IVCE.  Hypertension     Iron deficiency anemias     Long term (current) use of anticoagulants 2/23/2011    Murmur 1/2011    Grade I-II/VI systolic ejection murmur along left sternal border, with radiation to the pulmonic area.  S/P cardiac cath 02/24/2012    Minimal coronary artery disease. Mild LVE. EF 55%. Mod MR. Mod-severe MS. RA 15.  RV 55/16. PA 58/34. W 36.  CO/CI 3.5/1.9 (TD); 3.61/1.9 (Dequan). R to L shunt suggested.  Wears glasses     Weight gain        Social History     Socioeconomic History    Marital status:      Spouse name: Not on file    Number of children: Not on file    Years of education: Not on file    Highest education level: Not on file   Social Needs    Financial resource strain: Not on file    Food insecurity - worry: Not on file    Food insecurity - inability: Not on file    Transportation needs - medical: Not on file   [place] needs - non-medical: Not on file   Occupational History    Not on file   Tobacco Use    Smoking status: Former Smoker    Smokeless tobacco: Never Used   Substance and Sexual Activity    Alcohol use:  Yes     Alcohol/week: 0.6 oz     Types: 1 Glasses of wine per week     Comment: occassionally    Drug use: No    Sexual activity: Yes     Partners: Male     Birth control/protection: None   Other Topics Concern    Not on file   Social History Narrative    Not on file       Family History   Problem Relation Age of Onset    Heart Surgery Father         Open-Heart Surgery    Other Father         Venous Thromboembolism    Heart Disease Mother         Enlarged Heart    Hypertension Mother     Diabetes Mother          OBJECTIVE    Physical Exam:     Visit Vitals  BP (!) 148/96 (BP 1 Location: Left arm, BP Patient Position: Sitting)   Pulse 89   Temp 98.2 °F (36.8 °C) (Oral)   Resp 16   Ht 5' 9\" (1.753 m)   Wt 190 lb (86.2 kg)   SpO2 97%   BMI 28.06 kg/m²       General: alert, well-appearing,  in no apparent distress or pain  HEENT: throat and pharynx normal, eac patent TM intact  CVS: irregular, mech valve click, + murmur  Lungs:clear to ausculation bilaterally, no crackles, wheezing or rhonchi noted  Psych:  mood and affect normal      ASSESSMENT/PLAN  Diagnoses and all orders for this visit:    Tension  Headache  Neg CT, mild microvascular changes  Address anxiety/BP    Essential hypertension  Needs better control  Add norvasc 5 mg  Cont cozaar 100 mg and metoprolol 100 mg  DASH diet, monitoring    Adjustment reaction with anxious mood  Improving  Cont zoloft 25 mg    NSTEMI  Asymptomatic  Mild reversible ischemia on stress test 1/2019  On bb, arb, ASA  Cont  lipitor, agreeable to this   Will plan on rechecking lipids in June 2019  Following dr. Daniel Hensley    Atrial fibrillation, unspecified type ([address]  Rate controlled, On BB, coumadin  INR therapeutic  Following dr. Daniel Hensley    Hyperlipidemia, unspecified hyperlipidemia type  Suboptimal  LDL goal <70   see above    Long term current use of anticoagulant therapy  Cards following    Rheumatic valvular disease    Follow-up Disposition:  Return if symptoms worsen or fail to improve, for keep appt in april. Patient understands plan of care. Patient has provided input and agrees with goals.

## 2019-03-05 NOTE — PROGRESS NOTES
1. Have you been to the ER, urgent care clinic since your last visit? Hospitalized since your last visit? [facility]    2. Have you seen or consulted any other health care providers outside of the [address] [place] [address] since your last visit? Include any pap smears or colon screening.     Dr. July Boyer

## 2019-03-11 ENCOUNTER — PATIENT OUTREACH (OUTPATIENT)
Dept: CARDIOLOGY CLINIC | Age: 59
End: 2019-03-11

## 2019-03-13 ENCOUNTER — OFFICE VISIT (OUTPATIENT)
Dept: ORTHOPEDIC SURGERY | Age: 59
End: 2019-03-13

## 2019-03-13 VITALS
TEMPERATURE: 96.8 F | WEIGHT: 189.8 LBS | SYSTOLIC BLOOD PRESSURE: 131 MMHG | HEART RATE: 79 BPM | RESPIRATION RATE: 15 BRPM | DIASTOLIC BLOOD PRESSURE: 84 MMHG | HEIGHT: 69 IN | OXYGEN SATURATION: 96 % | BODY MASS INDEX: 28.11 KG/M2

## 2019-03-13 DIAGNOSIS — M72.2 PLANTAR FASCIITIS OF LEFT FOOT: Primary | ICD-10-CM

## 2019-03-13 DIAGNOSIS — M79.672 LEFT FOOT PAIN: ICD-10-CM

## 2019-03-13 NOTE — PROGRESS NOTES
AMBULATORY PROGRESS NOTE      Patient: Barney Sharif             MRN: [id number]     SSN: [id number] Body mass index is 28.03 kg/m². YOB: 1960     AGE: 59 y.o. EX: female    PCP: Luann Daniel MD     IMPRESSION/DIAGNOSIS AND TREATMENT PLAN     DIAGNOSES  1. Plantar fasciitis of left foot    2. Left foot pain        Orders Placed This Encounter    AMB SUPPLY ORDER    AMB SUPPLY ORDER    [12314] Foot Min 3V      Barney Sharif understands her diagnoses and the proposed plan. She has plantar fasciitis and left, central heel pain. She had x-rays of her left foot, three views, AP, lateral, and oblique, at [facility]. The x-rays are listed as below. There was no acute fracture, subluxation, or dislocation. Normal alignment was seen in these weightbearing films. Conservative care is recommended at this point. She may require a custom orthotic to offload her central heel should the central component of her heel pain not improve, but she clearly also has plantar fasciitis. She cannot take anti-inflammatory medications due to Coumadin consumption. Plan:    1) DME Order: Dorsal night splint. 2) DME Order: Visco heel inserts. 3) ALEKS Leigh ATC, has educated the patient on plantar fascial specific exercises and/or stretches. 4) Take OTC Tylenol as needed for pain. 5) Continue activity modification as directed. 6) Anticipate referral to PT or custom orthotic if condition does not improve. RTO - 3 weeks     HPI AND EXAMINATION     Barney Sharif IS A 59 y.o. female who presents to my outpatient office complaining of left foot pain. Ms. Tyrel Jc reports that she has been experiencing daily pain in her left heel and medial hindfoot for the past 2 months. She states that she experiences the pain first thing in the mornings and that she finds herself limping. She reports that her pain is the worst at night, once she has stopped for the day.  She describes the pain as a constant, throbbing pain. She has tried compression stockings and extra-strength Tylenol, which she reports does not help much. The patient is currently taking Coumadin for A-fib and cannot take NSAIDs. She states that her     Visit Vitals  /84   Pulse 79   Temp 96.8 °F (36 °C) (Oral)   Resp 15   Ht 5' 9\" (1.753 m)   Wt 189 lb 12.8 oz (86.1 kg)   SpO2 96%   BMI 28.03 kg/m²     Appearance: Alert, well appearing and pleasant patient who is in no distress, oriented to person, place/time, and who follows commands. Psychiatric: Affect and mood are appropriate. Cardiovascular/Peripheral Vascular: Normal Pulses to each hand and foot  Musculoskeletal:  LOCATION:  Left FOOT/ANKLE  Integumentary: No rashes, skin patches, wounds, or abrasions to the right or left legs       Warm and normal color. No regions of expressible drainage. Palpable fullness or mass is not present      Gait: Antalgic      Tenderness: mild PLANTAR FASCIA REGION, with no NODULARITIES        Mild tenderness to the central heel      Motor/Strength/Tone Exam: Normal DF, INV,EVERSION. Slightly diminished PF           strength due to plantar fascial tenderness      Sensory Exam:   Intact Normal Sensation to ankle/foot      Stability Testing: No anterolateral or varus instability of the Ankle or Subtalar Joints               No peroneal tendon instability noted      ROM: Normal ROM noted to ankle/foot      Contractures: No Achilles or Gastrocnemius Contractures      Calf tenderness: Absent for calf or gastrocnemius muscle regions       Soft, supple, non tender, non taut lower extremity compartments       Alignment: Neutral Hindfoot  Wounds/Abrasions:   None present  Extremities:   No embolic phenomena to the toes or hands         No significant edema to the foot and or toes.         Lower extremities are warm and appear well perfused    DVT: No evidence of DVT seen on examination at this time    No calf swelling, no tenderness to calf muscles  Lymphatic:  No Evidence of Lymphedema  Vascular: Medial Border of Tibia Region: Edema is not present        Pulses: Dorsalis Pedis &  Posterior Tibial Pulses : Palpable yes        Varicosities Lower Limbs :  None    Neuro: Negative bilateral Straight leg raise (seated position)   no Tinel's at PM NV Bundle Tarsal Canal   no Proximal Tarsal Tunnel Tenderness    no Distal Tarsal Tunnel Tenderness    See Musculoskeletal section for pertinent individual extremity examination    No abnormal hand/wrist, foot/ankle, or facial/neck tremors. CHART REVIEW     Past Medical History:   Diagnosis Date    Aortic valve disorders 1/13/2011    Asthma     Atrial fibrillation ([address] 1/13/2011    Congestive heart failure, unspecified March 2005    Cleared quickly with Lasix therapy    Echocardiogram 01/10/2012    A-fib. EF 45%. Mild RVE. RVSP 45-50. Massive LAE. Mod TAMI. Mod MS. Severe MR (mean grad 10). Mild-mod AI. Mild TR. Pulmonic systolic flow reversal.  Mild IVCE.  Hypertension     Iron deficiency anemias     Long term (current) use of anticoagulants 2/23/2011    Murmur 1/2011    Grade I-II/VI systolic ejection murmur along left sternal border, with radiation to the pulmonic area.  S/P cardiac cath 02/24/2012    Minimal coronary artery disease. Mild LVE. EF 55%. Mod MR. Mod-severe MS. RA 15.  RV 55/16. PA 58/34. W 36.  CO/CI 3.5/1.9 (TD); 3.61/1.9 (Dequan). R to L shunt suggested.  Wears glasses     Weight gain      Current Outpatient Medications   Medication Sig    amLODIPine (NORVASC) 5 mg tablet Take 1 Tab by mouth daily.  sertraline (ZOLOFT) 25 mg tablet Take 1 Tab by mouth daily.  atorvastatin (LIPITOR) 40 mg tablet Take 1 Tab by mouth daily.  diclofenac (VOLTAREN) 1 % gel Apply  to affected area four (4) times daily.  VENTOLIN HFA 90 mcg/actuation inhaler TAKE 2 PUFFS BY INHALATION DAILY.     warfarin (COUMADIN) 5 mg tablet TAKE 5MG DAILY EXCEPT 2.5MG ON TUES, THUR, AND SAT. OR AS DIRECTED BY OUR OFFICE    losartan (COZAAR) 100 mg tablet Take 1 Tab by mouth daily.  furosemide (LASIX) 40 mg tablet Take 1 Tab by mouth daily.  chlorpheniramine-acetaminophen (CORICIDIN HBP COLD AND FLU) 2-325 mg tab Take 325 mg by mouth two (2) times daily as needed for Cough (Cold symptoms).  fluticasone furoate (ARNUITY ELLIPTA) 100 mcg/actuation dsdv inhaler Take 2 Puffs by inhalation daily.  aspirin delayed-release 81 mg tablet Take 1 Tab by mouth daily.  docusate sodium (COLACE) 100 mg capsule Take 1 Cap by mouth two (2) times a day for 90 days.  polyethylene glycol (MIRALAX) 17 gram packet Take 1 Packet by mouth daily. (Patient taking differently: Take 1 Packet by mouth daily as needed for Other (Constipation). )    esomeprazole (NEXIUM) 20 mg capsule Take 20 mg by mouth daily as needed (heart burn).  fluticasone (FLONASE ALLERGY RELIEF) 50 mcg/actuation nasal spray 2 Sprays by Both Nostrils route daily as needed for Rhinitis.  metoprolol succinate (TOPROL-XL) 100 mg tablet Take 1 Tab by mouth daily.  KLOR-CON M20 20 mEq tablet TAKE 1 TAB BY MOUTH DAILY.  sodium chloride (SALINE NASAL) 0.65 % nasal squeeze bottle 1 Spray as needed for Congestion.  melatonin tab tablet Take 10 mg by mouth nightly. No current facility-administered medications for this visit. Allergies   Allergen Reactions    Morphine Rash and Itching    Norco [Hydrocodone-Acetaminophen] Itching     Past Surgical History:   Procedure Laterality Date    HX HEART CATHETERIZATION  2/24/2012    HX HYSTERECTOMY  2006    HX MITRAL VALVE REPLACEMENT  05/22/2013    25/33 mm On-X mechanical valve with bilateral pulmonary vein isolation and resection of LA appendage     Social History     Occupational History    Not on file   Tobacco Use    Smoking status: Former Smoker    Smokeless tobacco: Never Used   Substance and Sexual Activity    Alcohol use:  Yes     Alcohol/week: 0.6 oz Types: 1 Glasses of wine per week     Comment: occassionally    Drug use: No    Sexual activity: Yes     Partners: Male     Birth control/protection: None     Family History   Problem Relation Age of Onset    Heart Surgery Father         Open-Heart Surgery    Other Father         Venous Thromboembolism    Heart Disease Mother         Enlarged Heart    Hypertension Mother     Diabetes Mother         REVIEW OF SYSTEMS : 3/13/2019  ALL BELOW ARE Negative except : SEE HPI     Constitutional: Negative for fever, chills and weight loss. Neg Weight Loss  Cardiovascular: Negative for chest pain, claudication and leg swelling. SOB, LIZARRAGA   Gastrointestinal/Urological: Negative for  pain, N/V/D/C, Blood in stool or urine,dysuria                         Hematuria, Incontinence, pelvic pain  Musculoskeletal: see HPI. Neurological: Negative for dizziness and weakness, headaches,Visual Changes             Confusion,  Or Seizures,   Psychiatric/Behavioral: Negative for depression, memory loss and substance abuse. Extremities:  Negative for hair changes, rash or skin lesion changes. Hematologic: Negative for Bleeding problems, bruising, pallor or swollen lymph nodes. Peripheral Vascular: No calf pain, vascular vein tenderness to calf pain              No calf throbbing, posterior knee throbbing pain     DIAGNOSTIC IMAGING     FOOT X RAYS 3 VIEWS Left   3/13/2019    NON WEIGHT BEARING    X RAYS AT [address] [place] [address]  3/13/2019      Bones: No fractures or dislocations. No focal osteolytic or osteoblastic process     Bone Spurs: No significant bone spurs  Foot Alignment: WNL  Joint Condition: No Significant OA  Soft Tissues:    Normal, No radiopaque foreign body and No abnormal calcific densities to soft tissues   No ankle joint effusion in lateral projection. Mineralization: Suggests  no Osteopenia    I have personally reviewed the results of the above study.  The interpretation of this study is my professional opinion Written by Jonnathan Alcala, as dictated by Dr. Maryjane Bueno. I, Dr. Maryjane Bueno, confirm that all documentation is accurate.

## 2019-03-13 NOTE — PROGRESS NOTES
1. Have you been to the ER, urgent care clinic since your last visit? Hospitalized since your last visit? No    2. Have you seen or consulted any other health care providers outside of the [address] [place] [address] since your last visit? Include any pap smears or colon screening.  No

## 2019-03-13 NOTE — PATIENT INSTRUCTIONS
Plantar Fasciitis: Exercises      Complete at least 2 sessions of each exercise each day to get started (no days off). If beneficial and able to fit into your schedule, more sessions are recommended. Cross-friction massage         1. Sit in a chair with your shoes and socks off (for all exercises). 2. Using both thumbs, gently massage from just in front of your heel to the balls  of your feet. Continue for 3-5 minutes. 3. Using the same technique, massage perpendicular to your foot, from your  arch to the outside of your foot. Continue for 3-5 minutes. Plantar fascia self-massage    1. Sit in a chair. 2. Place your affected foot on a firm, tube-shaped object, such as a can or water bottle. You may progress to a tennis ball if these other items become to easy. 3. Roll your foot back and forth over the object to massage the bottom of your foot, from just in front of your heel to the balls of your feet. 4. If you want to do ice massage, fill a water bottle about three-fourths of the way full and freeze before using. 5. Continue for 3 to 5 minutes. Plantar fascia stretch    1. Sit in a chair and put your affected foot on your other knee. 2. Hold the heel of your foot in one hand, and grasp your toes with the other hand. 3. Pull on your heel (toward your body), and at the same time pull your toes back with your other hand. 4. You should feel a stretch along the bottom of your foot. 5. Hold for 30 seconds. 6. Repeat 3 times. Towel stretch (calf)    1. Sit with your legs extended and knees straight. 2. Place a towel around your foot just under the toes. 3. Hold each end of the towel in each hand, with your hands above your knees. 4. Pull back with the towel so that your foot stretches toward you. 5. Hold the position for 30 seconds. 6. Repeat 3 times. Calf wall stretch    1. Stand facing a wall with your hands on the wall at about eye level.  Put your affected leg about a step behind your other leg. 2. Keeping your back leg straight and your back heel on the floor, bend your front knee and gently bring your hip and chest toward the wall until you feel a stretch in the calf of your back leg. 3. Hold the stretch for 30 seconds. 4. Repeat 3 times per leg. 5. Repeat steps 1 through 4, but this time keep your back knee bent. Stair stretch (calf)    1. Stand with the balls of both feet on the edge of a step or curb. With at least one hand, hold onto something solid for balance, such as a banister or handrail. 2. Keeping your affected leg straight, slowly let that heel hang down off of the step or curb until you feel a stretch in the back of your calf and/or Achilles area. Some of your weight should still be on the other leg. 3. Hold this position for 30 seconds. 4. Repeat 3 times. Repeat with your knee slightly bent. If too easy, progress to one leg at a time. Toe taps           1. Sit in a chair or stand. 2. All the toes are lifted off the floor and, keeping the heel on the floor and the  outside four toes in the air, the big toe is tapped to the floor repetitively. 3. Next, the process is reversed, and the outside four toes are repetitively tapped  to the floor while keeping the big toe in the air. 4. Repeat 10 times for each foot, for 3 sets each, alternating feet after each set. Towel curls    1. While sitting, place your foot on a towel on the floor and scrunch the towel toward you with your toes. 2. Then, also using your toes, push the towel away from you.   3. Repeat 10 times for each foot, for 3 sets each, alternating feet after each set.

## 2019-03-15 ENCOUNTER — PATIENT OUTREACH (OUTPATIENT)
Dept: CARDIOLOGY CLINIC | Age: 59
End: 2019-03-15

## 2019-03-22 ENCOUNTER — PATIENT OUTREACH (OUTPATIENT)
Dept: CARDIOLOGY CLINIC | Age: 59
End: 2019-03-22

## 2019-03-27 ENCOUNTER — PATIENT OUTREACH (OUTPATIENT)
Dept: CARDIOLOGY CLINIC | Age: 59
End: 2019-03-27

## 2019-03-29 ENCOUNTER — ANTI-COAG VISIT (OUTPATIENT)
Dept: CARDIOLOGY CLINIC | Age: 59
End: 2019-03-29

## 2019-03-29 ENCOUNTER — PATIENT OUTREACH (OUTPATIENT)
Dept: CARDIOLOGY CLINIC | Age: 59
End: 2019-03-29

## 2019-03-29 DIAGNOSIS — Z79.01 LONG TERM CURRENT USE OF ANTICOAGULANT THERAPY: Primary | ICD-10-CM

## 2019-03-29 DIAGNOSIS — I48.91 ATRIAL FIBRILLATION, UNSPECIFIED TYPE (HCC): ICD-10-CM

## 2019-03-29 LAB
INR, EXTERNAL: 2.3
PT, EXTERNAL: 27.1

## 2019-03-29 NOTE — PROGRESS NOTES
Verbal order and read back per Leida Leon NP The INR is stable and therapeutic. continue Coumadin 5mg daily except 2.5mg on Tues, Thur, and Sat  recheck in 1 month.

## 2019-04-01 ENCOUNTER — OFFICE VISIT (OUTPATIENT)
Dept: FAMILY MEDICINE CLINIC | Age: 59
End: 2019-04-01

## 2019-04-01 ENCOUNTER — PATIENT OUTREACH (OUTPATIENT)
Dept: CARDIOLOGY CLINIC | Age: 59
End: 2019-04-01

## 2019-04-01 VITALS
HEIGHT: 69 IN | RESPIRATION RATE: 16 BRPM | BODY MASS INDEX: 27.99 KG/M2 | SYSTOLIC BLOOD PRESSURE: 128 MMHG | DIASTOLIC BLOOD PRESSURE: 76 MMHG | WEIGHT: 189 LBS | OXYGEN SATURATION: 98 % | TEMPERATURE: 98.3 F | HEART RATE: 88 BPM

## 2019-04-01 DIAGNOSIS — F43.22 ADJUSTMENT DISORDER WITH ANXIOUS MOOD: ICD-10-CM

## 2019-04-01 DIAGNOSIS — I48.91 ATRIAL FIBRILLATION, UNSPECIFIED TYPE (HCC): ICD-10-CM

## 2019-04-01 DIAGNOSIS — E78.2 MIXED HYPERLIPIDEMIA: ICD-10-CM

## 2019-04-01 DIAGNOSIS — J45.20 MILD INTERMITTENT ASTHMA WITHOUT COMPLICATION: ICD-10-CM

## 2019-04-01 DIAGNOSIS — I09.1 RHEUMATIC VALVULAR DISEASE: ICD-10-CM

## 2019-04-01 DIAGNOSIS — I21.4 NSTEMI (NON-ST ELEVATED MYOCARDIAL INFARCTION) (HCC): ICD-10-CM

## 2019-04-01 DIAGNOSIS — E78.5 HYPERLIPIDEMIA LDL GOAL <70: Primary | ICD-10-CM

## 2019-04-01 DIAGNOSIS — I11.0 BENIGN HYPERTENSIVE HEART DISEASE WITH HEART FAILURE (HCC): ICD-10-CM

## 2019-04-01 LAB
INR BLD: 2.3
PT POC: 27.1 SECONDS
VALID INTERNAL CONTROL?: YES

## 2019-04-01 RX ORDER — ATORVASTATIN CALCIUM 40 MG/1
40 TABLET, FILM COATED ORAL DAILY
Qty: 90 TAB | Refills: 0 | Status: SHIPPED | OUTPATIENT
Start: 2019-04-01 | End: 2019-07-21 | Stop reason: SDUPTHER

## 2019-04-01 RX ORDER — DIPHENHYDRAMINE HCL 25 MG
25 CAPSULE ORAL
COMMUNITY
End: 2020-02-06 | Stop reason: ALTCHOICE

## 2019-04-01 RX ORDER — SERTRALINE HYDROCHLORIDE 25 MG/1
25 TABLET, FILM COATED ORAL DAILY
Qty: 90 TAB | Refills: 0 | Status: SHIPPED | OUTPATIENT
Start: 2019-04-01 | End: 2019-06-24 | Stop reason: DRUGHIGH

## 2019-04-01 NOTE — PATIENT INSTRUCTIONS
Medicare Wellness Visit, Female The best way to live healthy is to have a lifestyle where you eat a well-balanced diet, exercise regularly, limit alcohol use, and quit all forms of tobacco/nicotine, if applicable. Regular preventive services are another way to keep healthy. Preventive services (vaccines, screening tests, monitoring & exams) can help personalize your care plan, which helps you manage your own care. Screening tests can find health problems at the earliest stages, when they are easiest to treat. Luis Mcgovern follows the current, evidence-based guidelines published by the [facility] Bernard Swetha ([facility]) when recommending preventive services for our patients. Because we follow these guidelines, sometimes recommendations change over time as research supports it. (For example, mammograms used to be recommended annually. Even though Medicare will still pay for an annual mammogram, the newer guidelines recommend a mammogram every two years for women of average risk.) Of course, you and your doctor may decide to screen more often for some diseases, based on your risk and your health status. Preventive services for you include: - Medicare offers their members a free annual wellness visit, which is time for you and your primary care provider to discuss and plan for your preventive service needs. Take advantage of this benefit every year! 
-All adults over the age of 72 should receive the recommended pneumonia vaccines. Current USPSTF guidelines recommend a series of two vaccines for the best pneumonia protection.  
-All adults should have a flu vaccine yearly and a tetanus vaccine every 10 years. All adults age 61 and older should receive a shingles vaccine once in their lifetime.   
-A bone mass density test is recommended when a woman turns 65 to screen for osteoporosis. This test is only recommended one time, as a screening. Some providers will use this same test as a disease monitoring tool if you already have osteoporosis. -All adults age 38-68 who are overweight should have a diabetes screening test once every three years.  
-Other screening tests and preventive services for persons with diabetes include: an eye exam to screen for diabetic retinopathy, a kidney function test, a foot exam, and stricter control over your cholesterol.  
-Cardiovascular screening for adults with routine risk involves an electrocardiogram (ECG) at intervals determined by your doctor.  
-Colorectal cancer screenings should be done for adults age 54-65 with no increased risk factors for colorectal cancer. There are a number of acceptable methods of screening for this type of cancer. Each test has its own benefits and drawbacks. Discuss with your doctor what is most appropriate for you during your annual wellness visit. The different tests include: colonoscopy (considered the best screening method), a fecal occult blood test, a fecal DNA test, and sigmoidoscopy. -Breast cancer screenings are recommended every other year for women of normal risk, age 54-69. 
-Cervical cancer screenings for women over age 72 are only recommended with certain risk factors.  
-All adults born between [facility] should be screened once for Hepatitis C. Here is a list of your current Health Maintenance items (your personalized list of preventive services) with a due date: 
Health Maintenance Due Topic Date Due  Pneumococcal Vaccine (1 of 1 - PPSV23) 07/04/1966  DTaP/Tdap/Td  (1 - Tdap) 07/04/1979  Shingles Vaccine (1 of 2) 07/04/2008 [address] [place] [address] Annual Well Visit  04/28/2019

## 2019-04-01 NOTE — PROGRESS NOTES
Patient has graduated from the Disease Specific Care Management  program on 4/1/19. Attempted to contact patient for Transition of Care follow up and sign off. No answer, left message with contact information provided. Patient's symptoms are stable at this time per MD note today. Patient/family has the ability to self-manage. Care management goals have been completed at this time. No further nurse navigator follow up scheduled. Goals Addressed None Pt has nurse navigator's contact information for any further questions, concerns, or needs. Patients upcoming visits:   
Future Appointments Date Time Provider Manpreet Stoddard 4/3/2019  9:50 AM Wale Rankin MD [facility] [address]  
4/26/2019  1:30 PM CSI, PT INR [facility] SO SCHED  
5/16/2019  8:45 AM Lianet Ochoa MD [facility]  
8/20/2019 10:40 AM Polo Price, DO [address] [place] [address]

## 2019-04-01 NOTE — PROGRESS NOTES
Abdulkadir Remedies, 59 y.o.,  female SUBJECTIVE Ff-up HTN-feeling much better overall,  added norvasc on last visit, no longer with headaches. Continues to take metoprolol and cozaar H/o nstemi, afib, rheumatic valve disease s/p MV repair 2013- denies any chest pain, sob, palpitations, lightheadedness. On chronic coumadin treatment, following cardiology. Anxiety- reports  Improvement with zoloft. HL-taking lipitor without problems Asthma- denies wheezing, sob. Taking arnuity for maintenance GERD- takes nexium otc prn for heartburn Foot pain- plantar fascitis following dr. Diana Kulkarni. ROS: 
See HPI, all others negative Patient Active Problem List  
Diagnosis Code  Rheumatic valvular disease I09.1  Chronic diastolic congestive heart failure (HCC) I50.32  Atrial fibrillation (HCC) I48.91  
 Long term current use of anticoagulant therapy Z79.01  
 Benign hypertensive heart disease with heart failure (HCC) I11.0  Hyperlipidemia E78.5  Advanced directives, counseling/discussion Z71.89  
 Mild intermittent asthma without complication F68.69  
 NSTEMI (non-ST elevated myocardial infarction) (HCC) I21.4  Chest pain R07.9  IFG (impaired fasting glucose) R73.01  
 Adjustment disorder with anxious mood F43.22 Current Outpatient Medications Medication Sig Dispense Refill  diphenhydrAMINE (BENADRYL) 25 mg capsule Take 25 mg by mouth every six (6) hours as needed.  OTHER Allergy injections  sertraline (ZOLOFT) 25 mg tablet Take 1 Tab by mouth daily. 90 Tab 0  
 atorvastatin (LIPITOR) 40 mg tablet Take 1 Tab by mouth daily. 90 Tab 0  
 amLODIPine (NORVASC) 5 mg tablet Take 1 Tab by mouth daily. 90 Tab 0  
 VENTOLIN HFA 90 mcg/actuation inhaler TAKE 2 PUFFS BY INHALATION DAILY. 3 Inhaler 1  
 warfarin (COUMADIN) 5 mg tablet TAKE 5MG DAILY EXCEPT 2.5MG ON TUES, THUR, AND SAT. OR AS DIRECTED BY OUR OFFICE 30 Tab 5  losartan (COZAAR) 100 mg tablet Take 1 Tab by mouth daily. 90 Tab 3  furosemide (LASIX) 40 mg tablet Take 1 Tab by mouth daily. 90 Tab 3  chlorpheniramine-acetaminophen (CORICIDIN HBP COLD AND FLU) 2-325 mg tab Take 325 mg by mouth two (2) times daily as needed for Cough (Cold symptoms).  fluticasone furoate (ARNUITY ELLIPTA) 100 mcg/actuation dsdv inhaler Take 2 Puffs by inhalation daily. 1 Inhaler 0  
 aspirin delayed-release 81 mg tablet Take 1 Tab by mouth daily. 30 Tab 0  
 docusate sodium (COLACE) 100 mg capsule Take 1 Cap by mouth two (2) times a day for 90 days. 60 Cap 0  
 polyethylene glycol (MIRALAX) 17 gram packet Take 1 Packet by mouth daily. (Patient taking differently: Take  by mouth daily as needed for Other (Constipation). ) 30 Packet 0  
 esomeprazole (NEXIUM) 20 mg capsule Take 20 mg by mouth daily as needed (heart burn).  fluticasone (FLONASE ALLERGY RELIEF) 50 mcg/actuation nasal spray 2 Sprays by Both Nostrils route daily as needed for Rhinitis.  metoprolol succinate (TOPROL-XL) 100 mg tablet Take 1 Tab by mouth daily. 30 Tab 6  
 KLOR-CON M20 20 mEq tablet TAKE 1 TAB BY MOUTH DAILY. 30 Tab 6  
 sodium chloride (SALINE NASAL) 0.65 % nasal squeeze bottle 1 Spray as needed for Congestion.  melatonin tab tablet Take 10 mg by mouth nightly.  diclofenac (VOLTAREN) 1 % gel Apply  to affected area four (4) times daily. 1 Each 0 Allergies Allergen Reactions  Morphine Rash and Itching  Norco [Hydrocodone-Acetaminophen] Itching Past Medical History:  
Diagnosis Date  Aortic valve disorders 1/13/2011  Asthma  Atrial fibrillation ([facility] [address] 1/13/2011  Congestive heart failure, unspecified March 2005 Cleared quickly with Lasix therapy  Echocardiogram 01/10/2012 A-fib. EF 45%. Mild RVE. RVSP 45-50. Massive LAE. Mod TAMI. Mod MS. Severe MR (mean grad 10). Mild-mod AI. Mild TR. Pulmonic systolic flow reversal.  Mild IVCE.  Hypertension  Iron deficiency anemias  Long term (current) use of anticoagulants 2/23/2011  Murmur 1/2011 Grade I-II/VI systolic ejection murmur along left sternal border, with radiation to the pulmonic area.  S/P cardiac cath 02/24/2012 Minimal coronary artery disease. Mild LVE. EF 55%. Mod MR. Mod-severe MS. RA 15.  RV 55/16. PA 58/34. W 36.  CO/CI 3.5/1.9 (TD); 3.61/1.9 (Dequan). R to L shunt suggested.  Wears glasses  Weight gain Social History Socioeconomic History  Marital status:  Spouse name: Not on file  Number of children: Not on file  Years of education: Not on file  Highest education level: Not on file Occupational History  Not on file Social Needs  Financial resource strain: Not on file  Food insecurity:  
  Worry: Not on file Inability: Not on file  Transportation needs:  
  Medical: Not on file Non-medical: Not on file Tobacco Use  Smoking status: Former Smoker  Smokeless tobacco: Never Used Substance and Sexual Activity  Alcohol use: Yes Alcohol/week: 0.6 oz Types: 1 Glasses of wine per week Comment: occassionally  Drug use: No  
 Sexual activity: Yes  
  Partners: Male Birth control/protection: None Lifestyle  Physical activity:  
  Days per week: Not on file Minutes per session: Not on file  Stress: Not on file Relationships  Social connections:  
  Talks on phone: Not on file Gets together: Not on file Attends Ca[employer] service: Not on file Active member of club or organization: Not on file Attends meetings of clubs or organizations: Not on file Relationship status: Not on file  Intimate partner violence:  
  Fear of current or ex partner: Not on file Emotionally abused: Not on file Physically abused: Not on file Forced sexual activity: Not on file Other Topics Concern  Not on file Social History Narrative  Not on file Family History Problem Relation Age of Onset  Heart Surgery Father Open-Heart Surgery  Other Father Venous Thromboembolism  Heart Disease Mother Enlarged Heart  Hypertension Mother  Diabetes Mother OBJECTIVE Physical Exam:  
 
Visit Vitals /76 (BP 1 Location: Left arm, BP Patient Position: Sitting) Pulse 88 Temp 98.3 °F (36.8 °C) (Oral) Resp 16 Ht 5' 9\" (1.753 m) Wt 189 lb (85.7 kg) SpO2 98% BMI 27.91 kg/m² General: alert, well-appearing,  in no apparent distress or pain CVS: irregular, mech valve click, + murmur Lungs:clear to ausculation bilaterally, no crackles, wheezing or rhonchi noted Psych:  mood and affect normal 
 
 
ASSESSMENT/PLAN Diagnoses and all orders for this visit: 
 
 
Essential hypertension Improving, now controlled Cont  norvasc 5 mg Cont cozaar 100 mg and metoprolol 100 mg DASH diet, monitoring Adjustment reaction with anxious mood Improving Cont zoloft 25 mg NSTEMI Asymptomatic Mild reversible ischemia on stress test 1/2019 On bb, arb, ASA Cont  lipitor, agreeable to this Will plan on rechecking lipids  Prior to next visit Following dr. Nabil Porter Atrial fibrillation, unspecified type ([address] Rate controlled, On BB, coumadin INR therapeutic Following dr. Nabil Porter Hyperlipidemia, unspecified hyperlipidemia type Suboptimal  On zocor, now on lipitor,  LDL goal <70 Check cmp/lipid panel prior to next visit Long term current use of anticoagulant therapy Cards following Rheumatic valvular disease S/p [facility] valve 2013 Chronic anticoagulation Mild intermittent asthma without complication Controlled Cont ICS Cont prn albuterol Offer PCV 23 on next visit Follow-up and Dispositions · Return in about 6 weeks (around 5/13/2019), or if symptoms worsen or fail to improve, for plan for MWV. Patient understands plan of care. Patient has provided input and agrees with goals.

## 2019-04-09 ENCOUNTER — OFFICE VISIT (OUTPATIENT)
Dept: ORTHOPEDIC SURGERY | Age: 59
End: 2019-04-09

## 2019-04-09 VITALS
TEMPERATURE: 97.4 F | BODY MASS INDEX: 27.99 KG/M2 | HEART RATE: 79 BPM | WEIGHT: 189 LBS | OXYGEN SATURATION: 96 % | RESPIRATION RATE: 15 BRPM | DIASTOLIC BLOOD PRESSURE: 90 MMHG | SYSTOLIC BLOOD PRESSURE: 123 MMHG | HEIGHT: 69 IN

## 2019-04-09 DIAGNOSIS — M72.2 PLANTAR FASCIITIS OF LEFT FOOT: Primary | ICD-10-CM

## 2019-04-09 NOTE — PROGRESS NOTES
AMBULATORY PROGRESS NOTE Patient: Franklyn Greenfield             MRN: [id number]     SSN: [id number] Body mass index is 27.91 kg/m². YOB: 1960     AGE: 59 y.o. EX: female PCP: Gloria Tellez MD 
 
 IMPRESSION/DIAGNOSIS AND TREATMENT PLAN  
 
DIAGNOSES 1. Plantar fasciitis of left foot Orders Placed This Encounter  REFERRAL TO PHYSICAL THERAPY Franklyn Greenfield understands her diagnoses and the proposed plan. Plan: 
 
1) Referral to Physical Therapy. Please utilize Graston technique and low frequency US. 2) Continue the HEP as directed. 3) Continue using the Visco heel inserts and dorsal night splint as directed. RTO - 4 weeks HPI AND EXAMINATION Franklyn Greenfield IS A 59 y.o. female who presents to my outpatient office for follow up of plantar fasciitis of the left foot. At the last visit, I provided orders for a dorsal night splint and Visco heel inserts, provided an HEP, instructed the patient to continue activity modification as directed, to take OTC Tylenol as needed, and to anticipate a referral to PT or a custom orthotic if condition does not improve. Since we saw her last, Ms. Dalila Cason states that she is still experiencing pain in her left plantar fascia. She rates her pain today at a 3 out of 10, but states that her pain becomes worse than this by the end of the day. She has been performing her exercises as directed, as well as using the dorsal night splint and Visco heel inserts. The patient has received Cortisone injections to other areas in her body in the past, and recalls that they did help with her pain. The patient is currently taking Coumadin for A-fib and cannot take NSAIDs. Visit Vitals /90 Pulse 79 Temp 97.4 °F (36.3 °C) (Oral) Resp 15 Ht 5' 9\" (1.753 m) Wt 189 lb (85.7 kg) SpO2 96% BMI 27.91 kg/m² Appearance: Alert, well appearing and pleasant patient who is in no distress, oriented to person, place/time, and who follows commands. Psychiatric: Affect and mood are appropriate. Cardiovascular/Peripheral Vascular: Normal Pulses to each hand and foot Musculoskeletal:  LOCATION:  Left FOOT/ANKLE Integumentary: No rashes, skin patches, wounds, or abrasions to the right or left legs Warm and normal color. No regions of expressible drainage. Palpable fullness or mass is not present Gait: Antalgic Tenderness: mild PLANTAR FASCIA REGION, with no NODULARITIES Mild tenderness to the central heel Motor/Strength/Tone Exam: Normal DF, INV,EVERSION. Slightly diminished PF 
                                                     strength due to plantar fascial tenderness Sensory Exam:   Intact Normal Sensation to ankle/foot Stability Testing: No anterolateral or varus instability of the Ankle or Subtalar Joints No peroneal tendon instability noted ROM: Normal ROM noted to ankle/foot Contractures: No Achilles or Gastrocnemius Contractures Calf tenderness: Absent for calf or gastrocnemius muscle regions Soft, supple, non tender, non taut lower extremity compartments Alignment: Neutral Hindfoot Wounds/Abrasions:   None present Extremities:   No embolic phenomena to the toes or hands No significant edema to the foot and or toes. Lower extremities are warm and appear well perfused DVT: No evidence of DVT seen on examination at this time No calf swelling, no tenderness to calf muscles Lymphatic:  No Evidence of Lymphedema Vascular: Medial Border of Tibia Region: Edema is not present Pulses: Dorsalis Pedis &  Posterior Tibial Pulses : Palpable yes Varicosities Lower Limbs :  None Neuro: Negative bilateral Straight leg raise (seated position) 
            no Tinel's at PM NV Bundle Tarsal Canal 
            no Proximal Tarsal Tunnel Tenderness 
            no Distal Tarsal Tunnel Tenderness See Musculoskeletal section for pertinent individual extremity examination No abnormal hand/wrist, foot/ankle, or facial/neck tremors. CHART REVIEW Past Medical History:  
Diagnosis Date  Aortic valve disorders 1/13/2011  Asthma  Atrial fibrillation ([address] 1/13/2011  Congestive heart failure, unspecified March 2005 Cleared quickly with Lasix therapy  Echocardiogram 01/10/2012 A-fib. EF 45%. Mild RVE. RVSP 45-50. Massive LAE. Mod TAMI. Mod MS. Severe MR (mean grad 10). Mild-mod AI. Mild TR. Pulmonic systolic flow reversal.  Mild IVCE.  Hypertension  Iron deficiency anemias  Long term (current) use of anticoagulants 2/23/2011  Murmur 1/2011 Grade I-II/VI systolic ejection murmur along left sternal border, with radiation to the pulmonic area.  S/P cardiac cath 02/24/2012 Minimal coronary artery disease. Mild LVE. EF 55%. Mod MR. Mod-severe MS. RA 15.  RV 55/16. PA 58/34. W 36.  CO/CI 3.5/1.9 (TD); 3.61/1.9 (Dequan). R to L shunt suggested.  Wears glasses  Weight gain Current Outpatient Medications Medication Sig  diphenhydrAMINE (BENADRYL) 25 mg capsule Take 25 mg by mouth every six (6) hours as needed.  OTHER Allergy injections  sertraline (ZOLOFT) 25 mg tablet Take 1 Tab by mouth daily.  atorvastatin (LIPITOR) 40 mg tablet Take 1 Tab by mouth daily.  amLODIPine (NORVASC) 5 mg tablet Take 1 Tab by mouth daily.  diclofenac (VOLTAREN) 1 % gel Apply  to affected area four (4) times daily.  VENTOLIN HFA 90 mcg/actuation inhaler TAKE 2 PUFFS BY INHALATION DAILY.  warfarin (COUMADIN) 5 mg tablet TAKE 5MG DAILY EXCEPT 2.5MG ON TUES, THUR, AND SAT.  OR AS DIRECTED BY OUR OFFICE  
  losartan (COZAAR) 100 mg tablet Take 1 Tab by mouth daily.  furosemide (LASIX) 40 mg tablet Take 1 Tab by mouth daily.  chlorpheniramine-acetaminophen (CORICIDIN HBP COLD AND FLU) 2-325 mg tab Take 325 mg by mouth two (2) times daily as needed for Cough (Cold symptoms).  fluticasone furoate (ARNUITY ELLIPTA) 100 mcg/actuation dsdv inhaler Take 2 Puffs by inhalation daily.  aspirin delayed-release 81 mg tablet Take 1 Tab by mouth daily.  docusate sodium (COLACE) 100 mg capsule Take 1 Cap by mouth two (2) times a day for 90 days.  polyethylene glycol (MIRALAX) 17 gram packet Take 1 Packet by mouth daily. (Patient taking differently: Take  by mouth daily as needed for Other (Constipation). )  esomeprazole (NEXIUM) 20 mg capsule Take 20 mg by mouth daily as needed (heart burn).  fluticasone (FLONASE ALLERGY RELIEF) 50 mcg/actuation nasal spray 2 Sprays by Both Nostrils route daily as needed for Rhinitis.  metoprolol succinate (TOPROL-XL) 100 mg tablet Take 1 Tab by mouth daily.  KLOR-CON M20 20 mEq tablet TAKE 1 TAB BY MOUTH DAILY.  sodium chloride (SALINE NASAL) 0.65 % nasal squeeze bottle 1 Spray as needed for Congestion.  melatonin tab tablet Take 10 mg by mouth nightly. No current facility-administered medications for this visit. Allergies Allergen Reactions  Morphine Rash and Itching  Norco [Hydrocodone-Acetaminophen] Itching Past Surgical History:  
Procedure Laterality Date  HX HEART CATHETERIZATION  2/24/2012  HX HYSTERECTOMY  2006  HX MITRAL VALVE REPLACEMENT  05/22/2013  
 25/33 mm On-X mechanical valve with bilateral pulmonary vein isolation and resection of LA appendage Social History Occupational History  Not on file Tobacco Use  Smoking status: Former Smoker  Smokeless tobacco: Never Used Substance and Sexual Activity  Alcohol use: Yes Alcohol/week: 0.6 oz Types: 1 Glasses of wine per week Comment: occassionally  Drug use: No  
 Sexual activity: Yes  
  Partners: Male Birth control/protection: None Family History Problem Relation Age of Onset  Heart Surgery Father Open-Heart Surgery  Other Father Venous Thromboembolism  Heart Disease Mother Enlarged Heart  Hypertension Mother  Diabetes Mother REVIEW OF SYSTEMS : 4/9/2019  ALL BELOW ARE Negative except : SEE HPI Constitutional: Negative for fever, chills and weight loss. Neg Weight Loss Cardiovascular: Negative for chest pain, claudication and leg swelling. SOB, LIZARRAGA Gastrointestinal/Urological: Negative for  pain, N/V/D/C, Blood in stool or urine,dysuria                         Hematuria, Incontinence, pelvic pain Musculoskeletal: see HPI. Neurological: Negative for dizziness and weakness, headaches,Visual Changes             Confusion,  Or Seizures, Psychiatric/Behavioral: Negative for depression, memory loss and substance abuse. Extremities:  Negative for hair changes, rash or skin lesion changes. Hematologic: Negative for Bleeding problems, bruising, pallor or swollen lymph nodes. Peripheral Vascular: No calf pain, vascular vein tenderness to calf pain No calf throbbing, posterior knee throbbing pain DIAGNOSTIC IMAGING No notes on file Please see above section of this report. I have personally reviewed the results of the above study. The interpretation of this study is my professional opinion. Written by Nilam Vu, as dictated by Dr. Michelle Escobar. I, Dr. Michelle Escobar, confirm that all documentation is accurate.

## 2019-04-09 NOTE — PROGRESS NOTES
Patient is improving since her last visit. She c/o pain at the end of the day and soreness when up on her feet for longer periods of time. She confessed to wearing heels, especially at [employer] on Sundays. She notices an increase in pain on those days. I encouraged her to give up heels until she is fully recovered and to wear tennis shoes when possible. She continues to perform her HEP 2x/day and has noticed an increase in flexibility and a decrease in pain while performing.

## 2019-04-09 NOTE — PATIENT INSTRUCTIONS
Please follow up in 4 weeks. You are advised to contact us if your condition worsens. Plantar Fasciitis: Care Instructions Your Care Instructions Plantar fasciitis is pain and inflammation of the plantar fascia, the tissue at the bottom of your foot that connects the heel bone to the toes. The plantar fascia also supports the arch. If you strain the plantar fascia, it can develop small tears and cause heel pain when you stand or walk. Plantar fasciitis can be caused by running or other sports. It also may occur in people who are overweight or who have high arches or flat feet. You may get plantar fasciitis if you walk or stand for long periods, or have a tight Achilles tendon or calf muscles. You can improve your foot pain with rest and other care at home. It might take a few weeks to a few months for your foot to heal completely. Follow-up care is a key part of your treatment and safety. Be sure to make and go to all appointments, and call your doctor if you are having problems. It's also a good idea to know your test results and keep a list of the medicines you take. How can you care for yourself at home? · Rest your feet often. Reduce your activity to a level that lets you avoid pain. If possible, do not run or walk on hard surfaces. · Take pain medicines exactly as directed. ? If the doctor gave you a prescription medicine for pain, take it as prescribed. ? If you are not taking a prescription pain medicine, take an over-the-counter anti-inflammatory medicine for pain and swelling, such as ibuprofen (Advil, Motrin) or naproxen (Aleve). Read and follow all instructions on the label. · Use ice massage to help with pain and swelling. You can use an ice cube or an ice cup several times a day. To make an ice cup, fill a paper cup with water and freeze it. Cut off the top of the cup until a half-inch of ice shows. Hold onto the remaining paper to use the cup.  Rub the ice in small circles over the area for 5 to 7 minutes. · Contrast baths, which alternate hot and cold water, can also help reduce swelling. But because heat alone may make pain and swelling worse, end a contrast bath with a soak in cold water. · Wear a night splint if your doctor suggests it. A night splint holds your foot with the toes pointed up and the foot and ankle at a 90-degree angle. This position gives the bottom of your foot a constant, gentle stretch. · Do simple exercises such as calf stretches and towel stretches 2 to 3 times each day, especially when you first get up in the morning. These can help the plantar fascia become more flexible. They also make the muscles that support your arch stronger. Hold these stretches for 15 to 30 seconds per stretch. Repeat 2 to 4 times. ? Stand about 1 foot from a wall. Place the palms of both hands against the wall at chest level. Lean forward against the wall, keeping one leg with the knee straight and heel on the ground while bending the knee of the other leg. 
? Sit down on the floor or a mat with your feet stretched in front of you. Roll up a towel lengthwise, and loop it over the ball of your foot. Holding the towel at both ends, gently pull the towel toward you to stretch your foot. · Wear shoes with good arch support. Athletic shoes or shoes with a well-cushioned sole are good choices. · Try heel cups or shoe inserts (orthotics) to help cushion your heel. You can buy these at many shoe stores. · Put on your shoes as soon as you get out of bed. Going barefoot or wearing slippers may make your pain worse. · Reach and stay at a good weight for your height. This puts less strain on your feet. When should you call for help? Call your doctor now or seek immediate medical care if: 
  · You have heel pain with fever, redness, or warmth in your heel.  
  · You cannot put weight on the sore foot.  Watch closely for changes in your health, and be sure to contact your doctor if: 
  · You have numbness or tingling in your heel.  
  · Your heel pain lasts more than 2 weeks. Where can you learn more? Go to http://greyson-andrea.info/. Juaquin Mccormick in the search box to learn more about \"Plantar Fasciitis: Care Instructions. \" Current as of: September 20, 2018 Content Version: 11.9 © 5103-3941 [employer]. Care instructions adapted under license by [employer] (which disclaims liability or warranty for this information). If you have questions about a medical condition or this instruction, always ask your healthcare professional. [address] any warranty or liability for your use of this information.

## 2019-04-09 NOTE — PROGRESS NOTES
1. Have you been to the ER, urgent care clinic since your last visit? Hospitalized since your last visit? No 
 
2. Have you seen or consulted any other health care providers outside of the [address] [place] [address] since your last visit? Include any pap smears or colon screening.  No

## 2019-04-12 ENCOUNTER — HOSPITAL ENCOUNTER (OUTPATIENT)
Dept: LAB | Age: 59
Discharge: HOME | End: 2019-04-12
Payer: MEDICARE

## 2019-04-12 DIAGNOSIS — E78.2 MIXED HYPERLIPIDEMIA: ICD-10-CM

## 2019-04-12 LAB
ALBUMIN SERPL-MCNC: 3.6 G/DL (ref 3.4–5)
ALBUMIN/GLOB SERPL: 0.9 {RATIO} (ref 0.8–1.7)
ALP SERPL-CCNC: 83 U/L (ref 45–117)
ALT SERPL-CCNC: 40 U/L (ref 13–56)
ANION GAP SERPL CALC-SCNC: 6 MMOL/L (ref 3–18)
AST SERPL-CCNC: 33 U/L (ref 15–37)
BILIRUB SERPL-MCNC: 0.6 MG/DL (ref 0.2–1)
BUN SERPL-MCNC: 12 MG/DL (ref 7–18)
BUN/CREAT SERPL: 14 (ref 12–20)
CALCIUM SERPL-MCNC: 9.2 MG/DL (ref 8.5–10.1)
CHLORIDE SERPL-SCNC: 108 MMOL/L (ref 100–108)
CHOLEST SERPL-MCNC: 130 MG/DL
CO2 SERPL-SCNC: 29 MMOL/L (ref 21–32)
CREAT SERPL-MCNC: 0.84 MG/DL (ref 0.6–1.3)
GLOBULIN SER CALC-MCNC: 4.1 G/DL (ref 2–4)
GLUCOSE SERPL-MCNC: 90 MG/DL (ref 74–99)
HDLC SERPL-MCNC: 67 MG/DL (ref 40–60)
HDLC SERPL: 1.9 {RATIO} (ref 0–5)
LDLC SERPL CALC-MCNC: 47 MG/DL (ref 0–100)
LIPID PROFILE,FLP: ABNORMAL
POTASSIUM SERPL-SCNC: 4.1 MMOL/L (ref 3.5–5.5)
PROT SERPL-MCNC: 7.7 G/DL (ref 6.4–8.2)
SODIUM SERPL-SCNC: 143 MMOL/L (ref 136–145)
TRIGL SERPL-MCNC: 80 MG/DL (ref ?–150)
VLDLC SERPL CALC-MCNC: 16 MG/DL

## 2019-04-12 PROCEDURE — 36415 COLL VENOUS BLD VENIPUNCTURE: CPT

## 2019-04-12 PROCEDURE — 80061 LIPID PANEL: CPT

## 2019-04-12 PROCEDURE — 80053 COMPREHEN METABOLIC PANEL: CPT

## 2019-04-15 ENCOUNTER — APPOINTMENT (OUTPATIENT)
Dept: PHYSICAL THERAPY | Age: 59
End: 2019-04-15

## 2019-04-26 ENCOUNTER — ANTI-COAG VISIT (OUTPATIENT)
Dept: CARDIOLOGY CLINIC | Age: 59
End: 2019-04-26

## 2019-04-26 DIAGNOSIS — I48.91 ATRIAL FIBRILLATION, UNSPECIFIED TYPE (HCC): ICD-10-CM

## 2019-04-26 DIAGNOSIS — Z79.01 LONG TERM CURRENT USE OF ANTICOAGULANT THERAPY: Primary | ICD-10-CM

## 2019-04-26 LAB
INR BLD: 2.4
PT POC: 28.3 SECONDS
VALID INTERNAL CONTROL?: YES

## 2019-04-29 ENCOUNTER — HOSPITAL ENCOUNTER (OUTPATIENT)
Dept: PHYSICAL THERAPY | Age: 59
Discharge: HOME | End: 2019-04-29
Payer: MEDICARE

## 2019-04-29 PROCEDURE — 97110 THERAPEUTIC EXERCISES: CPT

## 2019-04-29 PROCEDURE — 97162 PT EVAL MOD COMPLEX 30 MIN: CPT

## 2019-04-29 NOTE — PROGRESS NOTES
[address]  [address] [place], [address] 
[phone] fax Plan of Care/ Statement of Necessity for Physical Therapy Services Patient name: Indigo Mata Start of Care: 2019 Referral source: Shawanda Brannon MD : 1960 Medical Diagnosis: Plantar fascial fibromatosis [M72.2] Payor: [place] MED[place] / Plan: [address] [place] [address] / Product Type: Medicare /  Onset Date:3 months ago Treatment Diagnosis: Left foot pain Prior Hospitalization: see medical history Provider#: [id number] Medications: Verified on Patient summary List  
 Comorbidities: allergies, asthma, CHF, HBP Prior Level of Function: (I) with ADLs and takes care of her mother in law. The Plan of Care and following information is based on the information from the initial evaluation. Assessment/ key information: Patient is a 59 y.o female presenting with a chief complaint of left foot pain on the plantar surface that has been going on for about 3 months. Patient reports her pain is  Generally mild, however if she is on her feet all day her pain can get up to an 8/10. Patient states the pain in along her medial arch and heel of the foot and increases with weight bearing activities. Patient states her pain is worse when she first gets out of bed in the AM and after activity. Patient states she wears inserts in her shoes as well as a brace on her foot at night to help with the pain. Patient presents with impairments consistent with decreased left foot AROM (especially DF), decreased muscular flexibility (gastroc soleus complex), decreased strength, increased pain with standing heel raise, and decreased activity tolerance. Patient will benefit from skilled physical therapy in order to improve ease of ADLs and improve patients quality of life.   
Evaluation Complexity History MEDIUM  Complexity : 1-2 comorbidities / personal factors will impact the outcome/ POC ; Examination MEDIUM Complexity : 3 Standardized tests and measures addressing body structure, function, activity limitation and / or participation in recreation  ;Presentation MEDIUM Complexity : Evolving with changing characteristics  ; Clinical Decision Making MEDIUM Complexity : FOTO score of 26-74 Overall Complexity Rating: MEDIUM Problem List: pain affecting function, decrease ROM, decrease strength, impaired gait/ balance, decrease ADL/ functional abilitiies, decrease activity tolerance and decrease flexibility/ joint mobility Treatment Plan may include any combination of the following: Therapeutic exercise, Neuromuscular re-education, Physical agent/modality, Gait/balance training, Manual therapy, Patient education, Functional mobility training and Stair training Patient / Family readiness to learn indicated by: asking questions, trying to perform skills and interest 
Persons(s) to be included in education: patient (P) Barriers to Learning/Limitations: None Patient Goal (s): Get back to my exercise program Patient Self Reported Health Status: good Rehabilitation Potential: good Short Term Goals: To be accomplished in 2 weeks: 1. Patient will be independent and compliant with HEP 1-2x/day in order to improve ease of ADLs. Long Term Goals: To be accomplished in 4 weeks: 1. Patient will be able to improve left ankle DF AROM to 5 degrees in order to improve ease of ambulation. 2. Patient will be able to improve FOTO score to 65 in order to demonstrate improvements in functional independence. 3. Patient will be able to ambulate greater than 10 minutes void of pain in order to return to prior exercises routine. 4. Patient will be able to negotiate a flight of stairs without difficulty in order to improve ease of getting to her bedroom upstairs. Frequency / Duration: Patient to be seen 2 times per week for 4 weeks. Patient/ Caregiver education and instruction: Diagnosis, prognosis, activity modification and exercises 
 [x]  Plan of care has been reviewed with PTA Certification Period: 4/29/19 - 5/29/19 Shayne Hernández, PT 4/29/2019 9:43 AM 
 
________________________________________________________________________ I certify that the above Therapy Services are being furnished while the patient is under my care. I agree with the treatment plan and certify that this therapy is necessary. [de-identified] Signature:____________________  Date:____________Time: _________ Please sign and return to [address] [place], [address] 
[phone] fax

## 2019-04-29 NOTE — PROGRESS NOTES
PT DAILY TREATMENT NOTE 10-18 Patient Name: Abdulkadir Remedies Date:2019 : 1960 [x]  Patient  Verified Payor: [place] MEDICARE / Plan: Prisca Stoddard y / Product Type: Medicare / In time:9:04  Out time:9:42 Total Treatment Time (min): 38 Visit #: 1 of 8 Medicare/BCBS Only Total Timed Codes (min):  18 1:1 Treatment Time:  45 Treatment Area: Plantar fascial fibromatosis [M72.2] SUBJECTIVE Pain Level (0-10 scale): 2 Any medication changes, allergies to medications, adverse drug reactions, diagnosis change, or new procedure performed?: [x] No    [] Yes (see summary sheet for update) Subjective functional status/changes:   [] No changes reported Patient is a 59 y.o female presenting with a chief complaint of left foot pain on the plantar surface that has been going on for about 3 months. Patient reports her pain is  Generally mild, however if she is on her feet all day her pain can get up to an 8/10. Patient states the pain in along her medial arch and heel of the foot and increases with weight bearing activities. Patient states her pain is worse when she first gets out of bed in the AM and after activity. Patient states she wears inserts in her shoes as well as a brace on her foot at night to help with the pain. OBJECTIVE 20 min [x]Eval                  []Re-Eval  
 
 
18 min Therapeutic Exercise:  [x] See flow sheet : HEP exercises + STM plantar fascia and gastroc Rationale: increase ROM and increase strength to improve the patients ability to perform ADLs with improved ease. With 
 [] TE 
 [] TA 
 [] neuro 
 [] other: Patient Education: [x] Review HEP [] Progressed/Changed HEP based on:  
[] positioning   [] body mechanics   [] transfers   [] heat/ice application   
[] other:   
 
Other Objective/Functional Measures: See IE Pain Level (0-10 scale) post treatment: 2 
 
ASSESSMENT/Changes in Function: Patient presents with impairments consistent with decreased left foot AROM (especially DF), decreased muscular flexibility (gastroc soleus complex), decreased strength, increased pain with standing heel raise, and decreased activity tolerance. Patient will benefit from skilled physical therapy in order to improve ease of ADLs and improve patients quality of life. Patient will continue to benefit from skilled PT services to modify and progress therapeutic interventions, address functional mobility deficits, address ROM deficits, address strength deficits, analyze and address soft tissue restrictions, analyze and cue movement patterns, analyze and modify body mechanics/ergonomics and assess and modify postural abnormalities to attain remaining goals. [x]  See Plan of Care 
[]  See progress note/recertification 
[]  See Discharge Summary Progress towards goals / Updated goals: 
Short Term Goals: To be accomplished in 2 weeks: 1. Patient will be independent and compliant with HEP 1-2x/day in order to improve ease of ADLs. Long Term Goals: To be accomplished in 4 weeks: 1. Patient will be able to improve left ankle DF AROM to 5 degrees in order to improve ease of ambulation. 2. Patient will be able to improve FOTO score to 65 in order to demonstrate improvements in functional independence. 3. Patient will be able to ambulate greater than 10 minutes void of pain in order to return to prior exercises routine. 4. Patient will be able to negotiate a flight of stairs without difficulty in order to improve ease of getting to her bedroom upstairs. PLAN 
[]  Upgrade activities as tolerated     [x]  Continue plan of care 
[]  Update interventions per flow sheet      
[]  Discharge due to:_ 
[]  Other:_ Real Maxime PT 4/29/2019  9:44 AM 
 
Future Appointments Date Time Provider Manpreet Stoddard 5/7/2019  7:30 AM Kavya Rankin MD [address]  
 5/16/2019  8:45 AM Lobo Solis MD [facility] SO SCHED  
5/22/2019  1:00 PM CSI, PT INR [facility] SO SCHED  
8/20/2019 10:40 AM Nathan Hart, DO [address] [place] [address]

## 2019-04-30 RX ORDER — POTASSIUM CHLORIDE 1500 MG/1
TABLET, EXTENDED RELEASE ORAL
Qty: 30 TAB | Refills: 6 | Status: SHIPPED | OUTPATIENT
Start: 2019-04-30 | End: 2019-11-28 | Stop reason: SDUPTHER

## 2019-05-01 ENCOUNTER — HOSPITAL ENCOUNTER (OUTPATIENT)
Dept: PHYSICAL THERAPY | Age: 59
Discharge: HOME | End: 2019-05-01
Payer: MEDICARE

## 2019-05-01 PROCEDURE — 97140 MANUAL THERAPY 1/> REGIONS: CPT

## 2019-05-01 PROCEDURE — 97110 THERAPEUTIC EXERCISES: CPT

## 2019-05-01 NOTE — PROGRESS NOTES
PT DAILY TREATMENT NOTE 10-18 Patient Name: Rere Hoffman Date:2019 : 1960 [x]  Patient  Verified Payor: [place] MEDICARE / Plan: Prisca Cook / Product Type: Medicare / In time:2:24  Out time:3:05 Total Treatment Time (min): 41 Visit #: 2 of 8 Medicare/BCBS Only Total Timed Codes (min):  41 1:1 Treatment Time:  41  
 
 
Treatment Area: Left foot pain [M79.672] SUBJECTIVE Pain Level (0-10 scale): 2 Any medication changes, allergies to medications, adverse drug reactions, diagnosis change, or new procedure performed?: [x] No    [] Yes (see summary sheet for update) Subjective functional status/changes:   [] No changes reported Patient reports HEP compliance. OBJECTIVE 31 min Therapeutic Exercise:  [x] See flow sheet :  
Rationale: increase ROM, increase strength and improve coordination to improve the patients ability to increase ease with ADLs 10 min Manual Therapy:   
Explained effects and benefits of Graston Technique, including potential for post treatment soreness and bruising. Patient was provided the opportunity to ask any questions and verbalized understanding. Patient wished to proceed with treatment. Patient position: prone GT4 and GT6 to left plantar fascia and distal Achilles Rationale: decrease pain, increase ROM and increase tissue extensibility to ease ADL tolerance With 
 [] TE 
 [] TA 
 [] neuro 
 [] other: Patient Education: [x] Review HEP [] Progressed/Changed HEP based on:  
[] positioning   [] body mechanics   [] transfers   [] heat/ice application   
[] other:   
 
Other Objective/Functional Measures:  
First follow up session---cues sequencing and correct form throughout Pain Level (0-10 scale) post treatment: 0 \"sore\" ASSESSMENT/Changes in Function:  
Initiated POC per flowsheet. Patient puts forth good effort with exercises and reports HEP compliance.  Slightly tender with manual though willing to let therapist work on her. Fair static balance. Patient will continue to benefit from skilled PT services to modify and progress therapeutic interventions, address functional mobility deficits, address ROM deficits, address strength deficits, analyze and address soft tissue restrictions, analyze and cue movement patterns, analyze and modify body mechanics/ergonomics and assess and modify postural abnormalities to attain remaining goals. []  See Plan of Care 
[]  See progress note/recertification 
[]  See Discharge Summary Progress towards goals / Updated goals: 
Short Term Goals: To be accomplished in 2 weeks: 
            5. Patient will be independent and compliant with HEP 1-2x/day in order to improve ease of ADLs. -met per patient report (5/1/2019) Long Term Goals: To be accomplished in 4 weeks:  
            1. Patient will be able to improve left ankle DF AROM to 5 degrees in order to improve ease of ambulation.  
            2. Patient will be able to improve FOTO score to 65 in order to demonstrate improvements in functional independence.  
            3. Patient will be able to ambulate greater than 10 minutes void of pain in order to return to prior exercises routine.  
            4. Patient will be able to negotiate a flight of stairs without difficulty in order to improve ease of getting to her bedroom upstairs.  
  
 
 
 
PLAN 
[]  Upgrade activities as tolerated     [x]  Continue plan of care 
[]  Update interventions per flow sheet      
[]  Discharge due to:_ 
[]  Other:_   
 
Mateusz Griffith 5/1/2019  2:26 PM 
 
Future Appointments Date Time Provider Manpreet Stoddard 5/1/2019  2:30 PM [facility]  
5/7/2019  7:30 AM Ignrid Rankin MD [place] [id number]  
5/8/2019  9:00 AM [facility]arde[facility]hena [facility]  
5/10/2019 11:00 AM [facility]ennie Kerbs[facility]  
5/15/2019 11:00 AM [facility]  
 5/16/2019  8:45 AM Aisha López MD [facility] SO SCHED  
5/17/2019 10:00 AM Chidi Washburn, PTA [facility]  
5/22/2019 12:00 PM Chidi Washburn, PTA [facility]  
5/22/2019  1:00 PM CSI, PT INR [facility] SO SCHED  
5/23/2019  8:00 AM [facility] WILLOW TAMIKA  [facility]  
5/24/2019 11:30 AM Dominguez Uriostegui, PT [facility]  
8/20/2019 10:40 AM Javier Bucio,  [facility]

## 2019-05-08 ENCOUNTER — HOSPITAL ENCOUNTER (OUTPATIENT)
Dept: PHYSICAL THERAPY | Age: 59
Discharge: HOME | End: 2019-05-08
Payer: MEDICARE

## 2019-05-08 PROCEDURE — 97110 THERAPEUTIC EXERCISES: CPT

## 2019-05-08 PROCEDURE — 97140 MANUAL THERAPY 1/> REGIONS: CPT

## 2019-05-08 NOTE — PROGRESS NOTES
PT DAILY TREATMENT NOTE 10-18 Patient Name: Samantha Sequeira Date:2019 : 1960 [x]  Patient  Verified Payor: [place] MEDICARE / Plan: Prisca Stoddard y / Product Type: Medicare / In time:8:53  Out time:9:35 Total Treatment Time (min): 42 Visit #: 3 of 8 Medicare/BCBS Only Total Timed Codes (min):  42 1:1 Treatment Time:  37 Treatment Area: Left foot pain [M79.672] SUBJECTIVE Pain Level (0-10 scale): 0 Any medication changes, allergies to medications, adverse drug reactions, diagnosis change, or new procedure performed?: [x] No    [] Yes (see summary sheet for update) Subjective functional status/changes:   [] No changes reported Pt reports feeling very sore after last visit and still has throbbing at night OBJECTIVE 29 min Therapeutic Exercise:  [x] See flow sheet :  
Rationale: increase ROM, increase strength and improve balance to improve the patients ability to perform ADLs 8 min Manual Therapy:  STM/DTM to planta fascia and gastroc Rationale: decrease pain, increase ROM and increase tissue extensibility to improve functional mobility With 
 [] TE 
 [] TA 
 [] neuro 
 [] other: Patient Education: [x] Review HEP [] Progressed/Changed HEP based on:  
[] positioning   [] body mechanics   [] transfers   [] heat/ice application   
[] other:   
 
Other Objective/Functional Measures: Added footwork at [facility] Pain Level (0-10 scale) post treatment: 0 
 
ASSESSMENT/Changes in Function: Pt performed well with therex and was challenged well on reformer. Pt maintains reports of left foot soreness throughout treatment. Pt states swelling incr's at night.   
 
Patient will continue to benefit from skilled PT services to modify and progress therapeutic interventions, address functional mobility deficits, address ROM deficits, address strength deficits, analyze and address soft tissue restrictions, analyze and cue movement patterns, analyze and modify body mechanics/ergonomics and address imbalance/dizziness to attain remaining goals. []  See Plan of Care 
[]  See progress note/recertification 
[]  See Discharge Summary Progress towards goals / Updated goals: 
Short Term Goals: To be accomplished in 2 weeks: 
            8. Patient will be independent and compliant with HEP 1-2x/day in order to improve ease of ADLs. -met per patient report (5/1/2019) Long Term Goals: To be accomplished in 4 weeks:  
            1. Patient will be able to improve left ankle DF AROM to 5 degrees in order to improve ease of ambulation.  
            2. Patient will be able to improve FOTO score to 65 in order to demonstrate improvements in functional independence.  
            3. Patient will be able to ambulate greater than 10 minutes void of pain in order to return to prior exercises routine.  
            4. Patient will be able to negotiate a flight of stairs without difficulty in order to improve ease of getting to her bedroom upstairs.  PLAN 
[]  Upgrade activities as tolerated     [x]  Continue plan of care 
[]  Update interventions per flow sheet      
[]  Discharge due to:_ 
[]  Other:_ Walt Dyer PTA 5/8/2019  8:54 AM 
 
Future Appointments Date Time Provider Manpreet Arlyn 5/8/2019  9:00 AM [facility]  
5/10/2019 11:00 AM [facility]choff[facility]  
5/15/2019 11:00 AM Lin Monzon [facility]  
5/16/2019  8:45 AM Keely Corley MD [facility] SO SCHED  
5/17/2019 10:00 AM Corinda Aschoff, PTA [facility]  
5/22/2019 12:00 PM Corinda Aschoff, PTA [facility]  
5/22/2019  1:00 PM CSI, PT INR [facility] SO SCHED  
5/23/2019  8:00 AM HBV WILLOW GALINDOO [facility]  
5/24/2019 11:30 AM Venkat Burnham, DOLORES [facility]  
8/20/2019 10:40 AM Demian Abdul,  [facility]

## 2019-05-10 ENCOUNTER — HOSPITAL ENCOUNTER (OUTPATIENT)
Dept: PHYSICAL THERAPY | Age: 59
Discharge: HOME | End: 2019-05-10
Payer: MEDICARE

## 2019-05-10 PROCEDURE — 97110 THERAPEUTIC EXERCISES: CPT

## 2019-05-10 PROCEDURE — 97140 MANUAL THERAPY 1/> REGIONS: CPT

## 2019-05-10 NOTE — PROGRESS NOTES
PT DAILY TREATMENT NOTE 10-18 Patient Name: Larissa Berry Date:5/10/2019 : 1960 [x]  Patient  Verified Payor: [place] MED[place] / Plan: Prisca Cook / Product Type: Medicare / In time:11:00  Out time:11:46 Total Treatment Time (min): 46 Visit #: 4 of 8 Medicare/BCBS Only Total Timed Codes (min):  46 1:1 Treatment Time:  35 Treatment Area: Left foot pain [M79.672] SUBJECTIVE Pain Level (0-10 scale): 0/10 Any medication changes, allergies to medications, adverse drug reactions, diagnosis change, or new procedure performed?: [x] No    [] Yes (see summary sheet for update) Subjective functional status/changes:   [] No changes reported \"Sore at the moment, it really aches and throbs at night and it swells at night. \" OBJECTIVE 38 min Therapeutic Exercise:  [x] See flow sheet :  
Rationale: increase ROM, increase strength and increase proprioception to improve the patients ability to perform ADL's. 
 
8 min Manual Therapy:  Graston technique to Left gastroc, soleus, achilles tendon and plantar fascia. Rationale: decrease pain, increase ROM, increase tissue extensibility and decrease trigger points to improve ease of performing functional activities. With 
 [x] TE 
 [] TA 
 [] neuro 
 [] other: Patient Education: [x] Review HEP [] Progressed/Changed HEP based on:  
[] positioning   [] body mechanics   [] transfers   [] heat/ice application   
[] other:   
 
Other Objective/Functional Measures: AROM Left ankle DF neutral.  
 
Pain Level (0-10 scale) post treatment: 0/10 \"sore\" ASSESSMENT/Changes in Function: Continues to have limited ankle DF mobility. Cueing to correct mechanics for several exercises. Continue PT to increase ankle mobility and strength to improve ease of performing functional activities.  
 
Patient will continue to benefit from skilled PT services to modify and progress therapeutic interventions, address functional mobility deficits, address ROM deficits, address strength deficits, analyze and address soft tissue restrictions and analyze and modify body mechanics/ergonomics to attain remaining goals. [x]  See Plan of Care 
[]  See progress note/recertification 
[]  See Discharge Summary Progress towards goals / Updated goals: 
Short Term Goals: To be accomplished in 2 weeks: 
            1. Patient will be independent and compliant with HEP 1-2x/day in order to improve ease of ADLs. -met per patient report (5/1/2019) Long Term Goals: To be accomplished in 4 weeks:  
            1. Patient will be able to improve left ankle DF AROM to 5 degrees in order to improve ease of ambulation.  - Neutral. 5/10/2019 
            2. Patient will be able to improve FOTO score to 65 in order to demonstrate improvements in functional independence.  
            3. Patient will be able to ambulate greater than 10 minutes void of pain in order to return to prior exercises routine.  
            4. Patient will be able to negotiate a flight of stairs without difficulty in order to improve ease of getting to her bedroom upstairs.  PLAN 
[]  Upgrade activities as tolerated     [x]  Continue plan of care 
[]  Update interventions per flow sheet      
[]  Discharge due to:_ 
[]  Other:_   
 
Linh Crowder PTA 5/10/2019  10:49 AM 
 
Future Appointments Date Time Provider Manpreet Stoddard 5/10/2019 11:00 AM Chidi Washburn PTA [facility]  
5/15/2019 11:00 AM Stephanie Conteh [facility]  
5/16/2019  8:45 AM Aisha López MD [facility] SO SCHED  
5/17/2019 10:00 AM Chidi Washburn PTA [facility]  
5/20/2019  8:50 AM Pretty Rankin MD [facility] [address]  
5/22/2019 12:00 PM Chidi Washburn PTA [facility]  
5/22/2019  1:00 PM CSI, PT INR  [facility] SO SCHED  
5/23/2019  8:00 AM [facility] WILLOW GALINDOO [facility]  
5/24/2019 11:30 AM Dominguez Uriostegui, PT [facility]  
 8/20/2019 10:40 AM Karolyn Cowan DO [address] [place] [address]

## 2019-05-15 ENCOUNTER — HOSPITAL ENCOUNTER (OUTPATIENT)
Dept: PHYSICAL THERAPY | Age: 59
Discharge: HOME | End: 2019-05-15
Payer: MEDICARE

## 2019-05-15 PROCEDURE — 97110 THERAPEUTIC EXERCISES: CPT

## 2019-05-15 PROCEDURE — 97112 NEUROMUSCULAR REEDUCATION: CPT

## 2019-05-15 PROCEDURE — 97140 MANUAL THERAPY 1/> REGIONS: CPT

## 2019-05-15 NOTE — PROGRESS NOTES
PT DAILY TREATMENT NOTE 10-18 Patient Name: Abdulkadir Cruz Date:5/15/2019 : 1960 [x]  Patient  Verified Payor: [place] MEDICARE / Plan: [place] [employer] / Product Type: Medicare / In time:11:30  Out time:11:45 Total Treatment Time (min): 45 Visit #: 5 of 8 Medicare/BCBS Only Total Timed Codes (min):  45 1:1 Treatment Time:  40 Treatment Area: Left foot pain [M79.672] SUBJECTIVE Pain Level (0-10 scale): 0 Any medication changes, allergies to medications, adverse drug reactions, diagnosis change, or new procedure performed?: [x] No    [] Yes (see summary sheet for update) Subjective functional status/changes:   [] No changes reported \"I usually don't get pain till later in the day. \" OBJECTIVE 27 min Therapeutic Exercise:  [x] See flow sheet :  
Rationale: increase ROM, increase strength and improve coordination to improve the patients ability to increase ease with ADLs 10 min Neuromuscular Re-education:  [x]  See flow sheet :  
Rationale: increase strength, improve coordination, improve balance and increase proprioception  to improve the patients ability to improve ease with ADLs 8 min Manual Therapy:  GT3 and GT6 to left plantar fascia, gastroc/soleus complex Rationale: decrease pain, increase ROM and increase tissue extensibility to ease ADL tolerance With 
 [] TE 
 [] TA 
 [] neuro 
 [] other: Patient Education: [x] Review HEP [] Progressed/Changed HEP based on:  
[] positioning   [] body mechanics   [] transfers   [] heat/ice application   
[] other:   
 
Other Objective/Functional Measures:  
Improved towel scrunch form Pain Level (0-10 scale) post treatment: 0 
 
ASSESSMENT/Changes in Function:  
Static balance is good. Significant pronation and limited left ankle DF mobility noted with reformer footwork.   
 
Patient will continue to benefit from skilled PT services to modify and progress therapeutic interventions, address functional mobility deficits, address ROM deficits, address strength deficits, analyze and address soft tissue restrictions, analyze and cue movement patterns, analyze and modify body mechanics/ergonomics and assess and modify postural abnormalities to attain remaining goals. []  See Plan of Care 
[]  See progress note/recertification 
[]  See Discharge Summary Progress towards goals / Updated goals: 
Short Term Goals: To be accomplished in 2 weeks: 
            7. Patient will be independent and compliant with HEP 1-2x/day in order to improve ease of ADLs. -met per patient report (5/1/2019) Long Term Goals: To be accomplished in 4 weeks:  
            1. Patient will be able to improve left ankle DF AROM to 5 degrees in order to improve ease of ambulation.  - Neutral. 5/10/2019 
            2. Patient will be able to improve FOTO score to 65 in order to demonstrate improvements in functional independence.  
            3. Patient will be able to ambulate greater than 10 minutes void of pain in order to return to prior exercises routine.  
            4. Patient will be able to negotiate a flight of stairs without difficulty in order to improve ease of getting to her bedroom upstairs.  PLAN 
[]  Upgrade activities as tolerated     [x]  Continue plan of care 
[]  Update interventions per flow sheet      
[]  Discharge due to:_ 
[]  Other:_   
 
Derrick Shearer 5/15/2019  10:55 AM 
 
Future Appointments Date Time Provider Manpreet Stoddard 5/15/2019 11:00 [facility]  
5/16/2019  8:45 AM Hannah Nuno MD [facility] SO SCHED  
5/17/2019 10:00 AM [facility] Sw[facility]  
5/20/2019  8:50 AM Gerhard Rankin MD [place] [id number]  
5/22/2019 12:00 PM [facility]  
5/22/2019  1:00 PM CSI, PT INR [facility] SO SCHED  
5/23/2019  8:00 AM [facility] WILLOW LUNDBERG [facility] 5/24/2019 11:30 AM Earl Meredith, PT [facility]  
8/20/2019 10:40 AM Willian Mars DO [address] [place] [address]

## 2019-05-17 ENCOUNTER — HOSPITAL ENCOUNTER (OUTPATIENT)
Dept: PHYSICAL THERAPY | Age: 59
Discharge: HOME | End: 2019-05-17
Payer: MEDICARE

## 2019-05-17 PROCEDURE — 97112 NEUROMUSCULAR REEDUCATION: CPT

## 2019-05-17 PROCEDURE — 97140 MANUAL THERAPY 1/> REGIONS: CPT

## 2019-05-17 NOTE — PROGRESS NOTES
PT DAILY TREATMENT NOTE 10-18 Patient Name: Francisco Vogt Date:2019 : 1960 [x]  Patient  Verified Payor: [place] MEDICARE / Plan: Prisca Cook / Product Type: Medicare / In time:10:02  Out time:10:41 Total Treatment Time (min): 39 Visit #: 6 of 8 Medicare/BCBS Only Total Timed Codes (min):  39 1:1 Treatment Time:  34 Treatment Area: Left foot pain [M79.672] SUBJECTIVE Pain Level (0-10 scale): 0/10 Any medication changes, allergies to medications, adverse drug reactions, diagnosis change, or new procedure performed?: [x] No    [] Yes (see summary sheet for update) Subjective functional status/changes:   [] No changes reported \"None this morning, have most of my pain at night. \" OBJECTIVE 23 min Therapeutic Exercise:  [x] See flow sheet :  
Rationale: increase ROM and increase strength to improve the patients ability to perform ADL's. 
 
8 min Neuromuscular Re-education:  [x]  See flow sheet :  
Rationale: improve balance and increase proprioception  to improve the patients ability to perform functional activities. 8 min Manual Therapy:  Graston technique to Left gastroc, soleus, achilles tendon and plantar fascia. Rationale: decrease pain, increase ROM, increase tissue extensibility and decrease trigger points to improve ease of performing functional activities. With 
 [x] TE 
 [] TA 
 [] neuro 
 [] other: Patient Education: [x] Review HEP [] Progressed/Changed HEP based on:  
[] positioning   [] body mechanics   [] transfers   [] heat/ice application   
[] other:   
 
Other Objective/Functional Measures: Time constraint with several exercises, required cueing to correct reformer series. Pain Level (0-10 scale) post treatment: 0/10 ASSESSMENT/Changes in Function: Hypersensitivity primarily in plantar fascia, middle and medial aspects. Pt reports continued HEP compliance.  Continue PT to increase ankle mobility/stability and improve ease of performing functional activities. Patient will continue to benefit from skilled PT services to modify and progress therapeutic interventions, address functional mobility deficits, address ROM deficits, address strength deficits, analyze and address soft tissue restrictions and analyze and modify body mechanics/ergonomics to attain remaining goals. [x]  See Plan of Care 
[]  See progress note/recertification 
[]  See Discharge Summary Progress towards goals / Updated goals: 
Short Term Goals: To be accomplished in 2 weeks: 
            4. Patient will be independent and compliant with HEP 1-2x/day in order to improve ease of ADLs. -met per patient report (5/1/2019) Long Term Goals: To be accomplished in 4 weeks:  
            1. Patient will be able to improve left ankle DF AROM to 5 degrees in order to improve ease of ambulation.  - Neutral. 5/10/2019 
            2. Patient will be able to improve FOTO score to 65 in order to demonstrate improvements in functional independence.  
            3. Patient will be able to ambulate greater than 10 minutes void of pain in order to return to prior exercises routine.  
            4. Patient will be able to negotiate a flight of stairs without difficulty in order to improve ease of getting to her bedroom upstairs.  PLAN 
[]  Upgrade activities as tolerated     [x]  Continue plan of care 
[]  Update interventions per flow sheet      
[]  Discharge due to:_ 
[]  Other:_   
 
Thor Azar PTA 5/17/2019  10:03 AM 
 
Future Appointments Date Time Provider Manpreet Stoddard 5/20/2019  8:50 AM Martínez Rankin MD [facility] [address]  
5/22/2019 12:00 PM Terry CONTRERAS, PTA [facility]  
5/22/2019  1:00 PM CSI, PT INR [facility] SO SCHED  
5/23/2019  8:00 AM [facility] WILLOW GALINDOO [facility]  
5/24/2019 11:30 AM Bertha Camilo, PT [facility]  
6/24/2019 10:00 AM Jose Daniel Singh MD [facility] SO SCHED  
 8/20/2019 10:40 AM Lexie Zuniga DO [address] [place] [address]

## 2019-05-20 ENCOUNTER — OFFICE VISIT (OUTPATIENT)
Dept: ORTHOPEDIC SURGERY | Age: 59
End: 2019-05-20

## 2019-05-20 VITALS
OXYGEN SATURATION: 96 % | HEART RATE: 76 BPM | SYSTOLIC BLOOD PRESSURE: 127 MMHG | DIASTOLIC BLOOD PRESSURE: 91 MMHG | HEIGHT: 69 IN | BODY MASS INDEX: 28.29 KG/M2 | WEIGHT: 191 LBS | RESPIRATION RATE: 16 BRPM | TEMPERATURE: 97.9 F

## 2019-05-20 DIAGNOSIS — M72.2 PLANTAR FASCIITIS OF LEFT FOOT: Primary | ICD-10-CM

## 2019-05-20 NOTE — PROGRESS NOTES
AMBULATORY PROGRESS NOTE Patient: Antoinette Benavides             MRN: [id number]     SSN: [id number] Body mass index is 28.21 kg/m². YOB: 1960     AGE: 59 y.o. EX: female PCP: Lou Centeno MD 
 
 IMPRESSION/DIAGNOSIS AND TREATMENT PLAN  
 
DIAGNOSES 1. Plantar fasciitis of left foot Orders Placed This Encounter [id number] [facility],[address] Antoinette Benavides understands her diagnoses and the proposed plan. Plan: 
 
1) Steroid injection may be considered next time 2) Continue the HEP as directed. 3) Continue using the Visco heel inserts and dorsal night splint as directed. 4) Include Ultrasound to physical therapy RTO - 2 weeks HPI AND EXAMINATION Antoinette Benavides IS A 59 y.o. female who presents to my outpatient office for follow up of plantar fasciitis of the left foot. Since we saw her last, Ms. Rhianna Marquis was referred to physical therapy focusing on the Graston technique and low frequency ultrasound. She states that she is still experiencing pain in her left plantar fascia and that physical therapy has not helped. She rates her pain today at a 3 out of 10, but states that her pain becomes worse than this by the end of the day. She has been performing her exercises as directed, as well as using the dorsal night splint and Visco heel inserts. The patient has received Cortisone injections to other areas in her body in the past, and recalls that they did help with her pain. The patient is currently taking Coumadin for A-fib and cannot take NSAIDs. Visit Vitals BP (!) 127/91 (BP 1 Location: Left arm, BP Patient Position: Sitting) Pulse 76 Temp 97.9 °F (36.6 °C) (Oral) Resp 16 Ht 5' 9\" (1.753 m) Wt 191 lb (86.6 kg) SpO2 96% BMI 28.21 kg/m² Appearance: Alert, well appearing and pleasant patient who is in no distress, oriented to person, place/time, and who follows commands. Psychiatric: Affect and mood are appropriate. Cardiovascular/Peripheral Vascular: Normal Pulses to each hand and foot Musculoskeletal:  LOCATION:  Left FOOT/ANKLE Integumentary: No rashes, skin patches, wounds, or abrasions to the right or left legs Warm and normal color. No regions of expressible drainage. Palpable fullness or mass is not present Gait: Antalgic Tenderness: mild PLANTAR FASCIA REGION, with no NODULARITIES Mild tenderness to the central heel Motor/Strength/Tone Exam: Normal DF, INV,EVERSION. Slightly diminished PF 
                                                     strength due to plantar fascial tenderness Sensory Exam:   Intact Normal Sensation to ankle/foot Stability Testing: No anterolateral or varus instability of the Ankle or Subtalar Joints No peroneal tendon instability noted ROM: Normal ROM noted to ankle/foot Contractures: No Achilles or Gastrocnemius Contractures Calf tenderness: Absent for calf or gastrocnemius muscle regions Soft, supple, non tender, non taut lower extremity compartments Alignment: Neutral Hindfoot Wounds/Abrasions:   None present Extremities:   No embolic phenomena to the toes or hands No significant edema to the foot and or toes. Lower extremities are warm and appear well perfused DVT: No evidence of DVT seen on examination at this time No calf swelling, no tenderness to calf muscles Lymphatic:  No Evidence of Lymphedema Vascular: Medial Border of Tibia Region: Edema is not present Pulses: Dorsalis Pedis &  Posterior Tibial Pulses : Palpable yes Varicosities Lower Limbs :  None CHART REVIEW Past Medical History:  
Diagnosis Date  Aortic valve disorders 1/13/2011  Asthma  Atrial fibrillation ([facility] [address] 1/13/2011  Congestive heart failure, unspecified March 2005 Cleared quickly with Lasix therapy  Echocardiogram 01/10/2012 A-fib. EF 45%. Mild RVE. RVSP 45-50. Massive LAE. Mod TAMI. Mod MS. Severe MR (mean grad 10). Mild-mod AI. Mild TR. Pulmonic systolic flow reversal.  Mild IVCE.  Hypertension  Iron deficiency anemias  Long term (current) use of anticoagulants 2/23/2011  Murmur 1/2011 Grade I-II/VI systolic ejection murmur along left sternal border, with radiation to the pulmonic area.  S/P cardiac cath 02/24/2012 Minimal coronary artery disease. Mild LVE. EF 55%. Mod MR. Mod-severe MS. RA 15.  RV 55/16. PA 58/34. W 36.  CO/CI 3.5/1.9 (TD); 3.61/1.9 ([place]). R to L shunt suggested.  Wears glasses  Weight gain Current Outpatient Medications Medication Sig  
 KLOR-CON M20 20 mEq tablet TAKE 1 TAB BY MOUTH DAILY.  sertraline (ZOLOFT) 25 mg tablet Take 1 Tab by mouth daily.  atorvastatin (LIPITOR) 40 mg tablet Take 1 Tab by mouth daily.  amLODIPine (NORVASC) 5 mg tablet Take 1 Tab by mouth daily.  VENTOLIN HFA 90 mcg/actuation inhaler TAKE 2 PUFFS BY INHALATION DAILY.  warfarin (COUMADIN) 5 mg tablet TAKE 5MG DAILY EXCEPT 2.5MG ON TUES, THUR, AND SAT. OR AS DIRECTED BY OUR OFFICE  
 losartan (COZAAR) 100 mg tablet Take 1 Tab by mouth daily.  furosemide (LASIX) 40 mg tablet Take 1 Tab by mouth daily.  fluticasone furoate (ARNUITY ELLIPTA) 100 mcg/actuation dsdv inhaler Take 2 Puffs by inhalation daily.  aspirin delayed-release 81 mg tablet Take 1 Tab by mouth daily.  polyethylene glycol (MIRALAX) 17 gram packet Take 1 Packet by mouth daily. (Patient taking differently: Take  by mouth daily as needed for Other (Constipation). )  metoprolol succinate (TOPROL-XL) 100 mg tablet Take 1 Tab by mouth daily.  diphenhydrAMINE (BENADRYL) 25 mg capsule Take 25 mg by mouth every six (6) hours as needed.  OTHER Allergy injections  diclofenac (VOLTAREN) 1 % gel Apply  to affected area four (4) times daily.  chlorpheniramine-acetaminophen (CORICIDIN HBP COLD AND FLU) 2-325 mg tab Take 325 mg by mouth two (2) times daily as needed for Cough (Cold symptoms).  esomeprazole (NEXIUM) 20 mg capsule Take 20 mg by mouth daily as needed (heart burn).  fluticasone (FLONASE ALLERGY RELIEF) 50 mcg/actuation nasal spray 2 Sprays by Both Nostrils route daily as needed for Rhinitis.  sodium chloride (SALINE NASAL) 0.65 % nasal squeeze bottle 1 Spray as needed for Congestion.  melatonin tab tablet Take 10 mg by mouth nightly. No current facility-administered medications for this visit. Allergies Allergen Reactions  Morphine Rash and Itching  Norco [Hydrocodone-Acetaminophen] Itching Past Surgical History:  
Procedure Laterality Date  HX HEART CATHETERIZATION  2/24/2012  HX HYSTERECTOMY  2006  HX MITRAL VALVE REPLACEMENT  05/22/2013  
 25/33 mm On-X mechanical valve with bilateral pulmonary vein isolation and resection of LA appendage Social History Occupational History  Not on file Tobacco Use  Smoking status: Former Smoker  Smokeless tobacco: Never Used Substance and Sexual Activity  Alcohol use: Yes Alcohol/week: 0.6 oz Types: 1 Glasses of wine per week Comment: occassionally  Drug use: No  
 Sexual activity: Yes  
  Partners: Male Birth control/protection: None Family History Problem Relation Age of Onset  Heart Surgery Father Open-Heart Surgery  Other Father Venous Thromboembolism  Heart Disease Mother Enlarged Heart  Hypertension Mother  Diabetes Mother REVIEW OF SYSTEMS : 5/20/2019  ALL BELOW ARE Negative except : SEE HPI Constitutional: Negative for fever, chills and weight loss. Neg Weight Loss Cardiovascular: Negative for chest pain, claudication and leg swelling.  SOB, LIZARRAGA  
 Gastrointestinal/Urological: Negative for  pain, N/V/D/C, Blood in stool or urine,dysuria                         Hematuria, Incontinence, pelvic pain Musculoskeletal: see HPI. Neurological: Negative for dizziness and weakness, headaches,Visual Changes             Confusion,  Or Seizures, Psychiatric/Behavioral: Negative for depression, memory loss and substance abuse. Extremities:  Negative for hair changes, rash or skin lesion changes. Hematologic: Negative for Bleeding problems, bruising, pallor or swollen lymph nodes. Peripheral Vascular: No calf pain, vascular vein tenderness to calf pain No calf throbbing, posterior knee throbbing pain DIAGNOSTIC IMAGING No notes on file Please see above section of this report. I have personally reviewed the results of the above study. The interpretation of this study is my professional opinion. Written by Yelena Hastings, as dictated by Dr. Octavia Burr. I, Dr. Octavia Burr, confirm that all documentation is accurate.

## 2019-05-20 NOTE — PROGRESS NOTES
1. Have you been to the ER, urgent care clinic since your last visit? Hospitalized since your last visit? NO 
 
2. Have you seen or consulted any other health care providers outside of the [address] [place] [address] since your last visit? Include any pap smears or colon screening.  NO

## 2019-05-22 ENCOUNTER — ANTI-COAG VISIT (OUTPATIENT)
Dept: CARDIOLOGY CLINIC | Age: 59
End: 2019-05-22

## 2019-05-22 ENCOUNTER — HOSPITAL ENCOUNTER (OUTPATIENT)
Dept: PHYSICAL THERAPY | Age: 59
Discharge: HOME | End: 2019-05-22
Payer: MEDICARE

## 2019-05-22 DIAGNOSIS — Z79.01 LONG TERM CURRENT USE OF ANTICOAGULANT THERAPY: ICD-10-CM

## 2019-05-22 DIAGNOSIS — I48.91 ATRIAL FIBRILLATION, UNSPECIFIED TYPE (HCC): ICD-10-CM

## 2019-05-22 LAB
INR BLD: 2.4
PT POC: 28.2 SECONDS
VALID INTERNAL CONTROL?: YES

## 2019-05-22 PROCEDURE — 97140 MANUAL THERAPY 1/> REGIONS: CPT

## 2019-05-22 PROCEDURE — 97110 THERAPEUTIC EXERCISES: CPT

## 2019-05-22 NOTE — PROGRESS NOTES
PT DAILY TREATMENT NOTE 10-18    Patient Name: Samantha Sequeira  Date:2019  : 1960  [x]  Patient  Verified  Payor: [place] MEDICARE / Plan: [place] MEDICARE PART A & B / Product Type: Medicare /    In time:11:56  Out time:12:41  Total Treatment Time (min): 45  Visit #: 7 of 8    Medicare/BCBS Only   Total Timed Codes (min):  45 1:1 Treatment Time:  45       Treatment Area: Left foot pain [M79.692]    SUBJECTIVE  Pain Level (0-10 scale): 3/10  Any medication changes, allergies to medications, adverse drug reactions, diagnosis change, or new procedure performed?: [x] No    [] Yes (see summary sheet for update)  Subjective functional status/changes:   [] No changes reported  \"The calf mm's were sore for a few days after the last time, feeling better now. \"    OBJECTIVE     Modality rationale: decrease pain and increase tissue extensibility to improve the patients ability to perform ADL's.    Min Type Additional Details    [] Estim:  []Unatt       []IFC  []Premod                        []Other:  []w/ice   []w/heat  Position:  Location:    [] Estim: []Att    []TENS instruct  []NMES                    []Other:  []w/US   []w/ice   []w/heat  Position:  Location:    []  Traction: [] Cervical       []Lumbar                       [] Prone          []Supine                       []Intermittent   []Continuous Lbs:  [] before manual  [] after manual   6+2 setup [x]  Ultrasound: [x]Continuous   [] Pulsed                           [x]1MHz   []3MHz W/cm2: 1.0  Location: Left heel    []  Iontophoresis with dexamethasone         Location: [] Take home patch   [] In clinic    []  Ice     []  heat  []  Ice massage  []  Laser   []  Anodyne Position:  Location:    []  Laser with stim  []  Other:  Position:  Location:    []  Vasopneumatic Device Pressure:       [] lo [] med [] hi   Temperature: [] lo [] med [] hi   [] Skin assessment post-treatment:  []intact []redness- no adverse reaction    []redness  adverse reaction:     29 min Therapeutic Exercise:  [x] See flow sheet :   Rationale: increase ROM and increase strength to improve the patients ability to perform functional activities. 8 min Manual Therapy:  Graston technique to Left gastroc, soleus, achilles tendon and plantar fascia. Rationale: decrease pain, increase ROM, increase tissue extensibility and decrease trigger points to improve ease of performing functional activities. With   [x] TE   [] TA   [] neuro   [] other: Patient Education: [x] Review HEP    [] Progressed/Changed HEP based on:   [] positioning   [] body mechanics   [] transfers   [] heat/ice application    [] other:      Other Objective/Functional Measures: Added US per MD script. Pain Level (0-10 scale) post treatment: 0/10    ASSESSMENT/Changes in Function: Negotiated up/down 6\" steps with \"quite a bit of\" difficulty in the morning. Pt denied pain post-treatment, stated \"that pain I had is gone, it's just a little sore now. \" Continue PT to improve ankle mobility/strength to improve ease of performing functional activities. Patient will continue to benefit from skilled PT services to modify and progress therapeutic interventions, address functional mobility deficits, address ROM deficits, address strength deficits, analyze and address soft tissue restrictions and analyze and cue movement patterns to attain remaining goals. [x]  See Plan of Care  []  See progress note/recertification  []  See Discharge Summary         Progress towards goals / Updated goals:  Short Term Goals: To be accomplished in 2 weeks:              0. Patient will be independent and compliant with HEP 1-2x/day in order to improve ease of ADLs. -met per patient report (5/1/2019)  Long Term Goals: To be accomplished in 4 weeks:               1.  Patient will be able to improve left ankle DF AROM to 5 degrees in order to improve ease of ambulation.  - Neutral. 5/10/2019              2. Patient will be able to improve FOTO score to 65 in order to demonstrate improvements in functional independence.               3. Patient will be able to ambulate greater than 10 minutes void of pain in order to return to prior exercises routine.               4. Patient will be able to negotiate a flight of stairs without difficulty in order to improve ease of getting to her bedroom upstairs. - Negotiated up/down 6\" steps with \"quite a bit of\" difficulty in the morning. 5/22/2019    PLAN  []  Upgrade activities as tolerated     [x]  Continue plan of care  []  Update interventions per flow sheet       []  Discharge due to:_  []  Other:_      Ata Aguilar, TUNG 5/22/2019  12:00 PM    Future Appointments   Date Time Provider Manpreet Stoddard   5/22/2019  1:00 PM CSI, PT INR [facility] SO SCHED   5/23/2019  8:00 AM [facility] TAMIKA  [facility]   5/24/2019 11:30 AM Elizabeth Stewart PT [facility]   6/3/2019  8:50 AM AmYamil Santos MD [place] [id number]   6/24/2019 10:00 AM Tiki Aguilar MD [facility] SO SCHED   8/20/2019 10:40 AM Leland Alcala,  [facility]

## 2019-05-24 ENCOUNTER — HOSPITAL ENCOUNTER (OUTPATIENT)
Dept: PHYSICAL THERAPY | Age: 59
Discharge: HOME | End: 2019-05-24
Payer: MEDICARE

## 2019-05-24 PROCEDURE — 97035 APP MDLTY 1+ULTRASOUND EA 15: CPT

## 2019-05-24 PROCEDURE — 97110 THERAPEUTIC EXERCISES: CPT

## 2019-05-24 PROCEDURE — 97140 MANUAL THERAPY 1/> REGIONS: CPT

## 2019-05-24 NOTE — PROGRESS NOTES
PT DAILY TREATMENT NOTE 10-18    Patient Name: Kaity Gibson  Date:2019  : 1960  [x]  Patient  Verified  Payor: VA MEDICARE / Plan: VA MEDICARE PART A & B / Product Type: Medicare /    In time:11:28  Out time:12:22  Total Treatment Time (min): 47   Visit #: 8 of 8    Medicare/BCBS Only   Total Timed Codes (min):  54 1:1 Treatment Time:  40       Treatment Area: Left foot pain [M79.672]    SUBJECTIVE  Pain Level (0-10 scale): 5  Any medication changes, allergies to medications, adverse drug reactions, diagnosis change, or new procedure performed?: [x] No    [] Yes (see summary sheet for update)  Subjective functional status/changes:   [] No changes reported  \"I am hurting more today\". OBJECTIVE    Modality rationale: decrease pain and increase tissue extensibility to improve the patients ability to perform ADLs with improved ease.     Min Type Additional Details    [] Estim:  []Unatt       []IFC  []Premod                        []Other:  []w/ice   []w/heat  Position:  Location:    [] Estim: []Att    []TENS instruct  []NMES                    []Other:  []w/US   []w/ice   []w/heat  Position:  Location:    []  Traction: [] Cervical       []Lumbar                       [] Prone          []Supine                       []Intermittent   []Continuous Lbs:  [] before manual  [] after manual   6 min + 2 min set up [x]  Ultrasound: [x]Continuous   [] Pulsed                           [x]1MHz   []3MHz W/cm2: 1.0  Location: Left heel     []  Iontophoresis with dexamethasone         Location: [] Take home patch   [] In clinic    []  Ice     []  heat  []  Ice massage  []  Laser   []  Anodyne Position:  Location:    []  Laser with stim  []  Other:  Position:  Location:    []  Vasopneumatic Device Pressure:       [] lo [] med [] hi   Temperature: [] lo [] med [] hi   [] Skin assessment post-treatment:  []intact []redness- no adverse reaction    []redness  adverse reaction:       38 min Therapeutic Exercise:  [x] See flow sheet :   Rationale: increase ROM and increase strength to improve the patients ability to perform ADLs with improved ease. 8 min Manual Therapy:  DTM left gastroc, soleus, and plantar fascia   Rationale: increase ROM and increase tissue extensibility to improve patients functional mobility. With   [] TE   [] TA   [] neuro   [] other: Patient Education: [x] Review HEP    [] Progressed/Changed HEP based on:   [] positioning   [] body mechanics   [] transfers   [] heat/ice application    [] other:      Other Objective/Functional Measures: FOTO score - 42. Left DF  AROM - neutral.      Pain Level (0-10 scale) post treatment: 0    ASSESSMENT/Changes in Function: Patient reports no pain post session stating she feels much better than she did when she came in. Patient states she is able to stand for 1 hour prior to having pain. Patient continues to be limited in regards to left ankle DF. Patient educated on proper footwear in order to reduce symptoms. Plan to continue progressing mobility and strengthening exercises. Patient will continue to benefit from skilled PT services to modify and progress therapeutic interventions, address functional mobility deficits, address ROM deficits, address strength deficits, analyze and address soft tissue restrictions, analyze and cue movement patterns, analyze and modify body mechanics/ergonomics and assess and modify postural abnormalities to attain remaining goals. [x]  See Plan of Care  []  See progress note/recertification  []  See Discharge Summary         Progress towards goals / Updated goals:  Short Term Goals: To be accomplished in 2 weeks:              9. Patient will be independent and compliant with HEP 1-2x/day in order to improve ease of ADLs. -met per patient report (5/1/2019)  Long Term Goals: To be accomplished in 4 weeks:               1.  Patient will be able to improve left ankle DF AROM to 5 degrees in order to improve ease of ambulation.  - Neutral. 5/10/2019              2. Patient will be able to improve FOTO score to 65 in order to demonstrate improvements in functional independence. Not met (5/24/19) - FOTO score 42              3. Patient will be able to ambulate greater than 10 minutes void of pain in order to return to prior exercises routine.  Progressing 5/24/19 - patient reports ambulating 10 minutes with slight pain              4. Patient will be able to negotiate a flight of stairs without difficulty in order to improve ease of getting to her bedroom upstairs. - Negotiated up/down 6\" steps with \"quite a bit of\" difficulty in the morning. 5/22/2019      PLAN  []  Upgrade activities as tolerated     [x]  Continue plan of care  []  Update interventions per flow sheet       []  Discharge due to:_  []  Other:_      Raysa Donato, PT 5/24/2019  11:31 AM    Future Appointments   Date Time Provider Manpreet Stoddard   5/29/2019  7:45 AM [facility] TAMIKA  [facility]   6/3/2019  8:50 AM [place] Rankin MD [facility] [address]   6/24/2019 10:00 AM Rodo Ca MD [facility] SO SCHED   6/26/2019  8:30 AM CSI, PT INR  [facility] SO SCHED   8/20/2019 10:40 AM Andrew Byrd,  [facility]

## 2019-05-24 NOTE — PROGRESS NOTES
[facility]  [address]  [place], [address]  [phone] fax    Continued Plan of Care/ Re-certification for Physical Therapy Services    Patient name: Stephen Orellana Start of Care: 2019   Referral source: Kirsten Reveles MD : 1960               Medical Diagnosis: Plantar fascial fibromatosis [M72.2]  Payor: VA MEDICARE / Plan: VA MEDICARE PART A & B / Product Type: Medicare /  Onset Date:3 months ago               Treatment Diagnosis: Left foot pain   Prior Hospitalization: see medical history Provider#: [id number]   Medications: Verified on Patient summary List    Comorbidities: allergies, asthma, CHF, HBP   Prior Level of Function: (I) with ADLs and takes care of her mother in law. Visits from Start of Care: 8    Missed Visits: 0    The Plan of Care and following information is based on the patient's current status:      Key functional changes: Patient continues to present with limited ankle DF. Patient presents with increased medial arch. Decreased standing and ambulation tolerance due to increased pain. Patient educated on proper footwear in order to reduce symptoms. Short Term Goals: To be accomplished in 2 weeks:              8. Patient will be independent and compliant with HEP 1-2x/day in order to improve ease of ADLs. -met per patient report (2019)  Long Term Goals: To be accomplished in 4 weeks:               1. Patient will be able to improve left ankle DF AROM to 5 degrees in order to improve ease of ambulation.  - Neutral. 5/10/2019              2. Patient will be able to improve FOTO score to 65 in order to demonstrate improvements in functional independence. Not met (19) - FOTO score 42              3. Patient will be able to ambulate greater than 10 minutes void of pain in order to return to prior exercises routine.  Progressing 19 - patient reports ambulating 10 minutes with slight pain              4. Patient will be able to negotiate a flight of stairs without difficulty in order to improve ease of getting to her bedroom upstairs. - Negotiated up/down 6\" steps with \"quite a bit of\" difficulty in the morning. 5/22/2019      Problems/ barriers to goal attainment: compliance with HEP, proper footwear     Problem List: pain affecting function, decrease ROM, decrease strength, impaired gait/ balance, decrease ADL/ functional abilitiies, decrease activity tolerance and decrease flexibility/ joint mobility    Treatment Plan: Therapeutic exercise, Neuromuscular re-education, Physical agent/modality, Gait/balance training, Manual therapy, Patient education and Functional mobility training     Patient Goal (s) has been updated and includes: Improve left ankle AROM, improve ambulation tolerance, improve functional independence. Goals for this certification period to be accomplished in 3 weeks:  1. Patient will be able to improve left ankle DF AROM to 5 degrees in order to improve ease of ambulation.  - Neutral. 5/10/2019              2. Patient will be able to improve FOTO score to 65 in order to demonstrate improvements in functional independence. Not met (5/24/19) - FOTO score 42              3. Patient will be able to ambulate greater than 10 minutes void of pain in order to return to prior exercises routine. Progressing 5/24/19 - patient reports ambulating 10 minutes with slight pain              4. Patient will be able to negotiate a flight of stairs without difficulty in order to improve ease of getting to her bedroom upstairs. - Negotiated up/down 6\" steps with \"quite a bit of\" difficulty in the morning. 5/22/2019      Frequency / Duration: Patient to be seen 2 times per week for 3 weeks:    Assessment / Recommendations:Patient reports no pain post session stating she feels much better than she did when she came in. Patient states she is able to stand for 1 hour prior to having pain. Patient continues to be limited in regards to left ankle DF. Patient educated on proper footwear in order to reduce symptoms. Plan to continue progressing mobility and strengthening exercises. Certification Period: 5/24/19 - 6/23/19    Roberto Swartz, PT 5/24/2019 11:32 AM    ________________________________________________________________________  I certify that the above Therapy Services are being furnished while the patient is under my care. I agree with the treatment plan and certify that this therapy is necessary. [] I have read the above and request that my patient continue as recommended.   [] I have read the above report and request that my patient continue therapy with the following changes/special instructions: _____________________________________________  [] I have read the above report and request that my patient be discharged from therapy    Physician's Signature:____________Date:_________TIME:________    ** Signature, Date and Time must be completed for valid certification **    Please sign and return to [facility] [address] [place] [address] Rubi Bundy [address] [place] [address], [facility] [address]  [phone] fax

## 2019-05-29 ENCOUNTER — HOSPITAL ENCOUNTER (OUTPATIENT)
Dept: MAMMOGRAPHY | Age: 59
Discharge: HOME | End: 2019-05-29
Attending: FAMILY MEDICINE
Payer: MEDICARE

## 2019-05-29 DIAGNOSIS — Z12.31 VISIT FOR SCREENING MAMMOGRAM: ICD-10-CM

## 2019-05-29 PROCEDURE — 77063 BREAST TOMOSYNTHESIS BI: CPT

## 2019-05-30 ENCOUNTER — HOSPITAL ENCOUNTER (OUTPATIENT)
Dept: PHYSICAL THERAPY | Age: 59
Discharge: HOME | End: 2019-05-30
Payer: MEDICARE

## 2019-05-30 NOTE — PROGRESS NOTES
PT DAILY TREATMENT NOTE 10-18    Patient Name: Mile December  Date:2019  : 1960  [x]  Patient  Verified  Payor: VA MEDICARE / Plan: VA MEDICARE PART A & B / Product Type: Medicare /    In time:10:00  Out time:10:41  Total Treatment Time (min): 41  Visit #: 1 of 6    Medicare/BCBS Only   Total Timed Codes (min):  41 1:1 Treatment Time:  38       Treatment Area: Left foot pain [M79.672]    SUBJECTIVE  Pain Level (0-10 scale): 1  Any medication changes, allergies to medications, adverse drug reactions, diagnosis change, or new procedure performed?: [x] No    [] Yes (see summary sheet for update)  Subjective functional status/changes:   [] No changes reported  \"I took a few days to rest, and I am doing much better now\". OBJECTIVE    Modality rationale: decrease pain and increase tissue extensibility to improve the patients ability to perform ADLs.     Min Type Additional Details    [] Estim:  []Unatt       []IFC  []Premod                        []Other:  []w/ice   []w/heat  Position:  Location:    [] Estim: []Att    []TENS instruct  []NMES                    []Other:  []w/US   []w/ice   []w/heat  Position:  Location:    []  Traction: [] Cervical       []Lumbar                       [] Prone          []Supine                       []Intermittent   []Continuous Lbs:  [] before manual  [] after manual   6 min + 2 min set up [x]  Ultrasound: [x]Continuous   [] Pulsed                           [x]1MHz   []3MHz W/cm2: 1.0  Location: left heel    []  Iontophoresis with dexamethasone         Location: [] Take home patch   [] In clinic    []  Ice     []  heat  []  Ice massage  []  Laser   []  Anodyne Position:  Location:    []  Laser with stim  []  Other:  Position:  Location:    []  Vasopneumatic Device Pressure:       [] lo [] med [] hi   Temperature: [] lo [] med [] hi   [] Skin assessment post-treatment:  []intact []redness- no adverse reaction    []redness  adverse reaction:         25 min Therapeutic Exercise:  [x] See flow sheet :   Rationale: increase ROM and increase strength to improve the patients ability to perform ADLs with improved ease. 8 min Manual Therapy:  DTM plantar fascia,gastroc/soleus, and achilles tendon   Rationale: increase ROM and increase tissue extensibility to improve patients activity tolerance. With   [] TE   [] TA   [] neuro   [] other: Patient Education: [x] Review HEP    [] Progressed/Changed HEP based on:   [] positioning   [] body mechanics   [] transfers   [] heat/ice application    [] other:      Other Objective/Functional Measures:      Pain Level (0-10 scale) post treatment: 0    ASSESSMENT/Changes in Function: Patient tolerated all exercises well reporting no increase in symptoms. Patient demonstrated improvements in left SLS today. Plan to continue progressing mobility and strengthening exercises and progress to more standing exercises per patients tolerance. Patient will continue to benefit from skilled PT services to modify and progress therapeutic interventions, address functional mobility deficits, address ROM deficits, address strength deficits, analyze and address soft tissue restrictions, analyze and cue movement patterns, analyze and modify body mechanics/ergonomics and assess and modify postural abnormalities to attain remaining goals. [x]  See Plan of Care  []  See progress note/recertification  []  See Discharge Summary         Progress towards goals / Updated goals:  1. Patient will be able to improve left ankle DF AROM to 5 degrees in order to improve ease of ambulation.  - Neutral. 5/10/2019  2. Patient will be able to improve FOTO score to 65 in order to demonstrate improvements in functional independence. Not met (5/24/19) - FOTO score 42  3. Patient will be able to ambulate greater than 10 minutes void of pain in order to return to prior exercises routine.  Progressing 5/24/19 - patient reports ambulating 10 minutes with slight pain  4.  Patient will be able to negotiate a flight of stairs without difficulty in order to improve ease of getting to her bedroom upstairs. - Negotiated up/down 6\" steps with \"quite a bit of\" difficulty in the morning. 5/22/2019      PLAN  []  Upgrade activities as tolerated     [x]  Continue plan of care  []  Update interventions per flow sheet       []  Discharge due to:_  []  Other:_      Michael Hopkins, PT 5/30/2019  10:04 AM    Future Appointments   Date Time Provider Manpreet Stoddard   6/3/2019  8:50 AM Olamide Rankin MD [address]   6/4/2019  2:00 PM Carlos Monteiro, PTA [facility]   6/7/2019  9:30 AM Carlos Monteiro, PTA [facility]   6/10/2019  9:00 AM Carlos Monteiro, PTA [facility]   6/14/2019  9:30 AM Carlos Monteiro, PTA [facility]   6/21/2019 11:30 AM Chelsey Sigala, PT [facility]   6/24/2019 10:00 AM Jer Ramírez MD [facility] SO SCHED   6/26/2019  8:30 AM CSI, PT INR [facility] SO SCHED   8/20/2019 10:40 AM Adrian Barroso DO [address] [place] [address]

## 2019-06-03 ENCOUNTER — OFFICE VISIT (OUTPATIENT)
Dept: ORTHOPEDIC SURGERY | Age: 59
End: 2019-06-03

## 2019-06-03 VITALS
SYSTOLIC BLOOD PRESSURE: 125 MMHG | OXYGEN SATURATION: 97 % | BODY MASS INDEX: 28.29 KG/M2 | TEMPERATURE: 97.8 F | HEIGHT: 69 IN | RESPIRATION RATE: 16 BRPM | HEART RATE: 70 BPM | DIASTOLIC BLOOD PRESSURE: 84 MMHG | WEIGHT: 191 LBS

## 2019-06-03 DIAGNOSIS — M72.2 PLANTAR FASCIITIS OF LEFT FOOT: Primary | ICD-10-CM

## 2019-06-03 RX ORDER — TRIAMCINOLONE ACETONIDE 40 MG/ML
40 INJECTION, SUSPENSION INTRA-ARTICULAR; INTRAMUSCULAR ONCE
Qty: 1 ML | Refills: 0
Start: 2019-06-03 | End: 2019-06-03

## 2019-06-03 NOTE — PROGRESS NOTES
AMBULATORY PROGRESS NOTE      Patient: Jareth Ferreira             MRN: [id number]     SSN: [id number] Body mass index is 28.21 kg/m². YOB: 1960     AGE: 59 y.o. EX: female    PCP: Marsha Davenport MD     IMPRESSION/DIAGNOSIS AND TREATMENT PLAN     DIAGNOSES  1. Plantar fasciitis of left foot        Orders Placed This Encounter    INJECT TENDON SHEATH/LIGAMENT    TRIAMCINOLONE ACETONIDE INJ    triamcinolone acetonide (KENALOG) 40 mg/mL injection      Jareth Ferreira understands her diagnoses and the proposed plan. Plan:    1) Cortisone injection to the left plantar fascia: 1 mL Kenalog and 3 mL Lidocaine. 2) Use cryotherapy as directed. 3) Continue activity modification as directed. 4) Resume PT in 1 week. RTO - 4 weeks     HPI AND EXAMINATION     Jareth Ferreira IS A 59 y.o. female who presents to my outpatient office for follow up of plantar fasciitis of the left foot. At the last visit, I instructed the patient to continue activity modification as directed, to continue the HEP and PT, to continue using the Visco inserts, and to anticipate a Cortisone injection at the next visit. Since we saw her last, Ms. Glenis Harman states that she is still experiencing pain in her left plantar fascia. She notes that she still experiences pain in the morning and finds that it is worse after the weekend. She reports that this weekend in particular was difficult for her, in terms of pain. At this time, she will receive a Cortisone injection and will follow up in 4 weeks. The patient is currently taking Coumadin for A-fib and cannot take NSAIDs. The patient retired after having a valve replacement in 2011.      Visit Vitals  /84 (BP 1 Location: Left arm, BP Patient Position: Sitting)   Pulse 70   Temp 97.8 °F (36.6 °C) (Oral)   Resp 16   Ht 5' 9\" (1.753 m)   Wt 191 lb (86.6 kg)   SpO2 97%   BMI 28.21 kg/m²     Appearance: Alert, well appearing and pleasant patient who is in no distress, oriented to person, place/time, and who follows commands. Psychiatric: Affect and mood are appropriate. Cardiovascular/Peripheral Vascular: Normal Pulses to each hand and foot  Musculoskeletal:  LOCATION:  Left FOOT/ANKLE  Integumentary: No rashes, skin patches, wounds, or abrasions to the right or left legs                             Warm and normal color. No regions of expressible drainage. Palpable fullness or mass is not present      Gait: Antalgic      Tenderness: mild PLANTAR FASCIA REGION, with no NODULARITIES                              No tenderness to the central heel      Motor/Strength/Tone Exam: Normal DF, INV,EVERSION. Slightly diminished PF                                                       strength due to plantar fascial tenderness      Sensory Exam:   Intact Normal Sensation to ankle/foot      Stability Testing: No anterolateral or varus instability of the Ankle or Subtalar Joints                                     No peroneal tendon instability noted      ROM: Normal ROM noted to ankle/foot      Contractures: No Achilles or Gastrocnemius Contractures      Calf tenderness: Absent for calf or gastrocnemius muscle regions       Soft, supple, non tender, non taut lower extremity compartments       Alignment: Neutral Hindfoot  Wounds/Abrasions:   None present  Extremities:   No embolic phenomena to the toes or hands                          No significant edema to the foot and or toes.                           Lower extremities are warm and appear well perfused                          DVT: No evidence of DVT seen on examination at this time                          No calf swelling, no tenderness to calf muscles  Lymphatic:  No Evidence of Lymphedema  Vascular: Medial Border of Tibia Region: Edema is not present                   Pulses: Dorsalis Pedis &  Posterior Tibial Pulses : Palpable yes                   Varicosities Lower Limbs :  None CHART REVIEW     Past Medical History:   Diagnosis Date    Aortic valve disorders 1/13/2011    Asthma     Atrial fibrillation ([facility] [address] 1/13/2011    Congestive heart failure, unspecified March 2005    Cleared quickly with Lasix therapy    Echocardiogram 01/10/2012    A-fib. EF 45%. Mild RVE. RVSP 45-50. Massive LAE. Mod TAMI. Mod MS. Severe MR (mean grad 10). Mild-mod AI. Mild TR. Pulmonic systolic flow reversal.  Mild IVCE.  Hypertension     Iron deficiency anemias     Long term (current) use of anticoagulants 2/23/2011    Murmur 1/2011    Grade I-II/VI systolic ejection murmur along left sternal border, with radiation to the pulmonic area.  S/P cardiac cath 02/24/2012    Minimal coronary artery disease. Mild LVE. EF 55%. Mod MR. Mod-severe MS. RA 15.  RV 55/16. PA 58/34. W 36.  CO/CI 3.5/1.9 (TD); 3.61/1.9 ([place]). R to L shunt suggested.  Wears glasses     Weight gain      Current Outpatient Medications   Medication Sig    triamcinolone acetonide (KENALOG) 40 mg/mL injection 1 mL by IntraMUSCular route once for 1 dose.  amLODIPine (NORVASC) 5 mg tablet TAKE 1 TABLET BY MOUTH EVERY DAY    KLOR-CON M20 20 mEq tablet TAKE 1 TAB BY MOUTH DAILY.  diphenhydrAMINE (BENADRYL) 25 mg capsule Take 25 mg by mouth every six (6) hours as needed.  OTHER Allergy injections    sertraline (ZOLOFT) 25 mg tablet Take 1 Tab by mouth daily.  atorvastatin (LIPITOR) 40 mg tablet Take 1 Tab by mouth daily.  VENTOLIN HFA 90 mcg/actuation inhaler TAKE 2 PUFFS BY INHALATION DAILY.  warfarin (COUMADIN) 5 mg tablet TAKE 5MG DAILY EXCEPT 2.5MG ON TUES, THUR, AND SAT. OR AS DIRECTED BY OUR OFFICE    furosemide (LASIX) 40 mg tablet Take 1 Tab by mouth daily.  fluticasone furoate (ARNUITY ELLIPTA) 100 mcg/actuation dsdv inhaler Take 2 Puffs by inhalation daily.  aspirin delayed-release 81 mg tablet Take 1 Tab by mouth daily.     polyethylene glycol (MIRALAX) 17 gram packet Take 1 Packet by mouth daily. (Patient taking differently: Take  by mouth daily as needed for Other (Constipation). )    esomeprazole (NEXIUM) 20 mg capsule Take 20 mg by mouth daily as needed (heart burn).  fluticasone (FLONASE ALLERGY RELIEF) 50 mcg/actuation nasal spray 2 Sprays by Both Nostrils route daily as needed for Rhinitis.  metoprolol succinate (TOPROL-XL) 100 mg tablet Take 1 Tab by mouth daily.  sodium chloride (SALINE NASAL) 0.65 % nasal squeeze bottle 1 Spray as needed for Congestion.  melatonin tab tablet Take 10 mg by mouth nightly.  diclofenac (VOLTAREN) 1 % gel Apply  to affected area four (4) times daily.  losartan (COZAAR) 100 mg tablet Take 1 Tab by mouth daily.  chlorpheniramine-acetaminophen (CORICIDIN HBP COLD AND FLU) 2-325 mg tab Take 325 mg by mouth two (2) times daily as needed for Cough (Cold symptoms). No current facility-administered medications for this visit. Allergies   Allergen Reactions    Morphine Rash and Itching    Norco [Hydrocodone-Acetaminophen] Itching     Past Surgical History:   Procedure Laterality Date    HX HEART CATHETERIZATION  2/24/2012    HX HYSTERECTOMY  2006    HX MITRAL VALVE REPLACEMENT  05/22/2013    25/33 mm On-X mechanical valve with bilateral pulmonary vein isolation and resection of LA appendage     Social History     Occupational History    Not on file   Tobacco Use    Smoking status: Former Smoker    Smokeless tobacco: Never Used   Substance and Sexual Activity    Alcohol use:  Yes     Alcohol/week: 0.6 oz     Types: 1 Glasses of wine per week     Comment: occassionally    Drug use: No    Sexual activity: Yes     Partners: Male     Birth control/protection: None     Family History   Problem Relation Age of Onset    Heart Surgery Father         Open-Heart Surgery    Other Father         Venous Thromboembolism    Heart Disease Mother         Enlarged Heart    Hypertension Mother     Diabetes Mother         REVIEW OF SYSTEMS : 6/3/2019  ALL BELOW ARE Negative except : SEE HPI     Constitutional: Negative for fever, chills and weight loss. Neg Weight Loss  Cardiovascular: Negative for chest pain, claudication and leg swelling. SOB, LIZARRAGA   Gastrointestinal/Urological: Negative for  pain, N/V/D/C, Blood in stool or urine,dysuria                         Hematuria, Incontinence, pelvic pain  Musculoskeletal: see HPI. Neurological: Negative for dizziness and weakness, headaches,Visual Changes             Confusion,  Or Seizures,   Psychiatric/Behavioral: Negative for depression, memory loss and substance abuse. Extremities:  Negative for hair changes, rash or skin lesion changes. Hematologic: Negative for Bleeding problems, bruising, pallor or swollen lymph nodes. Peripheral Vascular: No calf pain, vascular vein tenderness to calf pain              No calf throbbing, posterior knee throbbing pain     DIAGNOSTIC IMAGING     No notes on file       PROCEDURE         MANJU PROCEDURE OUTPATIENT PROCEDURE    PROCEDURE:  Injection of the Left PLANTAR FASCIA     Chart reviewed for the following:     Zoltan Rodriguez MD, have reviewed the History, Physical and updated the Allergic reactions for [address] [place] [address] performed immediately prior to start of procedure:       * Patient was identified by name Sin Ny and date of birth   * Agreement on procedure being performed was verified  * Risks and Benefits explained to the patient: see below  * Procedure site verified and marked as necessary  * Patient was positioned for comfort  * Verbal Consent and Written Consent Verified by myself and my office staff. * Complications: None  *  All patient and/or family questions answered     The procedure was explained to the patient and possible adverse reactions were discussed.   These risks included, but not limited to: bleeding, infection, septic arthritis, local skin irritation (skin discoloration, hyperpigmentation, hypopigmentation, thinning of the skin, development of a wound, skin necrosis), synovitis, subcutaneous fat pad atrophy, subcutaneous abscess, tendon rupture. Time: 9:27 AM  Date of procedure: 6/3/2019  Procedure performed by: Westley Garrison MD  Provider assisted by:  MA  Patient assisted by: self  How tolerated by patient: tolerated the procedure well with no complications  Comments: none    After verbal and informed consent, and after all patient and family questions answered, the procedure was performed as below: The Left MEDIAL HINDFOOT area was prepped and cleansed with: sterile fashion using a follow solution: ALCOHOL/BETADINE STERILE PREP. The injection was administered:  A solution of 40 mg of  KENALOG and 3 ml of LIDOCAINE % plain was used. The pain assessment score PRIOR/AFTER to the injection: 6 /10 //  too soon at determine due to this immediate injection /10 pain severity, mild intensity, aching Pain quality    Post injection instructions were given: removed band aid 3 hours, Wash site with clean soap, No further dressings there after. Call me if any: pain, redness, drainage, fever. Samantha Sequeira tolerated the procedure well and was advised on the signs of infection and instructed to go to the ER or call the office if Samantha Sequeira becomes concerned about the area being infected. Written by Oniel Larson, as dictated by Dr. Elena Gtz. I, Dr. Elena Gtz, confirm that all documentation is accurate.

## 2019-06-03 NOTE — PROGRESS NOTES
1. Have you been to the ER, urgent care clinic since your last visit? Hospitalized since your last visit? No      2. Have you seen or consulted any other health care providers outside of the [address] [place] [address] since your last visit? Include any pap smears or colon screening.  NO

## 2019-06-04 ENCOUNTER — APPOINTMENT (OUTPATIENT)
Dept: PHYSICAL THERAPY | Age: 59
End: 2019-06-04
Payer: MEDICARE

## 2019-06-07 ENCOUNTER — APPOINTMENT (OUTPATIENT)
Dept: PHYSICAL THERAPY | Age: 59
End: 2019-06-07
Payer: MEDICARE

## 2019-06-10 ENCOUNTER — HOSPITAL ENCOUNTER (OUTPATIENT)
Dept: PHYSICAL THERAPY | Age: 59
Discharge: HOME | End: 2019-06-10
Payer: MEDICARE

## 2019-06-10 PROCEDURE — 97140 MANUAL THERAPY 1/> REGIONS: CPT

## 2019-06-10 PROCEDURE — 97110 THERAPEUTIC EXERCISES: CPT

## 2019-06-10 NOTE — PROGRESS NOTES
PT DAILY TREATMENT NOTE 10-18    Patient Name: Whitley Leigh  Date:6/10/2019  : 1960  [x]  Patient  Verified  Payor: [place] MEDICARE / Plan: [place] MEDICARE PART A & B / Product Type: Medicare /    In time:9:00  Out time:9:42  Total Treatment Time (min): 42  Visit #: 2 of 6    Medicare/BCBS Only   Total Timed Codes (min):  42 1:1 Treatment Time:  42       Treatment Area: Left foot pain [M79.862]    SUBJECTIVE  Pain Level (0-10 scale): 0/10  Any medication changes, allergies to medications, adverse drug reactions, diagnosis change, or new procedure performed?: [x] No    [] Yes (see summary sheet for update)  Subjective functional status/changes:   [] No changes reported  \"I had the shot last Monday, no pain right now but a little soreness. \"    OBJECTIVE    Modality rationale: decrease inflammation, decrease pain and increase tissue extensibility to improve the patients ability to perform ADL's.    Min Type Additional Details    [] Estim:  []Unatt       []IFC  []Premod                        []Other:  []w/ice   []w/heat  Position:  Location:    [] Estim: []Att    []TENS instruct  []NMES                    []Other:  []w/US   []w/ice   []w/heat  Position:  Location:    []  Traction: [] Cervical       []Lumbar                       [] Prone          []Supine                       []Intermittent   []Continuous Lbs:  [] before manual  [] after manual   6+ 2 setup [x]  Ultrasound: [x]Continuous   [] Pulsed                           [x]1MHz   []3MHz W/cm2: 1.0  Location: Left heel    []  Iontophoresis with dexamethasone         Location: [] Take home patch   [] In clinic    []  Ice     []  heat  []  Ice massage  []  Laser   []  Anodyne Position:  Location:    []  Laser with stim  []  Other:  Position:  Location:    []  Vasopneumatic Device Pressure:       [] lo [] med [] hi   Temperature: [] lo [] med [] hi   [] Skin assessment post-treatment:  []intact []redness- no adverse reaction    []redness  adverse reaction: 26 min Therapeutic Exercise:  [x] See flow sheet :   Rationale: increase ROM, increase strength and increase proprioception to improve the patients ability to perform ADL's.    8 min Manual Therapy:  Graston technique to Left gastroc, achilles tendon and plantar fascia. Rationale: decrease pain, increase ROM, increase tissue extensibility and decrease trigger points to improve ease of performing functional activities. With   [x] TE   [] TA   [] neuro   [] other: Patient Education: [x] Review HEP    [] Progressed/Changed HEP based on:   [] positioning   [] body mechanics   [] transfers   [] heat/ice application    [] other:      Other Objective/Functional Measures: Added standing hip 3-way 10x each with orange t-band. AROM Left ankle DF -5 while long-seated, neutral while supine. Pain Level (0-10 scale) post treatment: 0/10    ASSESSMENT/Changes in Function: Pt reports a decrease in pain/soreness since [facility] however demonstrates little mobility improvement. Instructed pt to perform stretches more often and to perform HS stretches in addition. Continue PT to increase ankle mobility and improve ease of performing functional activities. Patient will continue to benefit from skilled PT services to modify and progress therapeutic interventions, address functional mobility deficits, address ROM deficits, address strength deficits and analyze and address soft tissue restrictions to attain remaining goals. [x]  See Plan of Care  []  See progress note/recertification  []  See Discharge Summary         Progress towards goals / Updated goals:  1. Patient will be able to improve left ankle DF AROM to 5 degrees in order to improve ease of ambulation.  - Neutral. 5/10/2019; AROM Left ankle DF -5 while long-seated, neutral while supine. 6/10/2019  2. Patient will be able to improve FOTO score to 65 in order to demonstrate improvements in functional independence. Not met (5/24/19) - FOTO score 42  3.  Patient will be able to ambulate greater than 10 minutes void of pain in order to return to prior exercises routine. Progressing 5/24/19 - patient reports ambulating 10 minutes with slight pain  4.  Patient will be able to negotiate a flight of stairs without difficulty in order to improve ease of getting to her bedroom upstairs. - Negotiated up/down 6\" steps with \"quite a bit of\" difficulty in the morning. 5/22/2019    PLAN  []  Upgrade activities as tolerated     [x]  Continue plan of care  []  Update interventions per flow sheet       []  Discharge due to:_  []  Other:_      Maria Eugenia Alejandro PTA 6/10/2019  8:59 AM    Future Appointments   Date Time Provider Manpreet Bonillai   6/10/2019  9:00 AM Cralos Monteiro PTA [facility]   6/14/2019  9:30 AM Carlos Monteiro PTA [facility]   6/21/2019 11:30 AM Chelsey Sigala, PT [facility]   6/24/2019 10:00 AM Jer Ramírez MD [facility] SO SCHED   6/26/2019  8:30 AM CSI, PT INR [facility] SO SCHED   8/20/2019 10:40 AM Adrian Barroso DO [address] [place] [address]

## 2019-06-14 ENCOUNTER — HOSPITAL ENCOUNTER (OUTPATIENT)
Dept: PHYSICAL THERAPY | Age: 59
Discharge: HOME | End: 2019-06-14
Payer: MEDICARE

## 2019-06-14 PROCEDURE — 97110 THERAPEUTIC EXERCISES: CPT

## 2019-06-14 PROCEDURE — 97140 MANUAL THERAPY 1/> REGIONS: CPT

## 2019-06-14 NOTE — PROGRESS NOTES
PT DAILY TREATMENT NOTE 10-18    Patient Name: Jose Monte  Date:2019  : 1960  [x]  Patient  Verified  Payor: [place] MEDICARE / Plan: [place] MEDICARE PART A & B / Product Type: Medicare /    In time:9:30  Out time:10:13  Total Treatment Time (min): 43  Visit #: 3 of 6    Medicare/BCBS Only   Total Timed Codes (min):  43 1:1 Treatment Time:  33       Treatment Area: Left foot pain [M79.672]    SUBJECTIVE  Pain Level (0-10 scale): 0/10  Any medication changes, allergies to medications, adverse drug reactions, diagnosis change, or new procedure performed?: [x] No    [] Yes (see summary sheet for update)  Subjective functional status/changes:   [] No changes reported  \"I've been doing my stretches. \"    OBJECTIVE     Modality rationale: decrease inflammation, decrease pain and increase tissue extensibility to improve the patients ability to perform functional activities.    Min Type Additional Details    [] Estim:  []Unatt       []IFC  []Premod                        []Other:  []w/ice   []w/heat  Position:  Location:    [] Estim: []Att    []TENS instruct  []NMES                    []Other:  []w/US   []w/ice   []w/heat  Position:  Location:    []  Traction: [] Cervical       []Lumbar                       [] Prone          []Supine                       []Intermittent   []Continuous Lbs:  [] before manual  [] after manual   6+2 setup [x]  Ultrasound: [x]Continuous   [] Pulsed                           [x]1MHz   []3MHz W/cm2: 1.0  Location: Left heel    []  Iontophoresis with dexamethasone         Location: [] Take home patch   [] In clinic    []  Ice     []  heat  []  Ice massage  []  Laser   []  Anodyne Position:  Location:    []  Laser with stim  []  Other:  Position:  Location:    []  Vasopneumatic Device Pressure:       [] lo [] med [] hi   Temperature: [] lo [] med [] hi   [] Skin assessment post-treatment:  []intact []redness- no adverse reaction    []redness  adverse reaction:     27 min Therapeutic Exercise:  [x] See flow sheet :   Rationale: increase ROM, increase strength and increase proprioception to improve the patients ability to perform ADL's.    8 min Manual Therapy:  Graston technique to Left gastroc, soleus, achilles tendon and plantar fascia. Rationale: decrease pain, increase ROM and increase tissue extensibility to improve ease of performing functional activities. With   [x] TE   [] TA   [] neuro   [] other: Patient Education: [x] Review HEP    [] Progressed/Changed HEP based on:   [] positioning   [] body mechanics   [] transfers   [] heat/ice application    [] other:      Other Objective/Functional Measures:      Pain Level (0-10 scale) post treatment: 0/10    ASSESSMENT/Changes in Function: Pt reports no difficulty negotiating up/down stairs. Pt has made progress towards LTG's, mobility continues to be limited but has improved since [facility]. Plan to D/C to HEP next visit versus possible continuation for 2-3 weeks. Patient will continue to benefit from skilled PT services to modify and progress therapeutic interventions, address functional mobility deficits, address ROM deficits, address strength deficits, analyze and address soft tissue restrictions and analyze and modify body mechanics/ergonomics to attain remaining goals. [x]  See Plan of Care  []  See progress note/recertification  []  See Discharge Summary         Progress towards goals / Updated goals:  1. Patient will be able to improve left ankle DF AROM to 5 degrees in order to improve ease of ambulation.  - Neutral. 5/10/2019; AROM Left ankle DF -5 while long-seated, neutral while supine. 6/10/2019  2. Patient will be able to improve FOTO score to 65 in order to demonstrate improvements in functional independence. Not met (5/24/19) - FOTO score 42  3. Patient will be able to ambulate greater than 10 minutes void of pain in order to return to prior exercises routine.  Progressing 5/24/19 - patient reports ambulating 10 minutes with slight pain  4. Patient will be able to negotiate a flight of stairs without difficulty in order to improve ease of getting to her bedroom upstairs. - Negotiated up/down 6\" steps with \"quite a bit of\" difficulty in the morning. 5/22/2019; Pt reports no difficulty negotiating up/down stairs.  6/14/2019    PLAN  []  Upgrade activities as tolerated     [x]  Continue plan of care  []  Update interventions per flow sheet       []  Discharge due to:_  []  Other:_      Man Cope, TUNG 6/14/2019  9:40 AM    Future Appointments   Date Time Provider Manpreet Stoddard   6/21/2019 11:30 AM [facility]   6/24/2019 10:00 AM Catrachita See MD [facility] SO SCHED   6/26/2019  8:30 AM CSI, PT INR [facility] SCHED   8/20/2019 10:40 AM Mainor Schuster,  [facility]

## 2019-06-21 ENCOUNTER — HOSPITAL ENCOUNTER (OUTPATIENT)
Dept: PHYSICAL THERAPY | Age: 59
Discharge: HOME | End: 2019-06-21
Payer: MEDICARE

## 2019-06-21 PROCEDURE — 97112 NEUROMUSCULAR REEDUCATION: CPT

## 2019-06-21 PROCEDURE — 97110 THERAPEUTIC EXERCISES: CPT

## 2019-06-21 NOTE — PROGRESS NOTES
PT DISCHARGE DAILY NOTE AND [id number]    Patient name: Amelia Kingsley Start of Care: 2019   Referral source: Dov Rankin MD : [id number]               Medical Diagnosis: Plantar fascial fibromatosis [M72.2]  Payor: [place] MEDICARE / Plan: VA MEDICARE PART A & B / Product Type: Medicare /  Onset Date:3 months ago               Treatment Diagnosis: Left foot pain   Prior Hospitalization: see medical history Provider#: [id number]   Medications: Verified on Patient summary List    Comorbidities: allergies, asthma, CHF, HBP   Prior Level of Function: (I) with ADLs and takes care of her mother in law.     Visits from Start of Care: 12    Missed Visits: 0    Reporting Period : 19 to 19    Date:2019  : 1960  [x]  Patient  Verified  Payor: VA MEDICARE / Plan: VA MEDICARE PART A & B / Product Type: Medicare /    In time:11:30  Out time:12:13  Total Treatment Time (min): 43  Visit #: 4 of 6    Medicare/BCBS Only   Total Timed Codes (min):  43 1:1 Treatment Time:  43       SUBJECTIVE  Pain Level (0-10 scale): 0  Any medication changes, allergies to medications, adverse drug reactions, diagnosis change, or new procedure performed?: [x] No    [] Yes (see summary sheet for update)  Subjective functional status/changes:   [] No changes reported  \"I have been feeling pretty good\"     OBJECTIVE      33 min Therapeutic Exercise:  [x] See flow sheet :   Rationale: increase ROM and increase strength to improve the patients ability to perform ADLs. 10 min Neuromuscular Re-education:  [x]  See flow sheet :   Rationale: increase strength, improve coordination, improve balance and increase proprioception  to improve the patients ability to perform functional task with improved ease a stability.                With   [] TE   [] TA   [] neuro   [] other: Patient Education: [x] Review HEP    [] Progressed/Changed HEP based on:   [] positioning   [] body mechanics   [] transfers   [] heat/ice application    [] other:      Other Objective/Functional Measures: Patient ambulated on TM in clinic at 1.8 MPH for 10 minutes void of pain. FOTO score = 99%. Pain Level (0-10 scale) post treatment: 0    Summary of Care:  1. Patient will be able to improve left ankle DF AROM to 5 degrees in order to improve ease of ambulation.  - Neutral. 5/10/2019; AROM Left ankle DF -5 while long-seated, neutral while supine. 6/10/2019  2. Patient will be able to improve FOTO score to 65 in order to demonstrate improvements in functional independence. Not met (5/24/19) - FOTO score 42. FOTO score - 99. (6/21/19)  3. Patient will be able to ambulate greater than 10 minutes void of pain in order to return to prior exercises routine. Progressing 5/24/19 - patient reports ambulating 10 minutes with slight pain. GOAL MET - Patient ambulated on TM in clinic at 1.8 MPH for 10 minutes void of pain (6/21/19)  4. Patient will be able to negotiate a flight of stairs without difficulty in order to improve ease of getting to her bedroom upstairs. - Negotiated up/down 6\" steps with \"quite a bit of\" difficulty in the morning. 5/22/2019; Pt reports no difficulty negotiating up/down stairs. 6/14/2019      ASSESSMENT/Changes in Function: Patient has reached all established therapy goals and reports no complaints of pain in the last 3 weeks. Patient has put forth great effort during all treatment sessions. Patient received and was educated on updated HEP and a progressive waking routine in order to gradually return to prior level of function.     Thank you for this referral!      PLAN  [x]Discontinue therapy: [x]Patient has reached or is progressing toward set goals      []Patient is non-compliant or has abdicated      []Due to lack of appreciable progress towards set goals    Silvia Bates, PT 6/21/2019  11:36 AM

## 2019-06-24 ENCOUNTER — OFFICE VISIT (OUTPATIENT)
Dept: FAMILY MEDICINE CLINIC | Age: 59
End: 2019-06-24

## 2019-06-24 VITALS
RESPIRATION RATE: 16 BRPM | HEIGHT: 69 IN | WEIGHT: 190 LBS | TEMPERATURE: 98.4 F | DIASTOLIC BLOOD PRESSURE: 84 MMHG | BODY MASS INDEX: 28.14 KG/M2 | HEART RATE: 64 BPM | OXYGEN SATURATION: 98 % | SYSTOLIC BLOOD PRESSURE: 132 MMHG

## 2019-06-24 DIAGNOSIS — Z79.01 LONG TERM CURRENT USE OF ANTICOAGULANT THERAPY: ICD-10-CM

## 2019-06-24 DIAGNOSIS — E78.5 HYPERLIPIDEMIA, UNSPECIFIED HYPERLIPIDEMIA TYPE: ICD-10-CM

## 2019-06-24 DIAGNOSIS — Z23 ENCOUNTER FOR IMMUNIZATION: ICD-10-CM

## 2019-06-24 DIAGNOSIS — I09.1 RHEUMATIC VALVULAR DISEASE: ICD-10-CM

## 2019-06-24 DIAGNOSIS — I10 ESSENTIAL HYPERTENSION: ICD-10-CM

## 2019-06-24 DIAGNOSIS — Z00.00 MEDICARE ANNUAL WELLNESS VISIT, SUBSEQUENT: Primary | ICD-10-CM

## 2019-06-24 DIAGNOSIS — F43.22 ADJUSTMENT DISORDER WITH ANXIOUS MOOD: ICD-10-CM

## 2019-06-24 DIAGNOSIS — J45.20 MILD INTERMITTENT ASTHMA WITHOUT COMPLICATION: ICD-10-CM

## 2019-06-24 DIAGNOSIS — Z71.89 ADVANCE CARE PLANNING: ICD-10-CM

## 2019-06-24 DIAGNOSIS — I21.4 NSTEMI (NON-ST ELEVATED MYOCARDIAL INFARCTION) (HCC): ICD-10-CM

## 2019-06-24 RX ORDER — SERTRALINE HYDROCHLORIDE 50 MG/1
50 TABLET, FILM COATED ORAL DAILY
Qty: 90 TAB | Refills: 0 | Status: SHIPPED | OUTPATIENT
Start: 2019-06-24 | End: 2019-09-18 | Stop reason: SDUPTHER

## 2019-06-24 NOTE — ACP (ADVANCE CARE PLANNING)
Advance Care Planning (ACP) Provider Note - Comprehensive     Date of ACP Conversation: 06/24/19  Persons included in Conversation:  patient  Length of ACP Conversation in minutes:  16 minutes    Authorized Decision Maker (if patient is incapable of making informed decisions): This person is:  has yet to delegate, thinking her  and his brother            General ACP for ALL Patients with Decision Making Capacity:   Importance of advance care planning, including choosing a healthcare agent to communicate patient's healthcare decisions if patient lost the ability to make decisions, such as after a sudden illness or accident  Understanding of the healthcare agent role was assessed and information provided  Exploration of values, goals, and preferences if recovery is not expected, even with continued medical treatment in the event of: Imminent death  Severe, permanent brain injury  \"In these circumstances, what matters most to you? \"  Other: still considering    Interventions Provided:  Recommended completion of Advance Directive form after review of ACP materials and conversation with prospective healthcare agent   Recommended communicating the plan and making copies for the healthcare agent, personal physician, and others as appropriate (e.g., health system)  Recommended review of completed ACP document annually or upon change in health status

## 2019-06-24 NOTE — PROGRESS NOTES
This is the Subsequent Medicare Annual Wellness Exam, performed 12 months or more after the Initial AWV or the last Subsequent AWV    I have reviewed the patient's medical history in detail and updated the computerized patient record. History     Past Medical History:   Diagnosis Date    Aortic valve disorders 1/13/2011    Asthma     Atrial fibrillation ([address] 1/13/2011    Congestive heart failure, unspecified March 2005    Cleared quickly with Lasix therapy    Echocardiogram 01/10/2012    A-fib. EF 45%. Mild RVE. RVSP 45-50. Massive LAE. Mod TAMI. Mod MS. Severe MR (mean grad 10). Mild-mod AI. Mild TR. Pulmonic systolic flow reversal.  Mild IVCE.  Hypertension     Iron deficiency anemias     Long term (current) use of anticoagulants 2/23/2011    Murmur 1/2011    Grade I-II/VI systolic ejection murmur along left sternal border, with radiation to the pulmonic area.  S/P cardiac cath 02/24/2012    Minimal coronary artery disease. Mild LVE. EF 55%. Mod MR. Mod-severe MS. RA 15.  RV 55/16. PA 58/34. W 36.  CO/CI 3.5/1.9 (TD); 3.61/1.9 (Dequan). R to L shunt suggested.  Wears glasses     Weight gain       Past Surgical History:   Procedure Laterality Date    HX HEART CATHETERIZATION  2/24/2012    HX HYSTERECTOMY  2006    HX MITRAL VALVE REPLACEMENT  05/22/2013    25/33 mm On-X mechanical valve with bilateral pulmonary vein isolation and resection of LA appendage     Current Outpatient Medications   Medication Sig Dispense Refill    amLODIPine (NORVASC) 5 mg tablet TAKE 1 TABLET BY MOUTH EVERY DAY 90 Tab 2    KLOR-CON M20 20 mEq tablet TAKE 1 TAB BY MOUTH DAILY. 30 Tab 6    diphenhydrAMINE (BENADRYL) 25 mg capsule Take 25 mg by mouth every six (6) hours as needed.  OTHER Allergy injections      sertraline (ZOLOFT) 25 mg tablet Take 1 Tab by mouth daily. 90 Tab 0    atorvastatin (LIPITOR) 40 mg tablet Take 1 Tab by mouth daily.  90 Tab 0    VENTOLIN HFA 90 mcg/actuation inhaler TAKE 2 PUFFS BY INHALATION DAILY. 3 Inhaler 1    warfarin (COUMADIN) 5 mg tablet TAKE 5MG DAILY EXCEPT 2.5MG ON TUES, THUR, AND SAT. OR AS DIRECTED BY OUR OFFICE 30 Tab 5    losartan (COZAAR) 100 mg tablet Take 1 Tab by mouth daily. 90 Tab 3    furosemide (LASIX) 40 mg tablet Take 1 Tab by mouth daily. 90 Tab 3    chlorpheniramine-acetaminophen (CORICIDIN HBP COLD AND FLU) 2-325 mg tab Take 325 mg by mouth two (2) times daily as needed for Cough (Cold symptoms).  aspirin delayed-release 81 mg tablet Take 1 Tab by mouth daily. 30 Tab 0    polyethylene glycol (MIRALAX) 17 gram packet Take 1 Packet by mouth daily. (Patient taking differently: Take  by mouth daily as needed for Other (Constipation). ) 30 Packet 0    esomeprazole (NEXIUM) 20 mg capsule Take 20 mg by mouth daily as needed (heart burn).  fluticasone (FLONASE ALLERGY RELIEF) 50 mcg/actuation nasal spray 2 Sprays by Both Nostrils route daily as needed for Rhinitis.  metoprolol succinate (TOPROL-XL) 100 mg tablet Take 1 Tab by mouth daily. 30 Tab 6    sodium chloride (SALINE NASAL) 0.65 % nasal squeeze bottle 1 Spray as needed for Congestion.  melatonin tab tablet Take 10 mg by mouth nightly.  diclofenac (VOLTAREN) 1 % gel Apply  to affected area four (4) times daily. 1 Each 0    fluticasone furoate (ARNUITY ELLIPTA) 100 mcg/actuation dsdv inhaler Take 2 Puffs by inhalation daily. 1 Inhaler 0     Allergies   Allergen Reactions    Morphine Rash and Itching    Norco [Hydrocodone-Acetaminophen] Itching     Family History   Problem Relation Age of Onset    Heart Surgery Father         Open-Heart Surgery    Other Father         Venous Thromboembolism    Heart Disease Mother         Enlarged Heart    Hypertension Mother     Diabetes Mother      Social History     Tobacco Use    Smoking status: Former Smoker    Smokeless tobacco: Never Used   Substance Use Topics    Alcohol use:  Yes     Alcohol/week: 0.6 oz Types: 1 Glasses of wine per week     Comment: occassionally     Patient Active Problem List   Diagnosis Code    Rheumatic valvular disease I09.1    Chronic diastolic congestive heart failure (HCC) I50.32    Atrial fibrillation (HCC) I48.91    Long term current use of anticoagulant therapy Z79.01    Benign hypertensive heart disease with heart failure (HCC) I11.0    Hyperlipidemia E78.5    Advanced directives, counseling/discussion Z71.89    Mild intermittent asthma without complication F93.34    NSTEMI (non-ST elevated myocardial infarction) ([facility] [address] I21.4    Chest pain R07.9    IFG (impaired fasting glucose) R73.01    Adjustment disorder with anxious mood F43.22       Depression Risk Factor Screening:     3 most recent PHQ Screens 6/24/2019   Little interest or pleasure in doing things Not at all   Feeling down, depressed, irritable, or hopeless Not at all   Total Score PHQ 2 0     Alcohol Risk Factor Screening: You do not drink alcohol or very rarely. Functional Ability and Level of Safety:   Hearing Loss  Hearing is good. Activities of Daily Living  The home contains: no safety equipment. Patient does total self care    Fall Risk  No flowsheet data found.     Abuse Screen  Patient is not abused    Cognitive Screening   Evaluation of Cognitive Function:  Has your family/caregiver stated any concerns about your memory: no    Patient Care Team   Patient Care Team:  Selvin Villalta MD as PCP - General (Family Practice)  Calli Warren DO as Physician (Cardiology)  Calli Warren DO (Cardiology)  Tamar Greco MD (Otolaryngology)    Assessment/Plan   Education and counseling provided:  Are appropriate based on today's review and evaluation  End-of-Life planning (with patient's consent)- discussed, provided form  Pneumococcal Vaccine- 21 today, 13 at age 71 y/o  Influenza Vaccine- annually   Screening Mammography- 5/19  Screening Pap and pelvic (covered once every 2 years)- no longer indicated s/p hysterecomty  Colorectal cancer screening tests- due for FIT test 8/19 to be ordered on next visit  Cardiovascular screening blood test- 4/19  Screening for glaucoma- per pt utd    Diagnoses and all orders for this visit:    1. Medicare annual wellness visit, subsequent        Health Maintenance Due   Topic Date Due    Pneumococcal 0-64 years (1 of 1 - PPSV23) 07/04/1966    DTaP/Tdap/Td series (1 - Tdap) 07/04/1979    Shingrix Vaccine Age 50> (1 of 2) 07/04/2008    MEDICARE YEARLY EXAM  04/28/2019    FOBT Q 1 YEAR AGE 50-75  08/21/2019     Sin Rangel, 59 y.o.,  female    SUBJECTIVE  Ff-up    HTN-taking medications without problems. Continues to take norvasc,  metoprolol and cozaar    H/o nstemi, afib, rheumatic valve disease s/p MV repair 2013- denies any chest pain, sob, palpitations, lightheadedness. On chronic coumadin treatment, following cardiology. Reviewed labs LDL 67 on lipitor. Anxiety- reports to be more days of the week, still stressful living situation with MIL. She is currently on low dose zoloft. HL-taking lipitor without problems    Asthma- denies wheezing, sob. Taking arnuity for maintenance    GERD- takes nexium otc prn for heartburn    Foot pain- plantar fascitis following dr. Kathya Joel. Recent steroid injection she reports to have improvement of pain.      ROS:  See HPI, all others negative        Patient Active Problem List   Diagnosis Code    Rheumatic valvular disease I09.1    Chronic diastolic congestive heart failure (HCC) I50.32    Atrial fibrillation (HCC) I48.91    Long term current use of anticoagulant therapy Z79.01    Benign hypertensive heart disease with heart failure (HCC) I11.0    Hyperlipidemia E78.5    Advanced directives, counseling/discussion Z71.89    Mild intermittent asthma without complication D37.84    NSTEMI (non-ST elevated myocardial infarction) ([facility] [address] I21.4    Chest pain R07.9    IFG (impaired fasting glucose) R73.01    Adjustment disorder with anxious mood F43.22       Current Outpatient Medications   Medication Sig Dispense Refill    sertraline (ZOLOFT) 50 mg tablet Take 1 Tab by mouth daily. 90 Tab 0    amLODIPine (NORVASC) 5 mg tablet TAKE 1 TABLET BY MOUTH EVERY DAY 90 Tab 2    KLOR-CON M20 20 mEq tablet TAKE 1 TAB BY MOUTH DAILY. 30 Tab 6    diphenhydrAMINE (BENADRYL) 25 mg capsule Take 25 mg by mouth every six (6) hours as needed.  OTHER Allergy injections      atorvastatin (LIPITOR) 40 mg tablet Take 1 Tab by mouth daily. 90 Tab 0    VENTOLIN HFA 90 mcg/actuation inhaler TAKE 2 PUFFS BY INHALATION DAILY. 3 Inhaler 1    warfarin (COUMADIN) 5 mg tablet TAKE 5MG DAILY EXCEPT 2.5MG ON TUES, THUR, AND SAT. OR AS DIRECTED BY OUR OFFICE 30 Tab 5    losartan (COZAAR) 100 mg tablet Take 1 Tab by mouth daily. 90 Tab 3    furosemide (LASIX) 40 mg tablet Take 1 Tab by mouth daily. 90 Tab 3    chlorpheniramine-acetaminophen (CORICIDIN HBP COLD AND FLU) 2-325 mg tab Take 325 mg by mouth two (2) times daily as needed for Cough (Cold symptoms).  aspirin delayed-release 81 mg tablet Take 1 Tab by mouth daily. 30 Tab 0    polyethylene glycol (MIRALAX) 17 gram packet Take 1 Packet by mouth daily. (Patient taking differently: Take  by mouth daily as needed for Other (Constipation). ) 30 Packet 0    esomeprazole (NEXIUM) 20 mg capsule Take 20 mg by mouth daily as needed (heart burn).  fluticasone (FLONASE ALLERGY RELIEF) 50 mcg/actuation nasal spray 2 Sprays by Both Nostrils route daily as needed for Rhinitis.  metoprolol succinate (TOPROL-XL) 100 mg tablet Take 1 Tab by mouth daily. 30 Tab 6    sodium chloride (SALINE NASAL) 0.65 % nasal squeeze bottle 1 Spray as needed for Congestion.  melatonin tab tablet Take 10 mg by mouth nightly.  diclofenac (VOLTAREN) 1 % gel Apply  to affected area four (4) times daily.  1 Each 0    fluticasone furoate (ARNUITY ELLIPTA) 100 mcg/actuation dsdv inhaler Take 2 Puffs by inhalation daily. 1 Inhaler 0       Allergies   Allergen Reactions    Morphine Rash and Itching    Norco [Hydrocodone-Acetaminophen] Itching       Past Medical History:   Diagnosis Date    Aortic valve disorders 1/13/2011    Asthma     Atrial fibrillation ([facility] [address] 1/13/2011    Congestive heart failure, unspecified March 2005    Cleared quickly with Lasix therapy    Echocardiogram 01/10/2012    A-fib. EF 45%. Mild RVE. RVSP 45-50. Massive LAE. Mod TAMI. Mod MS. Severe MR (mean grad 10). Mild-mod AI. Mild TR. Pulmonic systolic flow reversal.  Mild IVCE.  Hypertension     Iron deficiency anemias     Long term (current) use of anticoagulants 2/23/2011    Murmur 1/2011    Grade I-II/VI systolic ejection murmur along left sternal border, with radiation to the pulmonic area.  S/P cardiac cath 02/24/2012    Minimal coronary artery disease. Mild LVE. EF 55%. Mod MR. Mod-severe MS. RA 15.  RV 55/16. PA 58/34. W 36.  CO/CI 3.5/1.9 (TD); 3.61/1.9 (Dequan). R to L shunt suggested.  Wears glasses     Weight gain        Social History     Socioeconomic History    Marital status:      Spouse name: Not on file    Number of children: Not on file    Years of education: Not on file    Highest education level: Not on file   Occupational History    Not on file   Social Needs    Financial resource strain: Not on file    Food insecurity:     Worry: Not on file     Inability: Not on file    Transportation needs:     Medical: Not on file     Non-medical: Not on file   Tobacco Use    Smoking status: Former Smoker    Smokeless tobacco: Never Used   Substance and Sexual Activity    Alcohol use:  Yes     Alcohol/week: 0.6 oz     Types: 1 Glasses of wine per week     Comment: occassionally    Drug use: No    Sexual activity: Yes     Partners: Male     Birth control/protection: None   Lifestyle    Physical activity:     Days per week: Not on file     Minutes per session: Not on file    Stress: Not on file   Relationships    Social connections:     Talks on phone: Not on file     Gets together: Not on file     Attends Confucianist service: Not on file     Active member of club or organization: Not on file     Attends meetings of clubs or organizations: Not on file     Relationship status: Not on file    Intimate partner violence:     Fear of current or ex partner: Not on file     Emotionally abused: Not on file     Physically abused: Not on file     Forced sexual activity: Not on file   Other Topics Concern    Not on file   Social History Narrative    Not on file       Family History   Problem Relation Age of Onset    Heart Surgery Father         Open-Heart Surgery    Other Father         Venous Thromboembolism    Heart Disease Mother         Enlarged Heart    Hypertension Mother     Diabetes Mother          OBJECTIVE    Physical Exam:     Visit Vitals  /84 (BP 1 Location: Left arm, BP Patient Position: Sitting)   Pulse 64   Temp 98.4 °F (36.9 °C) (Oral)   Resp 16   Ht 5' 9\" (1.753 m)   Wt 190 lb (86.2 kg)   SpO2 98%   BMI 28.06 kg/m²       General: alert, well-appearing,  in no apparent distress or pain  Breasts: breasts appear normal, no suspicious masses, no skin or nipple changes or axillary nodes.   CVS: irregular, mech valve click, + murmur  Lungs:clear to ausculation bilaterally, no crackles, wheezing or rhonchi noted  Abdomen: soft, NT, ND  Extremities: no edema  Psych:  mood and affect normal      ASSESSMENT/PLAN  Diagnoses and all orders for this visit:      Essential hypertension  controlled  Cont  norvasc 5 mg  Cont cozaar 100 mg and metoprolol 100 mg  DASH diet, monitoring    Adjustment reaction with anxious mood  Needs better control  Increase  zoloft 25 mg to 50 mg    NSTEMI  Asymptomatic  Mild reversible ischemia on stress test 1/2019  On bb, arb, ASA  LDL is <70  Cont lipitor 40 mg qhs  Following dr. Michelle De La Garza    Atrial fibrillation, unspecified type ([address]  Rate controlled, On BB, coumadin  INR therapeutic  Following dr. Ida Gilliam    Hyperlipidemia, unspecified hyperlipidemia type  Improved on lipitor    LDL goal <70  Check cmpprior to next visit    Long term current use of anticoagulant therapy  Cards following    Rheumatic valvular disease  S/p [facility] valve 2013  Chronic anticoagulation    Mild intermittent asthma without complication  Controlled  Cont ICS  Cont prn albuterol  given PCV 23 today    Follow-up and Dispositions    · Return in about 6 weeks (around 8/5/2019), or if symptoms worsen or fail to improve. Patient understands plan of care. Patient has provided input and agrees with goals.

## 2019-06-24 NOTE — PROGRESS NOTES
1. Have you been to the ER, urgent care clinic since your last visit? Hospitalized since your last visit? No    2. Have you seen or consulted any other health care providers outside of the [address] [place] [address] since your last visit? Include any pap smears or colon screening. Dr. Rossana Augustin  Pneumovax 23, 0.5 ml given IM in left deltoid. Lot # [id number], exp date 08/10/2020. Patient tolerated injection well. No adverse reaction noted.

## 2019-06-24 NOTE — PATIENT INSTRUCTIONS
Medicare Wellness Visit, Female The best way to live healthy is to have a lifestyle where you eat a well-balanced diet, exercise regularly, limit alcohol use, and quit all forms of tobacco/nicotine, if applicable. Regular preventive services are another way to keep healthy. Preventive services (vaccines, screening tests, monitoring & exams) can help personalize your care plan, which helps you manage your own care. Screening tests can find health problems at the earliest stages, when they are easiest to treat. [facility] follows the current, evidence-based guidelines published by the [facility] Swetha ([facility]) when recommending preventive services for our patients. Because we follow these guidelines, sometimes recommendations change over time as research supports it. (For example, mammograms used to be recommended annually. Even though Medicare will still pay for an annual mammogram, the newer guidelines recommend a mammogram every two years for women of average risk.) Of course, you and your doctor may decide to screen more often for some diseases, based on your risk and your health status. Preventive services for you include: - Medicare offers their members a free annual wellness visit, which is time for you and your primary care provider to discuss and plan for your preventive service needs. Take advantage of this benefit every year! 
-All adults over the age of 72 should receive the recommended pneumonia vaccines. Current USPSTF guidelines recommend a series of two vaccines for the best pneumonia protection.  
-All adults should have a flu vaccine yearly and a tetanus vaccine every 10 years. All adults age 61 and older should receive a shingles vaccine once in their lifetime.   
-A bone mass density test is recommended when a woman turns 65 to screen for osteoporosis. This test is only recommended one time, as a screening. Some providers will use this same test as a disease monitoring tool if you already have osteoporosis. -All adults age 38-68 who are overweight should have a diabetes screening test once every three years.  
-Other screening tests and preventive services for persons with diabetes include: an eye exam to screen for diabetic retinopathy, a kidney function test, a foot exam, and stricter control over your cholesterol.  
-Cardiovascular screening for adults with routine risk involves an electrocardiogram (ECG) at intervals determined by your doctor.  
-Colorectal cancer screenings should be done for adults age 54-65 with no increased risk factors for colorectal cancer. There are a number of acceptable methods of screening for this type of cancer. Each test has its own benefits and drawbacks. Discuss with your doctor what is most appropriate for you during your annual wellness visit. The different tests include: colonoscopy (considered the best screening method), a fecal occult blood test, a fecal DNA test, and sigmoidoscopy. -Breast cancer screenings are recommended every other year for women of normal risk, age 54-69. 
-Cervical cancer screenings for women over age 72 are only recommended with certain risk factors.  
-All adults born between [facility] should be screened once for Hepatitis C. Here is a list of your current Health Maintenance items (your personalized list of preventive services) with a due date: 
Health Maintenance Due Topic Date Due  Pneumococcal Vaccine (1 of 1 - PPSV23) 07[birth year]04/1964  DTaP/Tdap/Td  (1 - Tdap) 07/04/1979  Shingles Vaccine (1 of 2) 07/04/2008 Paul Annual Well Visit  04/28/2019  Stool testing for trace blood  08/21/2019

## 2019-06-26 ENCOUNTER — ANTI-COAG VISIT (OUTPATIENT)
Dept: CARDIOLOGY CLINIC | Age: 59
End: 2019-06-26

## 2019-06-26 DIAGNOSIS — Z79.01 LONG TERM CURRENT USE OF ANTICOAGULANT THERAPY: ICD-10-CM

## 2019-06-26 DIAGNOSIS — I48.91 ATRIAL FIBRILLATION, UNSPECIFIED TYPE (HCC): ICD-10-CM

## 2019-06-26 LAB
INR BLD: 2
PT POC: 24.2 SECONDS
VALID INTERNAL CONTROL?: YES

## 2019-08-05 ENCOUNTER — OFFICE VISIT (OUTPATIENT)
Dept: FAMILY MEDICINE CLINIC | Age: 59
End: 2019-08-05

## 2019-08-05 VITALS
HEART RATE: 72 BPM | TEMPERATURE: 98.4 F | HEIGHT: 69 IN | DIASTOLIC BLOOD PRESSURE: 82 MMHG | BODY MASS INDEX: 27.7 KG/M2 | SYSTOLIC BLOOD PRESSURE: 120 MMHG | WEIGHT: 187 LBS | RESPIRATION RATE: 16 BRPM | OXYGEN SATURATION: 98 %

## 2019-08-05 DIAGNOSIS — Z12.11 COLON CANCER SCREENING: ICD-10-CM

## 2019-08-05 DIAGNOSIS — I21.4 NSTEMI (NON-ST ELEVATED MYOCARDIAL INFARCTION) (HCC): ICD-10-CM

## 2019-08-05 DIAGNOSIS — I10 ESSENTIAL HYPERTENSION: ICD-10-CM

## 2019-08-05 DIAGNOSIS — Z79.01 LONG TERM CURRENT USE OF ANTICOAGULANT THERAPY: ICD-10-CM

## 2019-08-05 DIAGNOSIS — E78.5 HYPERLIPIDEMIA, UNSPECIFIED HYPERLIPIDEMIA TYPE: ICD-10-CM

## 2019-08-05 DIAGNOSIS — I48.91 ATRIAL FIBRILLATION, UNSPECIFIED TYPE (HCC): ICD-10-CM

## 2019-08-05 DIAGNOSIS — J45.20 MILD INTERMITTENT ASTHMA WITHOUT COMPLICATION: ICD-10-CM

## 2019-08-05 DIAGNOSIS — F43.22 ADJUSTMENT DISORDER WITH ANXIOUS MOOD: Primary | ICD-10-CM

## 2019-08-05 DIAGNOSIS — I09.1 RHEUMATIC VALVULAR DISEASE: ICD-10-CM

## 2019-08-05 NOTE — PROGRESS NOTES
Chief Complaint   Patient presents with    Hypertension    Cholesterol Problem    Other     Adjustment disorder    Asthma     1. Have you been to the ER, urgent care clinic since your last visit? Hospitalized since your last visit? No    2. Have you seen or consulted any other health care providers outside of the [address] [place] [address] since your last visit? Include any pap smears or colon screening.  No

## 2019-08-05 NOTE — PATIENT INSTRUCTIONS
A Healthy Heart: Care Instructions  Your Care Instructions    Heart disease occurs when a substance called plaque builds up in the vessels that supply oxygen-rich blood to your heart. This can narrow the blood vessels and reduce blood flow. A heart attack happens when blood flow is completely blocked. A high-fat diet, smoking, and other factors increase the risk of heart disease. Your doctor has found that you have a chance of having heart disease. You can do lots of things to keep your heart healthy. It may not be easy, but you can change your diet, exercise more, and quit smoking. These steps really work to lower your chance of heart disease. Follow-up care is a key part of your treatment and safety. Be sure to make and go to all appointments, and call your doctor if you are having problems. It's also a good idea to know your test results and keep a list of the medicines you take. How can you care for yourself at home? Diet    · Use less salt when you cook and eat. This helps lower your blood pressure. Taste food before salting. Add only a little salt when you think you need it. With time, your taste buds will adjust to less salt.     · Eat fewer snack items, fast foods, canned soups, and other high-salt, high-fat, processed foods.     · Read food labels and try to avoid saturated and trans fats. They increase your risk of heart disease by raising cholesterol levels.     · Limit the amount of solid fat-butter, margarine, and shortening-you eat. Use olive, peanut, or canola oil when you cook. Bake, broil, and steam foods instead of frying them.     · Eating fish can lower your risk for heart disease. Eat at least 2 servings of fish a week. Rush, mackerel, herring, sardines, and chunk light tuna are very good choices. These fish contain omega-3 fatty acids.     · Eat a variety of fruit and vegetables every day. Dark green, deep orange, red, or yellow fruits and vegetables are especially good for you. Examples include spinach, carrots, peaches, and berries.     · Foods high in fiber can reduce your cholesterol and provide important vitamins and minerals. High-fiber foods include whole-grain cereals and breads, oatmeal, beans, brown rice, citrus fruits, and apples.     · Limit drinks and foods with added sugar. These include candy, desserts, and soda pop.    Lifestyle changes    · If your doctor recommends it, get more exercise. Walking is a good choice. Bit by bit, increase the amount you walk every day. Try for at least 30 minutes on most days of the week. You also may want to swim, bike, or do other activities.     · Do not smoke. If you need help quitting, talk to your doctor about stop-smoking programs and medicines. These can increase your chances of quitting for good. Quitting smoking may be the most important step you can take to protect your heart. It is never too late to quit. You will get health benefits right away.     · Limit alcohol to 2 drinks a day for men and 1 drink a day for women. Too much alcohol can cause health problems. Medicines    · Take your medicines exactly as prescribed. Call your doctor if you think you are having a problem with your medicine.     · If your doctor recommends aspirin, take the amount directed each day. Make sure you take aspirin and not another kind of pain reliever, such as acetaminophen (Tylenol). If you take ibuprofen (such as Advil or Motrin) for other problems, take aspirin at least 2 hours before taking ibuprofen. When should you call for help? Call 911 if you have symptoms of a heart attack.  These may include:    · Chest pain or pressure, or a strange feeling in the chest.     · Sweating.     · Shortness of breath.     · Pain, pressure, or a strange feeling in the back, neck, jaw, or upper belly or in one or both shoulders or arms.     · Lightheadedness or sudden weakness.     · A fast or irregular heartbeat.    After you call 911, the  may tell you to chew 1 adult-strength or 2 to 4 low-dose aspirin. Wait for an ambulance. Do not try to drive yourself.   Watch closely for changes in your health, and be sure to contact your doctor if you have any problems. Where can you learn more? Go to http://greyson-andrea.info/. Enter C670 in the search box to learn more about \"A Healthy Heart: Care Instructions. \"  Current as of: July 22, 2018  Content Version: 12.1  © 3378-8797 [employer]. Care instructions adapted under license by [employer] (which disclaims liability or warranty for this information). If you have questions about a medical condition or this instruction, always ask your healthcare professional. [address] any warranty or liability for your use of this information.

## 2019-08-05 NOTE — PROGRESS NOTES
Livan Li, 62 y.o.,  female    SUBJECTIVE  Ff-up    Anxiety-increase zoloft dose on last visit and she reports feeling much better. Calmer, less irritable. Still stressful situation with living with her MIL who has chronic illness, she  has improved outloook/acceptance    HTN-taking medications without problems. Continues to take norvasc,  metoprolol and cozaar    H/o nstemi, afib, rheumatic valve disease s/p MV repair 2013- denies any chest pain, sob, palpitations, lightheadedness. On chronic coumadin treatment, following cardiology. LDL 67 on lipitor (4/2019)     HL-taking lipitor without problems    Asthma- denies wheezing, sob. Taking arnuity for maintenance    GERD- takes nexium otc prn for heartburn    ROS:  See HPI, all others negative        Patient Active Problem List   Diagnosis Code    Rheumatic valvular disease I09.1    Chronic diastolic congestive heart failure (HCC) I50.32    Atrial fibrillation (HCC) I48.91    Long term current use of anticoagulant therapy Z79.01    Benign hypertensive heart disease with heart failure (HCC) I11.0    Hyperlipidemia E78.5    Advanced directives, counseling/discussion Z71.89    Mild intermittent asthma without complication T27.97    NSTEMI (non-ST elevated myocardial infarction) (HCC) I21.4    Chest pain R07.9    IFG (impaired fasting glucose) R73.01    Adjustment disorder with anxious mood F43.22    Essential hypertension I10       Current Outpatient Medications   Medication Sig Dispense Refill    metoprolol succinate (TOPROL-XL) 100 mg tablet TAKE 1 TABLET BY MOUTH EVERY DAY 90 Tab 3    atorvastatin (LIPITOR) 40 mg tablet TAKE 1 TABLET BY MOUTH EVERY DAY 90 Tab 0    sertraline (ZOLOFT) 50 mg tablet Take 1 Tab by mouth daily. 90 Tab 0    amLODIPine (NORVASC) 5 mg tablet TAKE 1 TABLET BY MOUTH EVERY DAY 90 Tab 2    KLOR-CON M20 20 mEq tablet TAKE 1 TAB BY MOUTH DAILY.  30 Tab 6    diphenhydrAMINE (BENADRYL) 25 mg capsule Take 25 mg by mouth every six (6) hours as needed.  OTHER Allergy injections      VENTOLIN HFA 90 mcg/actuation inhaler TAKE 2 PUFFS BY INHALATION DAILY. 3 Inhaler 1    warfarin (COUMADIN) 5 mg tablet TAKE 5MG DAILY EXCEPT 2.5MG ON TUES, THUR, AND SAT. OR AS DIRECTED BY OUR OFFICE 30 Tab 5    losartan (COZAAR) 100 mg tablet Take 1 Tab by mouth daily. 90 Tab 3    furosemide (LASIX) 40 mg tablet Take 1 Tab by mouth daily. 90 Tab 3    chlorpheniramine-acetaminophen (CORICIDIN HBP COLD AND FLU) 2-325 mg tab Take 325 mg by mouth two (2) times daily as needed for Cough (Cold symptoms).  fluticasone furoate (ARNUITY ELLIPTA) 100 mcg/actuation dsdv inhaler Take 2 Puffs by inhalation daily. 1 Inhaler 0    aspirin delayed-release 81 mg tablet Take 1 Tab by mouth daily. 30 Tab 0    polyethylene glycol (MIRALAX) 17 gram packet Take 1 Packet by mouth daily. (Patient taking differently: Take  by mouth daily as needed for Other (Constipation). ) 30 Packet 0    esomeprazole (NEXIUM) 20 mg capsule Take 20 mg by mouth daily as needed (heart burn).  fluticasone (FLONASE ALLERGY RELIEF) 50 mcg/actuation nasal spray 2 Sprays by Both Nostrils route daily as needed for Rhinitis.  sodium chloride (SALINE NASAL) 0.65 % nasal squeeze bottle 1 Spray as needed for Congestion.  melatonin tab tablet Take 10 mg by mouth nightly.  diclofenac (VOLTAREN) 1 % gel Apply  to affected area four (4) times daily. 1 Each 0       Allergies   Allergen Reactions    Morphine Rash and Itching    Norco [Hydrocodone-Acetaminophen] Itching       Past Medical History:   Diagnosis Date    Aortic valve disorders 1/13/2011    Asthma     Atrial fibrillation ([facility] [address] 1/13/2011    Congestive heart failure, unspecified March 2005    Cleared quickly with Lasix therapy    Echocardiogram 01/10/2012    A-fib. EF 45%. Mild RVE. RVSP 45-50. Massive LAE. Mod TAMI. Mod MS. Severe MR (mean grad 10). Mild-mod AI. Mild TR.   Pulmonic systolic flow reversal.  Mild IVCE.  Hypertension     Iron deficiency anemias     Long term (current) use of anticoagulants 2/23/2011    Murmur 1/2011    Grade I-II/VI systolic ejection murmur along left sternal border, with radiation to the pulmonic area.  S/P cardiac cath 02/24/2012    Minimal coronary artery disease. Mild LVE. EF 55%. Mod MR. Mod-severe MS. RA 15.  RV 55/16. PA 58/34. W 36.  CO/CI 3.5/1.9 (TD); 3.61/1.9 (Dequan). R to L shunt suggested.  Wears glasses     Weight gain        Social History     Socioeconomic History    Marital status:      Spouse name: Not on file    Number of children: Not on file    Years of education: Not on file    Highest education level: Not on file   Occupational History    Not on file   Social Needs    Financial resource strain: Not on file    Food insecurity:     Worry: Not on file     Inability: Not on file    Transportation needs:     Medical: Not on file     Non-medical: Not on file   Tobacco Use    Smoking status: Former Smoker    Smokeless tobacco: Never Used   Substance and Sexual Activity    Alcohol use:  Yes     Alcohol/week: 1.0 standard drinks     Types: 1 Glasses of wine per week     Comment: occassionally    Drug use: No    Sexual activity: Yes     Partners: Male     Birth control/protection: None   Lifestyle    Physical activity:     Days per week: Not on file     Minutes per session: Not on file    Stress: Not on file   Relationships    Social connections:     Talks on phone: Not on file     Gets together: Not on file     Attends Anabaptist service: Not on file     Active member of club or organization: Not on file     Attends meetings of clubs or organizations: Not on file     Relationship status: Not on file    Intimate partner violence:     Fear of current or ex partner: Not on file     Emotionally abused: Not on file     Physically abused: Not on file     Forced sexual activity: Not on file   Other Topics Concern    Not on file Social History Narrative    Not on file       Family History   Problem Relation Age of Onset    Heart Surgery Father         Open-Heart Surgery    Other Father         Venous Thromboembolism    Heart Disease Mother         Enlarged Heart    Hypertension Mother     Diabetes Mother          OBJECTIVE    Physical Exam:     Visit Vitals  /82 (BP 1 Location: Left arm, BP Patient Position: Sitting)   Pulse 72   Temp 98.4 °F (36.9 °C) (Oral)   Resp 16   Ht 5' 9\" (1.753 m)   Wt 187 lb (84.8 kg)   SpO2 98%   BMI 27.62 kg/m²       General: alert, well-appearing,  in no apparent distress or pain  Breasts: breasts appear normal, no suspicious masses, no skin or nipple changes or axillary nodes.   CVS: irregular, [facility] valve click, + murmur  Lungs:clear to ausculation bilaterally, no crackles, wheezing or rhonchi noted  Abdomen: soft, NT, ND  Extremities: no edema  Psych:  mood and affect normal      ASSESSMENT/PLAN  Diagnoses and all orders for this visit:    Adjustment reaction with anxious mood  Improving  Cont zoloft 50 mg    Essential hypertension  controlled  Cont  norvasc 5 mg  Cont cozaar 100 mg and metoprolol 100 mg  DASH diet, monitoring    NSTEMI  Asymptomatic  Mild reversible ischemia on stress test 1/2019  On bb, arb, ASA  LDL is <70  Cont lipitor 40 mg qhs  Following dr. Frederica Duverney    Atrial fibrillation, unspecified type ([address]  Rate controlled, On BB, coumadin  INR therapeutic  Following dr. Frederica Duverney    Hyperlipidemia, unspecified hyperlipidemia type  Improved on lipitor    LDL goal <70  Check cmp prior to next visit in 3 months  Lipid panel update 4/2020    Long term current use of anticoagulant therapy  Cards following    Rheumatic valvular disease  S/p [facility] valve 2013  Chronic anticoagulation    Mild intermittent asthma without complication  Controlled  Cont ICS  Cont prn albuterol  given PCV 23 today    Colon cancer screening  Discussed options, agreed on FIT test    Follow-up and Dispositions · Return in about 3 months (around 11/5/2019), or if symptoms worsen or fail to improve, for routine chronic illness care, non-fasting labs prior to your next visit. Patient understands plan of care. Patient has provided input and agrees with goals.

## 2019-08-06 ENCOUNTER — HOSPITAL ENCOUNTER (OUTPATIENT)
Dept: LAB | Age: 59
Discharge: HOME | End: 2019-08-06
Payer: MEDICARE

## 2019-08-06 DIAGNOSIS — E78.5 HYPERLIPIDEMIA, UNSPECIFIED HYPERLIPIDEMIA TYPE: ICD-10-CM

## 2019-08-06 LAB
ALBUMIN SERPL-MCNC: 3.8 G/DL (ref 3.4–5)
ALBUMIN/GLOB SERPL: 0.9 {RATIO} (ref 0.8–1.7)
ALP SERPL-CCNC: 114 U/L (ref 45–117)
ALT SERPL-CCNC: 44 U/L (ref 13–56)
ANION GAP SERPL CALC-SCNC: 3 MMOL/L (ref 3–18)
AST SERPL-CCNC: 40 U/L (ref 10–38)
BILIRUB SERPL-MCNC: 0.5 MG/DL (ref 0.2–1)
BUN SERPL-MCNC: 14 MG/DL (ref 7–18)
BUN/CREAT SERPL: 16 (ref 12–20)
CALCIUM SERPL-MCNC: 9.4 MG/DL (ref 8.5–10.1)
CHLORIDE SERPL-SCNC: 105 MMOL/L (ref 100–111)
CO2 SERPL-SCNC: 32 MMOL/L (ref 21–32)
CREAT SERPL-MCNC: 0.9 MG/DL (ref 0.6–1.3)
GLOBULIN SER CALC-MCNC: 4.2 G/DL (ref 2–4)
GLUCOSE SERPL-MCNC: 94 MG/DL (ref 74–99)
POTASSIUM SERPL-SCNC: 4.1 MMOL/L (ref 3.5–5.5)
PROT SERPL-MCNC: 8 G/DL (ref 6.4–8.2)
SODIUM SERPL-SCNC: 140 MMOL/L (ref 136–145)

## 2019-08-06 PROCEDURE — 80053 COMPREHEN METABOLIC PANEL: CPT

## 2019-08-06 PROCEDURE — 36415 COLL VENOUS BLD VENIPUNCTURE: CPT

## 2019-08-08 ENCOUNTER — TELEPHONE (OUTPATIENT)
Dept: FAMILY MEDICINE CLINIC | Age: 59
End: 2019-08-08

## 2019-08-08 DIAGNOSIS — Z12.11 COLON CANCER SCREENING: ICD-10-CM

## 2019-08-08 DIAGNOSIS — R77.1 ELEVATED SERUM GLOBULIN LEVEL: Primary | ICD-10-CM

## 2019-08-08 NOTE — TELEPHONE ENCOUNTER
Hollis Hdz From [employer] that he received everything for the fit kit but the Specimen. Pt will need to redo. Please advise.

## 2019-08-09 ENCOUNTER — ANTI-COAG VISIT (OUTPATIENT)
Dept: CARDIOLOGY CLINIC | Age: 59
End: 2019-08-09

## 2019-08-09 DIAGNOSIS — Z79.01 LONG TERM CURRENT USE OF ANTICOAGULANT THERAPY: Primary | ICD-10-CM

## 2019-08-09 DIAGNOSIS — I48.91 ATRIAL FIBRILLATION, UNSPECIFIED TYPE (HCC): ICD-10-CM

## 2019-08-09 LAB
INR BLD: 1.7
PT POC: 20.8 SECONDS
VALID INTERNAL CONTROL?: YES

## 2019-08-09 NOTE — PROGRESS NOTES
Verbal order and read back per Olesya Mix NP    Patient is not with therapeutic range  Take 7.5 mg today then Continue Coumadin 5mg daily except 2.5mg on Tues, Thur, and Sat   Patient had greens   This has been fully explained to the patient, who indicates understanding.

## 2019-08-26 NOTE — PROGRESS NOTES
Joshua Rojas presents today for follow-up. She was supposed to see Dr. Janet Clark for a 6 month check-up but due to a scheduling conflict, the appointment had to be rescheduled. She is a 62year old female with a history of rheumatic valvular heart disease (s/p mitral valve replacement with a 25/23 mm On-X mechanical valve in May 2013), chronic atrial fibrillation, CHF, dyslipidemia, and hypertension. She was last seen by Dr Jaent Clark in 2018. She was hospitalized on 19 for complaints of chest pain, shortness of breath, orthopnea, and palpitations. She admitted to increased stress as her sister recently  and she admitted to a poor diet over the holidays. Her troponins were indeterminate at 0.14, 0.15, and 0.12. Her NT pro-BNP was 1530. She was treated with IV Lasix. She underwent a pharmacologic nuclear stress test on 19 and it showed a small reversible defect with mild reduction in uptake in the inferolateral region. Medical management was recommended unless she has recurrent exertional angina. She also had an echo done which showed an EF of 56-60%, no WMA, and mild concentric LVH. She reports that her losartan was decreased as her blood pressure was low at times in the hospital especially after she was diuresed. She has not had any recurrent chest pain. She admits to increased stressors at home as her mother-in-law and sister-in-law moved in with them prior to the holidays. However, she has learned to manage this stressor. Denies chest pain, tightness, heaviness, and palpitations. Denies shortness of breath at rest, dyspnea on exertion, orthopnea and PND. Denies abdominal bloating. Denies lightheadedness, dizziness, and syncope. Denies lower extremity edema and claudication. Denies nausea, vomiting, diarrhea, melena, hematochezia. Denies hematuria, urgency, frequency. Denies fever, chills.         Past Medical History:   Diagnosis Date    Aortic valve disorders 1/13/2011    Asthma     Atrial fibrillation ([facility] [address] 1/13/2011    Congestive heart failure, unspecified March 2005    Cleared quickly with Lasix therapy    Echocardiogram 01/10/2012    A-fib. EF 45%. Mild RVE. RVSP 45-50. Massive LAE. Mod TAMI. Mod MS. Severe MR (mean grad 10). Mild-mod AI. Mild TR. Pulmonic systolic flow reversal.  Mild IVCE.  Hypertension     Iron deficiency anemias     Long term (current) use of anticoagulants 2/23/2011    Murmur 1/2011    Grade I-II/VI systolic ejection murmur along left sternal border, with radiation to the pulmonic area.  S/P cardiac cath 02/24/2012    Minimal coronary artery disease. Mild LVE. EF 55%. Mod MR. Mod-severe MS. RA 15.  RV 55/16. PA 58/34. W 36.  CO/CI 3.5/1.9 (TD); 3.61/1.9 (Dequan). R to L shunt suggested.  Wears glasses     Weight gain      Past Surgical History:   Procedure Laterality Date    HX HEART CATHETERIZATION  2/24/2012    HX HYSTERECTOMY  2006    HX MITRAL VALVE REPLACEMENT  05/22/2013    25/33 mm On-X mechanical valve with bilateral pulmonary vein isolation and resection of LA appendage     Family History   Problem Relation Age of Onset    Heart Surgery Father         Open-Heart Surgery    Other Father         Venous Thromboembolism    Heart Disease Mother         Enlarged Heart    Hypertension Mother     Diabetes Mother      Social History     Tobacco Use    Smoking status: Former Smoker    Smokeless tobacco: Never Used   Substance Use Topics    Alcohol use: Yes     Alcohol/week: 1.0 standard drinks     Types: 1 Glasses of wine per week     Comment: occassionally    Drug use: No       Current Medications:  Current Outpatient Medications   Medication Sig    metoprolol succinate (TOPROL-XL) 100 mg tablet TAKE 1 TABLET BY MOUTH EVERY DAY    atorvastatin (LIPITOR) 40 mg tablet TAKE 1 TABLET BY MOUTH EVERY DAY    sertraline (ZOLOFT) 50 mg tablet Take 1 Tab by mouth daily.     amLODIPine (NORVASC) 5 mg tablet TAKE 1 TABLET BY MOUTH EVERY DAY    KLOR-CON M20 20 mEq tablet TAKE 1 TAB BY MOUTH DAILY.  diphenhydrAMINE (BENADRYL) 25 mg capsule Take 25 mg by mouth every six (6) hours as needed.  OTHER Allergy injections    VENTOLIN HFA 90 mcg/actuation inhaler TAKE 2 PUFFS BY INHALATION DAILY.  warfarin (COUMADIN) 5 mg tablet TAKE 5MG DAILY EXCEPT 2.5MG ON TUES, THUR, AND SAT. OR AS DIRECTED BY OUR OFFICE    losartan (COZAAR) 100 mg tablet Take 1 Tab by mouth daily.  furosemide (LASIX) 40 mg tablet Take 1 Tab by mouth daily.  chlorpheniramine-acetaminophen (CORICIDIN HBP COLD AND FLU) 2-325 mg tab Take 325 mg by mouth two (2) times daily as needed for Cough (Cold symptoms).  fluticasone furoate (ARNUITY ELLIPTA) 100 mcg/actuation dsdv inhaler Take 2 Puffs by inhalation daily.  aspirin delayed-release 81 mg tablet Take 1 Tab by mouth daily.  polyethylene glycol (MIRALAX) 17 gram packet Take 1 Packet by mouth daily. (Patient taking differently: Take  by mouth daily as needed for Other (Constipation). )    esomeprazole (NEXIUM) 20 mg capsule Take 20 mg by mouth daily as needed (heart burn).  fluticasone (FLONASE ALLERGY RELIEF) 50 mcg/actuation nasal spray 2 Sprays by Both Nostrils route daily as needed for Rhinitis.  sodium chloride (SALINE NASAL) 0.65 % nasal squeeze bottle 1 Spray as needed for Congestion.  melatonin tab tablet Take 10 mg by mouth nightly. No current facility-administered medications for this visit. Allergies   Allergen Reactions    Morphine Rash and Itching    Norco [Hydrocodone-Acetaminophen] Itching         Physical:  Visit Vitals  /72   Pulse 88   Ht 5' 9\" (1.753 m)   Wt 85.3 kg (188 lb)   SpO2 98%   BMI 27.76 kg/m²         Her weight is down 4 pounds since her last appointment. Neck:  Supple, no JVD, no carotid bruits  CV:  Normal S1 and crisp mechanical valve sounds, no murmurs, rubs, or gallops noted.   Irregularly, irregular rhythm  Lungs: Clear to ausculation throughout, no wheezes or rales  Abd:  Soft, non-tender, non-distended with good bowel sounds. No hepatosplenomegaly  Extremities:  No edema      EKG:  Read by Jessica Raza, DO - Atrial flutter fibrillation. LABS:  Lab Results   Component Value Date/Time    Sodium 140 08/06/2019 02:23 PM    Potassium 4.1 08/06/2019 02:23 PM    Chloride 105 08/06/2019 02:23 PM    CO2 32 08/06/2019 02:23 PM    Glucose 94 08/06/2019 02:23 PM    BUN 14 08/06/2019 02:23 PM    Creatinine 0.90 08/06/2019 02:23 PM     Lab Results   Component Value Date/Time    Cholesterol, total 130 04/12/2019 09:28 AM    HDL Cholesterol 67 (H) 04/12/2019 09:28 AM    LDL, calculated 47 04/12/2019 09:28 AM    Triglyceride 80 04/12/2019 09:28 AM    CHOL/HDL Ratio 1.9 04/12/2019 09:28 AM       No results found for: GPT         Impression / Plan:      1. Chronic diastolic heart failure, appears compensated at present  2. Chronic Atrial fibrillation, rate controlled and on Coumadin managed by our office  3. Rheumatic valvular heart disease, s/p mitral valve replacement with a 25/23 mm On-X mechanical valve in May 2013  4. Non-STEMI    Mrs. Elana Laboy was seen today for a 6 month follow-up. She states that she is feeling well and offers no cardiac complaints. Her blood pressure is well controlled, lungs are clear, and she does not exhibit any signs of volume overload. She remains in AFIB with controlled ventricular rate. She remains anticoagulated with Coumadin which she is tolerating well, with no bleeding reported. Her INR was checked today and it was 2.0. It will be rechecked again in 4 weeks. She will remain on her current dosing schedule of Coumadin 5mg daily except 2.5mg on Tues, Thur, and Sat . She will follow-up with Dr. Samaria Moody as scheduled and as needed.       Swathi Centeno MSN, FNP-BC    Please note:  Portions of this chart were created with Dragon medical speech to text program.  Unrecognized errors may be present.

## 2019-09-03 ENCOUNTER — OFFICE VISIT (OUTPATIENT)
Dept: CARDIOLOGY CLINIC | Age: 59
End: 2019-09-03

## 2019-09-03 VITALS
OXYGEN SATURATION: 98 % | HEIGHT: 69 IN | DIASTOLIC BLOOD PRESSURE: 72 MMHG | WEIGHT: 188 LBS | BODY MASS INDEX: 27.85 KG/M2 | HEART RATE: 88 BPM | SYSTOLIC BLOOD PRESSURE: 120 MMHG

## 2019-09-03 DIAGNOSIS — I48.19 PERSISTENT ATRIAL FIBRILLATION (HCC): ICD-10-CM

## 2019-09-03 DIAGNOSIS — Z95.2 S/P MVR (MITRAL VALVE REPLACEMENT): ICD-10-CM

## 2019-09-03 DIAGNOSIS — I50.32 CHRONIC DIASTOLIC CONGESTIVE HEART FAILURE (HCC): Primary | ICD-10-CM

## 2019-09-03 DIAGNOSIS — Z79.01 LONG TERM CURRENT USE OF ANTICOAGULANT THERAPY: ICD-10-CM

## 2019-09-03 DIAGNOSIS — Z79.01 LONG TERM (CURRENT) USE OF ANTICOAGULANTS: ICD-10-CM

## 2019-09-03 DIAGNOSIS — I09.1 RHEUMATIC VALVULAR DISEASE: ICD-10-CM

## 2019-09-03 DIAGNOSIS — E78.2 MIXED HYPERLIPIDEMIA: ICD-10-CM

## 2019-09-03 LAB
INR BLD: 2
PT POC: 23.4 SECONDS
VALID INTERNAL CONTROL?: YES

## 2019-09-03 NOTE — PROGRESS NOTES
Paulo Ramirez presents today for   Chief Complaint   Patient presents with    CHF     6 month     Leg Swelling     left ankle everyday       Paulo Ramirez preferred language for health care discussion is english/other. Is someone accompanying this pt? no    Is the patient using any DME equipment during [address]? no    Depression Screening:  3 most recent PHQ Screens 6/24/2019   Little interest or pleasure in doing things Not at all   Feeling down, depressed, irritable, or hopeless Not at all   Total Score PHQ 2 0       Learning Assessment:  Learning Assessment 8/11/2016   PRIMARY LEARNER Patient   PRIMARY LANGUAGE ENGLISH   LEARNER PREFERENCE PRIMARY DEMONSTRATION     READING   ANSWERED BY PATIENT   RELATIONSHIP SELF       Abuse Screening:  Abuse Screening Questionnaire 6/24/2019   Do you ever feel afraid of your partner? N   Are you in a relationship with someone who physically or mentally threatens you? N   Is it safe for you to go home? Y       Fall Risk  Fall Risk Assessment, last 12 mths 9/3/2019   Able to walk? Yes   Fall in past 12 months? No       Pt currently taking Anticoagulant therapy? yes    Coordination of Care:  1. Have you been to the ER, urgent care clinic since your last visit? Hospitalized since your last visit? no    2. Have you seen or consulted any other health care providers outside of the [address] [place] [address] since your last visit? Include any pap smears or colon screening.  no

## 2019-09-13 ENCOUNTER — HOSPITAL ENCOUNTER (OUTPATIENT)
Dept: LAB | Age: 59
Discharge: HOME | End: 2019-09-13
Payer: MEDICARE

## 2019-09-13 DIAGNOSIS — R77.1 ELEVATED SERUM GLOBULIN LEVEL: ICD-10-CM

## 2019-09-13 PROCEDURE — 84155 ASSAY OF PROTEIN SERUM: CPT

## 2019-09-13 PROCEDURE — 36415 COLL VENOUS BLD VENIPUNCTURE: CPT

## 2019-09-16 ENCOUNTER — HOSPITAL ENCOUNTER (OUTPATIENT)
Dept: GENERAL RADIOLOGY | Age: 59
Discharge: HOME | End: 2019-09-16
Payer: MEDICARE

## 2019-09-16 ENCOUNTER — OFFICE VISIT (OUTPATIENT)
Dept: FAMILY MEDICINE CLINIC | Age: 59
End: 2019-09-16

## 2019-09-16 VITALS
BODY MASS INDEX: 27.85 KG/M2 | SYSTOLIC BLOOD PRESSURE: 132 MMHG | WEIGHT: 188 LBS | RESPIRATION RATE: 16 BRPM | HEIGHT: 69 IN | DIASTOLIC BLOOD PRESSURE: 78 MMHG | OXYGEN SATURATION: 98 % | TEMPERATURE: 98.3 F | HEART RATE: 88 BPM

## 2019-09-16 DIAGNOSIS — I09.1 RHEUMATIC VALVULAR DISEASE: ICD-10-CM

## 2019-09-16 DIAGNOSIS — J45.20 MILD INTERMITTENT ASTHMA WITHOUT COMPLICATION: ICD-10-CM

## 2019-09-16 DIAGNOSIS — I10 ESSENTIAL HYPERTENSION: ICD-10-CM

## 2019-09-16 DIAGNOSIS — R05.9 COUGH: Primary | ICD-10-CM

## 2019-09-16 DIAGNOSIS — I21.4 NSTEMI (NON-ST ELEVATED MYOCARDIAL INFARCTION) (HCC): ICD-10-CM

## 2019-09-16 DIAGNOSIS — J30.9 ALLERGIC RHINITIS, UNSPECIFIED SEASONALITY, UNSPECIFIED TRIGGER: ICD-10-CM

## 2019-09-16 DIAGNOSIS — M79.672 LEFT FOOT PAIN: ICD-10-CM

## 2019-09-16 DIAGNOSIS — Z79.01 LONG TERM CURRENT USE OF ANTICOAGULANT THERAPY: ICD-10-CM

## 2019-09-16 DIAGNOSIS — R05.9 COUGH: ICD-10-CM

## 2019-09-16 DIAGNOSIS — I48.91 ATRIAL FIBRILLATION, UNSPECIFIED TYPE (HCC): ICD-10-CM

## 2019-09-16 LAB
ALBUMIN SERPL ELPH-MCNC: 3.6 G/DL (ref 2.9–4.4)
ALBUMIN/GLOB SERPL: 0.9 {RATIO} (ref 0.7–1.7)
ALPHA1 GLOB SERPL ELPH-MCNC: 0.2 G/DL (ref 0–0.4)
ALPHA2 GLOB SERPL ELPH-MCNC: 0.6 G/DL (ref 0.4–1)
B-GLOBULIN SERPL ELPH-MCNC: 1.7 G/DL (ref 0.7–1.3)
GAMMA GLOB SERPL ELPH-MCNC: 1.6 G/DL (ref 0.4–1.8)
GLOBULIN SER CALC-MCNC: 4.1 G/DL (ref 2.2–3.9)
M PROTEIN SERPL ELPH-MCNC: ABNORMAL G/DL
PROT SERPL-MCNC: 7.7 G/DL (ref 6–8.5)

## 2019-09-16 PROCEDURE — 71046 X-RAY EXAM CHEST 2 VIEWS: CPT

## 2019-09-16 RX ORDER — MONTELUKAST SODIUM 10 MG/1
10 TABLET ORAL DAILY
Qty: 90 TAB | Refills: 0 | Status: SHIPPED | OUTPATIENT
Start: 2019-09-16 | End: 2019-11-29 | Stop reason: SDUPTHER

## 2019-09-16 RX ORDER — AZITHROMYCIN 250 MG/1
TABLET, FILM COATED ORAL
Qty: 6 TAB | Refills: 0 | Status: SHIPPED | OUTPATIENT
Start: 2019-09-16 | End: 2019-11-05

## 2019-09-16 NOTE — PROGRESS NOTES
Syed Ulloa, 62 y.o.,  female    SUBJECTIVE  Cough x 2 weeks    Pt with PMHx asthma, allergic rhinitis, chronic diastolic CHF, GERD. Reports past 2 weeks has bothersome productive cough with yellowish sputum, nasal congestion and post nasal drip. She denies worsening asthma, no increase in albuterol use, using her arnuity regularly. She continues to get weekly allergy shots, on flonase and benadryl. She denies worsening GERD  She denies chest pain, LIZARRAGA, pillow orthopnea, leg edema. She has L ankle swelling she relates to her sympomatic plantar fascitis. She was recently evaluated by cardiology, reviewed notes 9/3/19, no changes, no cardaic concern. Echo 1/19 essentially normal,      Requesting to see dr. Arnold Rodríguez again for L foot pain, responded well to steroid injection 6/2019    Anxiety-doing fairly well on zoloft, still with situational stressors but manageable. HTN-taking medications without problems. Continues to take norvasc,  metoprolol and cozaar    H/o nstemi, afib, rheumatic valve disease s/p MV repair 2013- denies any chest pain, sob, palpitations, lightheadedness. On chronic coumadin treatment, following cardiology.    LDL 67 on lipitor (4/2019)     HL-taking lipitor without problems    ROS:  See HPI, all others negative        Patient Active Problem List   Diagnosis Code    Rheumatic valvular disease I09.1    Chronic diastolic congestive heart failure (HCC) I50.32    Atrial fibrillation (HCC) I48.91    Long term current use of anticoagulant therapy Z79.01    Benign hypertensive heart disease with heart failure (HCC) I11.0    Hyperlipidemia E78.5    Advanced directives, counseling/discussion Z71.89    Mild intermittent asthma without complication Y25.42    NSTEMI (non-ST elevated myocardial infarction) ([facility] [address] I21.4    Chest pain R07.9    IFG (impaired fasting glucose) R73.01    Adjustment disorder with anxious mood F43.22    Essential hypertension I10    Allergic rhinitis J30.9 Current Outpatient Medications   Medication Sig Dispense Refill    azithromycin (ZITHROMAX) 250 mg tablet Take two tablets today then one tablet daily 6 Tab 0    montelukast (SINGULAIR) 10 mg tablet Take 1 Tab by mouth daily. 90 Tab 0    metoprolol succinate (TOPROL-XL) 100 mg tablet TAKE 1 TABLET BY MOUTH EVERY DAY 90 Tab 3    atorvastatin (LIPITOR) 40 mg tablet TAKE 1 TABLET BY MOUTH EVERY DAY 90 Tab 0    sertraline (ZOLOFT) 50 mg tablet Take 1 Tab by mouth daily. 90 Tab 0    amLODIPine (NORVASC) 5 mg tablet TAKE 1 TABLET BY MOUTH EVERY DAY 90 Tab 2    KLOR-CON M20 20 mEq tablet TAKE 1 TAB BY MOUTH DAILY. 30 Tab 6    OTHER Allergy injections      VENTOLIN HFA 90 mcg/actuation inhaler TAKE 2 PUFFS BY INHALATION DAILY. 3 Inhaler 1    warfarin (COUMADIN) 5 mg tablet TAKE 5MG DAILY EXCEPT 2.5MG ON TUES, THUR, AND SAT. OR AS DIRECTED BY OUR OFFICE 30 Tab 5    losartan (COZAAR) 100 mg tablet Take 1 Tab by mouth daily. 90 Tab 3    furosemide (LASIX) 40 mg tablet Take 1 Tab by mouth daily. 90 Tab 3    chlorpheniramine-acetaminophen (CORICIDIN HBP COLD AND FLU) 2-325 mg tab Take 325 mg by mouth two (2) times daily as needed for Cough (Cold symptoms).  aspirin delayed-release 81 mg tablet Take 1 Tab by mouth daily. 30 Tab 0    polyethylene glycol (MIRALAX) 17 gram packet Take 1 Packet by mouth daily. (Patient taking differently: Take  by mouth daily as needed for Other (Constipation). ) 30 Packet 0    esomeprazole (NEXIUM) 20 mg capsule Take 20 mg by mouth daily as needed (heart burn).  fluticasone (FLONASE ALLERGY RELIEF) 50 mcg/actuation nasal spray 2 Sprays by Both Nostrils route daily as needed for Rhinitis.  sodium chloride (SALINE NASAL) 0.65 % nasal squeeze bottle 1 Spray as needed for Congestion.  melatonin tab tablet Take 10 mg by mouth nightly.  diphenhydrAMINE (BENADRYL) 25 mg capsule Take 25 mg by mouth every six (6) hours as needed.       fluticasone furoate (ARNUITY ELLIPTA) 100 mcg/actuation dsdv inhaler Take 2 Puffs by inhalation daily. 1 Inhaler 0       Allergies   Allergen Reactions    Morphine Rash and Itching    Norco [Hydrocodone-Acetaminophen] Itching       Past Medical History:   Diagnosis Date    Aortic valve disorders 1/13/2011    Asthma     Atrial fibrillation ([facility] [address] 1/13/2011    Congestive heart failure, unspecified March 2005    Cleared quickly with Lasix therapy    Echocardiogram 01/10/2012    A-fib. EF 45%. Mild RVE. RVSP 45-50. Massive LAE. Mod TAMI. Mod MS. Severe MR (mean grad 10). Mild-mod AI. Mild TR. Pulmonic systolic flow reversal.  Mild IVCE.  Hypertension     Iron deficiency anemias     Long term (current) use of anticoagulants 2/23/2011    Murmur 1/2011    Grade I-II/VI systolic ejection murmur along left sternal border, with radiation to the pulmonic area.  S/P cardiac cath 02/24/2012    Minimal coronary artery disease. Mild LVE. EF 55%. Mod MR. Mod-severe MS. RA 15.  RV 55/16. PA 58/34. W 36.  CO/CI 3.5/1.9 (TD); 3.61/1.9 (Dequan). R to L shunt suggested.  Wears glasses     Weight gain        Social History     Socioeconomic History    Marital status:      Spouse name: Not on file    Number of children: Not on file    Years of education: Not on file    Highest education level: Not on file   Occupational History    Not on file   Social Needs    Financial resource strain: Not on file    Food insecurity:     Worry: Not on file     Inability: Not on file    Transportation needs:     Medical: Not on file     Non-medical: Not on file   Tobacco Use    Smoking status: Former Smoker    Smokeless tobacco: Never Used   Substance and Sexual Activity    Alcohol use:  Yes     Alcohol/week: 1.0 standard drinks     Types: 1 Glasses of wine per week     Comment: occassionally    Drug use: No    Sexual activity: Yes     Partners: Male     Birth control/protection: None   Lifestyle    Physical activity:     Days per week: Not on file     Minutes per session: Not on file    Stress: Not on file   Relationships    Social connections:     Talks on phone: Not on file     Gets together: Not on file     Attends Hoahaoism service: Not on file     Active member of club or organization: Not on file     Attends meetings of clubs or organizations: Not on file     Relationship status: Not on file    Intimate partner violence:     Fear of current or ex partner: Not on file     Emotionally abused: Not on file     Physically abused: Not on file     Forced sexual activity: Not on file   Other Topics Concern    Not on file   Social History Narrative    Not on file       Family History   Problem Relation Age of Onset    Heart Surgery Father         Open-Heart Surgery    Other Father         Venous Thromboembolism    Heart Disease Mother         Enlarged Heart    Hypertension Mother     Diabetes Mother          OBJECTIVE    Physical Exam:     Visit Vitals  /78 (BP 1 Location: Left arm, BP Patient Position: Sitting)   Pulse 88   Temp 98.3 °F (36.8 °C) (Oral)   Resp 16   Ht 5' 9\" (1.753 m)   Wt 188 lb (85.3 kg)   SpO2 98%   BMI 27.76 kg/m²       General: alert, well-appearing,  in no apparent distress or pain  Neck: no JVD  CVS: irregular, mech valve click, + murmur  Lungs:clear to ausculation bilaterally, no crackles, wheezing or rhonchi noted  Abdomen: soft, NT, ND  Extremities: + Left heel tenderness, trace L ankle edema  Psych:  mood and affect normal      ASSESSMENT/PLAN  Diagnoses and all orders for this visit:    Cough  Sounds more like post nasal drip/infectious bronchitis  Will give zpack to cover for bronchitis and add singulair  If not improved, will step up maintenance asthma inhaler  Appears euvolemic, and does not appear to be decompensated   Check CXR    Left foot pain  Likely plantar fascitis  Refer back to dr. Laorn Hanson hypertension  controlled  Cont  norvasc 5 mg  Cont cozaar 100 mg and metoprolol 100 mg  DASH diet, monitoring    NSTEMI  Asymptomatic  Mild reversible ischemia on stress test 1/2019  On bb, arb, ASA  LDL is <70  Cont lipitor 40 mg qhs  Following dr. Luanne Yates    Atrial fibrillation, unspecified type ([address]  Rate controlled, On BB, coumadin  INR therapeutic  Following dr. Luanne Yates    Hyperlipidemia, unspecified hyperlipidemia type  Improved on lipitor    LDL goal <70  Check cmp prior to next visit in 3 months  Lipid panel update 4/2020    Long term current use of anticoagulant therapy  Cards following    Rheumatic valvular disease  S/p [facility] valve 2013  Chronic anticoagulation    Mild intermittent asthma without complication  Controlled, see above  Cont ICS  Cont prn albuterol    Allergic rhinitis, unspecified seasonality, unspecified trigger  Receiving allergy shots  Cont flonase, benadryl  Add singulair    Follow-up and Dispositions    · Return in about 2 weeks (around 9/30/2019), or if symptoms worsen or fail to improve. Patient understands plan of care. Patient has provided input and agrees with goals.

## 2019-09-16 NOTE — PATIENT INSTRUCTIONS
Plantar Fasciitis: Care Instructions  Your Care Instructions    Plantar fasciitis is pain and inflammation of the plantar fascia, the tissue at the bottom of your foot that connects the heel bone to the toes. The plantar fascia also supports the arch. If you strain the plantar fascia, it can develop small tears and cause heel pain when you stand or walk. Plantar fasciitis can be caused by running or other sports. It also may occur in people who are overweight or who have high arches or flat feet. You may get plantar fasciitis if you walk or stand for long periods, or have a tight Achilles tendon or calf muscles. You can improve your foot pain with rest and other care at home. It might take a few weeks to a few months for your foot to heal completely. Follow-up care is a key part of your treatment and safety. Be sure to make and go to all appointments, and call your doctor if you are having problems. It's also a good idea to know your test results and keep a list of the medicines you take. How can you care for yourself at home? · Rest your feet often. Reduce your activity to a level that lets you avoid pain. If possible, do not run or walk on hard surfaces. · Take pain medicines exactly as directed. ? If the doctor gave you a prescription medicine for pain, take it as prescribed. ? If you are not taking a prescription pain medicine, take an over-the-counter anti-inflammatory medicine for pain and swelling, such as ibuprofen (Advil, Motrin) or naproxen (Aleve). Read and follow all instructions on the label. · Use ice massage to help with pain and swelling. You can use an ice cube or an ice cup several times a day. To make an ice cup, fill a paper cup with water and freeze it. Cut off the top of the cup until a half-inch of ice shows. Hold onto the remaining paper to use the cup. Rub the ice in small circles over the area for 5 to 7 minutes.   · Contrast baths, which alternate hot and cold water, can also help reduce swelling. But because heat alone may make pain and swelling worse, end a contrast bath with a soak in cold water. · Wear a night splint if your doctor suggests it. A night splint holds your foot with the toes pointed up and the foot and ankle at a 90-degree angle. This position gives the bottom of your foot a constant, gentle stretch. · Do simple exercises such as calf stretches and towel stretches 2 to 3 times each day, especially when you first get up in the morning. These can help the plantar fascia become more flexible. They also make the muscles that support your arch stronger. Hold these stretches for 15 to 30 seconds per stretch. Repeat 2 to 4 times. ? Stand about 1 foot from a wall. Place the palms of both hands against the wall at chest level. Lean forward against the wall, keeping one leg with the knee straight and heel on the ground while bending the knee of the other leg.  ? Sit down on the floor or a mat with your feet stretched in front of you. Roll up a towel lengthwise, and loop it over the ball of your foot. Holding the towel at both ends, gently pull the towel toward you to stretch your foot. · Wear shoes with good arch support. Athletic shoes or shoes with a well-cushioned sole are good choices. · Try heel cups or shoe inserts (orthotics) to help cushion your heel. You can buy these at many shoe stores. · Put on your shoes as soon as you get out of bed. Going barefoot or wearing slippers may make your pain worse. · Reach and stay at a good weight for your height. This puts less strain on your feet. When should you call for help? Call your doctor now or seek immediate medical care if:    · You have heel pain with fever, redness, or warmth in your heel.     · You cannot put weight on the sore foot.    Watch closely for changes in your health, and be sure to contact your doctor if:    · You have numbness or tingling in your heel.     · Your heel pain lasts more than 2 weeks. Where can you learn more? Go to http://greyson-andrea.info/. Alexa Bynum in the search box to learn more about \"Plantar Fasciitis: Care Instructions. \"  Current as of: September 20, 2018  Content Version: 12.1  © 5599-3413 [employer], X[employer]1. Care instructions adapted under license by [employer] (which disclaims liability or warranty for this information). If you have questions about a medical condition or this instruction, always ask your healthcare professional. [address] any warranty or liability for your use of this information.

## 2019-09-16 NOTE — PROGRESS NOTES
1. Have you been to the ER, urgent care clinic since your last visit? Hospitalized since your last visit? No    2. Have you seen or consulted any other health care providers outside of the [address] [place] [address] since your last visit? Include any pap smears or colon screening.  No

## 2019-09-19 RX ORDER — SERTRALINE HYDROCHLORIDE 50 MG/1
TABLET, FILM COATED ORAL
Qty: 90 TAB | Refills: 0 | Status: SHIPPED | OUTPATIENT
Start: 2019-09-19 | End: 2019-10-08 | Stop reason: SDUPTHER

## 2019-09-30 ENCOUNTER — ANTI-COAG VISIT (OUTPATIENT)
Dept: CARDIOLOGY CLINIC | Age: 59
End: 2019-09-30

## 2019-09-30 ENCOUNTER — OFFICE VISIT (OUTPATIENT)
Dept: ORTHOPEDIC SURGERY | Age: 59
End: 2019-09-30

## 2019-09-30 VITALS
BODY MASS INDEX: 28.44 KG/M2 | SYSTOLIC BLOOD PRESSURE: 132 MMHG | RESPIRATION RATE: 10 BRPM | DIASTOLIC BLOOD PRESSURE: 94 MMHG | OXYGEN SATURATION: 92 % | WEIGHT: 192 LBS | TEMPERATURE: 97.1 F | HEIGHT: 69 IN | HEART RATE: 85 BPM

## 2019-09-30 DIAGNOSIS — Z79.01 LONG TERM CURRENT USE OF ANTICOAGULANT THERAPY: Primary | ICD-10-CM

## 2019-09-30 DIAGNOSIS — M72.2 PLANTAR FASCIITIS OF LEFT FOOT: Primary | ICD-10-CM

## 2019-09-30 DIAGNOSIS — I48.91 ATRIAL FIBRILLATION, UNSPECIFIED TYPE (HCC): ICD-10-CM

## 2019-09-30 DIAGNOSIS — R77.1 ELEVATED SERUM GLOBULIN LEVEL: Primary | ICD-10-CM

## 2019-09-30 LAB
INR BLD: 2.2
PT POC: 26.4 SECONDS
VALID INTERNAL CONTROL?: YES

## 2019-09-30 NOTE — PROGRESS NOTES
Verbal order and read back per Allison Padilla MD     Patient is within therapeutic range  Continue Coumadin 5mg daily except 2.5mg on Tues, Thur, and Sat   Recheck 4 weeks  This has been fully explained to the patient, who indicates understanding.

## 2019-09-30 NOTE — PROGRESS NOTES
+ mildly elevated protein levels on labs, would want to have this further evaluated by a hematologist, referral placed  pls notify pt.

## 2019-09-30 NOTE — PROGRESS NOTES
AMBULATORY PROGRESS NOTE      Patient: Nenita Black             MRN: [id number]     SSN: [id number] Body mass index is 28.35 kg/m². YOB: 1960     AGE: 62 y.o. EX: female    PCP: Oni Ayala MD     IMPRESSION/DIAGNOSIS AND TREATMENT PLAN     DIAGNOSES  1. Plantar fasciitis of left foot        No orders of the defined types were placed in this encounter. Nenita Blakc understands her diagnoses and the proposed plan. Plan:    1) Continue HEP with plantar fascial stretches. 2) Continue cryotherapy as directed. 3) Continue activity modification as directed. RTO - 2 months     HPI AND EXAMINATION     Nenita Black IS A 62 y.o. female who presents to my outpatient office for follow up of plantar fasciitis of the left foot. At the last visit, I provided a Cortisone injection to the left plantar fascia, instructed the patient to continue activity modification as directed, to use cryotherapy as directed, and to resume PT in 1 week. Since we saw her last, Ms. Phyllis Gonzalez states that she is still experiencing pain in her left plantar fascia, but she finds that her pain has improved. She notes that the Cortisone injection she received at the last visit did help with her pain. She finds that her pain is not as bad anymore when she first walks in the mornings. She mostly experiences pain at the end of the day now, and she describes this pain as a throbbing pain. She finds that by noon, her foot becomes swollen. She has been elevating and icing her foot. She notes that she has not been as diligent with her HEP. She has been taking OTC Tylenol as needed for pain. The patient is currently taking Coumadin for A-fib and cannot take NSAIDs. The patient retired after having a valve replacement in 2011.      Visit Vitals  BP (!) 132/94   Pulse 85   Temp 97.1 °F (36.2 °C) (Oral)   Resp 10   Ht 5' 9\" (1.753 m)   Wt 192 lb (87.1 kg)   SpO2 92%   BMI 28.35 kg/m²     Appearance: Alert, well appearing and pleasant patient who is in no distress, oriented to person, place/time, and who follows commands. Psychiatric: Affect and mood are appropriate. Cardiovascular/Peripheral Vascular: Normal Pulses to each hand and foot  Musculoskeletal:  LOCATION:  Left FOOT/ANKLE  Integumentary: No rashes, skin patches, wounds, or abrasions to the right or left legs                             Warm and normal color. No regions of expressible drainage. Palpable fullness or mass is not present       Darkened discoloration at the site of Cortisone injection      Gait: Antalgic      Tenderness: mild PLANTAR FASCIA REGION, with no NODULARITIES                              No tenderness to the central heel      Motor/Strength/Tone Exam: WNL      Sensory Exam:   Intact Normal Sensation to ankle/foot      Stability Testing: No anterolateral or varus instability of the Ankle or Subtalar Joints                                     No peroneal tendon instability noted      ROM: Normal ROM noted to ankle/foot      Contractures: No Achilles or Gastrocnemius Contractures      Calf tenderness: Absent for calf or gastrocnemius muscle regions       Soft, supple, non tender, non taut lower extremity compartments       Alignment: Neutral Hindfoot  Wounds/Abrasions:   None present  Extremities:   No embolic phenomena to the toes or hands                          No significant edema to the foot and or toes.                           Lower extremities are warm and appear well perfused                          DVT: No evidence of DVT seen on examination at this time                          No calf swelling, no tenderness to calf muscles  Lymphatic:  No Evidence of Lymphedema  Vascular: Medial Border of Tibia Region: Edema is not present                   Pulses: Dorsalis Pedis &  Posterior Tibial Pulses : Palpable yes                   Varicosities Lower Limbs :  None        CHART REVIEW     Past Medical History:   Diagnosis Date    Aortic valve disorders 1/13/2011    Asthma     Atrial fibrillation ([address] 1/13/2011    Congestive heart failure, unspecified March 2005    Cleared quickly with Lasix therapy    Echocardiogram 01/10/2012    A-fib. EF 45%. Mild RVE. RVSP 45-50. Massive LAE. Mod TAMI. Mod MS. Severe MR (mean grad 10). Mild-mod AI. Mild TR. Pulmonic systolic flow reversal.  Mild IVCE.  Hypertension     Iron deficiency anemias     Long term (current) use of anticoagulants 2/23/2011    Murmur 1/2011    Grade I-II/VI systolic ejection murmur along left sternal border, with radiation to the pulmonic area.  S/P cardiac cath 02/24/2012    Minimal coronary artery disease. Mild LVE. EF 55%. Mod MR. Mod-severe MS. RA 15.  RV 55/16. PA 58/34. W 36.  CO/CI 3.5/1.9 (TD); 3.61/1.9 (Dequan). R to L shunt suggested.  Wears glasses     Weight gain      Current Outpatient Medications   Medication Sig    sertraline (ZOLOFT) 50 mg tablet TAKE 1 TABLET BY MOUTH EVERY DAY    azithromycin (ZITHROMAX) 250 mg tablet Take two tablets today then one tablet daily    montelukast (SINGULAIR) 10 mg tablet Take 1 Tab by mouth daily.  metoprolol succinate (TOPROL-XL) 100 mg tablet TAKE 1 TABLET BY MOUTH EVERY DAY    atorvastatin (LIPITOR) 40 mg tablet TAKE 1 TABLET BY MOUTH EVERY DAY    amLODIPine (NORVASC) 5 mg tablet TAKE 1 TABLET BY MOUTH EVERY DAY    KLOR-CON M20 20 mEq tablet TAKE 1 TAB BY MOUTH DAILY.  diphenhydrAMINE (BENADRYL) 25 mg capsule Take 25 mg by mouth every six (6) hours as needed.  OTHER Allergy injections    VENTOLIN HFA 90 mcg/actuation inhaler TAKE 2 PUFFS BY INHALATION DAILY.  warfarin (COUMADIN) 5 mg tablet TAKE 5MG DAILY EXCEPT 2.5MG ON TUES, THUR, AND SAT. OR AS DIRECTED BY OUR OFFICE    losartan (COZAAR) 100 mg tablet Take 1 Tab by mouth daily.  furosemide (LASIX) 40 mg tablet Take 1 Tab by mouth daily.     chlorpheniramine-acetaminophen (CORICIDIN HBP COLD AND FLU) 2-325 mg tab Take 325 mg by mouth two (2) times daily as needed for Cough (Cold symptoms).  fluticasone furoate (ARNUITY ELLIPTA) 100 mcg/actuation dsdv inhaler Take 2 Puffs by inhalation daily.  aspirin delayed-release 81 mg tablet Take 1 Tab by mouth daily.  polyethylene glycol (MIRALAX) 17 gram packet Take 1 Packet by mouth daily. (Patient taking differently: Take  by mouth daily as needed for Other (Constipation). )    esomeprazole (NEXIUM) 20 mg capsule Take 20 mg by mouth daily as needed (heart burn).  fluticasone (FLONASE ALLERGY RELIEF) 50 mcg/actuation nasal spray 2 Sprays by Both Nostrils route daily as needed for Rhinitis.  sodium chloride (SALINE NASAL) 0.65 % nasal squeeze bottle 1 Spray as needed for Congestion.  melatonin tab tablet Take 10 mg by mouth nightly. No current facility-administered medications for this visit. Allergies   Allergen Reactions    Morphine Rash and Itching    Norco [Hydrocodone-Acetaminophen] Itching     Past Surgical History:   Procedure Laterality Date    HX HEART CATHETERIZATION  2/24/2012    HX HYSTERECTOMY  2006    HX MITRAL VALVE REPLACEMENT  05/22/2013    25/33 mm On-X mechanical valve with bilateral pulmonary vein isolation and resection of LA appendage     Social History     Occupational History    Not on file   Tobacco Use    Smoking status: Former Smoker    Smokeless tobacco: Never Used   Substance and Sexual Activity    Alcohol use:  Yes     Alcohol/week: 1.0 standard drinks     Types: 1 Glasses of wine per week     Comment: occassionally    Drug use: No    Sexual activity: Yes     Partners: Male     Birth control/protection: None     Family History   Problem Relation Age of Onset    Heart Surgery Father         Open-Heart Surgery    Other Father         Venous Thromboembolism    Heart Disease Mother         Enlarged Heart    Hypertension Mother     Diabetes Mother         REVIEW OF SYSTEMS : 9/30/2019  ALL BELOW ARE Negative except : SEE HPI     Constitutional: Negative for fever, chills and weight loss. Neg Weight Loss  Cardiovascular: Negative for chest pain, claudication and leg swelling. SOB, LIZARRAGA   Gastrointestinal/Urological: Negative for  pain, N/V/D/C, Blood in stool or urine,dysuria                         Hematuria, Incontinence, pelvic pain  Musculoskeletal: see HPI. Neurological: Negative for dizziness and weakness, headaches,Visual Changes             Confusion,  Or Seizures,   Psychiatric/Behavioral: Negative for depression, memory loss and substance abuse. Extremities:  Negative for hair changes, rash or skin lesion changes. Hematologic: Negative for Bleeding problems, bruising, pallor or swollen lymph nodes. Peripheral Vascular: No calf pain, vascular vein tenderness to calf pain              No calf throbbing, posterior knee throbbing pain     DIAGNOSTIC IMAGING     No notes on file    Please see above section of this report. I have personally reviewed the results of the above study. The interpretation of this study is my professional opinion. Written by Sindhu Aguero, as dictated by Dr. Gloria Cr. I, Dr. Gloria Cr, confirm that all documentation is accurate.

## 2019-09-30 NOTE — PATIENT INSTRUCTIONS
Plantar Fasciitis: Exercises  Introduction  Here are some examples of exercises for you to try. The exercises may be suggested for a condition or for rehabilitation. Start each exercise slowly. Ease off the exercises if you start to have pain. You will be told when to start these exercises and which ones will work best for you. How to do the exercises  Towel stretch    1. Sit with your legs extended and knees straight. 2. Place a towel around your foot just under the toes. 3. Hold each end of the towel in each hand, with your hands above your knees. 4. Pull back with the towel so that your foot stretches toward you. 5. Hold the position for at least 15 to 30 seconds. 6. Repeat 2 to 4 times a session, up to 5 sessions a day. Calf stretch    1. Stand facing a wall with your hands on the wall at about eye level. Put the leg you want to stretch about a step behind your other leg. 2. Keeping your back heel on the floor, bend your front knee until you feel a stretch in the back leg. 3. Hold the stretch for 15 to 30 seconds. Repeat 2 to 4 times. Plantar fascia and calf stretch    1. Stand on a step as shown above. Be sure to hold on to the banister. 2. Slowly let your heels down over the edge of the step as you relax your calf muscles. You should feel a gentle stretch across the bottom of your foot and up the back of your leg to your knee. 3. Hold the stretch about 15 to 30 seconds, and then tighten your calf muscle a little to bring your heel back up to the level of the step. Repeat 2 to 4 times. Towel curls    1. While sitting, place your foot on a towel on the floor and scrunch the towel toward you with your toes. 2. Then, also using your toes, push the towel away from you. [place] pickups    1. Put marbles on the floor next to a cup.  2. Using your toes, try to lift the marbles up from the floor and put them in the cup. Follow-up care is a key part of your treatment and safety.  Be sure to make and go to all appointments, and call your doctor if you are having problems. It's also a good idea to know your test results and keep a list of the medicines you take. Where can you learn more? Go to http://greyson-andrea.info/. Miranda Bui in the search box to learn more about \"Plantar Fasciitis: Exercises. \"  Current as of: June 26, 2019  Content Version: 12.2  © 7569-3715 [employer]. Care instructions adapted under license by [employer] (which disclaims liability or warranty for this information). If you have questions about a medical condition or this instruction, always ask your healthcare professional. [address] any warranty or liability for your use of this information.

## 2019-09-30 NOTE — PROGRESS NOTES
1. Have you been to the ER, urgent care clinic since your last visit? Hospitalized since your last visit? No    2. Have you seen or consulted any other health care providers outside of the [address] [place] [address] since your last visit? Include any pap smears or colon screening.  No

## 2019-10-08 ENCOUNTER — OFFICE VISIT (OUTPATIENT)
Dept: FAMILY MEDICINE CLINIC | Age: 59
End: 2019-10-08

## 2019-10-08 VITALS
HEIGHT: 69 IN | BODY MASS INDEX: 28.14 KG/M2 | RESPIRATION RATE: 16 BRPM | HEART RATE: 83 BPM | TEMPERATURE: 98.3 F | OXYGEN SATURATION: 98 % | SYSTOLIC BLOOD PRESSURE: 132 MMHG | WEIGHT: 190 LBS | DIASTOLIC BLOOD PRESSURE: 86 MMHG

## 2019-10-08 DIAGNOSIS — I21.4 NSTEMI (NON-ST ELEVATED MYOCARDIAL INFARCTION) (HCC): ICD-10-CM

## 2019-10-08 DIAGNOSIS — E78.5 HYPERLIPIDEMIA LDL GOAL <70: ICD-10-CM

## 2019-10-08 DIAGNOSIS — F41.9 ANXIETY: ICD-10-CM

## 2019-10-08 DIAGNOSIS — R05.9 COUGH: Primary | ICD-10-CM

## 2019-10-08 DIAGNOSIS — I48.91 ATRIAL FIBRILLATION, UNSPECIFIED TYPE (HCC): ICD-10-CM

## 2019-10-08 DIAGNOSIS — R77.1 ELEVATED SERUM GLOBULIN LEVEL: ICD-10-CM

## 2019-10-08 DIAGNOSIS — I09.1 RHEUMATIC VALVULAR DISEASE: ICD-10-CM

## 2019-10-08 DIAGNOSIS — Z79.01 LONG TERM CURRENT USE OF ANTICOAGULANT THERAPY: ICD-10-CM

## 2019-10-08 DIAGNOSIS — J30.9 ALLERGIC RHINITIS, UNSPECIFIED SEASONALITY, UNSPECIFIED TRIGGER: ICD-10-CM

## 2019-10-08 DIAGNOSIS — J45.41 MODERATE PERSISTENT ASTHMA WITH ACUTE EXACERBATION: ICD-10-CM

## 2019-10-08 DIAGNOSIS — I10 ESSENTIAL HYPERTENSION: ICD-10-CM

## 2019-10-08 RX ORDER — BUDESONIDE AND FORMOTEROL FUMARATE DIHYDRATE 80; 4.5 UG/1; UG/1
2 AEROSOL RESPIRATORY (INHALATION) 2 TIMES DAILY
Qty: 1 INHALER | Refills: 0 | Status: SHIPPED | OUTPATIENT
Start: 2019-10-08 | End: 2019-10-09 | Stop reason: SDUPTHER

## 2019-10-08 RX ORDER — METHYLPREDNISOLONE 4 MG/1
TABLET ORAL
Qty: 1 DOSE PACK | Refills: 0 | Status: SHIPPED | OUTPATIENT
Start: 2019-10-08 | End: 2019-11-05

## 2019-10-08 RX ORDER — SERTRALINE HYDROCHLORIDE 50 MG/1
TABLET, FILM COATED ORAL
Qty: 90 TAB | Refills: 0 | Status: SHIPPED | OUTPATIENT
Start: 2019-10-08 | End: 2019-11-05 | Stop reason: DRUGHIGH

## 2019-10-08 NOTE — PROGRESS NOTES
Yanick Ellis, 62 y.o.,  female    SUBJECTIVE  Cough x 5 days    Pt with PMHx asthma, allergic rhinitis, chronic diastolic CHF, GERD. She has initially responded to added singulair and zpack and was cough free for about a week, until she says she was exposed to cold wind 5 days ago and now with dry cough, nasal congestion and post nasal drip again. She denies worsening asthma, no increase in albuterol use, using her arnuity regularly. She continues to get weekly allergy shots, on flonase and benadryl. She denies worsening GERD  She denies chest pain, LIZARRAGA, pillow orthopnea, leg edema. She has L ankle swelling she relates to her sympomatic plantar fascitis. She was recently evaluated by cardiology, reviewed notes 9/3/19, no changes, no cardaic concern. Echo 1/19 essentially normal,  cxr showing no changes, note similar vascular congestion from 2.19 cxr. Anxiety-doing fairly well on zoloft, still with situational stressors but manageable. HTN-taking medications without problems. Continues to take norvasc,  metoprolol and cozaar    H/o nstemi, afib, rheumatic valve disease s/p MV repair 2013- denies any chest pain, sob, palpitations, lightheadedness. On chronic coumadin treatment, following cardiology. LDL 67 on lipitor (4/2019)     HL-taking lipitor without problems    Mildly elevated globulin level on previous checks, SPEP showing elevated beta globulin level. No M spike observed. Referred to hematology to commend on this finding.      ROS:  See HPI, all others negative        Patient Active Problem List   Diagnosis Code    Rheumatic valvular disease I09.1    Chronic diastolic congestive heart failure (HCC) I50.32    Atrial fibrillation (HCC) I48.91    Long term current use of anticoagulant therapy Z79.01    Benign hypertensive heart disease with heart failure ([facility] [address] I11.0    Hyperlipidemia E78.5    Advanced directives, counseling/discussion Z71.89    Mild intermittent asthma without complication X68.23    NSTEMI (non-ST elevated myocardial infarction) ([facility]) I21.4    Chest pain R07.9    IFG (impaired fasting glucose) R73.01    Adjustment disorder with anxious mood F43.22    Essential hypertension I10    Allergic rhinitis J30.9       Current Outpatient Medications   Medication Sig Dispense Refill    sertraline (ZOLOFT) 50 mg tablet TAKE 1 TABLET BY MOUTH EVERY DAY 90 Tab 0    budesonide-formoterol (SYMBICORT) 80-4.5 mcg/actuation HFAA Take 2 Puffs by inhalation two (2) times a day. 1 Inhaler 0    methylPREDNISolone (MEDROL DOSEPACK) 4 mg tablet Take as directed 1 Dose Pack 0    montelukast (SINGULAIR) 10 mg tablet Take 1 Tab by mouth daily. 90 Tab 0    metoprolol succinate (TOPROL-XL) 100 mg tablet TAKE 1 TABLET BY MOUTH EVERY DAY 90 Tab 3    atorvastatin (LIPITOR) 40 mg tablet TAKE 1 TABLET BY MOUTH EVERY DAY 90 Tab 0    amLODIPine (NORVASC) 5 mg tablet TAKE 1 TABLET BY MOUTH EVERY DAY 90 Tab 2    KLOR-CON M20 20 mEq tablet TAKE 1 TAB BY MOUTH DAILY. 30 Tab 6    diphenhydrAMINE (BENADRYL) 25 mg capsule Take 25 mg by mouth every six (6) hours as needed.  OTHER Allergy injections      VENTOLIN HFA 90 mcg/actuation inhaler TAKE 2 PUFFS BY INHALATION DAILY. 3 Inhaler 1    warfarin (COUMADIN) 5 mg tablet TAKE 5MG DAILY EXCEPT 2.5MG ON TUES, THUR, AND SAT. OR AS DIRECTED BY OUR OFFICE 30 Tab 5    losartan (COZAAR) 100 mg tablet Take 1 Tab by mouth daily. 90 Tab 3    furosemide (LASIX) 40 mg tablet Take 1 Tab by mouth daily. 90 Tab 3    chlorpheniramine-acetaminophen (CORICIDIN HBP COLD AND FLU) 2-325 mg tab Take 325 mg by mouth two (2) times daily as needed for Cough (Cold symptoms).  aspirin delayed-release 81 mg tablet Take 1 Tab by mouth daily. 30 Tab 0    polyethylene glycol (MIRALAX) 17 gram packet Take 1 Packet by mouth daily. (Patient taking differently: Take  by mouth daily as needed for Other (Constipation). ) 30 Packet 0    esomeprazole (NEXIUM) 20 mg capsule Take 20 mg by mouth daily as needed (heart burn).  fluticasone (FLONASE ALLERGY RELIEF) 50 mcg/actuation nasal spray 2 Sprays by Both Nostrils route daily as needed for Rhinitis.  sodium chloride (SALINE NASAL) 0.65 % nasal squeeze bottle 1 Spray as needed for Congestion.  melatonin tab tablet Take 10 mg by mouth nightly.  azithromycin (ZITHROMAX) 250 mg tablet Take two tablets today then one tablet daily 6 Tab 0       Allergies   Allergen Reactions    Morphine Rash and Itching    Norco [Hydrocodone-Acetaminophen] Itching       Past Medical History:   Diagnosis Date    Aortic valve disorders 1/13/2011    Asthma     Atrial fibrillation ([facility] [address] 1/13/2011    Congestive heart failure, unspecified March 2005    Cleared quickly with Lasix therapy    Echocardiogram 01/10/2012    A-fib. EF 45%. Mild RVE. RVSP 45-50. Massive LAE. Mod TAMI. Mod MS. Severe MR (mean grad 10). Mild-mod AI. Mild TR. Pulmonic systolic flow reversal.  Mild IVCE.  Hypertension     Iron deficiency anemias     Long term (current) use of anticoagulants 2/23/2011    Murmur 1/2011    Grade I-II/VI systolic ejection murmur along left sternal border, with radiation to the pulmonic area.  S/P cardiac cath 02/24/2012    Minimal coronary artery disease. Mild LVE. EF 55%. Mod MR. Mod-severe MS. RA 15.  RV 55/16. PA 58/34. W 36.  CO/CI 3.5/1.9 (TD); 3.61/1.9 ([place]). R to L shunt suggested.     Wears glasses     Weight gain        Social History     Socioeconomic History    Marital status:      Spouse name: Not on file    Number of children: Not on file    Years of education: Not on file    Highest education level: Not on file   Occupational History    Not on file   Social Needs    Financial resource strain: Not on file    Food insecurity:     Worry: Not on file     Inability: Not on file    Transportation needs:     Medical: Not on file     Non-medical: Not on file   Tobacco Use    Smoking status: Former Smoker    Smokeless tobacco: Never Used   Substance and Sexual Activity    Alcohol use:  Yes     Alcohol/week: 1.0 standard drinks     Types: 1 Glasses of wine per week     Comment: occassionally    Drug use: No    Sexual activity: Yes     Partners: Male     Birth control/protection: None   Lifestyle    Physical activity:     Days per week: Not on file     Minutes per session: Not on file    Stress: Not on file   Relationships    Social connections:     Talks on phone: Not on file     Gets together: Not on file     Attends Judaism service: Not on file     Active member of club or organization: Not on file     Attends meetings of clubs or organizations: Not on file     Relationship status: Not on file    Intimate partner violence:     Fear of current or ex partner: Not on file     Emotionally abused: Not on file     Physically abused: Not on file     Forced sexual activity: Not on file   Other Topics Concern    Not on file   Social History Narrative    Not on file       Family History   Problem Relation Age of Onset    Heart Surgery Father         Open-Heart Surgery    Other Father         Venous Thromboembolism    Heart Disease Mother         Enlarged Heart    Hypertension Mother     Diabetes Mother          OBJECTIVE    Physical Exam:     Visit Vitals  /86 (BP 1 Location: Left arm, BP Patient Position: Sitting)   Pulse 83   Temp 98.3 °F (36.8 °C) (Oral)   Resp 16   Ht 5' 9\" (1.753 m)   Wt 190 lb (86.2 kg)   SpO2 98%   BMI 28.06 kg/m²       General: alert, well-appearing,  in no apparent distress or pain  Neck: no JVD  CVS: irregular, mech valve click, + murmur  Lungs:clear to ausculation bilaterally, no crackles, wheezing or rhonchi noted  Abdomen: soft, NT, ND  Extremities: + Left heel tenderness, trace L ankle edema  Psych:  mood and affect normal      ASSESSMENT/PLAN  Diagnoses and all orders for this visit:    Cough  Sounds more like upper airway cough syndrome vs worsening asthma control  Already receiving allergy shots, nasal steroid spray, antihistamine and singulair   step up arnuity to LABA/ICS  rx symbicort and medrol dose pack  Appears euvolemic, and does not appear to be decompensated   Ordered PFTs  Referral to pulm  Cont care with ENT  CXR showing vascular congestion similar to 2/19, Echo 1/19 without concerning findings    Moderate persistent asthma without complication  See above    Essential hypertension  controlled  Cont  norvasc 5 mg  Cont cozaar 100 mg and metoprolol 100 mg  DASH diet, monitoring    NSTEMI  Asymptomatic  Mild reversible ischemia on stress test 1/2019  On bb, arb, ASA  LDL is <70  Cont lipitor 40 mg qhs  Following dr. Marty Ruano    Atrial fibrillation, unspecified type ([address]  Rate controlled, On BB, coumadin  INR therapeutic  Following dr. Marty Ruano    Hyperlipidemia, unspecified hyperlipidemia type  Improved on lipitor    LDL goal <70  Check cmp prior to next visit in 3 months  Lipid panel update 4/2020    Long term current use of anticoagulant therapy  Cards following    Rheumatic valvular disease  S/p [facility] valve 2013  Chronic anticoagulation    Allergic rhinitis, unspecified seasonality, unspecified trigger  Receiving allergy shots  Cont flonase, benadryl,  singulair    Elevated globulin  No M spike  Referral to hematology previously placed, given # today    Anxiety   Stable  Cont zoloft    Follow-up and Dispositions    · Return in about 4 weeks (around 11/5/2019), or if symptoms worsen or fail to improve, for ff-up on acute concern. Patient understands plan of care. Patient has provided input and agrees with goals.

## 2019-10-08 NOTE — PATIENT INSTRUCTIONS
Asthma in Adults: Care Instructions  Your Care Instructions    During an asthma attack, your airways swell and narrow as a reaction to certain things (triggers). This makes it hard to breathe. You may be able to prevent asthma attacks if you avoid the things that set off your asthma symptoms. Keeping your asthma under control and treating symptoms before they get bad can help you avoid severe attacks. If you can control your asthma, you may be able to do all of your normal daily activities. You may also avoid asthma attacks and trips to the hospital.  Follow-up care is a key part of your treatment and safety. Be sure to make and go to all appointments, and call your doctor if you are having problems. It's also a good idea to know your test results and keep a list of the medicines you take. How can you care for yourself at home? · Follow your asthma action plan so you can manage your symptoms at home. An asthma action plan will help you prevent and control airway reactions and will tell you what to do during an asthma attack. If you do not have an asthma action plan, work with your doctor to build one. · Take your asthma medicine exactly as prescribed. Medicine plays an important role in controlling asthma. Talk to your doctor right away if you have any questions about what to take and how to take it. ? Use your quick-relief medicine when you have symptoms of an attack. Quick-relief medicine often is an albuterol inhaler. Some people need to use quick-relief medicine before they exercise. ? Take your controller medicine every day, not just when you have symptoms. Controller medicine is usually an inhaled corticosteroid. The goal is to prevent problems before they occur. Do not use your controller medicine to try to treat an attack that has already started. It does not work fast enough to help. ? If your doctor prescribed corticosteroid pills to use during an attack, take them as directed.  They may take hours to work, but they may shorten the attack and help you breathe better. ? Keep your quick-relief medicine with you at all times. · Talk to your doctor before using other medicines. Some medicines, such as aspirin, can cause asthma attacks in some people. · Check yourself for asthma symptoms to know which step to follow in your action plan. Watch for things like being short of breath, having chest tightness, coughing, and wheezing. Also notice if symptoms wake you up at night or if you get tired quickly when you exercise. · If you have a peak flow meter, use it to check how well you are breathing. This can help you predict when an asthma attack is going to occur. Then you can take medicine to prevent the asthma attack or make it less severe. · See your doctor regularly. These visits will help you learn more about asthma and what you can do to control it. Your doctor will monitor your treatment to make sure the medicine is helping you. · Keep track of your asthma attacks and your treatment. After you have had an attack, write down what triggered it, what helped end it, and any concerns you have about your asthma action plan. Take your diary when you see your doctor. You can then review your asthma action plan and decide if it is working. · Do not smoke or allow others to smoke around you. Avoid smoky places. Smoking makes asthma worse. If you need help quitting, talk to your doctor about stop-smoking programs and medicines. These can increase your chances of quitting for good. · Learn what triggers an asthma attack for you, and avoid the triggers when you can. Common triggers include colds, smoke, air pollution, dust, pollen, mold, pets, cockroaches, stress, and cold air. · Avoid colds and the flu. Get a pneumococcal vaccine shot. If you have had one before, ask your doctor whether you need a second dose. Get a flu vaccine every fall.  If you must be around people with colds or the flu, wash your hands often.  When should you call for help? Call 911 anytime you think you may need emergency care. For example, call if:    · You have severe trouble breathing.    Call your doctor now or seek immediate medical care if:    · Your symptoms do not get better after you have followed your asthma action plan.     · You cough up yellow, dark brown, or bloody mucus (sputum).    Watch closely for changes in your health, and be sure to contact your doctor if:    · Your coughing and wheezing get worse.     · You need to use quick-relief medicine on more than 2 days a week (unless it is just for exercise).     · You need help figuring out what is triggering your asthma attacks. Where can you learn more? Go to http://greyson-andrea.info/. Enter P597 in the search box to learn more about \"Asthma in Adults: Care Instructions. \"  Current as of: June 9, 2019  Content Version: 12.2  © 7977-0952 [employer]. Care instructions adapted under license by [employer] (which disclaims liability or warranty for this information). If you have questions about a medical condition or this instruction, always ask your healthcare professional. [place] [address] any warranty or liability for your use of this information.

## 2019-10-09 RX ORDER — BUDESONIDE AND FORMOTEROL FUMARATE DIHYDRATE 80; 4.5 UG/1; UG/1
2 AEROSOL RESPIRATORY (INHALATION) 2 TIMES DAILY
Qty: 1 INHALER | Refills: 0 | Status: SHIPPED | OUTPATIENT
Start: 2019-10-09 | End: 2019-10-21

## 2019-10-09 NOTE — TELEPHONE ENCOUNTER
This pharmacy faxed over request for the following prescriptions to be filled:    Medication requested :   Requested Prescriptions     Pending Prescriptions Disp Refills    budesonide-formoterol (SYMBICORT) 80-4.5 mcg/actuation HFAA 1 Inhaler 0     Sig: Take 2 Puffs by inhalation two (2) times a day.      PCP: [address] or Print: [facility]  Mail order or Local pharmacy [phone]    Scheduled appointment if not seen by current providers in office: LOV: 10-8-19 NOV: 11-4-19

## 2019-10-11 NOTE — TELEPHONE ENCOUNTER
Pt called and stated she need a alternative medication for symbicort. Please call pt at your earliest convenience.

## 2019-10-12 ENCOUNTER — HOSPITAL ENCOUNTER (EMERGENCY)
Age: 59
Discharge: HOME | End: 2019-10-12
Attending: EMERGENCY MEDICINE
Payer: MEDICARE

## 2019-10-12 ENCOUNTER — APPOINTMENT (OUTPATIENT)
Dept: GENERAL RADIOLOGY | Age: 59
End: 2019-10-12
Attending: EMERGENCY MEDICINE
Payer: MEDICARE

## 2019-10-12 VITALS
RESPIRATION RATE: 15 BRPM | BODY MASS INDEX: 29.82 KG/M2 | HEART RATE: 72 BPM | HEIGHT: 67 IN | TEMPERATURE: 97.9 F | OXYGEN SATURATION: 99 % | DIASTOLIC BLOOD PRESSURE: 89 MMHG | SYSTOLIC BLOOD PRESSURE: 146 MMHG | WEIGHT: 190 LBS

## 2019-10-12 DIAGNOSIS — I50.30 DIASTOLIC CONGESTIVE HEART FAILURE, UNSPECIFIED HF CHRONICITY (HCC): Primary | ICD-10-CM

## 2019-10-12 DIAGNOSIS — R05.9 COUGH: ICD-10-CM

## 2019-10-12 LAB
ALBUMIN SERPL-MCNC: 3.7 G/DL (ref 3.4–5)
ALBUMIN/GLOB SERPL: 0.8 {RATIO} (ref 0.8–1.7)
ALP SERPL-CCNC: 96 U/L (ref 45–117)
ALT SERPL-CCNC: 41 U/L (ref 13–56)
ANION GAP SERPL CALC-SCNC: 7 MMOL/L (ref 3–18)
APPEARANCE UR: CLEAR
AST SERPL-CCNC: 27 U/L (ref 10–38)
BACTERIA URNS QL MICRO: NEGATIVE /HPF
BASOPHILS # BLD: 0 K/UL (ref 0–0.1)
BASOPHILS NFR BLD: 0 % (ref 0–2)
BILIRUB SERPL-MCNC: 0.4 MG/DL (ref 0.2–1)
BILIRUB UR QL: NEGATIVE
BNP SERPL-MCNC: 987 PG/ML (ref 0–900)
BUN SERPL-MCNC: 20 MG/DL (ref 7–18)
BUN/CREAT SERPL: 25 (ref 12–20)
CALCIUM SERPL-MCNC: 9.3 MG/DL (ref 8.5–10.1)
CHLORIDE SERPL-SCNC: 106 MMOL/L (ref 100–111)
CO2 SERPL-SCNC: 28 MMOL/L (ref 21–32)
COLOR UR: YELLOW
CREAT SERPL-MCNC: 0.8 MG/DL (ref 0.6–1.3)
DIFFERENTIAL METHOD BLD: ABNORMAL
EOSINOPHIL # BLD: 0 K/UL (ref 0–0.4)
EOSINOPHIL NFR BLD: 0 % (ref 0–5)
EPITH CASTS URNS QL MICRO: NORMAL /LPF (ref 0–5)
ERYTHROCYTE [DISTWIDTH] IN BLOOD BY AUTOMATED COUNT: 13.6 % (ref 11.6–14.5)
GLOBULIN SER CALC-MCNC: 4.5 G/DL (ref 2–4)
GLUCOSE SERPL-MCNC: 113 MG/DL (ref 74–99)
GLUCOSE UR STRIP.AUTO-MCNC: NEGATIVE MG/DL
HCT VFR BLD AUTO: 37.5 % (ref 35–45)
HGB BLD-MCNC: 11.9 G/DL (ref 12–16)
HGB UR QL STRIP: NEGATIVE
INR PPP: 2.3 (ref 0.8–1.2)
KETONES UR QL STRIP.AUTO: NEGATIVE MG/DL
LEUKOCYTE ESTERASE UR QL STRIP.AUTO: ABNORMAL
LYMPHOCYTES # BLD: 2.6 K/UL (ref 0.9–3.6)
LYMPHOCYTES NFR BLD: 21 % (ref 21–52)
MAGNESIUM SERPL-MCNC: 1.8 MG/DL (ref 1.6–2.6)
MCH RBC QN AUTO: 26.6 PG (ref 24–34)
MCHC RBC AUTO-ENTMCNC: 31.7 G/DL (ref 31–37)
MCV RBC AUTO: 83.7 FL (ref 74–97)
MONOCYTES # BLD: 1.5 K/UL (ref 0.05–1.2)
MONOCYTES NFR BLD: 12 % (ref 3–10)
NEUTS SEG # BLD: 8.2 K/UL (ref 1.8–8)
NEUTS SEG NFR BLD: 67 % (ref 40–73)
NITRITE UR QL STRIP.AUTO: NEGATIVE
PH UR STRIP: 5.5 [PH] (ref 5–8)
PLATELET # BLD AUTO: 181 K/UL (ref 135–420)
PMV BLD AUTO: 11.5 FL (ref 9.2–11.8)
POTASSIUM SERPL-SCNC: 3.6 MMOL/L (ref 3.5–5.5)
PROT SERPL-MCNC: 8.2 G/DL (ref 6.4–8.2)
PROT UR STRIP-MCNC: NEGATIVE MG/DL
PROTHROMBIN TIME: 25.3 SEC (ref 11.5–15.2)
RBC # BLD AUTO: 4.48 M/UL (ref 4.2–5.3)
RBC #/AREA URNS HPF: NEGATIVE /HPF (ref 0–5)
SODIUM SERPL-SCNC: 141 MMOL/L (ref 136–145)
SP GR UR REFRACTOMETRY: 1.01 (ref 1–1.03)
TROPONIN I SERPL-MCNC: 0.06 NG/ML (ref 0–0.04)
UROBILINOGEN UR QL STRIP.AUTO: 1 EU/DL (ref 0.2–1)
WBC # BLD AUTO: 12.3 K/UL (ref 4.6–13.2)
WBC URNS QL MICRO: NORMAL /HPF (ref 0–4)

## 2019-10-12 PROCEDURE — 83880 ASSAY OF NATRIURETIC PEPTIDE: CPT

## 2019-10-12 PROCEDURE — 80053 COMPREHEN METABOLIC PANEL: CPT

## 2019-10-12 PROCEDURE — 99285 EMERGENCY DEPT VISIT HI MDM: CPT

## 2019-10-12 PROCEDURE — 85610 PROTHROMBIN TIME: CPT

## 2019-10-12 PROCEDURE — 81001 URINALYSIS AUTO W/SCOPE: CPT

## 2019-10-12 PROCEDURE — 83735 ASSAY OF MAGNESIUM: CPT

## 2019-10-12 PROCEDURE — 85025 COMPLETE CBC W/AUTO DIFF WBC: CPT

## 2019-10-12 PROCEDURE — 84484 ASSAY OF TROPONIN QUANT: CPT

## 2019-10-12 PROCEDURE — 71046 X-RAY EXAM CHEST 2 VIEWS: CPT

## 2019-10-12 PROCEDURE — 93005 ELECTROCARDIOGRAM TRACING: CPT

## 2019-10-12 RX ORDER — SODIUM CHLORIDE 0.9 % (FLUSH) 0.9 %
5-40 SYRINGE (ML) INJECTION EVERY 8 HOURS
Status: DISCONTINUED | OUTPATIENT
Start: 2019-10-12 | End: 2019-10-12 | Stop reason: HOSPADM

## 2019-10-12 RX ORDER — SODIUM CHLORIDE 0.9 % (FLUSH) 0.9 %
5-40 SYRINGE (ML) INJECTION AS NEEDED
Status: DISCONTINUED | OUTPATIENT
Start: 2019-10-12 | End: 2019-10-12 | Stop reason: HOSPADM

## 2019-10-12 NOTE — ED PROVIDER NOTES
EMERGENCY DEPARTMENT HISTORY AND PHYSICAL EXAM    10:03 AM      Date: 10/12/2019  Patient Name: Lilo Gibson    History of Presenting Illness     Chief Complaint   Patient presents with    Wheezing    Cough         History Provided By: Patient      Additional History (Context): Lilo Gibson is a 62 y.o. female with PMHx of asthma, A-Fib, CHF and HTN who presents to the ED with c/o worsening cough for the past 10 days. Describes cough is worse with lying flat. Associated symptoms include chest pressure, \"knot in throat,\" and SOB. Reports she had a sinus infection 2 weeks ago for which she is still taking Prednisone. Patient describes surgical history of mechanical mitral valve replacement in 2014 and has been experiencing orthopnea since, but notes it has recently worsened. Admits she follows up with her cardiologist periodically, notes last ECHO was approximately 2 years ago. Denies fever, BLE swelling or cardiac stent placement. No modifying or aggravating factors were reported. No other symptoms or concerns were expressed. PCP: Jaqueline Cadet MD    Chief Complaint: Cough  Duration:  10 days  Timing:  Worsening  Location: Respiratory  Quality: Not described  Severity: Not reported  Modifying Factors:  Worse with lying flat  Associated Symptoms: chest pressure, \"knot in throat,\" and SOB    Current Facility-Administered Medications   Medication Dose Route Frequency Provider Last Rate Last Dose    sodium chloride (NS) flush 5-40 mL  5-40 mL IntraVENous Q8H Ritu Elmore, DO        sodium chloride (NS) flush 5-40 mL  5-40 mL IntraVENous PRN Ritu Elmore, DO         Current Outpatient Medications   Medication Sig Dispense Refill    sertraline (ZOLOFT) 50 mg tablet TAKE 1 TABLET BY MOUTH EVERY DAY 90 Tab 0    methylPREDNISolone (MEDROL DOSEPACK) 4 mg tablet Take as directed 1 Dose Pack 0    azithromycin (ZITHROMAX) 250 mg tablet Take two tablets today then one tablet daily 6 Tab 0    montelukast (SINGULAIR) 10 mg tablet Take 1 Tab by mouth daily. 90 Tab 0    metoprolol succinate (TOPROL-XL) 100 mg tablet TAKE 1 TABLET BY MOUTH EVERY DAY 90 Tab 3    atorvastatin (LIPITOR) 40 mg tablet TAKE 1 TABLET BY MOUTH EVERY DAY 90 Tab 0    amLODIPine (NORVASC) 5 mg tablet TAKE 1 TABLET BY MOUTH EVERY DAY 90 Tab 2    KLOR-CON M20 20 mEq tablet TAKE 1 TAB BY MOUTH DAILY. 30 Tab 6    diphenhydrAMINE (BENADRYL) 25 mg capsule Take 25 mg by mouth every six (6) hours as needed.  OTHER Allergy injections      VENTOLIN HFA 90 mcg/actuation inhaler TAKE 2 PUFFS BY INHALATION DAILY. 3 Inhaler 1    warfarin (COUMADIN) 5 mg tablet TAKE 5MG DAILY EXCEPT 2.5MG ON TUES, THUR, AND SAT. OR AS DIRECTED BY OUR OFFICE 30 Tab 5    losartan (COZAAR) 100 mg tablet Take 1 Tab by mouth daily. 90 Tab 3    furosemide (LASIX) 40 mg tablet Take 1 Tab by mouth daily. 90 Tab 3    chlorpheniramine-acetaminophen (CORICIDIN HBP COLD AND FLU) 2-325 mg tab Take 325 mg by mouth two (2) times daily as needed for Cough (Cold symptoms).  aspirin delayed-release 81 mg tablet Take 1 Tab by mouth daily. 30 Tab 0    polyethylene glycol (MIRALAX) 17 gram packet Take 1 Packet by mouth daily. (Patient taking differently: Take  by mouth daily as needed for Other (Constipation). ) 30 Packet 0    esomeprazole (NEXIUM) 20 mg capsule Take 20 mg by mouth daily as needed (heart burn).  fluticasone (FLONASE ALLERGY RELIEF) 50 mcg/actuation nasal spray 2 Sprays by Both Nostrils route daily as needed for Rhinitis.  sodium chloride (SALINE NASAL) 0.65 % nasal squeeze bottle 1 Spray as needed for Congestion.  melatonin tab tablet Take 10 mg by mouth nightly.  budesonide-formoterol (SYMBICORT) 80-4.5 mcg/actuation HFAA Take 2 Puffs by inhalation two (2) times a day.  1 Inhaler 0       Past History     Past Medical History:  Past Medical History:   Diagnosis Date    Aortic valve disorders 1/13/2011    Asthma     Atrial fibrillation ([facility] [address] 1/13/2011    Congestive heart failure, unspecified March 2005    Cleared quickly with Lasix therapy    Echocardiogram 01/10/2012    A-fib. EF 45%. Mild RVE. RVSP 45-50. Massive LAE. Mod TAMI. Mod MS. Severe MR (mean grad 10). Mild-mod AI. Mild TR. Pulmonic systolic flow reversal.  Mild IVCE.  Hypertension     Iron deficiency anemias     Long term (current) use of anticoagulants 2/23/2011    Murmur 1/2011    Grade I-II/VI systolic ejection murmur along left sternal border, with radiation to the pulmonic area.  S/P cardiac cath 02/24/2012    Minimal coronary artery disease. Mild LVE. EF 55%. Mod MR. Mod-severe MS. RA 15.  RV 55/16. PA 58/34. W 36.  CO/CI 3.5/1.9 (TD); 3.61/1.9 ([place]). R to L shunt suggested.  Wears glasses     Weight gain        Past Surgical History:  Past Surgical History:   Procedure Laterality Date    HX HEART CATHETERIZATION  2/24/2012    HX HYSTERECTOMY  2006    HX MITRAL VALVE REPLACEMENT  05/22/2013    25/33 mm On-X mechanical valve with bilateral pulmonary vein isolation and resection of LA appendage       Family History:  Family History   Problem Relation Age of Onset    Heart Surgery Father         Open-Heart Surgery    Other Father         Venous Thromboembolism    Heart Disease Mother         Enlarged Heart    Hypertension Mother     Diabetes Mother        Social History:  Social History     Tobacco Use    Smoking status: Former Smoker    Smokeless tobacco: Never Used   Substance Use Topics    Alcohol use: Yes     Alcohol/week: 1.0 standard drinks     Types: 1 Glasses of wine per week     Comment: occassionally    Drug use: No       Allergies: Allergies   Allergen Reactions    Morphine Rash and Itching    Norco [Hydrocodone-Acetaminophen] Itching         Review of Systems       Review of Systems   Constitutional: Negative for activity change, fatigue and fever. HENT: Negative for congestion and rhinorrhea.     Eyes: Negative for visual disturbance. Respiratory: Positive for cough and shortness of breath. Negative for wheezing. Positive for chest pressure   Cardiovascular: Negative for chest pain and palpitations. Gastrointestinal: Negative for abdominal pain, diarrhea, nausea and vomiting. Genitourinary: Negative for dysuria and hematuria. Musculoskeletal: Negative for back pain. Skin: Negative for rash. Neurological: Negative for dizziness, weakness and light-headedness. All other systems reviewed and are negative. Physical Exam     Visit Vitals  /89   Pulse 72   Temp 97.9 °F (36.6 °C)   Resp 15   Ht 5' 7\" (1.702 m)   Wt 86.2 kg (190 lb)   SpO2 99%   BMI 29.76 kg/m²         Physical Exam   Constitutional: She is oriented to person, place, and time. She appears well-developed and well-nourished. No distress. HENT:   Head: Normocephalic and atraumatic. Right Ear: External ear normal.   Left Ear: External ear normal.   Nose: Nose normal.   Mouth/Throat: Oropharynx is clear and moist.   Eyes: Pupils are equal, round, and reactive to light. Conjunctivae and EOM are normal.   Neck: Normal range of motion. Neck supple. No tracheal deviation present. Cardiovascular: Normal rate, regular rhythm and intact distal pulses. Pulmonary/Chest: Effort normal and breath sounds normal. She exhibits no tenderness. Abdominal: Soft. Bowel sounds are normal. She exhibits no distension. There is no tenderness. There is no rebound and no guarding. Musculoskeletal: Normal range of motion. She exhibits no edema or tenderness. Neurological: She is alert and oriented to person, place, and time. No cranial nerve deficit. Coordination normal.   Skin: Skin is warm and dry. Psychiatric: She has a normal mood and affect. Her behavior is normal. Judgment and thought content normal.   Nursing note and vitals reviewed.         Diagnostic Study Results     Labs -  Recent Results (from the past 12 hour(s))   EKG, 12 LEAD, INITIAL    Collection Time: 10/12/19  9:42 AM   Result Value Ref Range    Ventricular Rate 81 BPM    Atrial Rate 220 BPM    QRS Duration 98 ms    Q-T Interval 376 ms    QTC Calculation (Bezet) 436 ms    Calculated R Axis 43 degrees    Calculated T Axis 128 degrees    Diagnosis       Atrial fibrillation with a competing junctional pacemaker  Nonspecific ST and T wave abnormality , probably digitalis effect  Abnormal ECG  When compared with ECG of 28-FEB-2019 19:44,  No significant change was found     URINALYSIS W/ RFLX MICROSCOPIC    Collection Time: 10/12/19  9:46 AM   Result Value Ref Range    Color YELLOW      Appearance CLEAR      Specific gravity 1.014 1.005 - 1.030      pH (UA) 5.5 5.0 - 8.0      Protein NEGATIVE  NEG mg/dL    Glucose NEGATIVE  NEG mg/dL    Ketone NEGATIVE  NEG mg/dL    Bilirubin NEGATIVE  NEG      Blood NEGATIVE  NEG      Urobilinogen 1.0 0.2 - 1.0 EU/dL    Nitrites NEGATIVE  NEG      Leukocyte Esterase MODERATE (A) NEG     URINE MICROSCOPIC ONLY    Collection Time: 10/12/19  9:46 AM   Result Value Ref Range    WBC 4 to 10 0 - 4 /hpf    RBC NEGATIVE  0 - 5 /hpf    Epithelial cells 1+ 0 - 5 /lpf    Bacteria NEGATIVE  NEG /hpf   CBC WITH AUTOMATED DIFF    Collection Time: 10/12/19  9:50 AM   Result Value Ref Range    WBC 12.3 4.6 - 13.2 K/uL    RBC 4.48 4.20 - 5.30 M/uL    HGB 11.9 (L) 12.0 - 16.0 g/dL    HCT 37.5 35.0 - 45.0 %    MCV 83.7 74.0 - 97.0 FL    MCH 26.6 24.0 - 34.0 PG    MCHC 31.7 31.0 - 37.0 g/dL    RDW 13.6 11.6 - 14.5 %    PLATELET 236 469 - 601 K/uL    MPV 11.5 9.2 - 11.8 FL    NEUTROPHILS 67 40 - 73 %    LYMPHOCYTES 21 21 - 52 %    MONOCYTES 12 (H) 3 - 10 %    EOSINOPHILS 0 0 - 5 %    BASOPHILS 0 0 - 2 %    ABS. NEUTROPHILS 8.2 (H) 1.8 - 8.0 K/UL    ABS. LYMPHOCYTES 2.6 0.9 - 3.6 K/UL    ABS. MONOCYTES 1.5 (H) 0.05 - 1.2 K/UL    ABS. EOSINOPHILS 0.0 0.0 - 0.4 K/UL    ABS.  BASOPHILS 0.0 0.0 - 0.1 K/UL    DF AUTOMATED     METABOLIC PANEL, COMPREHENSIVE    Collection Time: 10/12/19  9:50 AM   Result Value Ref Range    Sodium 141 136 - 145 mmol/L    Potassium 3.6 3.5 - 5.5 mmol/L    Chloride 106 100 - 111 mmol/L    CO2 28 21 - 32 mmol/L    Anion gap 7 3.0 - 18 mmol/L    Glucose 113 (H) 74 - 99 mg/dL    BUN 20 (H) 7.0 - 18 MG/DL    Creatinine 0.80 0.6 - 1.3 MG/DL    BUN/Creatinine ratio 25 (H) 12 - 20      GFR est AA >60 >60 ml/min/1.73m2    GFR est non-AA >60 >60 ml/min/1.73m2    Calcium 9.3 8.5 - 10.1 MG/DL    Bilirubin, total 0.4 0.2 - 1.0 MG/DL    ALT (SGPT) 41 13 - 56 U/L    AST (SGOT) 27 10 - 38 U/L    Alk. phosphatase 96 45 - 117 U/L    Protein, total 8.2 6.4 - 8.2 g/dL    Albumin 3.7 3.4 - 5.0 g/dL    Globulin 4.5 (H) 2.0 - 4.0 g/dL    A-G Ratio 0.8 0.8 - 1.7     MAGNESIUM    Collection Time: 10/12/19  9:50 AM   Result Value Ref Range    Magnesium 1.8 1.6 - 2.6 mg/dL   TROPONIN I    Collection Time: 10/12/19  9:50 AM   Result Value Ref Range    Troponin-I, QT 0.06 (H) 0.0 - 0.045 NG/ML   NT-PRO BNP    Collection Time: 10/12/19  9:50 AM   Result Value Ref Range    NT pro- (H) 0 - 900 PG/ML   PROTHROMBIN TIME + INR    Collection Time: 10/12/19  9:50 AM   Result Value Ref Range    Prothrombin time 25.3 (H) 11.5 - 15.2 sec    INR 2.3 (H) 0.8 - 1.2         Radiologic Studies -   XR CHEST PA LAT   Final Result   Impression:      Cardiomegaly status post mitral valve replacement. No acute cardiopulmonary   abnormalities are seen. No convincing evidence of heart failure at this time. Medical Decision Making     It should be noted that I, No att. providers found will be the provider of record for this patient. I reviewed the vital signs, available nursing notes, past medical history, past surgical history, family history and social history. Vital Signs-Reviewed the patient's vital signs. Pulse Oximetry Analysis -  95% on room air, stable. Cardiac Monitor:  Rate: 82  Rhythm:  Normal Sinus Rhythm     EKG: Interpreted by the EP.    Time Interpreted: 9:42 AM   Rate: 81   Rhythm: Atrial Fibrillation    Interpretation: No acute changes. Records Reviewed: Nursing Notes and Old Medical Records (Time of Review: 10:03 AM)    ED Course: Progress Notes, Reevaluation, and Consults:      Provider Notes (Medical Decision Making):   Patient is a 68-year-old female with a history of valve replacement and heart failure with preserved ejection fraction. Patient with asthma and was started on steroids by her doctor last week. Patient states that she has a clear cough. Patient has a history of orthopnea but it is no worse than usual.  Describes some dyspnea on exertion but that is no worse than. Patient with A. fib that is known. INR therapeutic. No acute findings on chest x-ray. No fever. No wheezing. No evidence for asthma exacerbation or pneumonia today. Patient states that her wheezing had improved. It is possible that she is now retaining fluid due to steroid use. Troponin is less than it has been in the past and BNP although slightly elevated, based on exam and chest x-ray is not concerning the elevated. Discussed with patient's that I would not take the steroid any further as she feels that it is not helping and may be making her worse. Instructed to take an extra dose of Lasix when she gets home. No evidence for respiratory distress, heart failure on x-ray or by exam, hypoxia, respiratory failure, or MI. Patient has had the symptoms for the last week. Appears well at this time, stable for discharge and would prefer to go home today. Return if any worsening or concerning symptoms. Understands agrees with plan. Stable for discharge. Diagnosis     Clinical Impression:   1. Diastolic congestive heart failure, unspecified HF chronicity ([address]    2. Cough        Disposition: Discharge.     Follow-up Information     Follow up With Specialties Details Why Contact Info    Marilyn Mcclain MD Family Practice Schedule an appointment as soon as possible for a visit  [address] [facility]  [address] [facility]      Maralee Boeck, NP Nurse Practitioner, Cardiology Schedule an appointment as soon as possible for a visit Please take an extra dose of Lasix when you get in. [address] [place] [address] [address]  [id number] [address]             Discharge Medication List as of 10/12/2019 11:28 AM      CONTINUE these medications which have NOT CHANGED    Details   sertraline (ZOLOFT) 50 mg tablet TAKE 1 TABLET BY MOUTH EVERY DAY, Normal, Disp-90 Tab, R-0      methylPREDNISolone (MEDROL DOSEPACK) 4 mg tablet Take as directed, Normal, Disp-1 Dose Pack, R-0      azithromycin (ZITHROMAX) 250 mg tablet Take two tablets today then one tablet daily, Normal, Disp-6 Tab, R-0      montelukast (SINGULAIR) 10 mg tablet Take 1 Tab by mouth daily. , Normal, Disp-90 Tab, R-0      metoprolol succinate (TOPROL-XL) 100 mg tablet TAKE 1 TABLET BY MOUTH EVERY DAY, Normal, Disp-90 Tab, R-3      atorvastatin (LIPITOR) 40 mg tablet TAKE 1 TABLET BY MOUTH EVERY DAY, Normal, Disp-90 Tab, R-0      amLODIPine (NORVASC) 5 mg tablet TAKE 1 TABLET BY MOUTH EVERY DAY, Normal, Disp-90 Tab, R-2      KLOR-CON M20 20 mEq tablet TAKE 1 TAB BY MOUTH DAILY., Normal, Disp-30 Tab, R-6      diphenhydrAMINE (BENADRYL) 25 mg capsule Take 25 mg by mouth every six (6) hours as needed., Historical Med      OTHER Allergy injections, Historical Med      VENTOLIN HFA 90 mcg/actuation inhaler TAKE 2 PUFFS BY INHALATION DAILY., Normal, Disp-3 Inhaler, R-1      warfarin (COUMADIN) 5 mg tablet TAKE 5MG DAILY EXCEPT 2.5MG ON TUES, THUR, AND SAT. OR AS DIRECTED BY OUR OFFICE, Normal, Disp-30 Tab, R-5      losartan (COZAAR) 100 mg tablet Take 1 Tab by mouth daily. , Normal, Disp-90 Tab, R-3      furosemide (LASIX) 40 mg tablet Take 1 Tab by mouth daily. , Normal, Disp-90 Tab, R-3      chlorpheniramine-acetaminophen (CORICIDIN HBP COLD AND FLU) 2-325 mg tab Take 325 mg by mouth two (2) times daily as needed for Cough (Cold symptoms). , Historical Med      aspirin delayed-release 81 mg tablet Take 1 Tab by mouth daily. , Print, Disp-30 Tab, R-0      polyethylene glycol (MIRALAX) 17 gram packet Take 1 Packet by mouth daily. , Print, Disp-30 Packet, R-0      esomeprazole (NEXIUM) 20 mg capsule Take 20 mg by mouth daily as needed (heart burn). , Historical Med      fluticasone (FLONASE ALLERGY RELIEF) 50 mcg/actuation nasal spray 2 Sprays by Both Nostrils route daily as needed for Rhinitis., Historical Med      sodium chloride (SALINE NASAL) 0.65 % nasal squeeze bottle 1 Spray as needed for Congestion. , Historical Med      melatonin tab tablet Take 10 mg by mouth nightly., Historical Med      budesonide-formoterol (SYMBICORT) 80-4.5 mcg/actuation HFAA Take 2 Puffs by inhalation two (2) times a day., Normal, Disp-1 Inhaler, R-0           _______________________________       Scribe Nakul Lowe acting as a scribe for and in the presence of Ra Ortega DO      October 12, 2019 at 10:03 AM       Provider Attestation:      I personally performed the services described in the documentation, reviewed the documentation, as recorded by the scribe in my presence, and it accurately and completely records my words and actions.  October 12, 2019 at 10:03 AM - Ra LENNON DO        _______________________________

## 2019-10-12 NOTE — ED TRIAGE NOTES
Patient c/o coughing and wheezing x 10 days, clear mucus. Patient went to her PCP pt. Was prescribe methylprednisolone dose pack. And symbicort but insurance have not approved the symibicort. Patient also c/o chest pressure feels like someone is pressing down on it.

## 2019-10-12 NOTE — ED NOTES
Josh Beckwith is a 62 y.o. female that was discharged in good condition. The patients diagnosis, condition and treatment were explained to  patient and aftercare instructions were given. The patient verbalized understanding. Patient armband removed and shredded.
Stable

## 2019-10-13 LAB
ATRIAL RATE: 220 BPM
CALCULATED R AXIS, ECG10: 43 DEGREES
CALCULATED T AXIS, ECG11: 128 DEGREES
DIAGNOSIS, 93000: NORMAL
Q-T INTERVAL, ECG07: 376 MS
QRS DURATION, ECG06: 98 MS
QTC CALCULATION (BEZET), ECG08: 436 MS
VENTRICULAR RATE, ECG03: 81 BPM

## 2019-10-14 ENCOUNTER — HOSPITAL ENCOUNTER (OUTPATIENT)
Dept: RESPIRATORY THERAPY | Age: 59
Discharge: HOME | End: 2019-10-14
Attending: FAMILY MEDICINE
Payer: MEDICARE

## 2019-10-14 DIAGNOSIS — J45.41 MODERATE PERSISTENT ASTHMA WITH ACUTE EXACERBATION: ICD-10-CM

## 2019-10-14 PROCEDURE — 94729 DIFFUSING CAPACITY: CPT

## 2019-10-14 PROCEDURE — 94726 PLETHYSMOGRAPHY LUNG VOLUMES: CPT

## 2019-10-14 PROCEDURE — 94010 BREATHING CAPACITY TEST: CPT

## 2019-10-14 NOTE — PROGRESS NOTES
Hematology/Oncology Consultation Note    Name: Helen Abbasi  Date: 10/15/2019  : 1960    Dima Abrams MD         Subjective:     Reason for Consult/Chief Complaint: Abnormal Lab results      History of Present Illness:   Ms. Lukas Streeter is a most pleasant 62y.o. year old female with PMHx of asthma, Rheumatic melody disease s.p valve replacement, Atrial Fibrillation, Congestive Heart Failure (CHF) and Hypertension who was seen for an elevated serum globulin level . Her labs reviewed which showed perrsistent elevated globulin levels since 2019. Her recent electrophoresis (19) revealed a increased Beta globulin of 1.7, No M spike observed. Recently she visited to ED for her acute CHF exacerbation with worsening cough. She reported feeling better today with improved cough. Her leg swelling has been improved with water pills. She denied any acute or worsening bone pain or back pain. No worsening headache, acute vision change. denied any focal weakness or numbness. Denied fever or chills, no weight loss, sweating, skin lumps/bumps, lymphadenopathy, bleeding, bruising. No family history of blood disorders. Oncologic History:   No history exists. Past Medical History, Family History, and Social History:    Past Medical History:   Diagnosis Date    Aortic valve disorders 2011    Asthma     Atrial fibrillation ([facility] [address] 2011    Congestive heart failure, unspecified 2005    Cleared quickly with Lasix therapy    Echocardiogram 01/10/2012    A-fib. EF 45%. Mild RVE. RVSP 45-50. Massive LAE. Mod TAMI. Mod MS. Severe MR (mean grad 10). Mild-mod AI. Mild TR. Pulmonic systolic flow reversal.  Mild IVCE.  Hypertension     Iron deficiency anemias     Long term (current) use of anticoagulants 2011    Murmur 2011    Grade I-II/VI systolic ejection murmur along left sternal border, with radiation to the pulmonic area.     S/P cardiac cath 2012 Minimal coronary artery disease. Mild LVE. EF 55%. Mod MR. Mod-severe MS. RA 15.  RV 55/16. PA 58/34. W 36.  CO/CI 3.5/1.9 (TD); 3.61/1.9 ([place]). R to L shunt suggested.  Wears glasses     Weight gain      Past Surgical History:   Procedure Laterality Date    HX HEART CATHETERIZATION  2012    HX HYSTERECTOMY      HX MITRAL VALVE REPLACEMENT  2013    25/33 mm On-X mechanical valve with bilateral pulmonary vein isolation and resection of LA appendage     Social History     Socioeconomic History    Marital status:      Spouse name: Not on file    Number of children: Not on file    Years of education: Not on file    Highest education level: Not on file   Occupational History    Not on file   Social Needs    Financial resource strain: Not on file    Food insecurity:     Worry: Not on file     Inability: Not on file    Transportation needs:     Medical: Not on file     Non-medical: Not on file   Tobacco Use    Smoking status: Former Smoker     Packs/day: 0.50     Years: 21.00     Pack years: 10.50     Last attempt to quit:      Years since quittin.7    Smokeless tobacco: Never Used   Substance and Sexual Activity    Alcohol use:  Yes     Alcohol/week: 1.0 standard drinks     Types: 1 Glasses of wine per week     Comment: occassionally    Drug use: No    Sexual activity: Yes     Partners: Male     Birth control/protection: None   Lifestyle    Physical activity:     Days per week: Not on file     Minutes per session: Not on file    Stress: Not on file   Relationships    Social connections:     Talks on phone: Not on file     Gets together: Not on file     Attends [employer] service: Not on file     Active member of club or organization: Not on file     Attends meetings of clubs or organizations: Not on file     Relationship status: Not on file    Intimate partner violence:     Fear of current or ex partner: Not on file     Emotionally abused: Not on file Physically abused: Not on file     Forced sexual activity: Not on file   Other Topics Concern    Not on file   Social History Narrative    Not on file     Family History   Problem Relation Age of Onset    Heart Surgery Father         Open-Heart Surgery    Other Father         Venous Thromboembolism    Hypertension Father     Heart Disease Mother         Enlarged Heart    Hypertension Mother    Lisette Posey Diabetes Mother     Diabetes Sister     Hypertension Sister     Diabetes Brother     Heart Disease Paternal Grandmother     Diabetes Sister     Hypertension Sister      Current Outpatient Medications   Medication Sig Dispense Refill    budesonide-formoterol (SYMBICORT) 80-4.5 mcg/actuation HFAA Take 2 Puffs by inhalation two (2) times a day. 1 Inhaler 0    sertraline (ZOLOFT) 50 mg tablet TAKE 1 TABLET BY MOUTH EVERY DAY 90 Tab 0    methylPREDNISolone (MEDROL DOSEPACK) 4 mg tablet Take as directed 1 Dose Pack 0    azithromycin (ZITHROMAX) 250 mg tablet Take two tablets today then one tablet daily 6 Tab 0    montelukast (SINGULAIR) 10 mg tablet Take 1 Tab by mouth daily. 90 Tab 0    metoprolol succinate (TOPROL-XL) 100 mg tablet TAKE 1 TABLET BY MOUTH EVERY DAY 90 Tab 3    atorvastatin (LIPITOR) 40 mg tablet TAKE 1 TABLET BY MOUTH EVERY DAY 90 Tab 0    amLODIPine (NORVASC) 5 mg tablet TAKE 1 TABLET BY MOUTH EVERY DAY 90 Tab 2    KLOR-CON M20 20 mEq tablet TAKE 1 TAB BY MOUTH DAILY. 30 Tab 6    diphenhydrAMINE (BENADRYL) 25 mg capsule Take 25 mg by mouth every six (6) hours as needed.  OTHER Allergy injections      VENTOLIN HFA 90 mcg/actuation inhaler TAKE 2 PUFFS BY INHALATION DAILY. 3 Inhaler 1    warfarin (COUMADIN) 5 mg tablet TAKE 5MG DAILY EXCEPT 2.5MG ON TUES, THUR, AND SAT. OR AS DIRECTED BY OUR OFFICE 30 Tab 5    losartan (COZAAR) 100 mg tablet Take 1 Tab by mouth daily. 90 Tab 3    furosemide (LASIX) 40 mg tablet Take 1 Tab by mouth daily.  90 Tab 3    chlorpheniramine-acetaminophen (CORICIDIN HBP COLD AND FLU) 2-325 mg tab Take 325 mg by mouth two (2) times daily as needed for Cough (Cold symptoms).  aspirin delayed-release 81 mg tablet Take 1 Tab by mouth daily. 30 Tab 0    polyethylene glycol (MIRALAX) 17 gram packet Take 1 Packet by mouth daily. (Patient taking differently: Take  by mouth daily as needed for Other (Constipation). ) 30 Packet 0    esomeprazole (NEXIUM) 20 mg capsule Take 20 mg by mouth daily as needed (heart burn).  fluticasone (FLONASE ALLERGY RELIEF) 50 mcg/actuation nasal spray 2 Sprays by Both Nostrils route daily as needed for Rhinitis.  sodium chloride (SALINE NASAL) 0.65 % nasal squeeze bottle 1 Spray as needed for Congestion.  melatonin tab tablet Take 10 mg by mouth nightly. Review of Systems:  Constitutional: The patient has no acute distress or discomfort. HEENT: The patient denies recent head trauma, eye pain, blurred vision,  hearing deficit, oropharyngeal mucosal pain or lesions, and the patient denies throat pain or discomfort. Lymphatics: The patient denies palpable peripheral lymphadenopathy. Hematologic: The patient denies having bruising, bleeding, or progressive fatigue. Respiratory: Patient denies having shortness of breath, cough, sputum production, fever, or dyspnea on exertion. Cardiovascular: The patient denies having leg pain, worsening leg swelling, heart palpitations, chest pain, or lightheadedness. Chronic dyspnea on exertion. Gastrointestinal: The patient denies having nausea, emesis, or diarrhea. The patient denies having any hematemesis or blood in the stool. Genitourinary: Patient denies having urinary urgency, frequency, or dysuria. The patient denies having blood in the urine. Psychological: The patient denies having symptoms of nervousness, anxiety, depression, or thoughts of harming self.   Skin: Patient denies having skin rashes, skin, ulcerations, or unexplained itching or pruritus. Musculoskeletal: The patient denies having pain in the joints or bones. Objective:     Visit Vitals  /80   Pulse (!) 105   Temp 98.7 °F (37.1 °C)   Ht 5' 7\" (1.702 m)   Wt 84.8 kg (186 lb 14.4 oz)   SpO2 97%   BMI 29.27 kg/m²         Physical Exam:   Gen. Appearance: The patient is in no acute distress. Skin: There is no bruise or rash. HEENT: The exam is unremarkable. Neck: Supple without lymphadenopathy or thyromegaly. Lungs: Clear to auscultation and percussion; there are no wheezes or rhonchi. Heart: Regular rate and rhythm; there are no gallops, or rubs. Abdomen: Bowel sounds are present and normal.  There is no guarding, tenderness, or hepatosplenomegaly. Extremities: There is no clubbing, cyanosis. Mild pitting edema both legs. Neurologic: There are no focal neurologic deficits. Lymphatics: There is no palpable peripheral lymphadenopathy. Musculoskeletal: The patient has full range of motion at all joints. There is no evidence of joint deformity or effusions. There is no focal joint tenderness. Psychological/psychiatric: There is no clinical evidence of anxiety, depression, or melancholy. Diagnostics:      No results found for this or any previous visit. Recent Results (from the past 2016 hour(s))   METABOLIC PANEL, COMPREHENSIVE    Collection Time: 08/06/19  2:23 PM   Result Value Ref Range    Sodium 140 136 - 145 mmol/L    Potassium 4.1 3.5 - 5.5 mmol/L    Chloride 105 100 - 111 mmol/L    CO2 32 21 - 32 mmol/L    Anion gap 3 3.0 - 18 mmol/L    Glucose 94 74 - 99 mg/dL    BUN 14 7.0 - 18 MG/DL    Creatinine 0.90 0.6 - 1.3 MG/DL    BUN/Creatinine ratio 16 12 - 20      GFR est AA >60 >60 ml/min/1.73m2    GFR est non-AA >60 >60 ml/min/1.73m2    Calcium 9.4 8.5 - 10.1 MG/DL    Bilirubin, total 0.5 0.2 - 1.0 MG/DL    ALT (SGPT) 44 13 - 56 U/L    AST (SGOT) 40 (H) 10 - 38 U/L    Alk.  phosphatase 114 45 - 117 U/L    Protein, total 8.0 6.4 - 8.2 g/dL Albumin 3.8 3.4 - 5.0 g/dL    Globulin 4.2 (H) 2.0 - 4.0 g/dL    A-G Ratio 0.9 0.8 - 1.7     AMB POC PT/INR    Collection Time: 08/09/19  1:52 PM   Result Value Ref Range    VALID INTERNAL CONTROL POC Yes     Prothrombin time (POC) 20.8 seconds    INR POC 1.7    AMB POC PT/INR    Collection Time: 09/03/19  2:04 PM   Result Value Ref Range    VALID INTERNAL CONTROL POC Yes     Prothrombin time (POC) 23.4 seconds    INR POC 2.0    PROTEIN ELECTROPHORESIS    Collection Time: 09/13/19 12:20 PM   Result Value Ref Range    Protein, total 7.7 6.0 - 8.5 g/dL    Albumin 3.6 2.9 - 4.4 g/dL    Alpha-1-globulin 0.2 0.0 - 0.4 g/dL    ALPHA-2 GLOBULIN 0.6 0.4 - 1.0 g/dL    Beta globulin 1.7 (H) 0.7 - 1.3 g/dL    Gamma globulin 1.6 0.4 - 1.8 g/dL    M-Kwame Not Observed Not Observed g/dL    Globulin, total 4.1 (H) 2.2 - 3.9 g/dL    A/G ratio 0.9 0.7 - 1.7     AMB POC PT/INR    Collection Time: 09/30/19 11:38 AM   Result Value Ref Range    VALID INTERNAL CONTROL POC Yes     Prothrombin time (POC) 26.4 seconds    INR POC 2.2    EKG, 12 LEAD, INITIAL    Collection Time: 10/12/19  9:42 AM   Result Value Ref Range    Ventricular Rate 81 BPM    Atrial Rate 220 BPM    QRS Duration 98 ms    Q-T Interval 376 ms    QTC Calculation (Bezet) 436 ms    Calculated R Axis 43 degrees    Calculated T Axis 128 degrees    Diagnosis       Atrial fibrillation with a competing junctional pacemaker  Nonspecific ST and T wave abnormality , probably digitalis effect  Abnormal ECG  When compared with ECG of 28-FEB-2019 19:44,  No significant change was found  Confirmed by Ashley Weaver ([id number]) on 10/13/2019 11:05:05 AM     URINALYSIS W/ RFLX MICROSCOPIC    Collection Time: 10/12/19  9:46 AM   Result Value Ref Range    Color YELLOW      Appearance CLEAR      Specific gravity 1.014 1.005 - 1.030      pH (UA) 5.5 5.0 - 8.0      Protein NEGATIVE  NEG mg/dL    Glucose NEGATIVE  NEG mg/dL    Ketone NEGATIVE  NEG mg/dL    Bilirubin NEGATIVE  NEG      Blood NEGATIVE  NEG      Urobilinogen 1.0 0.2 - 1.0 EU/dL    Nitrites NEGATIVE  NEG      Leukocyte Esterase MODERATE (A) NEG     URINE MICROSCOPIC ONLY    Collection Time: 10/12/19  9:46 AM   Result Value Ref Range    WBC 4 to 10 0 - 4 /hpf    RBC NEGATIVE  0 - 5 /hpf    Epithelial cells 1+ 0 - 5 /lpf    Bacteria NEGATIVE  NEG /hpf   CBC WITH AUTOMATED DIFF    Collection Time: 10/12/19  9:50 AM   Result Value Ref Range    WBC 12.3 4.6 - 13.2 K/uL    RBC 4.48 4.20 - 5.30 M/uL    HGB 11.9 (L) 12.0 - 16.0 g/dL    HCT 37.5 35.0 - 45.0 %    MCV 83.7 74.0 - 97.0 FL    MCH 26.6 24.0 - 34.0 PG    MCHC 31.7 31.0 - 37.0 g/dL    RDW 13.6 11.6 - 14.5 %    PLATELET 299 421 - 166 K/uL    MPV 11.5 9.2 - 11.8 FL    NEUTROPHILS 67 40 - 73 %    LYMPHOCYTES 21 21 - 52 %    MONOCYTES 12 (H) 3 - 10 %    EOSINOPHILS 0 0 - 5 %    BASOPHILS 0 0 - 2 %    ABS. NEUTROPHILS 8.2 (H) 1.8 - 8.0 K/UL    ABS. LYMPHOCYTES 2.6 0.9 - 3.6 K/UL    ABS. MONOCYTES 1.5 (H) 0.05 - 1.2 K/UL    ABS. EOSINOPHILS 0.0 0.0 - 0.4 K/UL    ABS. BASOPHILS 0.0 0.0 - 0.1 K/UL    DF AUTOMATED     METABOLIC PANEL, COMPREHENSIVE    Collection Time: 10/12/19  9:50 AM   Result Value Ref Range    Sodium 141 136 - 145 mmol/L    Potassium 3.6 3.5 - 5.5 mmol/L    Chloride 106 100 - 111 mmol/L    CO2 28 21 - 32 mmol/L    Anion gap 7 3.0 - 18 mmol/L    Glucose 113 (H) 74 - 99 mg/dL    BUN 20 (H) 7.0 - 18 MG/DL    Creatinine 0.80 0.6 - 1.3 MG/DL    BUN/Creatinine ratio 25 (H) 12 - 20      GFR est AA >60 >60 ml/min/1.73m2    GFR est non-AA >60 >60 ml/min/1.73m2    Calcium 9.3 8.5 - 10.1 MG/DL    Bilirubin, total 0.4 0.2 - 1.0 MG/DL    ALT (SGPT) 41 13 - 56 U/L    AST (SGOT) 27 10 - 38 U/L    Alk.  phosphatase 96 45 - 117 U/L    Protein, total 8.2 6.4 - 8.2 g/dL    Albumin 3.7 3.4 - 5.0 g/dL    Globulin 4.5 (H) 2.0 - 4.0 g/dL    A-G Ratio 0.8 0.8 - 1.7     MAGNESIUM    Collection Time: 10/12/19  9:50 AM   Result Value Ref Range    Magnesium 1.8 1.6 - 2.6 mg/dL   TROPONIN I    Collection Time: 10/12/19  9:50 AM   Result Value Ref Range    Troponin-I, QT 0.06 (H) 0.0 - 0.045 NG/ML   NT-PRO BNP    Collection Time: 10/12/19  9:50 AM   Result Value Ref Range    NT pro- (H) 0 - 900 PG/ML   PROTHROMBIN TIME + INR    Collection Time: 10/12/19  9:50 AM   Result Value Ref Range    Prothrombin time 25.3 (H) 11.5 - 15.2 sec    INR 2.3 (H) 0.8 - 1.2         Results for Sherwin Quintanilla (MRN [id number])    Ref. Range 1/6/2019 01:09 2/28/2019 16:42 4/12/2019 09:28 8/6/2019 14:23 10/12/2019 09:50   Globulin Latest Ref Range: 2.0 - 4.0 g/dL 3.8 4.5 (H) 4.1 (H) 4.2 (H) 4.5 (H)     9/16/2019  3:37 PM - [facility]     Component Value Flag Ref Range Units Status   Protein, total 7.7   6.0 - 8.5 g/dL Final   Albumin 3.6   2.9 - 4.4 g/dL Final   Alpha-1-globulin 0.2   0.0 - 0.4 g/dL Final   ALPHA-2 GLOBULIN 0.6   0.4 - 1.0 g/dL Final   Beta globulin 1.7  High   0.7 - 1.3 g/dL Final   Gamma globulin 1.6   0.4 - 1.8 g/dL Final   M-Kwame Not Observed   Not Observed g/dL Final   Globulin, total 4.1  High   2.2 - 3.9 g/dL Final   A/G ratio 0.9   0.7 - 1.7   Final           Assessment and Plan       1. Elevated serum globulin level    Her labs reviewed which showed perrsistent elevated globulin levels since 2/28/2019. The recent electrophoresis revealed a increased Beta globulin of 1.7, No M spike observed. Her normocytic anemia appears chronically at baseline 10-12 g/dl, no progressive worsening anemia noticed. No hypercalcemia or renal impairment. No bone pain clinically. While the characteristic monoclonal band in Monoclonal Gammopathies is usually found in the gamma-globulin region of SPEP, it also can be found in the beta or alpha-2 regions reportedly. We will obtain her Serum immunofixation and serum free light chain (FLC) assay, urine protein 24h for further evaluation. We also obtain skeletal survey if any bone lytic lesions.   Further workup will be based on reports of above test.    2. Normocytic anemia  Her normocytic anemia appears chronically at baseline 10-12 g/dl. Asymptomatic presently, no acute bleeding. Will obtain Iron study, Vitamin Y49, Folic acid level, Reticulocyte panel     Orders Placed This Encounter    XR BONE SURVEY COMP     Standing Status:   Future     Standing Expiration Date:   11/15/2020     Order Specific Question:   Reason for Exam     Answer:   Elevated serum globulin, rule out bone lytic lesion    PROTEIN, URINE, 24 HR     Standing Status:   Future     Standing Expiration Date:   10/15/2020    CREATININE, UR, 24 HR     Standing Status:   Future     Standing Expiration Date:   10/15/2020     Order Specific Question:   Has the patient fasted? Answer:   No     Order Specific Question:   Patient Height     Answer:   5     Order Specific Question:   Patient Weight     Answer:   [address] ([place].)     Standing Status:   Future     Standing Expiration Date:   10/15/2020    FREE LIGHT CHAINS, KAPPA/LAMBDA, QT     Standing Status:   Future     Standing Expiration Date:   10/15/2020    VITAMIN B12 & FOLATE     Standing Status:   Future     Standing Expiration Date:   10/15/2020    RETICULOCYTE COUNT     Standing Status:   Future     Standing Expiration Date:   10/15/2020    IRON PROFILE     Standing Status:   Future     Standing Expiration Date:   10/15/2020     I will see the patient back in clinic in about 2-3 weeks to discuss lab results. Always sooner if required. All of patient's questions answered to their apparent satisfaction. They verbally show understanding and agreement with the plan. About 45 minutes were spent for this encounter with more than 50% of the time spent in face-to-face counseling and discussing on diagnosis and management plan going forward. Follow-up and Dispositions  ·   Return in about 3 weeks (around 11/5/2019), or if symptoms worsen or fail to improve.      I would like to  thank Dr. Cornel Galvan MD  for allowing me to participate in the care of this very pleasant patient. If I can be of further assistance please do not hesitate to call. Barb Clark MD  10/15/2019      Parts of this document has been produced using Dragon dictation system. Unrecognized errors in transcription may be present. Please do not hesitate to reach out for any questions or clarifications.

## 2019-10-15 ENCOUNTER — TELEPHONE (OUTPATIENT)
Dept: FAMILY MEDICINE CLINIC | Age: 59
End: 2019-10-15

## 2019-10-15 ENCOUNTER — HOSPITAL ENCOUNTER (OUTPATIENT)
Dept: INFUSION THERAPY | Age: 59
Discharge: HOME | End: 2019-10-15

## 2019-10-15 ENCOUNTER — HOSPITAL ENCOUNTER (OUTPATIENT)
Dept: LAB | Age: 59
Discharge: HOME | End: 2019-10-15
Payer: MEDICARE

## 2019-10-15 ENCOUNTER — OFFICE VISIT (OUTPATIENT)
Dept: ONCOLOGY | Age: 59
End: 2019-10-15

## 2019-10-15 ENCOUNTER — HOSPITAL ENCOUNTER (OUTPATIENT)
Dept: GENERAL RADIOLOGY | Age: 59
Discharge: HOME | End: 2019-10-15
Payer: MEDICARE

## 2019-10-15 VITALS
SYSTOLIC BLOOD PRESSURE: 124 MMHG | TEMPERATURE: 98.7 F | HEART RATE: 105 BPM | WEIGHT: 186.9 LBS | OXYGEN SATURATION: 97 % | DIASTOLIC BLOOD PRESSURE: 80 MMHG | HEIGHT: 67 IN | BODY MASS INDEX: 29.34 KG/M2

## 2019-10-15 DIAGNOSIS — R77.1 ELEVATED SERUM GLOBULIN LEVEL: ICD-10-CM

## 2019-10-15 DIAGNOSIS — R77.1 ELEVATED SERUM GLOBULIN LEVEL: Primary | ICD-10-CM

## 2019-10-15 PROBLEM — J32.9 CHRONIC SINUSITIS: Status: ACTIVE | Noted: 2019-10-15

## 2019-10-15 PROBLEM — J30.1 ALLERGIC RHINITIS DUE TO POLLEN: Status: ACTIVE | Noted: 2018-11-30

## 2019-10-15 PROBLEM — J30.89 OTHER ALLERGIC RHINITIS: Status: ACTIVE | Noted: 2018-11-30

## 2019-10-15 PROBLEM — D64.9 NORMOCYTIC ANEMIA: Status: ACTIVE | Noted: 2019-10-15

## 2019-10-15 LAB
FOLATE SERPL-MCNC: 12.6 NG/ML (ref 3.1–17.5)
IRON SATN MFR SERPL: 28 %
IRON SERPL-MCNC: 75 UG/DL (ref 50–175)
RETICS/RBC NFR AUTO: 1.6 % (ref 0.5–2.3)
TIBC SERPL-MCNC: 272 UG/DL (ref 250–450)
VIT B12 SERPL-MCNC: 741 PG/ML (ref 211–911)

## 2019-10-15 PROCEDURE — 83540 ASSAY OF IRON: CPT

## 2019-10-15 PROCEDURE — 77075 RADEX OSSEOUS SURVEY COMPL: CPT

## 2019-10-15 PROCEDURE — 82607 VITAMIN B-12: CPT

## 2019-10-15 PROCEDURE — 83883 ASSAY NEPHELOMETRY NOT SPEC: CPT

## 2019-10-15 PROCEDURE — 36415 COLL VENOUS BLD VENIPUNCTURE: CPT

## 2019-10-15 PROCEDURE — 82784 ASSAY IGA/IGD/IGG/IGM EACH: CPT

## 2019-10-15 PROCEDURE — 84156 ASSAY OF PROTEIN URINE: CPT

## 2019-10-15 PROCEDURE — 82570 ASSAY OF URINE CREATININE: CPT

## 2019-10-15 PROCEDURE — 85045 AUTOMATED RETICULOCYTE COUNT: CPT

## 2019-10-15 NOTE — LETTER
10/15/19 Patient: Sheldon Denver YOB: 1960 Date of Visit: 10/15/2019 Talia Irizarry, [address] [id number] [place] [address] VIA In Basket Dear Talia Irizarry MD, Thank you for referring Ms. Sheldon Denver to [facility] for evaluation. My notes for this consultation are attached. If you have questions, please do not hesitate to call me. I look forward to following your patient along with you. Sincerely, Carina Short MD

## 2019-10-15 NOTE — PROGRESS NOTES
Some abnormality on lung function test, shows some reduction on air exchange  Keep appt with pulm for further evaluation  pls notify pt.

## 2019-10-15 NOTE — PATIENT INSTRUCTIONS
Anemia: Care Instructions  Your Care Instructions    Anemia is a low level of red blood cells, which carry oxygen throughout your body. Many things can cause anemia. Lack of iron is one of the most common causes. Your body needs iron to make hemoglobin, a substance in red blood cells that carries oxygen from the lungs to your body's cells. Without enough iron, the body produces fewer and smaller red blood cells. As a result, your body's cells do not get enough oxygen, and you feel tired and weak. And you may have trouble concentrating. Bleeding is the most common cause of a lack of iron. You may have heavy menstrual bleeding or bleeding caused by conditions such as ulcers, hemorrhoids, or cancer. Regular use of aspirin or other anti-inflammatory medicines (such as ibuprofen) also can cause bleeding in some people. A lack of iron in your diet also can cause anemia, especially at times when the body needs more iron, such as during pregnancy, infancy, and the teen years. Your doctor may have prescribed iron pills. It may take several months of treatment for your iron levels to return to normal. Your doctor also may suggest that you eat foods that are rich in iron, such as meat and beans. There are many other causes of anemia. It is not always due to a lack of iron. Finding the specific cause of your anemia will help your doctor find the right treatment for you. Follow-up care is a key part of your treatment and safety. Be sure to make and go to all appointments, and call your doctor if you are having problems. It's also a good idea to know your test results and keep a list of the medicines you take. How can you care for yourself at home? · Take your medicines exactly as prescribed. Call your doctor if you think you are having a problem with your medicine. · If your doctor recommends iron pills, take them as directed:  ? Try to take the pills on an empty stomach about 1 hour before or 2 hours after meals. But you may need to take iron with food to avoid an upset stomach. ? Do not take antacids or drink milk or caffeine drinks (such as coffee, tea, or cola) at the same time or within 2 hours of the time that you take your iron. They can make it hard for your body to absorb the iron. ? Vitamin C (from food or supplements) helps your body absorb iron. Try taking iron pills with a glass of orange juice or some other food that is high in vitamin C, such as citrus fruits. ? Iron pills may cause stomach problems, such as heartburn, nausea, diarrhea, constipation, and cramps. Be sure to drink plenty of fluids, and include fruits, vegetables, and fiber in your diet each day. Iron pills often make your bowel movements dark or green. ? If you forget to take an iron pill, do not take a double dose of iron the next time you take a pill. ? Keep iron pills out of the reach of small children. An overdose of iron can be very dangerous. · Follow your doctor's advice about eating iron-rich foods. These include red meat, shellfish, poultry, eggs, beans, raisins, whole-grain bread, and leafy green vegetables. · Steam vegetables to help them keep their iron content. When should you call for help? Call 911 anytime you think you may need emergency care. For example, call if:    · You have symptoms of a heart attack. These may include:  ? Chest pain or pressure, or a strange feeling in the chest.  ? Sweating. ? Shortness of breath. ? Nausea or vomiting. ? Pain, pressure, or a strange feeling in the back, neck, jaw, or upper belly or in one or both shoulders or arms. ? Lightheadedness or sudden weakness. ? A fast or irregular heartbeat. After you call 911, the  may tell you to chew 1 adult-strength or 2 to 4 low-dose aspirin. Wait for an ambulance.  Do not try to drive yourself.     · You passed out (lost consciousness).    Call your doctor now or seek immediate medical care if:    · You have new or increased shortness of breath.     · You are dizzy or lightheaded, or you feel like you may faint.     · Your fatigue and weakness continue or get worse.     · You have any abnormal bleeding, such as:  ? Nosebleeds. ? Vaginal bleeding that is different (heavier, more frequent, at a different time of the month) than what you are used to.  ? Bloody or black stools, or rectal bleeding. ? Bloody or pink urine.    Watch closely for changes in your health, and be sure to contact your doctor if:    · You do not get better as expected. Where can you learn more? Go to http://greyson-andrea.info/. Enter R301 in the search box to learn more about \"Anemia: Care Instructions. \"  Current as of: March 28, 2019  Content Version: 12.2  © 4087-7757 [employer]. Care instructions adapted under license by [employer] (which disclaims liability or warranty for this information). If you have questions about a medical condition or this instruction, always ask your healthcare professional. [address] any warranty or liability for your use of this information.

## 2019-10-15 NOTE — TELEPHONE ENCOUNTER
Pt went to the [facility] ER for CHF. Pt stated the er dr doubled her furosemide. Pt would like to discuss her visit.  Please call pt at your earliest convince

## 2019-10-16 LAB
IGA SERPL-MCNC: 726 MG/DL (ref 87–352)
IGG SERPL-MCNC: 1416 MG/DL (ref 700–1600)
IGM SERPL-MCNC: 88 MG/DL (ref 26–217)
KAPPA LC FREE SER-MCNC: 30.8 MG/L (ref 3.3–19.4)
KAPPA LC FREE/LAMBDA FREE SER: 2.35 {RATIO} (ref 0.26–1.65)
LAMBDA LC FREE SERPL-MCNC: 13.1 MG/L (ref 5.7–26.3)
PROT PATTERN SERPL IFE-IMP: ABNORMAL

## 2019-10-16 PROCEDURE — 82570 ASSAY OF URINE CREATININE: CPT

## 2019-10-16 PROCEDURE — 84156 ASSAY OF PROTEIN URINE: CPT

## 2019-10-16 NOTE — TELEPHONE ENCOUNTER
Patient identified with 2 identifiers (name and ). Spoke with patient and she states that she was seen in the ER 10/12/2019 for CHF advised to stop prednisone and to take an extra lasix. Patient states she is feeling some better.  Reminded of appt 2019

## 2019-10-17 ENCOUNTER — HOSPITAL ENCOUNTER (OUTPATIENT)
Dept: LAB | Age: 59
Discharge: HOME | End: 2019-10-17

## 2019-10-17 LAB
COLLECT DURATION TIME UR: 24 HR
COLLECT DURATION TIME UR: 24 HR
CREAT 24H UR-MRATE: 981 MG/24HR (ref 600–2500)
PROT 24H UR-MRATE: 111 MG/24HR
SPECIMEN VOL ?TM UR: 1850 ML
SPECIMEN VOL ?TM UR: 1850 ML

## 2019-10-21 RX ORDER — FLUTICASONE PROPIONATE AND SALMETEROL 250; 50 UG/1; UG/1
1 POWDER RESPIRATORY (INHALATION) EVERY 12 HOURS
Qty: 1 EACH | Refills: 0 | Status: SHIPPED | OUTPATIENT
Start: 2019-10-21 | End: 2021-07-22 | Stop reason: SDUPTHER

## 2019-10-23 NOTE — PROGRESS NOTES
Patient identified with 2 identifiers (name and ).   Patient aware pf Some abnormality on lung function test, shows some reduction on air exchange   Keep appt with pulm for further evaluation

## 2019-10-31 ENCOUNTER — ANTI-COAG VISIT (OUTPATIENT)
Dept: CARDIOLOGY CLINIC | Age: 59
End: 2019-10-31

## 2019-10-31 DIAGNOSIS — I48.91 ATRIAL FIBRILLATION, UNSPECIFIED TYPE (HCC): Primary | ICD-10-CM

## 2019-10-31 DIAGNOSIS — Z79.01 LONG TERM CURRENT USE OF ANTICOAGULANT THERAPY: Primary | ICD-10-CM

## 2019-10-31 DIAGNOSIS — I48.91 ATRIAL FIBRILLATION, UNSPECIFIED TYPE (HCC): ICD-10-CM

## 2019-10-31 LAB
INR BLD: 1.7
PT POC: 20.6 SECONDS
VALID INTERNAL CONTROL?: YES

## 2019-10-31 NOTE — PROGRESS NOTES
Verbal order and read back per Collin Angeles MD  The INR is below the therapeutic range. Please make the following adjustments to Coumadin dosing: Take  Coumadin 5mg today 10/31/2019 then continue coumadin 5mg once daily  except 2.5mg on Tues, Thur, and Sat . Repeat the INR in 1 week. This has been fully explained to the patient, who indicates understanding.

## 2019-11-05 ENCOUNTER — OFFICE VISIT (OUTPATIENT)
Dept: ONCOLOGY | Age: 59
End: 2019-11-05

## 2019-11-05 ENCOUNTER — OFFICE VISIT (OUTPATIENT)
Dept: FAMILY MEDICINE CLINIC | Age: 59
End: 2019-11-05

## 2019-11-05 VITALS
TEMPERATURE: 98.5 F | DIASTOLIC BLOOD PRESSURE: 80 MMHG | WEIGHT: 189 LBS | HEART RATE: 71 BPM | OXYGEN SATURATION: 97 % | RESPIRATION RATE: 16 BRPM | BODY MASS INDEX: 29.66 KG/M2 | HEIGHT: 67 IN | SYSTOLIC BLOOD PRESSURE: 122 MMHG

## 2019-11-05 VITALS
BODY MASS INDEX: 29.91 KG/M2 | RESPIRATION RATE: 18 BRPM | OXYGEN SATURATION: 95 % | HEIGHT: 67 IN | DIASTOLIC BLOOD PRESSURE: 69 MMHG | WEIGHT: 190.6 LBS | TEMPERATURE: 98.5 F | SYSTOLIC BLOOD PRESSURE: 128 MMHG | HEART RATE: 86 BPM

## 2019-11-05 DIAGNOSIS — J45.41 MODERATE PERSISTENT ASTHMA WITH ACUTE EXACERBATION: ICD-10-CM

## 2019-11-05 DIAGNOSIS — F41.9 ANXIETY: Primary | ICD-10-CM

## 2019-11-05 DIAGNOSIS — D63.8 ANEMIA OF CHRONIC DISEASE: ICD-10-CM

## 2019-11-05 DIAGNOSIS — I48.91 ATRIAL FIBRILLATION, UNSPECIFIED TYPE (HCC): ICD-10-CM

## 2019-11-05 DIAGNOSIS — D63.8 ANEMIA, CHRONIC DISEASE: ICD-10-CM

## 2019-11-05 DIAGNOSIS — I10 ESSENTIAL HYPERTENSION: ICD-10-CM

## 2019-11-05 DIAGNOSIS — I21.4 NSTEMI (NON-ST ELEVATED MYOCARDIAL INFARCTION) (HCC): ICD-10-CM

## 2019-11-05 DIAGNOSIS — R77.1 ELEVATED SERUM GLOBULIN LEVEL: Primary | ICD-10-CM

## 2019-11-05 DIAGNOSIS — I09.1 RHEUMATIC VALVULAR DISEASE: ICD-10-CM

## 2019-11-05 DIAGNOSIS — R77.1 ELEVATED SERUM GLOBULIN LEVEL: ICD-10-CM

## 2019-11-05 DIAGNOSIS — E78.5 HYPERLIPIDEMIA, UNSPECIFIED HYPERLIPIDEMIA TYPE: ICD-10-CM

## 2019-11-05 RX ORDER — SERTRALINE HYDROCHLORIDE 100 MG/1
100 TABLET, FILM COATED ORAL DAILY
Qty: 90 TAB | Refills: 0 | Status: SHIPPED | OUTPATIENT
Start: 2019-11-05 | End: 2020-02-06

## 2019-11-05 NOTE — PROGRESS NOTES
Hematology/Oncology Progress Note    Name: Sheldon Denver  Date: 2019  : 1960    Desi Jordan MD         Subjective:     Reason for Consult/Chief Complaint: Follow-up visit, Abnormal Lab results      History of Present Illness:   Ms. Carla Davies is here today for follow-up her elevated serum globulin level . Since last seen, she denies any acute changes or new symptoms. She denied any acute or worsening bone pain or back pain. No fever, chills, worsening headache, acute vision change. denied any focal weakness or numbness. She has on-off both legs edema which is improving with water pills. Denied fever or chills, no weight loss, sweating, skin lumps/bumps, lymphadenopathy, bleeding, bruising, leg tenderness, joint pain. No other complaints. Oncologic History:   No history exists. Past Medical History, Family History, and Social History:    Past Medical History:   Diagnosis Date    Aortic valve disorders 2011    Asthma     Atrial fibrillation ([facility] [address] 2011    Congestive heart failure, unspecified 2005    Cleared quickly with Lasix therapy    Echocardiogram 01/10/2012    A-fib. EF 45%. Mild RVE. RVSP 45-50. Massive LAE. Mod TAMI. Mod MS. Severe MR (mean grad 10). Mild-mod AI. Mild TR. Pulmonic systolic flow reversal.  Mild IVCE.  Hypertension     Iron deficiency anemias     Long term (current) use of anticoagulants 2011    Murmur 2011    Grade I-II/VI systolic ejection murmur along left sternal border, with radiation to the pulmonic area.  S/P cardiac cath 2012    Minimal coronary artery disease. Mild LVE. EF 55%. Mod MR. Mod-severe MS. RA 15.  RV 55/16. PA 58/34. W 36.  CO/CI 3.5/1.9 (TD); 3.61/1.9 ([place]). R to L shunt suggested.     Wears glasses     Weight gain      Past Surgical History:   Procedure Laterality Date    HX HEART CATHETERIZATION  2012    HX HYSTERECTOMY      HX MITRAL VALVE REPLACEMENT  2013 25/33 mm On-X mechanical valve with bilateral pulmonary vein isolation and resection of LA appendage     Social History     Socioeconomic History    Marital status:      Spouse name: Not on file    Number of children: Not on file    Years of education: Not on file    Highest education level: Not on file   Occupational History    Not on file   Social Needs    Financial resource strain: Not on file    Food insecurity:     Worry: Not on file     Inability: Not on file    Transportation needs:     Medical: Not on file     Non-medical: Not on file   Tobacco Use    Smoking status: Former Smoker     Packs/day: 0.50     Years: 21.00     Pack years: 10.50     Last attempt to quit:      Years since quittin.8    Smokeless tobacco: Never Used   Substance and Sexual Activity    Alcohol use:  Yes     Alcohol/week: 1.0 standard drinks     Types: 1 Glasses of wine per week     Comment: occassionally    Drug use: No    Sexual activity: Yes     Partners: Male     Birth control/protection: None   Lifestyle    Physical activity:     Days per week: Not on file     Minutes per session: Not on file    Stress: Not on file   Relationships    Social connections:     Talks on phone: Not on file     Gets together: Not on file     Attends Advent service: Not on file     Active member of club or organization: Not on file     Attends meetings of clubs or organizations: Not on file     Relationship status: Not on file    Intimate partner violence:     Fear of current or ex partner: Not on file     Emotionally abused: Not on file     Physically abused: Not on file     Forced sexual activity: Not on file   Other Topics Concern    Not on file   Social History Narrative    Not on file     Family History   Problem Relation Age of Onset    Heart Surgery Father         Open-Heart Surgery    Other Father         Venous Thromboembolism    Hypertension Father     Heart Disease Mother         Enlarged Heart    Hypertension Mother     Diabetes Mother     Diabetes Sister     Hypertension Sister     Diabetes Brother     Heart Disease Paternal Grandmother     Diabetes Sister     Hypertension Sister      Current Outpatient Medications   Medication Sig Dispense Refill    fluticasone propion-salmeterol (ADVAIR/WIXELA) 250-50 mcg/dose diskus inhaler Take 1 Puff by inhalation every twelve (12) hours. 1 Each 0    sertraline (ZOLOFT) 50 mg tablet TAKE 1 TABLET BY MOUTH EVERY DAY 90 Tab 0    montelukast (SINGULAIR) 10 mg tablet Take 1 Tab by mouth daily. 90 Tab 0    metoprolol succinate (TOPROL-XL) 100 mg tablet TAKE 1 TABLET BY MOUTH EVERY DAY 90 Tab 3    atorvastatin (LIPITOR) 40 mg tablet TAKE 1 TABLET BY MOUTH EVERY DAY 90 Tab 0    amLODIPine (NORVASC) 5 mg tablet TAKE 1 TABLET BY MOUTH EVERY DAY 90 Tab 2    KLOR-CON M20 20 mEq tablet TAKE 1 TAB BY MOUTH DAILY. 30 Tab 6    diphenhydrAMINE (BENADRYL) 25 mg capsule Take 25 mg by mouth every six (6) hours as needed.  VENTOLIN HFA 90 mcg/actuation inhaler TAKE 2 PUFFS BY INHALATION DAILY. 3 Inhaler 1    warfarin (COUMADIN) 5 mg tablet TAKE 5MG DAILY EXCEPT 2.5MG ON TUES, THUR, AND SAT. OR AS DIRECTED BY OUR OFFICE 30 Tab 5    losartan (COZAAR) 100 mg tablet Take 1 Tab by mouth daily. 90 Tab 3    furosemide (LASIX) 40 mg tablet Take 1 Tab by mouth daily. 90 Tab 3    chlorpheniramine-acetaminophen (CORICIDIN HBP COLD AND FLU) 2-325 mg tab Take 325 mg by mouth two (2) times daily as needed for Cough (Cold symptoms).  aspirin delayed-release 81 mg tablet Take 1 Tab by mouth daily. 30 Tab 0    polyethylene glycol (MIRALAX) 17 gram packet Take 1 Packet by mouth daily. (Patient taking differently: Take  by mouth daily as needed for Other (Constipation). ) 30 Packet 0    fluticasone (FLONASE ALLERGY RELIEF) 50 mcg/actuation nasal spray 2 Sprays by Both Nostrils route daily as needed for Rhinitis.       sodium chloride (SALINE NASAL) 0.65 % nasal squeeze bottle 1 Spray as needed for Congestion.  methylPREDNISolone (MEDROL DOSEPACK) 4 mg tablet Take as directed 1 Dose Pack 0    azithromycin (ZITHROMAX) 250 mg tablet Take two tablets today then one tablet daily 6 Tab 0    OTHER Allergy injections      esomeprazole (NEXIUM) 20 mg capsule Take 20 mg by mouth daily as needed (heart burn).  melatonin tab tablet Take 10 mg by mouth nightly. Review of Systems:  Constitutional: The patient has no acute distress or discomfort. HEENT: The patient denies recent head trauma, eye pain, blurred vision,  hearing deficit, oropharyngeal mucosal pain or lesions, and the patient denies throat pain or discomfort. Lymphatics: The patient denies palpable peripheral lymphadenopathy. Hematologic: The patient denies having bruising, bleeding, or progressive fatigue. Respiratory: Patient denies having shortness of breath, cough, sputum production, fever, or dyspnea on exertion. Cardiovascular: The patient denies having leg pain, worsening leg swelling, heart palpitations, chest pain, or lightheadedness. Chronic dyspnea on exertion. Gastrointestinal: The patient denies having nausea, emesis, or diarrhea. The patient denies having any hematemesis or blood in the stool. Genitourinary: Patient denies having urinary urgency, frequency, or dysuria. The patient denies having blood in the urine. Psychological: The patient denies having symptoms of nervousness, anxiety, depression, or thoughts of harming self. Skin: Patient denies having skin rashes, skin, ulcerations, or unexplained itching or pruritus. Musculoskeletal: The patient denies having pain in the joints or bones. Objective:     Visit Vitals  /69 (BP 1 Location: Right arm, BP Patient Position: Sitting)   Pulse 86   Temp 98.5 °F (36.9 °C) (Oral)   Resp 18   Ht 5' 7\" (1.702 m)   Wt 86.5 kg (190 lb 9.6 oz)   SpO2 95%   BMI 29.85 kg/m²         Physical Exam:   Gen. Appearance:  The patient is in no acute distress. Skin: There is no bruise or rash. HEENT: The exam is unremarkable. Neck: Supple without lymphadenopathy or thyromegaly. Lungs: Clear to auscultation and percussion; there are no wheezes or rhonchi. Heart: Regular rate and rhythm; there are no gallops, or rubs. Abdomen: Bowel sounds are present and normal.  There is no guarding, tenderness, or hepatosplenomegaly. Extremities: There is no clubbing, cyanosis. Mild pitting edema both legs. Neurologic: There are no focal neurologic deficits. Lymphatics: There is no palpable peripheral lymphadenopathy. Musculoskeletal: The patient has full range of motion at all joints. There is no evidence of joint deformity or effusions. There is no focal joint tenderness. Psychological/psychiatric: There is no clinical evidence of anxiety, depression, or melancholy. Diagnostics:      No results found for this or any previous visit.     Recent Results (from the past 2016 hour(s))   AMB POC PT/INR    Collection Time: 09/03/19  2:04 PM   Result Value Ref Range    VALID INTERNAL CONTROL POC Yes     Prothrombin time (POC) 23.4 seconds    INR POC 2.0    PROTEIN ELECTROPHORESIS    Collection Time: 09/13/19 12:20 PM   Result Value Ref Range    Protein, total 7.7 6.0 - 8.5 g/dL    Albumin 3.6 2.9 - 4.4 g/dL    Alpha-1-globulin 0.2 0.0 - 0.4 g/dL    ALPHA-2 GLOBULIN 0.6 0.4 - 1.0 g/dL    Beta globulin 1.7 (H) 0.7 - 1.3 g/dL    Gamma globulin 1.6 0.4 - 1.8 g/dL    M-Kwame Not Observed Not Observed g/dL    Globulin, total 4.1 (H) 2.2 - 3.9 g/dL    A/G ratio 0.9 0.7 - 1.7     AMB POC PT/INR    Collection Time: 09/30/19 11:38 AM   Result Value Ref Range    VALID INTERNAL CONTROL POC Yes     Prothrombin time (POC) 26.4 seconds    INR POC 2.2    EKG, 12 LEAD, INITIAL    Collection Time: 10/12/19  9:42 AM   Result Value Ref Range    Ventricular Rate 81 BPM    Atrial Rate 220 BPM    QRS Duration 98 ms    Q-T Interval 376 ms    QTC Calculation (Bezet) 436 ms Calculated R Axis 43 degrees    Calculated T Axis 128 degrees    Diagnosis       Atrial fibrillation with a competing junctional pacemaker  Nonspecific ST and T wave abnormality , probably digitalis effect  Abnormal ECG  When compared with ECG of 28-FEB-2019 19:44,  No significant change was found  Confirmed by Nicko Jennings ([id number]) on 10/13/2019 11:05:05 AM     URINALYSIS W/ RFLX MICROSCOPIC    Collection Time: 10/12/19  9:46 AM   Result Value Ref Range    Color YELLOW      Appearance CLEAR      Specific gravity 1.014 1.005 - 1.030      pH (UA) 5.5 5.0 - 8.0      Protein NEGATIVE  NEG mg/dL    Glucose NEGATIVE  NEG mg/dL    Ketone NEGATIVE  NEG mg/dL    Bilirubin NEGATIVE  NEG      Blood NEGATIVE  NEG      Urobilinogen 1.0 0.2 - 1.0 EU/dL    Nitrites NEGATIVE  NEG      Leukocyte Esterase MODERATE (A) NEG     URINE MICROSCOPIC ONLY    Collection Time: 10/12/19  9:46 AM   Result Value Ref Range    WBC 4 to 10 0 - 4 /hpf    RBC NEGATIVE  0 - 5 /hpf    Epithelial cells 1+ 0 - 5 /lpf    Bacteria NEGATIVE  NEG /hpf   CBC WITH AUTOMATED DIFF    Collection Time: 10/12/19  9:50 AM   Result Value Ref Range    WBC 12.3 4.6 - 13.2 K/uL    RBC 4.48 4.20 - 5.30 M/uL    HGB 11.9 (L) 12.0 - 16.0 g/dL    HCT 37.5 35.0 - 45.0 %    MCV 83.7 74.0 - 97.0 FL    MCH 26.6 24.0 - 34.0 PG    MCHC 31.7 31.0 - 37.0 g/dL    RDW 13.6 11.6 - 14.5 %    PLATELET 850 984 - 070 K/uL    MPV 11.5 9.2 - 11.8 FL    NEUTROPHILS 67 40 - 73 %    LYMPHOCYTES 21 21 - 52 %    MONOCYTES 12 (H) 3 - 10 %    EOSINOPHILS 0 0 - 5 %    BASOPHILS 0 0 - 2 %    ABS. NEUTROPHILS 8.2 (H) 1.8 - 8.0 K/UL    ABS. LYMPHOCYTES 2.6 0.9 - 3.6 K/UL    ABS. MONOCYTES 1.5 (H) 0.05 - 1.2 K/UL    ABS. EOSINOPHILS 0.0 0.0 - 0.4 K/UL    ABS.  BASOPHILS 0.0 0.0 - 0.1 K/UL    DF AUTOMATED     METABOLIC PANEL, COMPREHENSIVE    Collection Time: 10/12/19  9:50 AM   Result Value Ref Range    Sodium 141 136 - 145 mmol/L    Potassium 3.6 3.5 - 5.5 mmol/L    Chloride 106 100 - 111 mmol/L CO2 28 21 - 32 mmol/L    Anion gap 7 3.0 - 18 mmol/L    Glucose 113 (H) 74 - 99 mg/dL    BUN 20 (H) 7.0 - 18 MG/DL    Creatinine 0.80 0.6 - 1.3 MG/DL    BUN/Creatinine ratio 25 (H) 12 - 20      GFR est AA >60 >60 ml/min/1.73m2    GFR est non-AA >60 >60 ml/min/1.73m2    Calcium 9.3 8.5 - 10.1 MG/DL    Bilirubin, total 0.4 0.2 - 1.0 MG/DL    ALT (SGPT) 41 13 - 56 U/L    AST (SGOT) 27 10 - 38 U/L    Alk.  phosphatase 96 45 - 117 U/L    Protein, total 8.2 6.4 - 8.2 g/dL    Albumin 3.7 3.4 - 5.0 g/dL    Globulin 4.5 (H) 2.0 - 4.0 g/dL    A-G Ratio 0.8 0.8 - 1.7     MAGNESIUM    Collection Time: 10/12/19  9:50 AM   Result Value Ref Range    Magnesium 1.8 1.6 - 2.6 mg/dL   TROPONIN I    Collection Time: 10/12/19  9:50 AM   Result Value Ref Range    Troponin-I, QT 0.06 (H) 0.0 - 0.045 NG/ML   NT-PRO BNP    Collection Time: 10/12/19  9:50 AM   Result Value Ref Range    NT pro- (H) 0 - 900 PG/ML   PROTHROMBIN TIME + INR    Collection Time: 10/12/19  9:50 AM   Result Value Ref Range    Prothrombin time 25.3 (H) 11.5 - 15.2 sec    INR 2.3 (H) 0.8 - 1.2     IMMUNOELECTROPHORESIS (IMMUNOFIX.)    Collection Time: 10/15/19 12:14 PM   Result Value Ref Range    Immunofixation, serum Comment      Immunoglobulin G, Qt. 1,416 700 - 1,600 mg/dL    Immunoglobulin A, Qt. 726 (H) 87 - 352 mg/dL    Immunoglobulin M, Qt. 88 26 - 217 mg/dL   FREE LIGHT CHAINS, KAPPA/LAMBDA, QT    Collection Time: 10/15/19 12:14 PM   Result Value Ref Range    Free Kappa Lt Chains, serum 30.8 (H) 3.3 - 19.4 mg/L    Free Lambda Lt Chains, serum 13.1 5.7 - 26.3 mg/L    Kappa/Lambda ratio, serum 2.35 (H) 0.26 - 1.65     VITAMIN B12 & FOLATE    Collection Time: 10/15/19 12:14 PM   Result Value Ref Range    Vitamin B12 741 211 - 911 pg/mL    Folate 12.6 3.10 - 17.50 ng/mL   RETICULOCYTE COUNT    Collection Time: 10/15/19 12:14 PM   Result Value Ref Range    Reticulocyte count 1.6 0.5 - 2.3 %   IRON PROFILE    Collection Time: 10/15/19 12:14 PM   Result Value Ref Range    Iron 75 50 - 175 ug/dL    TIBC 272 250 - 450 ug/dL    Iron % saturation 28 %   PROTEIN, URINE, 24 HR    Collection Time: 10/16/19 11:00 AM   Result Value Ref Range    Period of collection 24 hr    Volume 1,850 mL    Protein,urine 24 hr 111 <149.1 mg/24hr   CREATININE, UR, 24 HR    Collection Time: 10/16/19 11:00 AM   Result Value Ref Range    Period of collection 24 hr    Total volume, urine 1,850 mL    Creatinine, urine 24 hr 981 600 - 2,500 MG/24HR   AMB POC PT/INR    Collection Time: 10/31/19 10:02 AM   Result Value Ref Range    VALID INTERNAL CONTROL POC Yes     Prothrombin time (POC) 20.6 seconds    INR POC 1.7        Results for Francois Jimenez (MRN [id number])    Ref. Range 1/6/2019 01:09 2/28/2019 16:42 4/12/2019 09:28 8/6/2019 14:23 10/12/2019 09:50   Globulin Latest Ref Range: 2.0 - 4.0 g/dL 3.8 4.5 (H) 4.1 (H) 4.2 (H) 4.5 (H)     9/16/2019  3:37 PM - [facility], [facility]     Component Value Flag Ref Range Units Status   Protein, total 7.7   6.0 - 8.5 g/dL Final   Albumin 3.6   2.9 - 4.4 g/dL Final   Alpha-1-globulin 0.2   0.0 - 0.4 g/dL Final   ALPHA-2 GLOBULIN 0.6   0.4 - 1.0 g/dL Final   Beta globulin 1.7  High   0.7 - 1.3 g/dL Final   Gamma globulin 1.6   0.4 - 1.8 g/dL Final   M-Kwame Not Observed   Not Observed g/dL Final   Globulin, total 4.1  High   2.2 - 3.9 g/dL Final   A/G ratio 0.9   0.7 - 1.7   Final           Assessment and Plan       1. Elevated serum globulins   Her labs showed perrsistent elevated globulin levels since 2/28/2019. Recent SPEP revealed a increased Beta globulin of 1.7, No M spike observed. Her normocytic anemia appears chronically at baseline 10-12 g/dl, no progressive worsening anemia noticed. No hypercalcemia or renal impairment. Today I reviewed with her recent lab results. - Serum immunofixation showed an apparent polyclonal gammopathy with IgA. Kappa and lambda typing. No M spike.   - Serum free light chain reported a mild elevated Free Kappa Lt Chains, 30.8; also a near-normal Kappa/Lambda ratio at 2.35. Her urine protein 24h was WNL. - Skeletal survey reported no lytic lesions to confirm a diagnosis of monoclonal paraproteinemia. The mild elevation of globulins likely resulted from polyclonal gammopathy which is secondary to inflammatory or reactive processes. The mild elevated Free Kappa Lt Chains with near-normal ratio could also be explained by polyclonal gammopathy. We are not planning for bone marrow biopsy at this time. We can repeat her labs including SFLC and SPEP in three months. 2. Normocytic anemia, likely anemia of chronic diease  Her normocytic anemia appears chronically at baseline 10-12 g/dl. Asymptomatic without acute bleeding. I reviewed with her recent study: Iron study, Vitamin J21, Folic acid level, and Reticulocyte panel. Likely this is anemia of chronic diease. Suggest to continue monitoring CBC    No orders of the defined types were placed in this encounter. I will see the patient back in clinic in 3 months for follow-up. Always sooner if required. All of patient's questions answered to their apparent satisfaction. They verbally show understanding and agreement with the plan. About 25 minutes were spent for this encounter with more than 50% of the time spent in face-to-face counseling and discussing on diagnosis and management plan going forward. Jenna Beauchamp MD  11/5/2019      Parts of this document has been produced using Dragon dictation system. Unrecognized errors in transcription may be present. Please do not hesitate to reach out for any questions or clarifications.

## 2019-11-05 NOTE — LETTER
11/5/19 Patient: Minnie Jackson YOB: 1960 Date of Visit: 11/5/2019 Anthony Singh, [address] [id number] [place] [address] VIA In Basket Dear Anthony Singh MD, Thank you for referring Ms. Minnie Jackson to [facility] for evaluation. My notes for this consultation are attached. If you have questions, please do not hesitate to call me. I look forward to following your patient along with you. Sincerely, Charline Gold MD

## 2019-11-05 NOTE — PROGRESS NOTES
1. Have you been to the ER, urgent care clinic since your last visit? Hospitalized since your last visit? Er 10/12/2019 CHF     2. Have you seen or consulted any other health care providers outside of the [address] [place] [address] since your last visit? Include any pap smears or colon screening.    , / Dr. Richard Levine ENT

## 2019-11-05 NOTE — PATIENT INSTRUCTIONS
Adjustment Disorder: Care Instructions  Your Care Instructions    Adjustment disorder means that you have emotional or behavioral problems because of stress. But your response to the stress is far more severe than a normal response. It is severe enough to affect your work or social life and may cause depression and physical pains and problems. Events that may cause this response can include a divorce, money problems, or starting school or a new job. It might be anything that causes some stress. This disorder is most often a short-term problem. It happens within 3 months of the stressful event or change. If the response lasts longer than 6 months after the event ends, you may have a more serious disorder. Follow-up care is a key part of your treatment and safety. Be sure to make and go to all appointments, and call your doctor if you are having problems. It's also a good idea to know your test results and keep a list of the medicines you take. How can you care for yourself at home? · Go to all counseling sessions. Do not skip any because you are feeling better. · If your doctor prescribed medicines, take them exactly as prescribed. Call your doctor if you think you are having a problem with your medicine. You will get more details on the specific medicines your doctor prescribes. · Discuss the causes of your stress with a good friend or family member. Or you can join a support group for people with similar problems. Talking to others sometimes relieves stress. · Get at least 30 minutes of exercise on most days of the week. Walking is a good choice. You also may want to do other activities, such as running, swimming, cycling, or playing tennis or team sports. Relaxation techniques  Do relaxation exercises 10 to 20 minutes a day. You can play soothing, relaxing music while you do them, if you wish. · Tell others in your house that you are going to do your relaxation exercises.  Ask them not to disturb you.  · Find a comfortable, quiet place. · Lie down on your back, or sit with your back straight. · Focus on your breathing. Make it slow and steady. · Breathe in through your nose. Breathe out through either your nose or mouth. · Breathe deeply, filling up the area between your navel and your rib cage. Breathe so that your belly goes up and down. · Do not hold your breath. · Breathe like this for 5 to 10 minutes. Notice the feeling of calmness throughout your whole body. As you continue to breathe slowly and deeply, relax by doing these next steps for another 5 to 10 minutes:  · Tighten and relax each muscle group in your body. Start at your toes, and work your way up to your head. · Imagine your muscle groups relaxing and getting heavy. · Empty your mind of all thoughts. · Let yourself relax more and more deeply. · Be aware of the state of calmness that surrounds you. · When your relaxation time is over, you can bring yourself back to alertness by moving your fingers and toes. Then move your hands and feet. And then move your entire body. Sometimes people fall asleep during relaxation. But they most often wake up soon. · Always give yourself time to return to full alertness before you drive a car. Wait to do anything that might cause an accident if you are not fully alert. Never play a relaxation tape while you drive a car. When should you call for help? Call 911 anytime you think you may need emergency care. For example, call if:    · You feel you cannot stop from hurting yourself or someone else. Keep the numbers for these national suicide hotlines: 7-611-776-TALK (6-123.808.5543) and 2-661-YQDBTCF (4-168.657.5684).  If you or someone you know talks about suicide or feeling hopeless, get help right away.    Watch closely for changes in your health, and be sure to contact your doctor if:    · You have new anxiety, or your anxiety gets worse.     · You have been feeling sad, depressed, or hopeless or have lost interest in things that you usually enjoy.     · You do not get better as expected. Where can you learn more? Go to http://greyson-andrea.info/. Enter [id number] in the search box to learn more about \"Adjustment Disorder: Care Instructions. \"  Current as of: April 7, 2019  Content Version: 12.2  © 9316-2301 [employer]. Care instructions adapted under license by [employer] (which disclaims liability or warranty for this information). If you have questions about a medical condition or this instruction, always ask your healthcare professional. [place] [address] any warranty or liability for your use of this information.

## 2019-11-06 PROBLEM — D63.8 ANEMIA OF CHRONIC DISEASE: Status: ACTIVE | Noted: 2019-11-06

## 2019-11-06 NOTE — PROGRESS NOTES
Bret Zapata, 62 y.o.,  female    SUBJECTIVE  Ff-up    Anxiety-reports increasing anxiety recently, stressors with dealing with mother in law. Wants to increase zoloft dose    Cough has resolved- PFT 10/15 normal. ED visit reviewed and was told to take extra lasix for possible pulmonary congestion for a few days with mildly elevated BMP levels, thought to be due to po steroids. Back to her current lasix dose of 40 mg qday. S. Echo 1/19 essentially normal    HTN-taking medications without problems. Continues to take norvasc,  metoprolol and cozaar    H/o nstemi, afib, rheumatic valve disease s/p MV repair 2013- denies any chest pain, sob, palpitations, lightheadedness. On chronic coumadin treatment, following cardiology. LDL 67 on lipitor (4/2019)     HL-taking lipitor without problems    Mildly elevated globulin level on previous checks, SPEP showing elevated beta globulin level. No M spike observed. Dr. Benita Zaragoza notes reviewed no lytic lesions,  likely reactive to her inflammatory process.   and has anemia of chronic disease recommended continued monitoring    ROS:  See HPI, all others negative        Patient Active Problem List   Diagnosis Code    Rheumatic valvular disease I09.1    Chronic diastolic congestive heart failure (HCC) I50.32    Atrial fibrillation (HCC) I48.91    Long term current use of anticoagulant therapy Z79.01    Benign hypertensive heart disease with heart failure (HCC) I11.0    Hyperlipidemia E78.5    Advanced directives, counseling/discussion Z71.89    Mild intermittent asthma without complication H58.08    NSTEMI (non-ST elevated myocardial infarction) ([facility] [address] I21.4    Chest pain R07.9    IFG (impaired fasting glucose) R73.01    Adjustment disorder with anxious mood F43.22    Essential hypertension I10    Other allergic rhinitis J30.89    Anxiety F41.9    Allergic rhinitis due to pollen J30.1    Chronic sinusitis J32.9    Mitral valve stenosis I05.0    Elevated serum globulin level R77.1    Normocytic anemia D64.9       Current Outpatient Medications   Medication Sig Dispense Refill    sertraline (ZOLOFT) 100 mg tablet Take 1 Tab by mouth daily. 90 Tab 0    fluticasone propion-salmeterol (ADVAIR/WIXELA) 250-50 mcg/dose diskus inhaler Take 1 Puff by inhalation every twelve (12) hours. 1 Each 0    montelukast (SINGULAIR) 10 mg tablet Take 1 Tab by mouth daily. 90 Tab 0    metoprolol succinate (TOPROL-XL) 100 mg tablet TAKE 1 TABLET BY MOUTH EVERY DAY 90 Tab 3    atorvastatin (LIPITOR) 40 mg tablet TAKE 1 TABLET BY MOUTH EVERY DAY 90 Tab 0    amLODIPine (NORVASC) 5 mg tablet TAKE 1 TABLET BY MOUTH EVERY DAY 90 Tab 2    KLOR-CON M20 20 mEq tablet TAKE 1 TAB BY MOUTH DAILY. 30 Tab 6    OTHER Allergy injections      warfarin (COUMADIN) 5 mg tablet TAKE 5MG DAILY EXCEPT 2.5MG ON TUES, THUR, AND SAT. OR AS DIRECTED BY OUR OFFICE 30 Tab 5    losartan (COZAAR) 100 mg tablet Take 1 Tab by mouth daily. 90 Tab 3    furosemide (LASIX) 40 mg tablet Take 1 Tab by mouth daily. 90 Tab 3    chlorpheniramine-acetaminophen (CORICIDIN HBP COLD AND FLU) 2-325 mg tab Take 325 mg by mouth two (2) times daily as needed for Cough (Cold symptoms).  aspirin delayed-release 81 mg tablet Take 1 Tab by mouth daily. 30 Tab 0    polyethylene glycol (MIRALAX) 17 gram packet Take 1 Packet by mouth daily. (Patient taking differently: Take  by mouth daily as needed for Other (Constipation). ) 30 Packet 0    esomeprazole (NEXIUM) 20 mg capsule Take 20 mg by mouth daily as needed (heart burn).  fluticasone (FLONASE ALLERGY RELIEF) 50 mcg/actuation nasal spray 2 Sprays by Both Nostrils route daily as needed for Rhinitis.  sodium chloride (SALINE NASAL) 0.65 % nasal squeeze bottle 1 Spray as needed for Congestion.  melatonin tab tablet Take 10 mg by mouth nightly.  diphenhydrAMINE (BENADRYL) 25 mg capsule Take 25 mg by mouth every six (6) hours as needed.       VENTOLIN HFA 90 mcg/actuation inhaler TAKE 2 PUFFS BY INHALATION DAILY. 3 Inhaler 1       Allergies   Allergen Reactions    Morphine Rash and Itching    Norco [Hydrocodone-Acetaminophen] Itching    Penicillin Hives       Past Medical History:   Diagnosis Date    Aortic valve disorders 2011    Asthma     Atrial fibrillation ([facility] [address] 2011    Congestive heart failure, unspecified 2005    Cleared quickly with Lasix therapy    Echocardiogram 01/10/2012    A-fib. EF 45%. Mild RVE. RVSP 45-50. Massive LAE. Mod TAMI. Mod MS. Severe MR (mean grad 10). Mild-mod AI. Mild TR. Pulmonic systolic flow reversal.  Mild IVCE.  Hypertension     Iron deficiency anemias     Long term (current) use of anticoagulants 2011    Murmur 2011    Grade I-II/VI systolic ejection murmur along left sternal border, with radiation to the pulmonic area.  S/P cardiac cath 2012    Minimal coronary artery disease. Mild LVE. EF 55%. Mod MR. Mod-severe MS. RA 15.  RV 55/16. PA 58/34. W 36.  CO/CI 3.5/1.9 (TD); 3.61/1.9 (Dequan). R to L shunt suggested.  Wears glasses     Weight gain        Social History     Socioeconomic History    Marital status:      Spouse name: Not on file    Number of children: Not on file    Years of education: Not on file    Highest education level: Not on file   Occupational History    Not on file   Social Needs    Financial resource strain: Not on file    Food insecurity:     Worry: Not on file     Inability: Not on file    Transportation needs:     Medical: Not on file     Non-medical: Not on file   Tobacco Use    Smoking status: Former Smoker     Packs/day: 0.50     Years: 21.00     Pack years: 10.50     Last attempt to quit:      Years since quittin.8    Smokeless tobacco: Never Used   Substance and Sexual Activity    Alcohol use: Yes     Alcohol/week: 1.0 standard drinks     Types: 1 Glasses of wine per week     Comment: occassionally    Drug use:  No  Sexual activity: Yes     Partners: Male     Birth control/protection: None   Lifestyle    Physical activity:     Days per week: Not on file     Minutes per session: Not on file    Stress: Not on file   Relationships    Social connections:     Talks on phone: Not on file     Gets together: Not on file     Attends Nondenominational service: Not on file     Active member of club or organization: Not on file     Attends meetings of clubs or organizations: Not on file     Relationship status: Not on file    Intimate partner violence:     Fear of current or ex partner: Not on file     Emotionally abused: Not on file     Physically abused: Not on file     Forced sexual activity: Not on file   Other Topics Concern    Not on file   Social History Narrative    Not on file       Family History   Problem Relation Age of Onset    Heart Surgery Father         Open-Heart Surgery    Other Father         Venous Thromboembolism    Hypertension Father     Heart Disease Mother         Enlarged Heart    Hypertension Mother     Diabetes Mother     Diabetes Sister     Hypertension Sister     Diabetes Brother     Heart Disease Paternal Grandmother     Diabetes Sister     Hypertension Sister          OBJECTIVE    Physical Exam:     Visit Vitals  /80 (BP 1 Location: Left arm, BP Patient Position: Sitting)   Pulse 71   Temp 98.5 °F (36.9 °C) (Oral)   Resp 16   Ht 5' 7\" (1.702 m)   Wt 189 lb (85.7 kg)   SpO2 97%   BMI 29.60 kg/m²       General: alert, well-appearing,  in no apparent distress or pain  Neck: no JVD  CVS: irregular, mech valve click, + murmur  Lungs:clear to ausculation bilaterally, no crackles, wheezing or rhonchi noted  Abdomen: soft, NT, ND  Extremities: + Left heel tenderness, trace L ankle edema  Psych:  mood and affect normal      ASSESSMENT/PLAN  Diagnoses and all orders for this visit:    Anxiety   Needs better control  Increase zoloft from 50 mg to 100 mg    Moderate persistent asthma without complication  PFTs 50/83 normal  Cough has resolved  Currently on advair, and albuterol prn    Essential hypertension  controlled  Cont  norvasc 5 mg  Cont cozaar 100 mg and metoprolol 100 mg  DASH diet, monitoring    NSTEMI  Asymptomatic  Mild reversible ischemia on stress test 1/2019  On bb, arb, ASA  LDL is <70  Cont lipitor 40 mg qhs  Following dr. Cecil Mckinley    Atrial fibrillation, unspecified type ([address]  Rate controlled, On BB, coumadin  INR therapeutic  Following dr. Cecil Mckinley    Hyperlipidemia, unspecified hyperlipidemia type  Improved on lipitor    LDL goal <70  Check cmp/lipid panel prior to next visit  Lipid panel update 4/2020    Long term current use of anticoagulant therapy  Cards following    Rheumatic valvular disease  S/p [facility] valve 2013  Chronic anticoagulation    Allergic rhinitis, unspecified seasonality, unspecified trigger  Receiving allergy shots  Cont flonase, benadryl,  singulair    Elevated globulin  No M spike or lytic lesions  Pt was reassured, appreciate heme recs. Will continue to monitor anemia of chronic disease    Anemia of chronic disease    Follow-up and Dispositions    · Return in about 3 months (around 2/5/2020), or if symptoms worsen or fail to improve, for fasting labs a week prior to your next visit, routine chronic illness care. Patient understands plan of care. Patient has provided input and agrees with goals.

## 2019-11-07 ENCOUNTER — HOSPITAL ENCOUNTER (OUTPATIENT)
Dept: LAB | Age: 59
Discharge: HOME | End: 2019-11-07
Payer: MEDICARE

## 2019-11-07 LAB
INR PPP: 1.8 (ref 0.8–1.2)
PROTHROMBIN TIME: 21 SEC (ref 11.5–15.2)

## 2019-11-07 PROCEDURE — 36415 COLL VENOUS BLD VENIPUNCTURE: CPT

## 2019-11-07 PROCEDURE — 85610 PROTHROMBIN TIME: CPT

## 2019-11-08 ENCOUNTER — TELEPHONE ANTICOAG (OUTPATIENT)
Dept: CARDIOLOGY CLINIC | Age: 59
End: 2019-11-08

## 2019-11-08 DIAGNOSIS — Z79.01 LONG TERM CURRENT USE OF ANTICOAGULANT THERAPY: ICD-10-CM

## 2019-11-08 DIAGNOSIS — I48.91 ATRIAL FIBRILLATION, UNSPECIFIED TYPE (HCC): ICD-10-CM

## 2019-11-08 NOTE — PROGRESS NOTES
Verbal order and read back per Jalen Boyle, DO  The INR is below the therapeutic range. Please make the following adjustments to Coumadin dosing: Take 5mg x 3 days then resume coumadin 5mg daily except 2.5mg on Tuesday, Thursday, Saturday. .  Repeat the INR on 11-19-19 at in office INR appt. This has been fully explained to the patient, who indicates understanding.

## 2019-11-19 ENCOUNTER — ANTI-COAG VISIT (OUTPATIENT)
Dept: CARDIOLOGY CLINIC | Age: 59
End: 2019-11-19

## 2019-11-19 DIAGNOSIS — Z79.01 LONG TERM CURRENT USE OF ANTICOAGULANT THERAPY: ICD-10-CM

## 2019-11-19 DIAGNOSIS — I48.91 ATRIAL FIBRILLATION, UNSPECIFIED TYPE (HCC): ICD-10-CM

## 2019-11-19 LAB
INR BLD: 2.2
PT POC: 25.9 SECONDS
VALID INTERNAL CONTROL?: YES

## 2019-11-19 NOTE — PROGRESS NOTES
The INR is stable and therapeutic. Continue Coumadin 5mg daily except 2.5mg on Tuesday, Thursday, Saturday.   Recheck INR in 2 weeks

## 2019-11-25 DIAGNOSIS — I11.0 BENIGN HYPERTENSIVE HEART DISEASE WITH HEART FAILURE (HCC): ICD-10-CM

## 2019-11-25 RX ORDER — FUROSEMIDE 40 MG/1
40 TABLET ORAL DAILY
Qty: 90 TAB | Refills: 3 | Status: SHIPPED | OUTPATIENT
Start: 2019-11-25 | End: 2019-12-02 | Stop reason: SDUPTHER

## 2019-12-02 DIAGNOSIS — I11.0 BENIGN HYPERTENSIVE HEART DISEASE WITH HEART FAILURE (HCC): ICD-10-CM

## 2019-12-03 ENCOUNTER — HOSPITAL ENCOUNTER (EMERGENCY)
Age: 59
Discharge: HOME | End: 2019-12-03
Attending: EMERGENCY MEDICINE
Payer: MEDICARE

## 2019-12-03 ENCOUNTER — APPOINTMENT (OUTPATIENT)
Dept: GENERAL RADIOLOGY | Age: 59
End: 2019-12-03
Attending: EMERGENCY MEDICINE
Payer: MEDICARE

## 2019-12-03 VITALS
DIASTOLIC BLOOD PRESSURE: 89 MMHG | HEART RATE: 87 BPM | HEIGHT: 67 IN | SYSTOLIC BLOOD PRESSURE: 147 MMHG | BODY MASS INDEX: 31.08 KG/M2 | OXYGEN SATURATION: 98 % | RESPIRATION RATE: 16 BRPM | WEIGHT: 198 LBS

## 2019-12-03 DIAGNOSIS — R07.89 ATYPICAL CHEST PAIN: Primary | ICD-10-CM

## 2019-12-03 LAB
ANION GAP SERPL CALC-SCNC: 6 MMOL/L (ref 3–18)
BASOPHILS # BLD: 0 K/UL (ref 0–0.1)
BASOPHILS NFR BLD: 1 % (ref 0–2)
BUN SERPL-MCNC: 13 MG/DL (ref 7–18)
BUN/CREAT SERPL: 15 (ref 12–20)
CALCIUM SERPL-MCNC: 9.2 MG/DL (ref 8.5–10.1)
CHLORIDE SERPL-SCNC: 104 MMOL/L (ref 100–111)
CO2 SERPL-SCNC: 29 MMOL/L (ref 21–32)
CREAT SERPL-MCNC: 0.89 MG/DL (ref 0.6–1.3)
DIFFERENTIAL METHOD BLD: ABNORMAL
EOSINOPHIL # BLD: 0.1 K/UL (ref 0–0.4)
EOSINOPHIL NFR BLD: 2 % (ref 0–5)
ERYTHROCYTE [DISTWIDTH] IN BLOOD BY AUTOMATED COUNT: 13.9 % (ref 11.6–14.5)
GLUCOSE SERPL-MCNC: 84 MG/DL (ref 74–99)
HCT VFR BLD AUTO: 37.1 % (ref 35–45)
HGB BLD-MCNC: 11.7 G/DL (ref 12–16)
INR PPP: 2 (ref 0.8–1.2)
LYMPHOCYTES # BLD: 2.2 K/UL (ref 0.9–3.6)
LYMPHOCYTES NFR BLD: 36 % (ref 21–52)
MAGNESIUM SERPL-MCNC: 2 MG/DL (ref 1.6–2.6)
MCH RBC QN AUTO: 26.5 PG (ref 24–34)
MCHC RBC AUTO-ENTMCNC: 31.5 G/DL (ref 31–37)
MCV RBC AUTO: 84.1 FL (ref 74–97)
MONOCYTES # BLD: 1 K/UL (ref 0.05–1.2)
MONOCYTES NFR BLD: 16 % (ref 3–10)
NEUTS SEG # BLD: 2.8 K/UL (ref 1.8–8)
NEUTS SEG NFR BLD: 45 % (ref 40–73)
PLATELET # BLD AUTO: 164 K/UL (ref 135–420)
PMV BLD AUTO: 12.4 FL (ref 9.2–11.8)
POTASSIUM SERPL-SCNC: 4 MMOL/L (ref 3.5–5.5)
PROTHROMBIN TIME: 22 SEC (ref 11.5–15.2)
RBC # BLD AUTO: 4.41 M/UL (ref 4.2–5.3)
SODIUM SERPL-SCNC: 139 MMOL/L (ref 136–145)
TROPONIN I SERPL-MCNC: 0.07 NG/ML (ref 0–0.04)
WBC # BLD AUTO: 6.2 K/UL (ref 4.6–13.2)

## 2019-12-03 PROCEDURE — 99284 EMERGENCY DEPT VISIT MOD MDM: CPT

## 2019-12-03 PROCEDURE — 83735 ASSAY OF MAGNESIUM: CPT

## 2019-12-03 PROCEDURE — 84484 ASSAY OF TROPONIN QUANT: CPT

## 2019-12-03 PROCEDURE — 93005 ELECTROCARDIOGRAM TRACING: CPT

## 2019-12-03 PROCEDURE — 71046 X-RAY EXAM CHEST 2 VIEWS: CPT

## 2019-12-03 PROCEDURE — 80048 BASIC METABOLIC PNL TOTAL CA: CPT

## 2019-12-03 PROCEDURE — 85025 COMPLETE CBC W/AUTO DIFF WBC: CPT

## 2019-12-03 PROCEDURE — 85610 PROTHROMBIN TIME: CPT

## 2019-12-03 RX ORDER — FUROSEMIDE 40 MG/1
40 TABLET ORAL DAILY
Qty: 90 TAB | Refills: 3 | Status: SHIPPED | OUTPATIENT
Start: 2019-12-03 | End: 2020-08-12

## 2019-12-03 RX ORDER — POTASSIUM CHLORIDE 1500 MG/1
TABLET, EXTENDED RELEASE ORAL
Qty: 30 TAB | Refills: 6 | Status: SHIPPED | OUTPATIENT
Start: 2019-12-03 | End: 2020-06-29

## 2019-12-03 NOTE — ED PROVIDER NOTES
HPI complains of a heavy sensation in her chest for the past 3 days. She says the sensation is most severe if she is bending forward. She says the symptom is better if she is standing up or sitting down. She has the sensation is bilateral across the front of her chest.  She says it feels like someone is pushing on her chest.  She denies any specific chest pain. She not denies any shortness of breath or abdominal pain. She said she has had similar episodes in the past and at one time was diagnosed with pneumonia and she is concerned that that might be her diagnosis today. No other complaints given at this time. She says she has been compliant with her medication occasions recently including her Coumadin. Past Medical History:   Diagnosis Date    Aortic valve disorders 1/13/2011    Asthma     Atrial fibrillation ([address] 1/13/2011    Congestive heart failure, unspecified March 2005    Cleared quickly with Lasix therapy    Echocardiogram 01/10/2012    A-fib. EF 45%. Mild RVE. RVSP 45-50. Massive LAE. Mod TAMI. Mod MS. Severe MR (mean grad 10). Mild-mod AI. Mild TR. Pulmonic systolic flow reversal.  Mild IVCE.  Hypertension     Iron deficiency anemias     Long term (current) use of anticoagulants 2/23/2011    Murmur 1/2011    Grade I-II/VI systolic ejection murmur along left sternal border, with radiation to the pulmonic area.  S/P cardiac cath 02/24/2012    Minimal coronary artery disease. Mild LVE. EF 55%. Mod MR. Mod-severe MS. RA 15.  RV 55/16. PA 58/34. W 36.  CO/CI 3.5/1.9 (TD); 3.61/1.9 (Dequan). R to L shunt suggested.     Wears glasses     Weight gain        Past Surgical History:   Procedure Laterality Date    HX HEART CATHETERIZATION  2/24/2012    HX HYSTERECTOMY  2006    HX MITRAL VALVE REPLACEMENT  05/22/2013    25/33 mm On-X mechanical valve with bilateral pulmonary vein isolation and resection of LA appendage         Family History:   Problem Relation Age of Onset  Heart Surgery Father         Open-Heart Surgery    Other Father         Venous Thromboembolism    Hypertension Father     Heart Disease Mother         Enlarged Heart    Hypertension Mother     Diabetes Mother     Diabetes Sister     Hypertension Sister     Diabetes Brother     Heart Disease Paternal Grandmother     Diabetes Sister     Hypertension Sister        Social History     Socioeconomic History    Marital status:      Spouse name: Not on file    Number of children: Not on file    Years of education: Not on file    Highest education level: Not on file   Occupational History    Not on file   Social Needs    Financial resource strain: Not on file    Food insecurity:     Worry: Not on file     Inability: Not on file    Transportation needs:     Medical: Not on file     Non-medical: Not on file   Tobacco Use    Smoking status: Former Smoker     Packs/day: 0.50     Years: 21.00     Pack years: 10.50     Last attempt to quit:      Years since quittin.9    Smokeless tobacco: Never Used   Substance and Sexual Activity    Alcohol use:  Yes     Alcohol/week: 1.0 standard drinks     Types: 1 Glasses of wine per week     Comment: occassionally    Drug use: No    Sexual activity: Yes     Partners: Male     Birth control/protection: None   Lifestyle    Physical activity:     Days per week: Not on file     Minutes per session: Not on file    Stress: Not on file   Relationships    Social connections:     Talks on phone: Not on file     Gets together: Not on file     Attends Sikh service: Not on file     Active member of club or organization: Not on file     Attends meetings of clubs or organizations: Not on file     Relationship status: Not on file    Intimate partner violence:     Fear of current or ex partner: Not on file     Emotionally abused: Not on file     Physically abused: Not on file     Forced sexual activity: Not on file   Other Topics Concern    Not on file Social History Narrative    Not on file         ALLERGIES: Morphine; Norco [hydrocodone-acetaminophen]; and Penicillin    Review of Systems   Constitutional: Negative. HENT: Negative. Eyes: Negative. Respiratory: Positive for chest tightness. Cardiovascular: Negative. Gastrointestinal: Negative. Genitourinary: Negative. Musculoskeletal: Negative. Skin: Negative. Neurological: Negative. Psychiatric/Behavioral: Negative. Vitals:    12/03/19 1721 12/03/19 1725   BP: 147/89    Pulse: 87    Resp: 16    SpO2: 98% 98%   Weight: 89.8 kg (198 lb)    Height: 5' 7\" (1.702 m)             Physical Exam  Vitals signs and nursing note reviewed. Constitutional:       Appearance: She is well-developed. HENT:      Head: Normocephalic and atraumatic. Eyes:      Conjunctiva/sclera: Conjunctivae normal.      Pupils: Pupils are equal, round, and reactive to light. Neck:      Musculoskeletal: Normal range of motion and neck supple. Cardiovascular:      Rate and Rhythm: Normal rate and regular rhythm. Pulmonary:      Effort: Pulmonary effort is normal.      Breath sounds: Normal breath sounds. Abdominal:      Palpations: Abdomen is soft. Musculoskeletal: Normal range of motion. Skin:     General: Skin is warm and dry. Neurological:      Mental Status: She is alert and oriented to person, place, and time. MDM         Procedures          EKG was read by myself at 5:16 PM showing atrial fibrillation with a ventricular response rate of 77. Patient is feeling much better now. I reviewed the results of her ER evaluation with her and she understands and agrees with the disposition and follow-up plan.   Yolanda Randall MD 6:54 PM

## 2019-12-03 NOTE — DISCHARGE INSTRUCTIONS

## 2019-12-04 LAB
ATRIAL RATE: 74 BPM
CALCULATED R AXIS, ECG10: 42 DEGREES
CALCULATED T AXIS, ECG11: 117 DEGREES
DIAGNOSIS, 93000: NORMAL
Q-T INTERVAL, ECG07: 438 MS
QRS DURATION, ECG06: 96 MS
QTC CALCULATION (BEZET), ECG08: 495 MS
VENTRICULAR RATE, ECG03: 77 BPM

## 2019-12-06 ENCOUNTER — ANTI-COAG VISIT (OUTPATIENT)
Dept: CARDIOLOGY CLINIC | Age: 59
End: 2019-12-06

## 2019-12-06 DIAGNOSIS — I48.91 ATRIAL FIBRILLATION, UNSPECIFIED TYPE (HCC): ICD-10-CM

## 2019-12-06 DIAGNOSIS — Z79.01 LONG TERM CURRENT USE OF ANTICOAGULANT THERAPY: Primary | ICD-10-CM

## 2019-12-06 LAB
INR BLD: 2.6
PT POC: 31.5 SECONDS
VALID INTERNAL CONTROL?: YES

## 2019-12-09 RX ORDER — AMLODIPINE BESYLATE 5 MG/1
TABLET ORAL
Qty: 90 TAB | Refills: 3 | Status: SHIPPED | OUTPATIENT
Start: 2019-12-09 | End: 2021-02-10

## 2019-12-09 NOTE — TELEPHONE ENCOUNTER
This patient contacted office for the following prescriptions to be filled:    Medication requested :   Requested Prescriptions     Pending Prescriptions Disp Refills    amLODIPine (NORVASC) 5 mg tablet 90 Tab 2     PCP: [address] or Print: [facility]  Mail order or Local pharmacy [address] [facility]     Scheduled appointment if not seen by current providers in office: LOV 11/5/2019 f/u 2/6/2020

## 2020-01-10 ENCOUNTER — ANTI-COAG VISIT (OUTPATIENT)
Dept: CARDIOLOGY CLINIC | Age: 60
End: 2020-01-10

## 2020-01-10 DIAGNOSIS — Z79.01 LONG TERM CURRENT USE OF ANTICOAGULANT THERAPY: Primary | ICD-10-CM

## 2020-01-10 DIAGNOSIS — I48.91 ATRIAL FIBRILLATION, UNSPECIFIED TYPE (HCC): ICD-10-CM

## 2020-01-10 LAB
INR BLD: 2.3
PT POC: 27.8 SECONDS
VALID INTERNAL CONTROL?: YES

## 2020-01-10 NOTE — PROGRESS NOTES
Verbal order and read back per RODOLFO Mazariegos NP. The INR is stable and therapeutic. Continue same dose of coumadin Continue Coumadin 5mg daily except 2.5mg on Tuesday, Thursday, Saturday. and recheck in 1 month. This has been fully explained to the patient, who indicates understanding.

## 2020-01-24 RX ORDER — FLUTICASONE PROPIONATE 50 MCG
2 SPRAY, SUSPENSION (ML) NASAL
Qty: 1 BOTTLE | Refills: 0 | Status: SHIPPED | OUTPATIENT
Start: 2020-01-24 | End: 2020-06-05 | Stop reason: SDUPTHER

## 2020-01-24 NOTE — TELEPHONE ENCOUNTER
patient called requestijg Flonase prescription for allergic rhinitis. Please advise. Dr Deja Joel can you review chart and advise.

## 2020-01-28 ENCOUNTER — TELEPHONE (OUTPATIENT)
Dept: FAMILY MEDICINE CLINIC | Age: 60
End: 2020-01-28

## 2020-01-28 NOTE — TELEPHONE ENCOUNTER
Pt states that she has had an allergic reaction to eye drops that she was given for glaucoma . And she states that she broke out in bumps around her eye and would like to know if she can get something called in . Please advise.

## 2020-02-03 ENCOUNTER — OFFICE VISIT (OUTPATIENT)
Dept: FAMILY MEDICINE CLINIC | Age: 60
End: 2020-02-03

## 2020-02-03 VITALS
OXYGEN SATURATION: 97 % | TEMPERATURE: 97 F | BODY MASS INDEX: 29.51 KG/M2 | RESPIRATION RATE: 20 BRPM | HEART RATE: 77 BPM | SYSTOLIC BLOOD PRESSURE: 130 MMHG | WEIGHT: 188 LBS | HEIGHT: 67 IN | DIASTOLIC BLOOD PRESSURE: 80 MMHG

## 2020-02-03 DIAGNOSIS — R21 RASH AND NONSPECIFIC SKIN ERUPTION: Primary | ICD-10-CM

## 2020-02-03 PROBLEM — J30.2 PERENNIAL ALLERGIC RHINITIS WITH SEASONAL VARIATION: Status: ACTIVE | Noted: 2018-11-30

## 2020-02-03 PROBLEM — J30.89 PERENNIAL ALLERGIC RHINITIS WITH SEASONAL VARIATION: Status: ACTIVE | Noted: 2018-11-30

## 2020-02-03 PROBLEM — J30.0 VASOMOTOR RHINITIS: Status: ACTIVE | Noted: 2020-02-03

## 2020-02-03 RX ORDER — TIMOLOL MALEATE 5 MG/ML
SOLUTION/ DROPS OPHTHALMIC
COMMUNITY
Start: 2020-01-20 | End: 2021-06-11

## 2020-02-03 RX ORDER — TRIAMCINOLONE ACETONIDE 1 MG/G
CREAM TOPICAL 2 TIMES DAILY
Qty: 45 G | Refills: 1 | Status: SHIPPED | OUTPATIENT
Start: 2020-02-03 | End: 2020-02-06 | Stop reason: ALTCHOICE

## 2020-02-03 NOTE — PROGRESS NOTES
Asehr Boudreaux presents today for   Chief Complaint   Patient presents with    Skin Problem     Rash noted to skin for 2 weeks       Is someone accompanying this pt? No    Is the patient using any DME equipment during OV? No    Depression Screening:  3 most recent PHQ Screens 2/3/2020   Little interest or pleasure in doing things Not at all   Feeling down, depressed, irritable, or hopeless Not at all   Total Score PHQ 2 0       Learning Assessment:  Learning Assessment 8/11/2016   PRIMARY LEARNER Patient   PRIMARY LANGUAGE ENGLISH   LEARNER PREFERENCE PRIMARY DEMONSTRATION     READING   ANSWERED BY PATIENT   RELATIONSHIP SELF       Abuse Screening:  Abuse Screening Questionnaire 2/3/2020   Do you ever feel afraid of your partner? N   Are you in a relationship with someone who physically or mentally threatens you? N   Is it safe for you to go home? N         Health Maintenance Due   Topic Date Due    DTaP/Tdap/Td series (1 - Tdap) 07/04/1969    Shingrix Vaccine Age 50> (1 of 2) 07/04/2008    Influenza Age 5 to Adult  08/01/2019    FOBT Q 1 YEAR AGE 50-75  08/21/2019    PAP AKA CERVICAL CYTOLOGY  03/09/2020   . Health Maintenance reviewed and discussed and ordered per Provider. Coordination of Care  1. Have you been to the ER, urgent care clinic since your last visit? Hospitalized since your last visit? No    2. Have you seen or consulted any other health care providers outside of the [address] [place] [address] since your last visit? Include any pap smears or colon screening. NO          Advance Directive:  1. Do you have an advance directive in place? Patient Reply:No    2. If not, would you like material regarding how to put one in place?  Patient Reply: Rk Griffin

## 2020-02-03 NOTE — PROGRESS NOTES
HPI  Pt of Dr Lizeth Owens.      Reports that she was started on eye drops by eye doctor over 2 weeks ago. 2 days after starting this, noticed a rash. Eye doctor was made aware. Stopped the drops, tried another drop, and rash continued. He is now sending her to an eye specialist.  Not taking either drops. Eye drops were Latanoprost and Timolol eye drops. Continues to have itchy rash, keeps popping up in new places    Gets allergy shots every other week    Family members living in the house are using different laundry detergent- new to her. Typically is sensitive to this type of thing. Has had allergies to laundry detergent in past    Worried about scabies or something else that may be causing rash. Family members have been moving belongings in to home from storage locker. May have a lot of dust on them. Not sure about bedbugs    Past Medical History  Past Medical History:   Diagnosis Date    Aortic valve disorders 1/13/2011    Asthma     Atrial fibrillation ([facility] [address] 1/13/2011    Congestive heart failure, unspecified March 2005    Cleared quickly with Lasix therapy    Echocardiogram 01/10/2012    A-fib. EF 45%. Mild RVE. RVSP 45-50. Massive LAE. Mod TAMI. Mod MS. Severe MR (mean grad 10). Mild-mod AI. Mild TR. Pulmonic systolic flow reversal.  Mild IVCE.  Hypertension     Iron deficiency anemias     Long term (current) use of anticoagulants 2/23/2011    Murmur 1/2011    Grade I-II/VI systolic ejection murmur along left sternal border, with radiation to the pulmonic area.  S/P cardiac cath 02/24/2012    Minimal coronary artery disease. Mild LVE. EF 55%. Mod MR. Mod-severe MS. RA 15.  RV 55/16. PA 58/34. W 36.  CO/CI 3.5/1.9 (TD); 3.61/1.9 (Dequan). R to L shunt suggested.     Wears glasses     Weight gain        Surgical History  Past Surgical History:   Procedure Laterality Date    HX HEART CATHETERIZATION  2/24/2012    HX HYSTERECTOMY  2006    HX MITRAL VALVE REPLACEMENT  05/22/2013 25/33 mm On-X mechanical valve with bilateral pulmonary vein isolation and resection of LA appendage        Medications  Current Outpatient Medications   Medication Sig Dispense Refill    triamcinolone acetonide (KENALOG) 0.1 % topical cream Apply  to affected area two (2) times a day for 7 days. 45 g 1    fluticasone propionate (FLONASE ALLERGY RELIEF) 50 mcg/actuation nasal spray 2 Sprays by Both Nostrils route daily as needed for Rhinitis. 1 Bottle 0    amLODIPine (NORVASC) 5 mg tablet TAKE 1 TABLET BY MOUTH EVERY DAY 90 Tab 3    KLOR-CON M20 20 mEq tablet TAKE 1 TAB BY MOUTH DAILY. 30 Tab 6    furosemide (LASIX) 40 mg tablet Take 1 Tab by mouth daily. 90 Tab 3    montelukast (SINGULAIR) 10 mg tablet TAKE 1 TABLET BY MOUTH EVERY DAY 90 Tab 3    sertraline (ZOLOFT) 100 mg tablet Take 1 Tab by mouth daily. 90 Tab 0    fluticasone propion-salmeterol (ADVAIR/WIXELA) 250-50 mcg/dose diskus inhaler Take 1 Puff by inhalation every twelve (12) hours. 1 Each 0    metoprolol succinate (TOPROL-XL) 100 mg tablet TAKE 1 TABLET BY MOUTH EVERY DAY 90 Tab 3    atorvastatin (LIPITOR) 40 mg tablet TAKE 1 TABLET BY MOUTH EVERY DAY 90 Tab 0    diphenhydrAMINE (BENADRYL) 25 mg capsule Take 25 mg by mouth every six (6) hours as needed.  OTHER Allergy injections      VENTOLIN HFA 90 mcg/actuation inhaler TAKE 2 PUFFS BY INHALATION DAILY. 3 Inhaler 1    warfarin (COUMADIN) 5 mg tablet TAKE 5MG DAILY EXCEPT 2.5MG ON TUES, THUR, AND SAT. OR AS DIRECTED BY OUR OFFICE 30 Tab 5    losartan (COZAAR) 100 mg tablet Take 1 Tab by mouth daily. 90 Tab 3    aspirin delayed-release 81 mg tablet Take 1 Tab by mouth daily. 30 Tab 0    polyethylene glycol (MIRALAX) 17 gram packet Take 1 Packet by mouth daily. (Patient taking differently: Take  by mouth daily as needed for Other (Constipation). ) 30 Packet 0    esomeprazole (NEXIUM) 20 mg capsule Take 20 mg by mouth daily as needed (heart burn).       sodium chloride (SALINE NASAL) 0.65 % nasal squeeze bottle 1 Spray as needed for Congestion.  melatonin tab tablet Take 10 mg by mouth nightly.  timolol (TIMOPTIC) 0.5 % ophthalmic solution       chlorpheniramine-acetaminophen (CORICIDIN HBP COLD AND FLU) 2-325 mg tab Take 325 mg by mouth two (2) times daily as needed for Cough (Cold symptoms). Allergies  Allergies   Allergen Reactions    Hydrocodone Bitartrate Rash    Morphine Rash and Itching    Norco [Hydrocodone-Acetaminophen] Itching    Penicillin Hives       Family History  Family History   Problem Relation Age of Onset    Heart Surgery Father         Open-Heart Surgery    Other Father         Venous Thromboembolism    Hypertension Father     Heart Disease Mother         Enlarged Heart    Hypertension Mother     Diabetes Mother     Diabetes Sister     Hypertension Sister     Diabetes Brother     Heart Disease Paternal Grandmother     Diabetes Sister     Hypertension Sister        Social History  Social History     Socioeconomic History    Marital status:      Spouse name: Not on file    Number of children: Not on file    Years of education: Not on file    Highest education level: Not on file   Occupational History    Not on file   Social Needs    Financial resource strain: Not on file    Food insecurity:     Worry: Not on file     Inability: Not on file    Transportation needs:     Medical: Not on file     Non-medical: Not on file   Tobacco Use    Smoking status: Former Smoker     Packs/day: 0.50     Years: 21.00     Pack years: 10.50     Last attempt to quit:      Years since quittin.0    Smokeless tobacco: Never Used   Substance and Sexual Activity    Alcohol use:  Yes     Alcohol/week: 1.0 standard drinks     Types: 1 Glasses of wine per week     Comment: occassionally    Drug use: No    Sexual activity: Yes     Partners: Male     Birth control/protection: None   Lifestyle    Physical activity:     Days per week: Not on file Minutes per session: Not on file    Stress: Not on file   Relationships    Social connections:     Talks on phone: Not on file     Gets together: Not on file     Attends Druze service: Not on file     Active member of club or organization: Not on file     Attends meetings of clubs or organizations: Not on file     Relationship status: Not on file    Intimate partner violence:     Fear of current or ex partner: Not on file     Emotionally abused: Not on file     Physically abused: Not on file     Forced sexual activity: Not on file   Other Topics Concern    Not on file   Social History Narrative    Not on file       Problem List  Patient Active Problem List   Diagnosis Code    Rheumatic valvular disease I09.1    Chronic diastolic congestive heart failure ([facility] [address] I50.32    Atrial fibrillation ([facility] [address] I48.91    Long term current use of anticoagulant therapy Z79.01    Benign hypertensive heart disease with heart failure ([facility] [address] I11.0    Hyperlipidemia E78.5    Advanced directives, counseling/discussion Z71.89    Mild intermittent asthma without complication Z06.40    NSTEMI (non-ST elevated myocardial infarction) ([facility] [address] I21.4    Chest pain R07.9    IFG (impaired fasting glucose) R73.01    Adjustment disorder with anxious mood F43.22    Essential hypertension I10    Other allergic rhinitis J30.89    Anxiety F41.9    Allergic rhinitis due to pollen J30.1    Chronic sinusitis J32.9    Mitral valve stenosis I05.0    Elevated serum globulin level R77.1    Normocytic anemia D64.9    Anemia of chronic disease D63.8    Perennial allergic rhinitis with seasonal variation J30.89, J30.2    Vasomotor rhinitis J30.0       Review of Systems  Review of Systems   Constitutional: Negative. Eyes: Negative. Respiratory: Negative. Cardiovascular: Negative. Skin: Positive for itching and rash. Neurological: Negative.         Vital Signs  Vitals:    02/03/20 1535   BP: 130/80   Pulse: 77   Resp: 20 Temp: 97 °F (36.1 °C)   TempSrc: Oral   SpO2: 97%   Weight: 188 lb (85.3 kg)   Height: 5' 7\" (1.702 m)   PainSc:   0 - No pain       Physical Exam  Physical Exam  Vitals signs and nursing note reviewed. Constitutional:       Appearance: Normal appearance. She is not ill-appearing. Cardiovascular:      Rate and Rhythm: Normal rate and regular rhythm. Heart sounds: Normal heart sounds. Pulmonary:      Effort: Pulmonary effort is normal.      Breath sounds: Normal breath sounds. Skin:     General: Skin is warm and dry. Comments: Scattered raised reddened papules of varying sizes   Neurological:      Mental Status: She is alert and oriented to person, place, and time. Psychiatric:         Mood and Affect: Mood normal.         Behavior: Behavior normal.         Diagnostics  Orders Placed This Encounter    timolol (TIMOPTIC) 0.5 % ophthalmic solution    triamcinolone acetonide (KENALOG) 0.1 % topical cream     Sig: Apply  to affected area two (2) times a day for 7 days. Dispense:  45 g     Refill:  1       Results  Results for orders placed or performed in visit on 01/10/20   AMB POC PT/INR   Result Value Ref Range    VALID INTERNAL CONTROL POC Yes     Prothrombin time (POC) 27.8 seconds    INR POC 2.3      Assessment and Plan  Diagnoses and all orders for this visit:    1. Rash and nonspecific skin eruption  -     triamcinolone acetonide (KENALOG) 0.1 % topical cream; Apply  to affected area two (2) times a day for 7 days. Discussed with pt that this does not appear to be allergic rash or scabies but does appear to be insect bites. Encouraged pt to monitor situation with new belongings being brought in to the home- check for bed bugs, fleas, etc.  Encouraged pt to clean all belongings well with hot water/ dry in high temperaturr dryer    After care summary printed and reviewed with patient. Plan reviewed with patient. Questions answered.  Patient verbalized understanding of plan and is in agreement with plan. Patient to follow up in one week or earlier if symptoms worsen/ so not improve. Encouraged the use of my chart.     Aurelia Bar, YOSSI-C

## 2020-02-06 ENCOUNTER — OFFICE VISIT (OUTPATIENT)
Dept: FAMILY MEDICINE CLINIC | Age: 60
End: 2020-02-06

## 2020-02-06 VITALS
SYSTOLIC BLOOD PRESSURE: 122 MMHG | DIASTOLIC BLOOD PRESSURE: 78 MMHG | HEIGHT: 67 IN | HEART RATE: 78 BPM | BODY MASS INDEX: 29.51 KG/M2 | WEIGHT: 188 LBS | OXYGEN SATURATION: 98 % | TEMPERATURE: 98 F | RESPIRATION RATE: 16 BRPM

## 2020-02-06 DIAGNOSIS — L28.2 PRURITIC RASH: Primary | ICD-10-CM

## 2020-02-06 DIAGNOSIS — J30.1 ALLERGIC RHINITIS DUE TO POLLEN, UNSPECIFIED SEASONALITY: ICD-10-CM

## 2020-02-06 DIAGNOSIS — I10 ESSENTIAL HYPERTENSION: ICD-10-CM

## 2020-02-06 DIAGNOSIS — E78.5 HYPERLIPIDEMIA, UNSPECIFIED HYPERLIPIDEMIA TYPE: ICD-10-CM

## 2020-02-06 DIAGNOSIS — R73.01 IFG (IMPAIRED FASTING GLUCOSE): ICD-10-CM

## 2020-02-06 DIAGNOSIS — J45.20 MILD INTERMITTENT ASTHMA WITHOUT COMPLICATION: ICD-10-CM

## 2020-02-06 DIAGNOSIS — I48.91 ATRIAL FIBRILLATION, UNSPECIFIED TYPE (HCC): ICD-10-CM

## 2020-02-06 DIAGNOSIS — F41.9 ANXIETY: ICD-10-CM

## 2020-02-06 DIAGNOSIS — I25.119 CORONARY ARTERY DISEASE INVOLVING NATIVE CORONARY ARTERY OF NATIVE HEART WITH ANGINA PECTORIS (HCC): ICD-10-CM

## 2020-02-06 DIAGNOSIS — I09.1 RHEUMATIC VALVULAR DISEASE: ICD-10-CM

## 2020-02-06 RX ORDER — CLOBETASOL PROPIONATE 0.5 MG/G
OINTMENT TOPICAL 2 TIMES DAILY
Qty: 15 G | Refills: 0 | Status: SHIPPED | OUTPATIENT
Start: 2020-02-06

## 2020-02-06 RX ORDER — SERTRALINE HYDROCHLORIDE 100 MG/1
TABLET, FILM COATED ORAL
Qty: 90 TAB | Refills: 0 | Status: SHIPPED | OUTPATIENT
Start: 2020-02-06 | End: 2020-04-30

## 2020-02-06 RX ORDER — HYDROXYZINE PAMOATE 25 MG/1
25 CAPSULE ORAL
Qty: 30 CAP | Refills: 0 | Status: SHIPPED | OUTPATIENT
Start: 2020-02-06 | End: 2020-02-18

## 2020-02-06 NOTE — PROGRESS NOTES
Alida Colmenares, 62 y.o.,  female    SUBJECTIVE  Ff-up    Anxiety-reports to be doing well with increased zoloft dose    HTN-taking medications without problems. Continues to take norvasc,  metoprolol and cozaar    H/o nstemi, afib, rheumatic valve disease s/p MV repair 2013- denies any chest pain, sob, palpitations, lightheadedness. On chronic coumadin treatment, following cardiology. LDL 67 on lipitor (4/2019)     HL-taking lipitor without problems    Itchy rash on mostly upper extremities for the past months. Says started on her bilateral arms after starting new glaucoma eye drop in early January. She was then told by eye doctor to hold medication, but she continued to have persistent rash. Trial triamcinolone/ benadryl somewhat effective. Says they have cleaned bed sheets/towels. No known topical agents that has changed that may have caused this. No new food.      ROS:  See HPI, all others negative        Patient Active Problem List   Diagnosis Code    Rheumatic valvular disease I09.1    Chronic diastolic congestive heart failure (HCC) I50.32    Atrial fibrillation (HCC) I48.91    Long term current use of anticoagulant therapy Z79.01    Benign hypertensive heart disease with heart failure (HCC) I11.0    Hyperlipidemia E78.5    Advanced directives, counseling/discussion Z71.89    Mild intermittent asthma without complication X82.29    NSTEMI (non-ST elevated myocardial infarction) ([facility] [address] I21.4    Chest pain R07.9    IFG (impaired fasting glucose) R73.01    Adjustment disorder with anxious mood F43.22    Essential hypertension I10    Other allergic rhinitis J30.89    Anxiety F41.9    Allergic rhinitis due to pollen J30.1    Chronic sinusitis J32.9    Mitral valve stenosis I05.0    Elevated serum globulin level R77.1    Normocytic anemia D64.9    Anemia of chronic disease D63.8    Perennial allergic rhinitis with seasonal variation J30.89, J30.2    Vasomotor rhinitis J30.0       Current Outpatient Medications   Medication Sig Dispense Refill    sertraline (ZOLOFT) 100 mg tablet TAKE 1 TABLET BY MOUTH EVERY DAY 90 Tab 0    hydrOXYzine pamoate (VISTARIL) 25 mg capsule Take 1 Cap by mouth three (3) times daily as needed for Itching. 30 Cap 0    clobetasoL (TEMOVATE) 0.05 % ointment Apply  to affected area two (2) times a day. 15 g 0    fluticasone propionate (FLONASE ALLERGY RELIEF) 50 mcg/actuation nasal spray 2 Sprays by Both Nostrils route daily as needed for Rhinitis. 1 Bottle 0    amLODIPine (NORVASC) 5 mg tablet TAKE 1 TABLET BY MOUTH EVERY DAY 90 Tab 3    KLOR-CON M20 20 mEq tablet TAKE 1 TAB BY MOUTH DAILY. 30 Tab 6    furosemide (LASIX) 40 mg tablet Take 1 Tab by mouth daily. 90 Tab 3    montelukast (SINGULAIR) 10 mg tablet TAKE 1 TABLET BY MOUTH EVERY DAY 90 Tab 3    metoprolol succinate (TOPROL-XL) 100 mg tablet TAKE 1 TABLET BY MOUTH EVERY DAY 90 Tab 3    atorvastatin (LIPITOR) 40 mg tablet TAKE 1 TABLET BY MOUTH EVERY DAY 90 Tab 0    OTHER Allergy injections      VENTOLIN HFA 90 mcg/actuation inhaler TAKE 2 PUFFS BY INHALATION DAILY. 3 Inhaler 1    warfarin (COUMADIN) 5 mg tablet TAKE 5MG DAILY EXCEPT 2.5MG ON TUES, THUR, AND SAT. OR AS DIRECTED BY OUR OFFICE 30 Tab 5    losartan (COZAAR) 100 mg tablet Take 1 Tab by mouth daily. 90 Tab 3    aspirin delayed-release 81 mg tablet Take 1 Tab by mouth daily. 30 Tab 0    polyethylene glycol (MIRALAX) 17 gram packet Take 1 Packet by mouth daily. (Patient taking differently: Take  by mouth daily as needed for Other (Constipation). ) 30 Packet 0    esomeprazole (NEXIUM) 20 mg capsule Take 20 mg by mouth daily as needed (heart burn).  sodium chloride (SALINE NASAL) 0.65 % nasal squeeze bottle 1 Spray as needed for Congestion.  melatonin tab tablet Take 10 mg by mouth nightly.       timolol (TIMOPTIC) 0.5 % ophthalmic solution       fluticasone propion-salmeterol (ADVAIR/WIXELA) 250-50 mcg/dose diskus inhaler Take 1 Puff by inhalation every twelve (12) hours. 1 Each 0       Allergies   Allergen Reactions    Hydrocodone Bitartrate Rash    Morphine Rash and Itching    Norco [Hydrocodone-Acetaminophen] Itching    Penicillin Hives       Past Medical History:   Diagnosis Date    Aortic valve disorders 2011    Asthma     Atrial fibrillation ([facility] [address] 2011    Congestive heart failure, unspecified 2005    Cleared quickly with Lasix therapy    Echocardiogram 01/10/2012    A-fib. EF 45%. Mild RVE. RVSP 45-50. Massive LAE. Mod TAMI. Mod MS. Severe MR (mean grad 10). Mild-mod AI. Mild TR. Pulmonic systolic flow reversal.  Mild IVCE.  Hypertension     Iron deficiency anemias     Long term (current) use of anticoagulants 2011    Murmur 2011    Grade I-II/VI systolic ejection murmur along left sternal border, with radiation to the pulmonic area.  S/P cardiac cath 2012    Minimal coronary artery disease. Mild LVE. EF 55%. Mod MR. Mod-severe MS. RA 15.  RV 55/16. PA 58/34. W 36.  CO/CI 3.5/1.9 (TD); 3.61/1.9 (Dequan). R to L shunt suggested.  Wears glasses     Weight gain        Social History     Socioeconomic History    Marital status:      Spouse name: Not on file    Number of children: Not on file    Years of education: Not on file    Highest education level: Not on file   Occupational History    Not on file   Social Needs    Financial resource strain: Not on file    Food insecurity:     Worry: Not on file     Inability: Not on file    Transportation needs:     Medical: Not on file     Non-medical: Not on file   Tobacco Use    Smoking status: Former Smoker     Packs/day: 0.50     Years: 21.00     Pack years: 10.50     Last attempt to quit:      Years since quittin.1    Smokeless tobacco: Never Used   Substance and Sexual Activity    Alcohol use:  Yes     Alcohol/week: 1.0 standard drinks     Types: 1 Glasses of wine per week     Comment: occassionally    Drug use: No    Sexual activity: Yes     Partners: Male     Birth control/protection: None   Lifestyle    Physical activity:     Days per week: Not on file     Minutes per session: Not on file    Stress: Not on file   Relationships    Social connections:     Talks on phone: Not on file     Gets together: Not on file     Attends Caodaism service: Not on file     Active member of club or organization: Not on file     Attends meetings of clubs or organizations: Not on file     Relationship status: Not on file    Intimate partner violence:     Fear of current or ex partner: Not on file     Emotionally abused: Not on file     Physically abused: Not on file     Forced sexual activity: Not on file   Other Topics Concern    Not on file   Social History Narrative    Not on file       Family History   Problem Relation Age of Onset    Heart Surgery Father         Open-Heart Surgery    Other Father         Venous Thromboembolism    Hypertension Father     Heart Disease Mother         Enlarged Heart    Hypertension Mother     Diabetes Mother     Diabetes Sister     Hypertension Sister     Diabetes Brother     Heart Disease Paternal Grandmother     Diabetes Sister     Hypertension Sister          OBJECTIVE    Physical Exam:     Visit Vitals  /78 (BP 1 Location: Left arm, BP Patient Position: Sitting)   Pulse 78   Temp 98 °F (36.7 °C) (Oral)   Resp 16   Ht 5' 7\" (1.702 m)   Wt 188 lb (85.3 kg)   SpO2 98%   BMI 29.44 kg/m²       General: alert, well-appearing,  in no apparent distress or pain  Neck: no JVD  CVS: irregular, mech valve click, + murmur  Lungs:clear to ausculation bilaterally, no crackles, wheezing or rhonchi noted  Abdomen: soft, NT, ND  Skin: bilateral UE and upper back excoriations. No intertriginous findings.    Psych:  mood and affect normal      ASSESSMENT/PLAN  Diagnoses and all orders for this visit:    Pruritic Rash  Unclear etiology   Persistent month long, no known sick contacts  Switch triamcinolone to clobetasol  Switch benadryl to vistaril  Referral to derm    Anxiety   imrpoving  Cont [id number] mg    Mild intermittent asthma without complication  PFTs 70/71 normal  Currently on advair, and albuterol prn    Essential hypertension  controlled  Cont  norvasc 5 mg  Cont cozaar 100 mg and metoprolol 100 mg  DASH diet, monitoring    CAD involving native coronary artery of native heart with angina pectoris (HCC)  Asymptomatic  Mild reversible ischemia on stress test 1/2019  On bb, arb, ASA  LDL is <70  Cont lipitor 40 mg qhs  Following dr. Fredy Pavon    Atrial fibrillation, unspecified type ([facility] [address]  Rate controlled, On BB, coumadin  INR therapeutic  Following dr. Fredy Pavon    Hyperlipidemia, unspecified hyperlipidemia type  Improved on lipitor    LDL goal <70  Check cmp/lipid panel/a1c prior to next visit    Long term current use of anticoagulant therapy  Cards following    Rheumatic valvular disease  S/p [facility] valve 2013  Chronic anticoagulation    Allergic rhinitis, unspecified seasonality, unspecified trigger  Receiving allergy shots  Cont flonase, benadryl,  singulair    Anemia of chronic disease  Elevated globulin  No M spike or lytic lesions  Pt was reassured, appreciate heme recs. Will continue to monitor anemia of chronic disease    Follow-up and Dispositions    · Return in about 4 months (around 6/6/2020) for fasting labs a week prior to your next visit, plan on Medicare wellness on next visit. Patient understands plan of care. Patient has provided input and agrees with goals.

## 2020-02-06 NOTE — PROGRESS NOTES
1. Have you been to the ER, urgent care clinic since your last visit? Hospitalized since your last visit? ER 12/03/2019    2. Have you seen or consulted any other health care providers outside of the [address] [place] [address] since your last visit? Include any pap smears or colon screening.  Dr. Corrine Rosales every other week allergy injections  Dr. Leopoldo Moloney Dr. Jannifer Shell

## 2020-02-06 NOTE — PATIENT INSTRUCTIONS
Rash: Care Instructions  Your Care Instructions  A rash is any irritation or inflammation of the skin. Rashes have many possible causes, including allergy, infection, illness, heat, and emotional stress. Follow-up care is a key part of your treatment and safety. Be sure to make and go to all appointments, and call your doctor if you are having problems. It's also a good idea to know your test results and keep a list of the medicines you take. How can you care for yourself at home? · Wash the area with water only. Soap can make dryness and itching worse. Pat dry. · Put cold, wet cloths on the rash to reduce itching. · Keep cool, and stay out of the sun. · Leave the rash open to the air as much of the time as possible. · Sometimes petroleum jelly (Vaseline) can help relieve the discomfort caused by a rash. A moisturizing lotion, such as Cetaphil, also may help. Calamine lotion may help for rashes caused by contact with something (such as a plant or soap) that irritated the skin. Use it 3 or 4 times a day. · If your doctor prescribed a cream, use it as directed. If your doctor prescribed medicine, take it exactly as directed. · If your rash itches so badly that it interferes with your normal activities, take an over-the-counter antihistamine, such as diphenhydramine (Benadryl) or loratadine (Claritin). Read and follow all instructions on the label. When should you call for help? Call your doctor now or seek immediate medical care if:    · You have signs of infection, such as:  ? Increased pain, swelling, warmth, or redness. ? Red streaks leading from the area. ? Pus draining from the area. ? A fever.     · You have joint pain along with the rash.    Watch closely for changes in your health, and be sure to contact your doctor if:    · Your rash is changing or getting worse.  For example, call if you have pain along with the rash, the rash is spreading, or you have new blisters.     · You do not get better after 1 week. Where can you learn more? Go to http://greyson-andrea.info/. Enter U476 in the search box to learn more about \"Rash: Care Instructions. \"  Current as of: April 1, 2019  Content Version: 12.2  © 0289-3423 [employer]. Care instructions adapted under license by [employer] (which disclaims liability or warranty for this information). If you have questions about a medical condition or this instruction, always ask your healthcare professional. [address] any warranty or liability for your use of this information.

## 2020-02-14 ENCOUNTER — TELEPHONE (OUTPATIENT)
Dept: FAMILY MEDICINE CLINIC | Age: 60
End: 2020-02-14

## 2020-02-14 NOTE — TELEPHONE ENCOUNTER
Pt called and stated she would like a dermatologist in [place] Please call pt at your earliest convenience.

## 2020-02-16 NOTE — PROGRESS NOTES
Hematology/Oncology Progress Note    Name: Kaity Gibson  Date: 2020  : 1960    Marry Chandra MD         Subjective:     Reason for Consult/Chief Complaint: Follow-up visit, anemia      History of Present Illness:   Ms. Ambar Frazier is here today for follow-up her anemia and elevated serum globulin level . She denies any acute changes or new symptoms. She has been taking Coumadin for Afib. Denied any bleeding or bruising issues. Followed with Cardiology. She has some skin rashes over both upper arms thought likely due to peanut allergy. Pending Dermatology visit. The patient denied fever or chills, no weight loss, sweating, skin lumps/bumps, lymphadenopathy, bleeding, bruising, leg tenderness, joint pain. Denied any acute or worsening bone pain or back pain presently. Having intermittent right knee pain and swelling but not at this time. No fever, chills, worsening headache, acute vision change. denied any focal weakness or numbness. She has on-off both legs edema which is improving with water pills. No other complaints. Oncologic History:   No history exists. Past Medical History, Family History, and Social History:    Past Medical History:   Diagnosis Date    Aortic valve disorders 2011    Asthma     Atrial fibrillation ([facility] [address] 2011    Congestive heart failure, unspecified 2005    Cleared quickly with Lasix therapy    Echocardiogram 01/10/2012    A-fib. EF 45%. Mild RVE. RVSP 45-50. Massive LAE. Mod TAMI. Mod MS. Severe MR (mean grad 10). Mild-mod AI. Mild TR. Pulmonic systolic flow reversal.  Mild IVCE.  Hives     Hypertension     Iron deficiency anemias     Long term (current) use of anticoagulants 2011    Murmur 2011    Grade I-II/VI systolic ejection murmur along left sternal border, with radiation to the pulmonic area.  S/P cardiac cath 2012    Minimal coronary artery disease. Mild LVE. EF 55%. Mod MR. Mod-severe MS. RA 15. RV 55/16. PA 58/34. W 36.  CO/CI 3.5/1.9 (TD); 3.61/1.9 ([place]). R to L shunt suggested.  Wears glasses     Weight gain      Past Surgical History:   Procedure Laterality Date    HX HEART CATHETERIZATION  2012    HX HYSTERECTOMY  2006    HX MITRAL VALVE REPLACEMENT  2013    25/33 mm On-X mechanical valve with bilateral pulmonary vein isolation and resection of LA appendage     Social History     Socioeconomic History    Marital status:      Spouse name: Not on file    Number of children: Not on file    Years of education: Not on file    Highest education level: Not on file   Occupational History    Not on file   Social Needs    Financial resource strain: Not on file    Food insecurity:     Worry: Not on file     Inability: Not on file    Transportation needs:     Medical: Not on file     Non-medical: Not on file   Tobacco Use    Smoking status: Former Smoker     Packs/day: 0.50     Years: 21.00     Pack years: 10.50     Last attempt to quit:      Years since quittin.1    Smokeless tobacco: Never Used   Substance and Sexual Activity    Alcohol use:  Yes     Alcohol/week: 1.0 standard drinks     Types: 1 Glasses of wine per week     Comment: occassionally    Drug use: No    Sexual activity: Yes     Partners: Male     Birth control/protection: None   Lifestyle    Physical activity:     Days per week: Not on file     Minutes per session: Not on file    Stress: Not on file   Relationships    Social connections:     Talks on phone: Not on file     Gets together: Not on file     Attends Congregation service: Not on file     Active member of club or organization: Not on file     Attends meetings of clubs or organizations: Not on file     Relationship status: Not on file    Intimate partner violence:     Fear of current or ex partner: Not on file     Emotionally abused: Not on file     Physically abused: Not on file     Forced sexual activity: Not on file   Other Topics Concern    Not on file   Social History Narrative    Not on file     Family History   Problem Relation Age of Onset    Heart Surgery Father         Open-Heart Surgery    Other Father         Venous Thromboembolism    Hypertension Father     Heart Disease Mother         Enlarged Heart    Hypertension Mother    [facility] Diabetes Mother     Diabetes Sister     Hypertension Sister     Diabetes Brother     Heart Disease Paternal Grandmother     Diabetes Sister     Hypertension Sister      Current Outpatient Medications   Medication Sig Dispense Refill    warfarin (COUMADIN) 5 mg tablet TAKE 5MG DAILY EXCEPT 2.5MG ON TUE-THURS-SAT OR AS DIRECTED 60 Tab 6    triamcinolone acetonide (KENALOG) 0.1 % ointment Apply  to affected area two (2) times a day. use thin layer      hydrOXYzine pamoate (VISTARIL) 25 mg capsule TAKE 1 CAP BY MOUTH THREE (3) TIMES DAILY AS NEEDED FOR ITCHING. 30 Cap 0    sertraline (ZOLOFT) 100 mg tablet TAKE 1 TABLET BY MOUTH EVERY DAY 90 Tab 0    clobetasoL (TEMOVATE) 0.05 % ointment Apply  to affected area two (2) times a day. 15 g 0    fluticasone propionate (FLONASE ALLERGY RELIEF) 50 mcg/actuation nasal spray 2 Sprays by Both Nostrils route daily as needed for Rhinitis. 1 Bottle 0    amLODIPine (NORVASC) 5 mg tablet TAKE 1 TABLET BY MOUTH EVERY DAY 90 Tab 3    KLOR-CON M20 20 mEq tablet TAKE 1 TAB BY MOUTH DAILY. 30 Tab 6    furosemide (LASIX) 40 mg tablet Take 1 Tab by mouth daily. 90 Tab 3    montelukast (SINGULAIR) 10 mg tablet TAKE 1 TABLET BY MOUTH EVERY DAY 90 Tab 3    fluticasone propion-salmeterol (ADVAIR/WIXELA) 250-50 mcg/dose diskus inhaler Take 1 Puff by inhalation every twelve (12) hours.  1 Each 0    metoprolol succinate (TOPROL-XL) 100 mg tablet TAKE 1 TABLET BY MOUTH EVERY DAY 90 Tab 3    atorvastatin (LIPITOR) 40 mg tablet TAKE 1 TABLET BY MOUTH EVERY DAY 90 Tab 0    OTHER Allergy injections      VENTOLIN HFA 90 mcg/actuation inhaler TAKE 2 PUFFS BY INHALATION DAILY. 3 Inhaler 1    losartan (COZAAR) 100 mg tablet Take 1 Tab by mouth daily. 90 Tab 3    aspirin delayed-release 81 mg tablet Take 1 Tab by mouth daily. 30 Tab 0    polyethylene glycol (MIRALAX) 17 gram packet Take 1 Packet by mouth daily. (Patient taking differently: Take  by mouth daily as needed for Other (Constipation). ) 30 Packet 0    esomeprazole (NEXIUM) 20 mg capsule Take 20 mg by mouth daily as needed (heart burn).  sodium chloride (SALINE NASAL) 0.65 % nasal squeeze bottle 1 Spray as needed for Congestion.  melatonin tab tablet Take 10 mg by mouth nightly.  timolol (TIMOPTIC) 0.5 % ophthalmic solution          Review of Systems:  Constitutional: The patient has no acute distress or discomfort. HEENT: The patient denies recent head trauma, eye pain, blurred vision,  hearing deficit, oropharyngeal mucosal pain or lesions, and the patient denies throat pain or discomfort. Lymphatics: The patient denies palpable peripheral lymphadenopathy. Hematologic: The patient denies having bruising, bleeding, or progressive fatigue. Respiratory: Patient denies having shortness of breath, cough, sputum production, fever, or dyspnea on exertion. Cardiovascular: The patient denies having leg pain, worsening leg swelling, heart palpitations, chest pain, or lightheadedness. Chronic dyspnea on exertion. Gastrointestinal: The patient denies having nausea, emesis, or diarrhea. The patient denies having any hematemesis or blood in the stool. Genitourinary: Patient denies having urinary urgency, frequency, or dysuria. The patient denies having blood in the urine. Psychological: The patient denies having symptoms of nervousness, anxiety, depression, or thoughts of harming self. Skin: Patient denies having skin, ulcerations, or unexplained itching or pruritus. skin rashes (+)  Musculoskeletal: The patient denies having pain in the joints or bones.       Objective:     Visit Vitals  /84 (BP 1 Location: Left arm, BP Patient Position: Sitting)   Pulse 81   Temp 97.7 °F (36.5 °C) (Oral)   Resp 18   Ht 5' 7\" (1.702 m)   Wt 86.5 kg (190 lb 9.6 oz)   BMI 29.85 kg/m²         Physical Exam:   Gen. Appearance: The patient is in no acute distress. Skin: There is no bruise or rash. HEENT: The exam is unremarkable. Neck: Supple without lymphadenopathy or thyromegaly. Lungs: Clear to auscultation and percussion; there are no wheezes or rhonchi. Heart: Regular rate and rhythm; there are no gallops, or rubs. Abdomen: Bowel sounds are present and normal.  There is no guarding, tenderness, or hepatosplenomegaly. Extremities: There is no clubbing, cyanosis. Mild pitting edema both legs. Neurologic: There are no focal neurologic deficits. Lymphatics: There is no palpable peripheral lymphadenopathy. Musculoskeletal: The patient has full range of motion at all joints. There is no evidence of joint deformity or effusions. There is no focal joint tenderness. Psychological/psychiatric: There is no clinical evidence of anxiety, depression, or melancholy. Diagnostics:      No results found for this or any previous visit.     Recent Results (from the past 2016 hour(s))   EKG, 12 LEAD, INITIAL    Collection Time: 12/03/19  5:16 PM   Result Value Ref Range    Ventricular Rate 77 BPM    Atrial Rate 74 BPM    QRS Duration 96 ms    Q-T Interval 438 ms    QTC Calculation (Bezet) 495 ms    Calculated R Axis 42 degrees    Calculated T Axis 117 degrees    Diagnosis       Atrial fibrillation with a competing junctional pacemaker  Abnormal QRS-T angle, consider primary T wave abnormality  Prolonged QT  Abnormal ECG  When compared with ECG of 12-OCT-2019 09:42,  QT has lengthened  Confirmed by Nicko Figueredo ([id number]) on 12/4/2019 3:11:89 PM     METABOLIC PANEL, BASIC    Collection Time: 12/03/19  5:20 PM   Result Value Ref Range    Sodium 139 136 - 145 mmol/L    Potassium 4.0 3.5 - 5.5 mmol/L    Chloride 104 100 - 111 mmol/L    CO2 29 21 - 32 mmol/L    Anion gap 6 3.0 - 18 mmol/L    Glucose 84 74 - 99 mg/dL    BUN 13 7.0 - 18 MG/DL    Creatinine 0.89 0.6 - 1.3 MG/DL    BUN/Creatinine ratio 15 12 - 20      GFR est AA >60 >60 ml/min/1.73m2    GFR est non-AA >60 >60 ml/min/1.73m2    Calcium 9.2 8.5 - 10.1 MG/DL   CBC WITH AUTOMATED DIFF    Collection Time: 12/03/19  5:20 PM   Result Value Ref Range    WBC 6.2 4.6 - 13.2 K/uL    RBC 4.41 4.20 - 5.30 M/uL    HGB 11.7 (L) 12.0 - 16.0 g/dL    HCT 37.1 35.0 - 45.0 %    MCV 84.1 74.0 - 97.0 FL    MCH 26.5 24.0 - 34.0 PG    MCHC 31.5 31.0 - 37.0 g/dL    RDW 13.9 11.6 - 14.5 %    PLATELET 231 203 - 546 K/uL    MPV 12.4 (H) 9.2 - 11.8 FL    NEUTROPHILS 45 40 - 73 %    LYMPHOCYTES 36 21 - 52 %    MONOCYTES 16 (H) 3 - 10 %    EOSINOPHILS 2 0 - 5 %    BASOPHILS 1 0 - 2 %    ABS. NEUTROPHILS 2.8 1.8 - 8.0 K/UL    ABS. LYMPHOCYTES 2.2 0.9 - 3.6 K/UL    ABS. MONOCYTES 1.0 0.05 - 1.2 K/UL    ABS. EOSINOPHILS 0.1 0.0 - 0.4 K/UL    ABS.  BASOPHILS 0.0 0.0 - 0.1 K/UL    DF AUTOMATED     TROPONIN I    Collection Time: 12/03/19  5:20 PM   Result Value Ref Range    Troponin-I, QT 0.07 (H) 0.0 - 0.045 NG/ML   MAGNESIUM    Collection Time: 12/03/19  5:20 PM   Result Value Ref Range    Magnesium 2.0 1.6 - 2.6 mg/dL   PROTHROMBIN TIME + INR    Collection Time: 12/03/19  5:20 PM   Result Value Ref Range    Prothrombin time 22.0 (H) 11.5 - 15.2 sec    INR 2.0 (H) 0.8 - 1.2     AMB POC PT/INR    Collection Time: 12/06/19  1:05 PM   Result Value Ref Range    VALID INTERNAL CONTROL POC Yes     Prothrombin time (POC) 31.5 seconds    INR POC 2.6    AMB POC PT/INR    Collection Time: 01/10/20  2:04 PM   Result Value Ref Range    VALID INTERNAL CONTROL POC Yes     Prothrombin time (POC) 27.8 seconds    INR POC 2.3    AMB POC PT/INR    Collection Time: 02/19/20  1:15 PM   Result Value Ref Range    VALID INTERNAL CONTROL POC Yes     Prothrombin time (POC) 36.5 seconds    INR POC 3.0        Results for RON, Erica Wright (MRN [id number])    Ref. Range 1/6/2019 01:09 2/28/2019 16:42 4/12/2019 09:28 8/6/2019 14:23 10/12/2019 09:50   Globulin Latest Ref Range: 2.0 - 4.0 g/dL 3.8 4.5 (H) 4.1 (H) 4.2 (H) 4.5 (H)     9/16/2019  3:37 PM - [facility], [facility]     Component Value Flag Ref Range Units Status   Protein, total 7.7   6.0 - 8.5 g/dL Final   Albumin 3.6   2.9 - 4.4 g/dL Final   Alpha-1-globulin 0.2   0.0 - 0.4 g/dL Final   ALPHA-2 GLOBULIN 0.6   0.4 - 1.0 g/dL Final   Beta globulin 1.7  High   0.7 - 1.3 g/dL Final   Gamma globulin 1.6   0.4 - 1.8 g/dL Final   M-Kwame Not Observed   Not Observed g/dL Final   Globulin, total 4.1  High   2.2 - 3.9 g/dL Final   A/G ratio 0.9   0.7 - 1.7   Final           Assessment and Plan     1. Elevated serum globulins   -- Persistent elevated globulin levels since 2/28/2019. Recent SPEP revealed a increased Beta globulin of 1.7, No M spike observed. Her normocytic anemia appears chronically at baseline 10-12 g/dl, no progressive worsening anemia noticed. No hypercalcemia or renal impairment. 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 No M spike. Serum free light chain reported a mild elevated Free Kappa Lt Chains, 30.8; also a near-normal Kappa/Lambda ratio at 2.35. Her urine protein 24h was WNL. Skeletal survey reported no lytic lesions to confirm a diagnosis of monoclonal paraproteinemia. -- The mild elevation of globulins could be from polyclonal gammopathy which is secondary to inflammatory or reactive processes. The mild elevated Free Kappa Lt Chains with near-normal ratio could also be explained by polyclonal gammopathy. -- We will repeat her labs including chemistry, SFLC and SPEP today. 2. Normocytic anemia, likely anemia of chronic diease  -- Her normocytic anemia appears chronically at baseline 10-12 g/dl. Asymptomatic without acute bleeding. Likely this is anemia of chronic diease. -- We will repeat CBC today.    -- I will see the patient back in clinic in 6 months for follow-up. Always sooner if required. Orders Placed This Encounter    GAMMOPATHY EVAL, SPEP/[place], IG QT/FLC     Standing Status:   Future     Number of Occurrences:   1     Standing Expiration Date:   8/04/4173    METABOLIC PANEL, COMPREHENSIVE     Standing Status:   Future     Number of Occurrences:   1     Standing Expiration Date:   2/20/2021    CBC WITH AUTOMATED DIFF     Standing Status:   Future     Number of Occurrences:   1     Standing Expiration Date:   2/20/2021    triamcinolone acetonide (KENALOG) 0.1 % ointment     Sig: Apply  to affected area two (2) times a day. use thin layer       All of patient's questions answered to their apparent satisfaction. They verbally show understanding and agreement with the plan. About 24 minutes were spent for this encounter with more than 50% of the time spent in face-to-face counseling and discussing on diagnosis and management plan going forward. Alice Jerome MD  2/20/2020      Parts of this document has been produced using Dragon dictation system. Unrecognized errors in transcription may be present. Please do not hesitate to reach out for any questions or clarifications.

## 2020-02-17 ENCOUNTER — TELEPHONE (OUTPATIENT)
Dept: FAMILY MEDICINE CLINIC | Age: 60
End: 2020-02-17

## 2020-02-17 NOTE — TELEPHONE ENCOUNTER
Patient calling for a updated referral to Dermatology. She would like for the office to be in the [facility]. Patient can be reached at [phone].

## 2020-02-18 DIAGNOSIS — L28.2 PRURITIC RASH: Primary | ICD-10-CM

## 2020-02-18 DIAGNOSIS — L28.2 PRURITIC RASH: ICD-10-CM

## 2020-02-18 RX ORDER — HYDROXYZINE PAMOATE 25 MG/1
25 CAPSULE ORAL
Qty: 30 CAP | Refills: 0 | Status: SHIPPED | OUTPATIENT
Start: 2020-02-18 | End: 2021-01-14 | Stop reason: ALTCHOICE

## 2020-02-18 NOTE — TELEPHONE ENCOUNTER
Left message for patient to return my call regarding Derm. Not aware of Derm in [facility]. Only know of [facility] in [place] and [facility] on [address]. Awaiting return call.

## 2020-02-18 NOTE — TELEPHONE ENCOUNTER
Pt called back with a name and # of the dermatologist she would like to go to.      Patient is requesting (New  Updated) referral to the following office:    Speciality: dermatology  Specialist Name: Velvet Allen   Office Location:  [address] [place] [address] [address] [employer]  Phone (if available):  [phone]  Fax (if available): [phone]  Diagnosis: L28.2 (ICD-10-CM) - Pruritic rash  Date of appointment (if scheduled): medicare

## 2020-02-19 ENCOUNTER — ANTI-COAG VISIT (OUTPATIENT)
Dept: CARDIOLOGY CLINIC | Age: 60
End: 2020-02-19

## 2020-02-19 DIAGNOSIS — Z79.01 LONG TERM CURRENT USE OF ANTICOAGULANT THERAPY: ICD-10-CM

## 2020-02-19 DIAGNOSIS — I48.91 ATRIAL FIBRILLATION, UNSPECIFIED TYPE (HCC): ICD-10-CM

## 2020-02-19 LAB
INR BLD: 3
PT POC: 36.5 SECONDS
VALID INTERNAL CONTROL?: YES

## 2020-02-19 NOTE — PROGRESS NOTES
The INR is stable and therapeutic but in upper part of range. Possibly has \"hives\" and taking medication for itching. (Vistaril)  Only 2.5mg x 1 Continue Coumadin 5mg daily except 2.5mg on Tuesday, Thursday, Saturday.   Recheck INR in 5 weeks with appointment but if dose adjusted due to meds (if started on new medications after she sees dermatologist)

## 2020-02-20 ENCOUNTER — OFFICE VISIT (OUTPATIENT)
Dept: ONCOLOGY | Age: 60
End: 2020-02-20

## 2020-02-20 ENCOUNTER — HOSPITAL ENCOUNTER (OUTPATIENT)
Dept: INFUSION THERAPY | Age: 60
Discharge: HOME | End: 2020-02-20
Payer: MEDICARE

## 2020-02-20 VITALS
HEIGHT: 67 IN | SYSTOLIC BLOOD PRESSURE: 112 MMHG | BODY MASS INDEX: 29.91 KG/M2 | DIASTOLIC BLOOD PRESSURE: 84 MMHG | RESPIRATION RATE: 18 BRPM | WEIGHT: 190.6 LBS | HEART RATE: 81 BPM | TEMPERATURE: 97.7 F

## 2020-02-20 DIAGNOSIS — R77.1 ELEVATED SERUM GLOBULIN LEVEL: ICD-10-CM

## 2020-02-20 DIAGNOSIS — D63.8 ANEMIA, CHRONIC DISEASE: Primary | ICD-10-CM

## 2020-02-20 LAB
ALBUMIN SERPL-MCNC: 3.9 G/DL (ref 3.4–5)
ALBUMIN/GLOB SERPL: 0.9 {RATIO} (ref 0.8–1.7)
ALP SERPL-CCNC: 91 U/L (ref 45–117)
ALT SERPL-CCNC: 29 U/L (ref 13–56)
ANION GAP SERPL CALC-SCNC: 5 MMOL/L (ref 3–18)
AST SERPL-CCNC: 30 U/L (ref 10–38)
BASOPHILS # BLD: 0 K/UL (ref 0–0.1)
BASOPHILS NFR BLD: 1 % (ref 0–2)
BILIRUB SERPL-MCNC: 0.6 MG/DL (ref 0.2–1)
BUN SERPL-MCNC: 15 MG/DL (ref 7–18)
BUN/CREAT SERPL: 16 (ref 12–20)
CALCIUM SERPL-MCNC: 9.4 MG/DL (ref 8.5–10.1)
CHLORIDE SERPL-SCNC: 104 MMOL/L (ref 100–111)
CO2 SERPL-SCNC: 33 MMOL/L (ref 21–32)
CREAT SERPL-MCNC: 0.94 MG/DL (ref 0.6–1.3)
DIFFERENTIAL METHOD BLD: ABNORMAL
EOSINOPHIL # BLD: 0.2 K/UL (ref 0–0.4)
EOSINOPHIL NFR BLD: 3 % (ref 0–5)
ERYTHROCYTE [DISTWIDTH] IN BLOOD BY AUTOMATED COUNT: 13.9 % (ref 11.6–14.5)
GLOBULIN SER CALC-MCNC: 4.3 G/DL (ref 2–4)
GLUCOSE SERPL-MCNC: 78 MG/DL (ref 74–99)
HCT VFR BLD AUTO: 36.3 % (ref 35–45)
HGB BLD-MCNC: 11.4 G/DL (ref 12–16)
LYMPHOCYTES # BLD: 2.1 K/UL (ref 0.9–3.6)
LYMPHOCYTES NFR BLD: 32 % (ref 21–52)
MCH RBC QN AUTO: 25.9 PG (ref 24–34)
MCHC RBC AUTO-ENTMCNC: 31.4 G/DL (ref 31–37)
MCV RBC AUTO: 82.3 FL (ref 74–97)
MONOCYTES # BLD: 0.9 K/UL (ref 0.05–1.2)
MONOCYTES NFR BLD: 14 % (ref 3–10)
NEUTS SEG # BLD: 3.3 K/UL (ref 1.8–8)
NEUTS SEG NFR BLD: 50 % (ref 40–73)
PLATELET # BLD AUTO: 198 K/UL (ref 135–420)
PMV BLD AUTO: 12.8 FL (ref 9.2–11.8)
POTASSIUM SERPL-SCNC: 4 MMOL/L (ref 3.5–5.5)
PROT SERPL-MCNC: 8.2 G/DL (ref 6.4–8.2)
RBC # BLD AUTO: 4.41 M/UL (ref 4.2–5.3)
SODIUM SERPL-SCNC: 142 MMOL/L (ref 136–145)
WBC # BLD AUTO: 6.5 K/UL (ref 4.6–13.2)

## 2020-02-20 PROCEDURE — 36415 COLL VENOUS BLD VENIPUNCTURE: CPT

## 2020-02-20 PROCEDURE — 80053 COMPREHEN METABOLIC PANEL: CPT

## 2020-02-20 PROCEDURE — 82784 ASSAY IGA/IGD/IGG/IGM EACH: CPT

## 2020-02-20 PROCEDURE — 85025 COMPLETE CBC W/AUTO DIFF WBC: CPT

## 2020-02-20 RX ORDER — TRIAMCINOLONE ACETONIDE 1 MG/G
OINTMENT TOPICAL 2 TIMES DAILY
COMMUNITY
End: 2021-06-11

## 2020-02-20 RX ORDER — WARFARIN SODIUM 5 MG/1
TABLET ORAL
Qty: 60 TAB | Refills: 6 | Status: SHIPPED | OUTPATIENT
Start: 2020-02-20 | End: 2021-02-22

## 2020-02-20 NOTE — LETTER
2/20/20 Patient: Dae Acosta YOB: 1960 Date of Visit: 2/20/2020 Kallie Khalil, [address] [address] [place] [address] VIA In Basket Dear Kallie Khalil MD, Thank you for referring Ms. Dae Acosta to [facility] for evaluation. My notes for this consultation are attached. If you have questions, please do not hesitate to call me. I look forward to following your patient along with you. Sincerely, Iliana Joseph MD

## 2020-02-20 NOTE — PROGRESS NOTES
Sin Rangel is a 62 y.o. female presenting today for a follow-up appointment. Patient is ambulatory with no assistive devices, denies any generalized pain. Patient has no other complaints at this time. Chief Complaint   Patient presents with    Anemia    Follow-up     Visit Vitals  /84 (BP 1 Location: Left arm, BP Patient Position: Sitting)   Pulse 81   Temp 97.7 °F (36.5 °C) (Oral)   Resp 18   Ht 5' 7\" (1.702 m)   Wt 190 lb 9.6 oz (86.5 kg)   BMI 29.85 kg/m²     Current Outpatient Medications   Medication Sig    warfarin (COUMADIN) 5 mg tablet TAKE 5MG DAILY EXCEPT 2.5MG ON TUE-THURS-SAT OR AS DIRECTED    triamcinolone acetonide (KENALOG) 0.1 % ointment Apply  to affected area two (2) times a day. use thin layer    hydrOXYzine pamoate (VISTARIL) 25 mg capsule TAKE 1 CAP BY MOUTH THREE (3) TIMES DAILY AS NEEDED FOR ITCHING.  sertraline (ZOLOFT) 100 mg tablet TAKE 1 TABLET BY MOUTH EVERY DAY    clobetasoL (TEMOVATE) 0.05 % ointment Apply  to affected area two (2) times a day.  fluticasone propionate (FLONASE ALLERGY RELIEF) 50 mcg/actuation nasal spray 2 Sprays by Both Nostrils route daily as needed for Rhinitis.  amLODIPine (NORVASC) 5 mg tablet TAKE 1 TABLET BY MOUTH EVERY DAY    KLOR-CON M20 20 mEq tablet TAKE 1 TAB BY MOUTH DAILY.  furosemide (LASIX) 40 mg tablet Take 1 Tab by mouth daily.  montelukast (SINGULAIR) 10 mg tablet TAKE 1 TABLET BY MOUTH EVERY DAY    fluticasone propion-salmeterol (ADVAIR/WIXELA) 250-50 mcg/dose diskus inhaler Take 1 Puff by inhalation every twelve (12) hours.  metoprolol succinate (TOPROL-XL) 100 mg tablet TAKE 1 TABLET BY MOUTH EVERY DAY    atorvastatin (LIPITOR) 40 mg tablet TAKE 1 TABLET BY MOUTH EVERY DAY    OTHER Allergy injections    VENTOLIN HFA 90 mcg/actuation inhaler TAKE 2 PUFFS BY INHALATION DAILY.  losartan (COZAAR) 100 mg tablet Take 1 Tab by mouth daily.  aspirin delayed-release 81 mg tablet Take 1 Tab by mouth daily.     polyethylene glycol (MIRALAX) 17 gram packet Take 1 Packet by mouth daily. (Patient taking differently: Take  by mouth daily as needed for Other (Constipation). )    esomeprazole (NEXIUM) 20 mg capsule Take 20 mg by mouth daily as needed (heart burn).  sodium chloride (SALINE NASAL) 0.65 % nasal squeeze bottle 1 Spray as needed for Congestion.  melatonin tab tablet Take 10 mg by mouth nightly.  timolol (TIMOPTIC) 0.5 % ophthalmic solution      No current facility-administered medications for this visit. Fall Risk Assessment, last 12 mths 2/20/2020   Able to walk? Yes   Fall in past 12 months? No     3 most recent PHQ Screens 2/20/2020   Little interest or pleasure in doing things Not at all   Feeling down, depressed, irritable, or hopeless Not at all   Total Score PHQ 2 0     Abuse Screening Questionnaire 2/20/2020   Do you ever feel afraid of your partner? N   Are you in a relationship with someone who physically or mentally threatens you? N   Is it safe for you to go home? Y     Health Maintenance Due   Topic Date Due    DTaP/Tdap/Td series (1 - Tdap) 07/04/1969    Shingrix Vaccine Age 50> (1 of 2) 07/04/2008    A1C test (Diabetic or Prediabetic)  04/23/2014    FOBT Q1Y Age 50-75  08/21/2019    PAP AKA CERVICAL CYTOLOGY  03/09/2020       Medications no longer taking/discontinued:     Patient currently taking Antiplatelet therapy? yes    1. Have you been to the ER, urgent care clinic since your last visit? Hospitalized since your last visit? no     2. Have you seen or consulted any other health care providers outside of the [address] [place] [address] since your last visit? Include any pap smears or colon screening.  no

## 2020-02-20 NOTE — TELEPHONE ENCOUNTER
Rapid referral request with demographics office notes and labs have been faxed to [facility]. For appt.

## 2020-02-24 LAB
ALBUMIN SERPL ELPH-MCNC: 4.1 G/DL (ref 2.9–4.4)
ALBUMIN/GLOB SERPL: 1.2 {RATIO} (ref 0.7–1.7)
ALPHA1 GLOB SERPL ELPH-MCNC: 0.2 G/DL (ref 0–0.4)
ALPHA2 GLOB SERPL ELPH-MCNC: 0.5 G/DL (ref 0.4–1)
B-GLOBULIN SERPL ELPH-MCNC: 1.5 G/DL (ref 0.7–1.3)
GAMMA GLOB SERPL ELPH-MCNC: 1.3 G/DL (ref 0.4–1.8)
GLOBULIN SER-MCNC: 3.6 G/DL (ref 2.2–3.9)
IGA SERPL-MCNC: 729 MG/DL (ref 87–352)
IGG SERPL-MCNC: 1548 MG/DL (ref 700–1600)
IGM SERPL-MCNC: 78 MG/DL (ref 26–217)
INTERPRETATION SERPL IEP-IMP: ABNORMAL
KAPPA LC FREE SER-MCNC: 32.7 MG/L (ref 3.3–19.4)
KAPPA LC FREE/LAMBDA FREE SER: 1.97 {RATIO} (ref 0.26–1.65)
LAMBDA LC FREE SERPL-MCNC: 16.6 MG/L (ref 5.7–26.3)
M PROTEIN SERPL ELPH-MCNC: ABNORMAL G/DL
PROT SERPL-MCNC: 7.7 G/DL (ref 6–8.5)

## 2020-03-04 ENCOUNTER — OFFICE VISIT (OUTPATIENT)
Dept: PULMONOLOGY | Age: 60
End: 2020-03-04

## 2020-03-04 VITALS
HEIGHT: 67 IN | OXYGEN SATURATION: 99 % | DIASTOLIC BLOOD PRESSURE: 82 MMHG | HEART RATE: 73 BPM | WEIGHT: 187.4 LBS | RESPIRATION RATE: 20 BRPM | SYSTOLIC BLOOD PRESSURE: 129 MMHG | TEMPERATURE: 96.4 F | BODY MASS INDEX: 29.41 KG/M2

## 2020-03-04 DIAGNOSIS — R06.02 SHORTNESS OF BREATH: ICD-10-CM

## 2020-03-04 DIAGNOSIS — I38 VALVULAR HEART DISEASE: Primary | ICD-10-CM

## 2020-03-04 NOTE — PROGRESS NOTES
Dearl Members presents today for   Chief Complaint   Patient presents with    Asthma     CXR done 12/3, PFT done        Is someone accompanying this pt? No     Is the patient using any DME equipment during OV? No     -DME Company  n/a    Depression Screening:  3 most recent PHQ Screens 3/4/2020   Little interest or pleasure in doing things Not at all   Feeling down, depressed, irritable, or hopeless Not at all   Total Score PHQ 2 0       Learning Assessment:  Learning Assessment 8/11/2016   PRIMARY LEARNER Patient   PRIMARY LANGUAGE ENGLISH   LEARNER PREFERENCE PRIMARY DEMONSTRATION     READING   ANSWERED BY PATIENT   RELATIONSHIP SELF       Abuse Screening:  Abuse Screening Questionnaire 2/20/2020   Do you ever feel afraid of your partner? N   Are you in a relationship with someone who physically or mentally threatens you? N   Is it safe for you to go home? Y       Fall Risk  Fall Risk Assessment, last 12 mths 2/20/2020   Able to walk? Yes   Fall in past 12 months? No         Coordination of Care:  1. Have you been to the ER, urgent care clinic since your last visit? Hospitalized since your last visit? Yes; Name: [facility]     2. Have you seen or consulted any other health care providers outside of the [address] [place] [address] since your last visit? Include any pap smears or colon screening.  Dermatology, Dr Aminta Contreras

## 2020-03-04 NOTE — PROGRESS NOTES
[facility] PULMONARY SPECIALISTS  Pulmonary, Critical Care, and Sleep Medicine      Dear Dr. Rajni Mckinnon,    Chief complaint:  Shortness of breath    HPI:  Rere Bernabeman is 62years old and comes to the office today at your request concerning symptoms of shortness of breath. The patient relates that she has had shortness of breath for 40 years. She relates that she uses albuterol for relief of wheezing and that sometimes it gives her good relief. She states she also has chronic shortness of breath and difficulty doing housework and taking long walks. She denies PND chronic cough chest except at night pain syncope significant leg swelling. She also relates she has daytime sleepiness feels that she does not get good sleep because of coughing at night  Allergies   Allergen Reactions    Hydrocodone Bitartrate Rash    Morphine Rash and Itching    Norco [Hydrocodone-Acetaminophen] Itching    Penicillin Hives     Current Outpatient Medications   Medication Sig    warfarin (COUMADIN) 5 mg tablet TAKE 5MG DAILY EXCEPT 2.5MG ON TUE-THURS-SAT OR AS DIRECTED    hydrOXYzine pamoate (VISTARIL) 25 mg capsule TAKE 1 CAP BY MOUTH THREE (3) TIMES DAILY AS NEEDED FOR ITCHING.  sertraline (ZOLOFT) 100 mg tablet TAKE 1 TABLET BY MOUTH EVERY DAY    fluticasone propionate (FLONASE ALLERGY RELIEF) 50 mcg/actuation nasal spray 2 Sprays by Both Nostrils route daily as needed for Rhinitis.  amLODIPine (NORVASC) 5 mg tablet TAKE 1 TABLET BY MOUTH EVERY DAY    KLOR-CON M20 20 mEq tablet TAKE 1 TAB BY MOUTH DAILY.  furosemide (LASIX) 40 mg tablet Take 1 Tab by mouth daily.  montelukast (SINGULAIR) 10 mg tablet TAKE 1 TABLET BY MOUTH EVERY DAY    metoprolol succinate (TOPROL-XL) 100 mg tablet TAKE 1 TABLET BY MOUTH EVERY DAY    atorvastatin (LIPITOR) 40 mg tablet TAKE 1 TABLET BY MOUTH EVERY DAY    OTHER Allergy injections    VENTOLIN HFA 90 mcg/actuation inhaler TAKE 2 PUFFS BY INHALATION DAILY.     losartan (COZAAR) 100 mg tablet Take 1 Tab by mouth daily.  aspirin delayed-release 81 mg tablet Take 1 Tab by mouth daily.  polyethylene glycol (MIRALAX) 17 gram packet Take 1 Packet by mouth daily. (Patient taking differently: Take  by mouth daily as needed for Other (Constipation). )    esomeprazole (NEXIUM) 20 mg capsule Take 20 mg by mouth daily as needed (heart burn).  sodium chloride (SALINE NASAL) 0.65 % nasal squeeze bottle 1 Spray as needed for Congestion.  melatonin tab tablet Take 10 mg by mouth nightly.  triamcinolone acetonide (KENALOG) 0.1 % ointment Apply  to affected area two (2) times a day. use thin layer    clobetasoL (TEMOVATE) 0.05 % ointment Apply  to affected area two (2) times a day.  timolol (TIMOPTIC) 0.5 % ophthalmic solution     fluticasone propion-salmeterol (ADVAIR/WIXELA) 250-50 mcg/dose diskus inhaler Take 1 Puff by inhalation every twelve (12) hours. No current facility-administered medications for this visit. Past Medical History:   Diagnosis Date    Aortic valve disorders 1/13/2011    Asthma     Atrial fibrillation ([facility] [address] 1/13/2011    Congestive heart failure, unspecified March 2005    Cleared quickly with Lasix therapy    Echocardiogram 01/10/2012    A-fib. EF 45%. Mild RVE. RVSP 45-50. Massive LAE. Mod TAMI. Mod MS. Severe MR (mean grad 10). Mild-mod AI. Mild TR. Pulmonic systolic flow reversal.  Mild IVCE.  Hives     Hypertension     Iron deficiency anemias     Long term (current) use of anticoagulants 2/23/2011    Murmur 1/2011    Grade I-II/VI systolic ejection murmur along left sternal border, with radiation to the pulmonic area.  S/P cardiac cath 02/24/2012    Minimal coronary artery disease. Mild LVE. EF 55%. Mod MR. Mod-severe MS. RA 15.  RV 55/16. PA 58/34. W 36.  CO/CI 3.5/1.9 (TD); 3.61/1.9 (Dequan). R to L shunt suggested.     Wears glasses     Weight gain    She denies a history of diabetes kidney disease liver disease ulcers tuberculosis cancer  Past Surgical History:   Procedure Laterality Date    HX HEART CATHETERIZATION  2/24/2012    HX HYSTERECTOMY  2006    HX MITRAL VALVE REPLACEMENT  05/22/2013    25/33 mm On-X mechanical valve with bilateral pulmonary vein isolation and resection of LA appendage       Family history: Diabetes heart disease     Social History: Smokes cigarettes for 12 years no smoking for greater than 30 years    Review of systems:  She denies syncope seizures focal muscle weakness or numbness cold intolerance fever chills poor appetite weight loss trouble hearing trouble seeing trouble swallowing chronic abdominal pain melena blood in her stools dysuria hematuria rashes or significant arthritis but does report easy bruising    Physical Exam:  Visit Vitals  /82 (BP 1 Location: Right arm, BP Patient Position: Sitting)   Pulse 73   Temp 96.4 °F (35.8 °C) (Oral)   Resp 20   Ht 5' 7\" (1.702 m)   Wt 85 kg (187 lb 6.4 oz)   SpO2 99%   BMI 29.35 kg/m²     Well-developed well-nourished  HEENT: pupils equal, reactive, sclera, non-icteric   Oropharynx tongue: normal   Neck: Supple   Lymph Nodes: Supra clavicular and cervical nodes, negative   Chest: Equal symmetrical expansion, no dullness, no wheezes, rales or rubs   Heart: Regular, rhythm without jolynn or murmur no carotid bruits  Abdomen: soft, non-tender no masses or organomegaly   Extremities: no cyanosis, clubbing, no edema no calf tenderness  Musculoskeletal: No acute joint inflammation or muscle wasting  Skin: No rash   Neurological: alert, oriented, moves all extremities      LABS:  O2 sat room air at rest 99%  Chest x-ray personally reviewed 12/3/2019 with evidence of previous heart surgery and cardiomegaly  Echocardiogram 8/31/2018 with normal left ventricular function and borderline pulmonary hypertension  Pulmonary function tests 10/5/2019 with normal lung volumes and flows (spirometry) with a reduced diffusion capacity  Impression:   History and physical exam chest x-ray appearance echocardiography may suggest another cause for shortness of breath other than asthma. However the patient also has symptoms suggestive of asthma    Plan:  Rule out asthma with a methacholine challenge  Reassess cardiac status with echocardiogram  Follow-up in 4 to 6 weeks or sooner if needed    Thanks for allowing me to share in this patient's evaluation    Sincerely,    Olivia Oscar MD , [facility]    CC: Tiki Aguilar MD    [facility], [address] [address]     P: [phone]     F: [phone]

## 2020-04-15 RX ORDER — LOSARTAN POTASSIUM 100 MG/1
TABLET ORAL
Qty: 90 TAB | Refills: 3 | Status: SHIPPED | OUTPATIENT
Start: 2020-04-15 | End: 2020-05-01 | Stop reason: SDUPTHER

## 2020-04-29 DIAGNOSIS — J45.40 MODERATE PERSISTENT ASTHMA WITHOUT COMPLICATION: ICD-10-CM

## 2020-04-30 RX ORDER — ALBUTEROL SULFATE 90 UG/1
AEROSOL, METERED RESPIRATORY (INHALATION)
Qty: 18 INHALER | Refills: 5 | Status: SHIPPED | OUTPATIENT
Start: 2020-04-30 | End: 2021-05-17

## 2020-04-30 RX ORDER — SERTRALINE HYDROCHLORIDE 100 MG/1
TABLET, FILM COATED ORAL
Qty: 90 TAB | Refills: 0 | Status: SHIPPED | OUTPATIENT
Start: 2020-04-30 | End: 2020-07-24

## 2020-05-01 RX ORDER — LOSARTAN POTASSIUM 50 MG/1
50 TABLET ORAL 2 TIMES DAILY
Qty: 60 TAB | Refills: 6 | Status: SHIPPED | OUTPATIENT
Start: 2020-05-01 | End: 2020-08-25

## 2020-05-15 ENCOUNTER — ANTI-COAG VISIT (OUTPATIENT)
Dept: CARDIOLOGY CLINIC | Age: 60
End: 2020-05-15

## 2020-05-15 DIAGNOSIS — I48.91 ATRIAL FIBRILLATION, UNSPECIFIED TYPE (HCC): Primary | ICD-10-CM

## 2020-05-15 DIAGNOSIS — Z79.01 LONG TERM CURRENT USE OF ANTICOAGULANT THERAPY: ICD-10-CM

## 2020-05-15 LAB
INR BLD: 2.3
PT POC: 27.1 SECONDS
VALID INTERNAL CONTROL?: YES

## 2020-05-15 NOTE — PROGRESS NOTES
The INR is stable and therapeutic.    Continue same dose of coumadin (Continue Coumadin 5mg daily except 2.5mg on Tuesday, Thursday, Saturday.)  recheck in 4 weeks

## 2020-05-28 ENCOUNTER — PATIENT OUTREACH (OUTPATIENT)
Dept: FAMILY MEDICINE CLINIC | Age: 60
End: 2020-05-28

## 2020-06-05 ENCOUNTER — TELEPHONE (OUTPATIENT)
Dept: FAMILY MEDICINE CLINIC | Age: 60
End: 2020-06-05

## 2020-06-05 DIAGNOSIS — Z12.11 COLON CANCER SCREENING: Primary | ICD-10-CM

## 2020-06-05 RX ORDER — FLUTICASONE PROPIONATE 50 MCG
2 SPRAY, SUSPENSION (ML) NASAL
Qty: 1 BOTTLE | Refills: 2 | Status: SHIPPED | OUTPATIENT
Start: 2020-06-05 | End: 2020-08-06

## 2020-06-05 NOTE — TELEPHONE ENCOUNTER
This patient contacted office for the following prescriptions to be filled:      Medication requested :   Requested Prescriptions     Pending Prescriptions Disp Refills    fluticasone propionate (Flonase Allergy Relief) 50 mcg/actuation nasal spray 1 Bottle 0     Si Sprays by Both Nostrils route daily as needed for Rhinitis.      PCP: [address] [place] [address] or Print: [facility]  Mail order or Local pharmacy [address] [place] [address]    Scheduled appointment if not seen by current providers in office: LOV 2020 f/u 2020

## 2020-06-19 ENCOUNTER — TELEPHONE (OUTPATIENT)
Dept: FAMILY MEDICINE CLINIC | Age: 60
End: 2020-06-19

## 2020-06-19 NOTE — TELEPHONE ENCOUNTER
Patient identified with 2 identifiers (name and ). Spoke with patient and she is inquiring if she can take robitussin for her cough. Did advise patient that okay to take plain Robitussin due to HTN.

## 2020-06-19 NOTE — TELEPHONE ENCOUNTER
Pt called and stated she has been having a cough for a couple of weeks and would like to discuss option she could take. Please call pt at your earliest convenience.

## 2020-06-19 NOTE — TELEPHONE ENCOUNTER
Called again about her cough and that her chest hurts when she coughs. Would like to know if something can be called in for her. Please advise.

## 2020-06-29 ENCOUNTER — VIRTUAL VISIT (OUTPATIENT)
Dept: FAMILY MEDICINE CLINIC | Age: 60
End: 2020-06-29

## 2020-06-29 DIAGNOSIS — J45.20 MILD INTERMITTENT ASTHMA WITHOUT COMPLICATION: ICD-10-CM

## 2020-06-29 DIAGNOSIS — I48.91 ATRIAL FIBRILLATION, UNSPECIFIED TYPE (HCC): ICD-10-CM

## 2020-06-29 DIAGNOSIS — I25.10 CORONARY ARTERY DISEASE INVOLVING NATIVE CORONARY ARTERY OF NATIVE HEART WITHOUT ANGINA PECTORIS: ICD-10-CM

## 2020-06-29 DIAGNOSIS — F41.9 ANXIETY: ICD-10-CM

## 2020-06-29 DIAGNOSIS — I50.32 CHRONIC DIASTOLIC CONGESTIVE HEART FAILURE (HCC): ICD-10-CM

## 2020-06-29 DIAGNOSIS — J30.89 OTHER ALLERGIC RHINITIS: Primary | ICD-10-CM

## 2020-06-29 DIAGNOSIS — I10 ESSENTIAL HYPERTENSION: ICD-10-CM

## 2020-06-29 RX ORDER — CETIRIZINE HCL 10 MG
10 TABLET ORAL DAILY
Qty: 90 TAB | Refills: 0 | Status: SHIPPED | OUTPATIENT
Start: 2020-06-29 | End: 2020-08-26

## 2020-06-29 RX ORDER — AZELASTINE 1 MG/ML
1 SPRAY, METERED NASAL 2 TIMES DAILY
Qty: 1 BOTTLE | Refills: 2 | Status: SHIPPED | OUTPATIENT
Start: 2020-06-29 | End: 2020-08-06

## 2020-06-29 RX ORDER — POTASSIUM CHLORIDE 1500 MG/1
TABLET, EXTENDED RELEASE ORAL
Qty: 30 TAB | Refills: 6 | Status: SHIPPED | OUTPATIENT
Start: 2020-06-29 | End: 2021-01-08

## 2020-06-29 RX ORDER — BENZOCAINE .13; .15; .5; 2 G/100G; G/100G; G/100G; G/100G
2 GEL ORAL DAILY
Qty: 1 BOTTLE | Refills: 2 | Status: SHIPPED | OUTPATIENT
Start: 2020-06-29 | End: 2020-11-17

## 2020-06-30 NOTE — PROGRESS NOTES
Shameka Marie is a 62 y.o. female who was seen by synchronous (real-time) audio-video technology on 6/29/2020. Consent: Shameka Marie, who was seen by synchronous (real-time) audio-video technology, and/or her healthcare decision maker, is aware that this patient-initiated, Telehealth encounter on 6/29/2020 is a billable service, with coverage as determined by her insurance carrier. She is aware that she may receive a bill and has provided verbal consent to proceed: Yes. 712  Subjective:   Shameka Marie is a 62 y.o. female who was seen for No chief complaint on file. Nasal congestion x 1 month    Pt w/ known allergic rhinitis, reports worsening symptoms of nasal congestion with clear rhinorrhea, post nasal drip and freq throat clearing, worse at night when lying down. She is currently on claritin/flonase and singulair. She was receiving allergy shots, up until march she decided to hold off due to covid. She denies sob, wheezing, fever, cough. Depression/anxiety- reports to be doing fairly well, taking zoloft    She has h/o NSTEMI/afib/ rhem MV s/p repair- no current symptoms,  Her pulmonologist was recommending metacholine challenge test to evaluate for asthma      Prior to Admission medications    Medication Sig Start Date End Date Taking? Authorizing Provider   azelastine (ASTELIN) 137 mcg (0.1 %) nasal spray 1 [place] by Both Nostrils route two (2) times a day. Use in each nostril as directed 6/29/20  Yes May Wilcox MD   budesonide (RHINOCORT AQUA) 32 mcg/actuation nasal spray 2 Sprays by Both Nostrils route daily. 6/29/20  Yes May Wilcox MD   cetirizine (ZYRTEC) 10 mg tablet Take 1 Tab by mouth daily. 6/29/20  Yes Pascual, Darylene Kava, MD   fluticasone propionate (Flonase Allergy Relief) 50 mcg/actuation nasal spray 2 Sprays by Both Nostrils route daily as needed for Rhinitis.  6/5/20  Yes May Wilcox MD   losartan (COZAAR) 50 mg tablet Take 1 Tab by mouth two (2) times a day. 5/1/20  Yes Christine Naylor MD   sertraline (ZOLOFT) 100 mg tablet TAKE 1 TABLET BY MOUTH EVERY DAY 4/30/20  Yes Michaelle Coronado MD   Ventolin HFA 90 mcg/actuation inhaler TAKE 2 PUFFS BY INHALATION DAILY. 4/30/20  Yes Grady Oliver MD   warfarin (COUMADIN) 5 mg tablet TAKE 5MG DAILY EXCEPT 2.5MG ON TUE-THURS-SAT OR AS DIRECTED 2/20/20  Yes Shea Overall, NP   triamcinolone acetonide (KENALOG) 0.1 % ointment Apply  to affected area two (2) times a day. use thin layer   Yes Provider, Historical   hydrOXYzine pamoate (VISTARIL) 25 mg capsule TAKE 1 CAP BY MOUTH THREE (3) TIMES DAILY AS NEEDED FOR ITCHING. 2/18/20  Yes Michaelle Coronado MD   clobetasoL (TEMOVATE) 0.05 % ointment Apply  to affected area two (2) times a day. 2/6/20  Yes Grady Oliver MD   timolol (TIMOPTIC) 0.5 % ophthalmic solution  1/20/20  Yes Provider, Historical   amLODIPine (NORVASC) 5 mg tablet TAKE 1 TABLET BY MOUTH EVERY DAY 12/9/19  Yes Grady Oliver MD   furosemide (LASIX) 40 mg tablet Take 1 Tab by mouth daily. 12/3/19  Yes Janelle Mayorga DO   montelukast (SINGULAIR) 10 mg tablet TAKE 1 TABLET BY MOUTH EVERY DAY 12/2/19  Yes Michaelle Coronado MD   fluticasone propion-salmeterol (ADVAIR/WIXELA) 250-50 mcg/dose diskus inhaler Take 1 Puff by inhalation every twelve (12) hours. 10/21/19  Yes Grady Oliver MD   metoprolol succinate (TOPROL-XL) 100 mg tablet TAKE 1 TABLET BY MOUTH EVERY DAY 7/25/19  Yes Michaelle Coronado MD   atorvastatin (LIPITOR) 40 mg tablet TAKE 1 TABLET BY MOUTH EVERY DAY 7/21/19  Yes Grady Oliver MD   OTHER Allergy injections   Yes Provider, Historical   aspirin delayed-release 81 mg tablet Take 1 Tab by mouth daily. 1/11/19  Yes Vazquez Lucas MD   polyethylene glycol (MIRALAX) 17 gram packet Take 1 Packet by mouth daily. Patient taking differently: Take  by mouth daily as needed for Other (Constipation).  1/11/19  Yes Vazquez Lucas MD   esomeprazole (NEXIUM) 20 mg capsule Take 20 mg by mouth daily as needed (heart burn). Yes Provider, Historical   sodium chloride (SALINE NASAL) 0.65 % nasal squeeze bottle 1 Spray as needed for Congestion. Yes Provider, Historical   melatonin tab tablet Take 10 mg by mouth nightly. Yes Provider, Historical   Klor-Con M20 20 mEq tablet TAKE 1 TABLET BY MOUTH EVERY DAY 6/29/20   Tatianna Valdivia MD     Allergies   Allergen Reactions    Hydrocodone Bitartrate Rash    Morphine Rash and Itching    Norco [Hydrocodone-Acetaminophen] Itching    Penicillin Hives       Patient Active Problem List    Diagnosis Date Noted    Vasomotor rhinitis 02/03/2020    Anemia of chronic disease 11/06/2019    Chronic sinusitis 10/15/2019    Elevated serum globulin level 10/15/2019    Normocytic anemia 10/15/2019    Anxiety 10/08/2019    Essential hypertension 06/24/2019    Adjustment disorder with anxious mood 03/05/2019    IFG (impaired fasting glucose) 02/25/2019    NSTEMI (non-ST elevated myocardial infarction) ([facility] 01/05/2019    Chest pain 01/05/2019    Other allergic rhinitis 11/30/2018    Allergic rhinitis due to pollen 11/30/2018    Perennial allergic rhinitis with seasonal variation 11/30/2018    Mild intermittent asthma without complication 40/26/7667    Advanced directives, counseling/discussion 03/09/2017    Mitral valve stenosis 05/22/2013    Hyperlipidemia 02/02/2012    Benign hypertensive heart disease with heart failure ([facility] [address] 08/05/2011    Long term current use of anticoagulant therapy 02/23/2011    Atrial fibrillation (HCC) 01/20/2011    Rheumatic valvular disease     Chronic diastolic congestive heart failure ([facility] 03/01/2005     Current Outpatient Medications   Medication Sig Dispense Refill    azelastine (ASTELIN) 137 mcg (0.1 %) nasal spray 1 [place] by Both Nostrils route two (2) times a day.  Use in each nostril as directed 1 Bottle 2    budesonide (RHINOCORT AQUA) 32 mcg/actuation nasal spray 2 Sprays by Both Nostrils route daily. 1 Bottle 2    cetirizine (ZYRTEC) 10 mg tablet Take 1 Tab by mouth daily. 90 Tab 0    fluticasone propionate (Flonase Allergy Relief) 50 mcg/actuation nasal spray 2 Sprays by Both Nostrils route daily as needed for Rhinitis. 1 Bottle 2    losartan (COZAAR) 50 mg tablet Take 1 Tab by mouth two (2) times a day. 60 Tab 6    sertraline (ZOLOFT) 100 mg tablet TAKE 1 TABLET BY MOUTH EVERY DAY 90 Tab 0    Ventolin HFA 90 mcg/actuation inhaler TAKE 2 PUFFS BY INHALATION DAILY. 18 Inhaler 5    warfarin (COUMADIN) 5 mg tablet TAKE 5MG DAILY EXCEPT 2.5MG ON TUE-THURS-SAT OR AS DIRECTED 60 Tab 6    triamcinolone acetonide (KENALOG) 0.1 % ointment Apply  to affected area two (2) times a day. use thin layer      hydrOXYzine pamoate (VISTARIL) 25 mg capsule TAKE 1 CAP BY MOUTH THREE (3) TIMES DAILY AS NEEDED FOR ITCHING. 30 Cap 0    clobetasoL (TEMOVATE) 0.05 % ointment Apply  to affected area two (2) times a day. 15 g 0    timolol (TIMOPTIC) 0.5 % ophthalmic solution       amLODIPine (NORVASC) 5 mg tablet TAKE 1 TABLET BY MOUTH EVERY DAY 90 Tab 3    furosemide (LASIX) 40 mg tablet Take 1 Tab by mouth daily. 90 Tab 3    montelukast (SINGULAIR) 10 mg tablet TAKE 1 TABLET BY MOUTH EVERY DAY 90 Tab 3    fluticasone propion-salmeterol (ADVAIR/WIXELA) 250-50 mcg/dose diskus inhaler Take 1 Puff by inhalation every twelve (12) hours. 1 Each 0    metoprolol succinate (TOPROL-XL) 100 mg tablet TAKE 1 TABLET BY MOUTH EVERY DAY 90 Tab 3    atorvastatin (LIPITOR) 40 mg tablet TAKE 1 TABLET BY MOUTH EVERY DAY 90 Tab 0    OTHER Allergy injections      aspirin delayed-release 81 mg tablet Take 1 Tab by mouth daily. 30 Tab 0    polyethylene glycol (MIRALAX) 17 gram packet Take 1 Packet by mouth daily. (Patient taking differently: Take  by mouth daily as needed for Other (Constipation). ) 30 Packet 0    esomeprazole (NEXIUM) 20 mg capsule Take 20 mg by mouth daily as needed (heart burn).  sodium chloride (SALINE NASAL) 0.65 % nasal squeeze bottle 1 Spray as needed for Congestion.  melatonin tab tablet Take 10 mg by mouth nightly.  Klor-Con M20 20 mEq tablet TAKE 1 TABLET BY MOUTH EVERY DAY 30 Tab 6     Allergies   Allergen Reactions    Hydrocodone Bitartrate Rash    Morphine Rash and Itching    Norco [Hydrocodone-Acetaminophen] Itching    Penicillin Hives     Past Medical History:   Diagnosis Date    Aortic valve disorders 1/13/2011    Asthma     Atrial fibrillation ([address] 1/13/2011    Congestive heart failure, unspecified March 2005    Cleared quickly with Lasix therapy    Echocardiogram 01/10/2012    A-fib. EF 45%. Mild RVE. RVSP 45-50. Massive LAE. Mod TAMI. Mod MS. Severe MR (mean grad 10). Mild-mod AI. Mild TR. Pulmonic systolic flow reversal.  Mild IVCE.  Hives     Hypertension     Iron deficiency anemias     Long term (current) use of anticoagulants 2/23/2011    Murmur 1/2011    Grade I-II/VI systolic ejection murmur along left sternal border, with radiation to the pulmonic area.  S/P cardiac cath 02/24/2012    Minimal coronary artery disease. Mild LVE. EF 55%. Mod MR. Mod-severe MS. RA 15.  RV 55/16. PA 58/34. W 36.  CO/CI 3.5/1.9 (TD); 3.61/1.9 (Dequan). R to L shunt suggested.     Wears glasses     Weight gain      Past Surgical History:   Procedure Laterality Date    HX HEART CATHETERIZATION  2/24/2012    HX HYSTERECTOMY  2006    HX MITRAL VALVE REPLACEMENT  05/22/2013    25/33 mm On-X mechanical valve with bilateral pulmonary vein isolation and resection of LA appendage     Family History   Problem Relation Age of Onset    Heart Surgery Father         Open-Heart Surgery    Other Father         Venous Thromboembolism    Hypertension Father     Heart Disease Mother         Enlarged Heart    Hypertension Mother     Diabetes Mother     Diabetes Sister     Hypertension Sister     Diabetes Brother     Heart Disease Paternal Grandmother     Diabetes Sister     Hypertension Sister      Social History     Tobacco Use    Smoking status: Former Smoker     Packs/day: 0.50     Years: 21.00     Pack years: 10.50     Last attempt to quit: 2008     Years since quittin.5    Smokeless tobacco: Never Used   Substance Use Topics    Alcohol use: Yes     Alcohol/week: 1.0 standard drinks     Types: 1 Glasses of wine per week     Comment: occassionally       ROS      Objective: There were no vitals taken for this visit. General: alert, cooperative, no distress   Mental  status: normal mood, behavior, speech, dress, motor activity, and thought processes, able to follow commands   HENT: NCAT   Neck: no visualized mass   Resp: no respiratory distress   Neuro: no gross deficits   Skin: no discoloration or lesions of concern on visible areas   Psychiatric: normal affect, consistent with stated mood, no evidence of hallucinations     Additional exam findings: We discussed the expected course, resolution and complications of the diagnosis(es) in detail. Medication risks, benefits, costs, interactions, and alternatives were discussed as indicated. I advised her to contact the office if her condition worsens, changes or fails to improve as anticipated. She expressed understanding with the diagnosis(es) and plan. Zaid Ortiz is a 62 y.o. female who was evaluated by a video visit encounter for concerns as above. Patient identification was verified prior to start of the visit. A caregiver was present when appropriate. Due to this being a TeleHealth encounter (During OBN-09 public health emergency), evaluation of the following organ systems was limited: Vitals/Constitutional/EENT/Resp/CV/GI//MS/Neuro/Skin/Heme-Lymph-Imm.   Pursuant to the emergency declaration under the [address] [place] [address], [address] [place] [address] and the [employer] Act, this Virtual Visit was conducted, with patient's (and/or legal guardian's) consent, to reduce the patient's risk of exposure to COVID-19 and provide necessary medical care. Services were provided through a video synchronous discussion virtually to substitute for in-person clinic visit. Patient and provider were located at their individual homes. Assessment & Plan:   Diagnoses and all orders for this visit:    1. Other allergic rhinitis  Non toxic appearing, mild symptoms  Add azelastine spray  Switch claritin to zyrtec  Switch flonase to rhinocort  Cont singulair  She is off allergy shots due to covid    2. Mild intermittent asthma without complication  Following pulm-upcoming asthma eval    3. Essential hypertension  Per pt controlled  Cont current meds    4. Atrial fibrillation, unspecified type ([address]  Asymptomatic  On BB, coumadin  Following cards    5. Chronic diastolic congestive heart failure (HCC)  Asymptomatic    6. Coronary artery disease involving native coronary artery of native heart without angina pectoris  Asymptomatic  On statin, asa, BB, ARB    7. Anxiety  Stable  Cont zoloft    Other orders  -     azelastine (ASTELIN) 137 mcg (0.1 %) nasal spray; 1 [place] by Both Nostrils route two (2) times a day. Use in each nostril as directed  -     budesonide (RHINOCORT AQUA) 32 mcg/actuation nasal spray; 2 Sprays by Both Nostrils route daily. -     cetirizine (ZYRTEC) 10 mg tablet; Take 1 Tab by mouth daily.     Keep appt in august or sooner therese Jeffers MD

## 2020-06-30 NOTE — PATIENT INSTRUCTIONS
Allergies: Care Instructions Your Care Instructions Allergies occur when your body's defense system (immune system) overreacts to certain substances. The immune system treats a harmless substance as if it were a harmful germ or virus. Many things can cause this overreaction, including pollens, medicine, food, dust, animal dander, and mold. Allergies can be mild or severe. Mild allergies can be managed with home treatment. But medicine may be needed to prevent problems. Managing your allergies is an important part of staying healthy. Your doctor may suggest that you have allergy testing to help find out what is causing your allergies. When you know what things trigger your symptoms, you can avoid them. This can prevent allergy symptoms and other health problems. For severe allergies that cause reactions that affect your whole body (anaphylactic reactions), your doctor may prescribe a shot of epinephrine to carry with you in case you have a severe reaction. Learn how to give yourself the shot and keep it with you at all times. Make sure it is not . Follow-up care is a key part of your treatment and safety. Be sure to make and go to all appointments, and call your doctor if you are having problems. It's also a good idea to know your test results and keep a list of the medicines you take. How can you care for yourself at home? · If you have been told by your doctor that dust or dust mites are causing your allergy, decrease the dust around your bed: 
? Wash sheets, pillowcases, and other bedding in hot water every week. ? Use dust-proof covers for pillows, duvets, and mattresses. Avoid plastic covers because they tear easily and do not \"breathe. \" Wash as instructed on the label. ? Do not use any blankets and pillows that you do not need. ? Use blankets that you can wash in your washing machine. ? Consider removing drapes and carpets, which attract and hold dust, from your bedroom. · If you are allergic to house dust and mites, do not use home humidifiers. Your doctor can suggest ways you can control dust and mites. · Look for signs of cockroaches. Cockroaches cause allergic reactions. Use cockroach baits to get rid of them. Then, clean your home well. Cockroaches like areas where grocery bags, newspapers, empty bottles, or cardboard boxes are stored. Do not keep these inside your home, and keep trash and food containers sealed. Seal off any spots where cockroaches might enter your home. · If you are allergic to mold, get rid of furniture, rugs, and drapes that smell musty. Check for mold in the bathroom. · If you are allergic to outdoor pollen or mold spores, use air-conditioning. Change or clean all filters every month. Keep windows closed. · If you are allergic to pollen, stay inside when pollen counts are high. Use a vacuum  with a HEPA filter or a double-thickness filter at least two times each week. · Stay inside when air pollution is bad. Avoid paint fumes, perfumes, and other strong odors. · Avoid conditions that make your allergies worse. Stay away from smoke. Do not smoke or let anyone else smoke in your house. Do not use fireplaces or wood-burning stoves. · If you are allergic to your pets, change the air filter in your furnace every month. Use high-efficiency filters. · If you are allergic to pet dander, keep pets outside or out of your bedroom. Old carpet and cloth furniture can hold a lot of animal dander. You may need to replace them. When should you call for help? Give an epinephrine shot if: 
· You think you are having a severe allergic reaction. · You have symptoms in more than one body area, such as mild nausea and an itchy mouth. After giving an epinephrine shot call 911, even if you feel better. [id number] if: 
· You have symptoms of a severe allergic reaction. These may include: 
? Sudden raised, red areas (hives) all over your body. ? Swelling of the throat, mouth, lips, or tongue. ? Trouble breathing. ? Passing out (losing consciousness). Or you may feel very lightheaded or suddenly feel weak, confused, or restless. · You have been given an epinephrine shot, even if you feel better. Call your doctor now or seek immediate medical care if: 
· You have symptoms of an allergic reaction, such as: ? A rash or hives (raised, red areas on the skin). ? Itching. ? Swelling. ? Belly pain, nausea, or vomiting. Watch closely for changes in your health, and be sure to contact your doctor if: 
· You do not get better as expected. Where can you learn more? Go to http://greyson-andrea.info/ Enter Y512 in the search box to learn more about \"Allergies: Care Instructions. \" Current as of: October 7, 2019               Content Version: 12.5 © 5902-2741 [employer]. Care instructions adapted under license by [employer] (which disclaims liability or warranty for this information). If you have questions about a medical condition or this instruction, always ask your healthcare professional. [place] [address] any warranty or liability for your use of this information.

## 2020-07-02 ENCOUNTER — HOSPITAL ENCOUNTER (OUTPATIENT)
Dept: LAB | Age: 60
Discharge: HOME | End: 2020-07-02
Payer: MEDICARE

## 2020-07-02 DIAGNOSIS — Z12.11 COLON CANCER SCREENING: ICD-10-CM

## 2020-07-02 PROCEDURE — 82274 ASSAY TEST FOR BLOOD FECAL: CPT

## 2020-07-03 LAB — HEMOCCULT STL QL IA: NEGATIVE

## 2020-07-24 RX ORDER — SERTRALINE HYDROCHLORIDE 100 MG/1
TABLET, FILM COATED ORAL
Qty: 90 TAB | Refills: 0 | Status: SHIPPED | OUTPATIENT
Start: 2020-07-24 | End: 2020-07-27

## 2020-07-27 ENCOUNTER — HOSPITAL ENCOUNTER (OUTPATIENT)
Dept: LAB | Age: 60
Discharge: HOME | End: 2020-07-27
Payer: MEDICARE

## 2020-07-27 DIAGNOSIS — R73.01 IFG (IMPAIRED FASTING GLUCOSE): ICD-10-CM

## 2020-07-27 DIAGNOSIS — E78.5 HYPERLIPIDEMIA, UNSPECIFIED HYPERLIPIDEMIA TYPE: ICD-10-CM

## 2020-07-27 LAB
ALBUMIN SERPL-MCNC: 3.6 G/DL (ref 3.4–5)
ALBUMIN/GLOB SERPL: 0.9 {RATIO} (ref 0.8–1.7)
ALP SERPL-CCNC: 75 U/L (ref 45–117)
ALT SERPL-CCNC: 25 U/L (ref 13–56)
ANION GAP SERPL CALC-SCNC: 6 MMOL/L (ref 3–18)
AST SERPL-CCNC: 26 U/L (ref 10–38)
BILIRUB SERPL-MCNC: 0.6 MG/DL (ref 0.2–1)
BUN SERPL-MCNC: 14 MG/DL (ref 7–18)
BUN/CREAT SERPL: 18 (ref 12–20)
CALCIUM SERPL-MCNC: 9 MG/DL (ref 8.5–10.1)
CHLORIDE SERPL-SCNC: 107 MMOL/L (ref 100–111)
CHOLEST SERPL-MCNC: 135 MG/DL
CO2 SERPL-SCNC: 28 MMOL/L (ref 21–32)
CREAT SERPL-MCNC: 0.8 MG/DL (ref 0.6–1.3)
GLOBULIN SER CALC-MCNC: 4 G/DL (ref 2–4)
GLUCOSE SERPL-MCNC: 95 MG/DL (ref 74–99)
HBA1C MFR BLD: 5.3 % (ref 4.2–5.6)
HDLC SERPL-MCNC: 60 MG/DL (ref 40–60)
HDLC SERPL: 2.3 {RATIO} (ref 0–5)
LDLC SERPL CALC-MCNC: 54.8 MG/DL (ref 0–100)
LIPID PROFILE,FLP: NORMAL
POTASSIUM SERPL-SCNC: 3.9 MMOL/L (ref 3.5–5.5)
PROT SERPL-MCNC: 7.6 G/DL (ref 6.4–8.2)
SODIUM SERPL-SCNC: 141 MMOL/L (ref 136–145)
TRIGL SERPL-MCNC: 101 MG/DL (ref ?–150)
VLDLC SERPL CALC-MCNC: 20.2 MG/DL

## 2020-07-27 PROCEDURE — 83036 HEMOGLOBIN GLYCOSYLATED A1C: CPT

## 2020-07-27 PROCEDURE — 80053 COMPREHEN METABOLIC PANEL: CPT

## 2020-07-27 PROCEDURE — 36415 COLL VENOUS BLD VENIPUNCTURE: CPT

## 2020-07-27 PROCEDURE — 80061 LIPID PANEL: CPT

## 2020-07-27 RX ORDER — SERTRALINE HYDROCHLORIDE 100 MG/1
TABLET, FILM COATED ORAL
Qty: 90 TAB | Refills: 0 | Status: SHIPPED | OUTPATIENT
Start: 2020-07-27 | End: 2021-01-15 | Stop reason: SDUPTHER

## 2020-08-06 ENCOUNTER — VIRTUAL VISIT (OUTPATIENT)
Dept: FAMILY MEDICINE CLINIC | Age: 60
End: 2020-08-06

## 2020-08-06 DIAGNOSIS — F41.9 ANXIETY: ICD-10-CM

## 2020-08-06 DIAGNOSIS — Z00.00 MEDICARE ANNUAL WELLNESS VISIT, SUBSEQUENT: ICD-10-CM

## 2020-08-06 DIAGNOSIS — Z71.89 ADVANCE CARE PLANNING: ICD-10-CM

## 2020-08-06 DIAGNOSIS — J31.0 CHRONIC RHINITIS: Primary | ICD-10-CM

## 2020-08-06 DIAGNOSIS — I10 ESSENTIAL HYPERTENSION: ICD-10-CM

## 2020-08-06 DIAGNOSIS — I50.32 CHRONIC DIASTOLIC CONGESTIVE HEART FAILURE (HCC): ICD-10-CM

## 2020-08-06 DIAGNOSIS — J45.20 MILD INTERMITTENT ASTHMA WITHOUT COMPLICATION: ICD-10-CM

## 2020-08-06 DIAGNOSIS — I25.10 CORONARY ARTERY DISEASE INVOLVING NATIVE CORONARY ARTERY OF NATIVE HEART WITHOUT ANGINA PECTORIS: ICD-10-CM

## 2020-08-06 DIAGNOSIS — Z12.31 BREAST CANCER SCREENING BY MAMMOGRAM: ICD-10-CM

## 2020-08-06 RX ORDER — METHYLPREDNISOLONE 4 MG/1
TABLET ORAL
Qty: 1 DOSE PACK | Refills: 0 | Status: SHIPPED | OUTPATIENT
Start: 2020-08-06 | End: 2020-10-28

## 2020-08-06 RX ORDER — CODEINE PHOSPHATE AND GUAIFENESIN 10; 100 MG/5ML; MG/5ML
5 SOLUTION ORAL
Qty: 115 ML | Refills: 0 | Status: SHIPPED | OUTPATIENT
Start: 2020-08-06 | End: 2020-08-13

## 2020-08-06 NOTE — PROGRESS NOTES
This is the Subsequent Medicare Annual Wellness Exam, performed 12 months or more after the Initial AWV or the last Subsequent AWV    I have reviewed the patient's medical history in detail and updated the computerized patient record. History     Patient Active Problem List   Diagnosis Code    Rheumatic valvular disease I09.1    Chronic diastolic congestive heart failure (HCC) I50.32    Atrial fibrillation (HCC) I48.91    Long term current use of anticoagulant therapy Z79.01    Benign hypertensive heart disease with heart failure (HCC) I11.0    Hyperlipidemia E78.5    Advanced directives, counseling/discussion Z71.89    Mild intermittent asthma without complication X00.97    NSTEMI (non-ST elevated myocardial infarction) ([facility] I21.4    Chest pain R07.9    IFG (impaired fasting glucose) R73.01    Adjustment disorder with anxious mood F43.22    Essential hypertension I10    Other allergic rhinitis J30.89    Anxiety F41.9    Allergic rhinitis due to pollen J30.1    Chronic sinusitis J32.9    Mitral valve stenosis I05.0    Elevated serum globulin level R77.1    Normocytic anemia D64.9    Anemia of chronic disease D63.8    Perennial allergic rhinitis with seasonal variation J30.89, J30.2    Vasomotor rhinitis J30.0     Past Medical History:   Diagnosis Date    Aortic valve disorders 1/13/2011    Asthma     Atrial fibrillation ([facility] 1/13/2011    Congestive heart failure, unspecified March 2005    Cleared quickly with Lasix therapy    Echocardiogram 01/10/2012    A-fib. EF 45%. Mild RVE. RVSP 45-50. Massive LAE. Mod TAMI. Mod MS. Severe MR (mean grad 10). Mild-mod AI. Mild TR. Pulmonic systolic flow reversal.  Mild IVCE.  Hives     Hypertension     Iron deficiency anemias     Long term (current) use of anticoagulants 2/23/2011    Murmur 1/2011    Grade I-II/VI systolic ejection murmur along left sternal border, with radiation to the pulmonic area.     S/P cardiac cath 02/24/2012    Minimal coronary artery disease. Mild LVE. EF 55%. Mod MR. Mod-severe MS. RA 15.  RV 55/16. PA 58/34. W 36.  CO/CI 3.5/1.9 (TD); 3.61/1.9 (Dequan). R to L shunt suggested.  Wears glasses     Weight gain       Past Surgical History:   Procedure Laterality Date    HX HEART CATHETERIZATION  2/24/2012    HX HYSTERECTOMY  2006    HX MITRAL VALVE REPLACEMENT  05/22/2013    25/33 mm On-X mechanical valve with bilateral pulmonary vein isolation and resection of LA appendage     Current Outpatient Medications   Medication Sig Dispense Refill    methylPREDNISolone (MEDROL DOSEPACK) 4 mg tablet Use as directe 1 Dose Pack 0    guaiFENesin-codeine (Cheratussin AC) 100-10 mg/5 mL solution Take 5 mL by mouth three (3) times daily as needed for Cough for up to 7 days. Max Daily Amount: 15 mL. 115 mL 0    sertraline (ZOLOFT) 100 mg tablet TAKE 1 TABLET BY MOUTH EVERY DAY 90 Tab 0    metoprolol succinate (TOPROL-XL) 100 mg tablet TAKE 1 TABLET BY MOUTH EVERY DAY 90 Tab 1    Klor-Con M20 20 mEq tablet TAKE 1 TABLET BY MOUTH EVERY DAY 30 Tab 6    budesonide (RHINOCORT AQUA) 32 mcg/actuation nasal spray 2 Sprays by Both Nostrils route daily. 1 Bottle 2    cetirizine (ZYRTEC) 10 mg tablet Take 1 Tab by mouth daily. 90 Tab 0    losartan (COZAAR) 50 mg tablet Take 1 Tab by mouth two (2) times a day. (Patient taking differently: Take 50 mg by mouth daily.) 60 Tab 6    Ventolin HFA 90 mcg/actuation inhaler TAKE 2 PUFFS BY INHALATION DAILY. 18 Inhaler 5    warfarin (COUMADIN) 5 mg tablet TAKE 5MG DAILY EXCEPT 2.5MG ON TUE-THURS-SAT OR AS DIRECTED 60 Tab 6    triamcinolone acetonide (KENALOG) 0.1 % ointment Apply  to affected area two (2) times a day. use thin layer      hydrOXYzine pamoate (VISTARIL) 25 mg capsule TAKE 1 CAP BY MOUTH THREE (3) TIMES DAILY AS NEEDED FOR ITCHING. 30 Cap 0    clobetasoL (TEMOVATE) 0.05 % ointment Apply  to affected area two (2) times a day.  15 g 0    timolol (TIMOPTIC) 0.5 % ophthalmic solution       amLODIPine (NORVASC) 5 mg tablet TAKE 1 TABLET BY MOUTH EVERY DAY 90 Tab 3    furosemide (LASIX) 40 mg tablet Take 1 Tab by mouth daily. 90 Tab 3    montelukast (SINGULAIR) 10 mg tablet TAKE 1 TABLET BY MOUTH EVERY DAY 90 Tab 3    fluticasone propion-salmeterol (ADVAIR/WIXELA) 250-50 mcg/dose diskus inhaler Take 1 Puff by inhalation every twelve (12) hours. 1 Each 0    atorvastatin (LIPITOR) 40 mg tablet TAKE 1 TABLET BY MOUTH EVERY DAY 90 Tab 0    aspirin delayed-release 81 mg tablet Take 1 Tab by mouth daily. 30 Tab 0    polyethylene glycol (MIRALAX) 17 gram packet Take 1 Packet by mouth daily. (Patient taking differently: Take  by mouth daily as needed for Other (Constipation). ) 30 Packet 0    esomeprazole (NEXIUM) 20 mg capsule Take 20 mg by mouth daily as needed (heart burn).  sodium chloride (SALINE NASAL) 0.65 % nasal squeeze bottle 1 Spray as needed for Congestion.  melatonin tab tablet Take 10 mg by mouth nightly.  OTHER Allergy injections       Allergies   Allergen Reactions    Hydrocodone Bitartrate Rash    Morphine Rash and Itching    Norco [Hydrocodone-Acetaminophen] Itching    Penicillin Hives       Family History   Problem Relation Age of Onset    Heart Surgery Father         Open-Heart Surgery    Other Father         Venous Thromboembolism    Hypertension Father     Heart Disease Mother         Enlarged Heart    Hypertension Mother    Nirmala Peabody Diabetes Mother     Diabetes Sister     Hypertension Sister     Diabetes Brother     Heart Disease Paternal Grandmother     Diabetes Sister     Hypertension Sister      Social History     Tobacco Use    Smoking status: Former Smoker     Packs/day: 0.50     Years: 21.00     Pack years: 10.50     Last attempt to quit:      Years since quittin.6    Smokeless tobacco: Never Used   Substance Use Topics    Alcohol use:  Yes     Alcohol/week: 1.0 standard drinks     Types: 1 Glasses of wine per week     Comment: occassionally       Depression Risk Factor Screening:     3 most recent PHQ Screens 8/6/2020   PHQ Not Done Active Diagnosis of Depression or Bipolar Disorder   Little interest or pleasure in doing things -   Feeling down, depressed, irritable, or hopeless -   Total Score PHQ 2 -       Alcohol Risk Factor Screening:   Do you average 1 drink per night or more than 7 drinks a week:  No    On any one occasion in the past three months have you have had more than 3 drinks containing alcohol:  No      Functional Ability and Level of Safety:   Hearing: Hearing is good. Activities of Daily Living: The home contains: no safety equipment. Patient does total self care     Ambulation: with no difficulty     Fall Risk:  Fall Risk Assessment, last 12 mths 8/6/2020   Able to walk? Yes   Fall in past 12 months?  No     Abuse Screen:  Patient is not abused       Cognitive Screening   Has your family/caregiver stated any concerns about your memory: no       Patient Care Team   Patient Care Team:  Steff Abebe MD as PCP - General (Family Medicine)  Steff Abebe MD as PCP - [address] [place] [address] Provider  Hawa Mireles DO as Physician (Cardiology)  Hawa Mireles DO (Cardiology)  Cinthya Ash MD (Otolaryngology)  Luis Price MD (Pulmonary Disease)    Assessment/Plan   Education and counseling provided:  Are appropriate based on today's review and evaluation  End-of-Life planning (with patient's consent)- discussed  Pneumococcal Vaccine- 23 completed  Influenza Vaccine- annually   Screening Mammography 5/2019  Screening Pap and pelvic (covered once every 2 years)- will need to check records  Colorectal cancer screening tests- 6/2020 FIT test  Cardiovascular screening blood test- 7/2020        Health Maintenance Due   Topic Date Due    DTaP/Tdap/Td series (1 - Tdap) 07/04/1979    Shingrix Vaccine Age 50> (1 of 2) 07/04/2008    Medicare Yearly Exam  06/24/2020    Influenza Age 5 to Adult 08/01/2020       Josh Beckwith, who was evaluated through a synchronous (real-time) audio-video encounter, and/or her healthcare decision maker, is aware that it is a billable service, with coverage as determined by her insurance carrier. She provided verbal consent to proceed: Yes, and patient identification was verified. It was conducted pursuant to the emergency declaration under the [facility], [address] [place] [address] and the [employer] Act. A caregiver was present when appropriate. Ability to conduct physical exam was limited. I was in the office. The patient was at home. Royal Lundborg, MD     Josh Beckwith is a 56 y.o. female who was seen by synchronous (real-time) audio-video technology on 8/6/2020. Consent: Josh Beckwith, who was seen by synchronous (real-time) audio-video technology, and/or her healthcare decision maker, is aware that this patient-initiated, Telehealth encounter on 8/6/2020 is a billable service, with coverage as determined by her insurance carrier. She is aware that she may receive a bill and has provided verbal consent to proceed: Yes. 712  Subjective:   Josh Beckwith is a 56 y.o. female who was seen for No chief complaint on file. Ff-up      chronic allergic rhinitis- continues to have bothersome symptoms, difficulty sleeping due to post nasal drip/incessant coughing, lear rhinorrhea, post nasal drip and freq throat clearing, worse at night when lying down. we have tried switching her medications. She is currently on singulair/budesonide and zyrtec without much improvement. .  She was receiving allergy shots, up until march she decided to hold off due to covid. She says azelastine spray was too expensive. She denies sob, wheezing, fever, cough.  She is requesting cough syrup and po steroids which has helped her in the past.      Depression/anxiety- reports to be doing fairly well, taking zoloft     She has h/o NSTEMI/afib/ rhem MV s/p repair- no current symptoms,  Her pulmonologist was recommending metacholine challenge test to evaluate for asthma, has yet to pursue.      Prior to Admission medications    Medication Sig Start Date End Date Taking? Authorizing Provider   methylPREDNISolone (MEDROL DOSEPACK) 4 mg tablet Use as directe 8/6/20  Yes Rosina Montesinos MD   guaiFENesin-codeine (Cheratussin AC) 100-10 mg/5 mL solution Take 5 mL by mouth three (3) times daily as needed for Cough for up to 7 days. Max Daily Amount: 15 mL. 8/6/20 8/13/20 Yes Rosina Montesinos MD   sertraline (ZOLOFT) 100 mg tablet TAKE 1 TABLET BY MOUTH EVERY DAY 7/27/20  Yes Hillary Coronado MD   metoprolol succinate (TOPROL-XL) 100 mg tablet TAKE 1 TABLET BY MOUTH EVERY DAY 7/20/20  Yes Rosina Montesinos MD   Klor-Con M20 20 mEq tablet TAKE 1 TABLET BY MOUTH EVERY DAY 6/29/20  Yes Clark Erazo MD   budesonide (RHINOCORT AQUA) 32 mcg/actuation nasal spray 2 Sprays by Both Nostrils route daily. 6/29/20  Yes Rosina Montesinos MD   cetirizine (ZYRTEC) 10 mg tablet Take 1 Tab by mouth daily. 6/29/20  Yes Rosina Montesinos MD   losartan (COZAAR) 50 mg tablet Take 1 Tab by mouth two (2) times a day. Patient taking differently: Take 50 mg by mouth daily. 5/1/20  Yes Clark Erazo MD   Ventolin HFA 90 mcg/actuation inhaler TAKE 2 PUFFS BY INHALATION DAILY. 4/30/20  Yes Rosina Montesinos MD   warfarin (COUMADIN) 5 mg tablet TAKE 5MG DAILY EXCEPT 2.5MG ON TUE-THURS-SAT OR AS DIRECTED 2/20/20  Yes Jack Brandt NP   triamcinolone acetonide (KENALOG) 0.1 % ointment Apply  to affected area two (2) times a day. use thin layer   Yes Provider, Historical   hydrOXYzine pamoate (VISTARIL) 25 mg capsule TAKE 1 CAP BY MOUTH THREE (3) TIMES DAILY AS NEEDED FOR ITCHING. 2/18/20  Yes Hillary Coronado MD   clobetasoL (TEMOVATE) 0.05 % ointment Apply  to affected area two (2) times a day. 2/6/20  Yes Shobha Massey MD   timolol (TIMOPTIC) 0.5 % ophthalmic solution  1/20/20  Yes Provider, Historical   amLODIPine (NORVASC) 5 mg tablet TAKE 1 TABLET BY MOUTH EVERY DAY 12/9/19  Yes Shobha Massey MD   furosemide (LASIX) 40 mg tablet Take 1 Tab by mouth daily. 12/3/19  Yes John Juarez, DO   montelukast (SINGULAIR) 10 mg tablet TAKE 1 TABLET BY MOUTH EVERY DAY 12/2/19  Yes Vernell Coronado MD   fluticasone propion-salmeterol (ADVAIR/WIXELA) 250-50 mcg/dose diskus inhaler Take 1 Puff by inhalation every twelve (12) hours. 10/21/19  Yes Shobha Massey MD   atorvastatin (LIPITOR) 40 mg tablet TAKE 1 TABLET BY MOUTH EVERY DAY 7/21/19  Yes Shobha Massey MD   aspirin delayed-release 81 mg tablet Take 1 Tab by mouth daily. 1/11/19  Yes Valentina Vásquez MD   polyethylene glycol (MIRALAX) 17 gram packet Take 1 Packet by mouth daily. Patient taking differently: Take  by mouth daily as needed for Other (Constipation). 1/11/19  Yes Valentina Vásquez MD   esomeprazole (NEXIUM) 20 mg capsule Take 20 mg by mouth daily as needed (heart burn). Yes Provider, Historical   sodium chloride (SALINE NASAL) 0.65 % nasal squeeze bottle 1 Spray as needed for Congestion. Yes Provider, Historical   melatonin tab tablet Take 10 mg by mouth nightly. Yes Provider, Historical   azelastine (ASTELIN) 137 mcg (0.1 %) nasal spray 1 [place] by Both Nostrils route two (2) times a day. Use in each nostril as directed 6/29/20 8/6/20  Shobha Massey MD   fluticasone propionate (Flonase Allergy Relief) 50 mcg/actuation nasal spray 2 Sprays by Both Nostrils route daily as needed for Rhinitis.  6/5/20 8/6/20  Shobha Massey MD   OTHER Allergy injections    Provider, Historical     Allergies   Allergen Reactions    Hydrocodone Bitartrate Rash    Morphine Rash and Itching    Norco [Hydrocodone-Acetaminophen] Itching    Penicillin Hives       Patient Active Problem List    Diagnosis Date Noted    Vasomotor rhinitis 02/03/2020    Anemia of chronic disease 11/06/2019    Chronic sinusitis 10/15/2019    Elevated serum globulin level 10/15/2019    Normocytic anemia 10/15/2019    Anxiety 10/08/2019    Essential hypertension 06/24/2019    Adjustment disorder with anxious mood 03/05/2019    IFG (impaired fasting glucose) 02/25/2019    NSTEMI (non-ST elevated myocardial infarction) ([facility] 01/05/2019    Chest pain 01/05/2019    Other allergic rhinitis 11/30/2018    Allergic rhinitis due to pollen 11/30/2018    Perennial allergic rhinitis with seasonal variation 11/30/2018    Mild intermittent asthma without complication 30/92/0270    Advanced directives, counseling/discussion 03/09/2017    Mitral valve stenosis 05/22/2013    Hyperlipidemia 02/02/2012    Benign hypertensive heart disease with heart failure ([facility] 08/05/2011    Long term current use of anticoagulant therapy 02/23/2011    Atrial fibrillation (HCC) 01/20/2011    Rheumatic valvular disease     Chronic diastolic congestive heart failure (HCC) 03/01/2005     Current Outpatient Medications   Medication Sig Dispense Refill    methylPREDNISolone (MEDROL DOSEPACK) 4 mg tablet Use as directe 1 Dose Pack 0    guaiFENesin-codeine (Cheratussin AC) 100-10 mg/5 mL solution Take 5 mL by mouth three (3) times daily as needed for Cough for up to 7 days. Max Daily Amount: 15 mL. 115 mL 0    sertraline (ZOLOFT) 100 mg tablet TAKE 1 TABLET BY MOUTH EVERY DAY 90 Tab 0    metoprolol succinate (TOPROL-XL) 100 mg tablet TAKE 1 TABLET BY MOUTH EVERY DAY 90 Tab 1    Klor-Con M20 20 mEq tablet TAKE 1 TABLET BY MOUTH EVERY DAY 30 Tab 6    budesonide (RHINOCORT AQUA) 32 mcg/actuation nasal spray 2 Sprays by Both Nostrils route daily. 1 Bottle 2    cetirizine (ZYRTEC) 10 mg tablet Take 1 Tab by mouth daily. 90 Tab 0    losartan (COZAAR) 50 mg tablet Take 1 Tab by mouth two (2) times a day.  (Patient taking differently: Take 50 mg by mouth daily.) 60 Tab 6    Ventolin HFA 90 mcg/actuation inhaler TAKE 2 PUFFS BY INHALATION DAILY. 18 Inhaler 5    warfarin (COUMADIN) 5 mg tablet TAKE 5MG DAILY EXCEPT 2.5MG ON TUE-THURS-SAT OR AS DIRECTED 60 Tab 6    triamcinolone acetonide (KENALOG) 0.1 % ointment Apply  to affected area two (2) times a day. use thin layer      hydrOXYzine pamoate (VISTARIL) 25 mg capsule TAKE 1 CAP BY MOUTH THREE (3) TIMES DAILY AS NEEDED FOR ITCHING. 30 Cap 0    clobetasoL (TEMOVATE) 0.05 % ointment Apply  to affected area two (2) times a day. 15 g 0    timolol (TIMOPTIC) 0.5 % ophthalmic solution       amLODIPine (NORVASC) 5 mg tablet TAKE 1 TABLET BY MOUTH EVERY DAY 90 Tab 3    furosemide (LASIX) 40 mg tablet Take 1 Tab by mouth daily. 90 Tab 3    montelukast (SINGULAIR) 10 mg tablet TAKE 1 TABLET BY MOUTH EVERY DAY 90 Tab 3    fluticasone propion-salmeterol (ADVAIR/WIXELA) 250-50 mcg/dose diskus inhaler Take 1 Puff by inhalation every twelve (12) hours. 1 Each 0    atorvastatin (LIPITOR) 40 mg tablet TAKE 1 TABLET BY MOUTH EVERY DAY 90 Tab 0    aspirin delayed-release 81 mg tablet Take 1 Tab by mouth daily. 30 Tab 0    polyethylene glycol (MIRALAX) 17 gram packet Take 1 Packet by mouth daily. (Patient taking differently: Take  by mouth daily as needed for Other (Constipation). ) 30 Packet 0    esomeprazole (NEXIUM) 20 mg capsule Take 20 mg by mouth daily as needed (heart burn).  sodium chloride (SALINE NASAL) 0.65 % nasal squeeze bottle 1 Spray as needed for Congestion.  melatonin tab tablet Take 10 mg by mouth nightly.       OTHER Allergy injections       Allergies   Allergen Reactions    Hydrocodone Bitartrate Rash    Morphine Rash and Itching    Norco [Hydrocodone-Acetaminophen] Itching    Penicillin Hives     Past Medical History:   Diagnosis Date    Aortic valve disorders 1/13/2011    Asthma     Atrial fibrillation ([facility] 1/13/2011    Congestive heart failure, unspecified March 2005    Cleared quickly with Lasix therapy    Echocardiogram 01/10/2012    A-fib. EF 45%. Mild RVE. RVSP 45-50. Massive LAE. Mod TAMI. Mod MS. Severe MR (mean grad 10). Mild-mod AI. Mild TR. Pulmonic systolic flow reversal.  Mild IVCE.  Hives     Hypertension     Iron deficiency anemias     Long term (current) use of anticoagulants 2011    Murmur 2011    Grade I-II/VI systolic ejection murmur along left sternal border, with radiation to the pulmonic area.  S/P cardiac cath 2012    Minimal coronary artery disease. Mild LVE. EF 55%. Mod MR. Mod-severe MS. RA 15.  RV 55/16. PA 58/34. W 36.  CO/CI 3.5/1.9 (TD); 3.61/1.9 (Dequan). R to L shunt suggested.  Wears glasses     Weight gain      Past Surgical History:   Procedure Laterality Date    HX HEART CATHETERIZATION  2012    HX HYSTERECTOMY      HX MITRAL VALVE REPLACEMENT  2013    25/33 mm On-X mechanical valve with bilateral pulmonary vein isolation and resection of LA appendage     Family History   Problem Relation Age of Onset    Heart Surgery Father         Open-Heart Surgery    Other Father         Venous Thromboembolism    Hypertension Father     Heart Disease Mother         Enlarged Heart    Hypertension Mother    [facility] Diabetes Mother     Diabetes Sister     Hypertension Sister     Diabetes Brother     Heart Disease Paternal Grandmother     Diabetes Sister     Hypertension Sister      Social History     Tobacco Use    Smoking status: Former Smoker     Packs/day: 0.50     Years: 21.00     Pack years: 10.50     Last attempt to quit:      Years since quittin.6    Smokeless tobacco: Never Used   Substance Use Topics    Alcohol use: Yes     Alcohol/week: 1.0 standard drinks     Types: 1 Glasses of wine per week     Comment: occassionally       ROS      Objective: There were no vitals taken for this visit.    General: alert, cooperative, no distress   Mental  status: normal mood, behavior, speech, dress, motor activity, and thought processes, able to follow commands   HENT: NCAT   Neck: no visualized mass   Resp: no respiratory distress   Neuro: no gross deficits   Skin: no discoloration or lesions of concern on visible areas   Psychiatric: normal affect, consistent with stated mood, no evidence of hallucinations     Additional exam findings:   Results for orders placed or performed during the hospital encounter of 67/26/16   METABOLIC PANEL, COMPREHENSIVE   Result Value Ref Range    Sodium 141 136 - 145 mmol/L    Potassium 3.9 3.5 - 5.5 mmol/L    Chloride 107 100 - 111 mmol/L    CO2 28 21 - 32 mmol/L    Anion gap 6 3.0 - 18 mmol/L    Glucose 95 74 - 99 mg/dL    BUN 14 7.0 - 18 MG/DL    Creatinine 0.80 0.6 - 1.3 MG/DL    BUN/Creatinine ratio 18 12 - 20      GFR est AA >60 >60 ml/min/1.73m2    GFR est non-AA >60 >60 ml/min/1.73m2    Calcium 9.0 8.5 - 10.1 MG/DL    Bilirubin, total 0.6 0.2 - 1.0 MG/DL    ALT (SGPT) 25 13 - 56 U/L    AST (SGOT) 26 10 - 38 U/L    Alk. phosphatase 75 45 - 117 U/L    Protein, total 7.6 6.4 - 8.2 g/dL    Albumin 3.6 3.4 - 5.0 g/dL    Globulin 4.0 2.0 - 4.0 g/dL    A-G Ratio 0.9 0.8 - 1.7     LIPID PANEL   Result Value Ref Range    LIPID PROFILE          Cholesterol, total 135 <200 MG/DL    Triglyceride 101 <150 MG/DL    HDL Cholesterol 60 40 - 60 MG/DL    LDL, calculated 54.8 0 - 100 MG/DL    VLDL, calculated 20.2 MG/DL    CHOL/HDL Ratio 2.3 0 - 5.0     HEMOGLOBIN A1C W/O EAG   Result Value Ref Range    Hemoglobin A1c 5.3 4.2 - 5.6 %         We discussed the expected course, resolution and complications of the diagnosis(es) in detail. Medication risks, benefits, costs, interactions, and alternatives were discussed as indicated. I advised her to contact the office if her condition worsens, changes or fails to improve as anticipated. She expressed understanding with the diagnosis(es) and plan. Laure Jenkins is a 56 y.o. female who was evaluated by a video visit encounter for concerns as above.  Patient identification was verified prior to start of the visit. A caregiver was present when appropriate. Due to this being a TeleHealth encounter (During IYRDN-13 public health emergency), evaluation of the following organ systems was limited: Vitals/Constitutional/EENT/Resp/CV/GI//MS/Neuro/Skin/Heme-Lymph-Imm. Pursuant to the emergency declaration under the [facility], [facility] waiver authority and the [employer] Act, this Virtual  Visit was conducted, with patient's (and/or legal guardian's) consent, to reduce the patient's risk of exposure to COVID-19 and provide necessary medical care. Services were provided through a video synchronous discussion virtually to substitute for in-person clinic visit. Patient and provider were located at their individual homes. Assessment & Plan:   Diagnoses and all orders for this visit:     1. Chronic allergic rhinitis  Non toxic appearing, mild symptoms  cont zyrtec  cont rhinocort  Cont singulair  She is off allergy shots due to covid   given medrol dose pack and cheratussin, previously effective for her  Consider resuming allergy shots    2. Mild intermittent asthma without complication  Following pulm for asthma eval     3. Essential hypertension  Per pt controlled  Cont current meds     4. Atrial fibrillation, unspecified type ([address]  Asymptomatic  On BB, coumadin  Following cards     5. Chronic diastolic congestive heart failure (HCC)  Asymptomatic     6. Coronary artery disease involving native coronary artery of native heart without angina pectoris  Asymptomatic   LDL at goal <70  On statin, asa, BB, ARB   following cardiology    7.  Anxiety  Stable  Cont zoloft    Ff-up in 3 months or sooner prrach Dias MD

## 2020-08-06 NOTE — PATIENT INSTRUCTIONS
Medicare Wellness Visit, Female The best way to live healthy is to have a lifestyle where you eat a well-balanced diet, exercise regularly, limit alcohol use, and quit all forms of tobacco/nicotine, if applicable. Regular preventive services are another way to keep healthy. Preventive services (vaccines, screening tests, monitoring & exams) can help personalize your care plan, which helps you manage your own care. Screening tests can find health problems at the earliest stages, when they are easiest to treat. [facility] follows the current, evidence-based guidelines published by the [facility] ([facility]) when recommending preventive services for our patients. Because we follow these guidelines, sometimes recommendations change over time as research supports it. (For example, mammograms used to be recommended annually. Even though Medicare will still pay for an annual mammogram, the newer guidelines recommend a mammogram every two years for women of average risk). Of course, you and your doctor may decide to screen more often for some diseases, based on your risk and your co-morbidities (chronic disease you are already diagnosed with). Preventive services for you include: - Medicare offers their members a free annual wellness visit, which is time for you and your primary care provider to discuss and plan for your preventive service needs. Take advantage of this benefit every year! 
-All adults over the age of 72 should receive the recommended pneumonia vaccines. Current USPSTF guidelines recommend a series of two vaccines for the best pneumonia protection.  
-All adults should have a flu vaccine yearly and a tetanus vaccine every 10 years.  
-All adults age 48 and older should receive the shingles vaccines (series of two vaccines). -All adults age 38-68 who are overweight should have a diabetes screening test once every three years. -All adults born between [birth year] and 1967 should be screened once for Hepatitis C. 
-Other screening tests and preventive services for persons with diabetes include: an eye exam to screen for diabetic retinopathy, a kidney function test, a foot exam, and stricter control over your cholesterol.  
-Cardiovascular screening for adults with routine risk involves an electrocardiogram (ECG) at intervals determined by your doctor.  
-Colorectal cancer screenings should be done for adults age 54-65 with no increased risk factors for colorectal cancer. There are a number of acceptable methods of screening for this type of cancer. Each test has its own benefits and drawbacks. Discuss with your doctor what is most appropriate for you during your annual wellness visit. The different tests include: colonoscopy (considered the best screening method), a fecal occult blood test, a fecal DNA test, and sigmoidoscopy. 
 
-A bone mass density test is recommended when a woman turns 65 to screen for osteoporosis. This test is only recommended one time, as a screening. Some providers will use this same test as a disease monitoring tool if you already have osteoporosis. -Breast cancer screenings are recommended every other year for women of normal risk, age 54-69. 
-Cervical cancer screenings for women over age 72 are only recommended with certain risk factors. Here is a list of your current Health Maintenance items (your personalized list of preventive services) with a due date: 
Health Maintenance Due Topic Date Due  
 DTaP/Tdap/Td  (1 - Tdap) 07/04/1979  Shingles Vaccine (1 of 2) 07/04/2008 [address] [place] [address] Annual Well Visit  06/24/2020  Flu Vaccine  08/01/2020

## 2020-08-06 NOTE — ACP (ADVANCE CARE PLANNING)
Advance Care Planning (ACP) Physician/NP/PA (Provider) Conversation        Date of ACP Conversation: 8/6/2020    Conversation Conducted with:   Patient with [address] Maker:    Current Designated Health Care Decision Maker:   Primary Decision Maker: [facility] - [phone]  (If there is a [address] named in the [address] Makers\" box in the ACP activity, but it is not visible above, be sure to open that field and then select the health care decision maker relationship (ie \"primary\") in the blank space to the right of the name.)    Note: Assess and validate information in current ACP documents, as indicated. If no Authorized Decision Maker has previously been identified, then patient chooses [address]:      Note: If the relationship of these Decision-Makers to the patient does NOT follow your state's Next of Kin hierarchy, recommend that patient complete ACP document that meets state-specific requirements to allow them to act on the patient's behalf when appropriate. Care Preferences:    Hospitalization: \"If your health worsens and it becomes clear that your chance of recovery is unlikely, what would your preference be regarding hospitalization? \"  If the patient would want hospitalization, answer \"yes\". If the patient would prefer comfort-focused treatment without hospitalization, answer \"no\". no      Ventilation: \"If you were in your present state of health and suddenly became very ill and were unable to breathe on your own, what would your preference be about the use of a ventilator (breathing machine) if it was available to you? \"    If patient would desire the use of a ventilator (breathing machine), answer \"yes\", if not answer \"no\":yes    \"If your health worsens and it becomes clear that your chance of recovery is unlikely, what would your preference be about the use of a ventilator (breathing machine) if it was available to you? \"   no      Resuscitation:  \"CPR works best to restart the heart when there is a sudden event, like a heart attack, in someone who is otherwise healthy. Unfortunately, CPR does not typically restart the heart for people who have serious health conditions or who are very sick. \"    \"In the event your heart stopped as a result of an underlying serious health condition, would you want attempts to be made to restart your heart (answer \"yes\" for attempt to resuscitate) or would you prefer a natural death (answer \"no\" for do not attempt to resuscitate)? \"   no    NOTE: If the patient has a valid advance directive AND provides care preference(s) that are inconsistent with that prior directive, advise the patient to consider either: creating a new advance directive that complies with state-specific requirements; or, if that is not possible, orally revoking that prior directive in accordance with state-specific requirements, which must be documented in the EHR.     Conversation Outcomes / Follow-Up Plan:   Recommended completion of Advance Directive  Referral made to ACP Specialist      Length of Voluntary ACP Conversation in minutes:  16 minutes      Kelechi Juarez MD

## 2020-08-10 ENCOUNTER — TELEPHONE (OUTPATIENT)
Dept: FAMILY MEDICINE CLINIC | Age: 60
End: 2020-08-10

## 2020-08-10 DIAGNOSIS — E78.5 HYPERLIPIDEMIA LDL GOAL <70: ICD-10-CM

## 2020-08-10 RX ORDER — ATORVASTATIN CALCIUM 40 MG/1
TABLET, FILM COATED ORAL
Qty: 90 TAB | Refills: 2 | Status: SHIPPED | OUTPATIENT
Start: 2020-08-10 | End: 2021-03-05 | Stop reason: SDUPTHER

## 2020-08-10 NOTE — TELEPHONE ENCOUNTER
ACP Specialist reached out to pt via telephone contact to initiate conversation re: Advance Care Planning and the [place] for [employer]. No answer. This is the first attempt to reach patient. ACP Specialist provided contact information and encouraged a call back at her convenience.      ANITA Hannah, THEAW  ACP Specialist  Advance Care Planning Team  (M) [phone]

## 2020-08-11 ENCOUNTER — TELEPHONE (OUTPATIENT)
Dept: FAMILY MEDICINE CLINIC | Age: 60
End: 2020-08-11

## 2020-08-11 DIAGNOSIS — J01.90 ACUTE SINUSITIS, RECURRENCE NOT SPECIFIED, UNSPECIFIED LOCATION: Primary | ICD-10-CM

## 2020-08-11 RX ORDER — AZITHROMYCIN 250 MG/1
TABLET, FILM COATED ORAL
Qty: 6 TAB | Refills: 0 | Status: SHIPPED | OUTPATIENT
Start: 2020-08-11 | End: 2020-10-28

## 2020-08-11 NOTE — TELEPHONE ENCOUNTER
Pt called stating that her mucus has turned to a yellowish color and she states that she was supposed to call and let Dr Chance Sylvester know if anything changed with how she is feeling. Everything is about the same. Please advise.

## 2020-08-12 ENCOUNTER — TELEPHONE (OUTPATIENT)
Dept: FAMILY MEDICINE CLINIC | Age: 60
End: 2020-08-12

## 2020-08-12 DIAGNOSIS — I11.0 BENIGN HYPERTENSIVE HEART DISEASE WITH HEART FAILURE (HCC): ICD-10-CM

## 2020-08-12 RX ORDER — FUROSEMIDE 40 MG/1
TABLET ORAL
Qty: 90 TAB | Refills: 3 | Status: SHIPPED | OUTPATIENT
Start: 2020-08-12 | End: 2021-08-24

## 2020-08-12 NOTE — TELEPHONE ENCOUNTER
ACP Specialist reached out to pt via telephone contact to initiate conversation re: Advance Care Planning and the [place] for [employer]. No answer. This is the 2nd attempt to reach patient. ACP Specialist provided contact information and encouraged a call back at her convenience.      ANITA Lama, LCSW  ACP Specialist  Advance Care Planning Team  (V) [phone]

## 2020-08-12 NOTE — TELEPHONE ENCOUNTER
Patient identified with 2 identifiers (name and ). Patient aware that mindy nunez was E scribed to her pharmacy.

## 2020-08-14 ENCOUNTER — ANTI-COAG VISIT (OUTPATIENT)
Dept: CARDIOLOGY CLINIC | Age: 60
End: 2020-08-14

## 2020-08-14 DIAGNOSIS — Z79.01 LONG TERM CURRENT USE OF ANTICOAGULANT THERAPY: Primary | ICD-10-CM

## 2020-08-14 LAB
INR BLD: 1.9
PT POC: 23.3 SECONDS
VALID INTERNAL CONTROL?: YES

## 2020-08-19 ENCOUNTER — TELEPHONE (OUTPATIENT)
Dept: FAMILY MEDICINE CLINIC | Age: 60
End: 2020-08-19

## 2020-08-19 NOTE — TELEPHONE ENCOUNTER
ACP Specialist reached out to pt via telephone contact to initiate conversation re: Advance Care Planning and the [place] for [employer]. No answer. This is the 3rd attempt to reach patient. ACP Specialist provided contact information and encouraged a call back at her convenience. Another outgoing call to pt will not be placed.      Allegra Ca, ANITA, LCSW  ACP Specialist  Advance Care Planning Team  (D) [phone]

## 2020-08-21 ENCOUNTER — VIRTUAL VISIT (OUTPATIENT)
Dept: ONCOLOGY | Age: 60
End: 2020-08-21

## 2020-08-21 DIAGNOSIS — D63.8 ANEMIA, CHRONIC DISEASE: Primary | ICD-10-CM

## 2020-08-24 ENCOUNTER — HOSPITAL ENCOUNTER (OUTPATIENT)
Dept: LAB | Age: 60
Discharge: HOME | End: 2020-08-24
Payer: MEDICARE

## 2020-08-24 ENCOUNTER — LAB ONLY (OUTPATIENT)
Dept: ONCOLOGY | Age: 60
End: 2020-08-24

## 2020-08-24 DIAGNOSIS — D63.8 ANEMIA, CHRONIC DISEASE: ICD-10-CM

## 2020-08-24 DIAGNOSIS — R77.1 ELEVATED SERUM GLOBULIN LEVEL: ICD-10-CM

## 2020-08-24 DIAGNOSIS — D63.8 ANEMIA, CHRONIC DISEASE: Primary | ICD-10-CM

## 2020-08-24 LAB
ALBUMIN SERPL-MCNC: 3.5 G/DL (ref 3.4–5)
ALBUMIN/GLOB SERPL: 0.9 {RATIO} (ref 0.8–1.7)
ALP SERPL-CCNC: 78 U/L (ref 45–117)
ALT SERPL-CCNC: 27 U/L (ref 13–56)
ANION GAP SERPL CALC-SCNC: 5 MMOL/L (ref 3–18)
AST SERPL-CCNC: 25 U/L (ref 10–38)
BASOPHILS # BLD: 0 K/UL (ref 0–0.1)
BASOPHILS NFR BLD: 0 % (ref 0–2)
BILIRUB SERPL-MCNC: 0.8 MG/DL (ref 0.2–1)
BUN SERPL-MCNC: 16 MG/DL (ref 7–18)
BUN/CREAT SERPL: 18 (ref 12–20)
CALCIUM SERPL-MCNC: 8.7 MG/DL (ref 8.5–10.1)
CHLORIDE SERPL-SCNC: 107 MMOL/L (ref 100–111)
CO2 SERPL-SCNC: 30 MMOL/L (ref 21–32)
CREAT SERPL-MCNC: 0.88 MG/DL (ref 0.6–1.3)
DIFFERENTIAL METHOD BLD: ABNORMAL
EOSINOPHIL # BLD: 0.1 K/UL (ref 0–0.4)
EOSINOPHIL NFR BLD: 2 % (ref 0–5)
ERYTHROCYTE [DISTWIDTH] IN BLOOD BY AUTOMATED COUNT: 14.4 % (ref 11.6–14.5)
FERRITIN SERPL-MCNC: 208 NG/ML (ref 8–388)
FOLATE SERPL-MCNC: 9 NG/ML (ref 3.1–17.5)
GLOBULIN SER CALC-MCNC: 3.9 G/DL (ref 2–4)
GLUCOSE SERPL-MCNC: 83 MG/DL (ref 74–99)
HCT VFR BLD AUTO: 34.9 % (ref 35–45)
HGB BLD-MCNC: 10.7 G/DL (ref 12–16)
IRON SATN MFR SERPL: 24 % (ref 20–50)
IRON SERPL-MCNC: 67 UG/DL (ref 50–175)
LYMPHOCYTES # BLD: 1.7 K/UL (ref 0.9–3.6)
LYMPHOCYTES NFR BLD: 27 % (ref 21–52)
MCH RBC QN AUTO: 26.2 PG (ref 24–34)
MCHC RBC AUTO-ENTMCNC: 30.7 G/DL (ref 31–37)
MCV RBC AUTO: 85.5 FL (ref 74–97)
MONOCYTES # BLD: 0.9 K/UL (ref 0.05–1.2)
MONOCYTES NFR BLD: 15 % (ref 3–10)
NEUTS SEG # BLD: 3.3 K/UL (ref 1.8–8)
NEUTS SEG NFR BLD: 56 % (ref 40–73)
PLATELET # BLD AUTO: 172 K/UL (ref 135–420)
PMV BLD AUTO: 12.5 FL (ref 9.2–11.8)
POTASSIUM SERPL-SCNC: 3.9 MMOL/L (ref 3.5–5.5)
PROT SERPL-MCNC: 7.4 G/DL (ref 6.4–8.2)
RBC # BLD AUTO: 4.08 M/UL (ref 4.2–5.3)
RETICS/RBC NFR AUTO: 1.6 % (ref 0.5–2.3)
SODIUM SERPL-SCNC: 142 MMOL/L (ref 136–145)
TIBC SERPL-MCNC: 279 UG/DL (ref 250–450)
VIT B12 SERPL-MCNC: 609 PG/ML (ref 211–911)
WBC # BLD AUTO: 6.1 K/UL (ref 4.6–13.2)

## 2020-08-24 PROCEDURE — 85025 COMPLETE CBC W/AUTO DIFF WBC: CPT

## 2020-08-24 PROCEDURE — 82746 ASSAY OF FOLIC ACID SERUM: CPT

## 2020-08-24 PROCEDURE — 36415 COLL VENOUS BLD VENIPUNCTURE: CPT

## 2020-08-24 PROCEDURE — 82784 ASSAY IGA/IGD/IGG/IGM EACH: CPT

## 2020-08-24 PROCEDURE — 83540 ASSAY OF IRON: CPT

## 2020-08-24 PROCEDURE — 80053 COMPREHEN METABOLIC PANEL: CPT

## 2020-08-24 PROCEDURE — 85045 AUTOMATED RETICULOCYTE COUNT: CPT

## 2020-08-24 PROCEDURE — 82728 ASSAY OF FERRITIN: CPT

## 2020-08-25 RX ORDER — LOSARTAN POTASSIUM 50 MG/1
TABLET ORAL
Qty: 60 TAB | Refills: 6 | Status: SHIPPED | OUTPATIENT
Start: 2020-08-25 | End: 2021-03-05 | Stop reason: SDUPTHER

## 2020-08-26 LAB
ALBUMIN SERPL ELPH-MCNC: 3.6 G/DL (ref 2.9–4.4)
ALBUMIN/GLOB SERPL: 1.2 {RATIO} (ref 0.7–1.7)
ALPHA1 GLOB SERPL ELPH-MCNC: 0.2 G/DL (ref 0–0.4)
ALPHA2 GLOB SERPL ELPH-MCNC: 0.6 G/DL (ref 0.4–1)
B-GLOBULIN SERPL ELPH-MCNC: 1.3 G/DL (ref 0.7–1.3)
GAMMA GLOB SERPL ELPH-MCNC: 1.1 G/DL (ref 0.4–1.8)
GLOBULIN SER-MCNC: 3.2 G/DL (ref 2.2–3.9)
IGA SERPL-MCNC: 660 MG/DL (ref 87–352)
IGG SERPL-MCNC: 1275 MG/DL (ref 586–1602)
IGM SERPL-MCNC: 73 MG/DL (ref 26–217)
INTERPRETATION SERPL IEP-IMP: ABNORMAL
KAPPA LC FREE SER-MCNC: 36.6 MG/L (ref 3.3–19.4)
KAPPA LC FREE/LAMBDA FREE SER: 1.96 {RATIO} (ref 0.26–1.65)
LAMBDA LC FREE SERPL-MCNC: 18.7 MG/L (ref 5.7–26.3)
M PROTEIN SERPL ELPH-MCNC: ABNORMAL G/DL
PROT SERPL-MCNC: 6.8 G/DL (ref 6–8.5)

## 2020-09-01 ENCOUNTER — TELEPHONE (OUTPATIENT)
Dept: FAMILY MEDICINE CLINIC | Age: 60
End: 2020-09-01

## 2020-09-01 RX ORDER — LEVOCETIRIZINE DIHYDROCHLORIDE 5 MG/1
5 TABLET, FILM COATED ORAL DAILY
Qty: 90 TAB | Refills: 0 | Status: SHIPPED | OUTPATIENT
Start: 2020-09-01 | End: 2020-11-19

## 2020-09-01 NOTE — TELEPHONE ENCOUNTER
Pt states that she is still having post nasal drip and waking coughing with phlegm and states that her allergist recommended that she change antihistamine.  Please call it in to [facility] [phone]

## 2020-09-02 ENCOUNTER — VIRTUAL VISIT (OUTPATIENT)
Dept: ONCOLOGY | Age: 60
End: 2020-09-02

## 2020-09-02 DIAGNOSIS — D63.8 ANEMIA, CHRONIC DISEASE: ICD-10-CM

## 2020-09-02 DIAGNOSIS — R77.1 ELEVATED SERUM GLOBULIN LEVEL: Primary | ICD-10-CM

## 2020-09-02 NOTE — PROGRESS NOTES
Josh Beckwith is a 56 y.o. female, evaluated via audio-only technology on 9/2/2020 for No chief complaint on file. Laury Lim MD    Attending: Dr. Hemphill Iron:   1. Elevated serum globulins   -- Persistent elevated globulin levels since 2/28/2019. Recent SPEP revealed normal Beta globulin of 1.3g/dL, No M spike observed. Her normocytic anemia appears chronically at baseline 10-12 g/dl, no progressive worsening anemia noticed. No hypercalcemia or renal impairment. 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 No M spike. Serum free light chain reported a mild elevated Free Kappa Lt Chains, 36.6mg/dL; also a near-normal Kappa/Lambda ratio at 1.96.  -- Her urine protein 24h was WNL. Skeletal survey reported no lytic lesions to confirm a diagnosis of monoclonal paraproteinemia. -- The mild elevation of globulins could be from polyclonal gammopathy which is secondary to inflammatory or reactive processes. The mild elevated Free Kappa Lt Chains with near-normal ratio could also be explained by polyclonal gammopathy. -- We will repeat her labs including chemistry, SFLC and SPEP prior to follow up visit,      2. Normocytic anemia, likely anemia of chronic diease  -- Her normocytic anemia appears chronically at baseline 10-12 g/dl. Asymptomatic without acute bleeding. Likely this is anemia of chronic disease.   -- We will repeat CBC next visit,  -- I will see the patient back in clinic in 6 months for follow-up or sooner if indicated.         Subjective:   Ms Josh Beckwith is a 56 y.o. female who was evaluated via audio-only technology. She states she has been doing well since her last office visit. She reports she is being seen by her PCP on a regular basis. She denies fever or chills, no weight loss, sweating, skin lumps/bumps, lymphadenopathy, bleeding, bruising, leg tenderness, joint pain.  Denies any acute or worsening bone pain or back pain presently. Denies headaches, acute vision changes. Denied any focal weakness or numbness. Denies any concerns or complaints at this time. Prior to Admission medications    Medication Sig Start Date End Date Taking? Authorizing Provider   levocetirizine (XYZAL) 5 mg tablet Take 1 Tab by mouth daily. 9/1/20   Laury Lim MD   losartan (COZAAR) 50 mg tablet TAKE 1 TABLET BY MOUTH TWICE A DAY 8/25/20   Atul Blood MD   furosemide (LASIX) 40 mg tablet TAKE 1 TABLET BY MOUTH EVERY DAY 8/12/20   Giorgio Mazariegos NP   azithromycin (ZITHROMAX) 250 mg tablet Take two tablets today then one tablet daily 8/11/20   Laury Lim MD   atorvastatin (LIPITOR) 40 mg tablet TAKE 1 TABLET BY MOUTH EVERY DAY 8/10/20   Laury Lim MD   methylPREDNISolone (MEDROL DOSEPACK) 4 mg tablet Use as directe 8/6/20   Laury Lim MD   sertraline (ZOLOFT) 100 mg tablet TAKE 1 TABLET BY MOUTH EVERY DAY 7/27/20   Laury Lim MD   metoprolol succinate (TOPROL-XL) 100 mg tablet TAKE 1 TABLET BY MOUTH EVERY DAY 7/20/20   Laury Lim MD   Klor-Con M20 20 mEq tablet TAKE 1 TABLET BY MOUTH EVERY DAY 6/29/20   Atul Blood MD   budesonide (RHINOCORT AQUA) 32 mcg/actuation nasal spray 2 Sprays by Both Nostrils route daily. 6/29/20   Laury Lim MD   Ventolin HFA 90 mcg/actuation inhaler TAKE 2 PUFFS BY INHALATION DAILY. 4/30/20   Laury Lim MD   warfarin (COUMADIN) 5 mg tablet TAKE 5MG DAILY EXCEPT 2.5MG ON TUE-THURS-SAT OR AS DIRECTED 2/20/20   Juan Mazariegos NP   triamcinolone acetonide (KENALOG) 0.1 % ointment Apply  to affected area two (2) times a day. use thin layer    Provider, Renzo   hydrOXYzine pamoate (VISTARIL) 25 mg capsule TAKE 1 CAP BY MOUTH THREE (3) TIMES DAILY AS NEEDED FOR ITCHING. 2/18/20   Jewel Coronado MD   clobetasoL (TEMOVATE) 0.05 % ointment Apply  to affected area two (2) times a day.  2/6/20   Laury Lim MD timolol (TIMOPTIC) 0.5 % ophthalmic solution  1/20/20   Provider, Historical   amLODIPine (NORVASC) 5 mg tablet TAKE 1 TABLET BY MOUTH EVERY DAY 12/9/19   Luis Antonio Quintero MD   montelukast (SINGULAIR) 10 mg tablet TAKE 1 TABLET BY MOUTH EVERY DAY 12/2/19   Luis Antonio Quintero MD   fluticasone propion-salmeterol (ADVAIR/WIXELA) 250-50 mcg/dose diskus inhaler Take 1 Puff by inhalation every twelve (12) hours. 10/21/19   Luis Antonio Quintero MD   OTHER Allergy injections    Provider, Historical   aspirin delayed-release 81 mg tablet Take 1 Tab by mouth daily. 1/11/19   Natalie Diaz MD   polyethylene glycol (MIRALAX) 17 gram packet Take 1 Packet by mouth daily. Patient taking differently: Take  by mouth daily as needed for Other (Constipation). 1/11/19   Natalie Diaz MD   esomeprazole (NEXIUM) 20 mg capsule Take 20 mg by mouth daily as needed (heart burn). Provider, Historical   sodium chloride (SALINE NASAL) 0.65 % nasal squeeze bottle 1 Spray as needed for Congestion. Provider, Historical   melatonin tab tablet Take 10 mg by mouth nightly. Provider, Historical     Lab Results   Component Value Date/Time    WBC 6.1 08/24/2020 08:55 AM    HGB 10.7 (L) 08/24/2020 08:55 AM    HCT 34.9 (L) 08/24/2020 08:55 AM    PLATELET 197 20/15/0942 08:55 AM    MCV 85.5 08/24/2020 08:55 AM     Lab Results   Component Value Date/Time    Sodium 142 08/24/2020 08:55 AM    Potassium 3.9 08/24/2020 08:55 AM    Chloride 107 08/24/2020 08:55 AM    CO2 30 08/24/2020 08:55 AM    Anion gap 5 08/24/2020 08:55 AM    Glucose 83 08/24/2020 08:55 AM    BUN 16 08/24/2020 08:55 AM    Creatinine 0.88 08/24/2020 08:55 AM    BUN/Creatinine ratio 18 08/24/2020 08:55 AM    GFR est AA >60 08/24/2020 08:55 AM    GFR est non-AA >60 08/24/2020 08:55 AM    Calcium 8.7 08/24/2020 08:55 AM    Bilirubin, total 0.8 08/24/2020 08:55 AM    Alk.  phosphatase 78 08/24/2020 08:55 AM    Protein, total 7.4 08/24/2020 08:55 AM    Protein, total 6.8 08/24/2020 08:55 AM    Albumin 3.5 08/24/2020 08:55 AM    Globulin 3.9 08/24/2020 08:55 AM    A-G Ratio 0.9 08/24/2020 08:55 AM    ALT (SGPT) 27 08/24/2020 08:55 AM    AST (SGOT) 25 08/24/2020 08:55 AM     Lab Results   Component Value Date/Time    Iron 67 08/24/2020 08:55 AM    TIBC 279 08/24/2020 08:55 AM    Iron % saturation 24 08/24/2020 08:55 AM    Ferritin 208 08/24/2020 08:55 AM     Lab Results   Component Value Date/Time    Vitamin B12 609 08/24/2020 08:55 AM    Folate 9.0 08/24/2020 08:55 AM       Adilene Oliver, who was evaluated through a patient-initiated, synchronous (real-time) audio only encounter, and/or her healthcare decision maker, is aware that it is a billable service, with coverage as determined by her insurance carrier. She provided verbal consent to proceed: Yes. She has not had a related appointment within my department in the past 7 days or scheduled within the next 24 hours.       Total Time: minutes: 11-20 minutes   Follow up in 6 months with Dr. Shira Bynum or sooner if indicated      Genesis Odell, [facility]  09/02/2020

## 2020-09-02 NOTE — TELEPHONE ENCOUNTER
Spoke with Mrs. Tejeda aware that Dr. Imtiaz Vasquez has switched her from zyrtec to xyzal 5 mg. and is ready for  at [facility].

## 2020-09-04 RX ORDER — MONTELUKAST SODIUM 10 MG/1
TABLET ORAL
Qty: 90 TAB | Refills: 3 | Status: SHIPPED | OUTPATIENT
Start: 2020-09-04 | End: 2021-03-05 | Stop reason: SDUPTHER

## 2020-09-11 ENCOUNTER — ANTI-COAG VISIT (OUTPATIENT)
Dept: CARDIOLOGY CLINIC | Age: 60
End: 2020-09-11

## 2020-09-11 DIAGNOSIS — Z79.01 LONG TERM CURRENT USE OF ANTICOAGULANT THERAPY: ICD-10-CM

## 2020-09-11 DIAGNOSIS — I48.91 ATRIAL FIBRILLATION, UNSPECIFIED TYPE (HCC): ICD-10-CM

## 2020-09-11 LAB
INR BLD: 2.2
PT POC: 26.5 SECONDS
VALID INTERNAL CONTROL?: YES

## 2020-09-11 NOTE — PROGRESS NOTES
The INR is stable and therapeutic. Continue Coumadin 5mg daily except 2.5mg on Tuesday, Thursday, Saturday. Recheck INR in 4 weeks.

## 2020-10-28 ENCOUNTER — VIRTUAL VISIT (OUTPATIENT)
Dept: FAMILY MEDICINE CLINIC | Age: 60
End: 2020-10-28
Payer: MEDICARE

## 2020-10-28 DIAGNOSIS — N76.0 ACUTE VAGINITIS: Primary | ICD-10-CM

## 2020-10-28 PROCEDURE — 99213 OFFICE O/P EST LOW 20 MIN: CPT | Performed by: FAMILY MEDICINE

## 2020-10-28 PROCEDURE — G8756 NO BP MEASURE DOC: HCPCS | Performed by: FAMILY MEDICINE

## 2020-10-28 PROCEDURE — G8432 DEP SCR NOT DOC, RNG: HCPCS | Performed by: FAMILY MEDICINE

## 2020-10-28 PROCEDURE — G8427 DOCREV CUR MEDS BY ELIG CLIN: HCPCS | Performed by: FAMILY MEDICINE

## 2020-10-28 PROCEDURE — G0463 HOSPITAL OUTPT CLINIC VISIT: HCPCS | Performed by: FAMILY MEDICINE

## 2020-10-28 PROCEDURE — G9899 SCRN MAM PERF RSLTS DOC: HCPCS | Performed by: FAMILY MEDICINE

## 2020-10-28 PROCEDURE — 3017F COLORECTAL CA SCREEN DOC REV: CPT | Performed by: FAMILY MEDICINE

## 2020-10-28 RX ORDER — FLUCONAZOLE 150 MG/1
TABLET ORAL
Qty: 2 TAB | Refills: 0 | Status: SHIPPED | OUTPATIENT
Start: 2020-10-28 | End: 2020-10-28 | Stop reason: SDUPTHER

## 2020-10-28 RX ORDER — CLOTRIMAZOLE AND BETAMETHASONE DIPROPIONATE 10; .64 MG/G; MG/G
CREAM TOPICAL
Qty: 15 G | Refills: 0 | Status: SHIPPED | OUTPATIENT
Start: 2020-10-28 | End: 2021-01-14 | Stop reason: ALTCHOICE

## 2020-10-28 RX ORDER — FLUCONAZOLE 150 MG/1
TABLET ORAL
Qty: 2 TAB | Refills: 0 | Status: SHIPPED | OUTPATIENT
Start: 2020-10-28 | End: 2021-01-14 | Stop reason: ALTCHOICE

## 2020-10-28 NOTE — PATIENT INSTRUCTIONS

## 2020-10-28 NOTE — PROGRESS NOTES
Ranulfo Newberry is a 56 y.o. female who was seen by synchronous (real-time) audio-video technology on 10/28/2020. Consent: Ranulfo Newberry, who was seen by synchronous (real-time) audio-video technology, and/or her healthcare decision maker, is aware that this patient-initiated, Telehealth encounter on 10/28/2020 is a billable service, with coverage as determined by her insurance carrier. She is aware that she may receive a bill and has provided verbal consent to proceed: Yes. 712  Subjective:   Ranulfo Newberry is a 56 y.o. female who was seen for No chief complaint on file. vaginal irritation x 2 weeks    C/o outer vaginal area irritation, redness, itching 2 weeks ago. She has tried [employer] for 3 days with some improvement however not completely resolved. No vaginal odor,urinary symptoms, new partner or concern for std. Prior to Admission medications    Medication Sig Start Date End Date Taking? Authorizing Provider   fluconazole (DIFLUCAN) 150 mg tablet Take 1 tablet today and another tablet in 3 days. FDA advises cautious prescribing of oral fluconazole in pregnancy. 10/28/20  Yes Luis Manuel Richardson MD   clotrimazole-betamethasone (LOTRISONE) topical cream Apply thin layer to affected area twice a day for 2 weeks 10/28/20  Yes Luis Manuel Richardson MD   montelukast (SINGULAIR) 10 mg tablet TAKE 1 TABLET BY MOUTH EVERY DAY 9/4/20  Yes Robby Coronado MD   levocetirizine (XYZAL) 5 mg tablet Take 1 Tab by mouth daily.  9/1/20  Yes Luis Manuel Richardson MD   losartan (COZAAR) 50 mg tablet TAKE 1 TABLET BY MOUTH TWICE A DAY 8/25/20  Yes Myriam Sprague MD   furosemide (LASIX) 40 mg tablet TAKE 1 TABLET BY MOUTH EVERY DAY 8/12/20  Yes Lydia Hall NP   atorvastatin (LIPITOR) 40 mg tablet TAKE 1 TABLET BY MOUTH EVERY DAY 8/10/20  Yes Luis Manuel Richardson MD   sertraline (ZOLOFT) 100 mg tablet TAKE 1 TABLET BY MOUTH EVERY DAY 7/27/20  Yes Luis Manuel Richardson MD   metoprolol succinate (TOPROL-XL) 100 mg tablet TAKE 1 TABLET BY MOUTH EVERY DAY 7/20/20 10/28/20 Yes Gerard Coronado MD   Klor-Con M20 20 mEq tablet TAKE 1 TABLET BY MOUTH EVERY DAY 6/29/20  Yes Judi Coronado MD   budesonide (RHINOCORT AQUA) 32 mcg/actuation nasal spray 2 Sprays by Both Nostrils route daily. 6/29/20  Yes Mark Grimes MD   Ventolin HFA 90 mcg/actuation inhaler TAKE 2 PUFFS BY INHALATION DAILY. 4/30/20  Yes Mark Grimes MD   warfarin (COUMADIN) 5 mg tablet TAKE 5MG DAILY EXCEPT 2.5MG ON TUE-THURS-SAT OR AS DIRECTED 2/20/20  Yes Yazmin Paulino NP   triamcinolone acetonide (KENALOG) 0.1 % ointment Apply  to affected area two (2) times a day. use thin layer   Yes Provider, Historical   hydrOXYzine pamoate (VISTARIL) 25 mg capsule TAKE 1 CAP BY MOUTH THREE (3) TIMES DAILY AS NEEDED FOR ITCHING. 2/18/20  Yes Gerard Coronado MD   clobetasoL (TEMOVATE) 0.05 % ointment Apply  to affected area two (2) times a day. 2/6/20  Yes Mark Grimes MD   timolol (TIMOPTIC) 0.5 % ophthalmic solution  1/20/20  Yes Provider, Historical   amLODIPine (NORVASC) 5 mg tablet TAKE 1 TABLET BY MOUTH EVERY DAY 12/9/19  Yes Mark Grimes MD   fluticasone propion-salmeterol (ADVAIR/WIXELA) 250-50 mcg/dose diskus inhaler Take 1 Puff by inhalation every twelve (12) hours. 10/21/19  Yes Mark Grimes MD   aspirin delayed-release 81 mg tablet Take 1 Tab by mouth daily. 1/11/19  Yes Mauricio Shah MD   polyethylene glycol (MIRALAX) 17 gram packet Take 1 Packet by mouth daily. Patient taking differently: Take  by mouth daily as needed for Other (Constipation). 1/11/19  Yes Mauricio Shah MD   esomeprazole (NEXIUM) 20 mg capsule Take 20 mg by mouth daily as needed (heart burn). Yes Provider, Historical   sodium chloride (SALINE NASAL) 0.65 % nasal squeeze bottle 1 Spray as needed for Congestion. Yes Provider, Historical   melatonin tab tablet Take 10 mg by mouth nightly.    Yes Provider, Historical   azithromycin (ZITHROMAX) 250 mg tablet Take two tablets today then one tablet daily 8/11/20 10/28/20  Alisia Washington MD   methylPREDNISolone (MEDROL DOSEPACK) 4 mg tablet Use as directe 8/6/20 10/28/20  Alisia Washington MD   OTHER Allergy injections    Provider, Historical     Allergies   Allergen Reactions    Hydrocodone Bitartrate Rash    Morphine Rash and Itching    Norco [Hydrocodone-Acetaminophen] Itching    Penicillin Hives       Patient Active Problem List    Diagnosis Date Noted    Vasomotor rhinitis 02/03/2020    Anemia of chronic disease 11/06/2019    Chronic sinusitis 10/15/2019    Elevated serum globulin level 10/15/2019    Normocytic anemia 10/15/2019    Anxiety 10/08/2019    Essential hypertension 06/24/2019    Adjustment disorder with anxious mood 03/05/2019    IFG (impaired fasting glucose) 02/25/2019    NSTEMI (non-ST elevated myocardial infarction) ([facility] [address] 01/05/2019    Chest pain 01/05/2019    Other allergic rhinitis 11/30/2018    Allergic rhinitis due to pollen 11/30/2018    Perennial allergic rhinitis with seasonal variation 11/30/2018    Mild intermittent asthma without complication 30/89/5531    Advanced directives, counseling/discussion 03/09/2017    Mitral valve stenosis 05/22/2013    Hyperlipidemia 02/02/2012    Benign hypertensive heart disease with heart failure ([address] 08/05/2011    Long term current use of anticoagulant therapy 02/23/2011    Atrial fibrillation ([facility] [address] 01/20/2011    Rheumatic valvular disease     Chronic diastolic congestive heart failure ([facility] [address] 03/01/2005     Current Outpatient Medications   Medication Sig Dispense Refill    fluconazole (DIFLUCAN) 150 mg tablet Take 1 tablet today and another tablet in 3 days. FDA advises cautious prescribing of oral fluconazole in pregnancy.  2 Tab 0    clotrimazole-betamethasone (LOTRISONE) topical cream Apply thin layer to affected area twice a day for 2 weeks 15 g 0    montelukast (SINGULAIR) 10 mg tablet TAKE 1 TABLET BY MOUTH EVERY DAY 90 Tab 3    levocetirizine (XYZAL) 5 mg tablet Take 1 Tab by mouth daily. 90 Tab 0    losartan (COZAAR) 50 mg tablet TAKE 1 TABLET BY MOUTH TWICE A DAY 60 Tab 6    furosemide (LASIX) 40 mg tablet TAKE 1 TABLET BY MOUTH EVERY DAY 90 Tab 3    atorvastatin (LIPITOR) 40 mg tablet TAKE 1 TABLET BY MOUTH EVERY DAY 90 Tab 2    sertraline (ZOLOFT) 100 mg tablet TAKE 1 TABLET BY MOUTH EVERY DAY 90 Tab 0    Klor-Con M20 20 mEq tablet TAKE 1 TABLET BY MOUTH EVERY DAY 30 Tab 6    budesonide (RHINOCORT AQUA) 32 mcg/actuation nasal spray 2 Sprays by Both Nostrils route daily. 1 Bottle 2    Ventolin HFA 90 mcg/actuation inhaler TAKE 2 PUFFS BY INHALATION DAILY. 18 Inhaler 5    warfarin (COUMADIN) 5 mg tablet TAKE 5MG DAILY EXCEPT 2.5MG ON TUE-THURS-SAT OR AS DIRECTED 60 Tab 6    triamcinolone acetonide (KENALOG) 0.1 % ointment Apply  to affected area two (2) times a day. use thin layer      hydrOXYzine pamoate (VISTARIL) 25 mg capsule TAKE 1 CAP BY MOUTH THREE (3) TIMES DAILY AS NEEDED FOR ITCHING. 30 Cap 0    clobetasoL (TEMOVATE) 0.05 % ointment Apply  to affected area two (2) times a day. 15 g 0    timolol (TIMOPTIC) 0.5 % ophthalmic solution       amLODIPine (NORVASC) 5 mg tablet TAKE 1 TABLET BY MOUTH EVERY DAY 90 Tab 3    fluticasone propion-salmeterol (ADVAIR/WIXELA) 250-50 mcg/dose diskus inhaler Take 1 Puff by inhalation every twelve (12) hours. 1 Each 0    aspirin delayed-release 81 mg tablet Take 1 Tab by mouth daily. 30 Tab 0    polyethylene glycol (MIRALAX) 17 gram packet Take 1 Packet by mouth daily. (Patient taking differently: Take  by mouth daily as needed for Other (Constipation). ) 30 Packet 0    esomeprazole (NEXIUM) 20 mg capsule Take 20 mg by mouth daily as needed (heart burn).  sodium chloride (SALINE NASAL) 0.65 % nasal squeeze bottle 1 Spray as needed for Congestion.  melatonin tab tablet Take 10 mg by mouth nightly.       OTHER Allergy injections       Allergies   Allergen Reactions    Hydrocodone Bitartrate Rash    Morphine Rash and Itching    Norco [Hydrocodone-Acetaminophen] Itching    Penicillin Hives     Past Medical History:   Diagnosis Date    Aortic valve disorders 1/13/2011    Asthma     Atrial fibrillation ([address] 1/13/2011    Congestive heart failure, unspecified March 2005    Cleared quickly with Lasix therapy    Echocardiogram 01/10/2012    A-fib. EF 45%. Mild RVE. RVSP 45-50. Massive LAE. Mod TAMI. Mod MS. Severe MR (mean grad 10). Mild-mod AI. Mild TR. Pulmonic systolic flow reversal.  Mild IVCE.  Hives     Hypertension     Iron deficiency anemias     Long term (current) use of anticoagulants 2/23/2011    Murmur 1/2011    Grade I-II/VI systolic ejection murmur along left sternal border, with radiation to the pulmonic area.  S/P cardiac cath 02/24/2012    Minimal coronary artery disease. Mild LVE. EF 55%. Mod MR. Mod-severe MS. RA 15.  RV 55/16. PA 58/34. W 36.  CO/CI 3.5/1.9 (TD); 3.61/1.9 ([place]). R to L shunt suggested.     Wears glasses     Weight gain      Past Surgical History:   Procedure Laterality Date    HX HEART CATHETERIZATION  2/24/2012    HX HYSTERECTOMY  2006    HX MITRAL VALVE REPLACEMENT  05/22/2013    25/33 mm On-X mechanical valve with bilateral pulmonary vein isolation and resection of LA appendage     Family History   Problem Relation Age of Onset    Heart Surgery Father         Open-Heart Surgery    Other Father         Venous Thromboembolism    Hypertension Father     Heart Disease Mother         Enlarged Heart    Hypertension Mother    Violeta Hair Diabetes Mother     Diabetes Sister     Hypertension Sister     Diabetes Brother     Heart Disease Paternal Grandmother     Diabetes Sister     Hypertension Sister      Social History     Tobacco Use    Smoking status: Former Smoker     Packs/day: 0.50     Years: 21.00     Pack years: 10.50     Last attempt to quit: 2008     Years since quittin.8    Smokeless tobacco: Never Used   Substance Use Topics    Alcohol use: Yes     Alcohol/week: 1.0 standard drinks     Types: 1 Glasses of wine per week     Comment: occassionally       ROS      Objective: There were no vitals taken for this visit. General: alert, cooperative, no distress   Mental  status: normal mood, behavior, speech, dress, motor activity, and thought processes, able to follow commands   HENT: NCAT   Neck: no visualized mass   Resp: no respiratory distress   Neuro: no gross deficits   Skin: no discoloration or lesions of concern on visible areas   Psychiatric: normal affect, consistent with stated mood, no evidence of hallucinations     Additional exam findings: We discussed the expected course, resolution and complications of the diagnosis(es) in detail. Medication risks, benefits, costs, interactions, and alternatives were discussed as indicated. I advised her to contact the office if her condition worsens, changes or fails to improve as anticipated. She expressed understanding with the diagnosis(es) and plan. Monse Copeland is a 56 y.o. female who was evaluated by a video visit encounter for concerns as above. Patient identification was verified prior to start of the visit. A caregiver was present when appropriate. Due to this being a TeleHealth encounter (During [place] [id number] public health emergency), evaluation of the following organ systems was limited: Vitals/Constitutional/EENT/Resp/CV/GI//MS/Neuro/Skin/Heme-Lymph-Imm. Pursuant to the emergency declaration under the [facility], [facility] waiver authority and the [employer] General Act, this Virtual  Visit was conducted, with patient's (and/or legal guardian's) consent, to reduce the patient's risk of exposure to COVID-19 and provide necessary medical care.      Services were provided through a video synchronous discussion virtually to substitute for in-person clinic visit. Patient and provider were located at their individual homes. Assessment & Plan:   Diagnoses and all orders for this visit:    1. Acute vaginitis  Start:  -     fluconazole (DIFLUCAN) 150 mg tablet; Take 1 tablet today and another tablet in 3 days. FDA advises cautious prescribing of oral fluconazole in pregnancy.   -     clotrimazole-betamethasone (LOTRISONE) topical cream; Apply thin layer to affected area twice a day for 2 weeks    Keep appt next week for routine care    Garth Valdes MD

## 2020-11-02 ENCOUNTER — TELEPHONE (OUTPATIENT)
Dept: FAMILY MEDICINE CLINIC | Age: 60
End: 2020-11-02

## 2020-11-03 ENCOUNTER — HOSPITAL ENCOUNTER (OUTPATIENT)
Dept: MAMMOGRAPHY | Age: 60
Discharge: HOME | End: 2020-11-03
Attending: FAMILY MEDICINE
Payer: MEDICARE

## 2020-11-03 ENCOUNTER — OFFICE VISIT (OUTPATIENT)
Dept: FAMILY MEDICINE CLINIC | Age: 60
End: 2020-11-03
Payer: MEDICARE

## 2020-11-03 VITALS
OXYGEN SATURATION: 98 % | RESPIRATION RATE: 16 BRPM | HEIGHT: 67 IN | DIASTOLIC BLOOD PRESSURE: 60 MMHG | TEMPERATURE: 98.5 F | HEART RATE: 83 BPM | SYSTOLIC BLOOD PRESSURE: 104 MMHG | BODY MASS INDEX: 29.82 KG/M2 | WEIGHT: 190 LBS

## 2020-11-03 DIAGNOSIS — J45.20 MILD INTERMITTENT ASTHMA WITHOUT COMPLICATION: ICD-10-CM

## 2020-11-03 DIAGNOSIS — F41.9 ANXIETY: ICD-10-CM

## 2020-11-03 DIAGNOSIS — F33.9 RECURRENT DEPRESSION (HCC): ICD-10-CM

## 2020-11-03 DIAGNOSIS — Z12.31 VISIT FOR SCREENING MAMMOGRAM: ICD-10-CM

## 2020-11-03 DIAGNOSIS — R77.1 ELEVATED SERUM GLOBULIN LEVEL: ICD-10-CM

## 2020-11-03 DIAGNOSIS — I10 ESSENTIAL HYPERTENSION: ICD-10-CM

## 2020-11-03 DIAGNOSIS — I09.1 RHEUMATIC VALVULAR DISEASE: ICD-10-CM

## 2020-11-03 DIAGNOSIS — E78.2 MIXED HYPERLIPIDEMIA: Primary | ICD-10-CM

## 2020-11-03 DIAGNOSIS — I48.91 ATRIAL FIBRILLATION, UNSPECIFIED TYPE (HCC): ICD-10-CM

## 2020-11-03 DIAGNOSIS — I25.10 CORONARY ARTERY DISEASE INVOLVING NATIVE CORONARY ARTERY OF NATIVE HEART WITHOUT ANGINA PECTORIS: ICD-10-CM

## 2020-11-03 DIAGNOSIS — D63.8 ANEMIA OF CHRONIC DISEASE: ICD-10-CM

## 2020-11-03 DIAGNOSIS — J30.89 OTHER ALLERGIC RHINITIS: ICD-10-CM

## 2020-11-03 PROCEDURE — G8427 DOCREV CUR MEDS BY ELIG CLIN: HCPCS | Performed by: FAMILY MEDICINE

## 2020-11-03 PROCEDURE — 99214 OFFICE O/P EST MOD 30 MIN: CPT | Performed by: FAMILY MEDICINE

## 2020-11-03 PROCEDURE — G8752 SYS BP LESS 140: HCPCS | Performed by: FAMILY MEDICINE

## 2020-11-03 PROCEDURE — 77063 BREAST TOMOSYNTHESIS BI: CPT

## 2020-11-03 PROCEDURE — G9899 SCRN MAM PERF RSLTS DOC: HCPCS | Performed by: FAMILY MEDICINE

## 2020-11-03 PROCEDURE — G8419 CALC BMI OUT NRM PARAM NOF/U: HCPCS | Performed by: FAMILY MEDICINE

## 2020-11-03 PROCEDURE — G8754 DIAS BP LESS 90: HCPCS | Performed by: FAMILY MEDICINE

## 2020-11-03 PROCEDURE — G8510 SCR DEP NEG, NO PLAN REQD: HCPCS | Performed by: FAMILY MEDICINE

## 2020-11-03 PROCEDURE — G0463 HOSPITAL OUTPT CLINIC VISIT: HCPCS | Performed by: FAMILY MEDICINE

## 2020-11-03 PROCEDURE — 3017F COLORECTAL CA SCREEN DOC REV: CPT | Performed by: FAMILY MEDICINE

## 2020-11-03 NOTE — PATIENT INSTRUCTIONS
DASH Diet: Care Instructions Your Care Instructions The DASH diet is an eating plan that can help lower your blood pressure. DASH stands for Dietary Approaches to Stop Hypertension. Hypertension is high blood pressure. The DASH diet focuses on eating foods that are high in calcium, potassium, and magnesium. These nutrients can lower blood pressure. The foods that are highest in these nutrients are fruits, vegetables, low-fat dairy products, nuts, seeds, and legumes. But taking calcium, potassium, and magnesium supplements instead of eating foods that are high in those nutrients does not have the same effect. The DASH diet also includes whole grains, fish, and poultry. The DASH diet is one of several lifestyle changes your doctor may recommend to lower your high blood pressure. Your doctor may also want you to decrease the amount of sodium in your diet. Lowering sodium while following the DASH diet can lower blood pressure even further than just the DASH diet alone. Follow-up care is a key part of your treatment and safety. Be sure to make and go to all appointments, and call your doctor if you are having problems. It's also a good idea to know your test results and keep a list of the medicines you take. How can you care for yourself at home? Following the DASH diet · Eat 4 to 5 servings of fruit each day. A serving is 1 medium-sized piece of fruit, ½ cup chopped or canned fruit, 1/4 cup dried fruit, or 4 ounces (½ cup) of fruit juice. Choose fruit more often than fruit juice. · Eat 4 to 5 servings of vegetables each day. A serving is 1 cup of lettuce or raw leafy vegetables, ½ cup of chopped or cooked vegetables, or 4 ounces (½ cup) of vegetable juice. Choose vegetables more often than vegetable juice. · Get 2 to 3 servings of low-fat and fat-free dairy each day. A serving is 8 ounces of milk, 1 cup of yogurt, or 1 ½ ounces of cheese. · Eat 6 to 8 servings of grains each day. A serving is 1 slice of bread, 1 ounce of dry cereal, or ½ cup of cooked rice, pasta, or cooked cereal. Try to choose whole-grain products as much as possible. · Limit lean meat, poultry, and fish to 2 servings each day. A serving is 3 ounces, about the size of a deck of cards. · Eat 4 to 5 servings of nuts, seeds, and legumes (cooked dried beans, lentils, and split peas) each week. A serving is 1/3 cup of nuts, 2 tablespoons of seeds, or ½ cup of cooked beans or peas. · Limit fats and oils to 2 to 3 servings each day. A serving is 1 teaspoon of vegetable oil or 2 tablespoons of salad dressing. · Limit sweets and added sugars to 5 servings or less a week. A serving is 1 tablespoon jelly or jam, ½ cup sorbet, or 1 cup of lemonade. · Eat less than 2,300 milligrams (mg) of sodium a day. If you limit your sodium to 1,500 mg a day, you can lower your blood pressure even more. Tips for success · Start small. Do not try to make dramatic changes to your diet all at once. You might feel that you are missing out on your favorite foods and then be more likely to not follow the plan. Make small changes, and stick with them. Once those changes become habit, add a few more changes. · Try some of the following: ? Make it a goal to eat a fruit or vegetable at every meal and at snacks. This will make it easy to get the recommended amount of fruits and vegetables each day. ? Try yogurt topped with fruit and nuts for a snack or healthy dessert. ? Add lettuce, tomato, cucumber, and onion to sandwiches. ? Combine a ready-made pizza crust with low-fat mozzarella cheese and lots of vegetable toppings. Try using tomatoes, squash, spinach, broccoli, carrots, cauliflower, and onions. ? Have a variety of cut-up vegetables with a low-fat dip as an appetizer instead of chips and dip. ? Sprinkle sunflower seeds or chopped almonds over salads.  Or try adding chopped walnuts or almonds to cooked vegetables. ? Try some vegetarian meals using beans and peas. Add garbanzo or kidney beans to salads. Make burritos and tacos with mashed curtis beans or black beans. Where can you learn more? Go to http://www.gray.com/ Enter L886 in the search box to learn more about \"DASH Diet: Care Instructions. \" Current as of: December 16, 2019               Content Version: 12.6 © 3448-4896 [employer]. Care instructions adapted under license by [employer] (which disclaims liability or warranty for this information). If you have questions about a medical condition or this instruction, always ask your healthcare professional. [address] any warranty or liability for your use of this information.

## 2020-11-03 NOTE — PROGRESS NOTES
1. Have you been to the ER, urgent care clinic since your last visit? Hospitalized since your last visit? No    2. Have you seen or consulted any other health care providers outside of the [address] [place] [address] since your last visit? Include any pap smears or colon screening.  No    Chief Complaint   Patient presents with    Vaginitis    Hypertension    Irregular Heart Beat    CHF    Coronary Artery Disease    Anxiety    Allergic Rhinitis

## 2020-11-03 NOTE — PROGRESS NOTES
Ranjana Ritcihe, 56 y.o.,  female    SUBJECTIVE  Ff-up  No new concerns    Anxiety/depression-reports to be about the same, continues to struggle with caring for MIL. She has difficulty getting up in the morning worried about outcomes in the day with labile mood of MIL. She is currently on zoloft and open to talk therapy. HTN-taking medications without problems. Continues to take norvasc,  metoprolol and cozaar    H/o nstemi, afib, rheumatic valve disease s/p MV repair 2013- denies any chest pain, sob, palpitations, lightheadedness. On chronic coumadin treatment, following cardiology. HL-taking lipitor without problems    Elevated globulin/anemia of chronic disease- neg work up, monitoring.  Following dr. Pillai:  See HPI, all others negative        Patient Active Problem List   Diagnosis Code    Rheumatic valvular disease I09.1    Chronic diastolic congestive heart failure ([facility] [address] I50.32    Atrial fibrillation (HCC) I48.91    Long term current use of anticoagulant therapy Z79.01    Benign hypertensive heart disease with heart failure (HCC) I11.0    Hyperlipidemia E78.5    Advanced directives, counseling/discussion Z71.89    Mild intermittent asthma without complication P34.66    NSTEMI (non-ST elevated myocardial infarction) ([facility] [address] I21.4    Chest pain R07.9    IFG (impaired fasting glucose) R73.01    Adjustment disorder with anxious mood F43.22    Essential hypertension I10    Other allergic rhinitis J30.89    Anxiety F41.9    Allergic rhinitis due to pollen J30.1    Chronic sinusitis J32.9    Mitral valve stenosis I05.0    Elevated serum globulin level R77.1    Normocytic anemia D64.9    Anemia of chronic disease D63.8    Perennial allergic rhinitis with seasonal variation J30.89, J30.2    Vasomotor rhinitis J30.0       Current Outpatient Medications   Medication Sig Dispense Refill    clotrimazole-betamethasone (LOTRISONE) topical cream Apply thin layer to affected area twice a day for 2 weeks 15 g 0    fluconazole (DIFLUCAN) 150 mg tablet Take 1 tablet today and another tablet in 3 days. 2 Tab 0    montelukast (SINGULAIR) 10 mg tablet TAKE 1 TABLET BY MOUTH EVERY DAY 90 Tab 3    levocetirizine (XYZAL) 5 mg tablet Take 1 Tab by mouth daily. 90 Tab 0    losartan (COZAAR) 50 mg tablet TAKE 1 TABLET BY MOUTH TWICE A DAY 60 Tab 6    furosemide (LASIX) 40 mg tablet TAKE 1 TABLET BY MOUTH EVERY DAY 90 Tab 3    atorvastatin (LIPITOR) 40 mg tablet TAKE 1 TABLET BY MOUTH EVERY DAY 90 Tab 2    sertraline (ZOLOFT) 100 mg tablet TAKE 1 TABLET BY MOUTH EVERY DAY 90 Tab 0    Klor-Con M20 20 mEq tablet TAKE 1 TABLET BY MOUTH EVERY DAY 30 Tab 6    budesonide (RHINOCORT AQUA) 32 mcg/actuation nasal spray 2 Sprays by Both Nostrils route daily. 1 Bottle 2    Ventolin HFA 90 mcg/actuation inhaler TAKE 2 PUFFS BY INHALATION DAILY. 18 Inhaler 5    warfarin (COUMADIN) 5 mg tablet TAKE 5MG DAILY EXCEPT 2.5MG ON TUE-THURS-SAT OR AS DIRECTED 60 Tab 6    triamcinolone acetonide (KENALOG) 0.1 % ointment Apply  to affected area two (2) times a day. use thin layer      hydrOXYzine pamoate (VISTARIL) 25 mg capsule TAKE 1 CAP BY MOUTH THREE (3) TIMES DAILY AS NEEDED FOR ITCHING. 30 Cap 0    clobetasoL (TEMOVATE) 0.05 % ointment Apply  to affected area two (2) times a day. 15 g 0    amLODIPine (NORVASC) 5 mg tablet TAKE 1 TABLET BY MOUTH EVERY DAY 90 Tab 3    fluticasone propion-salmeterol (ADVAIR/WIXELA) 250-50 mcg/dose diskus inhaler Take 1 Puff by inhalation every twelve (12) hours. 1 Each 0    OTHER Allergy injections      aspirin delayed-release 81 mg tablet Take 1 Tab by mouth daily. 30 Tab 0    polyethylene glycol (MIRALAX) 17 gram packet Take 1 Packet by mouth daily. (Patient taking differently: Take  by mouth daily as needed for Other (Constipation). ) 30 Packet 0    esomeprazole (NEXIUM) 20 mg capsule Take 20 mg by mouth daily as needed (heart burn).       sodium chloride (SALINE NASAL) 0.65 % nasal squeeze bottle 1 Spray as needed for Congestion.  melatonin tab tablet Take 10 mg by mouth nightly.  timolol (TIMOPTIC) 0.5 % ophthalmic solution          Allergies   Allergen Reactions    Hydrocodone Bitartrate Rash    Morphine Rash and Itching    Norco [Hydrocodone-Acetaminophen] Itching    Penicillin Hives       Past Medical History:   Diagnosis Date    Aortic valve disorders 2011    Asthma     Atrial fibrillation ([address] 2011    Congestive heart failure, unspecified 2005    Cleared quickly with Lasix therapy    Echocardiogram 01/10/2012    A-fib. EF 45%. Mild RVE. RVSP 45-50. Massive LAE. Mod TAMI. Mod MS. Severe MR (mean grad 10). Mild-mod AI. Mild TR. Pulmonic systolic flow reversal.  Mild IVCE.  Hives     Hypertension     Iron deficiency anemias     Long term (current) use of anticoagulants 2011    Murmur 2011    Grade I-II/VI systolic ejection murmur along left sternal border, with radiation to the pulmonic area.  S/P cardiac cath 2012    Minimal coronary artery disease. Mild LVE. EF 55%. Mod MR. Mod-severe MS. RA 15.  RV 55/16. PA 58/34. W 36.  CO/CI 3.5/1.9 (TD); 3.61/1.9 (Dequan). R to L shunt suggested.     Wears glasses     Weight gain        Social History     Socioeconomic History    Marital status:      Spouse name: Not on file    Number of children: Not on file    Years of education: Not on file    Highest education level: Not on file   Occupational History    Not on file   Social Needs    Financial resource strain: Not on file    Food insecurity     Worry: Not on file     Inability: Not on file    Transportation needs     Medical: Not on file     Non-medical: Not on file   Tobacco Use    Smoking status: Former Smoker     Packs/day: 0.50     Years: 21.00     Pack years: 10.50     Last attempt to quit:      Years since quittin.8    Smokeless tobacco: Never Used   Substance and Sexual Activity    Alcohol use: Yes     Alcohol/week: 1.0 standard drinks     Types: 1 Glasses of wine per week     Comment: occassionally    Drug use: No    Sexual activity: Yes     Partners: Male     Birth control/protection: None   Lifestyle    Physical activity     Days per week: Not on file     Minutes per session: Not on file    Stress: Not on file   Relationships    Social connections     Talks on phone: Not on file     Gets together: Not on file     Attends [employer] service: Not on file     Active member of club or organization: Not on file     Attends meetings of clubs or organizations: Not on file     Relationship status: Not on file    Intimate partner violence     Fear of current or ex partner: Not on file     Emotionally abused: Not on file     Physically abused: Not on file     Forced sexual activity: Not on file   Other Topics Concern    Not on file   Social History Narrative    Not on file       Family History   Problem Relation Age of Onset    Heart Surgery Father         Open-Heart Surgery    Other Father         Venous Thromboembolism    Hypertension Father     Heart Disease Mother         Enlarged Heart    Hypertension Mother     Diabetes Mother     Diabetes Sister     Hypertension Sister     Diabetes Brother     Heart Disease Paternal Grandmother     Diabetes Sister     Hypertension Sister          OBJECTIVE    Physical Exam:     Visit Vitals  /60 (BP 1 Location: Left arm, BP Patient Position: Sitting)   Pulse 83   Temp 98.5 °F (36.9 °C) (Oral)   Resp 16   Ht 5' 7\" (1.702 m)   Wt 190 lb (86.2 kg)   SpO2 98%   BMI 29.76 kg/m²       General: alert, well-appearing,  in no apparent distress or pain  Neck: no JVD  CVS: irregular, mech valve click, + murmur  Lungs:clear to ausculation bilaterally, no crackles, wheezing or rhonchi noted  Abdomen: soft, NT, ND  Skin: bilateral UE and upper back excoriations. No intertriginous findings.    Psych:  mood and affect normal      ASSESSMENT/PLAN  Diagnoses and all orders for this visit:    Anxiety   Fair control  Cont [id number] mg  Start talk therapy, referral to psychology    Recurrent depression  See above    Mild intermittent asthma without complication  PFTs 12/51 normal  Currently on advair, and albuterol prn    Essential hypertension  controlled  Cont  norvasc 5 mg  Cont cozaar 100 mg   cont metoprolol 100 mg  DASH diet, monitoring    CAD involving native coronary artery of native heart with angina pectoris ([facility] [address]  Asymptomatic  Mild reversible ischemia on stress test 1/2019  On bb, arb, ASA  LDL is <70  Cont lipitor 40 mg qhs  Following cardiology    Atrial fibrillation, unspecified type ([address]  Rate controlled, On BB, coumadin  INR therapeutic  Following dr. Ashanti Russo    Hyperlipidemia, unspecified hyperlipidemia type  Good range on  lipitor    LDL goal <70  Lipid panel 6/2020  Check cmp prior to next visit    Long term current use of anticoagulant therapy  Cards following    Rheumatic valvular disease  S/p [facility] valve 2013  Chronic anticoagulation    Allergic rhinitis, unspecified seasonality, unspecified trigger  Fair control  Cont flonase, benadryl,  singulair    Anemia of chronic disease  Elevated globulin  No M spike or lytic lesions  Pt was reassured, appreciate heme recs. Will continue to monitor anemia of chronic disease    Follow-up and Dispositions    · Return in about 3 months (around 2/3/2021), or if symptoms worsen or fail to improve, for non-fasting labs prior to your next visit. Patient understands plan of care. Patient has provided input and agrees with goals.

## 2020-11-17 RX ORDER — BENZOCAINE .13; .15; .5; 2 G/100G; G/100G; G/100G; G/100G
GEL ORAL
Qty: 3 BOTTLE | Refills: 1 | Status: SHIPPED | OUTPATIENT
Start: 2020-11-17

## 2020-11-18 ENCOUNTER — OFFICE VISIT (OUTPATIENT)
Dept: ORTHOPEDIC SURGERY | Age: 60
End: 2020-11-18
Payer: MEDICARE

## 2020-11-18 VITALS
HEART RATE: 70 BPM | WEIGHT: 191.2 LBS | DIASTOLIC BLOOD PRESSURE: 98 MMHG | HEIGHT: 67 IN | RESPIRATION RATE: 16 BRPM | SYSTOLIC BLOOD PRESSURE: 135 MMHG | BODY MASS INDEX: 30.01 KG/M2 | OXYGEN SATURATION: 98 % | TEMPERATURE: 94.4 F

## 2020-11-18 DIAGNOSIS — M25.561 RIGHT KNEE PAIN, UNSPECIFIED CHRONICITY: Primary | ICD-10-CM

## 2020-11-18 DIAGNOSIS — M17.11 PRIMARY OSTEOARTHRITIS OF RIGHT KNEE: ICD-10-CM

## 2020-11-18 PROCEDURE — G8752 SYS BP LESS 140: HCPCS | Performed by: PHYSICIAN ASSISTANT

## 2020-11-18 PROCEDURE — 99204 OFFICE O/P NEW MOD 45 MIN: CPT | Performed by: PHYSICIAN ASSISTANT

## 2020-11-18 PROCEDURE — G9899 SCRN MAM PERF RSLTS DOC: HCPCS | Performed by: PHYSICIAN ASSISTANT

## 2020-11-18 PROCEDURE — G8755 DIAS BP > OR = 90: HCPCS | Performed by: PHYSICIAN ASSISTANT

## 2020-11-18 PROCEDURE — 73560 X-RAY EXAM OF KNEE 1 OR 2: CPT | Performed by: PHYSICIAN ASSISTANT

## 2020-11-18 PROCEDURE — G8427 DOCREV CUR MEDS BY ELIG CLIN: HCPCS | Performed by: PHYSICIAN ASSISTANT

## 2020-11-18 PROCEDURE — G8419 CALC BMI OUT NRM PARAM NOF/U: HCPCS | Performed by: PHYSICIAN ASSISTANT

## 2020-11-18 PROCEDURE — 20610 DRAIN/INJ JOINT/BURSA W/O US: CPT | Performed by: PHYSICIAN ASSISTANT

## 2020-11-18 PROCEDURE — G8432 DEP SCR NOT DOC, RNG: HCPCS | Performed by: PHYSICIAN ASSISTANT

## 2020-11-18 PROCEDURE — 3017F COLORECTAL CA SCREEN DOC REV: CPT | Performed by: PHYSICIAN ASSISTANT

## 2020-11-18 RX ORDER — TRIAMCINOLONE ACETONIDE 40 MG/ML
40 INJECTION, SUSPENSION INTRA-ARTICULAR; INTRAMUSCULAR ONCE
Status: COMPLETED | OUTPATIENT
Start: 2020-11-18 | End: 2020-11-18

## 2020-11-18 RX ADMIN — TRIAMCINOLONE ACETONIDE 40 MG: 40 INJECTION, SUSPENSION INTRA-ARTICULAR; INTRAMUSCULAR at 11:04

## 2020-11-18 NOTE — PROGRESS NOTES
Ranulfo Newberry  1960   Chief Complaint   Patient presents with    Knee Pain     right knee pain        HISTORY OF PRESENT ILLNESS  Ranulfo Newberry is a 56 y.o. female who presents today for evaluation of right knee pain. She has had pain off and on for the last year. Denies an injury. Has a \"popping sensation\" and sharp stabbing pains. Has some pains with stairs. Patient describes the pain as sharp and stabbing that is Intermittent in nature. Symptoms are worse with getting up from a seated position and stairs and is better with  rest.  Associated symptoms include none,. Since problem started, it: has worsened in the last 2 months. Pain does wake patient up at night. Has taken nothing for the problem. Pain is a 4/10. Has tried following treatments: Injections:NO; Brace:NO; Therapy:NO; Cane/Crutch:NO       Allergies   Allergen Reactions    Hydrocodone Bitartrate Rash    Morphine Rash and Itching    Norco [Hydrocodone-Acetaminophen] Itching    Penicillin Hives        Past Medical History:   Diagnosis Date    Aortic valve disorders 1/13/2011    Asthma     Atrial fibrillation ([facility] [address] 1/13/2011    Congestive heart failure, unspecified March 2005    Cleared quickly with Lasix therapy    Echocardiogram 01/10/2012    A-fib. EF 45%. Mild RVE. RVSP 45-50. Massive LAE. Mod TAMI. Mod MS. Severe MR (mean grad 10). Mild-mod AI. Mild TR. Pulmonic systolic flow reversal.  Mild IVCE.  Hives     Hypertension     Iron deficiency anemias     Long term (current) use of anticoagulants 2/23/2011    Murmur 1/2011    Grade I-II/VI systolic ejection murmur along left sternal border, with radiation to the pulmonic area.  S/P cardiac cath 02/24/2012    Minimal coronary artery disease. Mild LVE. EF 55%. Mod MR. Mod-severe MS. RA 15.  RV 55/16. PA 58/34. W 36.  CO/CI 3.5/1.9 (TD); 3.61/1.9 (Dequan). R to L shunt suggested.     Wears glasses     Weight gain       Social History     Socioeconomic History    Marital status:      Spouse name: Not on file    Number of children: Not on file    Years of education: Not on file    Highest education level: Not on file   Occupational History    Not on file   Social Needs    Financial resource strain: Not on file    Food insecurity     Worry: Not on file     Inability: Not on file    Transportation needs     Medical: Not on file     Non-medical: Not on file   Tobacco Use    Smoking status: Former Smoker     Packs/day: 0.50     Years: 21.00     Pack years: 10.50     Last attempt to quit:      Years since quittin.8    Smokeless tobacco: Never Used   Substance and Sexual Activity    Alcohol use:  Yes     Alcohol/week: 1.0 standard drinks     Types: 1 Glasses of wine per week     Comment: occassionally    Drug use: No    Sexual activity: Yes     Partners: Male     Birth control/protection: None   Lifestyle    Physical activity     Days per week: Not on file     Minutes per session: Not on file    Stress: Not on file   Relationships    Social connections     Talks on phone: Not on file     Gets together: Not on file     Attends [employer] service: Not on file     Active member of club or organization: Not on file     Attends meetings of clubs or organizations: Not on file     Relationship status: Not on file    Intimate partner violence     Fear of current or ex partner: Not on file     Emotionally abused: Not on file     Physically abused: Not on file     Forced sexual activity: Not on file   Other Topics Concern    Not on file   Social History Narrative    Not on file      Past Surgical History:   Procedure Laterality Date    HX HEART CATHETERIZATION  2012    HX HYSTERECTOMY  2006    HX MITRAL VALVE REPLACEMENT  2013    25/33 mm On-X mechanical valve with bilateral pulmonary vein isolation and resection of LA appendage      Family History   Problem Relation Age of Onset    Heart Surgery Father         Open-Heart Surgery    Other Father         Venous Thromboembolism    Hypertension Father     Heart Disease Mother         Enlarged Heart    Hypertension Mother     Diabetes Mother     Diabetes Sister     Hypertension Sister     Diabetes Brother     Heart Disease Paternal Grandmother     Diabetes Sister     Hypertension Sister       Current Outpatient Medications   Medication Sig    budesonide (RHINOCORT AQUA) 32 mcg/actuation nasal spray SPRAY 2 SPRAYS INTO EACH NOSTRIL EVERY DAY    fluconazole (DIFLUCAN) 150 mg tablet Take 1 tablet today and another tablet in 3 days.  montelukast (SINGULAIR) 10 mg tablet TAKE 1 TABLET BY MOUTH EVERY DAY    losartan (COZAAR) 50 mg tablet TAKE 1 TABLET BY MOUTH TWICE A DAY    furosemide (LASIX) 40 mg tablet TAKE 1 TABLET BY MOUTH EVERY DAY    atorvastatin (LIPITOR) 40 mg tablet TAKE 1 TABLET BY MOUTH EVERY DAY    sertraline (ZOLOFT) 100 mg tablet TAKE 1 TABLET BY MOUTH EVERY DAY    Klor-Con M20 20 mEq tablet TAKE 1 TABLET BY MOUTH EVERY DAY    Ventolin HFA 90 mcg/actuation inhaler TAKE 2 PUFFS BY INHALATION DAILY.  warfarin (COUMADIN) 5 mg tablet TAKE 5MG DAILY EXCEPT 2.5MG ON TUE-THURS-SAT OR AS DIRECTED    triamcinolone acetonide (KENALOG) 0.1 % ointment Apply  to affected area two (2) times a day. use thin layer    amLODIPine (NORVASC) 5 mg tablet TAKE 1 TABLET BY MOUTH EVERY DAY    fluticasone propion-salmeterol (ADVAIR/WIXELA) 250-50 mcg/dose diskus inhaler Take 1 Puff by inhalation every twelve (12) hours.  OTHER Allergy injections    aspirin delayed-release 81 mg tablet Take 1 Tab by mouth daily.  esomeprazole (NEXIUM) 20 mg capsule Take 20 mg by mouth daily as needed (heart burn).  melatonin tab tablet Take 10 mg by mouth nightly.  clotrimazole-betamethasone (LOTRISONE) topical cream Apply thin layer to affected area twice a day for 2 weeks    levocetirizine (XYZAL) 5 mg tablet Take 1 Tab by mouth daily.     hydrOXYzine pamoate (VISTARIL) 25 mg capsule TAKE 1 CAP BY MOUTH THREE (3) TIMES DAILY AS NEEDED FOR ITCHING.  clobetasoL (TEMOVATE) 0.05 % ointment Apply  to affected area two (2) times a day.  timolol (TIMOPTIC) 0.5 % ophthalmic solution     polyethylene glycol (MIRALAX) 17 gram packet Take 1 Packet by mouth daily. (Patient taking differently: Take  by mouth daily as needed for Other (Constipation). )    sodium chloride (SALINE NASAL) 0.65 % nasal squeeze bottle 1 Spray as needed for Congestion. No current facility-administered medications for this visit. REVIEW OF SYSTEM   Patient denies: Weight loss, Fever/Chills, HA, Visual changes, Fatigue, Chest pain, SOB, Abdominal pain, N/V/D/C, Blood in stool or urine, Edema. Pertinent positive as above in HPI. All others were negative    PHYSICAL EXAM:   Visit Vitals  BP (!) 135/98 (BP 1 Location: Left arm, BP Patient Position: Sitting)   Pulse 70   Temp (!) 94.4 °F (34.7 °C) (Temporal)   Resp 16   Ht 5' 7\" (1.702 m)   Wt 191 lb 3.2 oz (86.7 kg)   SpO2 98%   BMI 29.95 kg/m²     The patient is a well-developed, well-nourished female   in no acute distress. The patient is alert and oriented times three. The patient is alert and oriented times three. Mood and affect are normal.  LYMPHATIC: lymph nodes are not enlarged and are within normal limits  SKIN: normal in color and non tender to palpation. There are no bruises or abrasions noted. NEUROLOGICAL: Motor sensory exam is within normal limits. Reflexes are equal bilaterally.  There is normal sensation to pinprick and light touch  MUSCULOSKELETAL:  Examination Right knee   Skin Intact   Range of motion 0-130   Effusion -   Medial joint line tenderness -   Lateral joint line tenderness -   Tenderness Pes Bursa -   Tenderness insertion MCL -   Tenderness insertion LCL -   Leanns -   Patella crepitus -   Patella grind -   Lachman -   Pivot shift -   Anterior drawer -   Posterior drawer -   Varus stress -   Valgus stress -   Neurovascular Intact Calf Swelling and Tenderness to Palpation -   Alexsander's Test -   Hamstring Cord Tightness -          PROCEDURE: Right knee Injection    Indication:Right knee pain/swelling    After sterile prep, 4 cc of Xylocaine and 1 cc of Kenalog were injected into the right knee. [facility]  OFFICE PROCEDURE PROGRESS NOTE        Chart reviewed for the following:  Lionel LUNA PA, have reviewed the History, Physical and updated the Allergic reactions for [address] performed immediately prior to start of procedure:  Lionel LUNA PA-C, have performed the following reviews on [place] prior to the start of the procedure:            * Patient was identified by name and date of birth   * Agreement on procedure being performed was verified  * Risks and Benefits explained to the patient  * Procedure site verified and marked as necessary  * Patient was positioned for comfort  * Consent was signed and verified     Time: 10:57 AM    Date of procedure: 2020    Procedure performed by:  SAMANTHA Bond    Provider assisted by: (see medication administration)    How tolerated by patient: tolerated the procedure well with no complications    Comments: none     IMAGIN view xray images of right knee taken in office on 2020 read and reviewed by myself reveal degenerative arthritis with joint space narrowing in the medial compartment     IMPRESSION:      ICD-10-CM ICD-9-CM    1. Right knee pain, unspecified chronicity  M25.561 719.46 AMB POC XRAY, KNEE; 1/2 VIEWS   2. Primary osteoarthritis of right knee  M17.11 715.16         PLAN:   1. Patient with degenerative arthritis. Unable to take nsaids secondary to being on coumadin. Will inject with cortisone today. physician directed exercise program given to patient today  Risk factors include: CHF, HTN,  2. Yes cortisone injection indicated today   3.  No Physical/Occupational Therapy indicated today  4. No diagnostic test indicated today:   5. No durable medical equipment indicated today  6. No referral indicated today   7. Yes medications indicated today: voltaren gel  8.  No Narcotic indicated today     RTC 3 weeks     MAGGIE Reeves [facility]

## 2020-11-19 RX ORDER — LEVOCETIRIZINE DIHYDROCHLORIDE 5 MG/1
TABLET, FILM COATED ORAL
Qty: 90 TAB | Refills: 0 | Status: SHIPPED | OUTPATIENT
Start: 2020-11-19 | End: 2021-03-05 | Stop reason: SDUPTHER

## 2020-11-23 ENCOUNTER — ANTI-COAG VISIT (OUTPATIENT)
Dept: CARDIOLOGY CLINIC | Age: 60
End: 2020-11-23
Payer: MEDICARE

## 2020-11-23 ENCOUNTER — OFFICE VISIT (OUTPATIENT)
Dept: CARDIOLOGY CLINIC | Age: 60
End: 2020-11-23
Payer: MEDICARE

## 2020-11-23 VITALS
HEIGHT: 67 IN | OXYGEN SATURATION: 98 % | WEIGHT: 188 LBS | DIASTOLIC BLOOD PRESSURE: 90 MMHG | BODY MASS INDEX: 29.51 KG/M2 | HEART RATE: 76 BPM | SYSTOLIC BLOOD PRESSURE: 135 MMHG

## 2020-11-23 DIAGNOSIS — I10 ESSENTIAL HYPERTENSION: ICD-10-CM

## 2020-11-23 DIAGNOSIS — Z79.01 LONG TERM CURRENT USE OF ANTICOAGULANT THERAPY: ICD-10-CM

## 2020-11-23 DIAGNOSIS — I48.91 ATRIAL FIBRILLATION, UNSPECIFIED TYPE (HCC): ICD-10-CM

## 2020-11-23 DIAGNOSIS — Z95.2 S/P MVR (MITRAL VALVE REPLACEMENT): ICD-10-CM

## 2020-11-23 DIAGNOSIS — E78.5 HYPERLIPIDEMIA, UNSPECIFIED HYPERLIPIDEMIA TYPE: ICD-10-CM

## 2020-11-23 DIAGNOSIS — Z95.2 S/P MVR (MITRAL VALVE REPLACEMENT): Primary | ICD-10-CM

## 2020-11-23 DIAGNOSIS — Z79.01 LONG TERM (CURRENT) USE OF ANTICOAGULANTS: ICD-10-CM

## 2020-11-23 LAB
INR BLD: 2.2
PT POC: 26.3 SECONDS
VALID INTERNAL CONTROL?: YES

## 2020-11-23 PROCEDURE — G8419 CALC BMI OUT NRM PARAM NOF/U: HCPCS | Performed by: INTERNAL MEDICINE

## 2020-11-23 PROCEDURE — G8752 SYS BP LESS 140: HCPCS | Performed by: INTERNAL MEDICINE

## 2020-11-23 PROCEDURE — G8427 DOCREV CUR MEDS BY ELIG CLIN: HCPCS | Performed by: INTERNAL MEDICINE

## 2020-11-23 PROCEDURE — 85610 PROTHROMBIN TIME: CPT | Performed by: INTERNAL MEDICINE

## 2020-11-23 PROCEDURE — G8755 DIAS BP > OR = 90: HCPCS | Performed by: INTERNAL MEDICINE

## 2020-11-23 PROCEDURE — G9899 SCRN MAM PERF RSLTS DOC: HCPCS | Performed by: INTERNAL MEDICINE

## 2020-11-23 PROCEDURE — 93000 ELECTROCARDIOGRAM COMPLETE: CPT | Performed by: INTERNAL MEDICINE

## 2020-11-23 PROCEDURE — 99214 OFFICE O/P EST MOD 30 MIN: CPT | Performed by: INTERNAL MEDICINE

## 2020-11-23 PROCEDURE — G8432 DEP SCR NOT DOC, RNG: HCPCS | Performed by: INTERNAL MEDICINE

## 2020-11-23 NOTE — PROGRESS NOTES
The INR is stable and therapeutic. Continue Coumadin 5mg daily except 2.5mg on Tuesday, Thursday, Saturday.

## 2020-11-23 NOTE — PROGRESS NOTES
HPI:   I saw Rohan Garrido in my office today in cardiovascular evaluation regarding her mitral valve disease. Ms. Snow Irwin is a very pleasant 55 year old [facility] American female whom I first saw back in March of 2005 at [facility] with congestive heart failure. Her heart failure cleared quickly at that time with Lasix therapy and subsequent echocardiogram demonstrated mild concentric left ventricular hypertrophy without ventricular dilatation, and a low normal overall left ventricular function with ejection fraction in the 55% range, as well as mild to moderate mitral stenosis and moderate mitral regurgitation, with some signs of rheumatic aortic valvular disease as well.      She subsequently did reasonably well on Diovan and beta blockers, but she was lost to follow up for some time, and when I saw her again in 2012 she was having signs of worsening mitral stenosis and ultimately underwent a cardiac catheterization by my associate Dr. Meeta Lloyd on 2/24/12, which demonstrated what appeared to be moderate to severe mitral stenosis with moderate mitral regurgitation, mild aortic insufficiency and only very mild coronary artery disease.      She did have an episode of hypoxemia during that cardiac catheterization and had a cardiac MRI and was further worked up by Dr. Hue Castillo prior to her ultimately getting surgery, which actually was put off for another year because of some personal issues. She ultimately was referred for surgery and that was completed by Dr. Hue Castillo on 5/22/13, and consisted of a mitral valve replacement with a 25/23 mm On-X mechanical valve together with bilateral pulmonary vein isolation and resection of the left atrial appendage.     She comes in today and relates that she is doing reasonably well. He has occasional palpitations, but in general is doing quite well without any other cardiovascular complaints. Encounter Diagnoses   Name Primary?     [facility], [facility]     S/P MVR (mitral valve replacement) 5/23/2013 Yes    Essential hypertension     Hyperlipidemia, unspecified hyperlipidemia type     Long term (current) use of anticoagulants        Discussion: This lady appears to be doing reasonably well at this juncture. She raining fairly active and denies any real cardiovascular symptoms except for occasional palpitations which are short-lived. It has been was 2 years since we did an echocardiogram to assess her overall left ventricular function and prosthetic valve function so I am going to get one completed. Her latest lipid profile which was completed in July 27, 2020 demonstrated total cholesterol of 135 with triglycerides of 101, HDL of 60, LDL of 54.8, and VLDL of 20.2 which is excellent control on Lipitor 40 mg daily. Her blood pressure is elevated today at 142/90 when checked by my staff and 130/90 when checked by myself, however she tells me that it is always well controlled at home and in other doctor's offices so not can make any changes. Her heart rate in atrial fibrillation is well controlled and her most INR done today was therapeutic range at 2.2. Since she is otherwise doing very well I am simply going to have her seen again in several months by my associate Dr. Niraj Piña since I will be retiring at the end of the year. PCP: Licha Pierre MD       Past Medical History:   Diagnosis Date    Aortic valve disorders 1/13/2011    Asthma     Atrial fibrillation ([facility] 1/13/2011    Congestive heart failure, unspecified March 2005    Cleared quickly with Lasix therapy    Echocardiogram 01/10/2012    A-fib. EF 45%. Mild RVE. RVSP 45-50. Massive LAE. Mod TAMI. Mod MS. Severe MR (mean grad 10). Mild-mod AI. Mild TR. Pulmonic systolic flow reversal.  Mild IVCE.     Hives     Hypertension     Iron deficiency anemias     Long term (current) use of anticoagulants 2/23/2011    Murmur 1/2011    Grade I-II/VI systolic ejection murmur along left sternal border, with radiation to the pulmonic area.  S/P cardiac cath 02/24/2012    Minimal coronary artery disease. Mild LVE. EF 55%. Mod MR. Mod-severe MS. RA 15.  RV 55/16. PA 58/34. W 36.  CO/CI 3.5/1.9 (TD); 3.61/1.9 ([place]). R to L shunt suggested.  Wears glasses     Weight gain          Past Surgical History:   Procedure Laterality Date    HX HEART CATHETERIZATION  2/24/2012    HX HYSTERECTOMY  2006    HX MITRAL VALVE REPLACEMENT  05/22/2013    25/33 mm On-X mechanical valve with bilateral pulmonary vein isolation and resection of LA appendage       Current Outpatient Medications   Medication Sig    levocetirizine (XYZAL) 5 mg tablet TAKE 1 TABLET BY MOUTH EVERY DAY    budesonide (RHINOCORT AQUA) 32 mcg/actuation nasal spray SPRAY 2 SPRAYS INTO EACH NOSTRIL EVERY DAY    montelukast (SINGULAIR) 10 mg tablet TAKE 1 TABLET BY MOUTH EVERY DAY    losartan (COZAAR) 50 mg tablet TAKE 1 TABLET BY MOUTH TWICE A DAY    furosemide (LASIX) 40 mg tablet TAKE 1 TABLET BY MOUTH EVERY DAY    atorvastatin (LIPITOR) 40 mg tablet TAKE 1 TABLET BY MOUTH EVERY DAY    sertraline (ZOLOFT) 100 mg tablet TAKE 1 TABLET BY MOUTH EVERY DAY    Klor-Con M20 20 mEq tablet TAKE 1 TABLET BY MOUTH EVERY DAY    Ventolin HFA 90 mcg/actuation inhaler TAKE 2 PUFFS BY INHALATION DAILY.  warfarin (COUMADIN) 5 mg tablet TAKE 5MG DAILY EXCEPT 2.5MG ON TUE-THURS-SAT OR AS DIRECTED    timolol (TIMOPTIC) 0.5 % ophthalmic solution     amLODIPine (NORVASC) 5 mg tablet TAKE 1 TABLET BY MOUTH EVERY DAY    OTHER Allergy injections    aspirin delayed-release 81 mg tablet Take 1 Tab by mouth daily.  polyethylene glycol (MIRALAX) 17 gram packet Take 1 Packet by mouth daily. (Patient taking differently: Take  by mouth daily as needed for Other (Constipation). )    esomeprazole (NEXIUM) 20 mg capsule Take 20 mg by mouth daily as needed (heart burn).     sodium chloride (SALINE NASAL) 0.65 % nasal squeeze bottle 1 Spray as needed for Congestion.  melatonin tab tablet Take 10 mg by mouth nightly.  clotrimazole-betamethasone (LOTRISONE) topical cream Apply thin layer to affected area twice a day for 2 weeks    fluconazole (DIFLUCAN) 150 mg tablet Take 1 tablet today and another tablet in 3 days.  triamcinolone acetonide (KENALOG) 0.1 % ointment Apply  to affected area two (2) times a day. use thin layer    hydrOXYzine pamoate (VISTARIL) 25 mg capsule TAKE 1 CAP BY MOUTH THREE (3) TIMES DAILY AS NEEDED FOR ITCHING.  clobetasoL (TEMOVATE) 0.05 % ointment Apply  to affected area two (2) times a day.  fluticasone propion-salmeterol (ADVAIR/WIXELA) 250-50 mcg/dose diskus inhaler Take 1 Puff by inhalation every twelve (12) hours. No current facility-administered medications for this visit. Allergies   Allergen Reactions    Hydrocodone Bitartrate Rash    Morphine Rash and Itching    Norco [Hydrocodone-Acetaminophen] Itching    Penicillin Hives       Social History:   reports that she quit smoking about 12 years ago. She has a 10.50 pack-year smoking history. She has never used smokeless tobacco. She reports current alcohol use of about 1.0 standard drinks of alcohol per week. She reports that she does not use drugs. Family History   Problem Relation Age of Onset    Heart Surgery Father         Open-Heart Surgery    Other Father         Venous Thromboembolism    Hypertension Father     Heart Disease Mother         Enlarged Heart    Hypertension Mother    Gregory Valenzuela Diabetes Mother     Diabetes Sister     Hypertension Sister     Diabetes Brother     Heart Disease Paternal Grandmother     Diabetes Sister     Hypertension Sister      Review of Systems:    Constitutional: Positive for mild fatigue. Negative for chills, fever, or weight loss. Respiratory: Positive for wheezing. Negative for cough, hemoptysis, sputum production and shortness of breath.     Cardiovascular: Positive for minimal swelling in the right foot. Negative for chest pain, palpitations, claudication, leg swelling and PND. Gastrointestinal: Negative. Musculoskeletal: Negative for back pain, falls, joint pain, myalgias and neck pain. Neurological: Negative for dizziness. Physical Exam:   Visit Vitals  BP (!) 135/90 (BP 1 Location: Right arm, BP Patient Position: Sitting)   Pulse 76   Ht 5' 7\" (1.702 m)   Wt 85.3 kg (188 lb)   SpO2 98%   BMI 29.44 kg/m²       The patient is an alert, oriented, well developed, well nourished 56 y.o.  female who was in no acute distress at the time of my examination. HEENT: No icterus or xanthelasmas. Conjunctivae white, mucosa moist, no pallor or cyanosis. Neck: Supple without masses, tenderness or thyromegaly. No jugular venous distention. Carotid upstrokes are full bilaterally, without bruits. Cardiovascular: Chest is symmetrical with good excursion. There is a well healed, but widened mid sternotomy scar. [place] is not displaced. No lifts, heaves or thrills. Mechanical S1 with normal S2 without appreciable murmurs, rubs, clicks or gallops auscultated. Lungs: Clear to auscultation bilaterally. Abdomen: Soft. No masses, tenderness or organomegaly. No abdominal bruits. Extremities: No edema, with full peripheral pulses. No clubbing or cyanosis. Review of Data: Please refer to past medical history for most recent cardiac testing. Lab Results   Component Value Date/Time    Cholesterol, total 135 07/27/2020 08:21 AM    HDL Cholesterol 60 07/27/2020 08:21 AM    LDL, calculated 54.8 07/27/2020 08:21 AM    Triglyceride 101 07/27/2020 08:21 AM    CHOL/HDL Ratio 2.3 07/27/2020 08:21 AM     Results for orders placed or performed in visit on 11/23/20   AMB POC EKG ROUTINE W/ 12 LEADS, INTER & REP     Status: None    Narrative    Atrial fibrillation with controlled ventricular response rate in the mid 70s. Minor nonspecific diffuse ST-T change. Compared to the EKG of December 3, 2019 there was little interval change. Robyn Ortiz D.O., FDARRONC. [facility]  [facility]  [address] [place] [address]. [address] [place]    [phone]    PLEASE NOTE:  This document has been produced using voice recognition software. Unrecognized errors in transcription may be present.

## 2020-12-07 ENCOUNTER — TELEPHONE (OUTPATIENT)
Dept: CARDIOLOGY CLINIC | Age: 60
End: 2020-12-07

## 2020-12-07 NOTE — TELEPHONE ENCOUNTER
Called and left voicemail message to remind patient of echocardiogram appointment tomorrow.     Chikis Valdez, RCS, RDCS

## 2020-12-21 ENCOUNTER — TELEPHONE (OUTPATIENT)
Dept: CARDIOLOGY CLINIC | Age: 60
End: 2020-12-21

## 2020-12-21 NOTE — TELEPHONE ENCOUNTER
Called and left voicemail message to remind patient of echocardiogram appointment tomorrow.     Cassandra Acharya, RCS, RDCS

## 2021-01-08 RX ORDER — POTASSIUM CHLORIDE 1500 MG/1
TABLET, EXTENDED RELEASE ORAL
Qty: 30 TAB | Refills: 0 | Status: SHIPPED | OUTPATIENT
Start: 2021-01-08 | End: 2021-01-15

## 2021-01-14 ENCOUNTER — TELEPHONE (OUTPATIENT)
Dept: CARDIOLOGY CLINIC | Age: 61
End: 2021-01-14

## 2021-01-14 ENCOUNTER — VIRTUAL VISIT (OUTPATIENT)
Dept: FAMILY MEDICINE CLINIC | Age: 61
End: 2021-01-14
Payer: MEDICARE

## 2021-01-14 DIAGNOSIS — I25.119 CORONARY ARTERY DISEASE INVOLVING NATIVE CORONARY ARTERY OF NATIVE HEART WITH ANGINA PECTORIS (HCC): ICD-10-CM

## 2021-01-14 DIAGNOSIS — F33.9 RECURRENT DEPRESSION (HCC): ICD-10-CM

## 2021-01-14 DIAGNOSIS — M54.42 ACUTE MIDLINE LOW BACK PAIN WITH LEFT-SIDED SCIATICA: Primary | ICD-10-CM

## 2021-01-14 DIAGNOSIS — J45.20 MILD INTERMITTENT ASTHMA WITHOUT COMPLICATION: ICD-10-CM

## 2021-01-14 DIAGNOSIS — I25.10 CORONARY ARTERY DISEASE INVOLVING NATIVE CORONARY ARTERY OF NATIVE HEART WITHOUT ANGINA PECTORIS: ICD-10-CM

## 2021-01-14 DIAGNOSIS — I50.32 CHRONIC DIASTOLIC CONGESTIVE HEART FAILURE (HCC): ICD-10-CM

## 2021-01-14 DIAGNOSIS — I48.91 ATRIAL FIBRILLATION, UNSPECIFIED TYPE (HCC): ICD-10-CM

## 2021-01-14 DIAGNOSIS — F41.9 ANXIETY: ICD-10-CM

## 2021-01-14 DIAGNOSIS — E78.2 MIXED HYPERLIPIDEMIA: ICD-10-CM

## 2021-01-14 PROCEDURE — G8427 DOCREV CUR MEDS BY ELIG CLIN: HCPCS | Performed by: FAMILY MEDICINE

## 2021-01-14 PROCEDURE — G8432 DEP SCR NOT DOC, RNG: HCPCS | Performed by: FAMILY MEDICINE

## 2021-01-14 PROCEDURE — 3017F COLORECTAL CA SCREEN DOC REV: CPT | Performed by: FAMILY MEDICINE

## 2021-01-14 PROCEDURE — G0463 HOSPITAL OUTPT CLINIC VISIT: HCPCS | Performed by: FAMILY MEDICINE

## 2021-01-14 PROCEDURE — G8756 NO BP MEASURE DOC: HCPCS | Performed by: FAMILY MEDICINE

## 2021-01-14 PROCEDURE — G9899 SCRN MAM PERF RSLTS DOC: HCPCS | Performed by: FAMILY MEDICINE

## 2021-01-14 PROCEDURE — 99214 OFFICE O/P EST MOD 30 MIN: CPT | Performed by: FAMILY MEDICINE

## 2021-01-14 RX ORDER — CYCLOBENZAPRINE HCL 10 MG
10 TABLET ORAL
Qty: 30 TAB | Refills: 0 | Status: SHIPPED | OUTPATIENT
Start: 2021-01-14 | End: 2021-03-05 | Stop reason: SDUPTHER

## 2021-01-14 RX ORDER — ACETAMINOPHEN 500 MG
650 TABLET ORAL
COMMUNITY

## 2021-01-14 RX ORDER — METHYLPREDNISOLONE 4 MG/1
TABLET ORAL
Qty: 1 DOSE PACK | Refills: 0 | Status: SHIPPED | OUTPATIENT
Start: 2021-01-14 | End: 2021-06-11

## 2021-01-14 NOTE — PATIENT INSTRUCTIONS
Sciatica: Exercises Introduction Here are some examples of typical rehabilitation exercises for your condition. Start each exercise slowly. Ease off the exercise if you start to have pain. Your doctor or physical therapist will tell you when you can start these exercises and which ones will work best for you. When you are not being active, find a comfortable position for rest. Some people are comfortable on the floor or a medium-firm bed with a small pillow under their head and another under their knees. Some people prefer to lie on their side with a pillow between their knees. Don't stay in one position for too long. Take short walks (10 to 20 minutes) every 2 to 3 hours. Avoid slopes, hills, and stairs until you feel better. Walk only distances you can manage without pain, especially leg pain. How to do the exercises Back stretches 1. Get down on your hands and knees on the floor. 2. Relax your head and allow it to droop. Round your back up toward the ceiling until you feel a nice stretch in your upper, middle, and lower back. Hold this stretch for as long as it feels comfortable, or about 15 to 30 seconds. 3. Return to the starting position with a flat back while you are on your hands and knees. 4. Let your back sway by pressing your stomach toward the floor. Lift your buttocks toward the ceiling. 5. Hold this position for 15 to 30 seconds. 6. Repeat 2 to 4 times. Follow-up care is a key part of your treatment and safety. Be sure to make and go to all appointments, and call your doctor if you are having problems. It's also a good idea to know your test results and keep a list of the medicines you take. Where can you learn more? Go to http://www.gray.com/ Enter R728 in the search box to learn more about \"Sciatica: Exercises. \" Current as of: March 2, 2020               Content Version: 12.6 © 6297-0762 [employer]. Care instructions adapted under license by [employer] (which disclaims liability or warranty for this information). If you have questions about a medical condition or this instruction, always ask your healthcare professional. [address] any warranty or liability for your use of this information.

## 2021-01-14 NOTE — PROGRESS NOTES
Faraz Long is a 56 y.o. female who was seen by synchronous (real-time) audio-video technology on 1/14/2021. Consent: Faraz Long, who was seen by synchronous (real-time) audio-video technology, and/or her healthcare decision maker, is aware that this patient-initiated, Telehealth encounter on 1/14/2021 is a billable service, with coverage as determined by her insurance carrier. She is aware that she may receive a bill and has provided verbal consent to proceed: Yes. 712  Subjective:   Faraz Long is a 56 y.o. female who was seen for Back Pain (x 5 days - mid to lower back pain (sciatica?) - left side of back and radiates down to left leg - pain level 8 - worsens at night) and Leg Pain (left leg pain  - pain level 8 )    Reports lifting heavy laundry over the weekend and developed lower back pain radiating to left buttock and thigh. No weakness, paresthesia, incontinence. Has been applying warm compresses with minimal relief.      Depression/anxiety- reports to be doing fairly well, taking zoloft     She has h/o NSTEMI/afib/ rhem MV s/p repair- no current symptoms, on long term coumadin, lasix/Kcl, cozaar, lipitor  transitioning to new cardiologist upon dr. Sanjeev Graf long term     HTN - reports normotensive BP readings at home, on norvasc/cozaar    HL- on lipitor, satisfactory levels 7/2020    Asthma- no recent flares, following pulm  Chronic rhintis- not currently bothersome    Prior to Admission medications    Medication Sig Start Date End Date Taking? Authorizing Provider   acetaminophen (Tylenol Extra Strength) 500 mg tablet Take  by mouth every six (6) hours as needed for Pain. Yes Provider, Historical   cyclobenzaprine (FLEXERIL) 10 mg tablet Take 1 Tab by mouth three (3) times daily as needed for Muscle Spasm(s).  1/14/21  Yes Loulou Lovelace MD   methylPREDNISolone (MEDROL DOSEPACK) 4 mg tablet Use as directed 1/14/21  Yes Loulou Lovelace MD   Klor-Con M20 20 mEq tablet TAKE 1 TABLET BY MOUTH EVERY DAY 1/8/21  Yes Barbara Humphries MD   levocetirizine (XYZAL) 5 mg tablet TAKE 1 TABLET BY MOUTH EVERY DAY 11/19/20  Yes Anila Bradshaw MD   budesonide (RHINOCORT AQUA) 32 mcg/actuation nasal spray SPRAY 2 SPRAYS INTO EACH NOSTRIL EVERY DAY 11/17/20  Yes Concepción Coronado MD   montelukast (SINGULAIR) 10 mg tablet TAKE 1 TABLET BY MOUTH EVERY DAY 9/4/20  Yes Anila Bradshaw MD   losartan (COZAAR) 50 mg tablet TAKE 1 TABLET BY MOUTH TWICE A DAY 8/25/20  Yes Barbara Humphries MD   furosemide (LASIX) 40 mg tablet TAKE 1 TABLET BY MOUTH EVERY DAY 8/12/20  Yes Desmond Cruz NP   atorvastatin (LIPITOR) 40 mg tablet TAKE 1 TABLET BY MOUTH EVERY DAY 8/10/20  Yes Concepción Coronado MD   sertraline (ZOLOFT) 100 mg tablet TAKE 1 TABLET BY MOUTH EVERY DAY 7/27/20  Yes Anila Bradshaw MD   Ventolin HFA 90 mcg/actuation inhaler TAKE 2 PUFFS BY INHALATION DAILY. 4/30/20  Yes Anila Bradshaw MD   warfarin (COUMADIN) 5 mg tablet TAKE 5MG DAILY EXCEPT 2.5MG ON TUE-THURS-SAT OR AS DIRECTED 2/20/20  Yes Aaron Mazariegos, NP   clobetasoL (TEMOVATE) 0.05 % ointment Apply  to affected area two (2) times a day. 2/6/20  Yes Anila Bradshaw MD   amLODIPine (NORVASC) 5 mg tablet TAKE 1 TABLET BY MOUTH EVERY DAY 12/9/19  Yes Anila Bradshaw MD   OTHER Allergy injections   Yes Provider, Historical   aspirin delayed-release 81 mg tablet Take 1 Tab by mouth daily. 1/11/19  Yes Lillian Pimentel MD   polyethylene glycol (MIRALAX) 17 gram packet Take 1 Packet by mouth daily. Patient taking differently: Take  by mouth daily as needed for Other (Constipation). 1/11/19  Yes Lillian Pimentel MD   esomeprazole (NEXIUM) 20 mg capsule Take 20 mg by mouth daily as needed (heart burn). Yes Provider, Historical   sodium chloride (SALINE NASAL) 0.65 % nasal squeeze bottle 1 Spray as needed for Congestion. Yes Provider, Historical   melatonin tab tablet Take 10 mg by mouth nightly. Yes Provider, Historical   clotrimazole-betamethasone (LOTRISONE) topical cream Apply thin layer to affected area twice a day for 2 weeks 10/28/20 1/14/21  Portia Coronado MD   fluconazole (DIFLUCAN) 150 mg tablet Take 1 tablet today and another tablet in 3 days. 10/28/20 1/14/21  Portia Coroando MD   triamcinolone acetonide (KENALOG) 0.1 % ointment Apply  to affected area two (2) times a day. use thin layer    Provider, Historical   hydrOXYzine pamoate (VISTARIL) 25 mg capsule TAKE 1 CAP BY MOUTH THREE (3) TIMES DAILY AS NEEDED FOR ITCHING. 2/18/20 1/14/21  Portia Coronado MD   timolol (TIMOPTIC) 0.5 % ophthalmic solution  1/20/20   Provider, Historical   fluticasone propion-salmeterol (ADVAIR/WIXELA) 250-50 mcg/dose diskus inhaler Take 1 Puff by inhalation every twelve (12) hours. 10/21/19   Portia Coronado MD     Allergies   Allergen Reactions   • Hydrocodone Bitartrate Rash   • Morphine Rash and Itching   • Norco [Hydrocodone-Acetaminophen] Itching   • Penicillin Hives       Patient Active Problem List    Diagnosis Date Noted   • Coronary artery disease involving native coronary artery of native heart without angina pectoris 11/03/2020   • Vasomotor rhinitis 02/03/2020   • Anemia of chronic disease 11/06/2019   • Chronic sinusitis 10/15/2019   • Elevated serum globulin level 10/15/2019   • Normocytic anemia 10/15/2019   • Anxiety 10/08/2019   • Essential hypertension 06/24/2019   • Adjustment disorder with anxious mood 03/05/2019   • IFG (impaired fasting glucose) 02/25/2019   • NSTEMI (non-ST elevated myocardial infarction) (HCC) 01/05/2019   • Chest pain 01/05/2019   • Other allergic rhinitis 11/30/2018   • Allergic rhinitis due to pollen 11/30/2018   • Perennial allergic rhinitis with seasonal variation 11/30/2018   • Mild intermittent asthma without complication 08/17/2018   • Advanced directives, counseling/discussion 03/09/2017   • Mitral valve stenosis 05/22/2013   • Hyperlipidemia 02/02/2012   Benign hypertensive heart disease with heart failure ([facility] 08/05/2011    Long term current use of anticoagulant therapy 02/23/2011    Atrial fibrillation (HCC) 01/20/2011    Rheumatic valvular disease     Chronic diastolic congestive heart failure ([facility] 03/01/2005     Current Outpatient Medications   Medication Sig Dispense Refill    acetaminophen (Tylenol Extra Strength) 500 mg tablet Take  by mouth every six (6) hours as needed for Pain.  cyclobenzaprine (FLEXERIL) 10 mg tablet Take 1 Tab by mouth three (3) times daily as needed for Muscle Spasm(s). 30 Tab 0    methylPREDNISolone (MEDROL DOSEPACK) 4 mg tablet Use as directed 1 Dose Pack 0    Klor-Con M20 20 mEq tablet TAKE 1 TABLET BY MOUTH EVERY DAY 30 Tab 0    levocetirizine (XYZAL) 5 mg tablet TAKE 1 TABLET BY MOUTH EVERY DAY 90 Tab 0    budesonide (RHINOCORT AQUA) 32 mcg/actuation nasal spray SPRAY 2 SPRAYS INTO EACH NOSTRIL EVERY DAY 3 Bottle 1    montelukast (SINGULAIR) 10 mg tablet TAKE 1 TABLET BY MOUTH EVERY DAY 90 Tab 3    losartan (COZAAR) 50 mg tablet TAKE 1 TABLET BY MOUTH TWICE A DAY 60 Tab 6    furosemide (LASIX) 40 mg tablet TAKE 1 TABLET BY MOUTH EVERY DAY 90 Tab 3    atorvastatin (LIPITOR) 40 mg tablet TAKE 1 TABLET BY MOUTH EVERY DAY 90 Tab 2    sertraline (ZOLOFT) 100 mg tablet TAKE 1 TABLET BY MOUTH EVERY DAY 90 Tab 0    Ventolin HFA 90 mcg/actuation inhaler TAKE 2 PUFFS BY INHALATION DAILY. 18 Inhaler 5    warfarin (COUMADIN) 5 mg tablet TAKE 5MG DAILY EXCEPT 2.5MG ON TUE-THURS-SAT OR AS DIRECTED 60 Tab 6    clobetasoL (TEMOVATE) 0.05 % ointment Apply  to affected area two (2) times a day. 15 g 0    amLODIPine (NORVASC) 5 mg tablet TAKE 1 TABLET BY MOUTH EVERY DAY 90 Tab 3    OTHER Allergy injections      aspirin delayed-release 81 mg tablet Take 1 Tab by mouth daily. 30 Tab 0    polyethylene glycol (MIRALAX) 17 gram packet Take 1 Packet by mouth daily.  (Patient taking differently: Take  by mouth daily as needed for Other (Constipation). ) 30 Packet 0    esomeprazole (NEXIUM) 20 mg capsule Take 20 mg by mouth daily as needed (heart burn).  sodium chloride (SALINE NASAL) 0.65 % nasal squeeze bottle 1 Spray as needed for Congestion.  melatonin tab tablet Take 10 mg by mouth nightly.  triamcinolone acetonide (KENALOG) 0.1 % ointment Apply  to affected area two (2) times a day. use thin layer      timolol (TIMOPTIC) 0.5 % ophthalmic solution       fluticasone propion-salmeterol (ADVAIR/WIXELA) 250-50 mcg/dose diskus inhaler Take 1 Puff by inhalation every twelve (12) hours. 1 Each 0     Allergies   Allergen Reactions    Hydrocodone Bitartrate Rash    Morphine Rash and Itching    Norco [Hydrocodone-Acetaminophen] Itching    Penicillin Hives     Past Medical History:   Diagnosis Date    Aortic valve disorders 1/13/2011    Asthma     Atrial fibrillation ([facility] 1/13/2011    Congestive heart failure, unspecified March 2005    Cleared quickly with Lasix therapy    Echocardiogram 01/10/2012    A-fib. EF 45%. Mild RVE. RVSP 45-50. Massive LAE. Mod TAMI. Mod MS. Severe MR (mean grad 10). Mild-mod AI. Mild TR. Pulmonic systolic flow reversal.  Mild IVCE.  Hives     Hypertension     Iron deficiency anemias     Long term (current) use of anticoagulants 2/23/2011    Murmur 1/2011    Grade I-II/VI systolic ejection murmur along left sternal border, with radiation to the pulmonic area.  S/P cardiac cath 02/24/2012    Minimal coronary artery disease. Mild LVE. EF 55%. Mod MR. Mod-severe MS. RA 15.  RV 55/16. PA 58/34. W 36.  CO/CI 3.5/1.9 (TD); 3.61/1.9 ([place]). R to L shunt suggested.     Wears glasses     Weight gain      Past Surgical History:   Procedure Laterality Date    HX HEART CATHETERIZATION  2/24/2012    HX HYSTERECTOMY  2006    HX MITRAL VALVE REPLACEMENT  05/22/2013    25/33 mm On-X mechanical valve with bilateral pulmonary vein isolation and resection of LA appendage Family History   Problem Relation Age of Onset    Heart Surgery Father         Open-Heart Surgery    Other Father         Venous Thromboembolism    Hypertension Father     Heart Disease Mother         Enlarged Heart    Hypertension Mother    Floydene Ada Diabetes Mother     Diabetes Sister     Hypertension Sister     Diabetes Brother     Heart Disease Paternal [de-identified]     Diabetes Sister     Hypertension Sister      Social History     Tobacco Use    Smoking status: Former Smoker     Packs/day: 0.50     Years: 21.00     Pack years: 10.50     Quit date:      Years since quittin.0    Smokeless tobacco: Never Used   Substance Use Topics    Alcohol use: Yes     Alcohol/week: 1.0 standard drinks     Types: 1 Glasses of wine per week     Comment: occassionally       ROS      Objective: There were no vitals taken for this visit. General: alert, cooperative, no distress   Mental  status: normal mood, behavior, speech, dress, motor activity, and thought processes, able to follow commands   HENT: NCAT   Neck: no visualized mass   Resp: no respiratory distress   Neuro: no gross deficits   Skin: no discoloration or lesions of concern on visible areas   Psychiatric: normal affect, consistent with stated mood, no evidence of hallucinations     Additional exam findings: We discussed the expected course, resolution and complications of the diagnosis(es) in detail. Medication risks, benefits, costs, interactions, and alternatives were discussed as indicated. I advised her to contact the office if her condition worsens, changes or fails to improve as anticipated. She expressed understanding with the diagnosis(es) and plan. Holden Cousin is a 56 y.o. female who was evaluated by a video visit encounter for concerns as above. Patient identification was verified prior to start of the visit. A caregiver was present when appropriate.  Due to this being a TeleHealth encounter (During  public health emergency), evaluation of the following organ systems was limited: Vitals/Constitutional/EENT/Resp/CV/GI//MS/Neuro/Skin/Heme-Lymph-Imm. Pursuant to the emergency declaration under the [id number] waiver authority and the [employer] Act, this Virtual  Visit was conducted, with patient's (and/or legal guardian's) consent, to reduce the patient's risk of exposure to COVID-19 and provide necessary medical care. Services were provided through a video synchronous discussion virtually to substitute for in-person clinic visit. Patient and provider were located at their individual homes. Assessment & Plan:   Diagnoses and all orders for this visit:    Acute midline low back pain with left-sided sciatica  HEP provided  Start:  -     cyclobenzaprine (FLEXERIL) 10 mg tablet; Take 1 Tab by mouth three (3) times daily as needed for Muscle Spasm(s).   -     methylPREDNISolone (MEDROL DOSEPACK) 4 mg tablet; Use as directed  Ff-up in 2 weeks in person    Chronic diastolic congestive heart failure (HCC)  Stable  On arb, lasix/kcl  Transitioning to new cardiologist     Recurrent depression ([address]  Stable  Cont zoloft     Atrial fibrillation, unspecified type ([address]  Long term coumadin     Coronary artery disease involving native coronary artery of native heart with angina pectoris (HCC)  Asymptomatic  Asa, lipitor, arb, lasix/kcl, asa        Mild intermittent asthma without complication  Controlled  On prn albuterol  Following pulm       Essential hypertension  Controlled  Cont current meds: norvasc, cozaar     Hyperlipidemia  LDL at goal <70   Lipids 7/2020  Cont lipitor    Ff-up in 2 weeks, in person or sooner prn     Portia Ni

## 2021-01-14 NOTE — PROGRESS NOTES
1. Have you been to the ER, urgent care clinic since your last visit? Hospitalized since your last visit? No    2. Have you seen or consulted any other health care providers outside of the [address] [place] [address] since your last visit? Include any pap smears or colon screening.  No    Chief Complaint   Patient presents with    Back Pain     x 5 days - mid to lower back pain (sciatica?) - left side of back and radiates down to left leg - pain level 8 - worsens at night    Leg Pain     left leg pain  - pain level 8

## 2021-01-15 ENCOUNTER — ANTI-COAG VISIT (OUTPATIENT)
Dept: CARDIOLOGY CLINIC | Age: 61
End: 2021-01-15
Payer: MEDICARE

## 2021-01-15 DIAGNOSIS — Z79.01 LONG TERM CURRENT USE OF ANTICOAGULANT THERAPY: ICD-10-CM

## 2021-01-15 DIAGNOSIS — I48.91 ATRIAL FIBRILLATION, UNSPECIFIED TYPE (HCC): ICD-10-CM

## 2021-01-15 LAB
ECHO AO ROOT DIAM: 3.37 CM
ECHO AV AREA PEAK VELOCITY: 1.2 CM2
ECHO AV AREA VTI: 1.31 CM2
ECHO AV AREA/BSA PEAK VELOCITY: 0.6 CM2/M2
ECHO AV AREA/BSA VTI: 0.7 CM2/M2
ECHO AV MEAN GRADIENT: 10.05 MMHG
ECHO AV PEAK GRADIENT: 20.38 MMHG
ECHO AV PEAK VELOCITY: 225.74 CM/S
ECHO AV VTI: 50.14 CM
ECHO LA AREA 4C: 27.94 CM2
ECHO LA VOL 2C: 98.62 ML (ref 22–52)
ECHO LA VOL 4C: 87.9 ML (ref 22–52)
ECHO LA VOL BP: 100.86 ML (ref 22–52)
ECHO LA VOL/BSA BIPLANE: 51.21 ML/M2 (ref 16–28)
ECHO LA VOLUME INDEX A2C: 50.07 ML/M2 (ref 16–28)
ECHO LA VOLUME INDEX A4C: 44.63 ML/M2 (ref 16–28)
ECHO LV INTERNAL DIMENSION DIASTOLIC: 5.28 CM (ref 3.9–5.3)
ECHO LV INTERNAL DIMENSION SYSTOLIC: 3.56 CM
ECHO LV IVSD: 1.53 CM (ref 0.6–0.9)
ECHO LV MASS 2D: 323.6 G (ref 67–162)
ECHO LV MASS INDEX 2D: 164.3 G/M2 (ref 43–95)
ECHO LV POSTERIOR WALL DIASTOLIC: 1.31 CM (ref 0.6–0.9)
ECHO LVOT CARDIAC OUTPUT: 5.85 LITER/MINUTE
ECHO LVOT DIAM: 2.09 CM
ECHO LVOT PEAK GRADIENT: 2.48 MMHG
ECHO LVOT PEAK VELOCITY: 78.81 CM/S
ECHO LVOT SV: 65.9 ML
ECHO LVOT VTI: 19.14 CM
ECHO MV AREA VTI: 1.93 CM2
ECHO MV MAX VELOCITY: 169.51 CM/S
ECHO MV MEAN GRADIENT: 4.15 MMHG
ECHO MV PEAK GRADIENT: 11.49 MMHG
ECHO MV PEAK GRADIENT: 11.49 MMHG
ECHO MV VTI: 34.07 CM
ECHO RV TAPSE: 1.56 CM (ref 1.5–2)
ECHO TV REGURGITANT MAX VELOCITY: 277.17 CM/S
ECHO TV REGURGITANT PEAK GRADIENT: 30.73 MMHG
INR BLD: 2.3
LVOT MG: 1.36 MMHG
PT POC: 28.1 SECONDS
VALID INTERNAL CONTROL?: YES

## 2021-01-15 PROCEDURE — 85610 PROTHROMBIN TIME: CPT | Performed by: NURSE PRACTITIONER

## 2021-01-15 RX ORDER — POTASSIUM CHLORIDE 1500 MG/1
TABLET, EXTENDED RELEASE ORAL
Qty: 30 TAB | Refills: 6 | Status: SHIPPED | OUTPATIENT
Start: 2021-01-15 | End: 2021-08-23

## 2021-01-15 NOTE — TELEPHONE ENCOUNTER
This patient contacted office for the following prescriptions to be filled:    Last office visit: 1/14/2021  Follow up appointment: 2/4/2021  Medication requested :   Requested Prescriptions     Pending Prescriptions Disp Refills    sertraline (ZOLOFT) 100 mg tablet 90 Tab 0     PCP: Edward Gee order or Local pharmacy name [facility] [phone]

## 2021-01-15 NOTE — PROGRESS NOTES
The INR is stable and therapeutic.    Continue Coumadin 5mg daily except 2.5mg on Tues-Thurs-Sat  Recheck INR in 4 weeks

## 2021-01-18 DIAGNOSIS — I11.0 BENIGN HYPERTENSIVE HEART DISEASE WITH HEART FAILURE (HCC): ICD-10-CM

## 2021-01-18 RX ORDER — SERTRALINE HYDROCHLORIDE 100 MG/1
TABLET, FILM COATED ORAL
Qty: 90 TAB | Refills: 1 | Status: SHIPPED | OUTPATIENT
Start: 2021-01-18 | End: 2021-05-17

## 2021-01-18 RX ORDER — METOPROLOL SUCCINATE 100 MG/1
TABLET, EXTENDED RELEASE ORAL
Qty: 90 TAB | Refills: 1 | OUTPATIENT
Start: 2021-01-18

## 2021-01-25 NOTE — TELEPHONE ENCOUNTER
Pt would like a call about her Metoprolol that refused for refill. Explained that the RX had been discontinued and she states that it was not suppose to be. She is aware that Dr Robert James is not in the office today and this will be addressed tomorrow. Please advise.

## 2021-01-26 DIAGNOSIS — I11.0 BENIGN HYPERTENSIVE HEART DISEASE WITH HEART FAILURE (HCC): ICD-10-CM

## 2021-01-26 RX ORDER — METOPROLOL SUCCINATE 100 MG/1
TABLET, EXTENDED RELEASE ORAL
Qty: 90 TAB | Refills: 2 | Status: SHIPPED | OUTPATIENT
Start: 2021-01-26 | End: 2021-10-18

## 2021-01-26 NOTE — TELEPHONE ENCOUNTER
Patient has called multiple times today wanting to know why her metoprolol has been discontinued. Patient states she has never stopped the medication and will be taking her last pill tomorrow.  Please advise

## 2021-02-10 RX ORDER — AMLODIPINE BESYLATE 5 MG/1
TABLET ORAL
Qty: 90 TAB | Refills: 3 | Status: SHIPPED | OUTPATIENT
Start: 2021-02-10 | End: 2021-03-05 | Stop reason: SDUPTHER

## 2021-02-22 ENCOUNTER — VIRTUAL VISIT (OUTPATIENT)
Dept: FAMILY MEDICINE CLINIC | Age: 61
End: 2021-02-22
Payer: MEDICARE

## 2021-02-22 DIAGNOSIS — I10 ESSENTIAL HYPERTENSION: Primary | ICD-10-CM

## 2021-02-22 DIAGNOSIS — F41.9 ANXIETY: ICD-10-CM

## 2021-02-22 DIAGNOSIS — G44.219 EPISODIC TENSION-TYPE HEADACHE, NOT INTRACTABLE: ICD-10-CM

## 2021-02-22 DIAGNOSIS — I25.10 CORONARY ARTERY DISEASE INVOLVING NATIVE CORONARY ARTERY OF NATIVE HEART WITHOUT ANGINA PECTORIS: ICD-10-CM

## 2021-02-22 PROCEDURE — G9899 SCRN MAM PERF RSLTS DOC: HCPCS | Performed by: FAMILY MEDICINE

## 2021-02-22 PROCEDURE — G8756 NO BP MEASURE DOC: HCPCS | Performed by: FAMILY MEDICINE

## 2021-02-22 PROCEDURE — G8428 CUR MEDS NOT DOCUMENT: HCPCS | Performed by: FAMILY MEDICINE

## 2021-02-22 PROCEDURE — 3017F COLORECTAL CA SCREEN DOC REV: CPT | Performed by: FAMILY MEDICINE

## 2021-02-22 PROCEDURE — G0463 HOSPITAL OUTPT CLINIC VISIT: HCPCS | Performed by: FAMILY MEDICINE

## 2021-02-22 PROCEDURE — G8510 SCR DEP NEG, NO PLAN REQD: HCPCS | Performed by: FAMILY MEDICINE

## 2021-02-22 PROCEDURE — 99214 OFFICE O/P EST MOD 30 MIN: CPT | Performed by: FAMILY MEDICINE

## 2021-02-22 RX ORDER — BUSPIRONE HYDROCHLORIDE 5 MG/1
5 TABLET ORAL 2 TIMES DAILY
Qty: 60 TAB | Refills: 0 | Status: SHIPPED | OUTPATIENT
Start: 2021-02-22 | End: 2021-03-05 | Stop reason: SDUPTHER

## 2021-02-22 RX ORDER — AMLODIPINE BESYLATE 2.5 MG/1
2.5 TABLET ORAL DAILY
Qty: 90 TAB | Refills: 0 | Status: SHIPPED | OUTPATIENT
Start: 2021-02-22 | End: 2021-03-05 | Stop reason: SDUPTHER

## 2021-02-22 NOTE — PATIENT INSTRUCTIONS
DASH Diet: Care Instructions Your Care Instructions The DASH diet is an eating plan that can help lower your blood pressure. DASH stands for Dietary Approaches to Stop Hypertension. Hypertension is high blood pressure. The DASH diet focuses on eating foods that are high in calcium, potassium, and magnesium. These nutrients can lower blood pressure. The foods that are highest in these nutrients are fruits, vegetables, low-fat dairy products, nuts, seeds, and legumes. But taking calcium, potassium, and magnesium supplements instead of eating foods that are high in those nutrients does not have the same effect. The DASH diet also includes whole grains, fish, and poultry. The DASH diet is one of several lifestyle changes your doctor may recommend to lower your high blood pressure. Your doctor may also want you to decrease the amount of sodium in your diet. Lowering sodium while following the DASH diet can lower blood pressure even further than just the DASH diet alone. Follow-up care is a key part of your treatment and safety. Be sure to make and go to all appointments, and call your doctor if you are having problems. It's also a good idea to know your test results and keep a list of the medicines you take. How can you care for yourself at home? Following the DASH diet · Eat 4 to 5 servings of fruit each day. A serving is 1 medium-sized piece of fruit, ½ cup chopped or canned fruit, 1/4 cup dried fruit, or 4 ounces (½ cup) of fruit juice. Choose fruit more often than fruit juice. · Eat 4 to 5 servings of vegetables each day. A serving is 1 cup of lettuce or raw leafy vegetables, ½ cup of chopped or cooked vegetables, or 4 ounces (½ cup) of vegetable juice. Choose vegetables more often than vegetable juice. · Get 2 to 3 servings of low-fat and fat-free dairy each day. A serving is 8 ounces of milk, 1 cup of yogurt, or 1 ½ ounces of cheese. · Eat 6 to 8 servings of grains each day. A serving is 1 slice of bread, 1 ounce of dry cereal, or ½ cup of cooked rice, pasta, or cooked cereal. Try to choose whole-grain products as much as possible. · Limit lean meat, poultry, and fish to 2 servings each day. A serving is 3 ounces, about the size of a deck of cards. · Eat 4 to 5 servings of nuts, seeds, and legumes (cooked dried beans, lentils, and split peas) each week. A serving is 1/3 cup of nuts, 2 tablespoons of seeds, or ½ cup of cooked beans or peas. · Limit fats and oils to 2 to 3 servings each day. A serving is 1 teaspoon of vegetable oil or 2 tablespoons of salad dressing. · Limit sweets and added sugars to 5 servings or less a week. A serving is 1 tablespoon jelly or jam, ½ cup sorbet, or 1 cup of lemonade. · Eat less than 2,300 milligrams (mg) of sodium a day. If you limit your sodium to 1,500 mg a day, you can lower your blood pressure even more. Tips for success · Start small. Do not try to make dramatic changes to your diet all at once. You might feel that you are missing out on your favorite foods and then be more likely to not follow the plan. Make small changes, and stick with them. Once those changes become habit, add a few more changes. · Try some of the following: ? Make it a goal to eat a fruit or vegetable at every meal and at snacks. This will make it easy to get the recommended amount of fruits and vegetables each day. ? Try yogurt topped with fruit and nuts for a snack or healthy dessert. ? Add lettuce, tomato, cucumber, and onion to sandwiches. ? Combine a ready-made pizza crust with low-fat mozzarella cheese and lots of vegetable toppings. Try using tomatoes, squash, spinach, broccoli, carrots, cauliflower, and onions. ? Have a variety of cut-up vegetables with a low-fat dip as an appetizer instead of chips and dip. ? Sprinkle sunflower seeds or chopped almonds over salads. Or try adding chopped walnuts or almonds to cooked vegetables. ? Try some vegetarian meals using beans and peas. Add garbanzo or kidney beans to salads. Make burritos and tacos with mashed curtis beans or black beans. Where can you learn more? Go to http://www.gray.com/ Enter R154 in the search box to learn more about \"DASH Diet: Care Instructions. \" Current as of: December 16, 2019               Content Version: 12.6 © 1183-7421 [employer] Care instructions adapted under license by [employer] (which disclaims liability or warranty for this information). If you have questions about a medical condition or this instruction, always ask your healthcare professional. [address] any warranty or liability for your use of this information.

## 2021-02-22 NOTE — PROGRESS NOTES
Az Gore is a 56 y.o. female who was seen by synchronous (real-time) audio-video technology on 2/22/2021. Consent: Az Gore, who was seen by synchronous (real-time) audio-video technology, and/or her healthcare decision maker, is aware that this patient-initiated, Telehealth encounter on 2/22/2021 is a billable service, with coverage as determined by her insurance carrier. She is aware that she may receive a bill and has provided verbal consent to proceed: Yes. 712  Subjective:   Az Gore is a 56 y.o. female who was seen for Elevated Blood Pressure (x 3 days) and Headache (x 3 days)    Elevated BP    Pt reports BP has been higher than usual 140/'s. She reports increase anxiety and stress as well. Some mild generalized headaches with BP elevation. denies chest pain, sob, or other symptoms. No changes in diet. She is interested in talk therapy. Continues to take norvasc,  metoprolol and cozaar with good compliance. Anxiety/depression- She is currently on zoloft        H/o nstemi, afib, rheumatic valve disease s/p MV repair 2013- denies any chest pain, sob, palpitations, lightheadedness. On chronic coumadin treatment, following cardiology. HL-taking lipitor without problems    Prior to Admission medications    Medication Sig Start Date End Date Taking? Authorizing Provider   warfarin (COUMADIN) 5 mg tablet TAKE 1 TABLET BY MOUTH DAILY EXCEPT 1/2 TABLET ON TUE-THURS-SAT OR AS DIRECTED 2/22/21  Yes Arnaldo Guerra NP   amLODIPine (NORVASC) 2.5 mg tablet Take 1 Tab by mouth daily. 2/22/21  Yes Pilo Arroyo MD   busPIRone (BUSPAR) 5 mg tablet Take 1 Tab by mouth two (2) times a day.  2/22/21  Yes Pilo Arroyo MD   amLODIPine (NORVASC) 5 mg tablet TAKE 1 TABLET BY MOUTH EVERY DAY 2/10/21  Yes Naila Coronado MD   metoprolol succinate (TOPROL-XL) 100 mg tablet TAKE 1 TABLET BY MOUTH EVERY DAY 1/26/21  Yes Pilo Arroyo MD   sertraline (ZOLOFT) 100 mg tablet TAKE 1 TABLET BY MOUTH EVERY DAY 1/18/21  Yes Samuel Bloom MD   Klor-Con M20 20 mEq tablet TAKE 1 TABLET BY MOUTH EVERY DAY 1/15/21  Yes Negin Laws MD   acetaminophen (Tylenol Extra Strength) 500 mg tablet Take  by mouth every six (6) hours as needed for Pain. Yes Provider, Historical   cyclobenzaprine (FLEXERIL) 10 mg tablet Take 1 Tab by mouth three (3) times daily as needed for Muscle Spasm(s). 1/14/21  Yes Samuel Bloom MD   levocetirizine (XYZAL) 5 mg tablet TAKE 1 TABLET BY MOUTH EVERY DAY 11/19/20  Yes Portia Coronado MD   budesonide (RHINOCORT AQUA) 32 mcg/actuation nasal spray SPRAY 2 SPRAYS INTO EACH NOSTRIL EVERY DAY 11/17/20  Yes Portia Coronado MD   montelukast (SINGULAIR) 10 mg tablet TAKE 1 TABLET BY MOUTH EVERY DAY 9/4/20  Yes Samuel Bloom MD   losartan (COZAAR) 50 mg tablet TAKE 1 TABLET BY MOUTH TWICE A DAY 8/25/20  Yes Negin Laws MD   furosemide (LASIX) 40 mg tablet TAKE 1 TABLET BY MOUTH EVERY DAY 8/12/20  Yes Roshni Mazariegos NP   atorvastatin (LIPITOR) 40 mg tablet TAKE 1 TABLET BY MOUTH EVERY DAY 8/10/20  Yes Liliam Coronado MD   Ventolin HFA 90 mcg/actuation inhaler TAKE 2 PUFFS BY INHALATION DAILY. 4/30/20  Yes Samuel Bloom MD   OTHER Allergy injections   Yes Provider, Historical   aspirin delayed-release 81 mg tablet Take 1 Tab by mouth daily. 1/11/19  Yes Janie Rush MD   polyethylene glycol (MIRALAX) 17 gram packet Take 1 Packet by mouth daily. Patient taking differently: Take  by mouth daily as needed for Other (Constipation). 1/11/19  Yes Janie Rush MD   esomeprazole (NEXIUM) 20 mg capsule Take 20 mg by mouth daily as needed (heart burn). Yes Provider, Historical   sodium chloride (SALINE NASAL) 0.65 % nasal squeeze bottle 1 Spray as needed for Congestion. Yes Provider, Historical   melatonin tab tablet Take 10 mg by mouth nightly.    Yes Provider, Historical   methylPREDNISolone (MEDROL DOSEPACK) 4 mg tablet Use as directed 1/14/21   Stefani Alva MD   triamcinolone acetonide (KENALOG) 0.1 % ointment Apply  to affected area two (2) times a day. use thin layer    Provider, Historical   warfarin (COUMADIN) 5 mg tablet TAKE 5MG DAILY EXCEPT 2.5MG ON TUE-THURS-SAT OR AS DIRECTED 2/20/20 2/22/21  Aldair Mazariegos NP   clobetasoL (TEMOVATE) 0.05 % ointment Apply  to affected area two (2) times a day. 2/6/20   Stefani Alva MD   timolol (TIMOPTIC) 0.5 % ophthalmic solution  1/20/20   Provider, Historical   fluticasone propion-salmeterol (ADVAIR/WIXELA) 250-50 mcg/dose diskus inhaler Take 1 Puff by inhalation every twelve (12) hours.  10/21/19   Stefani Alva MD     Allergies   Allergen Reactions    Hydrocodone Bitartrate Rash    Morphine Rash and Itching    Norco [Hydrocodone-Acetaminophen] Itching    Penicillin Hives       Patient Active Problem List    Diagnosis Date Noted    Coronary artery disease involving native coronary artery of native heart without angina pectoris 11/03/2020    Vasomotor rhinitis 02/03/2020    Anemia of chronic disease 11/06/2019    Chronic sinusitis 10/15/2019    Elevated serum globulin level 10/15/2019    Normocytic anemia 10/15/2019    Anxiety 10/08/2019    Essential hypertension 06/24/2019    Adjustment disorder with anxious mood 03/05/2019    IFG (impaired fasting glucose) 02/25/2019    NSTEMI (non-ST elevated myocardial infarction) ([facility] [address] 01/05/2019    Chest pain 01/05/2019    Other allergic rhinitis 11/30/2018    Allergic rhinitis due to pollen 11/30/2018    Perennial allergic rhinitis with seasonal variation 11/30/2018    Mild intermittent asthma without complication 74/70/4123    Advanced directives, counseling/discussion 03/09/2017    Mitral valve stenosis 05/22/2013    Hyperlipidemia 02/02/2012    Benign hypertensive heart disease with heart failure ([facility] [address] 08/05/2011    Long term current use of anticoagulant therapy 02/23/2011  Atrial fibrillation ([facility] [address] 01/20/2011    Rheumatic valvular disease     Chronic diastolic congestive heart failure (HCC) 03/01/2005     Current Outpatient Medications   Medication Sig Dispense Refill    warfarin (COUMADIN) 5 mg tablet TAKE 1 TABLET BY MOUTH DAILY EXCEPT 1/2 TABLET ON TUE-THURS-SAT OR AS DIRECTED 60 Tab 6    amLODIPine (NORVASC) 2.5 mg tablet Take 1 Tab by mouth daily. 90 Tab 0    busPIRone (BUSPAR) 5 mg tablet Take 1 Tab by mouth two (2) times a day. 60 Tab 0    amLODIPine (NORVASC) 5 mg tablet TAKE 1 TABLET BY MOUTH EVERY DAY 90 Tab 3    metoprolol succinate (TOPROL-XL) 100 mg tablet TAKE 1 TABLET BY MOUTH EVERY DAY 90 Tab 2    sertraline (ZOLOFT) 100 mg tablet TAKE 1 TABLET BY MOUTH EVERY DAY 90 Tab 1    Klor-Con M20 20 mEq tablet TAKE 1 TABLET BY MOUTH EVERY DAY 30 Tab 6    acetaminophen (Tylenol Extra Strength) 500 mg tablet Take  by mouth every six (6) hours as needed for Pain.  cyclobenzaprine (FLEXERIL) 10 mg tablet Take 1 Tab by mouth three (3) times daily as needed for Muscle Spasm(s). 30 Tab 0    levocetirizine (XYZAL) 5 mg tablet TAKE 1 TABLET BY MOUTH EVERY DAY 90 Tab 0    budesonide (RHINOCORT AQUA) 32 mcg/actuation nasal spray SPRAY 2 SPRAYS INTO EACH NOSTRIL EVERY DAY 3 Bottle 1    montelukast (SINGULAIR) 10 mg tablet TAKE 1 TABLET BY MOUTH EVERY DAY 90 Tab 3    losartan (COZAAR) 50 mg tablet TAKE 1 TABLET BY MOUTH TWICE A DAY 60 Tab 6    furosemide (LASIX) 40 mg tablet TAKE 1 TABLET BY MOUTH EVERY DAY 90 Tab 3    atorvastatin (LIPITOR) 40 mg tablet TAKE 1 TABLET BY MOUTH EVERY DAY 90 Tab 2    Ventolin HFA 90 mcg/actuation inhaler TAKE 2 PUFFS BY INHALATION DAILY. 18 Inhaler 5    OTHER Allergy injections      aspirin delayed-release 81 mg tablet Take 1 Tab by mouth daily. 30 Tab 0    polyethylene glycol (MIRALAX) 17 gram packet Take 1 Packet by mouth daily. (Patient taking differently: Take  by mouth daily as needed for Other (Constipation). ) 30 Packet 0  esomeprazole (NEXIUM) 20 mg capsule Take 20 mg by mouth daily as needed (heart burn).  sodium chloride (SALINE NASAL) 0.65 % nasal squeeze bottle 1 Spray as needed for Congestion.  melatonin tab tablet Take 10 mg by mouth nightly.  methylPREDNISolone (MEDROL DOSEPACK) 4 mg tablet Use as directed 1 Dose Pack 0    triamcinolone acetonide (KENALOG) 0.1 % ointment Apply  to affected area two (2) times a day. use thin layer      clobetasoL (TEMOVATE) 0.05 % ointment Apply  to affected area two (2) times a day. 15 g 0    timolol (TIMOPTIC) 0.5 % ophthalmic solution       fluticasone propion-salmeterol (ADVAIR/WIXELA) 250-50 mcg/dose diskus inhaler Take 1 Puff by inhalation every twelve (12) hours. 1 Each 0     Allergies   Allergen Reactions    Hydrocodone Bitartrate Rash    Morphine Rash and Itching    Norco [Hydrocodone-Acetaminophen] Itching    Penicillin Hives     Past Medical History:   Diagnosis Date    Aortic valve disorders 1/13/2011    Asthma     Atrial fibrillation ([facility] [address] 1/13/2011    Congestive heart failure, unspecified March 2005    Cleared quickly with Lasix therapy    Echocardiogram 01/10/2012    A-fib. EF 45%. Mild RVE. RVSP 45-50. Massive LAE. Mod TAMI. Mod MS. Severe MR (mean grad 10). Mild-mod AI. Mild TR. Pulmonic systolic flow reversal.  Mild IVCE.  Hives     Hypertension     Iron deficiency anemias     Long term (current) use of anticoagulants 2/23/2011    Murmur 1/2011    Grade I-II/VI systolic ejection murmur along left sternal border, with radiation to the pulmonic area.  S/P cardiac cath 02/24/2012    Minimal coronary artery disease. Mild LVE. EF 55%. Mod MR. Mod-severe MS. RA 15.  RV 55/16. PA 58/34. W 36.  CO/CI 3.5/1.9 (TD); 3.61/1.9 ([place]). R to L shunt suggested.     Wears glasses     Weight gain      Past Surgical History:   Procedure Laterality Date    HX HEART CATHETERIZATION  2/24/2012    HX HYSTERECTOMY  2006    HX MITRAL VALVE REPLACEMENT  2013    25/33 mm On-X mechanical valve with bilateral pulmonary vein isolation and resection of LA appendage     Family History   Problem Relation Age of Onset    Heart Surgery Father         Open-Heart Surgery    Other Father         Venous Thromboembolism    Hypertension Father     Heart Disease Mother         Enlarged Heart    Hypertension Mother    Beto Loser Diabetes Mother     Diabetes Sister     Hypertension Sister     Diabetes Brother     Heart Disease Paternal [de-identified]     Diabetes Sister     Hypertension Sister      Social History     Tobacco Use    Smoking status: Former Smoker     Packs/day: 0.50     Years: 21.00     Pack years: 10.50     Quit date:      Years since quittin.1    Smokeless tobacco: Never Used   Substance Use Topics    Alcohol use: Yes     Alcohol/week: 1.0 standard drinks     Types: 1 Glasses of wine per week     Comment: occassionally       ROS      Objective: There were no vitals taken for this visit. General: alert, cooperative, no distress   Mental  status: normal mood, behavior, speech, dress, motor activity, and thought processes, able to follow commands   HENT: NCAT   Neck: no visualized mass   Resp: no respiratory distress   Neuro: no gross deficits   Skin: no discoloration or lesions of concern on visible areas   Psychiatric: normal affect, consistent with stated mood, no evidence of hallucinations     Additional exam findings: We discussed the expected course, resolution and complications of the diagnosis(es) in detail. Medication risks, benefits, costs, interactions, and alternatives were discussed as indicated. I advised her to contact the office if her condition worsens, changes or fails to improve as anticipated. She expressed understanding with the diagnosis(es) and plan. Oly Horton is a 56 y.o. female who was evaluated by a video visit encounter for concerns as above.  Patient identification was verified prior to start of the visit. A caregiver was present when appropriate. Due to this being a TeleHealth encounter (During UYKGZ-00 public health emergency), evaluation of the following organ systems was limited: Vitals/Constitutional/EENT/Resp/CV/GI//MS/Neuro/Skin/Heme-Lymph-Imm. Pursuant to the emergency declaration under the [facility] [place] [address] waiver authority and the [employer] Act, this Virtual  Visit was conducted, with patient's (and/or legal guardian's) consent, to reduce the patient's risk of exposure to COVID-19 and provide necessary medical care. Services were provided through a video synchronous discussion virtually to substitute for in-person clinic visit. Patient and provider were located at their individual homes. Assessment & Plan:   Diagnoses and all orders for this visit:    1. Essential hypertension  Needs better control  Increase norvasc to 7.5 mg (5 mg+ 2.5 mg)  Cont cozaar and metoprolol  Address anxiety, add buspar to zoloft  Start talk therapy, referral to psychiatry    2. Anxiety  -     REFERRAL TO PSYCHIATRY  -     REFERRAL TO PSYCHOLOGY    3. Episodic tension-type headache, not intractable    4. Coronary artery disease involving native coronary artery of native heart without angina pectoris    Other orders  -     amLODIPine (NORVASC) 2.5 mg tablet; Take 1 Tab by mouth daily. -     busPIRone (BUSPAR) 5 mg tablet; Take 1 Tab by mouth two (2) times a day.       Keep appt 3/5 for ff-up, in person or sooner therese Jeffers MD

## 2021-02-22 NOTE — PROGRESS NOTES
1. Have you been to the ER, urgent care clinic since your last visit? Hospitalized since your last visit? No    2. Have you seen or consulted any other health care providers outside of the [address] [place] [address] since your last visit? Include any pap smears or colon screening.  Dr. Santo Cortes

## 2021-02-23 ENCOUNTER — ANTI-COAG VISIT (OUTPATIENT)
Dept: CARDIOLOGY CLINIC | Age: 61
End: 2021-02-23
Payer: MEDICARE

## 2021-02-23 ENCOUNTER — HOSPITAL ENCOUNTER (OUTPATIENT)
Dept: LAB | Age: 61
Discharge: HOME | End: 2021-02-23
Payer: MEDICARE

## 2021-02-23 DIAGNOSIS — I48.91 ATRIAL FIBRILLATION, UNSPECIFIED TYPE (HCC): ICD-10-CM

## 2021-02-23 DIAGNOSIS — Z79.01 LONG TERM CURRENT USE OF ANTICOAGULANT THERAPY: ICD-10-CM

## 2021-02-23 DIAGNOSIS — E78.2 MIXED HYPERLIPIDEMIA: ICD-10-CM

## 2021-02-23 LAB
ALBUMIN SERPL-MCNC: 3.8 G/DL (ref 3.4–5)
ALBUMIN/GLOB SERPL: 0.8 {RATIO} (ref 0.8–1.7)
ALP SERPL-CCNC: 91 U/L (ref 45–117)
ALT SERPL-CCNC: 28 U/L (ref 13–56)
ANION GAP SERPL CALC-SCNC: 3 MMOL/L (ref 3–18)
AST SERPL-CCNC: 34 U/L (ref 10–38)
BILIRUB SERPL-MCNC: 1.1 MG/DL (ref 0.2–1)
BUN SERPL-MCNC: 14 MG/DL (ref 7–18)
BUN/CREAT SERPL: 17 (ref 12–20)
CALCIUM SERPL-MCNC: 9.3 MG/DL (ref 8.5–10.1)
CHLORIDE SERPL-SCNC: 105 MMOL/L (ref 100–111)
CO2 SERPL-SCNC: 31 MMOL/L (ref 21–32)
CREAT SERPL-MCNC: 0.84 MG/DL (ref 0.6–1.3)
GLOBULIN SER CALC-MCNC: 4.5 G/DL (ref 2–4)
GLUCOSE SERPL-MCNC: 86 MG/DL (ref 74–99)
INR BLD: 2.3
POTASSIUM SERPL-SCNC: 4 MMOL/L (ref 3.5–5.5)
PROT SERPL-MCNC: 8.3 G/DL (ref 6.4–8.2)
PT POC: 27.3 SECONDS
SODIUM SERPL-SCNC: 139 MMOL/L (ref 136–145)
VALID INTERNAL CONTROL?: YES

## 2021-02-23 PROCEDURE — 36415 COLL VENOUS BLD VENIPUNCTURE: CPT

## 2021-02-23 PROCEDURE — 80053 COMPREHEN METABOLIC PANEL: CPT

## 2021-02-23 PROCEDURE — 85610 PROTHROMBIN TIME: CPT | Performed by: NURSE PRACTITIONER

## 2021-02-23 NOTE — PROGRESS NOTES
The INR is stable and therapeutic. Continue same dose of coumadin: Continue Coumadin 5mg daily except 2.5mg on Tuesday, Thursday, Saturday. Repeat INR in 1 month.

## 2021-02-25 ENCOUNTER — TELEPHONE (OUTPATIENT)
Dept: FAMILY MEDICINE CLINIC | Age: 61
End: 2021-02-25

## 2021-02-25 NOTE — TELEPHONE ENCOUNTER
I am more concerned that there is a discrepancy on R and left arm BP check. Please advise ED visit for further evaluation of this.

## 2021-02-25 NOTE — TELEPHONE ENCOUNTER
Patient called and states her BP at:  2:16 am 127/87 (spouse took before he left for work)  6:53 am 151/107 right arm                  147/106 left arm (got up and went to the restroom)  Took medication. Is taking increase Norvasc 7.5 mg with other BP meds. Patient has started Buspar on 02/22/202. Patient states that she was up moving around. 9:08 am 135/82 right arm                 116/89 left arm   Patient is having a mild headache, forehead area. No chest pains,no blurred vision. Patient is concerned that her BP is not getting any better.

## 2021-02-26 NOTE — PROGRESS NOTES
Hematology/Oncology Progress Note    Name: Caprice Thorpe  Date: 3/3/2021  : 1960    Vashti Acevedo MD         Subjective:     Reason for Consult/Chief Complaint: Follow-up visit, anemia      History of Present Illness:   Ms. Cayetano Hartman is here today for follow-up her anemia and elevated serum globulin level . Since last visit she denies any acute changes or new symptoms. She has been taking Coumadin for Afib. Denied any bleeding or bruising issues. Followed with Cardiology. The patient denied fever or chills, no weight loss, sweating, skin lumps/bumps, lymphadenopathy, bleeding, bruising, leg tenderness, joint pain. Denied any acute or worsening bone pain or back pain presently. Having intermittent right knee pain and swelling but not at this time. No fever, chills, worsening headache, acute vision change. denied any focal weakness or numbness. She has on-off both legs edema which is improving with water pills. No other complaints. Oncologic History:  Oncology History    No history exists. Past Medical History, Family History, and Social History:    Past Medical History:   Diagnosis Date    Aortic valve disorders 2011    Asthma     Atrial fibrillation ([facility] [address] 2011    Congestive heart failure, unspecified 2005    Cleared quickly with Lasix therapy    Echocardiogram 01/10/2012    A-fib. EF 45%. Mild RVE. RVSP 45-50. Massive LAE. Mod TAMI. Mod MS. Severe MR (mean grad 10). Mild-mod AI. Mild TR. Pulmonic systolic flow reversal.  Mild IVCE.  Hives     Hypertension     Iron deficiency anemias     Long term (current) use of anticoagulants 2011    Murmur 2011    Grade I-II/VI systolic ejection murmur along left sternal border, with radiation to the pulmonic area.  S/P cardiac cath 2012    Minimal coronary artery disease. Mild LVE. EF 55%. Mod MR. Mod-severe MS. RA 15.  RV 55/16. PA 58/34. W 36.  CO/CI 3.5/1.9 (TD); 3.61/1.9 ([place]).   R to L shunt suggested.  Wears glasses     Weight gain      Past Surgical History:   Procedure Laterality Date    HX HEART CATHETERIZATION  2012    HX HYSTERECTOMY  2006    HX MITRAL VALVE REPLACEMENT  2013    25/33 mm On-X mechanical valve with bilateral pulmonary vein isolation and resection of LA appendage     Social History     Socioeconomic History    Marital status:      Spouse name: Not on file    Number of children: Not on file    Years of education: Not on file    Highest education level: Not on file   Occupational History    Not on file   Social Needs    Financial resource strain: Not on file    Food insecurity     Worry: Not on file     Inability: Not on file    Transportation needs     Medical: Not on file     Non-medical: Not on file   Tobacco Use    Smoking status: Former Smoker     Packs/day: 0.50     Years: 21.00     Pack years: 10.50     Quit date:      Years since quittin.1    Smokeless tobacco: Never Used   Substance and Sexual Activity    Alcohol use:  Yes     Alcohol/week: 1.0 standard drinks     Types: 1 Glasses of wine per week     Comment: occassionally    Drug use: No    Sexual activity: Yes     Partners: Male     Birth control/protection: None   Lifestyle    Physical activity     Days per week: Not on file     Minutes per session: Not on file    Stress: Not on file   Relationships    Social connections     Talks on phone: Not on file     Gets together: Not on file     Attends [employer] service: Not on file     Active member of club or organization: Not on file     Attends meetings of clubs or organizations: Not on file     Relationship status: Not on file    Intimate partner violence     Fear of current or ex partner: Not on file     Emotionally abused: Not on file     Physically abused: Not on file     Forced sexual activity: Not on file   Other Topics Concern    Not on file   Social History Narrative    Not on file     Family History   Problem Relation Age of Onset    Heart Surgery Father         Open-Heart Surgery    Other Father         Venous Thromboembolism    Hypertension Father     Heart Disease Mother         Enlarged Heart    Hypertension Mother    Hannah Me Diabetes Mother     Diabetes Sister     Hypertension Sister     Diabetes Brother     Heart Disease Paternal Grandmother     Diabetes Sister     Hypertension Sister      Current Outpatient Medications   Medication Sig Dispense Refill    warfarin (COUMADIN) 5 mg tablet TAKE 1 TABLET BY MOUTH DAILY EXCEPT 1/2 TABLET ON TUE-THURS-SAT OR AS DIRECTED 60 Tab 6    amLODIPine (NORVASC) 2.5 mg tablet Take 1 Tab by mouth daily. 90 Tab 0    busPIRone (BUSPAR) 5 mg tablet Take 1 Tab by mouth two (2) times a day. 60 Tab 0    amLODIPine (NORVASC) 5 mg tablet TAKE 1 TABLET BY MOUTH EVERY DAY 90 Tab 3    metoprolol succinate (TOPROL-XL) 100 mg tablet TAKE 1 TABLET BY MOUTH EVERY DAY 90 Tab 2    sertraline (ZOLOFT) 100 mg tablet TAKE 1 TABLET BY MOUTH EVERY DAY 90 Tab 1    Klor-Con M20 20 mEq tablet TAKE 1 TABLET BY MOUTH EVERY DAY 30 Tab 6    acetaminophen (Tylenol Extra Strength) 500 mg tablet Take  by mouth every six (6) hours as needed for Pain.  cyclobenzaprine (FLEXERIL) 10 mg tablet Take 1 Tab by mouth three (3) times daily as needed for Muscle Spasm(s).  30 Tab 0    methylPREDNISolone (MEDROL DOSEPACK) 4 mg tablet Use as directed 1 Dose Pack 0    levocetirizine (XYZAL) 5 mg tablet TAKE 1 TABLET BY MOUTH EVERY DAY 90 Tab 0    budesonide (RHINOCORT AQUA) 32 mcg/actuation nasal spray SPRAY 2 SPRAYS INTO EACH NOSTRIL EVERY DAY 3 Bottle 1    montelukast (SINGULAIR) 10 mg tablet TAKE 1 TABLET BY MOUTH EVERY DAY 90 Tab 3    losartan (COZAAR) 50 mg tablet TAKE 1 TABLET BY MOUTH TWICE A DAY 60 Tab 6    furosemide (LASIX) 40 mg tablet TAKE 1 TABLET BY MOUTH EVERY DAY 90 Tab 3    atorvastatin (LIPITOR) 40 mg tablet TAKE 1 TABLET BY MOUTH EVERY DAY 90 Tab 2    Ventolin HFA 90 mcg/actuation inhaler TAKE 2 PUFFS BY INHALATION DAILY. 18 Inhaler 5    triamcinolone acetonide (KENALOG) 0.1 % ointment Apply  to affected area two (2) times a day. use thin layer      clobetasoL (TEMOVATE) 0.05 % ointment Apply  to affected area two (2) times a day. 15 g 0    timolol (TIMOPTIC) 0.5 % ophthalmic solution       fluticasone propion-salmeterol (ADVAIR/WIXELA) 250-50 mcg/dose diskus inhaler Take 1 Puff by inhalation every twelve (12) hours. 1 Each 0    OTHER Allergy injections      aspirin delayed-release 81 mg tablet Take 1 Tab by mouth daily. 30 Tab 0    polyethylene glycol (MIRALAX) 17 gram packet Take 1 Packet by mouth daily. (Patient taking differently: Take  by mouth daily as needed for Other (Constipation). ) 30 Packet 0    sodium chloride (SALINE NASAL) 0.65 % nasal squeeze bottle 1 Spray as needed for Congestion.  melatonin tab tablet Take 10 mg by mouth nightly.  esomeprazole (NEXIUM) 20 mg capsule Take 20 mg by mouth daily as needed (heart burn). Review of Systems:  Constitutional: The patient has no acute distress or discomfort. HEENT: The patient denies recent head trauma, eye pain, blurred vision,  hearing deficit, oropharyngeal mucosal pain or lesions, and the patient denies throat pain or discomfort. Lymphatics: The patient denies palpable peripheral lymphadenopathy. Hematologic: The patient denies having bruising, bleeding, or progressive fatigue. Respiratory: Patient denies having shortness of breath, cough, sputum production, fever, or dyspnea on exertion. Cardiovascular: The patient denies having leg pain, worsening leg swelling, heart palpitations, chest pain, or lightheadedness. Chronic dyspnea on exertion. Gastrointestinal: The patient denies having nausea, emesis, or diarrhea. The patient denies having any hematemesis or blood in the stool. Genitourinary: Patient denies having urinary urgency, frequency, or dysuria.   The patient denies having blood in the urine.  Psychological: The patient denies having symptoms of nervousness, anxiety, depression, or thoughts of harming self. Skin: Patient denies having skin, ulcerations, or unexplained itching or pruritus. skin rashes (+)  Musculoskeletal: The patient denies having pain in the joints or bones. Objective:     Visit Vitals  /87 (BP Patient Position: Sitting)   Pulse 72   Temp 97.3 °F (36.3 °C) (Skin)   Resp 18   Wt 85.3 kg (188 lb)   SpO2 98%   BMI 29.44 kg/m²         Physical Exam:   Gen. Appearance: The patient is in no acute distress. Skin: There is no bruise or rash. HEENT: The exam is unremarkable. Neck: Supple without lymphadenopathy or thyromegaly. Lungs: Clear to auscultation and percussion; there are no wheezes or rhonchi. Heart: Regular rate and rhythm; there are no gallops, or rubs. Abdomen: Bowel sounds are present and normal.  There is no guarding, tenderness, or hepatosplenomegaly. Extremities: There is no clubbing, cyanosis. Mild pitting edema both legs. Neurologic: There are no focal neurologic deficits. Lymphatics: There is no palpable peripheral lymphadenopathy. Musculoskeletal: The patient has full range of motion at all joints. There is no evidence of joint deformity or effusions. There is no focal joint tenderness. Psychological/psychiatric: There is no clinical evidence of anxiety, depression, or melancholy. Diagnostics:      No results found for this or any previous visit.     Recent Results (from the past 2016 hour(s))   ECHO ADULT COMPLETE    Collection Time: 01/15/21 11:38 AM   Result Value Ref Range    IVSd 1.53 (A) 0.6 - 0.9 cm    LVIDd 5.28 3.9 - 5.3 cm    LVIDs 3.56 cm    LVOT d 2.09 cm    LVPWd 1.31 (A) 0.6 - 0.9 cm    LVOT Cardiac Output 5.85 liter/minute    LVOT Peak Gradient 2.48 mmHg    Left Ventricular Outflow Tract Mean Gradient 1.36 mmHg    LVOT SV 65.9 mL    LVOT Peak Velocity 78.81 cm/s    LVOT VTI 19.14 cm    LA Volume 100.86 22 - 52 mL    LA Area 4C 27.94 cm2    LA Vol 2C 98.62 (A) 22 - 52 mL    LA Vol 4C 87.90 (A) 22 - 52 mL    Aortic Valve Area by Continuity of Peak Velocity 1.20 cm2    Aortic Valve Area by Continuity of VTI 1.31 cm2    AoV PG 20.38 mmHg    Aortic Valve Systolic Mean Gradient 47.82 mmHg    Aortic Valve Systolic Peak Velocity 260.94 cm/s    AoV VTI 50.14 cm    MVA VTI 1.93 cm2    MV Peak Gradient 11.49 mmHg    MV Peak Gradient 11.49 mmHg    MV Mean Gradient 4.15 mmHg    Mitral Valve Max Velocity 169.51 cm/s    Mitral Valve Annulus Velocity Time Integral 34.07 cm    Tapse 1.56 1.5 - 2.0 cm    Triscuspid Valve Regurgitation Peak Gradient 30.73 mmHg    TR Max Velocity 277.17 cm/s    Ao Root D 3.37 cm    LV Mass .6 67 - 162 g    LV Mass AL Index 164.3 43 - 95 g/m2    LA Vol Index 51.21 16 - 28 ml/m2    LA Vol Index 50.07 16 - 28 ml/m2    LA Vol Index 44.63 16 - 28 ml/m2    ZEKE/BSA Pk Mike 0.6 cm2/m2    ZEKE/BSA VTI 0.7 cm2/m2   AMB POC PT/INR    Collection Time: 01/15/21 12:00 PM   Result Value Ref Range    VALID INTERNAL CONTROL POC Yes     Prothrombin time (POC) 28.1 seconds    INR POC 2.3    AMB POC PT/INR    Collection Time: 02/23/21  9:01 AM   Result Value Ref Range    VALID INTERNAL CONTROL POC Yes     Prothrombin time (POC) 27.3 seconds    INR POC 2.3    METABOLIC PANEL, COMPREHENSIVE    Collection Time: 02/23/21 10:33 AM   Result Value Ref Range    Sodium 139 136 - 145 mmol/L    Potassium 4.0 3.5 - 5.5 mmol/L    Chloride 105 100 - 111 mmol/L    CO2 31 21 - 32 mmol/L    Anion gap 3 3.0 - 18 mmol/L    Glucose 86 74 - 99 mg/dL    BUN 14 7.0 - 18 MG/DL    Creatinine 0.84 0.6 - 1.3 MG/DL    BUN/Creatinine ratio 17 12 - 20      GFR est AA >60 >60 ml/min/1.73m2    GFR est non-AA >60 >60 ml/min/1.73m2    Calcium 9.3 8.5 - 10.1 MG/DL    Bilirubin, total 1.1 (H) 0.2 - 1.0 MG/DL    ALT (SGPT) 28 13 - 56 U/L    AST (SGOT) 34 10 - 38 U/L    Alk.  phosphatase 91 45 - 117 U/L    Protein, total 8.3 (H) 6.4 - 8.2 g/dL    Albumin 3.8 3.4 - 5.0 g/dL    Globulin 4.5 (H) 2.0 - 4.0 g/dL    A-G Ratio 0.8 0.8 - 1.7     CBC WITH AUTOMATED DIFF    Collection Time: 03/01/21  9:10 AM   Result Value Ref Range    WBC 6.6 4.6 - 13.2 K/uL    RBC 4.30 4.20 - 5.30 M/uL    HGB 11.5 (L) 12.0 - 16.0 g/dL    HCT 36.2 35.0 - 45.0 %    MCV 84.2 74.0 - 97.0 FL    MCH 26.7 24.0 - 34.0 PG    MCHC 31.8 31.0 - 37.0 g/dL    RDW 14.0 11.6 - 14.5 %    PLATELET 345 959 - 021 K/uL    MPV 12.4 (H) 9.2 - 11.8 FL    NEUTROPHILS 57 40 - 73 %    LYMPHOCYTES 27 21 - 52 %    MONOCYTES 14 (H) 3 - 10 %    EOSINOPHILS 2 0 - 5 %    BASOPHILS 0 0 - 2 %    ABS. NEUTROPHILS 3.8 1.8 - 8.0 K/UL    ABS. LYMPHOCYTES 1.8 0.9 - 3.6 K/UL    ABS. MONOCYTES 0.9 0.05 - 1.2 K/UL    ABS. EOSINOPHILS 0.1 0.0 - 0.4 K/UL    ABS. BASOPHILS 0.0 0.0 - 0.1 K/UL    DF AUTOMATED     METABOLIC PANEL, COMPREHENSIVE    Collection Time: 03/01/21  9:10 AM   Result Value Ref Range    Sodium 143 136 - 145 mmol/L    Potassium 4.0 3.5 - 5.5 mmol/L    Chloride 105 100 - 111 mmol/L    CO2 29 21 - 32 mmol/L    Anion gap 9 3.0 - 18 mmol/L    Glucose 109 (H) 74 - 99 mg/dL    BUN 10 7.0 - 18 MG/DL    Creatinine 0.80 0.6 - 1.3 MG/DL    BUN/Creatinine ratio 13 12 - 20      GFR est AA >60 >60 ml/min/1.73m2    GFR est non-AA >60 >60 ml/min/1.73m2    Calcium 9.3 8.5 - 10.1 MG/DL    Bilirubin, total 0.6 0.2 - 1.0 MG/DL    ALT (SGPT) 32 13 - 56 U/L    AST (SGOT) 31 10 - 38 U/L    Alk.  phosphatase 87 45 - 117 U/L    Protein, total 7.8 6.4 - 8.2 g/dL    Albumin 3.7 3.4 - 5.0 g/dL    Globulin 4.1 (H) 2.0 - 4.0 g/dL    A-G Ratio 0.9 0.8 - 1.7     FERRITIN    Collection Time: 03/01/21  9:10 AM   Result Value Ref Range    Ferritin 301 8 - 388 NG/ML   IRON PROFILE    Collection Time: 03/01/21  9:10 AM   Result Value Ref Range    Iron 63 50 - 175 ug/dL    TIBC 298 250 - 450 ug/dL    Iron % saturation 21 20 - 50 %   AMB POC PT/INR    Collection Time: 03/01/21 11:20 AM   Result Value Ref Range    VALID INTERNAL CONTROL POC Yes     Prothrombin time (POC) 25.1 seconds    INR POC 2.1        Results for Rashmi Cantor (MRN [id number])    Ref. Range 1/6/2019 01:09 2/28/2019 16:42 4/12/2019 09:28 8/6/2019 14:23 10/12/2019 09:50   Globulin Latest Ref Range: 2.0 - 4.0 g/dL 3.8 4.5 (H) 4.1 (H) 4.2 (H) 4.5 (H)     9/16/2019  3:37 PM - [facility], [facility]     Component Value Flag Ref Range Units Status   Protein, total 7.7   6.0 - 8.5 g/dL Final   Albumin 3.6   2.9 - 4.4 g/dL Final   Alpha-1-globulin 0.2   0.0 - 0.4 g/dL Final   ALPHA-2 GLOBULIN 0.6   0.4 - 1.0 g/dL Final   Beta globulin 1.7  High   0.7 - 1.3 g/dL Final   Gamma globulin 1.6   0.4 - 1.8 g/dL Final   M-Kwame Not Observed   Not Observed g/dL Final   Globulin, total 4.1  High   2.2 - 3.9 g/dL Final   A/G ratio 0.9   0.7 - 1.7   Final           Assessment and Plan     1. Elevated serum globulins   -- Persistent elevated globulin levels since 2/28/2019. Recent SPEP revealed a increased Beta globulin of 1.7, No M spike observed. Her normocytic anemia appears chronically at baseline 10-12 g/dl, no progressive worsening anemia noticed. No hypercalcemia or renal impairment. 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 No M spike. Serum free light chain reported a mild elevated Free Kappa Lt Chains, 30.8; also a near-normal Kappa/Lambda ratio at 2.35. Her urine protein 24h was WNL. Skeletal survey reported no lytic lesions. -- The mild elevation of globulins could be from polyclonal gammopathy which is secondary to inflammatory or reactive processes. The mild elevated Free Kappa Lt Chains with near-normal ratio could also be explained by polyclonal gammopathy. -- Clinical stable. Today I have reviewed with the patient about recent lab reports include CBC and CMP. Pending monoclonal gammopathy workup at this time. The patient will be notified if any interventions needed.        2. Normocytic anemia, likely anemia of chronic diease  -- Her normocytic anemia appears chronically at baseline 10-12 g/dl. Asymptomatic without acute bleeding. Likely this is anemia of chronic dieases. -- Recent CBC showed stable H/H, no other cytopenia. -- I will see the patient back in clinic in annual follow-up. Always sooner if required. No orders of the defined types were placed in this encounter. All of patient's questions answered to their apparent satisfaction. They verbally show understanding and agreement with the plan. About 25 minutes were spent for this encounter with more than 50% of the time spent in face-to-face counseling and discussing on diagnosis and management plan going forward. Judy Price MD  3/3/2021      Parts of this document has been produced using Dragon dictation system. Unrecognized errors in transcription may be present. Please do not hesitate to reach out for any questions or clarifications.       CC: Cailin Quintana MD

## 2021-03-01 ENCOUNTER — ANTI-COAG VISIT (OUTPATIENT)
Dept: CARDIOLOGY CLINIC | Age: 61
End: 2021-03-01
Payer: MEDICARE

## 2021-03-01 ENCOUNTER — OFFICE VISIT (OUTPATIENT)
Dept: CARDIOLOGY CLINIC | Age: 61
End: 2021-03-01

## 2021-03-01 ENCOUNTER — APPOINTMENT (OUTPATIENT)
Dept: ONCOLOGY | Age: 61
End: 2021-03-01

## 2021-03-01 ENCOUNTER — HOSPITAL ENCOUNTER (OUTPATIENT)
Dept: LAB | Age: 61
Discharge: HOME | End: 2021-03-01
Payer: MEDICARE

## 2021-03-01 VITALS
HEIGHT: 67 IN | SYSTOLIC BLOOD PRESSURE: 132 MMHG | OXYGEN SATURATION: 98 % | WEIGHT: 187 LBS | BODY MASS INDEX: 29.35 KG/M2 | DIASTOLIC BLOOD PRESSURE: 76 MMHG | HEART RATE: 97 BPM

## 2021-03-01 DIAGNOSIS — I05.0 RHEUMATIC MITRAL STENOSIS: Primary | ICD-10-CM

## 2021-03-01 DIAGNOSIS — D63.8 ANEMIA, CHRONIC DISEASE: ICD-10-CM

## 2021-03-01 DIAGNOSIS — Z79.01 LONG TERM CURRENT USE OF ANTICOAGULANT THERAPY: Primary | ICD-10-CM

## 2021-03-01 DIAGNOSIS — R77.1 ELEVATED SERUM GLOBULIN LEVEL: ICD-10-CM

## 2021-03-01 DIAGNOSIS — I48.91 ATRIAL FIBRILLATION, UNSPECIFIED TYPE (HCC): ICD-10-CM

## 2021-03-01 LAB
ALBUMIN SERPL-MCNC: 3.7 G/DL (ref 3.4–5)
ALBUMIN/GLOB SERPL: 0.9 {RATIO} (ref 0.8–1.7)
ALP SERPL-CCNC: 87 U/L (ref 45–117)
ALT SERPL-CCNC: 32 U/L (ref 13–56)
ANION GAP SERPL CALC-SCNC: 9 MMOL/L (ref 3–18)
AST SERPL-CCNC: 31 U/L (ref 10–38)
BASOPHILS # BLD: 0 K/UL (ref 0–0.1)
BASOPHILS NFR BLD: 0 % (ref 0–2)
BILIRUB SERPL-MCNC: 0.6 MG/DL (ref 0.2–1)
BUN SERPL-MCNC: 10 MG/DL (ref 7–18)
BUN/CREAT SERPL: 13 (ref 12–20)
CALCIUM SERPL-MCNC: 9.3 MG/DL (ref 8.5–10.1)
CHLORIDE SERPL-SCNC: 105 MMOL/L (ref 100–111)
CO2 SERPL-SCNC: 29 MMOL/L (ref 21–32)
CREAT SERPL-MCNC: 0.8 MG/DL (ref 0.6–1.3)
DIFFERENTIAL METHOD BLD: ABNORMAL
EOSINOPHIL # BLD: 0.1 K/UL (ref 0–0.4)
EOSINOPHIL NFR BLD: 2 % (ref 0–5)
ERYTHROCYTE [DISTWIDTH] IN BLOOD BY AUTOMATED COUNT: 14 % (ref 11.6–14.5)
FERRITIN SERPL-MCNC: 301 NG/ML (ref 8–388)
GLOBULIN SER CALC-MCNC: 4.1 G/DL (ref 2–4)
GLUCOSE SERPL-MCNC: 109 MG/DL (ref 74–99)
HCT VFR BLD AUTO: 36.2 % (ref 35–45)
HGB BLD-MCNC: 11.5 G/DL (ref 12–16)
INR BLD: 2.1
IRON SATN MFR SERPL: 21 % (ref 20–50)
IRON SERPL-MCNC: 63 UG/DL (ref 50–175)
LYMPHOCYTES # BLD: 1.8 K/UL (ref 0.9–3.6)
LYMPHOCYTES NFR BLD: 27 % (ref 21–52)
MCH RBC QN AUTO: 26.7 PG (ref 24–34)
MCHC RBC AUTO-ENTMCNC: 31.8 G/DL (ref 31–37)
MCV RBC AUTO: 84.2 FL (ref 74–97)
MONOCYTES # BLD: 0.9 K/UL (ref 0.05–1.2)
MONOCYTES NFR BLD: 14 % (ref 3–10)
NEUTS SEG # BLD: 3.8 K/UL (ref 1.8–8)
NEUTS SEG NFR BLD: 57 % (ref 40–73)
PLATELET # BLD AUTO: 207 K/UL (ref 135–420)
PMV BLD AUTO: 12.4 FL (ref 9.2–11.8)
POTASSIUM SERPL-SCNC: 4 MMOL/L (ref 3.5–5.5)
PROT SERPL-MCNC: 7.8 G/DL (ref 6.4–8.2)
PT POC: 25.1 SECONDS
RBC # BLD AUTO: 4.3 M/UL (ref 4.2–5.3)
SODIUM SERPL-SCNC: 143 MMOL/L (ref 136–145)
TIBC SERPL-MCNC: 298 UG/DL (ref 250–450)
VALID INTERNAL CONTROL?: YES
WBC # BLD AUTO: 6.6 K/UL (ref 4.6–13.2)

## 2021-03-01 PROCEDURE — 85025 COMPLETE CBC W/AUTO DIFF WBC: CPT

## 2021-03-01 PROCEDURE — 3017F COLORECTAL CA SCREEN DOC REV: CPT | Performed by: INTERNAL MEDICINE

## 2021-03-01 PROCEDURE — 82728 ASSAY OF FERRITIN: CPT

## 2021-03-01 PROCEDURE — 85610 PROTHROMBIN TIME: CPT | Performed by: INTERNAL MEDICINE

## 2021-03-01 PROCEDURE — 83540 ASSAY OF IRON: CPT

## 2021-03-01 PROCEDURE — G8419 CALC BMI OUT NRM PARAM NOF/U: HCPCS | Performed by: INTERNAL MEDICINE

## 2021-03-01 PROCEDURE — 82784 ASSAY IGA/IGD/IGG/IGM EACH: CPT

## 2021-03-01 PROCEDURE — G8754 DIAS BP LESS 90: HCPCS | Performed by: INTERNAL MEDICINE

## 2021-03-01 PROCEDURE — G8510 SCR DEP NEG, NO PLAN REQD: HCPCS | Performed by: INTERNAL MEDICINE

## 2021-03-01 PROCEDURE — 80053 COMPREHEN METABOLIC PANEL: CPT

## 2021-03-01 PROCEDURE — G8427 DOCREV CUR MEDS BY ELIG CLIN: HCPCS | Performed by: INTERNAL MEDICINE

## 2021-03-01 PROCEDURE — G9899 SCRN MAM PERF RSLTS DOC: HCPCS | Performed by: INTERNAL MEDICINE

## 2021-03-01 PROCEDURE — 99214 OFFICE O/P EST MOD 30 MIN: CPT | Performed by: INTERNAL MEDICINE

## 2021-03-01 PROCEDURE — G8752 SYS BP LESS 140: HCPCS | Performed by: INTERNAL MEDICINE

## 2021-03-01 NOTE — PROGRESS NOTES
Verbal order and read back per Akbar   INR within therapeutic range   Continue Coumadin 5mg daily except 2.5mg on Tuesday, Thursday, Saturday. Recheck in 3 weeks   This has been fully explained to the patient, who indicates understanding.

## 2021-03-01 NOTE — PROGRESS NOTES
Amelia Tejeda presents today for   Chief Complaint   Patient presents with   • Follow-up     prev skillen patient; testing results        Amelia Tejeda preferred language for health care discussion is english/other.    Is someone accompanying this pt? no    Is the patient using any DME equipment during OV? no    Depression Screening:  3 most recent PHQ Screens 3/1/2021   PHQ Not Done -   Little interest or pleasure in doing things Not at all   Feeling down, depressed, irritable, or hopeless Not at all   Total Score PHQ 2 0       Learning Assessment:  Learning Assessment 3/1/2021   PRIMARY LEARNER Patient   PRIMARY LANGUAGE ENGLISH   LEARNER PREFERENCE PRIMARY DEMONSTRATION     -   ANSWERED BY patient   RELATIONSHIP SELF       Abuse Screening:  Abuse Screening Questionnaire 3/1/2021   Do you ever feel afraid of your partner? N   Are you in a relationship with someone who physically or mentally threatens you? N   Is it safe for you to go home? Y       Fall Risk  Fall Risk Assessment, last 12 mths 3/1/2021   Able to walk? Yes   Fall in past 12 months? 0   Do you feel unsteady? 0   Are you worried about falling 0       Pt currently taking Anticoagulant therapy? Warfarin 5 mg    Coordination of Care:  1. Have you been to the ER, urgent care clinic since your last visit? Hospitalized since your last visit? no    2. Have you seen or consulted any other health care providers outside of the [facility] since your last visit? Include any pap smears or colon screening. no

## 2021-03-03 ENCOUNTER — OFFICE VISIT (OUTPATIENT)
Dept: ONCOLOGY | Age: 61
End: 2021-03-03
Payer: MEDICARE

## 2021-03-03 VITALS
BODY MASS INDEX: 29.44 KG/M2 | WEIGHT: 188 LBS | TEMPERATURE: 97.3 F | RESPIRATION RATE: 18 BRPM | HEART RATE: 72 BPM | DIASTOLIC BLOOD PRESSURE: 87 MMHG | OXYGEN SATURATION: 98 % | SYSTOLIC BLOOD PRESSURE: 137 MMHG

## 2021-03-03 DIAGNOSIS — D63.8 ANEMIA, CHRONIC DISEASE: ICD-10-CM

## 2021-03-03 DIAGNOSIS — R77.1 ELEVATED SERUM GLOBULIN LEVEL: Primary | ICD-10-CM

## 2021-03-03 PROCEDURE — 99214 OFFICE O/P EST MOD 30 MIN: CPT | Performed by: INTERNAL MEDICINE

## 2021-03-03 PROCEDURE — G0463 HOSPITAL OUTPT CLINIC VISIT: HCPCS | Performed by: INTERNAL MEDICINE

## 2021-03-03 NOTE — TELEPHONE ENCOUNTER
Patient identified with 2 identifiers (name and ). Spoke with patient and she states he BP have been doing much better.    Today 137/60  Patient states she is feeling much better

## 2021-03-04 LAB
ALBUMIN SERPL ELPH-MCNC: 3.9 G/DL (ref 2.9–4.4)
ALBUMIN/GLOB SERPL: 1.2 {RATIO} (ref 0.7–1.7)
ALPHA1 GLOB SERPL ELPH-MCNC: 0.2 G/DL (ref 0–0.4)
ALPHA2 GLOB SERPL ELPH-MCNC: 0.5 G/DL (ref 0.4–1)
B-GLOBULIN SERPL ELPH-MCNC: 1.6 G/DL (ref 0.7–1.3)
GAMMA GLOB SERPL ELPH-MCNC: 1.1 G/DL (ref 0.4–1.8)
GLOBULIN SER-MCNC: 3.4 G/DL (ref 2.2–3.9)
IGA SERPL-MCNC: 733 MG/DL (ref 87–352)
IGG SERPL-MCNC: 1355 MG/DL (ref 586–1602)
IGM SERPL-MCNC: 75 MG/DL (ref 26–217)
INTERPRETATION SERPL IEP-IMP: ABNORMAL
KAPPA LC FREE SER-MCNC: 35.7 MG/L (ref 3.3–19.4)
KAPPA LC FREE/LAMBDA FREE SER: 1.67 {RATIO} (ref 0.26–1.65)
LAMBDA LC FREE SERPL-MCNC: 21.4 MG/L (ref 5.7–26.3)
M PROTEIN SERPL ELPH-MCNC: ABNORMAL G/DL
PROT SERPL-MCNC: 7.3 G/DL (ref 6–8.5)

## 2021-03-05 ENCOUNTER — OFFICE VISIT (OUTPATIENT)
Dept: FAMILY MEDICINE CLINIC | Age: 61
End: 2021-03-05
Payer: MEDICARE

## 2021-03-05 VITALS
OXYGEN SATURATION: 98 % | BODY MASS INDEX: 29.35 KG/M2 | TEMPERATURE: 97.8 F | HEIGHT: 67 IN | HEART RATE: 80 BPM | RESPIRATION RATE: 16 BRPM | SYSTOLIC BLOOD PRESSURE: 122 MMHG | WEIGHT: 187 LBS | DIASTOLIC BLOOD PRESSURE: 74 MMHG

## 2021-03-05 DIAGNOSIS — F33.9 RECURRENT DEPRESSION (HCC): ICD-10-CM

## 2021-03-05 DIAGNOSIS — I21.4 NSTEMI (NON-ST ELEVATED MYOCARDIAL INFARCTION) (HCC): ICD-10-CM

## 2021-03-05 DIAGNOSIS — I10 ESSENTIAL HYPERTENSION: ICD-10-CM

## 2021-03-05 DIAGNOSIS — I11.0 BENIGN HYPERTENSIVE HEART DISEASE WITH HEART FAILURE (HCC): ICD-10-CM

## 2021-03-05 DIAGNOSIS — E78.2 MIXED HYPERLIPIDEMIA: ICD-10-CM

## 2021-03-05 DIAGNOSIS — J45.20 MILD INTERMITTENT ASTHMA WITHOUT COMPLICATION: ICD-10-CM

## 2021-03-05 DIAGNOSIS — D63.8 ANEMIA OF CHRONIC DISEASE: ICD-10-CM

## 2021-03-05 DIAGNOSIS — R77.1 ELEVATED SERUM GLOBULIN LEVEL: ICD-10-CM

## 2021-03-05 DIAGNOSIS — F41.9 ANXIETY: Primary | ICD-10-CM

## 2021-03-05 DIAGNOSIS — I50.32 CHRONIC DIASTOLIC CONGESTIVE HEART FAILURE (HCC): ICD-10-CM

## 2021-03-05 DIAGNOSIS — J32.9 CHRONIC SINUSITIS, UNSPECIFIED LOCATION: ICD-10-CM

## 2021-03-05 DIAGNOSIS — M54.42 ACUTE MIDLINE LOW BACK PAIN WITH LEFT-SIDED SCIATICA: ICD-10-CM

## 2021-03-05 DIAGNOSIS — I48.91 ATRIAL FIBRILLATION, UNSPECIFIED TYPE (HCC): ICD-10-CM

## 2021-03-05 DIAGNOSIS — E78.5 HYPERLIPIDEMIA LDL GOAL <70: ICD-10-CM

## 2021-03-05 DIAGNOSIS — R73.03 PREDIABETES: ICD-10-CM

## 2021-03-05 DIAGNOSIS — I25.10 CORONARY ARTERY DISEASE INVOLVING NATIVE CORONARY ARTERY OF NATIVE HEART WITHOUT ANGINA PECTORIS: ICD-10-CM

## 2021-03-05 PROCEDURE — G0463 HOSPITAL OUTPT CLINIC VISIT: HCPCS | Performed by: FAMILY MEDICINE

## 2021-03-05 PROCEDURE — G8752 SYS BP LESS 140: HCPCS | Performed by: FAMILY MEDICINE

## 2021-03-05 PROCEDURE — G8754 DIAS BP LESS 90: HCPCS | Performed by: FAMILY MEDICINE

## 2021-03-05 PROCEDURE — G9899 SCRN MAM PERF RSLTS DOC: HCPCS | Performed by: FAMILY MEDICINE

## 2021-03-05 PROCEDURE — 3017F COLORECTAL CA SCREEN DOC REV: CPT | Performed by: FAMILY MEDICINE

## 2021-03-05 PROCEDURE — G8510 SCR DEP NEG, NO PLAN REQD: HCPCS | Performed by: FAMILY MEDICINE

## 2021-03-05 PROCEDURE — G8427 DOCREV CUR MEDS BY ELIG CLIN: HCPCS | Performed by: FAMILY MEDICINE

## 2021-03-05 PROCEDURE — 99214 OFFICE O/P EST MOD 30 MIN: CPT | Performed by: FAMILY MEDICINE

## 2021-03-05 PROCEDURE — G8419 CALC BMI OUT NRM PARAM NOF/U: HCPCS | Performed by: FAMILY MEDICINE

## 2021-03-05 RX ORDER — AMLODIPINE BESYLATE 5 MG/1
5 TABLET ORAL DAILY
Qty: 90 TAB | Refills: 3 | Status: SHIPPED | OUTPATIENT
Start: 2021-03-05 | End: 2022-02-18

## 2021-03-05 RX ORDER — CYCLOBENZAPRINE HCL 10 MG
10 TABLET ORAL
Qty: 30 TAB | Refills: 0 | Status: SHIPPED | OUTPATIENT
Start: 2021-03-05 | End: 2021-06-28 | Stop reason: SDUPTHER

## 2021-03-05 RX ORDER — BUSPIRONE HYDROCHLORIDE 5 MG/1
5 TABLET ORAL 2 TIMES DAILY
Qty: 180 TAB | Refills: 1 | Status: SHIPPED | OUTPATIENT
Start: 2021-03-05 | End: 2021-09-21

## 2021-03-05 RX ORDER — MONTELUKAST SODIUM 10 MG/1
TABLET ORAL
Qty: 90 TAB | Refills: 3 | Status: SHIPPED | OUTPATIENT
Start: 2021-03-05 | End: 2022-03-21

## 2021-03-05 RX ORDER — AMLODIPINE BESYLATE 2.5 MG/1
2.5 TABLET ORAL DAILY
Qty: 90 TAB | Refills: 0 | Status: SHIPPED | OUTPATIENT
Start: 2021-03-05 | End: 2021-08-19 | Stop reason: SDUPTHER

## 2021-03-05 RX ORDER — LEVOCETIRIZINE DIHYDROCHLORIDE 5 MG/1
TABLET, FILM COATED ORAL
Qty: 90 TAB | Refills: 3 | Status: SHIPPED | OUTPATIENT
Start: 2021-03-05 | End: 2022-03-31 | Stop reason: SDUPTHER

## 2021-03-05 RX ORDER — LOSARTAN POTASSIUM 50 MG/1
TABLET ORAL
Qty: 90 TAB | Refills: 1 | Status: SHIPPED | OUTPATIENT
Start: 2021-03-05 | End: 2021-08-19 | Stop reason: SDUPTHER

## 2021-03-05 RX ORDER — ATORVASTATIN CALCIUM 40 MG/1
TABLET, FILM COATED ORAL
Qty: 90 TAB | Refills: 2 | Status: SHIPPED | OUTPATIENT
Start: 2021-03-05 | End: 2021-11-29

## 2021-03-05 NOTE — PATIENT INSTRUCTIONS
DASH Diet: Care Instructions Your Care Instructions The DASH diet is an eating plan that can help lower your blood pressure. DASH stands for Dietary Approaches to Stop Hypertension. Hypertension is high blood pressure. The DASH diet focuses on eating foods that are high in calcium, potassium, and magnesium. These nutrients can lower blood pressure. The foods that are highest in these nutrients are fruits, vegetables, low-fat dairy products, nuts, seeds, and legumes. But taking calcium, potassium, and magnesium supplements instead of eating foods that are high in those nutrients does not have the same effect. The DASH diet also includes whole grains, fish, and poultry. The DASH diet is one of several lifestyle changes your doctor may recommend to lower your high blood pressure. Your doctor may also want you to decrease the amount of sodium in your diet. Lowering sodium while following the DASH diet can lower blood pressure even further than just the DASH diet alone. Follow-up care is a key part of your treatment and safety. Be sure to make and go to all appointments, and call your doctor if you are having problems. It's also a good idea to know your test results and keep a list of the medicines you take. How can you care for yourself at home? Following the DASH diet · Eat 4 to 5 servings of fruit each day. A serving is 1 medium-sized piece of fruit, ½ cup chopped or canned fruit, 1/4 cup dried fruit, or 4 ounces (½ cup) of fruit juice. Choose fruit more often than fruit juice. · Eat 4 to 5 servings of vegetables each day. A serving is 1 cup of lettuce or raw leafy vegetables, ½ cup of chopped or cooked vegetables, or 4 ounces (½ cup) of vegetable juice. Choose vegetables more often than vegetable juice. · Get 2 to 3 servings of low-fat and fat-free dairy each day. A serving is 8 ounces of milk, 1 cup of yogurt, or 1 ½ ounces of cheese. · Eat 6 to 8 servings of grains each day. A serving is 1 slice of bread, 1 ounce of dry cereal, or ½ cup of cooked rice, pasta, or cooked cereal. Try to choose whole-grain products as much as possible. · Limit lean meat, poultry, and fish to 2 servings each day. A serving is 3 ounces, about the size of a deck of cards. · Eat 4 to 5 servings of nuts, seeds, and legumes (cooked dried beans, lentils, and split peas) each week. A serving is 1/3 cup of nuts, 2 tablespoons of seeds, or ½ cup of cooked beans or peas. · Limit fats and oils to 2 to 3 servings each day. A serving is 1 teaspoon of vegetable oil or 2 tablespoons of salad dressing. · Limit sweets and added sugars to 5 servings or less a week. A serving is 1 tablespoon jelly or jam, ½ cup sorbet, or 1 cup of lemonade. · Eat less than 2,300 milligrams (mg) of sodium a day. If you limit your sodium to 1,500 mg a day, you can lower your blood pressure even more. Tips for success · Start small. Do not try to make dramatic changes to your diet all at once. You might feel that you are missing out on your favorite foods and then be more likely to not follow the plan. Make small changes, and stick with them. Once those changes become habit, add a few more changes. · Try some of the following: ? Make it a goal to eat a fruit or vegetable at every meal and at snacks. This will make it easy to get the recommended amount of fruits and vegetables each day. ? Try yogurt topped with fruit and nuts for a snack or healthy dessert. ? Add lettuce, tomato, cucumber, and onion to sandwiches. ? Combine a ready-made pizza crust with low-fat mozzarella cheese and lots of vegetable toppings. Try using tomatoes, squash, spinach, broccoli, carrots, cauliflower, and onions. ? Have a variety of cut-up vegetables with a low-fat dip as an appetizer instead of chips and dip. ? Sprinkle sunflower seeds or chopped almonds over salads. Or try adding chopped walnuts or almonds to cooked vegetables. ? Try some vegetarian meals using beans and peas. Add garbanzo or kidney beans to salads. Make burritos and tacos with mashed curtis beans or black beans. Where can you learn more? Go to http://www.gray.com/ Enter W797 in the search box to learn more about \"DASH Diet: Care Instructions. \" Current as of: December 16, 2019               Content Version: 12.6 © 6412-0082 [employer]. Care instructions adapted under license by [employer] (which disclaims liability or warranty for this information). If you have questions about a medical condition or this instruction, always ask your healthcare professional. [address] any warranty or liability for your use of this information.

## 2021-03-05 NOTE — PROGRESS NOTES
1. Have you been to the ER, urgent care clinic since your last visit? Hospitalized since your last visit? No    2. Have you seen or consulted any other health care providers outside of the [address] [place] [address] since your last visit? Include any pap smears or colon screening.  Dr. Duncan Gore cardiology, Dr. Micah [facility]

## 2021-03-05 NOTE — PROGRESS NOTES
Margie Pavon, 56 y.o.,  female    SUBJECTIVE  Ff-up    Anxiety/depression-reports much improved with addition of buspar from last visit. She continues to take zoloft, has not heard back from counseling office yet, provided resources today. She is currently on zoloft and open to talk therapy. HTN-improved BP with increase in norvasc dose to 7.5 mg. She continues to take metoprolol and cozaar. BP log reviewed 1teens to 130/60-80's. H/o nstemi, afib, rheumatic valve disease s/p MV repair 2013- denies any chest pain, sob, palpitations, lightheadedness. On chronic coumadin treatment and lasix, following cardiology. HL-taking lipitor without problems    Elevated globulin/anemia of chronic disease- neg work up, monitoring.  Following dr. do    Chronic rhinitis- on allergy shots, following ENT.     ROS:  See HPI, all others negative        Patient Active Problem List   Diagnosis Code    Rheumatic valvular disease I09.1    Chronic diastolic congestive heart failure ([facility] [facility] I50.32    Atrial fibrillation (HCC) I48.91    Long term current use of anticoagulant therapy Z79.01    Benign hypertensive heart disease with heart failure (HCC) I11.0    Hyperlipidemia E78.5    Advanced directives, counseling/discussion Z71.89    Mild intermittent asthma without complication G60.76    NSTEMI (non-ST elevated myocardial infarction) ([facility] [address] I21.4    Chest pain R07.9    IFG (impaired fasting glucose) R73.01    Adjustment disorder with anxious mood F43.22    Essential hypertension I10    Other allergic rhinitis J30.89    Anxiety F41.9    Allergic rhinitis due to pollen J30.1    Chronic sinusitis J32.9    Mitral valve stenosis I05.0    Elevated serum globulin level R77.1    Normocytic anemia D64.9    Anemia of chronic disease D63.8    Perennial allergic rhinitis with seasonal variation J30.89, J30.2    Vasomotor rhinitis J30.0    Coronary artery disease involving native coronary artery of native heart without angina pectoris I25.10    Recurrent depression (HCC) F33.9       Current Outpatient Medications   Medication Sig Dispense Refill    busPIRone (BUSPAR) 5 mg tablet Take 1 Tab by mouth two (2) times a day. 180 Tab 1    cyclobenzaprine (FLEXERIL) 10 mg tablet Take 1 Tab by mouth three (3) times daily as needed for Muscle Spasm(s). 30 Tab 0    losartan (COZAAR) 50 mg tablet TAKE 1 TABLET BY MOUTH TWICE A DAY 90 Tab 1    levocetirizine (XYZAL) 5 mg tablet TAKE 1 TABLET BY MOUTH EVERY DAY 90 Tab 3    montelukast (SINGULAIR) 10 mg tablet TAKE 1 TABLET BY MOUTH EVERY DAY 90 Tab 3    amLODIPine (NORVASC) 5 mg tablet Take 1 Tab by mouth daily. 90 Tab 3    atorvastatin (LIPITOR) 40 mg tablet TAKE 1 TABLET BY MOUTH EVERY DAY 90 Tab 2    amLODIPine (NORVASC) 2.5 mg tablet Take 1 Tab by mouth daily. 90 Tab 0    warfarin (COUMADIN) 5 mg tablet TAKE 1 TABLET BY MOUTH DAILY EXCEPT 1/2 TABLET ON TUE-THURS-SAT OR AS DIRECTED 60 Tab 6    metoprolol succinate (TOPROL-XL) 100 mg tablet TAKE 1 TABLET BY MOUTH EVERY DAY 90 Tab 2    sertraline (ZOLOFT) 100 mg tablet TAKE 1 TABLET BY MOUTH EVERY DAY 90 Tab 1    Klor-Con M20 20 mEq tablet TAKE 1 TABLET BY MOUTH EVERY DAY 30 Tab 6    acetaminophen (Tylenol Extra Strength) 500 mg tablet Take  by mouth every six (6) hours as needed for Pain.  budesonide (RHINOCORT AQUA) 32 mcg/actuation nasal spray SPRAY 2 SPRAYS INTO EACH NOSTRIL EVERY DAY 3 Bottle 1    furosemide (LASIX) 40 mg tablet TAKE 1 TABLET BY MOUTH EVERY DAY 90 Tab 3    Ventolin HFA 90 mcg/actuation inhaler TAKE 2 PUFFS BY INHALATION DAILY. 18 Inhaler 5    OTHER Allergy injections      aspirin delayed-release 81 mg tablet Take 1 Tab by mouth daily. 30 Tab 0    polyethylene glycol (MIRALAX) 17 gram packet Take 1 Packet by mouth daily. (Patient taking differently: Take  by mouth daily as needed for Other (Constipation). ) 30 Packet 0    esomeprazole (NEXIUM) 20 mg capsule Take 20 mg by mouth daily as needed (heart burn).  sodium chloride (SALINE NASAL) 0.65 % nasal squeeze bottle 1 Spray as needed for Congestion.  melatonin tab tablet Take 10 mg by mouth nightly.  methylPREDNISolone (MEDROL DOSEPACK) 4 mg tablet Use as directed 1 Dose Pack 0    triamcinolone acetonide (KENALOG) 0.1 % ointment Apply  to affected area two (2) times a day. use thin layer      clobetasoL (TEMOVATE) 0.05 % ointment Apply  to affected area two (2) times a day. 15 g 0    timolol (TIMOPTIC) 0.5 % ophthalmic solution       fluticasone propion-salmeterol (ADVAIR/WIXELA) 250-50 mcg/dose diskus inhaler Take 1 Puff by inhalation every twelve (12) hours. 1 Each 0       Allergies   Allergen Reactions    Hydrocodone Bitartrate Rash    Morphine Rash and Itching    Norco [Hydrocodone-Acetaminophen] Itching    Penicillin Hives       Past Medical History:   Diagnosis Date    Aortic valve disorders 1/13/2011    Asthma     Atrial fibrillation ([facility] [address] 1/13/2011    Congestive heart failure, unspecified March 2005    Cleared quickly with Lasix therapy    Echocardiogram 01/10/2012    A-fib. EF 45%. Mild RVE. RVSP 45-50. Massive LAE. Mod TAMI. Mod MS. Severe MR (mean grad 10). Mild-mod AI. Mild TR. Pulmonic systolic flow reversal.  Mild IVCE.  Hives     Hypertension     Iron deficiency anemias     Long term (current) use of anticoagulants 2/23/2011    Murmur 1/2011    Grade I-II/VI systolic ejection murmur along left sternal border, with radiation to the pulmonic area.  S/P cardiac cath 02/24/2012    Minimal coronary artery disease. Mild LVE. EF 55%. Mod MR. Mod-severe MS. RA 15.  RV 55/16. PA 58/34. W 36.  CO/CI 3.5/1.9 (TD); 3.61/1.9 (Dequan). R to L shunt suggested.     Wears glasses     Weight gain        Social History     Socioeconomic History    Marital status:      Spouse name: Not on file    Number of children: Not on file    Years of education: Not on file    Highest education level: Not on file   Occupational History    Not on file   Social Needs    Financial resource strain: Not on file    Food insecurity     Worry: Not on file     Inability: Not on file    Transportation needs     Medical: Not on file     Non-medical: Not on file   Tobacco Use    Smoking status: Former Smoker     Packs/day: 0.50     Years: 21.00     Pack years: 10.50     Quit date:      Years since quittin.1    Smokeless tobacco: Never Used   Substance and Sexual Activity    Alcohol use:  Yes     Alcohol/week: 1.0 standard drinks     Types: 1 Glasses of wine per week     Comment: occassionally    Drug use: No    Sexual activity: Yes     Partners: Male     Birth control/protection: None   Lifestyle    Physical activity     Days per week: Not on file     Minutes per session: Not on file    Stress: Not on file   Relationships    Social connections     Talks on phone: Not on file     Gets together: Not on file     Attends Gnosticism service: Not on file     Active member of club or organization: Not on file     Attends meetings of clubs or organizations: Not on file     Relationship status: Not on file    Intimate partner violence     Fear of current or ex partner: Not on file     Emotionally abused: Not on file     Physically abused: Not on file     Forced sexual activity: Not on file   Other Topics Concern    Not on file   Social History Narrative    Not on file       Family History   Problem Relation Age of Onset    Heart Surgery Father         Open-Heart Surgery    Other Father         Venous Thromboembolism    Hypertension Father     Heart Disease Mother         Enlarged Heart    Hypertension Mother     Diabetes Mother     Diabetes Sister     Hypertension Sister     Diabetes Brother     Heart Disease Paternal Grandmother     Diabetes Sister     Hypertension Sister          OBJECTIVE    Physical Exam:     Visit Vitals  /74 (BP 1 Location: Left upper arm, BP Patient Position: Sitting, BP Cuff Size: Adult)   Pulse 80   Temp 97.8 °F (36.6 °C) (Oral)   Resp 16   Ht 5' 7\" (1.702 m)   Wt 187 lb (84.8 kg)   SpO2 98%   BMI 29.29 kg/m²       General: alert, well-appearing,  in no apparent distress or pain  Neck: no JVD  CVS: irregular, mech valve click, + murmur  Lungs:clear to ausculation bilaterally, no crackles, wheezing or rhonchi noted  Abdomen: soft, NT, ND  Skin: bilateral UE and upper back excoriations. No intertriginous findings. Psych:  mood and affect normal      ASSESSMENT/PLAN  Diagnoses and all orders for this visit:    Anxiety   Improving  Cont buspar  Cont [id number] mg  Provided resources for talk therapy    Recurrent depression  See above    Mild intermittent asthma without complication  PFTs 04/98 normal  Currently on advair, and albuterol prn    Essential hypertension  Improved, now controlled  Cont  norvasc 7.5 mg  Cont cozaar 100 mg   cont metoprolol 100 mg  DASH diet, monitoring  Cmp/lipid panel prior to next visit    CAD involving native coronary artery of native heart with angina pectoris ([address]  Asymptomatic  Mild reversible ischemia on stress test 1/2019  On bb, arb, ASA  LDL is <70  Cont lipitor 40 mg qhs  Following cardiology    Atrial fibrillation, unspecified type ([address]  Rate controlled, On BB, coumadin  INR therapeutic  Following cards    Hyperlipidemia, unspecified hyperlipidemia type  Good range on  lipitor    LDL goal <70    Long term current use of anticoagulant therapy  Cards following    Rheumatic valvular disease  S/p [facility] valve 2013  Chronic anticoagulation    Allergic rhinitis, unspecified seasonality, unspecified trigger  On allergy shots  Cont flonase, benadryl,  singulair    Anemia of chronic disease  Elevated globulin  No M spike or lytic lesions  Pt was reassured, appreciate heme recs.     Will continue to monitor anemia of chronic disease    Prediabetes  Monitoring, a1c prior to next visit    Follow-up and Dispositions    · Return in about 3 months (around 6/5/2021), or if symptoms worsen or fail to improve, for fasting labs a week prior to your next visit, routine chronic illness care. Patient understands plan of care. Patient has provided input and agrees with goals.

## 2021-03-06 NOTE — PROGRESS NOTES
Harpreet Wilcox is in the office today in cardiovascular evaluation regarding her mitral valve disease. Has been followed by Dr. Benny Light for the last 16 years. She was admitted in 2005 to [facility] with congestive heart failure. An echocardiogram demonstrated mild concentric left ventricular hypertrophy and a low normal overall left ventricular function with an ejection fraction of 55% , as well as mild to moderate mitral stenosis and moderate mitral regurgitation.     She was not seen again until 2012 at which time she had advancing mitral stenosis and ultimately underwent a cardiac catheterization which demonstrated moderate to severe mitral stenosis with moderate mitral regurgitation, mild aortic insufficiency and  mild coronary artery disease.      She  was referred for surgery  on 5/22/13, which consisted of a mitral valve replacement with a 25/23 mm On-X mechanical valve together with bilateral pulmonary vein isolation and resection of the left atrial appendage.     In the office today, she reports she has done well since surgery. She has had no shortness of breath but does have to \"take it easy \". She has had no chest pain. She had no palpitations, near-syncope or syncope. She has had no peripheral swelling. Her blood pressure was recently  mildly elevated  and her amlodipine was increased from 5 mg to 7.5 mg daily. She had echocardiogram done on 1/15/2021. Her EF was 55 to 60%. Prosthetic mitral valve mean gradient was 4.1 mmHg. Pulmonary artery systolic pressure was estimated at 34 mmHg. She had mild to moderate AI. Plan; Harpreet Wilcox seems to be stable from a cardiac standpoint. We will make no changes in her medicines today and plan to see her back in 6 months.     PCP: Leandra Rich MD       Past Medical History:   Diagnosis Date    Aortic valve disorders 1/13/2011    Asthma     Atrial fibrillation ([facility] [address] 1/13/2011    Congestive heart failure, unspecified March 2005    Cleared quickly with Lasix therapy    Echocardiogram 01/10/2012    A-fib. EF 45%. Mild RVE. RVSP 45-50. Massive LAE. Mod TAMI. Mod MS. Severe MR (mean grad 10). Mild-mod AI. Mild TR. Pulmonic systolic flow reversal.  Mild IVCE.  Hives     Hypertension     Iron deficiency anemias     Long term (current) use of anticoagulants 2/23/2011    Murmur 1/2011    Grade I-II/VI systolic ejection murmur along left sternal border, with radiation to the pulmonic area.  S/P cardiac cath 02/24/2012    Minimal coronary artery disease. Mild LVE. EF 55%. Mod MR. Mod-severe MS. RA 15.  RV 55/16. PA 58/34. W 36.  CO/CI 3.5/1.9 (TD); 3.61/1.9 (Dequan). R to L shunt suggested.  Wears glasses     Weight gain          Past Surgical History:   Procedure Laterality Date    HX HEART CATHETERIZATION  2/24/2012    HX HYSTERECTOMY  2006    HX MITRAL VALVE REPLACEMENT  05/22/2013    25/33 mm On-X mechanical valve with bilateral pulmonary vein isolation and resection of LA appendage       Current Outpatient Medications   Medication Sig    warfarin (COUMADIN) 5 mg tablet TAKE 1 TABLET BY MOUTH DAILY EXCEPT 1/2 TABLET ON TUE-THURS-SAT OR AS DIRECTED    metoprolol succinate (TOPROL-XL) 100 mg tablet TAKE 1 TABLET BY MOUTH EVERY DAY    sertraline (ZOLOFT) 100 mg tablet TAKE 1 TABLET BY MOUTH EVERY DAY    Klor-Con M20 20 mEq tablet TAKE 1 TABLET BY MOUTH EVERY DAY    acetaminophen (Tylenol Extra Strength) 500 mg tablet Take  by mouth every six (6) hours as needed for Pain.  furosemide (LASIX) 40 mg tablet TAKE 1 TABLET BY MOUTH EVERY DAY    Ventolin HFA 90 mcg/actuation inhaler TAKE 2 PUFFS BY INHALATION DAILY.  OTHER Allergy injections    aspirin delayed-release 81 mg tablet Take 1 Tab by mouth daily.  polyethylene glycol (MIRALAX) 17 gram packet Take 1 Packet by mouth daily. (Patient taking differently: Take  by mouth daily as needed for Other (Constipation). )    esomeprazole (NEXIUM) 20 mg capsule Take 20 mg by mouth daily as needed (heart burn).  sodium chloride (SALINE NASAL) 0.65 % nasal squeeze bottle 1 Spray as needed for Congestion.  melatonin tab tablet Take 10 mg by mouth nightly.  busPIRone (BUSPAR) 5 mg tablet Take 1 Tab by mouth two (2) times a day.  cyclobenzaprine (FLEXERIL) 10 mg tablet Take 1 Tab by mouth three (3) times daily as needed for Muscle Spasm(s).  losartan (COZAAR) 50 mg tablet TAKE 1 TABLET BY MOUTH TWICE A DAY    levocetirizine (XYZAL) 5 mg tablet TAKE 1 TABLET BY MOUTH EVERY DAY    montelukast (SINGULAIR) 10 mg tablet TAKE 1 TABLET BY MOUTH EVERY DAY    amLODIPine (NORVASC) 5 mg tablet Take 1 Tab by mouth daily.  atorvastatin (LIPITOR) 40 mg tablet TAKE 1 TABLET BY MOUTH EVERY DAY    amLODIPine (NORVASC) 2.5 mg tablet Take 1 Tab by mouth daily.  methylPREDNISolone (MEDROL DOSEPACK) 4 mg tablet Use as directed    budesonide (RHINOCORT AQUA) 32 mcg/actuation nasal spray SPRAY 2 SPRAYS INTO EACH NOSTRIL EVERY DAY    triamcinolone acetonide (KENALOG) 0.1 % ointment Apply  to affected area two (2) times a day. use thin layer    clobetasoL (TEMOVATE) 0.05 % ointment Apply  to affected area two (2) times a day.  timolol (TIMOPTIC) 0.5 % ophthalmic solution     fluticasone propion-salmeterol (ADVAIR/WIXELA) 250-50 mcg/dose diskus inhaler Take 1 Puff by inhalation every twelve (12) hours. No current facility-administered medications for this visit. Allergies   Allergen Reactions    Hydrocodone Bitartrate Rash    Morphine Rash and Itching    Norco [Hydrocodone-Acetaminophen] Itching    Penicillin Hives       Social History:   reports that she quit smoking about 13 years ago. She has a 10.50 pack-year smoking history. She has never used smokeless tobacco. She reports current alcohol use of about 1.0 standard drinks of alcohol per week. She reports that she does not use drugs.     Family History   Problem Relation Age of Onset    Heart Surgery Father Open-Heart Surgery    Other Father         Venous Thromboembolism    Hypertension Father     Heart Disease Mother         Enlarged Heart    Hypertension Mother     Diabetes Mother     Diabetes Sister     Hypertension Sister     Diabetes Brother     Heart Disease Paternal Grandmother     Diabetes Sister     Hypertension Sister      Review of Systems:    Constitutional: Positive for mild fatigue. Negative for chills, fever, or weight loss. Respiratory: Positive for occasional wheezing. Negative for cough, hemoptysis, sputum production and shortness of breath. Cardiovascular:  Negative for chest pain, palpitations, claudication, leg swelling and PND. Gastrointestinal: Negative. Musculoskeletal: Negative for back pain, falls, joint pain, myalgias and neck pain. Neurological: Positive for anxiety. Physical Exam:   Visit Vitals  /76 (BP 1 Location: Right upper arm, BP Patient Position: Sitting, BP Cuff Size: Adult)   Pulse 97   Ht 5' 7\" (1.702 m)   Wt 187 lb (84.8 kg)   SpO2 98%   BMI 29.29 kg/m²       The patient is an alert, oriented, well developed, well nourished 56 y.o.  female who was in no acute distress at the time of my examination. HEENT:   Conjunctivae white and mucosa moist  Neck: Supple without masses, tenderness or thyromegaly. No jugular venous distention. Carotid upstrokes are full bilaterally, without bruits. Cardiovascular: Chest is symmetrical with good excursion. There is a well healed, but widened mid sternotomy scar. .  Mechanical S1 with normal S2 without appreciable murmurs, rubs, clicks or gallops auscultated. Lungs: Clear to auscultation bilaterally. Abdomen: Soft. No masses, tenderness or organomegaly. No abdominal bruits. Extremities: No edema, with full peripheral pulses. No clubbing or cyanosis. Review of Data: Please refer to past medical history for most recent cardiac testing.   Lab Results   Component Value Date/Time    Cholesterol, total 135 07/27/2020 08:21 AM    HDL Cholesterol 60 07/27/2020 08:21 AM    LDL, calculated 54.8 07/27/2020 08:21 AM    Triglyceride 101 07/27/2020 08:21 AM    CHOL/HDL Ratio 2.3 07/27/2020 08:21 AM     Results for orders placed or performed in visit on 11/23/20   AMB POC EKG ROUTINE W/ 12 LEADS, INTER & REP     Status: None    Narrative    Atrial fibrillation with controlled ventricular response rate in the mid 70s. Minor nonspecific diffuse ST-T change. Compared to the EKG of December 3, 2019 there was little interval change. Rashel Schafer MD, F.A.C.C. [facility]  [facility]  [address] [place] [address]. [address] [facility]  [phone]    PLEASE NOTE:  This document has been produced using voice recognition software. Unrecognized errors in transcription may be present.

## 2021-03-30 ENCOUNTER — ANTI-COAG VISIT (OUTPATIENT)
Dept: CARDIOLOGY CLINIC | Age: 61
End: 2021-03-30

## 2021-03-30 DIAGNOSIS — I48.91 ATRIAL FIBRILLATION, UNSPECIFIED TYPE (HCC): ICD-10-CM

## 2021-03-30 DIAGNOSIS — Z79.01 LONG TERM CURRENT USE OF ANTICOAGULANT THERAPY: ICD-10-CM

## 2021-03-30 LAB
INR BLD: 2.2
PT POC: 26.8 SECONDS
VALID INTERNAL CONTROL?: YES

## 2021-03-30 PROCEDURE — 85610 PROTHROMBIN TIME: CPT | Performed by: NURSE PRACTITIONER

## 2021-03-30 NOTE — PROGRESS NOTES
The INR is stable and therapeutic. Continue same dose of coumadin: Continue Coumadin 5mg daily except 2.5mg on Tuesday, Thursday, Saturday. Recheck in 3 weeks.

## 2021-04-27 ENCOUNTER — ANTI-COAG VISIT (OUTPATIENT)
Dept: CARDIOLOGY CLINIC | Age: 61
End: 2021-04-27
Payer: MEDICARE

## 2021-04-27 DIAGNOSIS — Z79.01 LONG TERM CURRENT USE OF ANTICOAGULANT THERAPY: ICD-10-CM

## 2021-04-27 DIAGNOSIS — I48.91 ATRIAL FIBRILLATION, UNSPECIFIED TYPE (HCC): ICD-10-CM

## 2021-04-27 LAB
INR BLD: 2.9
PT POC: 34.3 SECONDS
VALID INTERNAL CONTROL?: YES

## 2021-04-27 PROCEDURE — 85610 PROTHROMBIN TIME: CPT | Performed by: NURSE PRACTITIONER

## 2021-05-10 ENCOUNTER — TRANSCRIBE ORDER (OUTPATIENT)
Dept: SCHEDULING | Age: 61
End: 2021-05-10

## 2021-05-10 ENCOUNTER — HOSPITAL ENCOUNTER (OUTPATIENT)
Dept: LAB | Age: 61
Discharge: HOME | End: 2021-05-10
Payer: MEDICARE

## 2021-05-10 DIAGNOSIS — Z12.31 ENCOUNTER FOR SCREENING MAMMOGRAM FOR MALIGNANT NEOPLASM OF BREAST: Primary | ICD-10-CM

## 2021-05-10 DIAGNOSIS — E78.2 MIXED HYPERLIPIDEMIA: ICD-10-CM

## 2021-05-10 DIAGNOSIS — I10 ESSENTIAL HYPERTENSION: ICD-10-CM

## 2021-05-10 DIAGNOSIS — R73.03 PREDIABETES: ICD-10-CM

## 2021-05-10 LAB
ALBUMIN SERPL-MCNC: 3.8 G/DL (ref 3.4–5)
ALBUMIN/GLOB SERPL: 1 {RATIO} (ref 0.8–1.7)
ALP SERPL-CCNC: 83 U/L (ref 45–117)
ALT SERPL-CCNC: 40 U/L (ref 13–56)
ANION GAP SERPL CALC-SCNC: 5 MMOL/L (ref 3–18)
AST SERPL-CCNC: 40 U/L (ref 10–38)
BILIRUB SERPL-MCNC: 0.6 MG/DL (ref 0.2–1)
BUN SERPL-MCNC: 12 MG/DL (ref 7–18)
BUN/CREAT SERPL: 15 (ref 12–20)
CALCIUM SERPL-MCNC: 9.1 MG/DL (ref 8.5–10.1)
CHLORIDE SERPL-SCNC: 107 MMOL/L (ref 100–111)
CHOLEST SERPL-MCNC: 147 MG/DL
CO2 SERPL-SCNC: 29 MMOL/L (ref 21–32)
CREAT SERPL-MCNC: 0.78 MG/DL (ref 0.6–1.3)
EST. AVERAGE GLUCOSE BLD GHB EST-MCNC: 97 MG/DL
GLOBULIN SER CALC-MCNC: 3.9 G/DL (ref 2–4)
GLUCOSE SERPL-MCNC: 93 MG/DL (ref 74–99)
HBA1C MFR BLD: 5 % (ref 4.2–5.6)
HDLC SERPL-MCNC: 71 MG/DL (ref 40–60)
HDLC SERPL: 2.1 {RATIO} (ref 0–5)
LDLC SERPL CALC-MCNC: 58.8 MG/DL (ref 0–100)
LIPID PROFILE,FLP: ABNORMAL
POTASSIUM SERPL-SCNC: 4.2 MMOL/L (ref 3.5–5.5)
PROT SERPL-MCNC: 7.7 G/DL (ref 6.4–8.2)
SODIUM SERPL-SCNC: 141 MMOL/L (ref 136–145)
TRIGL SERPL-MCNC: 86 MG/DL (ref ?–150)
VLDLC SERPL CALC-MCNC: 17.2 MG/DL

## 2021-05-10 PROCEDURE — 80061 LIPID PANEL: CPT

## 2021-05-10 PROCEDURE — 83036 HEMOGLOBIN GLYCOSYLATED A1C: CPT

## 2021-05-10 PROCEDURE — 80053 COMPREHEN METABOLIC PANEL: CPT

## 2021-05-10 PROCEDURE — 36415 COLL VENOUS BLD VENIPUNCTURE: CPT

## 2021-05-16 DIAGNOSIS — J45.40 MODERATE PERSISTENT ASTHMA WITHOUT COMPLICATION: ICD-10-CM

## 2021-05-17 RX ORDER — ALBUTEROL SULFATE 90 UG/1
AEROSOL, METERED RESPIRATORY (INHALATION)
Qty: 18 INHALER | Refills: 3 | Status: SHIPPED | OUTPATIENT
Start: 2021-05-17 | End: 2021-11-17 | Stop reason: SDUPTHER

## 2021-05-17 RX ORDER — SERTRALINE HYDROCHLORIDE 100 MG/1
TABLET, FILM COATED ORAL
Qty: 90 TAB | Refills: 1 | Status: SHIPPED | OUTPATIENT
Start: 2021-05-17 | End: 2021-12-14

## 2021-05-25 ENCOUNTER — ANTI-COAG VISIT (OUTPATIENT)
Dept: CARDIOLOGY CLINIC | Age: 61
End: 2021-05-25
Payer: MEDICARE

## 2021-05-25 DIAGNOSIS — Z79.01 LONG TERM CURRENT USE OF ANTICOAGULANT THERAPY: ICD-10-CM

## 2021-05-25 DIAGNOSIS — I48.11 LONGSTANDING PERSISTENT ATRIAL FIBRILLATION (HCC): Primary | ICD-10-CM

## 2021-05-25 LAB
INR BLD: 2.3
PT POC: 27.6 SECONDS
VALID INTERNAL CONTROL?: YES

## 2021-05-25 PROCEDURE — 85610 PROTHROMBIN TIME: CPT | Performed by: NURSE PRACTITIONER

## 2021-05-25 NOTE — PROGRESS NOTES
The INR is stable and therapeutic. Continue same dose of coumadin: Continue Coumadin 5mg daily except 2.5mg on Tuesday, Thursday, Saturday. Recheck in 4 weeks.

## 2021-06-11 ENCOUNTER — OFFICE VISIT (OUTPATIENT)
Dept: FAMILY MEDICINE CLINIC | Age: 61
End: 2021-06-11
Payer: MEDICARE

## 2021-06-11 VITALS
DIASTOLIC BLOOD PRESSURE: 68 MMHG | WEIGHT: 189 LBS | TEMPERATURE: 97.8 F | HEART RATE: 78 BPM | HEIGHT: 67 IN | OXYGEN SATURATION: 98 % | RESPIRATION RATE: 16 BRPM | SYSTOLIC BLOOD PRESSURE: 124 MMHG | BODY MASS INDEX: 29.66 KG/M2

## 2021-06-11 DIAGNOSIS — I48.91 ATRIAL FIBRILLATION, UNSPECIFIED TYPE (HCC): ICD-10-CM

## 2021-06-11 DIAGNOSIS — E78.2 MIXED HYPERLIPIDEMIA: ICD-10-CM

## 2021-06-11 DIAGNOSIS — D63.8 ANEMIA OF CHRONIC DISEASE: ICD-10-CM

## 2021-06-11 DIAGNOSIS — J30.89 OTHER ALLERGIC RHINITIS: ICD-10-CM

## 2021-06-11 DIAGNOSIS — Z12.11 COLON CANCER SCREENING: ICD-10-CM

## 2021-06-11 DIAGNOSIS — I25.10 CORONARY ARTERY DISEASE INVOLVING NATIVE CORONARY ARTERY OF NATIVE HEART WITHOUT ANGINA PECTORIS: ICD-10-CM

## 2021-06-11 DIAGNOSIS — J45.20 MILD INTERMITTENT ASTHMA WITHOUT COMPLICATION: ICD-10-CM

## 2021-06-11 DIAGNOSIS — F41.9 ANXIETY: ICD-10-CM

## 2021-06-11 DIAGNOSIS — Z79.01 LONG TERM CURRENT USE OF ANTICOAGULANT THERAPY: ICD-10-CM

## 2021-06-11 DIAGNOSIS — F33.9 RECURRENT DEPRESSION (HCC): ICD-10-CM

## 2021-06-11 DIAGNOSIS — I10 ESSENTIAL HYPERTENSION: Primary | ICD-10-CM

## 2021-06-11 PROCEDURE — G8752 SYS BP LESS 140: HCPCS | Performed by: FAMILY MEDICINE

## 2021-06-11 PROCEDURE — G8427 DOCREV CUR MEDS BY ELIG CLIN: HCPCS | Performed by: FAMILY MEDICINE

## 2021-06-11 PROCEDURE — 3017F COLORECTAL CA SCREEN DOC REV: CPT | Performed by: FAMILY MEDICINE

## 2021-06-11 PROCEDURE — 99214 OFFICE O/P EST MOD 30 MIN: CPT | Performed by: FAMILY MEDICINE

## 2021-06-11 PROCEDURE — G9717 DOC PT DX DEP/BP F/U NT REQ: HCPCS | Performed by: FAMILY MEDICINE

## 2021-06-11 PROCEDURE — G9899 SCRN MAM PERF RSLTS DOC: HCPCS | Performed by: FAMILY MEDICINE

## 2021-06-11 PROCEDURE — G8754 DIAS BP LESS 90: HCPCS | Performed by: FAMILY MEDICINE

## 2021-06-11 PROCEDURE — G8419 CALC BMI OUT NRM PARAM NOF/U: HCPCS | Performed by: FAMILY MEDICINE

## 2021-06-11 PROCEDURE — G0463 HOSPITAL OUTPT CLINIC VISIT: HCPCS | Performed by: FAMILY MEDICINE

## 2021-06-11 NOTE — PROGRESS NOTES
1. Have you been to the ER, urgent care clinic since your last visit? Hospitalized since your last visit? No    2. Have you seen or consulted any other health care providers outside of the [address] [place] [address] since your last visit? Include any pap smears or colon screening.  In [address] for talk therapy

## 2021-06-11 NOTE — PATIENT INSTRUCTIONS
DASH Diet: Care Instructions Your Care Instructions The DASH diet is an eating plan that can help lower your blood pressure. DASH stands for Dietary Approaches to Stop Hypertension. Hypertension is high blood pressure. The DASH diet focuses on eating foods that are high in calcium, potassium, and magnesium. These nutrients can lower blood pressure. The foods that are highest in these nutrients are fruits, vegetables, low-fat dairy products, nuts, seeds, and legumes. But taking calcium, potassium, and magnesium supplements instead of eating foods that are high in those nutrients does not have the same effect. The DASH diet also includes whole grains, fish, and poultry. The DASH diet is one of several lifestyle changes your doctor may recommend to lower your high blood pressure. Your doctor may also want you to decrease the amount of sodium in your diet. Lowering sodium while following the DASH diet can lower blood pressure even further than just the DASH diet alone. Follow-up care is a key part of your treatment and safety. Be sure to make and go to all appointments, and call your doctor if you are having problems. It's also a good idea to know your test results and keep a list of the medicines you take. How can you care for yourself at home? Following the DASH diet · Eat 4 to 5 servings of fruit each day. A serving is 1 medium-sized piece of fruit, ½ cup chopped or canned fruit, 1/4 cup dried fruit, or 4 ounces (½ cup) of fruit juice. Choose fruit more often than fruit juice. · Eat 4 to 5 servings of vegetables each day. A serving is 1 cup of lettuce or raw leafy vegetables, ½ cup of chopped or cooked vegetables, or 4 ounces (½ cup) of vegetable juice. Choose vegetables more often than vegetable juice. · Get 2 to 3 servings of low-fat and fat-free dairy each day. A serving is 8 ounces of milk, 1 cup of yogurt, or 1 ½ ounces of cheese. · Eat 6 to 8 servings of grains each day.  A serving is 1 slice Is This A New Presentation, Or A Follow-Up?: Skin Lesion How Severe Is Your Skin Lesion?: moderate of bread, 1 ounce of dry cereal, or ½ cup of cooked rice, pasta, or cooked cereal. Try to choose whole-grain products as much as possible. · Limit lean meat, poultry, and fish to 2 servings each day. A serving is 3 ounces, about the size of a deck of cards. · Eat 4 to 5 servings of nuts, seeds, and legumes (cooked dried beans, lentils, and split peas) each week. A serving is 1/3 cup of nuts, 2 tablespoons of seeds, or ½ cup of cooked beans or peas. · Limit fats and oils to 2 to 3 servings each day. A serving is 1 teaspoon of vegetable oil or 2 tablespoons of salad dressing. · Limit sweets and added sugars to 5 servings or less a week. A serving is 1 tablespoon jelly or jam, ½ cup sorbet, or 1 cup of lemonade. · Eat less than 2,300 milligrams (mg) of sodium a day. If you limit your sodium to 1,500 mg a day, you can lower your blood pressure even more. · Be aware that all of these are the suggested number of servings for people who eat 1,800 to 2,000 calories a day. Your recommended number of servings may be different if you need more or fewer calories. Tips for success · Start small. Do not try to make dramatic changes to your diet all at once. You might feel that you are missing out on your favorite foods and then be more likely to not follow the plan. Make small changes, and stick with them. Once those changes become habit, add a few more changes. · Try some of the following: ? Make it a goal to eat a fruit or vegetable at every meal and at snacks. This will make it easy to get the recommended amount of fruits and vegetables each day. ? Try yogurt topped with fruit and nuts for a snack or healthy dessert. ? Add lettuce, tomato, cucumber, and onion to sandwiches. ? Combine a ready-made pizza crust with low-fat mozzarella cheese and lots of vegetable toppings. Try using tomatoes, squash, spinach, broccoli, carrots, cauliflower, and onions. ?  Have a variety of cut-up vegetables with a low-fat dip as an appetizer instead of chips and dip. ? Sprinkle sunflower seeds or chopped almonds over salads. Or try adding chopped walnuts or almonds to cooked vegetables. ? Try some vegetarian meals using beans and peas. Add garbanzo or kidney beans to salads. Make burritos and tacos with mashed curtis beans or black beans. Where can you learn more? Go to http://www.gray.com/ Enter O458 in the search box to learn more about \"DASH Diet: Care Instructions. \" Current as of: August 31, 2020               Content Version: 12.8 © 2388-6202 [employer]. Care instructions adapted under license by [employer] (which disclaims liability or warranty for this information). If you have questions about a medical condition or this instruction, always ask your healthcare professional. [address] any warranty or liability for your use of this information.

## 2021-06-11 NOTE — PROGRESS NOTES
[facility], 56 y.o.,  female    SUBJECTIVE  Ff-up    Anxiety/depression-reports to be doing well, now following talk therapist she finds very helpful. She continues to take  buspar and zoloft    HTN- continues to take norvasc dose to 7.5 mg, metoprolol and cozaar. H/o nstemi, afib, rheumatic valve disease s/p MV repair 2013- denies any chest pain, sob, palpitations, lightheadedness. On chronic coumadin treatment and lasix, following cardiology. HL-taking lipitor without problems. reviewd labs LDL 56    Elevated globulin/anemia of chronic disease- neg work up, monitoring. Following dr. rae    Chronic rhinitis- on allergy shots, following ENT. No longer on advair, not symptomatic.      ROS:  See HPI, all others negative        Patient Active Problem List   Diagnosis Code    Rheumatic valvular disease I09.1    Chronic diastolic congestive heart failure (HCC) I50.32    Atrial fibrillation (HCC) I48.91    Long term current use of anticoagulant therapy Z79.01    Benign hypertensive heart disease with heart failure (HCC) I11.0    Hyperlipidemia E78.5    Advanced directives, counseling/discussion Z71.89    Mild intermittent asthma without complication V82.77    NSTEMI (non-ST elevated myocardial infarction) ([address] I21.4    Chest pain R07.9    IFG (impaired fasting glucose) R73.01    Adjustment disorder with anxious mood F43.22    Essential hypertension I10    Other allergic rhinitis J30.89    Anxiety F41.9    Allergic rhinitis due to pollen J30.1    Chronic sinusitis J32.9    Mitral valve stenosis I05.0    Elevated serum globulin level R77.1    Normocytic anemia D64.9    Anemia of chronic disease D63.8    Perennial allergic rhinitis with seasonal variation J30.89, J30.2    Vasomotor rhinitis J30.0    Coronary artery disease involving native coronary artery of native heart without angina pectoris I25.10    Recurrent depression (HCC) F33.9       Current Outpatient Medications   Medication Sig Dispense Refill    albuterol (PROVENTIL HFA, VENTOLIN HFA, PROAIR HFA) 90 mcg/actuation inhaler INHALE 2 PUFFS BY MOUTH EVERY DAY. 18 Inhaler 3    sertraline (ZOLOFT) 100 mg tablet TAKE 1 TABLET BY MOUTH EVERY DAY 90 Tab 1    busPIRone (BUSPAR) 5 mg tablet Take 1 Tab by mouth two (2) times a day. 180 Tab 1    cyclobenzaprine (FLEXERIL) 10 mg tablet Take 1 Tab by mouth three (3) times daily as needed for Muscle Spasm(s). 30 Tab 0    losartan (COZAAR) 50 mg tablet TAKE 1 TABLET BY MOUTH TWICE A DAY 90 Tab 1    levocetirizine (XYZAL) 5 mg tablet TAKE 1 TABLET BY MOUTH EVERY DAY 90 Tab 3    montelukast (SINGULAIR) 10 mg tablet TAKE 1 TABLET BY MOUTH EVERY DAY 90 Tab 3    amLODIPine (NORVASC) 5 mg tablet Take 1 Tab by mouth daily. 90 Tab 3    atorvastatin (LIPITOR) 40 mg tablet TAKE 1 TABLET BY MOUTH EVERY DAY 90 Tab 2    amLODIPine (NORVASC) 2.5 mg tablet Take 1 Tab by mouth daily. 90 Tab 0    warfarin (COUMADIN) 5 mg tablet TAKE 1 TABLET BY MOUTH DAILY EXCEPT 1/2 TABLET ON TUE-THURS-SAT OR AS DIRECTED 60 Tab 6    metoprolol succinate (TOPROL-XL) 100 mg tablet TAKE 1 TABLET BY MOUTH EVERY DAY 90 Tab 2    Klor-Con M20 20 mEq tablet TAKE 1 TABLET BY MOUTH EVERY DAY 30 Tab 6    acetaminophen (Tylenol Extra Strength) 500 mg tablet Take  by mouth every six (6) hours as needed for Pain.  budesonide (RHINOCORT AQUA) 32 mcg/actuation nasal spray SPRAY 2 SPRAYS INTO EACH NOSTRIL EVERY DAY 3 Bottle 1    furosemide (LASIX) 40 mg tablet TAKE 1 TABLET BY MOUTH EVERY DAY 90 Tab 3    clobetasoL (TEMOVATE) 0.05 % ointment Apply  to affected area two (2) times a day. 15 g 0    OTHER Allergy injections      aspirin delayed-release 81 mg tablet Take 1 Tab by mouth daily. 30 Tab 0    polyethylene glycol (MIRALAX) 17 gram packet Take 1 Packet by mouth daily. (Patient taking differently: Take  by mouth daily as needed for Other (Constipation). ) 30 Packet 0    esomeprazole (NEXIUM) 20 mg capsule Take 20 mg by mouth daily as needed (heart burn).  sodium chloride (SALINE NASAL) 0.65 % nasal squeeze bottle 1 Spray as needed for Congestion.  melatonin tab tablet Take 10 mg by mouth nightly.  fluticasone propion-salmeterol (ADVAIR/WIXELA) 250-50 mcg/dose diskus inhaler Take 1 Puff by inhalation every twelve (12) hours. (Patient not taking: Reported on 2021) 1 Each 0       Allergies   Allergen Reactions    Hydrocodone Bitartrate Rash    Morphine Rash and Itching    Norco [Hydrocodone-Acetaminophen] Itching    Penicillin Hives       Past Medical History:   Diagnosis Date    Aortic valve disorders 2011    Asthma     Atrial fibrillation ([facility] [address] 2011    Congestive heart failure, unspecified 2005    Cleared quickly with Lasix therapy    Echocardiogram 01/10/2012    A-fib. EF 45%. Mild RVE. RVSP 45-50. Massive LAE. Mod TAMI. Mod MS. Severe MR (mean grad 10). Mild-mod AI. Mild TR. Pulmonic systolic flow reversal.  Mild IVCE.  Hives     Hypertension     Iron deficiency anemias     Long term (current) use of anticoagulants 2011    Murmur 2011    Grade I-II/VI systolic ejection murmur along left sternal border, with radiation to the pulmonic area.  S/P cardiac cath 2012    Minimal coronary artery disease. Mild LVE. EF 55%. Mod MR. Mod-severe MS. RA 15.  RV 55/16. PA 58/34. W 36.  CO/CI 3.5/1.9 (TD); 3.61/1.9 ([place]). R to L shunt suggested.     Wears glasses     Weight gain        Social History     Socioeconomic History    Marital status:      Spouse name: Not on file    Number of children: Not on file    Years of education: Not on file    Highest education level: Not on file   Occupational History    Not on file   Tobacco Use    Smoking status: Former Smoker     Packs/day: 0.50     Years: 21.00     Pack years: 10.50     Quit date:      Years since quittin.4    Smokeless tobacco: Never Used   Substance and Sexual Activity    Alcohol use: Yes     Alcohol/week: 1.0 standard drinks     Types: 1 Glasses of wine per week     Comment: occassionally    Drug use: No    Sexual activity: Yes     Partners: Male     Birth control/protection: None   Other Topics Concern    Not on file   Social History Narrative    Not on file     Social Determinants of Health     Financial Resource Strain:     Difficulty of Paying Living Expenses:    Food Insecurity:     Worried About Running Out of Food in the Last Year:     [address] in the Last Year:    Transportation Needs:     Lack of Transportation (Medical):      Lack of Transportation (Non-Medical):    Physical Activity:     Days of Exercise per Week:     Minutes of Exercise per Session:    Stress:     Feeling of Stress :    Social Connections:     Frequency of Communication with Friends and Family:     Frequency of Social Gatherings with Friends and Family:     Attends [employer] Services:     Active Member of Clubs or Organizations:     Attends Club or Organization Meetings:     Marital Status:    Intimate Partner Violence:     Fear of Current or Ex-Partner:     Emotionally Abused:     Physically Abused:     Sexually Abused:        Family History   Problem Relation Age of Onset    Heart Surgery Father         Open-Heart Surgery    Other Father         Venous Thromboembolism    Hypertension Father     Heart Disease Mother         Enlarged Heart    Hypertension Mother     Diabetes Mother     Diabetes Sister     Hypertension Sister     Diabetes Brother     Heart Disease Paternal Grandmother     Diabetes Sister     Hypertension Sister          OBJECTIVE    Physical Exam:     Visit Vitals  /68 (BP 1 Location: Left upper arm, BP Patient Position: Sitting, BP Cuff Size: Adult)   Pulse 78   Temp 97.8 °F (36.6 °C) (Oral)   Resp 16   Ht 5' 7\" (1.702 m)   Wt 189 lb (85.7 kg)   SpO2 98%   BMI 29.60 kg/m²       General: alert, well-appearing,  in no apparent distress or pain  Neck: no JVD  CVS: irregular, [facility] valve click, + murmur  Lungs:clear to ausculation bilaterally, no crackles, wheezing or rhonchi noted  Abdomen: soft, NT, ND  Skin: bilateral UE and upper back excoriations. No intertriginous findings. Psych:  mood and affect normal      ASSESSMENT/PLAN  Diagnoses and all orders for this visit:    Anxiety   stable  Cont buspar  Cont [id number] mg  cont talk therapy    Recurrent depression  See above    Mild intermittent asthma without complication  PFTs 25/74 normal  Currently on albuterol prn  Will monitor, previously on advair    Essential hypertension  controlled  Cont  norvasc 7.5 mg  Cont cozaar 100 mg   cont metoprolol 100 mg  DASH diet, monitoring  Cmp prior to next visit    CAD involving native coronary artery of native heart with angina pectoris ([facility] [address]  Asymptomatic  Mild reversible ischemia on stress test 1/2019  On bb, arb, ASA  LDL is <70  Cont lipitor 40 mg qhs  Following cardiology    Atrial fibrillation, unspecified type ([address]  Rate controlled, On BB, coumadin  INR therapeutic  Following cards    Hyperlipidemia, unspecified hyperlipidemia type  Good range on  lipitor    LDL goal <70    Long term current use of anticoagulant therapy  Cards following    Rheumatic valvular disease  S/p [facility] valve 2013  Chronic anticoagulation    Allergic rhinitis, unspecified seasonality, unspecified trigger  On allergy shots  Cont flonase, benadryl,  singulair    Anemia of chronic disease  Following hematology, monitoring      Colon cancer screening  Discussed options, agreed on FIT test    Follow-up and Dispositions    · Return in about 3 months (around 9/11/2021), or if symptoms worsen or fail to improve, for non-fasting labs prior to your next visit, plan on Medicare wellness on next visit. Patient understands plan of care. Patient has provided input and agrees with goals.

## 2021-06-14 ENCOUNTER — HOSPITAL ENCOUNTER (OUTPATIENT)
Dept: LAB | Age: 61
Discharge: HOME | End: 2021-06-14
Payer: MEDICARE

## 2021-06-14 PROCEDURE — 82274 ASSAY TEST FOR BLOOD FECAL: CPT

## 2021-06-15 ENCOUNTER — TELEPHONE (OUTPATIENT)
Dept: FAMILY MEDICINE CLINIC | Age: 61
End: 2021-06-15

## 2021-06-15 RX ORDER — AZITHROMYCIN 250 MG/1
TABLET, FILM COATED ORAL
Qty: 6 TABLET | Refills: 0 | Status: SHIPPED | OUTPATIENT
Start: 2021-06-15 | End: 2021-09-27 | Stop reason: ALTCHOICE

## 2021-06-15 NOTE — TELEPHONE ENCOUNTER
Pt states that she has a sinus infection and usually gets them twice a year and would like to know if Dr Miladis Alonso would call in a RX to [address] [place] [address] Street. Please advise.

## 2021-06-22 ENCOUNTER — ANTI-COAG VISIT (OUTPATIENT)
Dept: CARDIOLOGY CLINIC | Age: 61
End: 2021-06-22
Payer: MEDICARE

## 2021-06-22 DIAGNOSIS — Z79.01 LONG TERM CURRENT USE OF ANTICOAGULANT THERAPY: ICD-10-CM

## 2021-06-22 DIAGNOSIS — I48.91 ATRIAL FIBRILLATION, UNSPECIFIED TYPE (HCC): Primary | ICD-10-CM

## 2021-06-22 LAB
HEMOCCULT STL QL IA: NEGATIVE
INR BLD: 2.7
PT POC: 32.4 SECONDS
VALID INTERNAL CONTROL?: YES

## 2021-06-22 PROCEDURE — 85610 PROTHROMBIN TIME: CPT | Performed by: NURSE PRACTITIONER

## 2021-06-24 ENCOUNTER — OFFICE VISIT (OUTPATIENT)
Dept: ORTHOPEDIC SURGERY | Age: 61
End: 2021-06-24
Payer: MEDICARE

## 2021-06-24 VITALS
BODY MASS INDEX: 29.82 KG/M2 | OXYGEN SATURATION: 98 % | WEIGHT: 190 LBS | HEART RATE: 67 BPM | HEIGHT: 67 IN | RESPIRATION RATE: 15 BRPM

## 2021-06-24 DIAGNOSIS — M17.11 PRIMARY OSTEOARTHRITIS OF RIGHT KNEE: Primary | ICD-10-CM

## 2021-06-24 DIAGNOSIS — M17.12 PRIMARY OSTEOARTHRITIS OF LEFT KNEE: ICD-10-CM

## 2021-06-24 DIAGNOSIS — M25.562 CHRONIC PAIN OF LEFT KNEE: ICD-10-CM

## 2021-06-24 DIAGNOSIS — G89.29 CHRONIC PAIN OF LEFT KNEE: ICD-10-CM

## 2021-06-24 PROCEDURE — G8419 CALC BMI OUT NRM PARAM NOF/U: HCPCS | Performed by: PHYSICIAN ASSISTANT

## 2021-06-24 PROCEDURE — 3017F COLORECTAL CA SCREEN DOC REV: CPT | Performed by: PHYSICIAN ASSISTANT

## 2021-06-24 PROCEDURE — 20611 DRAIN/INJ JOINT/BURSA W/US: CPT | Performed by: PHYSICIAN ASSISTANT

## 2021-06-24 PROCEDURE — G8427 DOCREV CUR MEDS BY ELIG CLIN: HCPCS | Performed by: PHYSICIAN ASSISTANT

## 2021-06-24 PROCEDURE — 99214 OFFICE O/P EST MOD 30 MIN: CPT | Performed by: PHYSICIAN ASSISTANT

## 2021-06-24 PROCEDURE — G9717 DOC PT DX DEP/BP F/U NT REQ: HCPCS | Performed by: PHYSICIAN ASSISTANT

## 2021-06-24 PROCEDURE — G8756 NO BP MEASURE DOC: HCPCS | Performed by: PHYSICIAN ASSISTANT

## 2021-06-24 PROCEDURE — G9899 SCRN MAM PERF RSLTS DOC: HCPCS | Performed by: PHYSICIAN ASSISTANT

## 2021-06-24 PROCEDURE — 73560 X-RAY EXAM OF KNEE 1 OR 2: CPT | Performed by: PHYSICIAN ASSISTANT

## 2021-06-24 RX ORDER — TRIAMCINOLONE ACETONIDE 40 MG/ML
80 INJECTION, SUSPENSION INTRA-ARTICULAR; INTRAMUSCULAR ONCE
Status: COMPLETED | OUTPATIENT
Start: 2021-06-24 | End: 2021-06-24

## 2021-06-24 RX ADMIN — TRIAMCINOLONE ACETONIDE 80 MG: 40 INJECTION, SUSPENSION INTRA-ARTICULAR; INTRAMUSCULAR at 09:20

## 2021-06-24 NOTE — PROGRESS NOTES
Mariela Waterman  1960   Chief Complaint   Patient presents with    Knee Pain     bilateral knee pain        HISTORY OF PRESENT ILLNESS  Mariela Waterman is a 56 y.o. female who presents today for reevaluation of b/l knee. R>L. Patient rates pain as 6/10 today. Was last seen here for this in November and had a right knee injection. Injection lasted 6 months. Describes soreness and stiffness in the knees. Notes inflammation in the right knee and feels warm to the touch at the end of the day. Describes a throbbing pain at night. No recent injury. Has tried taking Tylenol PM, muscle relaxer, icing, heat. Patient denies any fever, chills, chest pain, shortness of breath or calf pain. The remainder of the review of systems in negative. There are no new illness or injuries to report since last seen in the office. There are no changes to medications, allergies, family or social history. Pain Assessment  6/24/2021   Location of Pain Knee   Pain Location Comment -   Location Modifiers Left;Right   Severity of Pain 6   Quality of Pain Sharp;Popping   Quality of Pain Comment -   Duration of Pain Persistent   Duration of Pain Comment -   Frequency of Pain Constant   Date Pain First Started -   Date Pain First Started Comment -   Aggravating Factors Other (Comment); Stairs; Walking   Aggravating Factors Comment worse at night   Limiting Behavior Yes   Relieving Factors Nothing   Result of Injury -       PHYSICAL EXAM:   Visit Vitals  Pulse 67   Resp 15   Ht 5' 7\" (1.702 m)   Wt 190 lb (86.2 kg)   SpO2 98%   BMI 29.76 kg/m²     The patient is a well-developed, well-nourished female   in no acute distress. The patient is alert and oriented times three. The patient is alert and oriented times three. Mood and affect are normal.  LYMPHATIC: lymph nodes are not enlarged and are within normal limits  SKIN: normal in color and non tender to palpation. There are no bruises or abrasions noted.    NEUROLOGICAL: Motor sensory exam is within normal limits. Reflexes are equal bilaterally. There is normal sensation to pinprick and light touch  MUSCULOSKELETAL:  Examination Right knee   Skin Intact   Range of motion 0-120   Effusion +   Medial joint line tenderness +   Lateral joint line tenderness -   Tenderness Pes Bursa -   Tenderness insertion MCL -   Tenderness insertion LCL -   Leanns -   Patella crepitus +   Patella grind -   Lachman -   Pivot shift -   Anterior drawer -   Posterior drawer -   Varus stress -   Valgus stress -   Neurovascular Intact   Calf Swelling and Tenderness to Palpation -   Alexsander's Test -   Hamstring Cord Tightness -         Examination Left knee   Skin Intact   Range of motion 0-130   Effusion -   Medial joint line tenderness +   Lateral joint line tenderness -   Tenderness Pes Bursa -   Tenderness insertion MCL -   Tenderness insertion LCL -   Leanns -   Patella crepitus +   Patella grind -   Lachman -   Pivot shift -   Anterior drawer -   Posterior drawer -   Varus stress -   Valgus stress -   Neurovascular Intact   Calf Swelling and Tenderness to Palpation -   Alexsander's Test -   Hamstring Cord Tightness -       PROCEDURE: Bilateral Knee Injection with Ultrasound Guidance    Indication:Bilateral Knee pain/swelling    After sterile prep, 4 cc of Xylocaine and 1 cc of Kenalog were injected into the bilateral  Knee. Intra-articular. Ultrasound images captured using [address] [place] [address] Ultrasound machine using a frequency of 10 MHz with a linear transducer and scanned into patient's chart.          [address] [place] [address]  OFFICE PROCEDURE PROGRESS NOTE        Chart reviewed for the following:  Jay LUNA PA-C, have reviewed the History, Physical and updated the Allergic reactions for Amelia [address] [place] [address] performed immediately prior to start of procedure:  Jay LUNA PA-C, have performed the following reviews on Phoebe Orn prior to the start of the procedure: * Patient was identified by name and date of birth   * Agreement on procedure being performed was verified  * Risks and Benefits explained to the patient  * Procedure site verified and marked as necessary  * Patient was positioned for comfort  * Needle placement confirmed by ultrasound  * Consent was signed and verified     Time: 9:21 AM    Date of procedure: 6/24/2021    Procedure performed by:  Caryle Aus, PA-C    Provider assisted by: (see medication administration)    How tolerated by patient: tolerated the procedure well with no complications    Comments: none      IMAGING:  XR of left knee with 2 views obtained in the office dated 6/24/2021 was reviewed and read by myself reveals: Mild decreased joint space in the medial compartment, mild patellofemoral arthritis       2 view xray images of right knee taken in office on 11/18/2020 read and reviewed by myself reveal degenerative arthritis with joint space narrowing in the medial compartment       IMPRESSION:      ICD-10-CM ICD-9-CM    1. Primary osteoarthritis of right knee  M17.11 715.16 triamcinolone acetonide (KENALOG-40) 40 mg/mL injection 80 mg      ARTHROCENTESIS ASPIR&/INJ MAJOR JT/BURSA W/US   2. Chronic pain of left knee  M25.562 719.46 AMB POC XRAY, KNEE; 1/2 VIEWS    G89.29 338.29    3. Primary osteoarthritis of left knee  M17.12 715.16 triamcinolone acetonide (KENALOG-40) 40 mg/mL injection 80 mg      ARTHROCENTESIS ASPIR&/INJ MAJOR JT/BURSA W/US        PLAN:   1. Pt presents today with b/l knee pain due to primary OA and I would like to try injecting both knees with cortisone. I also advised her to try using OTC Voltaren gel. Risk factors include: CHF, HTN  2. No ultrasound exam indicated today  3. Yes cortisone injection indicated today B/L KNEE US  4. No Physical/Occupational Therapy indicated today  5. No diagnostic test indicated today:   6. No durable medical equipment indicated today  7. No referral indicated today   8.  No medications indicated today:   9.  No Narcotic indicated today        RTC 3 weeks if pain continues      Scribed by Rosa M Wiseman [address]) as dictated by MAGGIE Crane PA-C [facility] [phone] [facility]

## 2021-06-28 DIAGNOSIS — M54.42 ACUTE MIDLINE LOW BACK PAIN WITH LEFT-SIDED SCIATICA: ICD-10-CM

## 2021-06-28 NOTE — TELEPHONE ENCOUNTER
This patient contacted office for the following prescriptions to be filled:    Last office visit: 6/11/2021  Follow up appointment: 9/13/2021  Medication requested :   Requested Prescriptions     Pending Prescriptions Disp Refills    cyclobenzaprine (FLEXERIL) 10 mg tablet 30 Tablet 0     Sig: Take 1 Tablet by mouth three (3) times daily as needed for Muscle Spasm(s).      PCP: Jordana Blum  Mail order or Local pharmacy name [address] [phone]

## 2021-06-29 RX ORDER — CYCLOBENZAPRINE HCL 10 MG
10 TABLET ORAL
Qty: 30 TABLET | Refills: 0 | Status: SHIPPED | OUTPATIENT
Start: 2021-06-29 | End: 2021-08-09

## 2021-07-20 ENCOUNTER — ANTI-COAG VISIT (OUTPATIENT)
Dept: CARDIOLOGY CLINIC | Age: 61
End: 2021-07-20
Payer: MEDICARE

## 2021-07-20 DIAGNOSIS — I48.11 LONGSTANDING PERSISTENT ATRIAL FIBRILLATION (HCC): Primary | ICD-10-CM

## 2021-07-20 DIAGNOSIS — Z79.01 LONG TERM CURRENT USE OF ANTICOAGULANT THERAPY: ICD-10-CM

## 2021-07-20 LAB
INR BLD: 2.3
PT POC: 27.7 SECONDS
VALID INTERNAL CONTROL?: YES

## 2021-07-20 PROCEDURE — 85610 PROTHROMBIN TIME: CPT | Performed by: NURSE PRACTITIONER

## 2021-07-20 NOTE — PROGRESS NOTES
The INR is stable and therapeutic. Continue same dose of coumadin: Continue Coumadin 5mg daily except 2.5mg on Tuesday, Thursday, Saturday. Recheck INR in 4 weeks. Patient may have INR checked at routine follow up with Dr. Mildred Ash.

## 2021-07-21 NOTE — TELEPHONE ENCOUNTER
Pt states that on Monday someone was using bleach heavily and caused an asthma attack. She was able to control it and kept her from going to the hospital. It happened again on Tuesday and today she states that she is wheezing and has pressure in her rib area. She would like to know if Dr Demian Garcia would send a RX for the Advair that she was on before to [facility] [phone]. She said that helped before. Pt is aware that Dr Vanesa Kaiser is not in the office today.  Please advise

## 2021-07-22 ENCOUNTER — HOSPITAL ENCOUNTER (OUTPATIENT)
Dept: LAB | Age: 61
Discharge: HOME | End: 2021-07-22
Payer: MEDICARE

## 2021-07-22 DIAGNOSIS — I10 ESSENTIAL HYPERTENSION: ICD-10-CM

## 2021-07-22 LAB
ALBUMIN SERPL-MCNC: 3.4 G/DL (ref 3.4–5)
ALBUMIN/GLOB SERPL: 0.8 {RATIO} (ref 0.8–1.7)
ALP SERPL-CCNC: 75 U/L (ref 45–117)
ALT SERPL-CCNC: 45 U/L (ref 13–56)
ANION GAP SERPL CALC-SCNC: 1 MMOL/L (ref 3–18)
AST SERPL-CCNC: 36 U/L (ref 10–38)
BILIRUB SERPL-MCNC: 0.5 MG/DL (ref 0.2–1)
BUN SERPL-MCNC: 17 MG/DL (ref 7–18)
BUN/CREAT SERPL: 19 (ref 12–20)
CALCIUM SERPL-MCNC: 8.7 MG/DL (ref 8.5–10.1)
CHLORIDE SERPL-SCNC: 108 MMOL/L (ref 100–111)
CO2 SERPL-SCNC: 33 MMOL/L (ref 21–32)
CREAT SERPL-MCNC: 0.89 MG/DL (ref 0.6–1.3)
GLOBULIN SER CALC-MCNC: 4.2 G/DL (ref 2–4)
GLUCOSE SERPL-MCNC: 77 MG/DL (ref 74–99)
POTASSIUM SERPL-SCNC: 4.5 MMOL/L (ref 3.5–5.5)
PROT SERPL-MCNC: 7.6 G/DL (ref 6.4–8.2)
SODIUM SERPL-SCNC: 142 MMOL/L (ref 136–145)

## 2021-07-22 PROCEDURE — 36415 COLL VENOUS BLD VENIPUNCTURE: CPT

## 2021-07-22 PROCEDURE — 80053 COMPREHEN METABOLIC PANEL: CPT

## 2021-07-22 RX ORDER — FLUTICASONE PROPIONATE AND SALMETEROL 250; 50 UG/1; UG/1
1 POWDER RESPIRATORY (INHALATION) EVERY 12 HOURS
Qty: 1 EACH | Refills: 2 | Status: SHIPPED | OUTPATIENT
Start: 2021-07-22 | End: 2021-07-23

## 2021-07-22 NOTE — TELEPHONE ENCOUNTER
Patient identified with 2 identifiers (name and ). Patient aware advair has been e scribed to [facility] pharmacy.

## 2021-07-23 RX ORDER — FLUTICASONE FUROATE AND VILANTEROL TRIFENATATE 200; 25 UG/1; UG/1
1 POWDER RESPIRATORY (INHALATION) DAILY
Qty: 1 EACH | Refills: 0 | Status: SHIPPED | OUTPATIENT
Start: 2021-07-23 | End: 2021-08-16

## 2021-08-08 DIAGNOSIS — M54.42 ACUTE MIDLINE LOW BACK PAIN WITH LEFT-SIDED SCIATICA: ICD-10-CM

## 2021-08-09 ENCOUNTER — TELEPHONE (OUTPATIENT)
Dept: FAMILY MEDICINE CLINIC | Age: 61
End: 2021-08-09

## 2021-08-09 RX ORDER — CYCLOBENZAPRINE HCL 10 MG
10 TABLET ORAL
Qty: 30 TABLET | Refills: 0 | Status: SHIPPED | OUTPATIENT
Start: 2021-08-09 | End: 2021-08-30

## 2021-08-10 NOTE — TELEPHONE ENCOUNTER
Pt would like to know if it will be ok for her to have an occasional glass of wine when she is on vacation in Sept. Please advise.

## 2021-08-19 NOTE — TELEPHONE ENCOUNTER
This patient contacted office for the following prescriptions to be filled:    Medication requested :   Requested Prescriptions     Pending Prescriptions Disp Refills    losartan (COZAAR) 50 mg tablet 180 Tablet 1     Sig: TAKE 1 TABLET BY MOUTH TWICE A DAY    amLODIPine (NORVASC) 2.5 mg tablet 90 Tablet 0     Sig: Take 1 Tablet by mouth daily.      PCP: [address] [place] [address] or Print: [facility]  Mail order or Local pharmacy [address]     Scheduled appointment if not seen by current providers in office: LOV 6/11/2021 f/u 9/7/2021

## 2021-08-20 RX ORDER — LOSARTAN POTASSIUM 50 MG/1
TABLET ORAL
Qty: 180 TABLET | Refills: 1 | Status: SHIPPED | OUTPATIENT
Start: 2021-08-20 | End: 2021-12-11

## 2021-08-20 RX ORDER — AMLODIPINE BESYLATE 2.5 MG/1
2.5 TABLET ORAL DAILY
Qty: 90 TABLET | Refills: 1 | Status: SHIPPED | OUTPATIENT
Start: 2021-08-20 | End: 2021-12-11

## 2021-08-23 RX ORDER — POTASSIUM CHLORIDE 1500 MG/1
TABLET, EXTENDED RELEASE ORAL
Qty: 30 TABLET | Refills: 6 | Status: SHIPPED | OUTPATIENT
Start: 2021-08-23 | End: 2022-03-14

## 2021-08-30 DIAGNOSIS — M54.42 ACUTE MIDLINE LOW BACK PAIN WITH LEFT-SIDED SCIATICA: ICD-10-CM

## 2021-08-30 RX ORDER — CYCLOBENZAPRINE HCL 10 MG
10 TABLET ORAL
Qty: 30 TABLET | Refills: 0 | Status: SHIPPED | OUTPATIENT
Start: 2021-08-30 | End: 2022-08-08

## 2021-08-31 ENCOUNTER — OFFICE VISIT (OUTPATIENT)
Dept: ORTHOPEDIC SURGERY | Age: 61
End: 2021-08-31
Payer: MEDICARE

## 2021-08-31 VITALS
BODY MASS INDEX: 29.35 KG/M2 | TEMPERATURE: 98 F | OXYGEN SATURATION: 98 % | WEIGHT: 187 LBS | HEART RATE: 73 BPM | HEIGHT: 67 IN

## 2021-08-31 DIAGNOSIS — M17.11 PRIMARY OSTEOARTHRITIS OF RIGHT KNEE: Primary | ICD-10-CM

## 2021-08-31 DIAGNOSIS — M17.12 PRIMARY OSTEOARTHRITIS OF LEFT KNEE: ICD-10-CM

## 2021-08-31 PROCEDURE — G8756 NO BP MEASURE DOC: HCPCS | Performed by: PHYSICIAN ASSISTANT

## 2021-08-31 PROCEDURE — G9717 DOC PT DX DEP/BP F/U NT REQ: HCPCS | Performed by: PHYSICIAN ASSISTANT

## 2021-08-31 PROCEDURE — 99214 OFFICE O/P EST MOD 30 MIN: CPT | Performed by: PHYSICIAN ASSISTANT

## 2021-08-31 PROCEDURE — 3017F COLORECTAL CA SCREEN DOC REV: CPT | Performed by: PHYSICIAN ASSISTANT

## 2021-08-31 PROCEDURE — G8427 DOCREV CUR MEDS BY ELIG CLIN: HCPCS | Performed by: PHYSICIAN ASSISTANT

## 2021-08-31 PROCEDURE — 20611 DRAIN/INJ JOINT/BURSA W/US: CPT | Performed by: PHYSICIAN ASSISTANT

## 2021-08-31 PROCEDURE — G8419 CALC BMI OUT NRM PARAM NOF/U: HCPCS | Performed by: PHYSICIAN ASSISTANT

## 2021-08-31 PROCEDURE — G9899 SCRN MAM PERF RSLTS DOC: HCPCS | Performed by: PHYSICIAN ASSISTANT

## 2021-08-31 RX ORDER — LIDOCAINE 50 MG/G
2 PATCH TOPICAL EVERY 24 HOURS
Qty: 60 PATCH | Refills: 0 | Status: SHIPPED | OUTPATIENT
Start: 2021-08-31 | End: 2021-09-27

## 2021-08-31 RX ORDER — TRIAMCINOLONE ACETONIDE 40 MG/ML
80 INJECTION, SUSPENSION INTRA-ARTICULAR; INTRAMUSCULAR ONCE
Status: COMPLETED | OUTPATIENT
Start: 2021-08-31 | End: 2021-08-31

## 2021-08-31 RX ADMIN — TRIAMCINOLONE ACETONIDE 80 MG: 40 INJECTION, SUSPENSION INTRA-ARTICULAR; INTRAMUSCULAR at 09:38

## 2021-08-31 NOTE — PROGRESS NOTES
Sreedhar Shaver  1960   Chief Complaint   Patient presents with    Knee Pain     bilateral        HISTORY OF PRESENT ILLNESS  Sreedhar Shaver is a 59 y.o. female who presents today for reevaluation of b/l knee. R>L. Patient rates pain as 8/10 today. Patient was last seen here in June and given bilateral cortisone injections. She states that the injections only lasted about a month and a half and the pain has been slowly worsening. However she does note that she is going on vacation in a week and is concerned she will be unable to do any walking during this trip. Notes inflammation in the right knee and feels warm to the touch at the end of the day. Describes a throbbing pain at night. No recent injury. Has tried taking Tylenol PM, muscle relaxer, icing, heat. Patient denies any fever, chills, chest pain, shortness of breath or calf pain. The remainder of the review of systems in negative. There are no new illness or injuries to report since last seen in the office. There are no changes to medications, allergies, family or social history. Pain Assessment  8/31/2021   Location of Pain Knee   Pain Location Comment -   Location Modifiers Right;Left   Severity of Pain 8   Quality of Pain Aching;Popping   Quality of Pain Comment -   Duration of Pain Persistent   Duration of Pain Comment -   Frequency of Pain Constant   Date Pain First Started -   Date Pain First Started Comment -   Aggravating Factors Walking   Aggravating Factors Comment -   Limiting Behavior Some   Relieving Factors Heat;Ice   Result of Injury No       PHYSICAL EXAM:   Visit Vitals  Pulse 73   Temp 98 °F (36.7 °C) (Temporal)   Ht 5' 7\" (1.702 m)   Wt 187 lb (84.8 kg)   SpO2 98%   BMI 29.29 kg/m²     The patient is a well-developed, well-nourished female   in no acute distress. The patient is alert and oriented times three. The patient is alert and oriented times three.  Mood and affect are normal.  LYMPHATIC: lymph nodes are not enlarged and are within normal limits  SKIN: normal in color and non tender to palpation. There are no bruises or abrasions noted. NEUROLOGICAL: Motor sensory exam is within normal limits. Reflexes are equal bilaterally. There is normal sensation to pinprick and light touch  MUSCULOSKELETAL:  Examination Right knee   Skin Intact   Range of motion 0-120   Effusion +   Medial joint line tenderness +   Lateral joint line tenderness -   Tenderness Pes Bursa -   Tenderness insertion MCL -   Tenderness insertion LCL -   Leanns -   Patella crepitus +   Patella grind -   Lachman -   Pivot shift -   Anterior drawer -   Posterior drawer -   Varus stress -   Valgus stress -   Neurovascular Intact   Calf Swelling and Tenderness to Palpation -   Alexsander's Test -   Hamstring Cord Tightness -         Examination Left knee   Skin Intact   Range of motion 0-130   Effusion -   Medial joint line tenderness +   Lateral joint line tenderness -   Tenderness Pes Bursa -   Tenderness insertion MCL -   Tenderness insertion LCL -   Leanns -   Patella crepitus +   Patella grind -   Lachman -   Pivot shift -   Anterior drawer -   Posterior drawer -   Varus stress -   Valgus stress -   Neurovascular Intact   Calf Swelling and Tenderness to Palpation -   Alexsander's Test -   Hamstring Cord Tightness -       PROCEDURE: Bilateral Knee Injection with Ultrasound Guidance    Indication:Bilateral Knee pain/swelling    After sterile prep, 4 cc of Xylocaine and 1 cc of Kenalog were injected into the bilateral  Knee. Intra-articular. Ultrasound images captured using [address] [place] [address] Ultrasound machine using a frequency of 10 MHz with a linear transducer and scanned into patient's chart.          [facility]  OFFICE PROCEDURE PROGRESS NOTE        Chart reviewed for the following:  Jacob LUNA PA-C, have reviewed the History, Physical and updated the Allergic reactions for [address] [place] [address] immediately prior to start of procedure:  Edilia LUNA PA-C, have performed the following reviews on Khadar Bess prior to the start of the procedure:            * Patient was identified by name and date of birth   * Agreement on procedure being performed was verified  * Risks and Benefits explained to the patient  * Procedure site verified and marked as necessary  * Patient was positioned for comfort  * Needle placement confirmed by ultrasound  * Consent was signed and verified     Time: 9:21 AM    Date of procedure: 8/31/2021    Procedure performed by:  Edilia Rangel PA-C    Provider assisted by: (see medication administration)    How tolerated by patient: tolerated the procedure well with no complications    Comments: none      IMAGING:  XR of left knee with 2 views obtained in the office dated 6/24/2021 was reviewed and read by myself reveals: Mild decreased joint space in the medial compartment, mild patellofemoral arthritis       2 view xray images of right knee taken in office on 11/18/2020 read and reviewed by myself reveal degenerative arthritis with joint space narrowing in the medial compartment       IMPRESSION:      ICD-10-CM ICD-9-CM    1. Primary osteoarthritis of right knee  M17.11 715.16 lidocaine (LIDODERM) 5 %      triamcinolone acetonide (KENALOG-40) 40 mg/mL injection 80 mg      ARTHROCENTESIS ASPIR&/INJ MAJOR JT/BURSA W/US      PROCEDURE AUTHORIZATION TO    2. Primary osteoarthritis of left knee  M17.12 715.16 lidocaine (LIDODERM) 5 %      triamcinolone acetonide (KENALOG-40) 40 mg/mL injection 80 mg      ARTHROCENTESIS ASPIR&/INJ MAJOR JT/BURSA W/US      PROCEDURE AUTHORIZATION TO         PLAN:   1. Pt presents today with b/l knee pain due to primary OA. Secondary to her upcoming vacation we will do a one-time early injection of cortisone in bilateral knees.   We will also authorize viscosupplementation for her knees given the degenerative arthritis with joint space narrowing. Lidoderm patches prescribed as well for her discomfort. Risk factors include: CHF, HTN  2. No ultrasound exam indicated today  3. Yes cortisone injection indicated today B/L KNEE US  4. No Physical/Occupational Therapy indicated today  5. No diagnostic test indicated today:   6. No durable medical equipment indicated today  7. No referral indicated today   8. Yes medications indicated today:   9.  No Narcotic indicated today        RTC when Ismael Krill is authorized      MAGG[facility]

## 2021-09-01 ENCOUNTER — TELEPHONE (OUTPATIENT)
Dept: ORTHOPEDIC SURGERY | Age: 61
End: 2021-09-01

## 2021-09-01 NOTE — TELEPHONE ENCOUNTER
Prior Jluis Daigle has been started at this time. You will receive an electronic determination in [employer]. You can see the latest determination by locating this request on your dashboard or by reopening this request. Anne-Marie Hood will also receive a faxed copy of the determination. If you have any questions please contact [facility] at [phone].     If you need assistance, please chat with [facility] or call us at [phone]

## 2021-09-01 NOTE — TELEPHONE ENCOUNTER
PA is required for Lidocaine patch    Cover [facility]:  Rl Mcgill [facility]: [id number] - PA Case ID: [id number] - Rx #: [id number]

## 2021-09-21 RX ORDER — BUSPIRONE HYDROCHLORIDE 5 MG/1
TABLET ORAL
Qty: 180 TABLET | Refills: 1 | Status: SHIPPED | OUTPATIENT
Start: 2021-09-21 | End: 2022-01-15

## 2021-09-21 NOTE — TELEPHONE ENCOUNTER
This pharmacy faxed over request for the following prescriptions to be filled:    Medication requested :   Requested Prescriptions     Pending Prescriptions Disp Refills    busPIRone (BUSPAR) 5 mg tablet [Pharmacy Med Name: BUSPIRONE HCL 5 MG TABLET] 180 Tablet 1     Sig: TAKE 1 TABLET BY MOUTH TWICE A DAY     PCP: [address] [place] [address] or Print: [facility]   Mail order or Local pharmacy [address] [place] [address]     Scheduled appointment if not seen by current providers in office: [facility] 6/11/2021 f/u 9/27/2021 pt is out of this medication

## 2021-09-24 ENCOUNTER — DOCUMENTATION ONLY (OUTPATIENT)
Dept: ORTHOPEDIC SURGERY | Age: 61
End: 2021-09-24

## 2021-09-27 ENCOUNTER — OFFICE VISIT (OUTPATIENT)
Dept: FAMILY MEDICINE CLINIC | Age: 61
End: 2021-09-27
Payer: MEDICARE

## 2021-09-27 VITALS
BODY MASS INDEX: 29.13 KG/M2 | HEIGHT: 67 IN | OXYGEN SATURATION: 98 % | SYSTOLIC BLOOD PRESSURE: 126 MMHG | TEMPERATURE: 96.9 F | WEIGHT: 185.6 LBS | RESPIRATION RATE: 16 BRPM | DIASTOLIC BLOOD PRESSURE: 88 MMHG | HEART RATE: 80 BPM

## 2021-09-27 DIAGNOSIS — Z79.01 LONG TERM CURRENT USE OF ANTICOAGULANT THERAPY: ICD-10-CM

## 2021-09-27 DIAGNOSIS — F33.9 RECURRENT DEPRESSION (HCC): ICD-10-CM

## 2021-09-27 DIAGNOSIS — J30.1 ALLERGIC RHINITIS DUE TO POLLEN, UNSPECIFIED SEASONALITY: ICD-10-CM

## 2021-09-27 DIAGNOSIS — D63.8 ANEMIA OF CHRONIC DISEASE: ICD-10-CM

## 2021-09-27 DIAGNOSIS — I09.1 RHEUMATIC VALVULAR DISEASE: ICD-10-CM

## 2021-09-27 DIAGNOSIS — Z23 ENCOUNTER FOR IMMUNIZATION: Primary | ICD-10-CM

## 2021-09-27 DIAGNOSIS — I11.0 BENIGN HYPERTENSIVE HEART DISEASE WITH HEART FAILURE (HCC): ICD-10-CM

## 2021-09-27 DIAGNOSIS — Z00.00 MEDICARE ANNUAL WELLNESS VISIT, SUBSEQUENT: ICD-10-CM

## 2021-09-27 DIAGNOSIS — J45.20 MILD INTERMITTENT ASTHMA WITHOUT COMPLICATION: ICD-10-CM

## 2021-09-27 DIAGNOSIS — E78.00 PURE HYPERCHOLESTEROLEMIA: ICD-10-CM

## 2021-09-27 DIAGNOSIS — I25.10 CORONARY ARTERY DISEASE INVOLVING NATIVE CORONARY ARTERY OF NATIVE HEART WITHOUT ANGINA PECTORIS: ICD-10-CM

## 2021-09-27 DIAGNOSIS — I48.91 ATRIAL FIBRILLATION, UNSPECIFIED TYPE (HCC): ICD-10-CM

## 2021-09-27 DIAGNOSIS — F41.9 ANXIETY: ICD-10-CM

## 2021-09-27 PROCEDURE — G9717 DOC PT DX DEP/BP F/U NT REQ: HCPCS | Performed by: FAMILY MEDICINE

## 2021-09-27 PROCEDURE — G8752 SYS BP LESS 140: HCPCS | Performed by: FAMILY MEDICINE

## 2021-09-27 PROCEDURE — G0439 PPPS, SUBSEQ VISIT: HCPCS | Performed by: FAMILY MEDICINE

## 2021-09-27 PROCEDURE — 90686 IIV4 VACC NO PRSV 0.5 ML IM: CPT | Performed by: FAMILY MEDICINE

## 2021-09-27 PROCEDURE — 99214 OFFICE O/P EST MOD 30 MIN: CPT | Performed by: FAMILY MEDICINE

## 2021-09-27 PROCEDURE — G0463 HOSPITAL OUTPT CLINIC VISIT: HCPCS | Performed by: FAMILY MEDICINE

## 2021-09-27 PROCEDURE — G8754 DIAS BP LESS 90: HCPCS | Performed by: FAMILY MEDICINE

## 2021-09-27 RX ORDER — DICLOFENAC SODIUM 10 MG/G
GEL TOPICAL 4 TIMES DAILY
COMMUNITY

## 2021-09-27 NOTE — PROGRESS NOTES
Patient presents for the following vaccines: Influenza. Patient consent obtained by patient. Patient tolerated procedure well at left deltoid. Patient remained in exam room, during vaccine documentation,  for period of 10 minutes post injection to ensure no reaction. VIS was given to patient.     Lot # [id number]  Exp: 2022  [facility]: [phone]

## 2021-09-27 NOTE — PATIENT INSTRUCTIONS
Medicare Wellness Visit, Female     The best way to live healthy is to have a lifestyle where you eat a well-balanced diet, exercise regularly, limit alcohol use, and quit all forms of tobacco/nicotine, if applicable. Regular preventive services are another way to keep healthy. Preventive services (vaccines, screening tests, monitoring & exams) can help personalize your care plan, which helps you manage your own care. Screening tests can find health problems at the earliest stages, when they are easiest to treat. [facility] follows the current, evidence-based guidelines published by the [facility] Posada ([facility]) when recommending preventive services for our patients. Because we follow these guidelines, sometimes recommendations change over time as research supports it. (For example, mammograms used to be recommended annually. Even though Medicare will still pay for an annual mammogram, the newer guidelines recommend a mammogram every two years for women of average risk). Of course, you and your doctor may decide to screen more often for some diseases, based on your risk and your co-morbidities (chronic disease you are already diagnosed with). Preventive services for you include:  - Medicare offers their members a free annual wellness visit, which is time for you and your primary care provider to discuss and plan for your preventive service needs. Take advantage of this benefit every year!  -All adults over the age of 72 should receive the recommended pneumonia vaccines. Current USPSTF guidelines recommend a series of two vaccines for the best pneumonia protection.   -All adults should have a flu vaccine yearly and a tetanus vaccine every 10 years.   -All adults age 48 and older should receive the shingles vaccines (series of two vaccines).       -All adults age 38-68 who are overweight should have a diabetes screening test once every three years.   -All adults born between 80 and 1965 should be screened once for Hepatitis C.  -Other screening tests and preventive services for persons with diabetes include: an eye exam to screen for diabetic retinopathy, a kidney function test, a foot exam, and stricter control over your cholesterol.   -Cardiovascular screening for adults with routine risk involves an electrocardiogram (ECG) at intervals determined by your doctor.   -Colorectal cancer screenings should be done for adults age 54-65 with no increased risk factors for colorectal cancer. There are a number of acceptable methods of screening for this type of cancer. Each test has its own benefits and drawbacks. Discuss with your doctor what is most appropriate for you during your annual wellness visit. The different tests include: colonoscopy (considered the best screening method), a fecal occult blood test, a fecal DNA test, and sigmoidoscopy.    -A bone mass density test is recommended when a woman turns 65 to screen for osteoporosis. This test is only recommended one time, as a screening. Some providers will use this same test as a disease monitoring tool if you already have osteoporosis. -Breast cancer screenings are recommended every other year for women of normal risk, age 54-69.  -Cervical cancer screenings for women over age 72 are only recommended with certain risk factors. Here is a list of your current Health Maintenance items (your personalized list of preventive services) with a due date:  Health Maintenance Due   Topic Date Due    DTaP/Tdap/Td  (1 - Tdap) Never done    Shingles Vaccine (1 of 2) Never done    Annual Well Visit  08/07/2021    Yearly Flu Vaccine (1) 09/01/2021            Influenza (Flu) Vaccine (Inactivated or Recombinant): What You Need to Know  Why get vaccinated? Influenza vaccine can prevent influenza (flu). Flu is a contagious disease that spreads around the [place] every year, usually between October and May.  Anyone can get the flu, but it is more dangerous for some people. Infants and young children, people 72years of age and older, pregnant women, and people with certain health conditions or a weakened immune system are at greatest risk of flu complications. Pneumonia, bronchitis, sinus infections and ear infections are examples of flu-related complications. If you have a medical condition, such as heart disease, cancer or diabetes, flu can make it worse. Flu can cause fever and chills, sore throat, muscle aches, fatigue, cough, headache, and runny or stuffy nose. Some people may have vomiting and diarrhea, though this is more common in children than adults. Each year, thousands of people in the [facility] die from flu, and many more are hospitalized. Flu vaccine prevents millions of illnesses and flu-related visits to the doctor each year. Influenza vaccine  CDC recommends everyone 10months of age and older get vaccinated every flu season. Children 6 months through 6years of age may need 2 doses during a single flu season. Everyone else needs only 1 dose each flu season. It takes about 2 weeks for protection to develop after vaccination. There are many flu viruses, and they are always changing. Each year a new flu vaccine is made to protect against three or four viruses that are likely to cause disease in the upcoming flu season. Even when the vaccine doesn't exactly match these viruses, it may still provide some protection. Influenza vaccine does not cause flu. Influenza vaccine may be given at the same time as other vaccines. Talk with your health care provider  Tell your vaccine provider if the person getting the vaccine:  · Has had an allergic reaction after a previous dose of influenza vaccine, or has any severe, life-threatening allergies. · Has ever had Guillain-Barré Syndrome (also called GBS).   In some cases, your health care provider may decide to postpone influenza vaccination to a future visit. People with minor illnesses, such as a cold, may be vaccinated. People who are moderately or severely ill should usually wait until they recover before getting influenza vaccine. Your health care provider can give you more information. Risks of a vaccine reaction  · Soreness, redness, and swelling where shot is given, fever, muscle aches, and headache can happen after influenza vaccine. · There may be a very small increased risk of Guillain-Barré Syndrome (GBS) after inactivated influenza vaccine (the flu shot). [employer] children who get the flu shot along with pneumococcal vaccine (PCV13), and/or DTaP vaccine at the same time might be slightly more likely to have a seizure caused by fever. Tell your health care provider if a child who is getting flu vaccine has ever had a seizure. People sometimes faint after medical procedures, including vaccination. Tell your provider if you feel dizzy or have vision changes or ringing in the ears. As with any medicine, there is a very remote chance of a vaccine causing a severe allergic reaction, other serious injury, or death. What if there is a serious problem? An allergic reaction could occur after the vaccinated person leaves the clinic. If you see signs of a severe allergic reaction (hives, swelling of the face and throat, difficulty breathing, a fast heartbeat, dizziness, or weakness), call 9-1-1 and get the person to the nearest hospital.  For other signs that concern you, call your health care provider. Adverse reactions should be reported to the Vaccine Adverse Event Reporting System (VAERS). Your health care provider will usually file this report, or you can do it yourself. Visit the VAERS website at www.vaers. hhs.gov or call 9-306.289.4026. VAERS is only for reporting reactions, and VAERS staff do not give medical advice.   The [place] Vaccine Injury Compensation Program  The [place] Vaccine Injury Compensation Program (VICP) is a federal program that was created to compensate people who may have been injured by certain vaccines. Visit the VICP website at www.[facility].gov/vaccinecompensation or call 8-216.939.1773 to learn about the program and about filing a claim. There is a time limit to file a claim for compensation. How can I learn more? · Ask your healthcare provider. · Call your local or state health department. · Contact the Centers for Disease Control and Prevention (CDC):  ? Call 1-674.846.1128 (1-800-CDC-INFO) or  ? Visit CDC's website at www.cdc.gov/flu  Vaccine Information Statement (Interim)  Inactivated Influenza Vaccine  8/15/2019  [address] 259AS-99  Department of Health and Human Services  Centers for Disease Control and Prevention  Many Vaccine Information Statements are available in [place] and other languages. See www.immunize.org/vis. Muchas hojas de información sobre vacunas están disponibles en español y en otros idiomas. Visite www.immunize.org/vis. Care instructions adapted under license by [employer] (which disclaims liability or warranty for this information). If you have questions about a medical condition or this instruction, always ask your healthcare professional. [place] [address] any warranty or liability for your use of this information. Medicare Wellness Visit, Female     The best way to live healthy is to have a lifestyle where you eat a well-balanced diet, exercise regularly, limit alcohol use, and quit all forms of tobacco/nicotine, if applicable. Regular preventive services are another way to keep healthy. Preventive services (vaccines, screening tests, monitoring & exams) can help personalize your care plan, which helps you manage your own care. Screening tests can find health problems at the earliest stages, when they are easiest to treat.    [facility] follows the current, evidence-based guidelines published by the [place] (USPSTF) when recommending preventive services for our patients. Because we follow these guidelines, sometimes recommendations change over time as research supports it. (For example, mammograms used to be recommended annually. Even though Medicare will still pay for an annual mammogram, the newer guidelines recommend a mammogram every two years for women of average risk). Of course, you and your doctor may decide to screen more often for some diseases, based on your risk and your co-morbidities (chronic disease you are already diagnosed with). Preventive services for you include:  - Medicare offers their members a free annual wellness visit, which is time for you and your primary care provider to discuss and plan for your preventive service needs. Take advantage of this benefit every year!  -All adults over the age of 72 should receive the recommended pneumonia vaccines. Current USPSTF guidelines recommend a series of two vaccines for the best pneumonia protection.   -All adults should have a flu vaccine yearly and a tetanus vaccine every 10 years.   -All adults age 48 and older should receive the shingles vaccines (series of two vaccines). -All adults age 38-68 who are overweight should have a diabetes screening test once every three years.   -All adults born between 80 and 1965 should be screened once for Hepatitis C.  -Other screening tests and preventive services for persons with diabetes include: an eye exam to screen for diabetic retinopathy, a kidney function test, a foot exam, and stricter control over your cholesterol.   -Cardiovascular screening for adults with routine risk involves an electrocardiogram (ECG) at intervals determined by your doctor.   -Colorectal cancer screenings should be done for adults age 54-65 with no increased risk factors for colorectal cancer. There are a number of acceptable methods of screening for this type of cancer. Each test has its own benefits and drawbacks. Discuss with your doctor what is most appropriate for you during your annual wellness visit. The different tests include: colonoscopy (considered the best screening method), a fecal occult blood test, a fecal DNA test, and sigmoidoscopy.    -A bone mass density test is recommended when a woman turns 65 to screen for osteoporosis. This test is only recommended one time, as a screening. Some providers will use this same test as a disease monitoring tool if you already have osteoporosis. -Breast cancer screenings are recommended every other year for women of normal risk, age 54-69.  -Cervical cancer screenings for women over age 72 are only recommended with certain risk factors.      Here is a list of your current Health Maintenance items (your personalized list of preventive services) with a due date:  Health Maintenance Due   Topic Date Due    DTaP/Tdap/Td  (1 - Tdap) Never done    Shingles Vaccine (1 of 2) Never done    Yearly Flu Vaccine (1) 09/01/2021

## 2021-09-27 NOTE — PROGRESS NOTES
1. Have you been to the ER, urgent care clinic since your last visit? Hospitalized since your last visit? No     2. Have you seen or consulted any other health care providers outside of the [address] [place] [address] since your last visit? Include any pap smears or colon screening. MNAJU Rick for knees - Dx arthritis in both knees.     Chief Complaint   Patient presents with   [facility] Wellness Visit

## 2021-09-27 NOTE — PROGRESS NOTES
This is the Subsequent Medicare Annual Wellness Exam, performed 12 months or more after the Initial AWV or the last Subsequent AWV    I have reviewed the patient's medical history in detail and updated the computerized patient record. Assessment/Plan   Education and counseling provided:  Are appropriate based on today's review and evaluation  Pneumococcal Vaccine 23 2019  Influenza Vaccine given today  Screening Mammography scheduled for nov 2021  Colorectal cancer screening tests  FIT 6/2021  Cardiovascular screening blood test- 5/2021       Depression Risk Factor Screening     3 most recent PHQ Screens 9/27/2021   PHQ Not Done -   Little interest or pleasure in doing things Not at all   Feeling down, depressed, irritable, or hopeless Not at all   Total Score PHQ 2 0       Alcohol Risk Screen    Do you average more than 1 drink per night or more than 7 drinks a week:  No    On any one occasion in the past three months have you have had more than 3 drinks containing alcohol:  No        Functional Ability and Level of Safety    Hearing: Hearing is good. Activities of Daily Living: The home contains: no safety equipment. Patient does total self care      Ambulation: with no difficulty     Fall Risk:  Fall Risk Assessment, last 12 mths 9/27/2021   Able to walk? Yes   Fall in past 12 months? 0   Do you feel unsteady?  0   Are you worried about falling 0      Abuse Screen:  Patient is not abused       Cognitive Screening    Has your family/caregiver stated any concerns about your memory: no      Health Maintenance Due     Health Maintenance Due   Topic Date Due    DTaP/Tdap/Td series (1 - Tdap) Never done    Shingrix Vaccine Age 50> (1 of 2) Never done    Flu Vaccine (1) 09/01/2021       Patient Care Team   Patient Care Team:  Yuri Blanco MD as PCP - General (Family Medicine)  Yuri Blanco MD as PCP - [facility] Melly Gilbert Provider  Vimal Paredes DO as Physician (Cardiology)  Vimal Paredes DO (Cardiology)  Hernan Fields MD (Otolaryngology)  Shahana Mccormack MD (Pulmonary Disease)    History     Patient Active Problem List   Diagnosis Code    Rheumatic valvular disease I09.1    Chronic diastolic congestive heart failure ([facility] [address] I50.32    Atrial fibrillation (HCC) I48.91    Long term current use of anticoagulant therapy Z79.01    Benign hypertensive heart disease with heart failure (HCC) I11.0    Hyperlipidemia E78.5    Advanced directives, counseling/discussion Z71.89    Mild intermittent asthma without complication W24.01    NSTEMI (non-ST elevated myocardial infarction) ([facility] [address] I21.4    Chest pain R07.9    IFG (impaired fasting glucose) R73.01    Adjustment disorder with anxious mood F43.22    Essential hypertension I10    Other allergic rhinitis J30.89    Anxiety F41.9    Allergic rhinitis due to pollen J30.1    Chronic sinusitis J32.9    Mitral valve stenosis I05.0    Elevated serum globulin level R77.1    Normocytic anemia D64.9    Anemia of chronic disease D63.8    Perennial allergic rhinitis with seasonal variation J30.89, J30.2    Vasomotor rhinitis J30.0    Coronary artery disease involving native coronary artery of native heart without angina pectoris I25.10    Recurrent depression ([facility] [address] F33.9     Past Medical History:   Diagnosis Date    Aortic valve disorders 1/13/2011    Asthma     Atrial fibrillation ([facility] [address] 1/13/2011    Congestive heart failure, unspecified March 2005    Cleared quickly with Lasix therapy    Echocardiogram 01/10/2012    A-fib. EF 45%. Mild RVE. RVSP 45-50. Massive LAE. Mod TAMI. Mod MS. Severe MR (mean grad 10). Mild-mod AI. Mild TR. Pulmonic systolic flow reversal.  Mild IVCE.  Hives     Hypertension     Iron deficiency anemias     Long term (current) use of anticoagulants 2/23/2011    Murmur 1/2011    Grade I-II/VI systolic ejection murmur along left sternal border, with radiation to the pulmonic area.     S/P cardiac cath 02/24/2012    Minimal coronary artery disease. Mild LVE. EF 55%. Mod MR. Mod-severe MS. RA 15.  RV 55/16. PA 58/34. W 36.  CO/CI 3.5/1.9 (TD); 3.61/1.9 (Dequan). R to L shunt suggested.  Wears glasses     Weight gain       Past Surgical History:   Procedure Laterality Date    HX HEART CATHETERIZATION  2/24/2012    HX HYSTERECTOMY  2006    HX MITRAL VALVE REPLACEMENT  05/22/2013    25/33 mm On-X mechanical valve with bilateral pulmonary vein isolation and resection of LA appendage     Current Outpatient Medications   Medication Sig Dispense Refill    methyl salicylate/menth/camph (SALONPAS EX) by Apply Externally route.  diclofenac (Voltaren) 1 % gel Apply  to affected area four (4) times daily.  busPIRone (BUSPAR) 5 mg tablet TAKE 1 TABLET BY MOUTH TWICE A  Tablet 1    OTHER Vitamin B-12 gummies      cyclobenzaprine (FLEXERIL) 10 mg tablet TAKE 1 TABLET BY MOUTH THREE (3) TIMES DAILY AS NEEDED FOR MUSCLE SPASM(S). 30 Tablet 0    furosemide (LASIX) 40 mg tablet TAKE 1 TABLET BY MOUTH EVERY DAY 30 Tablet 11    Klor-Con M20 20 mEq tablet TAKE 1 TABLET BY MOUTH EVERY DAY 30 Tablet 6    losartan (COZAAR) 50 mg tablet TAKE 1 TABLET BY MOUTH TWICE A  Tablet 1    amLODIPine (NORVASC) 2.5 mg tablet Take 1 Tablet by mouth daily. 90 Tablet 1    Breo Ellipta 200-25 mcg/dose inhaler TAKE 1 PUFF BY MOUTH EVERY DAY 3 Each 0    albuterol (PROVENTIL HFA, VENTOLIN HFA, PROAIR HFA) 90 mcg/actuation inhaler INHALE 2 PUFFS BY MOUTH EVERY DAY. 18 Inhaler 3    sertraline (ZOLOFT) 100 mg tablet TAKE 1 TABLET BY MOUTH EVERY DAY 90 Tab 1    levocetirizine (XYZAL) 5 mg tablet TAKE 1 TABLET BY MOUTH EVERY DAY 90 Tab 3    montelukast (SINGULAIR) 10 mg tablet TAKE 1 TABLET BY MOUTH EVERY DAY 90 Tab 3    amLODIPine (NORVASC) 5 mg tablet Take 1 Tab by mouth daily.  90 Tab 3    atorvastatin (LIPITOR) 40 mg tablet TAKE 1 TABLET BY MOUTH EVERY DAY 90 Tab 2    warfarin (COUMADIN) 5 mg tablet TAKE 1 TABLET BY MOUTH DAILY EXCEPT 1/2 TABLET ON -SAT OR AS DIRECTED 60 Tab 6    metoprolol succinate (TOPROL-XL) 100 mg tablet TAKE 1 TABLET BY MOUTH EVERY DAY 90 Tab 2    acetaminophen (Tylenol Extra Strength) 500 mg tablet Take 650 mg by mouth every six (6) hours as needed for Pain.  budesonide (RHINOCORT AQUA) 32 mcg/actuation nasal spray SPRAY 2 SPRAYS INTO EACH NOSTRIL EVERY DAY 3 Bottle 1    clobetasoL (TEMOVATE) 0.05 % ointment Apply  to affected area two (2) times a day. 15 g 0    OTHER Allergy injections      aspirin delayed-release 81 mg tablet Take 1 Tab by mouth daily. 30 Tab 0    polyethylene glycol (MIRALAX) 17 gram packet Take 1 Packet by mouth daily. (Patient taking differently: Take  by mouth daily as needed for Other (Constipation). ) 30 Packet 0    esomeprazole (NEXIUM) 20 mg capsule Take 20 mg by mouth daily as needed (heart burn).  sodium chloride (SALINE NASAL) 0.65 % nasal squeeze bottle 1 Spray as needed for Congestion.  melatonin tab tablet Take 10 mg by mouth nightly. Allergies   Allergen Reactions    Hydrocodone Bitartrate Rash    Morphine Rash and Itching    Norco [Hydrocodone-Acetaminophen] Itching    Penicillin Hives       Family History   Problem Relation Age of Onset    Heart Surgery Father         Open-Heart Surgery    Other Father         Venous Thromboembolism    Hypertension Father     Heart Disease Mother         Enlarged Heart    Hypertension Mother    Navin Rascon Diabetes Mother     Diabetes Sister     Hypertension Sister     Diabetes Brother     Heart Disease Paternal [de-identified]     Diabetes Sister     Hypertension Sister      Social History     Tobacco Use    Smoking status: Former Smoker     Packs/day: 0.50     Years: 21.00     Pack years: 10.50     Quit date:      Years since quittin.7    Smokeless tobacco: Never Used   Substance Use Topics    Alcohol use:  Yes     Alcohol/week: 1.0 standard drinks     Types: 1 Glasses of wine per week     Comment: occassionally         MD Penny Reeves, 59 y.o.,  female    SUBJECTIVE  Ff-up   no new concerns    Anxiety/depression-reports to be doing well She continues to take  buspar and zoloft along with talk therapy    HTN- continues to take norvasc dose to 7.5 mg, metoprolol and cozaar. H/o nstemi, afib, rheumatic valve disease s/p MV repair 2013- denies any chest pain, sob, palpitations, lightheadedness. On chronic coumadin treatment and lasix, following cardiology. HL-taking lipitor without problems. reviewd labs LDL 56    Elevated globulin/anemia of chronic disease- neg work up, monitoring. Following dr. Mccarthy rhinitis- on allergy shots, following ENT. No longer on advair, not symptomatic.      ROS:  See HPI, all others negative        Patient Active Problem List   Diagnosis Code    Rheumatic valvular disease I09.1    Chronic diastolic congestive heart failure (HCC) I50.32    Atrial fibrillation (HCC) I48.91    Long term current use of anticoagulant therapy Z79.01    Benign hypertensive heart disease with heart failure (HCC) I11.0    Hyperlipidemia E78.5    Advanced directives, counseling/discussion Z71.89    Mild intermittent asthma without complication X83.14    NSTEMI (non-ST elevated myocardial infarction) ([facility] [address] I21.4    Chest pain R07.9    IFG (impaired fasting glucose) R73.01    Adjustment disorder with anxious mood F43.22    Essential hypertension I10    Other allergic rhinitis J30.89    Anxiety F41.9    Allergic rhinitis due to pollen J30.1    Chronic sinusitis J32.9    Mitral valve stenosis I05.0    Elevated serum globulin level R77.1    Normocytic anemia D64.9    Anemia of chronic disease D63.8    Perennial allergic rhinitis with seasonal variation J30.89, J30.2    Vasomotor rhinitis J30.0    Coronary artery disease involving native coronary artery of native heart without angina pectoris I25.10    Recurrent depression (HCC) F33.9       Current Outpatient Medications   Medication Sig Dispense Refill    methyl salicylate/menth/camph (SALONPAS EX) by Apply Externally route.  diclofenac (Voltaren) 1 % gel Apply  to affected area four (4) times daily.  busPIRone (BUSPAR) 5 mg tablet TAKE 1 TABLET BY MOUTH TWICE A  Tablet 1    OTHER Vitamin B-12 gummies      cyclobenzaprine (FLEXERIL) 10 mg tablet TAKE 1 TABLET BY MOUTH THREE (3) TIMES DAILY AS NEEDED FOR MUSCLE SPASM(S). 30 Tablet 0    furosemide (LASIX) 40 mg tablet TAKE 1 TABLET BY MOUTH EVERY DAY 30 Tablet 11    Klor-Con M20 20 mEq tablet TAKE 1 TABLET BY MOUTH EVERY DAY 30 Tablet 6    losartan (COZAAR) 50 mg tablet TAKE 1 TABLET BY MOUTH TWICE A  Tablet 1    amLODIPine (NORVASC) 2.5 mg tablet Take 1 Tablet by mouth daily. 90 Tablet 1    Breo Ellipta 200-25 mcg/dose inhaler TAKE 1 PUFF BY MOUTH EVERY DAY 3 Each 0    albuterol (PROVENTIL HFA, VENTOLIN HFA, PROAIR HFA) 90 mcg/actuation inhaler INHALE 2 PUFFS BY MOUTH EVERY DAY. 18 Inhaler 3    sertraline (ZOLOFT) 100 mg tablet TAKE 1 TABLET BY MOUTH EVERY DAY 90 Tab 1    levocetirizine (XYZAL) 5 mg tablet TAKE 1 TABLET BY MOUTH EVERY DAY 90 Tab 3    montelukast (SINGULAIR) 10 mg tablet TAKE 1 TABLET BY MOUTH EVERY DAY 90 Tab 3    amLODIPine (NORVASC) 5 mg tablet Take 1 Tab by mouth daily. 90 Tab 3    atorvastatin (LIPITOR) 40 mg tablet TAKE 1 TABLET BY MOUTH EVERY DAY 90 Tab 2    warfarin (COUMADIN) 5 mg tablet TAKE 1 TABLET BY MOUTH DAILY EXCEPT 1/2 TABLET ON TUE-THURS-SAT OR AS DIRECTED 60 Tab 6    metoprolol succinate (TOPROL-XL) 100 mg tablet TAKE 1 TABLET BY MOUTH EVERY DAY 90 Tab 2    acetaminophen (Tylenol Extra Strength) 500 mg tablet Take 650 mg by mouth every six (6) hours as needed for Pain.       budesonide (RHINOCORT AQUA) 32 mcg/actuation nasal spray SPRAY 2 SPRAYS INTO EACH NOSTRIL EVERY DAY 3 Bottle 1    clobetasoL (TEMOVATE) 0.05 % ointment Apply  to affected area two (2) times a day. 15 g 0    OTHER Allergy injections      aspirin delayed-release 81 mg tablet Take 1 Tab by mouth daily. 30 Tab 0    polyethylene glycol (MIRALAX) 17 gram packet Take 1 Packet by mouth daily. (Patient taking differently: Take  by mouth daily as needed for Other (Constipation). ) 30 Packet 0    esomeprazole (NEXIUM) 20 mg capsule Take 20 mg by mouth daily as needed (heart burn).  sodium chloride (SALINE NASAL) 0.65 % nasal squeeze bottle 1 Spray as needed for Congestion.  melatonin tab tablet Take 10 mg by mouth nightly. Allergies   Allergen Reactions    Hydrocodone Bitartrate Rash    Morphine Rash and Itching    Norco [Hydrocodone-Acetaminophen] Itching    Penicillin Hives       Past Medical History:   Diagnosis Date    Aortic valve disorders 1/13/2011    Asthma     Atrial fibrillation ([facility] [address] 1/13/2011    Congestive heart failure, unspecified March 2005    Cleared quickly with Lasix therapy    Echocardiogram 01/10/2012    A-fib. EF 45%. Mild RVE. RVSP 45-50. Massive LAE. Mod TAMI. Mod MS. Severe MR (mean grad 10). Mild-mod AI. Mild TR. Pulmonic systolic flow reversal.  Mild IVCE.  Hives     Hypertension     Iron deficiency anemias     Long term (current) use of anticoagulants 2/23/2011    Murmur 1/2011    Grade I-II/VI systolic ejection murmur along left sternal border, with radiation to the pulmonic area.  S/P cardiac cath 02/24/2012    Minimal coronary artery disease. Mild LVE. EF 55%. Mod MR. Mod-severe MS. RA 15.  RV 55/16. PA 58/34. W 36.  CO/CI 3.5/1.9 (TD); 3.61/1.9 (Dequan). R to L shunt suggested.     Wears glasses     Weight gain        Social History     Socioeconomic History    Marital status:      Spouse name: Not on file    Number of children: Not on file    Years of education: Not on file    Highest education level: Not on file   Occupational History    Not on file   Tobacco Use    Smoking status: Former Smoker     Packs/day: 0.50 Years: 21.00     Pack years: 10.50     Quit date:      Years since quittin.7    Smokeless tobacco: Never Used   Substance and Sexual Activity    Alcohol use: Yes     Alcohol/week: 1.0 standard drinks     Types: 1 Glasses of wine per week     Comment: occassionally    Drug use: No    Sexual activity: Yes     Partners: Male     Birth control/protection: None   Other Topics Concern    Not on file   Social History Narrative    Not on file     Social Determinants of Health     Financial Resource Strain:     Difficulty of Paying Living Expenses:    Food Insecurity:     Worried About Running Out of Food in the Last Year:     [address] in the Last Year:    Transportation Needs:     Lack of Transportation (Medical):      Lack of Transportation (Non-Medical):    Physical Activity:     Days of Exercise per Week:     Minutes of Exercise per Session:    Stress:     Feeling of Stress :    Social Connections:     Frequency of Communication with Friends and Family:     Frequency of Social Gatherings with Friends and Family:     Attends Druze Services:     Active Member of Clubs or Organizations:     Attends Club or Organization Meetings:     Marital Status:    Intimate Partner Violence:     Fear of Current or Ex-Partner:     Emotionally Abused:     Physically Abused:     Sexually Abused:        Family History   Problem Relation Age of Onset    Heart Surgery Father         Open-Heart Surgery    Other Father         Venous Thromboembolism    Hypertension Father     Heart Disease Mother         Enlarged Heart    Hypertension Mother     Diabetes Mother     Diabetes Sister     Hypertension Sister     Diabetes Brother     Heart Disease Paternal Grandmother     Diabetes Sister     Hypertension Sister          OBJECTIVE    Physical Exam:     Visit Vitals  /88 (BP 1 Location: Left upper arm, BP Patient Position: Sitting, BP Cuff Size: Adult)   Pulse 80   Temp 96.9 °F (36.1 °C) (Temporal)   Resp 16   Ht 5' 7\" (1.702 m)   Wt 185 lb 9.6 oz (84.2 kg)   SpO2 98%   BMI 29.07 kg/m²       General: alert, well-appearing,  in no apparent distress or pain  Neck: no JVD  CVS: irregular, mech valve click, + murmur  Lungs:clear to ausculation bilaterally, no crackles, wheezing or rhonchi noted  Abdomen: soft, NT, ND  Skin: bilateral UE and upper back excoriations. No intertriginous findings.    Psych:  mood and affect normal      Results for orders placed or performed in visit on 08/31/21   AMB PT/INR EXTERNAL   Result Value Ref Range    INR, External 2.3    AMB POC PT/INR   Result Value Ref Range    VALID INTERNAL CONTROL POC Yes     Prothrombin time (POC) 27.9 seconds    INR POC 2.3        ASSESSMENT/PLAN  Diagnoses and all orders for this visit:    Anxiety   stable  Cont buspar  Cont [id number] mg  cont talk therapy    Recurrent depression  See above    Mild intermittent asthma without complication  PFTs 83/74 normal  Currently on albuterol prn  Will monitor, previously on advair    Essential hypertension  controlled  Cont  norvasc 7.5 mg  Cont cozaar 100 mg   cont metoprolol 100 mg  DASH diet, monitoring  Cmp prior to next visit    CAD involving native coronary artery of native heart with angina pectoris ([address]  Asymptomatic  Mild reversible ischemia on stress test 1/2019  On bb, arb, ASA  LDL is <70  Cont lipitor 40 mg qhs  Following cardiology    Atrial fibrillation, unspecified type ([address]  Rate controlled, On BB, coumadin  INR therapeutic  Following cards    Hyperlipidemia, unspecified hyperlipidemia type  Good range on  lipitor    LDL goal <70    Long term current use of anticoagulant therapy  Cards following    Rheumatic valvular disease  S/p [facility] valve 2013  Chronic anticoagulation    Allergic rhinitis, unspecified seasonality, unspecified trigger  On allergy shots  Cont flonase, benadryl,  singulair    Anemia of chronic disease  Following hematology, monitoring    Follow-up and Dispositions · Return in about 3 months (around 12/27/2021), or if symptoms worsen or fail to improve, for non-fasting labs prior to your next visit, routine chronic illness care. Patient understands plan of care. Patient has provided input and agrees with goals.

## 2021-10-08 ENCOUNTER — ANTI-COAG VISIT (OUTPATIENT)
Dept: CARDIOLOGY CLINIC | Age: 61
End: 2021-10-08
Payer: MEDICARE

## 2021-10-08 DIAGNOSIS — Z79.01 LONG TERM CURRENT USE OF ANTICOAGULANT THERAPY: ICD-10-CM

## 2021-10-08 DIAGNOSIS — I48.91 ATRIAL FIBRILLATION, UNSPECIFIED TYPE (HCC): Primary | ICD-10-CM

## 2021-10-08 LAB
INR BLD: 1.8
PT POC: 21.4 SECONDS
VALID INTERNAL CONTROL?: YES

## 2021-10-08 PROCEDURE — 85610 PROTHROMBIN TIME: CPT | Performed by: NURSE PRACTITIONER

## 2021-10-08 NOTE — PROGRESS NOTES
The INR is below the therapeutic range. Please make the following adjustments to Coumadin dosing: Extra 2.5mg today then Continue Coumadin 5mg daily except 2.5mg on Tuesday, Thursday, Saturday.   Repeat the INR in 4 weeks with appt

## 2021-10-26 ENCOUNTER — OFFICE VISIT (OUTPATIENT)
Dept: ORTHOPEDIC SURGERY | Age: 61
End: 2021-10-26
Payer: MEDICARE

## 2021-10-26 VITALS
TEMPERATURE: 96.9 F | OXYGEN SATURATION: 96 % | HEART RATE: 94 BPM | WEIGHT: 185 LBS | BODY MASS INDEX: 29.03 KG/M2 | HEIGHT: 67 IN

## 2021-10-26 DIAGNOSIS — M25.571 PAIN IN RIGHT ANKLE AND JOINTS OF RIGHT FOOT: ICD-10-CM

## 2021-10-26 DIAGNOSIS — M72.2 PLANTAR FASCIITIS OF RIGHT FOOT: Primary | ICD-10-CM

## 2021-10-26 PROCEDURE — 73600 X-RAY EXAM OF ANKLE: CPT | Performed by: PHYSICIAN ASSISTANT

## 2021-10-26 PROCEDURE — G8419 CALC BMI OUT NRM PARAM NOF/U: HCPCS | Performed by: PHYSICIAN ASSISTANT

## 2021-10-26 PROCEDURE — G8427 DOCREV CUR MEDS BY ELIG CLIN: HCPCS | Performed by: PHYSICIAN ASSISTANT

## 2021-10-26 PROCEDURE — G9899 SCRN MAM PERF RSLTS DOC: HCPCS | Performed by: PHYSICIAN ASSISTANT

## 2021-10-26 PROCEDURE — 73630 X-RAY EXAM OF FOOT: CPT | Performed by: PHYSICIAN ASSISTANT

## 2021-10-26 PROCEDURE — G8756 NO BP MEASURE DOC: HCPCS | Performed by: PHYSICIAN ASSISTANT

## 2021-10-26 PROCEDURE — 99214 OFFICE O/P EST MOD 30 MIN: CPT | Performed by: PHYSICIAN ASSISTANT

## 2021-10-26 PROCEDURE — 3017F COLORECTAL CA SCREEN DOC REV: CPT | Performed by: PHYSICIAN ASSISTANT

## 2021-10-26 PROCEDURE — G9717 DOC PT DX DEP/BP F/U NT REQ: HCPCS | Performed by: PHYSICIAN ASSISTANT

## 2021-10-26 RX ORDER — FAMOTIDINE 40 MG/1
40 TABLET, FILM COATED ORAL DAILY
Qty: 30 TABLET | Refills: 1 | Status: SHIPPED | OUTPATIENT
Start: 2021-10-26

## 2021-10-26 RX ORDER — METHYLPREDNISOLONE 4 MG/1
TABLET ORAL
Qty: 1 DOSE PACK | Refills: 0 | Status: SHIPPED | OUTPATIENT
Start: 2021-10-26 | End: 2021-12-02 | Stop reason: SDUPTHER

## 2021-10-26 NOTE — PATIENT INSTRUCTIONS
· Continue activity modification as necessary   · Complete home stretching program as directed  · Cryotherapy (ice the area using a frozen water bottle to roll under foot)  · Physical therapy (low frequency ultrasound, deep tissue message and plantar facial and gastroc stretching, etc)  · Weight-bear as tolerated in Short CAM Walker Boot until follow up  · Use Dorsal night splint  · Complete the Medrol Dose Pack as directed  · Skip one day after completing the Medrol Dose Pack and start taking the Mobic as directed.  Please take Pepcid with this medication

## 2021-10-26 NOTE — PROGRESS NOTES
Faraz Long (1960) is a 59 y.o. female, established patient, here for evaluation of the following chief complaint(s): Foot Pain (both feet R>L)       ASSESSMENT:       ICD-10-CM ICD-9-CM    1. Plantar fasciitis of right foot  M72.2 728.71 AMB SUPPLY ORDER      REFERRAL TO PHYSICAL THERAPY   2. Pain in right ankle and joints of right foot  M25.571 719.47 AMB POC XRAY, FOOT; COMPLETE, 3+ VIEW      POC XRAY, ANKLE; 2 VIEWS      AMB SUPPLY ORDER      REFERRAL TO PHYSICAL THERAPY        PLAN:     · Continue activity modification as necessary   · Complete home stretching program as directed  · Cryotherapy (ice the area using a frozen water bottle to roll under foot)  · Physical therapy (low frequency ultrasound, deep tissue message and plantar facial and gastroc stretching, etc)  · Weight-bear as tolerated in Short CAM Walker Boot until follow up  · Use Dorsal night splint  · Complete the Medrol Dose Pack as directed  · Skip one day after completing the Medrol Dose Pack and start taking the Mobic as directed. Please take Pepcid with this medication      Follow-up and Dispositions    · Return in about 6 weeks (around 12/7/2021) for follow up evaluation. Dr. Zonia Paredes has been consulted regarding the patient during this visit and he agrees with the assessment and plan    Patient expresses understanding of the diagnosis and treatment plan. Patient education has been provided.  was reviewed by Ebonie Javed PA-C on 10/26/2021. Faraz Long may have a reminder for a \"due or due soon\" health maintenance. I have asked that she contact her primary care provider for follow-up on this health maintenance. SUBJECTIVE & OBJECTIVE:     HPI    Faraz Long presents today for Foot Pain (both feet R>L)    Since last seen in the office on 9/30/19, the patient states that she has was doing well with regard to her right plantar fasciitis until recently.  She states that the plantar pain is worse with WB and sometimes has a dull throb even when she is at rest. She states that the CAM boot and dorsal night splint are somewhere in storage. She has been trying to complete the stretching exercises and uses ice. Otherwise, she denies any other new illness or injuries since last seen in the office. Missy Pimentel  has a past medical history of Aortic valve disorders (1/13/2011), Asthma, Atrial fibrillation ([facility] [address] (1/13/2011), Congestive heart failure, unspecified (March 2005), Echocardiogram (01/10/2012), Hives, Hypertension, Iron deficiency anemias, Long term (current) use of anticoagulants (2/23/2011), Murmur (1/2011), S/P cardiac cath (02/24/2012), Wears glasses, and Weight gain. Other than previously noted, patient reports no changes in medications, allergies, social or family history. Missy Pimentel has been experiencing pain, discomfort and associated symptoms and has tried modalities of treatment and/or self treatment confirmed as outlined in the pain assessment below. Pain Assessment  10/26/2021   Location of Pain Foot   Pain Location Comment -   Location Modifiers Right   Severity of Pain 6   Quality of Pain Other (Comment)   Quality of Pain Comment swelling   Duration of Pain -   Duration of Pain Comment -   Frequency of Pain Intermittent   Date Pain First Started (No Data)   Date Pain First Started Comment 2017   Aggravating Factors Walking;Exercise   Aggravating Factors Comment -   Limiting Behavior -   Relieving Factors Rest   Result of Injury No       PHYSICAL EXAM:        Visit Vitals  Pulse 94   Temp 96.9 °F (36.1 °C) (Temporal)   Ht 5' 7\" (1.702 m)   Wt 185 lb (83.9 kg)   SpO2 96%   BMI 28.98 kg/m²         Constitutional: [x] Alert, cooperative, appears well-developed and well-nourished [x] No apparent distress      [] Abnormal - Body mass index is 28.98 kg/m².  makes the treatment plan more complex     Mental status: [x] Alert and awake  [x] Oriented to person/place/time   [x] Normal mood/behavior/speech   [x] Normal dress/motor activity/thought processes/memory      [x] Able to follow commands    [] Abnormal - Dementia    Eyes:   EOM    [x]  Normal    [] Abnormal -   Sclera  [x]  Normal    [] Abnormal -          Discharge [x]  None visible   [] Abnormal -     HENT: [x] Normocephalic, atraumatic  [] Abnormal -   [x] Mouth/Throat: Mucous membranes are moist    External Ears [x] Normal, hearing intact  [] Abnormal -    Neck: [x] Supple. No visualized mass, normal ROM [] Abnormal -     Pulmonary/Chest: [x] Respiratory effort normal   [x] No visualized signs of difficulty breathing or respiratory distress        [] Abnormal -      Cardiovascular/    [x] Normal pulses to each foot  [] Abnormal -   Peripheral Vascular:    Neurological:        [x] No Facial Asymmetry (Cranial nerve 7 motor function) (limited exam due to video visit) [x] No gross defects         [x] No gaze palsy        [] Abnormal -          Skin:        [x] No significant exanthematous lesions or discoloration noted on facial skin or visible areas       [] Abnormal -            Psychiatric:       [x] Normal Affect, mood, judgement, behavior, conduct [] Abnormal -        [x] No Hallucinations      Musculoskeletal:   [x] Normal gait with no signs of ataxia         [x] Normal range of motion of neck        [] Abnormal -     Abdomen:             [x] NT/ND, BS++-     [] Abnormal -       MUSCULOSKELETAL: EXAMINATION OF:     ANKLE/FOOT right     Gait: antalgic   Tenderness: Moderage tenderness to the plantar fascia. Cutaneous: Mild swelling to the plantar fascia. Otherwise, the skin is intact. No rash or skin lesions. No warmth, erythema or ecchymosis. No signs of infection or cellulitis present. Joint Motion: WNL. There is pain-free range of motion of adjacent joints. Negative joint effusion  Joint / Tendon Stability: No Ankle or Subtalar instability or joint laxity.  No peroneal sublux ability or dislocation  Alignment: Forefoot, Midfoot, Hindfoot WNL. Deformity: none present   Neuro Motor/Sensory: NL/NL. Calf non-tender to palpation  Vascular: NL foot/ankle pulses  Lymphatics: No extremity lymphedema, No calf swelling, no tenderness to calf muscles    On this date 10/26/2021, I have spent 30 minutes reviewing previous notes, diagnostic test results, x-rays and face to face with the patient discussing the diagnosis and importance of compliance with the treatment and plan, discussing the potential for surgery, answering all questions, as well as documenting patient care on the day of the visit. An electronic signature was used to authenticate this note. -- Doc Pedro. [facility], PRECIOUS, PA-C  10/26/2021      Disclaimer: Sections of this note may have been dictated utilizing voice recognition software, which may have resulted in some phonetic based errors in grammar and contents. Even though attempts were made to correct all the mistakes, some may have been missed, and remained in the body of the document. If questions arise, please contact our department. REVIEW OF SYSTEMS:                  All Below are Negative except as indicated in the HPI: See HPI                Constitutional: negative for fever, chills, night sweats and unexpected weight loss and malaise/fatigue              HEENT: Negative. No hoarseness, no double vision              Respiratory: Negative.  No difficulty breathing, No SOB              Cardiovascular: negative for chest pain, claudication, LIZARRAGA. (-) Leg swelling               Gastrointestinal: Negative for pain, Blood in stool, incontinence, pelvic pain, N/V/C/D              Genitourinary: No saddle anesthesia, pelvic pain, blood in urine, incontinence, dysuria               Neurological: Negative for dizziness and weakness, visual changes, confusion, headaches, seizures               Phychiatric/Behavioral: Negative for depression, memory loss, substance abuse               Extremities: Negative for hair changes, rash, or skin lesion changes              Hematologic: Negative for bleeding problems, bruising, pallor or swollen lymph nodes              Peripheral Vascular: No calf pain, no circulation deficits              Musculoskeletal: As per HPI above    RADIOGRAPHS & DIAGNOSTIC STUDIES     Right foot X-rays, 3 views, AP/LAT/OBL completed 10/26/2021 AT [facility]    X-rays reveal no acute fracture, dislocation or subluxation noted. Overall alignment is acceptable. Soft tissue swelling is mild. No osteolytic or osteoblastic lesions noted. Mineralization suggests no osteopenia. Degenerative changes are noted. Calcified vessels are not present. Right ankle X-rays, 2 views, AP/LAT/OBL completed 10/26/2021 AT [facility]    X-rays reveal no acute fracture, dislocation or subluxation noted. Overall alignment is acceptable. Soft tissue swelling is mild. No osteolytic or osteoblastic lesions noted. Mineralization suggests no osteopenia. Degenerative changes are noted. Calcified vessels are not present. I have personally reviewed the images of the above study.  The interpretation of this study is my professional opinion    Eliceo Hinkle PA-C  10/26/2021

## 2021-10-27 ENCOUNTER — TELEPHONE (OUTPATIENT)
Dept: ORTHOPEDIC SURGERY | Age: 61
End: 2021-10-27

## 2021-10-27 NOTE — TELEPHONE ENCOUNTER
Patient called in stating the medication famotidine (PEPCID) 40 mg tablet is causing her to have \"an upset stomach along with pain\".  Patient also stated she would like \" a pain medication to help with the pain\"    Patient is requesting a call back at [phone]

## 2021-10-27 NOTE — TELEPHONE ENCOUNTER
Since we are not sure if either or both medications is causing GI distress, patient should discontinue taking both medications. I will not prescribe any additional oral medications at this time. If GI issues persist, patient should go to the ED as it may be some other condition causing her GI symptoms. Philly Yang [facility], MPA, PA-C  10/27/2021  11:43 AM

## 2021-10-27 NOTE — TELEPHONE ENCOUNTER
Pt states that she isnt have any stomach issues at this time. Pt states that she was wondering if there was something else that she can take for the pain. Informed pt that she isnt able to take any NSAIDS with the Medrol Dosepack at this time but she is taking a Tylenol PM at bedtime to help with sleep. Pt states that she will continue to take the medication as ordered and will eat something with the medication which is what she didn't do earlier. Also states that she will wear her brace and elevate her foot at night and wait until she sees Dr Patrick Wilson in 2 weeks.

## 2021-10-27 NOTE — TELEPHONE ENCOUNTER
Called and spoke to patient. She states that she took her first dose of the pepcid and prednisone this morning and now she's having some nausea. She isn't sure which medication is causing it.  Please advise on what she should do

## 2021-11-09 ENCOUNTER — OFFICE VISIT (OUTPATIENT)
Dept: CARDIOLOGY CLINIC | Age: 61
End: 2021-11-09
Payer: MEDICARE

## 2021-11-09 ENCOUNTER — HOSPITAL ENCOUNTER (OUTPATIENT)
Dept: MAMMOGRAPHY | Age: 61
Discharge: HOME | End: 2021-11-09
Attending: FAMILY MEDICINE
Payer: MEDICARE

## 2021-11-09 VITALS
WEIGHT: 189 LBS | BODY MASS INDEX: 29.66 KG/M2 | HEART RATE: 94 BPM | SYSTOLIC BLOOD PRESSURE: 122 MMHG | DIASTOLIC BLOOD PRESSURE: 78 MMHG | HEIGHT: 67 IN | OXYGEN SATURATION: 98 %

## 2021-11-09 DIAGNOSIS — Z79.01 LONG TERM CURRENT USE OF ANTICOAGULANT THERAPY: ICD-10-CM

## 2021-11-09 DIAGNOSIS — I48.91 ATRIAL FIBRILLATION, UNSPECIFIED TYPE (HCC): ICD-10-CM

## 2021-11-09 DIAGNOSIS — Z12.31 ENCOUNTER FOR SCREENING MAMMOGRAM FOR MALIGNANT NEOPLASM OF BREAST: ICD-10-CM

## 2021-11-09 DIAGNOSIS — I11.0 BENIGN HYPERTENSIVE HEART DISEASE WITH HEART FAILURE (HCC): ICD-10-CM

## 2021-11-09 DIAGNOSIS — I48.11 LONGSTANDING PERSISTENT ATRIAL FIBRILLATION (HCC): Primary | ICD-10-CM

## 2021-11-09 DIAGNOSIS — I05.0 RHEUMATIC MITRAL STENOSIS: ICD-10-CM

## 2021-11-09 LAB
INR BLD: 2.1
PT POC: 24.8 SECONDS
VALID INTERNAL CONTROL?: YES

## 2021-11-09 PROCEDURE — G8752 SYS BP LESS 140: HCPCS | Performed by: INTERNAL MEDICINE

## 2021-11-09 PROCEDURE — 85610 PROTHROMBIN TIME: CPT | Performed by: INTERNAL MEDICINE

## 2021-11-09 PROCEDURE — G9899 SCRN MAM PERF RSLTS DOC: HCPCS | Performed by: INTERNAL MEDICINE

## 2021-11-09 PROCEDURE — 99214 OFFICE O/P EST MOD 30 MIN: CPT | Performed by: INTERNAL MEDICINE

## 2021-11-09 PROCEDURE — G8754 DIAS BP LESS 90: HCPCS | Performed by: INTERNAL MEDICINE

## 2021-11-09 PROCEDURE — G8419 CALC BMI OUT NRM PARAM NOF/U: HCPCS | Performed by: INTERNAL MEDICINE

## 2021-11-09 PROCEDURE — 3017F COLORECTAL CA SCREEN DOC REV: CPT | Performed by: INTERNAL MEDICINE

## 2021-11-09 PROCEDURE — 93000 ELECTROCARDIOGRAM COMPLETE: CPT | Performed by: INTERNAL MEDICINE

## 2021-11-09 PROCEDURE — G8427 DOCREV CUR MEDS BY ELIG CLIN: HCPCS | Performed by: INTERNAL MEDICINE

## 2021-11-09 PROCEDURE — G9717 DOC PT DX DEP/BP F/U NT REQ: HCPCS | Performed by: INTERNAL MEDICINE

## 2021-11-09 PROCEDURE — 77063 BREAST TOMOSYNTHESIS BI: CPT

## 2021-11-09 NOTE — PROGRESS NOTES
Tasiasaul Espositos presents today for   Chief Complaint   Patient presents with    Follow-up     6 months - with INR        Tasia Jiménez preferred language for health care discussion is english/other. Is someone accompanying this pt? no    Is the patient using any DME equipment during [address]? no    Depression Screening:  3 most recent PHQ Screens 9/27/2021   PHQ Not Done -   Little interest or pleasure in doing things Not at all   Feeling down, depressed, irritable, or hopeless Not at all   Total Score PHQ 2 0       Learning Assessment:  Learning Assessment 9/27/2021   PRIMARY LEARNER Patient   HIGHEST LEVEL OF EDUCATION - PRIMARY LEARNER  GRADUATED HIGH SCHOOL OR GED   BARRIERS PRIMARY LEARNER NONE   CO-LEARNER CAREGIVER No   PRIMARY LANGUAGE ENGLISH    NEED No   LEARNER PREFERENCE PRIMARY LISTENING     -   LEARNING SPECIAL TOPICS No   ANSWERED BY patient   RELATIONSHIP SELF       Abuse Screening:  Abuse Screening Questionnaire 9/27/2021   Do you ever feel afraid of your partner? N   Are you in a relationship with someone who physically or mentally threatens you? N   Is it safe for you to go home? Y       Fall Risk  Fall Risk Assessment, last 12 mths 9/27/2021   Able to walk? Yes   Fall in past 12 months? 0   Do you feel unsteady? 0   Are you worried about falling 0       Pt currently taking Anticoagulant therapy? ASA 81mg every day     Coordination of Care:  1. Have you been to the ER, urgent care clinic since your last visit? Hospitalized since your last visit? no    2. Have you seen or consulted any other health care providers outside of the [address] [place] [address] since your last visit? Include any pap smears or colon screening.  no

## 2021-11-09 NOTE — PATIENT INSTRUCTIONS
The INR is stable and therapeutic. Continue Coumadin 5mg daily except 2.5mg on Tuesday, Thursday, Saturday  recheck in 1 month.

## 2021-11-17 ENCOUNTER — TELEPHONE (OUTPATIENT)
Dept: FAMILY MEDICINE CLINIC | Age: 61
End: 2021-11-17

## 2021-11-17 DIAGNOSIS — J45.40 MODERATE PERSISTENT ASTHMA WITHOUT COMPLICATION: ICD-10-CM

## 2021-11-17 RX ORDER — ALBUTEROL SULFATE 90 UG/1
AEROSOL, METERED RESPIRATORY (INHALATION)
Qty: 1 EACH | Refills: 2 | Status: SHIPPED | OUTPATIENT
Start: 2021-11-17

## 2021-11-17 NOTE — TELEPHONE ENCOUNTER
These sound like all the expected side effects from covid vaccine, it also means her antibodies are working! Give it 2 weeks and monitor symptoms, cold/warm compresses/nsaids/tylenol prn.  Let us know if things get worse

## 2021-11-17 NOTE — TELEPHONE ENCOUNTER
Patient called and states she got her Covid 19 Booster 11/12/2021 in left arm. Patient states she is still feeling fatigue, armstill sore witrh red going down her from injection site. Site is warm to touch, having a mild headache when she stands up.  Please advise

## 2021-11-17 NOTE — TELEPHONE ENCOUNTER
This patient contacted office for the following prescriptions to be filled:    Last office visit: 9/27/2021  Follow up appointment: 1/3/2022  Medication requested :   Requested Prescriptions     Pending Prescriptions Disp Refills    albuterol (PROVENTIL HFA, VENTOLIN HFA, PROAIR HFA) 90 mcg/actuation inhaler       PCP: Lori Palmer  Mail order or Local pharmacy name [facility] [employer] [phone]

## 2021-11-18 ENCOUNTER — APPOINTMENT (OUTPATIENT)
Dept: PHYSICAL THERAPY | Age: 61
End: 2021-11-18

## 2021-11-18 NOTE — TELEPHONE ENCOUNTER
Late entry  Patient identified with 2 identifiers (name and ). Spoke with patient and she is aware of Dr. Aurora Monterroso recommendations. Patient advised to monitor and keep in touch with any changes.

## 2021-11-20 NOTE — PROGRESS NOTES
Maki Pierson is in the office today in cardiovascular evaluation of her history of mitral valve disease. She has been followed by Dr. Issac Johnson for the last 16 - 17 years. She was admitted in 2005 to [facility] \[facility]\"" with congestive heart failure. An echocardiogram demonstrated mild concentric left ventricular hypertrophy and a low normal overall left ventricular function with an ejection fraction of 55%. She had mild to moderate mitral stenosis and moderate mitral regurgitation.     She was not seen again until 2012 at which time she had advancing mitral stenosis and ultimately underwent a cardiac catheterization which demonstrated moderate to severe mitral stenosis with moderate mitral regurgitation, mild aortic insufficiency and  mild coronary artery disease.      She  was referred for surgery which she had on 5/22/13, which consisted of a mitral valve replacement with a 25/23 mm On-X mechanical valve together with bilateral pulmonary vein isolation and resection of the left atrial appendage.     In the office today, she and reports she has done well since surgery. She has had no chest pain or shortness of breath. She reports her breathing has been \"pretty good \". She does have some fatigue. Her main complaint is of bilateral knee arthritis. She had echocardiogram done on 1/15/2021. Her EF was 55 to 60%. Prosthetic mitral valve mean gradient was 4.1 mmHg. Pulmonary artery systolic pressure was estimated at 34 mmHg. She had mild to moderate AI. Plan; Maki cyr continues  to be stable from a cardiac standpoint. Her blood pressure is well controlled in the office at 122/78. She remains on the metoprolol succinate 100 mg a day, Cozaar 50 a day and amlodipine 7.5 mg daily. Her latest lipid profile was done on 5/10/2021 and is as noted below. She remains on Lipitor 40 mg daily. We will plan to see her back in 6 months.     PCP: Jessi Bruner MD       Past Medical History:   Diagnosis Date    Aortic valve disorders 1/13/2011    Asthma     Atrial fibrillation ([facility] [address] 1/13/2011    Congestive heart failure, unspecified March 2005    Cleared quickly with Lasix therapy    Echocardiogram 01/10/2012    A-fib. EF 45%. Mild RVE. RVSP 45-50. Massive LAE. Mod TAMI. Mod MS. Severe MR (mean grad 10). Mild-mod AI. Mild TR. Pulmonic systolic flow reversal.  Mild IVCE.  Hives     Hypertension     Iron deficiency anemias     Long term (current) use of anticoagulants 2/23/2011    Murmur 1/2011    Grade I-II/VI systolic ejection murmur along left sternal border, with radiation to the pulmonic area.  S/P cardiac cath 02/24/2012    Minimal coronary artery disease. Mild LVE. EF 55%. Mod MR. Mod-severe MS. RA 15.  RV 55/16. PA 58/34. W 36.  CO/CI 3.5/1.9 (TD); 3.61/1.9 ([place]). R to L shunt suggested.  Wears glasses     Weight gain          Past Surgical History:   Procedure Laterality Date    HX HEART CATHETERIZATION  2/24/2012    HX HYSTERECTOMY  2006    HX MITRAL VALVE REPLACEMENT  05/22/2013    25/33 mm On-X mechanical valve with bilateral pulmonary vein isolation and resection of LA appendage       Current Outpatient Medications   Medication Sig    methylPREDNISolone (MEDROL DOSEPACK) 4 mg tablet As directed.  famotidine (PEPCID) 40 mg tablet Take 1 Tablet by mouth daily.  metoprolol succinate (TOPROL-XL) 100 mg tablet TAKE 1 TABLET BY MOUTH EVERY DAY    methyl salicylate/menth/camph (SALONPAS EX) by Apply Externally route.  diclofenac (Voltaren) 1 % gel Apply  to affected area four (4) times daily.  busPIRone (BUSPAR) 5 mg tablet TAKE 1 TABLET BY MOUTH TWICE A DAY    OTHER Vitamin B-12 gummies    cyclobenzaprine (FLEXERIL) 10 mg tablet TAKE 1 TABLET BY MOUTH THREE (3) TIMES DAILY AS NEEDED FOR MUSCLE SPASM(S).     furosemide (LASIX) 40 mg tablet TAKE 1 TABLET BY MOUTH EVERY DAY    Klor-Con M20 20 mEq tablet TAKE 1 TABLET BY MOUTH EVERY DAY    losartan (COZAAR) 50 mg tablet TAKE 1 TABLET BY MOUTH TWICE A DAY    amLODIPine (NORVASC) 2.5 mg tablet Take 1 Tablet by mouth daily.  Breo Ellipta 200-25 mcg/dose inhaler TAKE 1 PUFF BY MOUTH EVERY DAY    sertraline (ZOLOFT) 100 mg tablet TAKE 1 TABLET BY MOUTH EVERY DAY    levocetirizine (XYZAL) 5 mg tablet TAKE 1 TABLET BY MOUTH EVERY DAY    montelukast (SINGULAIR) 10 mg tablet TAKE 1 TABLET BY MOUTH EVERY DAY    amLODIPine (NORVASC) 5 mg tablet Take 1 Tab by mouth daily.  atorvastatin (LIPITOR) 40 mg tablet TAKE 1 TABLET BY MOUTH EVERY DAY    warfarin (COUMADIN) 5 mg tablet TAKE 1 TABLET BY MOUTH DAILY EXCEPT 1/2 TABLET ON TUE-THURS-SAT OR AS DIRECTED    acetaminophen (Tylenol Extra Strength) 500 mg tablet Take 650 mg by mouth every six (6) hours as needed for Pain.  budesonide (RHINOCORT AQUA) 32 mcg/actuation nasal spray SPRAY 2 SPRAYS INTO EACH NOSTRIL EVERY DAY    clobetasoL (TEMOVATE) 0.05 % ointment Apply  to affected area two (2) times a day.  OTHER Allergy injections    aspirin delayed-release 81 mg tablet Take 1 Tab by mouth daily.  polyethylene glycol (MIRALAX) 17 gram packet Take 1 Packet by mouth daily. (Patient taking differently: Take  by mouth daily as needed for Other (Constipation). )    esomeprazole (NEXIUM) 20 mg capsule Take 20 mg by mouth daily as needed (heart burn).  sodium chloride (SALINE NASAL) 0.65 % nasal squeeze bottle 1 Spray as needed for Congestion.  melatonin tab tablet Take 10 mg by mouth nightly.  albuterol (PROVENTIL HFA, VENTOLIN HFA, PROAIR HFA) 90 mcg/actuation inhaler INHALE 2 PUFFS BY MOUTH EVERY DAY. No current facility-administered medications for this visit. Allergies   Allergen Reactions    Hydrocodone Bitartrate Rash    Morphine Rash and Itching    Norco [Hydrocodone-Acetaminophen] Itching    Penicillin Hives       Social History:   reports that she quit smoking about 13 years ago. She has a 10.50 pack-year smoking history.  She has never used smokeless tobacco. She reports current alcohol use of about 1.0 standard drink of alcohol per week. She reports that she does not use drugs. Family History   Problem Relation Age of Onset    Heart Surgery Father         Open-Heart Surgery    Other Father         Venous Thromboembolism    Hypertension Father     Heart Disease Mother         Enlarged Heart    Hypertension Mother    [facility] Diabetes Mother     Diabetes Sister     Hypertension Sister     Diabetes Brother     Heart Disease Paternal Grandmother     Diabetes Sister     Hypertension Sister      Review of Systems:    Constitutional: Positive for mild fatigue. Negative for chills, fever, or weight loss. Respiratory:  Negative for cough, hemoptysis, sputum production and shortness of breath. Cardiovascular:  Negative for chest pain, palpitations, claudication, leg swelling and PND. Gastrointestinal: Negative. Musculoskeletal: Negative for back pain, falls, joint pain, myalgias and neck pain. Neurological: Positive for anxiety. Physical Exam:   Visit Vitals  /78 (BP 1 Location: Left upper arm, BP Patient Position: Sitting, BP Cuff Size: Adult)   Pulse 94   Ht 5' 7\" (1.702 m)   Wt 85.7 kg (189 lb)   SpO2 98%   BMI 29.60 kg/m²       The patient is cooperative and alert  HEENT:   Conjunctivae white and mucosa moist  Neck: Supple without masses, tenderness or thyromegaly. No jugular venous distention. Carotids are without bruits. Cardiovascular: Chest is symmetrical with good excursion. There is a well healed mid sternotomy scar. Irregularly irregular rhythm without appreciable murmurs, rubs, clicks or gallops . Lungs: Clear to auscultation bilaterally. Abdomen: Soft. No masses, tenderness or organomegaly. No abdominal bruits. Extremities: No edema, with full peripheral pulses. No clubbing or cyanosis. Review of Data: Please refer to past medical history for most recent cardiac testing.   Lab Results Component Value Date/Time    Cholesterol, total 147 05/10/2021 11:02 AM    HDL Cholesterol 71 (H) 05/10/2021 11:02 AM    LDL, calculated 58.8 05/10/2021 11:02 AM    Triglyceride 86 05/10/2021 11:02 AM    CHOL/HDL Ratio 2.1 05/10/2021 11:02 AM     Results for orders placed or performed in visit on 11/9/2021   AMB POC EKG ROUTINE W/ 12 LEADS, INTER & REP     Status: None    Narrative    Atrial fibrillation with a rate of 94 bpm.  Right bundle branch block. Compared to prior tracing of 11/23/2020, right bundle branch block is new. Earl Arzola MD, F.A.C.C. [facility]  [facility]  [address] [place] [address]. [address] [facility]  [phone]    PLEASE NOTE:  This document has been produced using voice recognition software. Unrecognized errors in transcription may be present.

## 2021-11-27 DIAGNOSIS — E78.5 HYPERLIPIDEMIA LDL GOAL <70: ICD-10-CM

## 2021-11-29 RX ORDER — ATORVASTATIN CALCIUM 40 MG/1
TABLET, FILM COATED ORAL
Qty: 90 TABLET | Refills: 2 | Status: SHIPPED | OUTPATIENT
Start: 2021-11-29 | End: 2022-09-12

## 2021-12-02 ENCOUNTER — VIRTUAL VISIT (OUTPATIENT)
Dept: FAMILY MEDICINE CLINIC | Age: 61
End: 2021-12-02
Payer: MEDICARE

## 2021-12-02 DIAGNOSIS — J45.41 MODERATE PERSISTENT ASTHMA WITH EXACERBATION: Primary | ICD-10-CM

## 2021-12-02 DIAGNOSIS — J32.0 CHRONIC MAXILLARY SINUSITIS: ICD-10-CM

## 2021-12-02 PROCEDURE — 99442 PR PHYS/QHP TELEPHONE EVALUATION 11-20 MIN: CPT | Performed by: FAMILY MEDICINE

## 2021-12-02 RX ORDER — HYDROCODONE POLISTIREX AND CHLORPHENIRAMINE POLISTIREX 10; 8 MG/5ML; MG/5ML
1 SUSPENSION, EXTENDED RELEASE ORAL
Qty: 70 ML | Refills: 0 | Status: SHIPPED | OUTPATIENT
Start: 2021-12-02 | End: 2021-12-09

## 2021-12-02 RX ORDER — METHYLPREDNISOLONE 4 MG/1
TABLET ORAL
Qty: 1 DOSE PACK | Refills: 0 | Status: SHIPPED | OUTPATIENT
Start: 2021-12-02 | End: 2022-01-12

## 2021-12-02 RX ORDER — AZITHROMYCIN 250 MG/1
TABLET, FILM COATED ORAL
Qty: 6 TABLET | Refills: 0 | Status: SHIPPED | OUTPATIENT
Start: 2021-12-02 | End: 2022-01-12

## 2021-12-02 NOTE — PROGRESS NOTES
Akbar Sy is a 59 y.o. female, evaluated via audio-only technology on 12/2/2021 for Cough (worse at night; productive with clear phlegm; wheezing and chest tightness; has tried not OTC cough suppressant) and Headache (from excessive coughing)  . Assessment & Plan:   Diagnoses and all orders for this visit:    1. Moderate persistent asthma with exacerbation  Does not appear dyspneic on conversation, will treat out patient for acute exacerbation  Cont breo, prn albuterol  Start:  -     methylPREDNISolone (MEDROL DOSEPACK) 4 mg tablet; As directed. -     azithromycin (ZITHROMAX) 250 mg tablet; Take two tablets today then one tablet daily  -     HYDROcodone-chlorpheniramine (TUSSIONEX) 10-8 mg/5 mL suspension; Take 5 mL by mouth every twelve (12) hours as needed for Cough for up to 7 days. Max Daily Amount: 10 mL. 2. Chronic maxillary sinusitis      Keep appt with me jan 3, or sooner prn  12  Subjective:     Cough x 2 weeks    Pt w/ h/o asthma/chronic rhinitis, increasing cough, sob, wheezing past 2 weeks. Using breo regularly and albuterol about 3 x a day. No fever, has received covid vaccines and booster, no known sick contacts    Prior to Admission medications    Medication Sig Start Date End Date Taking? Authorizing Provider   methylPREDNISolone (MEDROL DOSEPACK) 4 mg tablet As directed. 12/2/21  Yes Helena Maddox MD   azithromycin (ZITHROMAX) 250 mg tablet Take two tablets today then one tablet daily 12/2/21  Yes Helena Maddox MD   HYDROcodone-chlorpheniramine (TUSSIONEX) 10-8 mg/5 mL suspension Take 5 mL by mouth every twelve (12) hours as needed for Cough for up to 7 days. Max Daily Amount: 10 mL. 12/2/21 12/9/21 Yes Helena Maddox MD   atorvastatin (LIPITOR) 40 mg tablet TAKE 1 TABLET BY MOUTH EVERY DAY 11/29/21  Yes Helena Maddox MD   albuterol (PROVENTIL HFA, VENTOLIN HFA, PROAIR HFA) 90 mcg/actuation inhaler INHALE 2 PUFFS BY MOUTH EVERY DAY.  11/17/21  Yes Portia Coronado, MD   famotidine (PEPCID) 40 mg tablet Take 1 Tablet by mouth daily. 10/26/21  Yes Laisha Rosas PA-C   metoprolol succinate (TOPROL-XL) 100 mg tablet TAKE 1 TABLET BY MOUTH EVERY DAY 10/18/21  Yes Denia Coronado MD   diclofenac (Voltaren) 1 % gel Apply  to affected area four (4) times daily. Yes Provider, Historical   busPIRone (BUSPAR) 5 mg tablet TAKE 1 TABLET BY MOUTH TWICE A DAY 9/21/21  Yes Denia Coronado MD   OTHER Vitamin B-12 gummies   Yes Provider, Historical   cyclobenzaprine (FLEXERIL) 10 mg tablet TAKE 1 TABLET BY MOUTH THREE (3) TIMES DAILY AS NEEDED FOR MUSCLE SPASM(S). 8/30/21  Yes Maria De Jesus Ruiz MD   furosemide (LASIX) 40 mg tablet TAKE 1 TABLET BY MOUTH EVERY DAY 8/24/21  Yes Gurjit RAUSCH, NP   Klor-Con M20 20 mEq tablet TAKE 1 TABLET BY MOUTH EVERY DAY 8/23/21  Yes Gabby Garg MD   losartan (COZAAR) 50 mg tablet TAKE 1 TABLET BY MOUTH TWICE A DAY 8/20/21  Yes Denia Coronado MD   amLODIPine (NORVASC) 2.5 mg tablet Take 1 Tablet by mouth daily. 8/20/21  Yes Denia Coronado MD   Breo Ellipta 200-25 mcg/dose inhaler TAKE 1 PUFF BY MOUTH EVERY DAY 8/16/21  Yes Maria De Jesus Ruiz MD   sertraline (ZOLOFT) 100 mg tablet TAKE 1 TABLET BY MOUTH EVERY DAY 5/17/21  Yes Denia Coronado MD   levocetirizine (XYZAL) 5 mg tablet TAKE 1 TABLET BY MOUTH EVERY DAY 3/5/21  Yes Portia Coronado MD   montelukast (SINGULAIR) 10 mg tablet TAKE 1 TABLET BY MOUTH EVERY DAY 3/5/21  Yes Denia Coronado MD   amLODIPine (NORVASC) 5 mg tablet Take 1 Tab by mouth daily. 3/5/21  Yes Maria De Jesus Ruiz MD   warfarin (COUMADIN) 5 mg tablet TAKE 1 TABLET BY MOUTH DAILY EXCEPT 1/2 TABLET ON TUE-THURS-SAT OR AS DIRECTED 2/22/21  Yes Gurjit Joel NP   acetaminophen (Tylenol Extra Strength) 500 mg tablet Take 650 mg by mouth every six (6) hours as needed for Pain.    Yes Provider, Historical   budesonide (RHINOCORT AQUA) 32 mcg/actuation nasal spray SPRAY 2 SPRAYS INTO EACH NOSTRIL EVERY DAY 11/17/20  Yes Adilene Coronado MD   clobetasoL (TEMOVATE) 0.05 % ointment Apply  to affected area two (2) times a day. 2/6/20  Yes Taco Benavides MD   OTHER Allergy injections   Yes Provider, Historical   aspirin delayed-release 81 mg tablet Take 1 Tab by mouth daily. 1/11/19  Yes Juan M Cardoso MD   polyethylene glycol (MIRALAX) 17 gram packet Take 1 Packet by mouth daily. Patient taking differently: Take  by mouth daily as needed for Other (Constipation). 1/11/19  Yes Juan M Cardoso MD   esomeprazole (NEXIUM) 20 mg capsule Take 20 mg by mouth daily as needed (heart burn). Yes Provider, Historical   sodium chloride (SALINE NASAL) 0.65 % nasal squeeze bottle 1 Spray as needed for Congestion. Yes Provider, Historical   melatonin tab tablet Take 10 mg by mouth nightly. Yes Provider, Historical   methylPREDNISolone (MEDROL DOSEPACK) 4 mg tablet As directed. Patient not taking: Reported on 12/2/2021 10/26/21 12/2/21  Mehdi Tinajero PA-C   methyl salicylate/menth/camph [facility]) by Apply Externally route.   Patient not taking: Reported on 12/2/2021    Provider, Historical     Patient Active Problem List    Diagnosis Date Noted    Recurrent depression ([facility] 03/05/2021    Coronary artery disease involving native coronary artery of native heart without angina pectoris 11/03/2020    Vasomotor rhinitis 02/03/2020    Anemia of chronic disease 11/06/2019    Chronic sinusitis 10/15/2019    Elevated serum globulin level 10/15/2019    Normocytic anemia 10/15/2019    Anxiety 10/08/2019    Essential hypertension 06/24/2019    Adjustment disorder with anxious mood 03/05/2019    IFG (impaired fasting glucose) 02/25/2019    NSTEMI (non-ST elevated myocardial infarction) ([facility] [address] 01/05/2019    Chest pain 01/05/2019    Other allergic rhinitis 11/30/2018    Allergic rhinitis due to pollen 11/30/2018    Perennial allergic rhinitis with seasonal variation 11/30/2018    Mild intermittent asthma without complication 97/25/9862    Advanced directives, counseling/discussion 03/09/2017    Mitral valve stenosis 05/22/2013    Hyperlipidemia 02/02/2012    Benign hypertensive heart disease with heart failure ([facility] 08/05/2011    Long term current use of anticoagulant therapy 02/23/2011    Atrial fibrillation (HCC) 01/20/2011    Rheumatic valvular disease     Chronic diastolic congestive heart failure ([facility] 03/01/2005     Current Outpatient Medications   Medication Sig Dispense Refill    methylPREDNISolone (MEDROL DOSEPACK) 4 mg tablet As directed. 1 Dose Pack 0    azithromycin (ZITHROMAX) 250 mg tablet Take two tablets today then one tablet daily 6 Tablet 0    HYDROcodone-chlorpheniramine (TUSSIONEX) 10-8 mg/5 mL suspension Take 5 mL by mouth every twelve (12) hours as needed for Cough for up to 7 days. Max Daily Amount: 10 mL. 70 mL 0    atorvastatin (LIPITOR) 40 mg tablet TAKE 1 TABLET BY MOUTH EVERY DAY 90 Tablet 2    albuterol (PROVENTIL HFA, VENTOLIN HFA, PROAIR HFA) 90 mcg/actuation inhaler INHALE 2 PUFFS BY MOUTH EVERY DAY. 1 Each 2    famotidine (PEPCID) 40 mg tablet Take 1 Tablet by mouth daily. 30 Tablet 1    metoprolol succinate (TOPROL-XL) 100 mg tablet TAKE 1 TABLET BY MOUTH EVERY DAY 90 Tablet 1    diclofenac (Voltaren) 1 % gel Apply  to affected area four (4) times daily.  busPIRone (BUSPAR) 5 mg tablet TAKE 1 TABLET BY MOUTH TWICE A  Tablet 1    OTHER Vitamin B-12 gummies      cyclobenzaprine (FLEXERIL) 10 mg tablet TAKE 1 TABLET BY MOUTH THREE (3) TIMES DAILY AS NEEDED FOR MUSCLE SPASM(S). 30 Tablet 0    furosemide (LASIX) 40 mg tablet TAKE 1 TABLET BY MOUTH EVERY DAY 30 Tablet 11    Klor-Con M20 20 mEq tablet TAKE 1 TABLET BY MOUTH EVERY DAY 30 Tablet 6    losartan (COZAAR) 50 mg tablet TAKE 1 TABLET BY MOUTH TWICE A  Tablet 1    amLODIPine (NORVASC) 2.5 mg tablet Take 1 Tablet by mouth daily.  90 Tablet 1    Breo Ellipta 200-25 mcg/dose inhaler TAKE 1 PUFF BY MOUTH EVERY DAY 3 Each 0    sertraline (ZOLOFT) 100 mg tablet TAKE 1 TABLET BY MOUTH EVERY DAY 90 Tab 1    levocetirizine (XYZAL) 5 mg tablet TAKE 1 TABLET BY MOUTH EVERY DAY 90 Tab 3    montelukast (SINGULAIR) 10 mg tablet TAKE 1 TABLET BY MOUTH EVERY DAY 90 Tab 3    amLODIPine (NORVASC) 5 mg tablet Take 1 Tab by mouth daily. 90 Tab 3    warfarin (COUMADIN) 5 mg tablet TAKE 1 TABLET BY MOUTH DAILY EXCEPT 1/2 TABLET ON TUE-THURS-SAT OR AS DIRECTED 60 Tab 6    acetaminophen (Tylenol Extra Strength) 500 mg tablet Take 650 mg by mouth every six (6) hours as needed for Pain.  budesonide (RHINOCORT AQUA) 32 mcg/actuation nasal spray SPRAY 2 SPRAYS INTO EACH NOSTRIL EVERY DAY 3 Bottle 1    clobetasoL (TEMOVATE) 0.05 % ointment Apply  to affected area two (2) times a day. 15 g 0    OTHER Allergy injections      aspirin delayed-release 81 mg tablet Take 1 Tab by mouth daily. 30 Tab 0    polyethylene glycol (MIRALAX) 17 gram packet Take 1 Packet by mouth daily. (Patient taking differently: Take  by mouth daily as needed for Other (Constipation). ) 30 Packet 0    esomeprazole (NEXIUM) 20 mg capsule Take 20 mg by mouth daily as needed (heart burn).  sodium chloride (SALINE NASAL) 0.65 % nasal squeeze bottle 1 Spray as needed for Congestion.  melatonin tab tablet Take 10 mg by mouth nightly.  methyl salicylate/menth/camph (SALONPAS EX) by Apply Externally route. (Patient not taking: Reported on 12/2/2021)       Allergies   Allergen Reactions    Morphine Rash and Itching    Penicillin Hives     Past Medical History:   Diagnosis Date    Aortic valve disorders 1/13/2011    Asthma     Atrial fibrillation ([facility] [address] 1/13/2011    Congestive heart failure, unspecified March 2005    Cleared quickly with Lasix therapy    Echocardiogram 01/10/2012    A-fib. EF 45%. Mild RVE. RVSP 45-50. Massive LAE. Mod TAMI. Mod MS. Severe MR (mean grad 10). Mild-mod AI. Mild TR.   Pulmonic systolic flow reversal.  Mild IVCE.  Hives     Hypertension     Iron deficiency anemias     Long term (current) use of anticoagulants 2011    Murmur 2011    Grade I-II/VI systolic ejection murmur along left sternal border, with radiation to the pulmonic area.  S/P cardiac cath 2012    Minimal coronary artery disease. Mild LVE. EF 55%. Mod MR. Mod-severe MS. RA 15.  RV 55/16. PA 58/34. W 36.  CO/CI 3.5/1.9 (TD); 3.61/1.9 ([place]). R to L shunt suggested.  Wears glasses     Weight gain      Past Surgical History:   Procedure Laterality Date    HX HEART CATHETERIZATION  2012    HX HYSTERECTOMY      HX MITRAL VALVE REPLACEMENT  2013    25/33 mm On-X mechanical valve with bilateral pulmonary vein isolation and resection of LA appendage     Family History   Problem Relation Age of Onset    Heart Surgery Father         Open-Heart Surgery    Other Father         Venous Thromboembolism    Hypertension Father     Heart Disease Mother         Enlarged Heart    Hypertension Mother    [facility] Diabetes Mother     Diabetes Sister     Hypertension Sister     Diabetes Brother     Heart Disease Paternal [de-identified]     Diabetes Sister     Hypertension Sister      Social History     Tobacco Use    Smoking status: Former Smoker     Packs/day: 0.50     Years: 21.00     Pack years: 10.50     Quit date:      Years since quittin.9    Smokeless tobacco: Never Used   Substance Use Topics    Alcohol use:  Yes     Alcohol/week: 1.0 standard drink     Types: 1 Glasses of wine per week     Comment: occassionally       ROS    Patient-Reported Vitals 2021   Patient-Reported Pulse 82   Patient-Reported Systolic  267   Patient-Reported Diastolic 90       Boris Clayton, who was evaluated through a patient-initiated, synchronous (real-time) audio only encounter, and/or her healthcare decision maker, is aware that it is a billable service, with coverage as determined by her insurance carrier. She provided verbal consent to proceed: Yes. She has not had a related appointment within my department in the past 7 days or scheduled within the next 24 hours. On this date 12/02/2021 I have spent 20 minutes reviewing previous notes, test results and face to face (virtual) with the patient discussing the diagnosis and importance of compliance with the treatment plan as well as documenting on the day of the visit.     Mark Garner MD

## 2021-12-02 NOTE — PROGRESS NOTES
1. \"Have you been to the ER, urgent care clinic since your last visit? Hospitalized since your last visit? \" No    2. \"Have you seen or consulted any other health care providers outside of the [address] [place] [address] since your last visit? \" No     3. For patients aged 39-70: Has the patient had a colonoscopy / FIT/ Cologuard? Yes, HM satisfied with blue hyperlink     If the patient is female:    4. For patients aged 41-77: Has the patient had a mammogram within the past 2 years? Yes, HM satisfied with blue hyperlink    5. For patients aged 21-65: Has the patient had a pap smear?  N/a due to hysterectomy

## 2021-12-08 ENCOUNTER — TELEPHONE (OUTPATIENT)
Dept: FAMILY MEDICINE CLINIC | Age: 61
End: 2021-12-08

## 2021-12-08 ENCOUNTER — HOSPITAL ENCOUNTER (OUTPATIENT)
Dept: LAB | Age: 61
Discharge: HOME | End: 2021-12-08
Payer: MEDICARE

## 2021-12-08 ENCOUNTER — HOSPITAL ENCOUNTER (OUTPATIENT)
Dept: GENERAL RADIOLOGY | Age: 61
Discharge: HOME | End: 2021-12-08
Payer: MEDICARE

## 2021-12-08 ENCOUNTER — VIRTUAL VISIT (OUTPATIENT)
Dept: FAMILY MEDICINE CLINIC | Age: 61
End: 2021-12-08
Payer: MEDICARE

## 2021-12-08 DIAGNOSIS — R06.02 SHORTNESS OF BREATH: Primary | ICD-10-CM

## 2021-12-08 DIAGNOSIS — I50.32 CHRONIC DIASTOLIC CONGESTIVE HEART FAILURE (HCC): ICD-10-CM

## 2021-12-08 DIAGNOSIS — R06.02 SHORTNESS OF BREATH: ICD-10-CM

## 2021-12-08 DIAGNOSIS — J45.41 MODERATE PERSISTENT ASTHMA WITH EXACERBATION: ICD-10-CM

## 2021-12-08 LAB
ANION GAP SERPL CALC-SCNC: 5 MMOL/L (ref 3–18)
BASOPHILS # BLD: 0 K/UL (ref 0–0.1)
BASOPHILS NFR BLD: 0 % (ref 0–2)
BNP SERPL-MCNC: 579 PG/ML (ref 0–900)
BUN SERPL-MCNC: 19 MG/DL (ref 7–18)
BUN/CREAT SERPL: 19 (ref 12–20)
CALCIUM SERPL-MCNC: 9.4 MG/DL (ref 8.5–10.1)
CHLORIDE SERPL-SCNC: 102 MMOL/L (ref 100–111)
CO2 SERPL-SCNC: 30 MMOL/L (ref 21–32)
CREAT SERPL-MCNC: 0.99 MG/DL (ref 0.6–1.3)
DIFFERENTIAL METHOD BLD: ABNORMAL
EOSINOPHIL # BLD: 0.1 K/UL (ref 0–0.4)
EOSINOPHIL NFR BLD: 1 % (ref 0–5)
ERYTHROCYTE [DISTWIDTH] IN BLOOD BY AUTOMATED COUNT: 13.1 % (ref 11.6–14.5)
GLUCOSE SERPL-MCNC: 94 MG/DL (ref 74–99)
HCT VFR BLD AUTO: 41.9 % (ref 35–45)
HGB BLD-MCNC: 13.1 G/DL (ref 12–16)
IMM GRANULOCYTES # BLD AUTO: 0.1 K/UL (ref 0–0.04)
IMM GRANULOCYTES NFR BLD AUTO: 1 % (ref 0–0.5)
LYMPHOCYTES # BLD: 2.2 K/UL (ref 0.9–3.6)
LYMPHOCYTES NFR BLD: 22 % (ref 21–52)
MCH RBC QN AUTO: 27 PG (ref 24–34)
MCHC RBC AUTO-ENTMCNC: 31.3 G/DL (ref 31–37)
MCV RBC AUTO: 86.4 FL (ref 78–100)
MONOCYTES # BLD: 1.3 K/UL (ref 0.05–1.2)
MONOCYTES NFR BLD: 13 % (ref 3–10)
NEUTS SEG # BLD: 6.5 K/UL (ref 1.8–8)
NEUTS SEG NFR BLD: 64 % (ref 40–73)
NRBC # BLD: 0 K/UL (ref 0–0.01)
NRBC BLD-RTO: 0 PER 100 WBC
PLATELET # BLD AUTO: 234 K/UL (ref 135–420)
PMV BLD AUTO: 12.1 FL (ref 9.2–11.8)
POTASSIUM SERPL-SCNC: 4.4 MMOL/L (ref 3.5–5.5)
RBC # BLD AUTO: 4.85 M/UL (ref 4.2–5.3)
SODIUM SERPL-SCNC: 137 MMOL/L (ref 136–145)
WBC # BLD AUTO: 10.2 K/UL (ref 4.6–13.2)

## 2021-12-08 PROCEDURE — G9899 SCRN MAM PERF RSLTS DOC: HCPCS | Performed by: STUDENT IN AN ORGANIZED HEALTH CARE EDUCATION/TRAINING PROGRAM

## 2021-12-08 PROCEDURE — 3017F COLORECTAL CA SCREEN DOC REV: CPT | Performed by: STUDENT IN AN ORGANIZED HEALTH CARE EDUCATION/TRAINING PROGRAM

## 2021-12-08 PROCEDURE — 36415 COLL VENOUS BLD VENIPUNCTURE: CPT

## 2021-12-08 PROCEDURE — 99203 OFFICE O/P NEW LOW 30 MIN: CPT | Performed by: STUDENT IN AN ORGANIZED HEALTH CARE EDUCATION/TRAINING PROGRAM

## 2021-12-08 PROCEDURE — 85025 COMPLETE CBC W/AUTO DIFF WBC: CPT

## 2021-12-08 PROCEDURE — 80048 BASIC METABOLIC PNL TOTAL CA: CPT

## 2021-12-08 PROCEDURE — G9717 DOC PT DX DEP/BP F/U NT REQ: HCPCS | Performed by: STUDENT IN AN ORGANIZED HEALTH CARE EDUCATION/TRAINING PROGRAM

## 2021-12-08 PROCEDURE — G8419 CALC BMI OUT NRM PARAM NOF/U: HCPCS | Performed by: STUDENT IN AN ORGANIZED HEALTH CARE EDUCATION/TRAINING PROGRAM

## 2021-12-08 PROCEDURE — 83880 ASSAY OF NATRIURETIC PEPTIDE: CPT

## 2021-12-08 PROCEDURE — G8427 DOCREV CUR MEDS BY ELIG CLIN: HCPCS | Performed by: STUDENT IN AN ORGANIZED HEALTH CARE EDUCATION/TRAINING PROGRAM

## 2021-12-08 PROCEDURE — G8756 NO BP MEASURE DOC: HCPCS | Performed by: STUDENT IN AN ORGANIZED HEALTH CARE EDUCATION/TRAINING PROGRAM

## 2021-12-08 PROCEDURE — 71046 X-RAY EXAM CHEST 2 VIEWS: CPT

## 2021-12-08 NOTE — TELEPHONE ENCOUNTER
Called patient to discuss her lab results and CXR. CXR was negative for any signs of pneumonia, consolidation or pleural effusions and showed mild hyperinflation. BNP was 579 and lower than what it has been in the past for the patient. Thus unlikely to be acute CHF. Patient was counseled that her shortness of breath is due to her asthma exacerbation and she should complete the steroid taper which hopefully should result in improvement over the next few days. She was advised to continue using the albuterol inhaler every few hours as needed.

## 2021-12-08 NOTE — PROGRESS NOTES
Sandie Jha (: 1960) is a 59 y.o. female,  here for evaluation of the following chief complaint(s):   Shortness of Breath       ASSESSMENT/PLAN:  Below is the assessment and plan developed based on review of pertinent history, labs, studies, and medications. 1. Shortness of breath  -     NT-PRO BNP; Future  -     METABOLIC PANEL, BASIC; Future  -     CBC WITH AUTOMATED DIFF; Future  -     XR CHEST PA LAT; Future  2. Moderate persistent asthma with exacerbation  3. Chronic diastolic congestive heart failure (HCC)  -     NT-PRO BNP; Future    Patient with persistent shortness of breath despite treatment with steroids and antibiotics. She continues to feel poorly with subjective fever, chills and dyspnea. Will get CXR to r/o developing pneumonia. Patient also has a history of CHF. Shortness of breath may be due to acute CHF. Check BNP. No follow-ups on file. SUBJECTIVE/OBJECTIVE:  HPI  Patient has a PMHx of HTN, asthma, CHF. She was started on medrol dose pack and Zithromax by her PCP (Dr. Hallie Shafer) on  for moderate asthma exacerbation. Patient states she still does not feel better and continues to have shortness of breath. Patient complaining of night sweats and chills. Feels weaker. Still feels SOB with a pressure in the chest. Has a subjective fever. Temps at home have been normal.       Review of Systems   ROS:  History obtained from the patient intake forms which are reviewed with the patient  · General: positive for subjective fever, chills, poor appetite.    · HEENT: no sore throat, nasal congestion, vision problems or ear problems  · Respiratory: SOB and wheezing  · Cardiovascular: no chest pain, palpitations, has dyspnea on exertion  · Gastrointestinal: no abdominal pain, N/V, change in bowel habits, or black or bloody stools  · Musculoskeletal: no back pain, joint pain, joint stiffness, muscle pain or muscle weakness  · Neurological: no numbness, tingling, headache or dizziness  · Endo:  No polyuria or polydipsia. · : no hematuria, dysuria, frequency, hesitancy, or nocturia. · Psychological: negative for - anxiety, depression, sleep disturbances, suicidal or homicidal ideations    No data recorded     Physical Exam  Unable to perform PE due to telephone visit. Haroon Muñiz, was evaluated through a synchronous (real-time) audio-video encounter. The patient (or guardian if applicable) is aware that this is a billable service. Verbal consent to proceed has been obtained within the past 12 months. The visit was conducted pursuant to the emergency declaration under the [address] [place] [address], [address] [place] [address] authority and the [employer] Act. Patient identification was verified, and a caregiver was present when appropriate. The patient was located in a state where the provider was credentialed to provide care. An electronic signature was used to authenticate this note.   -- Deonte Welch MD

## 2021-12-09 ENCOUNTER — TELEPHONE (OUTPATIENT)
Dept: FAMILY MEDICINE CLINIC | Age: 61
End: 2021-12-09

## 2021-12-09 NOTE — TELEPHONE ENCOUNTER
Patient called and states she saw Dr. Matthew Benz.   Was advised to finish steroid.    Patient states she is going to run out of her cough syrup and wants to if she can get another cough syrup prescribed (does not have to be tussinex) Patient states she is cough mostly at night and early am. Please advise

## 2021-12-10 DIAGNOSIS — J45.21 MILD INTERMITTENT ASTHMA WITH ACUTE EXACERBATION: Primary | ICD-10-CM

## 2021-12-10 RX ORDER — BENZONATATE 100 MG/1
100 CAPSULE ORAL
Qty: 30 CAPSULE | Refills: 0 | Status: SHIPPED | OUTPATIENT
Start: 2021-12-10 | End: 2021-12-17

## 2021-12-10 RX ORDER — HYDROCODONE POLISTIREX AND CHLORPHENIRAMINE POLISTIREX 10; 8 MG/5ML; MG/5ML
5 SUSPENSION, EXTENDED RELEASE ORAL
Qty: 50 ML | Refills: 0 | Status: SHIPPED | OUTPATIENT
Start: 2021-12-10 | End: 2021-12-13

## 2021-12-10 NOTE — TELEPHONE ENCOUNTER
Patient identified with 2 identifiers (name and ). Patient advised on Dr. Sophie Amador recommendations. Reviewed ff-up note, labs/cxr neg. Erx tussionex for 5 more days, add tussionex tid  After this tussionex runs out, may take nyquil thereafter.    Patient verbalizes understanding

## 2021-12-10 NOTE — TELEPHONE ENCOUNTER
Reviewed ff-up note, labs/cxr neg.   Erx tussionex for 5 more days, add tussionex tid  After this tussionex runs out, may take nyquil thereafter  Pls notify pt

## 2021-12-11 RX ORDER — AMLODIPINE BESYLATE 2.5 MG/1
TABLET ORAL
Qty: 90 TABLET | Refills: 1 | Status: SHIPPED | OUTPATIENT
Start: 2021-12-11 | End: 2022-04-12

## 2021-12-11 RX ORDER — LOSARTAN POTASSIUM 50 MG/1
TABLET ORAL
Qty: 180 TABLET | Refills: 1 | Status: SHIPPED | OUTPATIENT
Start: 2021-12-11 | End: 2022-02-18 | Stop reason: SDUPTHER

## 2021-12-14 ENCOUNTER — APPOINTMENT (OUTPATIENT)
Dept: PHYSICAL THERAPY | Age: 61
End: 2021-12-14
Attending: PHYSICIAN ASSISTANT

## 2021-12-14 RX ORDER — SERTRALINE HYDROCHLORIDE 100 MG/1
TABLET, FILM COATED ORAL
Qty: 90 TABLET | Refills: 1 | Status: SHIPPED | OUTPATIENT
Start: 2021-12-14 | End: 2022-09-06

## 2021-12-17 ENCOUNTER — ANTI-COAG VISIT (OUTPATIENT)
Dept: CARDIOLOGY CLINIC | Age: 61
End: 2021-12-17
Payer: MEDICARE

## 2021-12-17 DIAGNOSIS — Z79.01 LONG TERM CURRENT USE OF ANTICOAGULANT THERAPY: Primary | ICD-10-CM

## 2021-12-17 DIAGNOSIS — I48.91 ATRIAL FIBRILLATION, UNSPECIFIED TYPE (HCC): ICD-10-CM

## 2021-12-17 LAB
INR BLD: 2.1
PT POC: 25.2 SECONDS
VALID INTERNAL CONTROL?: YES

## 2021-12-17 PROCEDURE — 85610 PROTHROMBIN TIME: CPT | Performed by: NURSE PRACTITIONER

## 2021-12-17 NOTE — PROGRESS NOTES
The INR is stable and therapeutic. Continue Coumadin 5mg daily except 2.5mg on Tuesday, Thursday, Saturday.   Recheck INR in 4 weeks

## 2021-12-29 ENCOUNTER — HOSPITAL ENCOUNTER (OUTPATIENT)
Dept: LAB | Age: 61
Discharge: HOME | End: 2021-12-29
Payer: MEDICARE

## 2021-12-29 DIAGNOSIS — I11.0 BENIGN HYPERTENSIVE HEART DISEASE WITH HEART FAILURE (HCC): ICD-10-CM

## 2021-12-29 LAB
ALBUMIN SERPL-MCNC: 3.7 G/DL (ref 3.4–5)
ALBUMIN/GLOB SERPL: 0.9 {RATIO} (ref 0.8–1.7)
ALP SERPL-CCNC: 86 U/L (ref 45–117)
ALT SERPL-CCNC: 25 U/L (ref 13–56)
ANION GAP SERPL CALC-SCNC: 3 MMOL/L (ref 3–18)
AST SERPL-CCNC: 30 U/L (ref 10–38)
BILIRUB SERPL-MCNC: 0.6 MG/DL (ref 0.2–1)
BUN SERPL-MCNC: 12 MG/DL (ref 7–18)
BUN/CREAT SERPL: 15 (ref 12–20)
CALCIUM SERPL-MCNC: 9.4 MG/DL (ref 8.5–10.1)
CHLORIDE SERPL-SCNC: 105 MMOL/L (ref 100–111)
CO2 SERPL-SCNC: 31 MMOL/L (ref 21–32)
CREAT SERPL-MCNC: 0.81 MG/DL (ref 0.6–1.3)
GLOBULIN SER CALC-MCNC: 3.9 G/DL (ref 2–4)
GLUCOSE SERPL-MCNC: 91 MG/DL (ref 74–99)
POTASSIUM SERPL-SCNC: 4.3 MMOL/L (ref 3.5–5.5)
PROT SERPL-MCNC: 7.6 G/DL (ref 6.4–8.2)
SODIUM SERPL-SCNC: 139 MMOL/L (ref 136–145)

## 2021-12-29 PROCEDURE — 80053 COMPREHEN METABOLIC PANEL: CPT

## 2021-12-29 PROCEDURE — 36415 COLL VENOUS BLD VENIPUNCTURE: CPT

## 2022-01-10 ENCOUNTER — NURSE TRIAGE (OUTPATIENT)
Dept: OTHER | Facility: CLINIC | Age: 62
End: 2022-01-10

## 2022-01-10 NOTE — TELEPHONE ENCOUNTER
Received call from Megan [address] at [facility] with Red Flag Complaint. Subjective: \"I have swelling on both legs\"    Current Symptoms: bilateral feet and ankle swelling    Onset: Three weeks    Pain Severity: 0/10- legs feel heavy    Temperature: Denies    What has been tried: Elevating legs at night    Recommended disposition: to be seen in the next three days. Care advice provided, patient verbalizes understanding; denies any other questions or concerns; instructed to call back for any new or worsening symptoms. Attention Provider: Thank you for allowing me to participate in the care of your patient. The patient was connected to triage in response to information provided to the ECC. Please do not respond through this encounter as the response is not directed to a shared pool.     Reason for Disposition   MILD swelling of both ankles (i.e., pedal edema) AND new-onset or worsening    Protocols used: LEG SWELLING AND EDEMA-ADULT-OH

## 2022-01-12 ENCOUNTER — OFFICE VISIT (OUTPATIENT)
Dept: FAMILY MEDICINE CLINIC | Age: 62
End: 2022-01-12
Payer: MEDICARE

## 2022-01-12 VITALS
DIASTOLIC BLOOD PRESSURE: 66 MMHG | WEIGHT: 193 LBS | OXYGEN SATURATION: 99 % | SYSTOLIC BLOOD PRESSURE: 120 MMHG | HEART RATE: 81 BPM | RESPIRATION RATE: 16 BRPM | BODY MASS INDEX: 30.23 KG/M2 | TEMPERATURE: 97 F

## 2022-01-12 DIAGNOSIS — R60.0 BILATERAL LEG EDEMA: Primary | ICD-10-CM

## 2022-01-12 DIAGNOSIS — F33.9 RECURRENT DEPRESSION (HCC): ICD-10-CM

## 2022-01-12 DIAGNOSIS — I25.10 CORONARY ARTERY DISEASE INVOLVING NATIVE CORONARY ARTERY OF NATIVE HEART WITHOUT ANGINA PECTORIS: ICD-10-CM

## 2022-01-12 DIAGNOSIS — I10 ESSENTIAL HYPERTENSION: ICD-10-CM

## 2022-01-12 DIAGNOSIS — I21.4 NSTEMI (NON-ST ELEVATED MYOCARDIAL INFARCTION) (HCC): ICD-10-CM

## 2022-01-12 DIAGNOSIS — I25.119 CORONARY ARTERY DISEASE INVOLVING NATIVE CORONARY ARTERY OF NATIVE HEART WITH ANGINA PECTORIS (HCC): ICD-10-CM

## 2022-01-12 DIAGNOSIS — E78.00 PURE HYPERCHOLESTEROLEMIA: ICD-10-CM

## 2022-01-12 DIAGNOSIS — D63.8 ANEMIA OF CHRONIC DISEASE: ICD-10-CM

## 2022-01-12 DIAGNOSIS — I11.0 BENIGN HYPERTENSIVE HEART DISEASE WITH HEART FAILURE (HCC): ICD-10-CM

## 2022-01-12 DIAGNOSIS — I48.11 LONGSTANDING PERSISTENT ATRIAL FIBRILLATION (HCC): ICD-10-CM

## 2022-01-12 DIAGNOSIS — F41.9 ANXIETY: ICD-10-CM

## 2022-01-12 DIAGNOSIS — I50.32 CHRONIC DIASTOLIC CONGESTIVE HEART FAILURE (HCC): ICD-10-CM

## 2022-01-12 DIAGNOSIS — R73.01 IFG (IMPAIRED FASTING GLUCOSE): ICD-10-CM

## 2022-01-12 PROCEDURE — G9899 SCRN MAM PERF RSLTS DOC: HCPCS | Performed by: FAMILY MEDICINE

## 2022-01-12 PROCEDURE — G9717 DOC PT DX DEP/BP F/U NT REQ: HCPCS | Performed by: FAMILY MEDICINE

## 2022-01-12 PROCEDURE — G8752 SYS BP LESS 140: HCPCS | Performed by: FAMILY MEDICINE

## 2022-01-12 PROCEDURE — G8754 DIAS BP LESS 90: HCPCS | Performed by: FAMILY MEDICINE

## 2022-01-12 PROCEDURE — 3017F COLORECTAL CA SCREEN DOC REV: CPT | Performed by: FAMILY MEDICINE

## 2022-01-12 PROCEDURE — 99214 OFFICE O/P EST MOD 30 MIN: CPT | Performed by: FAMILY MEDICINE

## 2022-01-12 PROCEDURE — G0463 HOSPITAL OUTPT CLINIC VISIT: HCPCS | Performed by: FAMILY MEDICINE

## 2022-01-12 PROCEDURE — G8417 CALC BMI ABV UP PARAM F/U: HCPCS | Performed by: FAMILY MEDICINE

## 2022-01-12 PROCEDURE — G8427 DOCREV CUR MEDS BY ELIG CLIN: HCPCS | Performed by: FAMILY MEDICINE

## 2022-01-12 NOTE — PROGRESS NOTES
Mike Pulido, 59 y.o.,  female    SUBJECTIVE  Ff-up leg edema    Developed bilateral end of day non pitting leg edema past 2 weeks. She reports some sob related to asthma exacerbation, she has known afib/ MVR. No chest pain, palpitations, pillow orthopnea. Reviewed labs cbc/pro BNP/cxr neg. She taking norvasc for years. On daily lasix    Anxiety/depression-reports to be doing well She continues to take  buspar and zoloft along with talk therapy    HTN- continues to take norvasc dose to 7.5 mg, metoprolol and cozaar. H/o nstemi, afib, rheumatic valve disease s/p MV repair 2013- denies any chest pain, sob, palpitations, lightheadedness. On chronic coumadin treatment and lasix, following cardiology. HL-taking lipitor without problems. reviewd labs LDL 56    Elevated globulin/anemia of chronic disease- neg work up, monitoring. Following dr. rae    Chronic rhinitis- on allergy shots, following ENT. No longer on advair, not symptomatic.      ROS:  See HPI, all others negative        Patient Active Problem List   Diagnosis Code    Rheumatic valvular disease I09.1    Chronic diastolic congestive heart failure (HCC) I50.32    Atrial fibrillation (HCC) I48.91    Long term current use of anticoagulant therapy Z79.01    Benign hypertensive heart disease with heart failure (HCC) I11.0    Hyperlipidemia E78.5    Advanced directives, counseling/discussion Z71.89    Mild intermittent asthma without complication T95.56    NSTEMI (non-ST elevated myocardial infarction) ([facility] [address] I21.4    Chest pain R07.9    IFG (impaired fasting glucose) R73.01    Adjustment disorder with anxious mood F43.22    Essential hypertension I10    Other allergic rhinitis J30.89    Anxiety F41.9    Allergic rhinitis due to pollen J30.1    Chronic sinusitis J32.9    Mitral valve stenosis I05.0    Elevated serum globulin level R77.1    Normocytic anemia D64.9    Anemia of chronic disease D63.8    Perennial allergic rhinitis with seasonal variation J30.89, J30.2    Vasomotor rhinitis J30.0    Coronary artery disease involving native coronary artery of native heart without angina pectoris I25.10    Recurrent depression (HCC) F33.9       Current Outpatient Medications   Medication Sig Dispense Refill    sertraline (ZOLOFT) 100 mg tablet TAKE 1 TABLET BY MOUTH EVERY DAY 90 Tablet 1    losartan (COZAAR) 50 mg tablet TAKE 1 TABLET BY MOUTH TWICE A  Tablet 1    amLODIPine (NORVASC) 2.5 mg tablet TAKE 1 TABLET BY MOUTH EVERY DAY 90 Tablet 1    atorvastatin (LIPITOR) 40 mg tablet TAKE 1 TABLET BY MOUTH EVERY DAY 90 Tablet 2    albuterol (PROVENTIL HFA, VENTOLIN HFA, PROAIR HFA) 90 mcg/actuation inhaler INHALE 2 PUFFS BY MOUTH EVERY DAY. 1 Each 2    famotidine (PEPCID) 40 mg tablet Take 1 Tablet by mouth daily. 30 Tablet 1    metoprolol succinate (TOPROL-XL) 100 mg tablet TAKE 1 TABLET BY MOUTH EVERY DAY 90 Tablet 1    diclofenac (Voltaren) 1 % gel Apply  to affected area four (4) times daily.  busPIRone (BUSPAR) 5 mg tablet TAKE 1 TABLET BY MOUTH TWICE A  Tablet 1    OTHER Vitamin B-12 gummies      cyclobenzaprine (FLEXERIL) 10 mg tablet TAKE 1 TABLET BY MOUTH THREE (3) TIMES DAILY AS NEEDED FOR MUSCLE SPASM(S). 30 Tablet 0    furosemide (LASIX) 40 mg tablet TAKE 1 TABLET BY MOUTH EVERY DAY 30 Tablet 11    Klor-Con M20 20 mEq tablet TAKE 1 TABLET BY MOUTH EVERY DAY 30 Tablet 6    Breo Ellipta 200-25 mcg/dose inhaler TAKE 1 PUFF BY MOUTH EVERY DAY 3 Each 0    levocetirizine (XYZAL) 5 mg tablet TAKE 1 TABLET BY MOUTH EVERY DAY 90 Tab 3    montelukast (SINGULAIR) 10 mg tablet TAKE 1 TABLET BY MOUTH EVERY DAY 90 Tab 3    amLODIPine (NORVASC) 5 mg tablet Take 1 Tab by mouth daily.  90 Tab 3    warfarin (COUMADIN) 5 mg tablet TAKE 1 TABLET BY MOUTH DAILY EXCEPT 1/2 TABLET ON TUE-THURS-SAT OR AS DIRECTED 60 Tab 6    acetaminophen (Tylenol Extra Strength) 500 mg tablet Take 650 mg by mouth every six (6) hours as needed for Pain.  budesonide (RHINOCORT AQUA) 32 mcg/actuation nasal spray SPRAY 2 SPRAYS INTO EACH NOSTRIL EVERY DAY 3 Bottle 1    clobetasoL (TEMOVATE) 0.05 % ointment Apply  to affected area two (2) times a day. 15 g 0    OTHER Allergy injections      aspirin delayed-release 81 mg tablet Take 1 Tab by mouth daily. 30 Tab 0    polyethylene glycol (MIRALAX) 17 gram packet Take 1 Packet by mouth daily. (Patient taking differently: Take  by mouth daily as needed for Other (Constipation). ) 30 Packet 0    esomeprazole (NEXIUM) 20 mg capsule Take 20 mg by mouth daily as needed (heart burn).  sodium chloride (SALINE NASAL) 0.65 % nasal squeeze bottle 1 Spray as needed for Congestion.  melatonin tab tablet Take 10 mg by mouth nightly. Allergies   Allergen Reactions    Morphine Rash and Itching    Penicillin Hives       Past Medical History:   Diagnosis Date    Aortic valve disorders 1/13/2011    Asthma     Atrial fibrillation ([facility] [address] 1/13/2011    Congestive heart failure, unspecified March 2005    Cleared quickly with Lasix therapy    Echocardiogram 01/10/2012    A-fib. EF 45%. Mild RVE. RVSP 45-50. Massive LAE. Mod TAMI. Mod MS. Severe MR (mean grad 10). Mild-mod AI. Mild TR. Pulmonic systolic flow reversal.  Mild IVCE.  Hives     Hypertension     Iron deficiency anemias     Long term (current) use of anticoagulants 2/23/2011    Murmur 1/2011    Grade I-II/VI systolic ejection murmur along left sternal border, with radiation to the pulmonic area.  S/P cardiac cath 02/24/2012    Minimal coronary artery disease. Mild LVE. EF 55%. Mod MR. Mod-severe MS. RA 15.  RV 55/16. PA 58/34. W 36.  CO/CI 3.5/1.9 (TD); 3.61/1.9 ([place]). R to L shunt suggested.     Wears glasses     Weight gain        Social History     Socioeconomic History    Marital status:      Spouse name: Not on file    Number of children: Not on file    Years of education: Not on file    Highest education level: Not on file   Occupational History    Not on file   Tobacco Use    Smoking status: Former Smoker     Packs/day: 0.50     Years: 21.00     Pack years: 10.50     Quit date:      Years since quittin.0    Smokeless tobacco: Never Used   Substance and Sexual Activity    Alcohol use: Yes     Alcohol/week: 1.0 standard drink     Types: 1 Glasses of wine per week     Comment: occassionally    Drug use: No    Sexual activity: Yes     Partners: Male     Birth control/protection: None   Other Topics Concern    Not on file   Social History Narrative    Not on file     Social Determinants of Health     Financial Resource Strain:     Difficulty of Paying Living Expenses: Not on file   Food Insecurity:     Worried About Running Out of Food in the Last Year: Not on file    Maria D of Food in the Last Year: Not on file   Transportation Needs:     Lack of Transportation (Medical): Not on file    Lack of Transportation (Non-Medical):  Not on file   Physical Activity:     Days of Exercise per Week: Not on file    Minutes of Exercise per Session: Not on file   Stress:     Feeling of Stress : Not on file   Social Connections:     Frequency of Communication with Friends and Family: Not on file    Frequency of Social Gatherings with Friends and Family: Not on file    Attends Congregation Services: Not on file    Active Member of [address] [place] [address] or Organizations: Not on file    Attends Club or Organization Meetings: Not on file    Marital Status: Not on file   Intimate Partner Violence:     Fear of Current or Ex-Partner: Not on file    Emotionally Abused: Not on file    Physically Abused: Not on file    Sexually Abused: Not on file   Housing Stability:     Unable to Pay for Housing in the Last Year: Not on file    Number of [place] in the Last Year: Not on file    Unstable Housing in the Last Year: Not on file       Family History   Problem Relation Age of Onset    Heart Surgery Father Open-Heart Surgery    Other Father         Venous Thromboembolism    Hypertension Father     Heart Disease Mother         Enlarged Heart    Hypertension Mother     Diabetes Mother     Diabetes Sister     Hypertension Sister     Diabetes Brother     Heart Disease Paternal Grandmother     Diabetes Sister     Hypertension Sister          OBJECTIVE    Physical Exam:     Visit Vitals  /66 (BP 1 Location: Left upper arm, BP Patient Position: Sitting, BP Cuff Size: Adult)   Pulse 81   Temp 97 °F (36.1 °C) (Temporal)   Resp 16   Wt 193 lb (87.5 kg)   SpO2 99%   BMI 30.23 kg/m²       General: alert, well-appearing,  in no apparent distress or pain  Neck: no JVD  CVS: irregular, mech valve click, + murmur  Lungs:clear to ausculation bilaterally, no crackles, wheezing or rhonchi noted  Abdomen: soft, NT, ND  Skin:no edema, good dp pulses  Psych:  mood and affect normal      Results for orders placed or performed during the hospital encounter of 09/85/77   METABOLIC PANEL, COMPREHENSIVE   Result Value Ref Range    Sodium 139 136 - 145 mmol/L    Potassium 4.3 3.5 - 5.5 mmol/L    Chloride 105 100 - 111 mmol/L    CO2 31 21 - 32 mmol/L    Anion gap 3 3.0 - 18 mmol/L    Glucose 91 74 - 99 mg/dL    BUN 12 7.0 - 18 MG/DL    Creatinine 0.81 0.6 - 1.3 MG/DL    BUN/Creatinine ratio 15 12 - 20      GFR est AA >60 >60 ml/min/1.73m2    GFR est non-AA >60 >60 ml/min/1.73m2    Calcium 9.4 8.5 - 10.1 MG/DL    Bilirubin, total 0.6 0.2 - 1.0 MG/DL    ALT (SGPT) 25 13 - 56 U/L    AST (SGOT) 30 10 - 38 U/L    Alk.  phosphatase 86 45 - 117 U/L    Protein, total 7.6 6.4 - 8.2 g/dL    Albumin 3.7 3.4 - 5.0 g/dL    Globulin 3.9 2.0 - 4.0 g/dL    A-G Ratio 0.9 0.8 - 1.7         ASSESSMENT/PLAN  Diagnoses and all orders for this visit:  Leg edema  Likely venous insufficiency, end of day pattern/neg CHF work up  Advised low salt diet, compression stockings  Consider reducing ccb dose  Advised to take extra lasix dose prn    Anxiety stable  Cont buspar  Cont iseyed700 mg  cont talk therapy    Recurrent depression  See above    Mild intermittent asthma without complication  PFTs 50/06 normal  Currently on albuterol prn  Will monitor, previously on advair    Essential hypertension  controlled  Cont  norvasc 7.5 mg  Cont cozaar 100 mg   cont metoprolol 100 mg  DASH diet, monitoring  Cmp/lipid panel prior to next visit    CAD involving native coronary artery of native heart with angina pectoris ([facility] [address]  Asymptomatic  Mild reversible ischemia on stress test 1/2019  On bb, arb, ASA  LDL is <70  Cont lipitor 40 mg qhs  Following cardiology    Atrial fibrillation, unspecified type ([facility] [address]  Rate controlled, On BB, coumadin  INR therapeutic  Following cards    Hyperlipidemia, unspecified hyperlipidemia type  Good range on  lipitor    LDL goal <70    Long term current use of anticoagulant therapy  Cards following    Rheumatic valvular disease  S/p [facility] valve 2013  Chronic anticoagulation    Allergic rhinitis, unspecified seasonality, unspecified trigger  On allergy shots  Cont flonase, benadryl,  singulair    Anemia of chronic disease  Following hematology, monitoring    Impaired fasting glucose  A1c/cmp prior to next visit    Follow-up and Dispositions    · Return in about 3 months (around 4/12/2022), or if symptoms worsen or fail to improve, for fasting labs a week prior to your next visit, routine chronic illness care. Patient understands plan of care. Patient has provided input and agrees with goals.

## 2022-01-12 NOTE — PROGRESS NOTES
1. Have you been to the ER, urgent care clinic since your last visit? Hospitalized since your last visit? No    2. Have you seen or consulted any other health care providers outside of the [address] [place] [address] since your last visit? Include any pap smears or colon screening.  No    Chief Complaint   Patient presents with    Leg Swelling     bilat leg swelling x 3 weeks    Foot Swelling     bilat foot swelling x 3 weeks

## 2022-01-12 NOTE — PATIENT INSTRUCTIONS
Learning About Venous Insufficiency  What is it? Venous insufficiency is a problem with the flow of blood from the veins of the legs back to the heart. It's also called chronic venous insufficiency or chronic venous stasis. Your veins bring blood back to the heart after it flows through your body. Veins have valves that keep the blood moving in one directiontoward the heart. But with venous insufficiency, the veins of the legs might not work as they should. This can allow blood to leak backward. Fluid can pool in the legs. This can lead to problems that include varicose veins. What causes it? Venous insufficiency is sometimes caused by deep vein thrombosis and high blood pressure inside leg veins. You are more likely to have venous insufficiency if you:  · Are older. · Are female. · Are overweight. · Don't get enough physical activity. · Smoke. · Have a family history of varicose veins. What are the symptoms? Symptoms of venous insufficiency affect the legs. They may include:  · Swelling, often in the ankles. · A rash. · Varicose veins. · Itching. · Cramping. · Skin sores (ulcers). · Aching or a feeling of heaviness. · Changes in skin color. How is it diagnosed? Your doctor can diagnose venous insufficiency by examining your legs and by using a type of ultrasound test (duplex Doppler) to find out how well blood is flowing in your legs. How is it treated? To reduce swelling and relieve pain caused by venous insufficiency, you can wear compression stockings. They are tighter at the ankles than at the top of the legs. They also can help venous skin ulcers heal. But there are different types of stockings, and they need to fit right. So your doctor will recommend what you need. You also can try to:  · Get more exercise, especially walking. It can increase blood flow. · Avoid standing still or sitting for a long time, which can make the fluid pool in your legs.   · Try not to sit with your legs crossed at the knee. · Keep your legs raised above your heart when you're lying down. This reduces swelling. If these treatments don't work, you may need medicine or a procedure to help relieve symptoms. Procedures might be done to close the vein, to remove the vein, or to improve blood flow. Follow-up care is a key part of your treatment and safety. Be sure to make and go to all appointments, and call your doctor if you are having problems. It's also a good idea to know your test results and keep a list of the medicines you take. Current as of: July 6, 2021               Content Version: 13.0  © 2006-2021 [employer] Care instructions adapted under license by [employer] (which disclaims liability or warranty for this information). If you have questions about a medical condition or this instruction, always ask your healthcare professional. [address] any warranty or liability for your use of this information.

## 2022-01-15 RX ORDER — BUSPIRONE HYDROCHLORIDE 5 MG/1
TABLET ORAL
Qty: 180 TABLET | Refills: 1 | Status: SHIPPED | OUTPATIENT
Start: 2022-01-15 | End: 2022-08-27

## 2022-01-25 ENCOUNTER — OFFICE VISIT (OUTPATIENT)
Dept: ORTHOPEDIC SURGERY | Age: 62
End: 2022-01-25
Payer: MEDICARE

## 2022-01-25 VITALS — TEMPERATURE: 97.1 F | BODY MASS INDEX: 30.67 KG/M2 | HEART RATE: 87 BPM | OXYGEN SATURATION: 97 % | WEIGHT: 195.8 LBS

## 2022-01-25 DIAGNOSIS — M17.11 PRIMARY OSTEOARTHRITIS OF RIGHT KNEE: Primary | ICD-10-CM

## 2022-01-25 DIAGNOSIS — M17.12 PRIMARY OSTEOARTHRITIS OF LEFT KNEE: ICD-10-CM

## 2022-01-25 PROCEDURE — 20611 DRAIN/INJ JOINT/BURSA W/US: CPT | Performed by: PHYSICIAN ASSISTANT

## 2022-01-25 NOTE — PROGRESS NOTES
Patient: Clari Marks                MRN: [id number]       SSN: [id number]  YOB: 1960        AGE: 59 y.o. SEX: female  Body mass index is 30.67 kg/m². PCP: Nat Cochran MD  01/25/22    Chief Complaint   Patient presents with    Knee Pain     bilat        HISTORY:  Clari Marks is a 59 y.o. female who is seen for reevaluation of Bilateral knee here for 1st injection of euflexxa. PROCEDURE:  Bilateral knee Injection with Ultrasound Guidance    Indication:Bilateral knee pain/swelling    After sterile prep, 2 cc of Euflexxa were injected into the bilateral  Knee. Intra-articular Ultrasound images captured using [employer] Loop Ultrasound machine using a frequency of 10 MHz with a linear transducer and scanned into patient's chart. [facility]  OFFICE PROCEDURE PROGRESS NOTE        Chart reviewed for the following:   Adore LUNA PA-C, have reviewed the History, Physical and updated the Allergic reactions for [address] [place] [address] performed immediately prior to start of procedure:   Adore LUNA PA-C, have performed the following reviews on Clari Marks prior to the start of the procedure:            * Patient was identified by name and date of birth   * Agreement on procedure being performed was verified  * Risks and Benefits explained to the patient  * Procedure site verified and marked as necessary  * Patient was positioned for comfort  * Consent was signed and verified     Time: 9:01 AM       Date of procedure: 1/25/2022    Procedure performed by:  SAMANTHA Nunez    Provider assisted by: None     How tolerated by patient: tolerated the procedure well with no complications    Comments: none    IMPRESSION:     ICD-10-CM ICD-9-CM    1.  Primary osteoarthritis of right knee  M17.11 715.16 sodium hyaluronate (SUPARTZ FX/EUFLEXXA/HYALGAN) 10 mg/mL injection syrg 40 mg      ARTHROCENTESIS ASPIR&/INJ MAJOR JT/BURSA W/US   2. Primary osteoarthritis of left knee  M17.12 715.16 sodium hyaluronate (SUPARTZ FX/EUFLEXXA/HYALGAN) 10 mg/mL injection syrg 40 mg      ARTHROCENTESIS ASPIR&/INJ MAJOR JT/BURSA W/US        PLAN:  Ms. Bernice Ortega will return in one week for her second Euflexxa injection.       Juvencio Queen PA-C  [facility]

## 2022-01-28 ENCOUNTER — ANTI-COAG VISIT (OUTPATIENT)
Dept: CARDIOLOGY CLINIC | Age: 62
End: 2022-01-28
Payer: MEDICARE

## 2022-01-28 DIAGNOSIS — Z79.01 LONG TERM CURRENT USE OF ANTICOAGULANT THERAPY: ICD-10-CM

## 2022-01-28 DIAGNOSIS — I48.91 ATRIAL FIBRILLATION, UNSPECIFIED TYPE (HCC): Primary | ICD-10-CM

## 2022-01-28 LAB
INR BLD: 3.4
PT POC: 40.9 SECONDS
VALID INTERNAL CONTROL?: YES

## 2022-01-28 PROCEDURE — 85610 PROTHROMBIN TIME: CPT | Performed by: NURSE PRACTITIONER

## 2022-01-28 NOTE — PROGRESS NOTES
The INR is above the therapeutic range. Has been taking Tylenol for pain  Ask the patient about bleeding complications. Please make the following adjustments to Coumadin dosing: Hold x 1 then Continue Coumadin 5mg daily except 2.5mg on Tuesday, Thursday, Saturday. Repeat the INR in 3 weeks.

## 2022-02-01 ENCOUNTER — OFFICE VISIT (OUTPATIENT)
Dept: ORTHOPEDIC SURGERY | Age: 62
End: 2022-02-01
Payer: MEDICARE

## 2022-02-01 VITALS — BODY MASS INDEX: 30.45 KG/M2 | WEIGHT: 194 LBS | OXYGEN SATURATION: 96 % | HEIGHT: 67 IN | HEART RATE: 89 BPM

## 2022-02-01 DIAGNOSIS — M17.12 PRIMARY OSTEOARTHRITIS OF LEFT KNEE: ICD-10-CM

## 2022-02-01 DIAGNOSIS — M17.11 PRIMARY OSTEOARTHRITIS OF RIGHT KNEE: Primary | ICD-10-CM

## 2022-02-01 PROCEDURE — 20611 DRAIN/INJ JOINT/BURSA W/US: CPT | Performed by: PHYSICIAN ASSISTANT

## 2022-02-01 NOTE — PROGRESS NOTES
Patient: Lizeth Shen                MRN: [id number]       SSN: [id number]  YOB: 1960        AGE: 59 y.o. SEX: female  There is no height or weight on file to calculate BMI. PCP: Chasity Obrien MD  02/01/22    No chief complaint on file. HISTORY:  Lizeth Shen is a 59 y.o. female who is seen for reevaluation of Bilateral knee here for 2nd injection of euflexxa. PROCEDURE:  Bilateral knee Injection with Ultrasound Guidance    Indication:Bilateral Knee pain/swelling    After sterile prep, 2 cc of Euflexxa were injected into the bilateral  Knee. Intra-articular Ultrasound images captured using [address] Ultrasound machine using a frequency of 10 MHz with a linear transducer and scanned into patient's chart. [facility]  OFFICE PROCEDURE PROGRESS NOTE        Chart reviewed for the following:   Kandice LUNA PA-C, have reviewed the History, Physical and updated the Allergic reactions for [address] [place] [address] performed immediately prior to start of procedure:   Kandice LUNA PA-C, have performed the following reviews on Lizeth Shen prior to the start of the procedure:            * Patient was identified by name and date of birth   * Agreement on procedure being performed was verified  * Risks and Benefits explained to the patient  * Procedure site verified and marked as necessary  * Patient was positioned for comfort  * Consent was signed and verified     Time: 8:53 AM    Date of procedure: 2/1/2022    Procedure performed by:  SAMANTHA Arboleda    Provider assisted by: None     How tolerated by patient: tolerated the procedure well with no complications    Comments: none    IMPRESSION: No diagnosis found. PLAN:  Ms. Brad Juarez will return in one week for her third Euflexxa injection.       Kandice Cisse PA-C   [facility]

## 2022-02-07 ENCOUNTER — TELEPHONE (OUTPATIENT)
Dept: FAMILY MEDICINE CLINIC | Age: 62
End: 2022-02-07

## 2022-02-07 NOTE — TELEPHONE ENCOUNTER
Pt called and stated she was doubling up on her Lax\six and she is down to 3 pills. Pt stated when she called the phcy they say it is too early.  Pt would like to know if the nurse could call the phcy to override her insurance to have her medication filled

## 2022-02-07 NOTE — TELEPHONE ENCOUNTER
Patient identified with 2 identifiers (name and ). Patient states that she has taken care of lasix prescription.  Did make patient aware that cardiology has been prescribing her laxis

## 2022-02-08 ENCOUNTER — OFFICE VISIT (OUTPATIENT)
Dept: ORTHOPEDIC SURGERY | Age: 62
End: 2022-02-08
Payer: MEDICARE

## 2022-02-08 VITALS
TEMPERATURE: 96.9 F | WEIGHT: 198 LBS | BODY MASS INDEX: 31.08 KG/M2 | OXYGEN SATURATION: 97 % | HEART RATE: 73 BPM | HEIGHT: 67 IN

## 2022-02-08 DIAGNOSIS — M17.12 PRIMARY OSTEOARTHRITIS OF LEFT KNEE: ICD-10-CM

## 2022-02-08 DIAGNOSIS — M17.11 PRIMARY OSTEOARTHRITIS OF RIGHT KNEE: Primary | ICD-10-CM

## 2022-02-08 PROCEDURE — 20611 DRAIN/INJ JOINT/BURSA W/US: CPT | Performed by: PHYSICIAN ASSISTANT

## 2022-02-08 NOTE — PROGRESS NOTES
Patient: Liss Carranza                MRN: [id number]       SSN: [id number]  YOB: 1960        AGE: 59 y.o. SEX: female  Body mass index is 31.01 kg/m². PCP: Radha Desai MD  02/08/22    Chief Complaint   Patient presents with    Knee Pain     both knees 3rd inj. HISTORY:  Liss Carranza is a 59 y.o. female who is seen for reevaluation of Bilateral knee here for 3rd and final injection of euflexxa. PROCEDURE:  Bilateral knee Injection with Ultrasound Guidance    Indication:Bilateral Knee pain/swelling    After sterile prep, 2 cc of Euflexxa were injected into the bilateral  Knee. Intra-articular Ultrasound images captured using [employer] [address] Loop Ultrasound machine using a frequency of 10 MHz with a linear transducer and scanned into patient's chart. [facility]  OFFICE PROCEDURE PROGRESS NOTE        Chart reviewed for the following:   Phyllis LUNA PA-C, have reviewed the History, Physical and updated the Allergic reactions for [address] [place] [address] performed immediately prior to start of procedure:   Phyllis LUNA PA-C, have performed the following reviews on Liss Carranza prior to the start of the procedure:            * Patient was identified by name and date of birth   * Agreement on procedure being performed was verified  * Risks and Benefits explained to the patient  * Procedure site verified and marked as necessary  * Patient was positioned for comfort  * Consent was signed and verified     Time: 9:48 AM       Date of procedure: 2/8/2022    Procedure performed by:  SAMANTHA Adam    Provider assisted by: None     How tolerated by patient: tolerated the procedure well with no complications    Comments: none    IMPRESSION:     ICD-10-CM ICD-9-CM    1. Primary osteoarthritis of right knee  M17.11 715.16    2.  Primary osteoarthritis of left knee  M17.12 715.16         PLAN: Ms. Marcos Lianna has completed her Euflexxa injection series. she will return as needed.       [facility]

## 2022-02-12 DIAGNOSIS — I11.0 BENIGN HYPERTENSIVE HEART DISEASE WITH HEART FAILURE (HCC): ICD-10-CM

## 2022-02-14 RX ORDER — METOPROLOL SUCCINATE 100 MG/1
TABLET, EXTENDED RELEASE ORAL
Qty: 90 TABLET | Refills: 1 | Status: SHIPPED | OUTPATIENT
Start: 2022-02-14 | End: 2022-08-04

## 2022-02-18 ENCOUNTER — LAB ONLY (OUTPATIENT)
Dept: ONCOLOGY | Age: 62
End: 2022-02-18

## 2022-02-18 DIAGNOSIS — R77.1 ELEVATED SERUM GLOBULIN LEVEL: Primary | ICD-10-CM

## 2022-02-18 DIAGNOSIS — D63.8 ANEMIA, CHRONIC DISEASE: ICD-10-CM

## 2022-02-18 RX ORDER — AMLODIPINE BESYLATE 5 MG/1
TABLET ORAL
Qty: 90 TABLET | Refills: 3 | Status: SHIPPED | OUTPATIENT
Start: 2022-02-18 | End: 2022-04-12

## 2022-02-18 RX ORDER — LOSARTAN POTASSIUM 50 MG/1
50 TABLET ORAL 2 TIMES DAILY
Qty: 180 TABLET | Refills: 1 | Status: SHIPPED | OUTPATIENT
Start: 2022-02-18 | End: 2022-04-12

## 2022-02-18 NOTE — TELEPHONE ENCOUNTER
This patient contacted office for the following prescriptions to be filled:    Medication requested :   Requested Prescriptions     Pending Prescriptions Disp Refills    losartan (COZAAR) 50 mg tablet 180 Tablet 1     Sig: Take 1 Tablet by mouth two (2) times a day.      [facility]   Mail order or Local pharmacy [address]     Scheduled appointment if not seen by current providers in office:  LOV 1/12/2022 f/u 4/18/2022

## 2022-02-24 ENCOUNTER — HOSPITAL ENCOUNTER (OUTPATIENT)
Dept: LAB | Age: 62
Discharge: HOME | End: 2022-02-24
Payer: MEDICARE

## 2022-02-24 ENCOUNTER — APPOINTMENT (OUTPATIENT)
Dept: ONCOLOGY | Age: 62
End: 2022-02-24

## 2022-02-24 DIAGNOSIS — D63.8 ANEMIA, CHRONIC DISEASE: ICD-10-CM

## 2022-02-24 DIAGNOSIS — R77.1 ELEVATED SERUM GLOBULIN LEVEL: ICD-10-CM

## 2022-02-24 LAB
ALBUMIN SERPL-MCNC: 3.7 G/DL (ref 3.4–5)
ALBUMIN/GLOB SERPL: 0.9 {RATIO} (ref 0.8–1.7)
ALP SERPL-CCNC: 87 U/L (ref 45–117)
ALT SERPL-CCNC: 26 U/L (ref 13–56)
ANION GAP SERPL CALC-SCNC: 6 MMOL/L (ref 3–18)
AST SERPL-CCNC: 29 U/L (ref 10–38)
BASOPHILS # BLD: 0 K/UL (ref 0–0.1)
BASOPHILS NFR BLD: 1 % (ref 0–2)
BILIRUB SERPL-MCNC: 0.6 MG/DL (ref 0.2–1)
BUN SERPL-MCNC: 10 MG/DL (ref 7–18)
BUN/CREAT SERPL: 12 (ref 12–20)
CALCIUM SERPL-MCNC: 9.6 MG/DL (ref 8.5–10.1)
CHLORIDE SERPL-SCNC: 107 MMOL/L (ref 100–111)
CO2 SERPL-SCNC: 28 MMOL/L (ref 21–32)
CREAT SERPL-MCNC: 0.81 MG/DL (ref 0.6–1.3)
DIFFERENTIAL METHOD BLD: ABNORMAL
EOSINOPHIL # BLD: 0.2 K/UL (ref 0–0.4)
EOSINOPHIL NFR BLD: 3 % (ref 0–5)
ERYTHROCYTE [DISTWIDTH] IN BLOOD BY AUTOMATED COUNT: 13.5 % (ref 11.6–14.5)
FERRITIN SERPL-MCNC: 181 NG/ML (ref 8–388)
GLOBULIN SER CALC-MCNC: 4.1 G/DL (ref 2–4)
GLUCOSE SERPL-MCNC: 96 MG/DL (ref 74–99)
HCT VFR BLD AUTO: 36 % (ref 35–45)
HGB BLD-MCNC: 10.7 G/DL (ref 12–16)
IMM GRANULOCYTES # BLD AUTO: 0 K/UL (ref 0–0.04)
IMM GRANULOCYTES NFR BLD AUTO: 0 % (ref 0–0.5)
IRON SATN MFR SERPL: 27 % (ref 20–50)
IRON SERPL-MCNC: 83 UG/DL (ref 50–175)
LYMPHOCYTES # BLD: 2.1 K/UL (ref 0.9–3.6)
LYMPHOCYTES NFR BLD: 35 % (ref 21–52)
MCH RBC QN AUTO: 25.8 PG (ref 24–34)
MCHC RBC AUTO-ENTMCNC: 29.7 G/DL (ref 31–37)
MCV RBC AUTO: 87 FL (ref 78–100)
MONOCYTES # BLD: 0.9 K/UL (ref 0.05–1.2)
MONOCYTES NFR BLD: 15 % (ref 3–10)
NEUTS SEG # BLD: 2.8 K/UL (ref 1.8–8)
NEUTS SEG NFR BLD: 46 % (ref 40–73)
NRBC # BLD: 0 K/UL (ref 0–0.01)
NRBC BLD-RTO: 0 PER 100 WBC
PLATELET # BLD AUTO: 195 K/UL (ref 135–420)
PMV BLD AUTO: 12.4 FL (ref 9.2–11.8)
POTASSIUM SERPL-SCNC: 4.5 MMOL/L (ref 3.5–5.5)
PROT SERPL-MCNC: 7.8 G/DL (ref 6.4–8.2)
RBC # BLD AUTO: 4.14 M/UL (ref 4.2–5.3)
RETICS/RBC NFR AUTO: 2 % (ref 0.5–2.5)
SODIUM SERPL-SCNC: 141 MMOL/L (ref 136–145)
TIBC SERPL-MCNC: 308 UG/DL (ref 250–450)
WBC # BLD AUTO: 6 K/UL (ref 4.6–13.2)

## 2022-02-24 PROCEDURE — 83540 ASSAY OF IRON: CPT

## 2022-02-24 PROCEDURE — 36415 COLL VENOUS BLD VENIPUNCTURE: CPT

## 2022-02-24 PROCEDURE — 82784 ASSAY IGA/IGD/IGG/IGM EACH: CPT

## 2022-02-24 PROCEDURE — 82728 ASSAY OF FERRITIN: CPT

## 2022-02-24 PROCEDURE — 83521 IG LIGHT CHAINS FREE EACH: CPT

## 2022-02-24 PROCEDURE — 80053 COMPREHEN METABOLIC PANEL: CPT

## 2022-02-24 PROCEDURE — 85045 AUTOMATED RETICULOCYTE COUNT: CPT

## 2022-02-24 PROCEDURE — 85025 COMPLETE CBC W/AUTO DIFF WBC: CPT

## 2022-02-25 ENCOUNTER — ANTI-COAG VISIT (OUTPATIENT)
Dept: CARDIOLOGY CLINIC | Age: 62
End: 2022-02-25
Payer: MEDICARE

## 2022-02-25 DIAGNOSIS — Z79.01 LONG TERM CURRENT USE OF ANTICOAGULANT THERAPY: ICD-10-CM

## 2022-02-25 DIAGNOSIS — I48.11 LONGSTANDING PERSISTENT ATRIAL FIBRILLATION (HCC): Primary | ICD-10-CM

## 2022-02-25 LAB
INR BLD: 2.5
KAPPA LC FREE SER-MCNC: 37.6 MG/L (ref 3.3–19.4)
KAPPA LC FREE/LAMBDA FREE SER: 2.09 {RATIO} (ref 0.26–1.65)
LAMBDA LC FREE SERPL-MCNC: 18 MG/L (ref 5.7–26.3)
PT POC: 30.2 SECONDS
VALID INTERNAL CONTROL?: YES

## 2022-02-25 PROCEDURE — 85610 PROTHROMBIN TIME: CPT | Performed by: NURSE PRACTITIONER

## 2022-02-25 NOTE — PROGRESS NOTES
The INR is stable and therapeutic. Continue same dose of coumadin: Continue Coumadin 5mg daily except 2.5mg on Tuesday, Thursday, Saturday. Recheck INR in 3 weeks during follow up appointment with Dr. Evelin Cruz.

## 2022-03-01 LAB
ALBUMIN SERPL ELPH-MCNC: 4.1 G/DL (ref 2.9–4.4)
ALBUMIN/GLOB SERPL: 1.2 {RATIO} (ref 0.7–1.7)
ALPHA1 GLOB SERPL ELPH-MCNC: 0.3 G/DL (ref 0–0.4)
ALPHA2 GLOB SERPL ELPH-MCNC: 0.5 G/DL (ref 0.4–1)
B-GLOBULIN SERPL ELPH-MCNC: 1.5 G/DL (ref 0.7–1.3)
GAMMA GLOB SERPL ELPH-MCNC: 1.4 G/DL (ref 0.4–1.8)
GLOBULIN SER-MCNC: 3.7 G/DL (ref 2.2–3.9)
IGA SERPL-MCNC: 897 MG/DL (ref 87–352)
IGG SERPL-MCNC: 1903 MG/DL (ref 586–1602)
IGM SERPL-MCNC: 91 MG/DL (ref 26–217)
INTERPRETATION SERPL IEP-IMP: ABNORMAL
KAPPA LC FREE SER-MCNC: 38.2 MG/L (ref 3.3–19.4)
KAPPA LC FREE/LAMBDA FREE SER: 2.12 {RATIO} (ref 0.26–1.65)
LAMBDA LC FREE SERPL-MCNC: 18 MG/L (ref 5.7–26.3)
M PROTEIN SERPL ELPH-MCNC: ABNORMAL G/DL
PROT SERPL-MCNC: 7.8 G/DL (ref 6–8.5)

## 2022-03-04 ENCOUNTER — OFFICE VISIT (OUTPATIENT)
Dept: ONCOLOGY | Age: 62
End: 2022-03-04
Payer: MEDICARE

## 2022-03-04 VITALS
DIASTOLIC BLOOD PRESSURE: 82 MMHG | HEIGHT: 67 IN | RESPIRATION RATE: 18 BRPM | OXYGEN SATURATION: 98 % | BODY MASS INDEX: 30.76 KG/M2 | WEIGHT: 196 LBS | SYSTOLIC BLOOD PRESSURE: 139 MMHG | HEART RATE: 67 BPM

## 2022-03-04 DIAGNOSIS — R77.1 ELEVATED SERUM GLOBULIN LEVEL: Primary | ICD-10-CM

## 2022-03-04 DIAGNOSIS — D63.8 ANEMIA, CHRONIC DISEASE: ICD-10-CM

## 2022-03-04 PROCEDURE — G9717 DOC PT DX DEP/BP F/U NT REQ: HCPCS | Performed by: NURSE PRACTITIONER

## 2022-03-04 PROCEDURE — G8754 DIAS BP LESS 90: HCPCS | Performed by: NURSE PRACTITIONER

## 2022-03-04 PROCEDURE — 99213 OFFICE O/P EST LOW 20 MIN: CPT | Performed by: NURSE PRACTITIONER

## 2022-03-04 PROCEDURE — G8752 SYS BP LESS 140: HCPCS | Performed by: NURSE PRACTITIONER

## 2022-03-04 PROCEDURE — 3017F COLORECTAL CA SCREEN DOC REV: CPT | Performed by: NURSE PRACTITIONER

## 2022-03-04 PROCEDURE — G8417 CALC BMI ABV UP PARAM F/U: HCPCS | Performed by: NURSE PRACTITIONER

## 2022-03-04 PROCEDURE — G0463 HOSPITAL OUTPT CLINIC VISIT: HCPCS | Performed by: NURSE PRACTITIONER

## 2022-03-04 PROCEDURE — G9899 SCRN MAM PERF RSLTS DOC: HCPCS | Performed by: NURSE PRACTITIONER

## 2022-03-04 PROCEDURE — G8427 DOCREV CUR MEDS BY ELIG CLIN: HCPCS | Performed by: NURSE PRACTITIONER

## 2022-03-04 NOTE — PROGRESS NOTES
Hematology/Oncology Progress Note    Name: Elder Joseph  Date: 3/4/2022  : 1960    Liana Corcoran MD         Subjective: Follow-up visit, anemia      History of Present Illness:   Ms. Desi Beauchamp is here today for management of her  anemia and elevated serum globulin level . Since last visit she denies any acute changes or new symptoms. She has been taking Coumadin for Afib. Denied any bleeding or bruising issues. Followed with Cardiology. Today the patient denies fevers, chills, weight loss, sweating, skin lumps/bumps, lymphadenopathy, bleeding, bruising, leg tenderness, joint pain. Denied any acute or worsening bone pain or back pain presently. Having intermittent right knee pain and swelling but not at this time. No fever, chills, worsening headache, acute vision change. denied any focal weakness or numbness. Oncologic History:  Oncology History    No history exists. Past Medical History, Family History, and Social History:    Past Medical History:   Diagnosis Date    Aortic valve disorders 2011    Asthma     Atrial fibrillation ([facility] [address] 2011    Congestive heart failure, unspecified 2005    Cleared quickly with Lasix therapy    Echocardiogram 01/10/2012    A-fib. EF 45%. Mild RVE. RVSP 45-50. Massive LAE. Mod TAMI. Mod MS. Severe MR (mean grad 10). Mild-mod AI. Mild TR. Pulmonic systolic flow reversal.  Mild IVCE.  Hives     Hypertension     Iron deficiency anemias     Long term (current) use of anticoagulants 2011    Murmur 2011    Grade I-II/VI systolic ejection murmur along left sternal border, with radiation to the pulmonic area.  S/P cardiac cath 2012    Minimal coronary artery disease. Mild LVE. EF 55%. Mod MR. Mod-severe MS. RA 15.  RV 55/16. PA 58/34. W 36.  CO/CI 3.5/1.9 (TD); 3.61/1.9 ([place]). R to L shunt suggested.     Wears glasses     Weight gain      Past Surgical History:   Procedure Laterality Date    HX HEART CATHETERIZATION  2012    HX HYSTERECTOMY  2006    HX MITRAL VALVE REPLACEMENT  2013    25/33 mm On-X mechanical valve with bilateral pulmonary vein isolation and resection of LA appendage     Social History     Socioeconomic History    Marital status:      Spouse name: Not on file    Number of children: Not on file    Years of education: Not on file    Highest education level: Not on file   Occupational History    Not on file   Tobacco Use    Smoking status: Former Smoker     Packs/day: 0.50     Years: 21.00     Pack years: 10.50     Quit date:      Years since quittin.1    Smokeless tobacco: Never Used   Vaping Use    Vaping Use: Never used   Substance and Sexual Activity    Alcohol use: Not Currently     Alcohol/week: 1.0 standard drink     Types: 1 Glasses of wine per week     Comment: occassionally    Drug use: No    Sexual activity: Yes     Partners: Male     Birth control/protection: None   Other Topics Concern    Not on file   Social History Narrative    Not on file     Social Determinants of Health     Financial Resource Strain:     Difficulty of Paying Living Expenses: Not on file   Food Insecurity:     Worried About Running Out of Food in the Last Year: Not on file    Maria D of Food in the Last Year: Not on file   Transportation Needs:     Lack of Transportation (Medical): Not on file    Lack of Transportation (Non-Medical):  Not on file   Physical Activity:     Days of Exercise per Week: Not on file    Minutes of Exercise per Session: Not on file   Stress:     Feeling of Stress : Not on file   Social Connections:     Frequency of Communication with Friends and Family: Not on file    Frequency of Social Gatherings with Friends and Family: Not on file    Attends [employer] Services: Not on file    Active Member of Clubs or Organizations: Not on file    Attends Club or Organization Meetings: Not on file    Marital Status: Not on file   Intimate Partner Violence:     Fear of Current or Ex-Partner: Not on file    Emotionally Abused: Not on file    Physically Abused: Not on file    Sexually Abused: Not on file   Housing Stability:     Unable to Pay for Housing in the Last Year: Not on file    Number of Places Lived in the Last Year: Not on file    Unstable Housing in the Last Year: Not on file     Family History   Problem Relation Age of Onset    Heart Surgery Father         Open-Heart Surgery    Other Father         Venous Thromboembolism    Hypertension Father     Heart Disease Mother         Enlarged Heart    Hypertension Mother    Miller Diabetes Mother     Diabetes Sister     Hypertension Sister     Diabetes Brother     Heart Disease Paternal Grandmother     Diabetes Sister     Hypertension Sister      Current Outpatient Medications   Medication Sig Dispense Refill    amLODIPine (NORVASC) 5 mg tablet TAKE 1 TABLET BY MOUTH EVERY DAY 90 Tablet 3    losartan (COZAAR) 50 mg tablet Take 1 Tablet by mouth two (2) times a day. 180 Tablet 1    metoprolol succinate (TOPROL-XL) 100 mg tablet TAKE 1 TABLET BY MOUTH EVERY DAY 90 Tablet 1    busPIRone (BUSPAR) 5 mg tablet TAKE 1 TABLET BY MOUTH TWICE A  Tablet 1    sertraline (ZOLOFT) 100 mg tablet TAKE 1 TABLET BY MOUTH EVERY DAY 90 Tablet 1    amLODIPine (NORVASC) 2.5 mg tablet TAKE 1 TABLET BY MOUTH EVERY DAY 90 Tablet 1    atorvastatin (LIPITOR) 40 mg tablet TAKE 1 TABLET BY MOUTH EVERY DAY 90 Tablet 2    albuterol (PROVENTIL HFA, VENTOLIN HFA, PROAIR HFA) 90 mcg/actuation inhaler INHALE 2 PUFFS BY MOUTH EVERY DAY. 1 Each 2    famotidine (PEPCID) 40 mg tablet Take 1 Tablet by mouth daily. 30 Tablet 1    diclofenac (Voltaren) 1 % gel Apply  to affected area four (4) times daily.  OTHER Vitamin B-12 gummies      cyclobenzaprine (FLEXERIL) 10 mg tablet TAKE 1 TABLET BY MOUTH THREE (3) TIMES DAILY AS NEEDED FOR MUSCLE SPASM(S).  30 Tablet 0    furosemide (LASIX) 40 mg tablet TAKE 1 TABLET BY MOUTH EVERY DAY 30 Tablet 11    Klor-Con M20 20 mEq tablet TAKE 1 TABLET BY MOUTH EVERY DAY 30 Tablet 6    Breo Ellipta 200-25 mcg/dose inhaler TAKE 1 PUFF BY MOUTH EVERY DAY 3 Each 0    levocetirizine (XYZAL) 5 mg tablet TAKE 1 TABLET BY MOUTH EVERY DAY 90 Tab 3    montelukast (SINGULAIR) 10 mg tablet TAKE 1 TABLET BY MOUTH EVERY DAY 90 Tab 3    warfarin (COUMADIN) 5 mg tablet TAKE 1 TABLET BY MOUTH DAILY EXCEPT 1/2 TABLET ON TUE-THURS-SAT OR AS DIRECTED 60 Tab 6    acetaminophen (Tylenol Extra Strength) 500 mg tablet Take 650 mg by mouth every six (6) hours as needed for Pain.  budesonide (RHINOCORT AQUA) 32 mcg/actuation nasal spray SPRAY 2 SPRAYS INTO EACH NOSTRIL EVERY DAY 3 Bottle 1    clobetasoL (TEMOVATE) 0.05 % ointment Apply  to affected area two (2) times a day. 15 g 0    OTHER Allergy injections      aspirin delayed-release 81 mg tablet Take 1 Tab by mouth daily. 30 Tab 0    polyethylene glycol (MIRALAX) 17 gram packet Take 1 Packet by mouth daily. (Patient taking differently: Take  by mouth daily as needed for Other (Constipation). ) 30 Packet 0    esomeprazole (NEXIUM) 20 mg capsule Take 20 mg by mouth daily as needed (heart burn).  sodium chloride (SALINE NASAL) 0.65 % nasal squeeze bottle 1 Spray as needed for Congestion.  melatonin tab tablet Take 10 mg by mouth nightly. Review of Systems:  Constitutional: The patient has no acute distress or discomfort. HEENT: The patient denies recent head trauma, eye pain, blurred vision,  hearing deficit, oropharyngeal mucosal pain or lesions, and the patient denies throat pain or discomfort. Lymphatics: The patient denies palpable peripheral lymphadenopathy. Hematologic: The patient denies having bruising, bleeding, or progressive fatigue. Respiratory: Patient denies having shortness of breath, cough, sputum production, fever, or dyspnea on exertion. Cardiovascular:  The patient denies having leg pain, worsening leg swelling, heart palpitations, chest pain, or lightheadedness. Chronic dyspnea on exertion. Gastrointestinal: The patient denies having nausea, emesis, or diarrhea. The patient denies having any hematemesis or blood in the stool. Genitourinary: Patient denies having urinary urgency, frequency, or dysuria. The patient denies having blood in the urine. Psychological: The patient denies having symptoms of nervousness, anxiety, depression, or thoughts of harming self. Skin: Patient denies having skin, ulcerations, or unexplained itching or pruritus. skin rashes (+)  Musculoskeletal: The patient denies having pain in the joints or bones. Objective:     Visit Vitals  /82   Pulse 67   Resp 18   Ht 5' 7\" (1.702 m)   Wt 88.9 kg (196 lb)   SpO2 98%   BMI 30.70 kg/m²         Physical Exam:   Gen. Appearance: The patient is in no acute distress. Skin: There is no bruise or rash. HEENT: The exam is unremarkable. Neck: Supple without lymphadenopathy or thyromegaly. Lungs: Clear to auscultation and percussion; there are no wheezes or rhonchi. Heart: Regular rate and rhythm; there are no gallops, or rubs. Abdomen: Bowel sounds are present and normal.  There is no guarding, tenderness, or hepatosplenomegaly. Extremities: There is no clubbing, cyanosis. Mild pitting edema both legs. Neurologic: There are no focal neurologic deficits. Lymphatics: There is no palpable peripheral lymphadenopathy. Musculoskeletal: The patient has full range of motion at all joints. There is no evidence of joint deformity or effusions. There is no focal joint tenderness. Psychological/psychiatric: There is no clinical evidence of anxiety, depression, or melancholy. Diagnostics:      No results found for this or any previous visit.     Recent Results (from the past 2016 hour(s))   AMB POC PT/INR    Collection Time: 12/17/21  9:30 AM   Result Value Ref Range    VALID INTERNAL CONTROL POC Yes     Prothrombin time (POC) 25.2 seconds    INR POC 2.1    METABOLIC PANEL, COMPREHENSIVE    Collection Time: 12/29/21  1:38 PM   Result Value Ref Range    Sodium 139 136 - 145 mmol/L    Potassium 4.3 3.5 - 5.5 mmol/L    Chloride 105 100 - 111 mmol/L    CO2 31 21 - 32 mmol/L    Anion gap 3 3.0 - 18 mmol/L    Glucose 91 74 - 99 mg/dL    BUN 12 7.0 - 18 MG/DL    Creatinine 0.81 0.6 - 1.3 MG/DL    BUN/Creatinine ratio 15 12 - 20      GFR est AA >60 >60 ml/min/1.73m2    GFR est non-AA >60 >60 ml/min/1.73m2    Calcium 9.4 8.5 - 10.1 MG/DL    Bilirubin, total 0.6 0.2 - 1.0 MG/DL    ALT (SGPT) 25 13 - 56 U/L    AST (SGOT) 30 10 - 38 U/L    Alk. phosphatase 86 45 - 117 U/L    Protein, total 7.6 6.4 - 8.2 g/dL    Albumin 3.7 3.4 - 5.0 g/dL    Globulin 3.9 2.0 - 4.0 g/dL    A-G Ratio 0.9 0.8 - 1.7     AMB POC PT/INR    Collection Time: 01/28/22  9:49 AM   Result Value Ref Range    VALID INTERNAL CONTROL POC Yes     Prothrombin time (POC) 40.9 seconds    INR POC 3.4    CBC WITH AUTOMATED DIFF    Collection Time: 02/24/22 10:13 AM   Result Value Ref Range    WBC 6.0 4.6 - 13.2 K/uL    RBC 4.14 (L) 4.20 - 5.30 M/uL    HGB 10.7 (L) 12.0 - 16.0 g/dL    HCT 36.0 35.0 - 45.0 %    MCV 87.0 78.0 - 100.0 FL    MCH 25.8 24.0 - 34.0 PG    MCHC 29.7 (L) 31.0 - 37.0 g/dL    RDW 13.5 11.6 - 14.5 %    PLATELET 116 071 - 179 K/uL    MPV 12.4 (H) 9.2 - 11.8 FL    NRBC 0.0 0  WBC    ABSOLUTE NRBC 0.00 0.00 - 0.01 K/uL    NEUTROPHILS 46 40 - 73 %    LYMPHOCYTES 35 21 - 52 %    MONOCYTES 15 (H) 3 - 10 %    EOSINOPHILS 3 0 - 5 %    BASOPHILS 1 0 - 2 %    IMMATURE GRANULOCYTES 0 0.0 - 0.5 %    ABS. NEUTROPHILS 2.8 1.8 - 8.0 K/UL    ABS. LYMPHOCYTES 2.1 0.9 - 3.6 K/UL    ABS. MONOCYTES 0.9 0.05 - 1.2 K/UL    ABS. EOSINOPHILS 0.2 0.0 - 0.4 K/UL    ABS. BASOPHILS 0.0 0.0 - 0.1 K/UL    ABS. IMM.  GRANS. 0.0 0.00 - 0.04 K/UL    DF AUTOMATED     METABOLIC PANEL, COMPREHENSIVE    Collection Time: 02/24/22 10:13 AM   Result Value Ref Range    Sodium 141 136 - 145 mmol/L    Potassium 4.5 3.5 - 5.5 mmol/L    Chloride 107 100 - 111 mmol/L    CO2 28 21 - 32 mmol/L    Anion gap 6 3.0 - 18 mmol/L    Glucose 96 74 - 99 mg/dL    BUN 10 7.0 - 18 MG/DL    Creatinine 0.81 0.6 - 1.3 MG/DL    BUN/Creatinine ratio 12 12 - 20      GFR est AA >60 >60 ml/min/1.73m2    GFR est non-AA >60 >60 ml/min/1.73m2    Calcium 9.6 8.5 - 10.1 MG/DL    Bilirubin, total 0.6 0.2 - 1.0 MG/DL    ALT (SGPT) 26 13 - 56 U/L    AST (SGOT) 29 10 - 38 U/L    Alk.  phosphatase 87 45 - 117 U/L    Protein, total 7.8 6.4 - 8.2 g/dL    Albumin 3.7 3.4 - 5.0 g/dL    Globulin 4.1 (H) 2.0 - 4.0 g/dL    A-G Ratio 0.9 0.8 - 1.7     IRON PROFILE    Collection Time: 02/24/22 10:13 AM   Result Value Ref Range    Iron 83 50 - 175 ug/dL    TIBC 308 250 - 450 ug/dL    Iron % saturation 27 20 - 50 %   FERRITIN    Collection Time: 02/24/22 10:13 AM   Result Value Ref Range    Ferritin 181 8 - 388 NG/ML   GAMMOPATHY EVAL, SPEP/BRENTON, IG QT/FLC    Collection Time: 02/24/22 10:13 AM   Result Value Ref Range    Immunoglobulin G, Qt. 1,903 (H) 586 - 1,602 mg/dL    Immunoglobulin A, Qt. 897 (H) 87 - 352 mg/dL    Immunoglobulin M, Qt. 91 26 - 217 mg/dL    Protein, total 7.8 6.0 - 8.5 g/dL    Albumin 4.1 2.9 - 4.4 g/dL    Alpha-1-Globulin 0.3 0.0 - 0.4 g/dL    Alpha-2-Globulin 0.5 0.4 - 1.0 g/dL    Beta Globulin 1.5 (H) 0.7 - 1.3 g/dL    Gamma Globulin 1.4 0.4 - 1.8 g/dL    M-Kwame Not Observed Not Observed g/dL    Globulin, total 3.7 2.2 - 3.9 g/dL    A/G ratio 1.2 0.7 - 1.7      Immunofixation Result Comment      Free Kappa Lt Chains, serum 38.2 (H) 3.3 - 19.4 mg/L    Free Lambda Lt Chains, serum 18.0 5.7 - 26.3 mg/L    Kappa/Lambda ratio, serum 2.12 (H) 0.26 - 1.65     RETICULOCYTE COUNT    Collection Time: 02/24/22 10:13 AM   Result Value Ref Range    Reticulocyte count 2.0 0.5 - 2.5 %   FREE LIGHT CHAINS, KAPPA/LAMBDA, QT    Collection Time: 02/24/22 10:13 AM   Result Value Ref Range    Free Kappa Lt Chains, serum 37.6 (H) 3.3 - 19.4 mg/L Free Lambda Lt Chains, serum 18.0 5.7 - 26.3 mg/L    Kappa/Lambda ratio, serum 2.09 (H) 0.26 - 1.65     AMB POC PT/INR    Collection Time: 02/25/22 10:51 AM   Result Value Ref Range    VALID INTERNAL CONTROL POC Yes     Prothrombin time (POC) 30.2 seconds    INR POC 2.5        Results for Alfie Garcia (MRN [id number])    Ref. Range 1/6/2019 01:09 2/28/2019 16:42 4/12/2019 09:28 8/6/2019 14:23 10/12/2019 09:50   Globulin Latest Ref Range: 2.0 - 4.0 g/dL 3.8 4.5 (H) 4.1 (H) 4.2 (H) 4.5 (H)     9/16/2019  3:37 PM - [facility]     Component Value Flag Ref Range Units Status   Protein, total 7.7   6.0 - 8.5 g/dL Final   Albumin 3.6   2.9 - 4.4 g/dL Final   Alpha-1-globulin 0.2   0.0 - 0.4 g/dL Final   ALPHA-2 GLOBULIN 0.6   0.4 - 1.0 g/dL Final   Beta globulin 1.7  High   0.7 - 1.3 g/dL Final   Gamma globulin 1.6   0.4 - 1.8 g/dL Final   M-Kwame Not Observed   Not Observed g/dL Final   Globulin, total 4.1  High   2.2 - 3.9 g/dL Final   A/G ratio 0.9   0.7 - 1.7   Final           Assessment and Plan     1. Elevated serum globulins   -- Persistent elevated globulin levels since 2/28/2019. Recent SPEP revealed a increased Beta globulin of 1.7, No M spike observed. Her normocytic anemia appears chronically at baseline 10-12 g/dl, no progressive worsening anemia noticed. No hypercalcemia or renal impairment. 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 No M spike. Serum free light chain reported a mild elevated Free Kappa Lt Chains, 30.8; also a near-normal Kappa/Lambda ratio at 2.35. Her urine protein 24h was WNL. Skeletal survey reported no lytic lesions. -- The mild elevation of globulins could be from polyclonal gammopathy which is secondary to inflammatory or reactive processes. The mild elevated Free Kappa Lt Chains with near-normal ratio could also be explained by polyclonal gammopathy. -- Clinical stable.    --- Today I have reviewed with the patient about recent lab reports from 2/17/2022 which include  CBC and CMP. ---monoclonal gammopathy shows Kappa/Lambda ratio 2.12, M-Kwame not observed. 2. Normocytic anemia, likely anemia of chronic diease  -- Her normocytic anemia appears chronically at baseline 10-12 g/dl. Asymptomatic without acute bleeding. Likely this is anemia of chronic dieases. -- Recent CBC showed stable H/H, at 10.7 g/dl and 36%. no other cytopenia. -- I will see the patient back in clinic in annual follow-up. Always sooner if required. Orders Placed This Encounter    FERRITIN     Standing Status:   Future     Standing Expiration Date:   3/4/2023    IRON PROFILE     Standing Status:   Future     Standing Expiration Date:   6/1/3168    METABOLIC PANEL, COMPREHENSIVE     Standing Status:   Future     Standing Expiration Date:   3/3/2023    CBC WITH AUTOMATED DIFF     Standing Status:   Future     Standing Expiration Date:   3/3/2023       All of patient's questions answered to their apparent satisfaction. They verbally show understanding and agreement with the plan. About 25 minutes were spent for this encounter with more than 50% of the time spent in face-to-face counseling and discussing on diagnosis and management plan going forward. Viet Zhong DNP  3/4/2022      Parts of this document has been produced using Dragon dictation system. Unrecognized errors in transcription may be present. Please do not hesitate to reach out for any questions or clarifications.       CC: Germán Alvarenga MD

## 2022-03-04 NOTE — PATIENT INSTRUCTIONS
Learning About High-Iron Foods  What foods are high in iron? The foods you eat contain nutrients, such as vitamins and minerals. Iron is a nutrient. Your body needs the right amount to stay healthy and work as it should. You can use the list below to help you make choices about which foods to eat. Here are some foods that contain iron. They have 1 to 2 milligrams of iron per serving. Fruits  · Figs (dried), 5 figs  Vegetables  · Asparagus (canned), 6 devlin  · Frank, beet, Swiss chard, or turnip greens, 1 cup  · Dried peas, cooked, ½ cup  · Seaweed, spirulina (dried), ¼ cup  · Spinach, (cooked) ½ cup or (raw) 1 cup  Grains  · Cereals, fortified with iron, 1 cup  · Grits (instant, cooked), fortified with iron, ½ cup  Meats and other protein foods  · Beans (kidney, lima, navy, white), canned or cooked, ½ cup  · Beef or lamb, 3 oz  · Chicken giblets, 3 oz  · Chickpeas (garbanzo beans), ½ cup  · Liver of beef, lamb, or pork, 3 oz  · Oysters (cooked), 3 oz  · Sardines (canned), 3 oz  · Soybeans (boiled), ½ cup  · Tofu (firm), ½ cup  Work with your doctor to find out how much of this nutrient you need. Depending on your health, you may need more or less of it in your diet. Current as of: December 17, 2020               Content Version: 13.0  © 2006-2021 [employer]. Care instructions adapted under license by [employer] (which disclaims liability or warranty for this information). If you have questions about a medical condition or this instruction, always ask your healthcare professional. [place] [address] any warranty or liability for your use of this information. Iron-Rich Diet: Care Instructions  Your Care Instructions     Your body needs iron to make hemoglobin. Hemoglobin is a substance in red blood cells that carries oxygen from the lungs to cells all through your body. If you do not get enough iron, your body makes fewer and smaller red blood cells.  As a result, your body's cells may not get enough oxygen. Adult men need 8 milligrams of iron a day; adult women need 18 milligrams of iron a day. After menopause, women need 8 milligrams of iron a day. A pregnant woman needs 27 milligrams of iron a day. Infants and young children have higher iron needs relative to their size than other age groups. People who have lost blood because of ulcers or heavy menstrual periods may become very low in iron and may develop anemia. Most people can get the iron their bodies need by eating enough of certain iron-rich foods. Your doctor may recommend that you take an iron supplement along with eating an iron-rich diet. Follow-up care is a key part of your treatment and safety. Be sure to make and go to all appointments, and call your doctor if you are having problems. It's also a good idea to know your test results and keep a list of the medicines you take. How can you care for yourself at home? · Make iron-rich foods a part of your daily diet. Iron-rich foods include:  ? All meats, such as chicken, beef, lamb, pork, fish, and shellfish. Liver is especially high in iron. ? Leafy green vegetables. ? Raisins, peas, beans, lentils, barley, and eggs. ? Iron-fortified breakfast cereals. · Eat foods with vitamin C along with iron-rich foods. Vitamin C helps you absorb more iron from food. Drink a glass of orange juice or another citrus juice with your food. · Eat meat and vegetables or grains together. The iron in meat helps your body absorb the iron in other foods. Where can you learn more? Go to http://www.gray.com/  Enter Z290 in the search box to learn more about \"Iron-Rich Diet: Care Instructions. \"  Current as of: December 17, 2020               Content Version: 13.0  © 2375-2206 [employer]. Care instructions adapted under license by [employer] (which disclaims liability or warranty for this information).  If you have questions about a medical condition or this instruction, always ask your healthcare professional. [place] [address] any warranty or liability for your use of this information.

## 2022-03-14 RX ORDER — POTASSIUM CHLORIDE 1500 MG/1
TABLET, EXTENDED RELEASE ORAL
Qty: 30 TABLET | Refills: 6 | Status: SHIPPED | OUTPATIENT
Start: 2022-03-14 | End: 2022-11-03

## 2022-03-14 RX ORDER — WARFARIN SODIUM 5 MG/1
TABLET ORAL
Qty: 60 TABLET | Refills: 6 | Status: SHIPPED | OUTPATIENT
Start: 2022-03-14 | End: 2022-09-12 | Stop reason: SDUPTHER

## 2022-03-18 PROBLEM — J32.9 CHRONIC SINUSITIS: Status: ACTIVE | Noted: 2019-10-15

## 2022-03-18 PROBLEM — R07.9 CHEST PAIN: Status: ACTIVE | Noted: 2019-01-05

## 2022-03-18 PROBLEM — I10 ESSENTIAL HYPERTENSION: Status: ACTIVE | Noted: 2019-06-24

## 2022-03-18 PROBLEM — R73.01 IFG (IMPAIRED FASTING GLUCOSE): Status: ACTIVE | Noted: 2019-02-25

## 2022-03-18 PROBLEM — F43.22 ADJUSTMENT DISORDER WITH ANXIOUS MOOD: Status: ACTIVE | Noted: 2019-03-05

## 2022-03-18 PROBLEM — R77.1 ELEVATED SERUM GLOBULIN LEVEL: Status: ACTIVE | Noted: 2019-10-15

## 2022-03-19 PROBLEM — I25.10 CORONARY ARTERY DISEASE INVOLVING NATIVE CORONARY ARTERY OF NATIVE HEART WITHOUT ANGINA PECTORIS: Status: ACTIVE | Noted: 2020-11-03

## 2022-03-19 PROBLEM — I21.4 NSTEMI (NON-ST ELEVATED MYOCARDIAL INFARCTION) (HCC): Status: ACTIVE | Noted: 2019-01-05

## 2022-03-19 PROBLEM — D63.8 ANEMIA OF CHRONIC DISEASE: Status: ACTIVE | Noted: 2019-11-06

## 2022-03-19 PROBLEM — J30.89 OTHER ALLERGIC RHINITIS: Status: ACTIVE | Noted: 2018-11-30

## 2022-03-19 PROBLEM — R60.0 BILATERAL LEG EDEMA: Status: ACTIVE | Noted: 2022-01-12

## 2022-03-19 PROBLEM — F33.9 RECURRENT DEPRESSION (HCC): Status: ACTIVE | Noted: 2021-03-05

## 2022-03-19 PROBLEM — J45.20 MILD INTERMITTENT ASTHMA WITHOUT COMPLICATION: Status: ACTIVE | Noted: 2018-08-17

## 2022-03-20 PROBLEM — F41.9 ANXIETY: Status: ACTIVE | Noted: 2019-10-08

## 2022-03-20 PROBLEM — Z71.89 ADVANCED DIRECTIVES, COUNSELING/DISCUSSION: Status: ACTIVE | Noted: 2017-03-09

## 2022-03-20 PROBLEM — J30.89 PERENNIAL ALLERGIC RHINITIS WITH SEASONAL VARIATION: Status: ACTIVE | Noted: 2018-11-30

## 2022-03-20 PROBLEM — J30.1 ALLERGIC RHINITIS DUE TO POLLEN: Status: ACTIVE | Noted: 2018-11-30

## 2022-03-20 PROBLEM — J30.2 PERENNIAL ALLERGIC RHINITIS WITH SEASONAL VARIATION: Status: ACTIVE | Noted: 2018-11-30

## 2022-03-20 PROBLEM — D64.9 NORMOCYTIC ANEMIA: Status: ACTIVE | Noted: 2019-10-15

## 2022-03-20 PROBLEM — J30.0 VASOMOTOR RHINITIS: Status: ACTIVE | Noted: 2020-02-03

## 2022-03-21 RX ORDER — MONTELUKAST SODIUM 10 MG/1
TABLET ORAL
Qty: 90 TABLET | Refills: 3 | Status: SHIPPED | OUTPATIENT
Start: 2022-03-21

## 2022-03-22 ENCOUNTER — ANTI-COAG VISIT (OUTPATIENT)
Dept: CARDIOLOGY CLINIC | Age: 62
End: 2022-03-22
Payer: MEDICARE

## 2022-03-22 ENCOUNTER — OFFICE VISIT (OUTPATIENT)
Dept: ORTHOPEDIC SURGERY | Age: 62
End: 2022-03-22

## 2022-03-22 VITALS — HEART RATE: 76 BPM | HEIGHT: 67 IN | WEIGHT: 196 LBS | BODY MASS INDEX: 30.76 KG/M2 | OXYGEN SATURATION: 98 %

## 2022-03-22 DIAGNOSIS — M17.12 PRIMARY OSTEOARTHRITIS OF LEFT KNEE: ICD-10-CM

## 2022-03-22 DIAGNOSIS — I48.11 LONGSTANDING PERSISTENT ATRIAL FIBRILLATION (HCC): Primary | ICD-10-CM

## 2022-03-22 DIAGNOSIS — M17.11 PRIMARY OSTEOARTHRITIS OF RIGHT KNEE: Primary | ICD-10-CM

## 2022-03-22 DIAGNOSIS — Z79.01 LONG TERM CURRENT USE OF ANTICOAGULANT THERAPY: ICD-10-CM

## 2022-03-22 LAB
INR BLD: 3.3
PT POC: 39.7 SECONDS
VALID INTERNAL CONTROL?: YES

## 2022-03-22 PROCEDURE — G8756 NO BP MEASURE DOC: HCPCS | Performed by: PHYSICIAN ASSISTANT

## 2022-03-22 PROCEDURE — G8417 CALC BMI ABV UP PARAM F/U: HCPCS | Performed by: PHYSICIAN ASSISTANT

## 2022-03-22 PROCEDURE — 3017F COLORECTAL CA SCREEN DOC REV: CPT | Performed by: PHYSICIAN ASSISTANT

## 2022-03-22 PROCEDURE — 99213 OFFICE O/P EST LOW 20 MIN: CPT | Performed by: PHYSICIAN ASSISTANT

## 2022-03-22 PROCEDURE — G9899 SCRN MAM PERF RSLTS DOC: HCPCS | Performed by: PHYSICIAN ASSISTANT

## 2022-03-22 PROCEDURE — G9717 DOC PT DX DEP/BP F/U NT REQ: HCPCS | Performed by: PHYSICIAN ASSISTANT

## 2022-03-22 PROCEDURE — G8427 DOCREV CUR MEDS BY ELIG CLIN: HCPCS | Performed by: PHYSICIAN ASSISTANT

## 2022-03-22 PROCEDURE — 85610 PROTHROMBIN TIME: CPT | Performed by: NURSE PRACTITIONER

## 2022-03-22 NOTE — PROGRESS NOTES
Skip Schirmer  1960   Chief Complaint   Patient presents with    Knee Pain     both knees         HISTORY OF PRESENT ILLNESS  Skip Schirmer is a 59 y.o. female who presents today for reevaluation of bilateral knees. She finished the euflexxa series on 2/8/22. She states today the pain has returned. She states it is not at all times but it is worse when she is walking or standing. First thing in the morning her knees are very stiff it takes a while to warm them up. Pain is a 9 out of 10 today. She continues with her Tylenol, heat, and Voltaren gel. She continues with her physician directed exercise program as well. .     Patient denies any fever, chills, chest pain, shortness of breath or calf pain. The remainder of the review of systems is negative. There are no new illness or injuries to report since last seen in the office. No changes in medications, allergies, social or family history. PHYSICAL EXAM:   Visit Vitals  Pulse 76   Ht 5' 7\" (1.702 m)   Wt 196 lb (88.9 kg)   SpO2 98%   BMI 30.70 kg/m²     The patient is a well-developed, well-nourished female   in no acute distress. The patient is alert and oriented times three. The patient is alert and oriented times three. Mood and affect are normal.  LYMPHATIC: lymph nodes are not enlarged and are within normal limits  SKIN: normal in color and non tender to palpation. There are no bruises or abrasions noted. NEUROLOGICAL: Motor sensory exam is within normal limits. Reflexes are equal bilaterally.  There is normal sensation to pinprick and light touch  MUSCULOSKELETAL:  Examination Left knee Right knee   Skin Intact Intact   Range of motion 0-115 0-115   Effusion - -   Medial joint line tenderness + +   Lateral joint line tenderness - -   Tenderness Pes Bursa - -   Tenderness insertion MCL - -   Tenderness insertion LCL - -   Leanns - -   Patella crepitus + +   Patella grind - -   Lachman - -   Pivot shift - -   Anterior drawer - - Posterior drawer - -   Varus stress - -   Valgus stress - -   Neurovascular Intact Intact   Calf Swelling and Tenderness to Palpation - -   Alexsander's Test - -   Hamstring Cord Tightness - -            IMPRESSION:      ICD-10-CM ICD-9-CM    1. Primary osteoarthritis of right knee  M17.11 715.16    2. Primary osteoarthritis of left knee  M17.12 715.16         PLAN:   1. Patient with worsening degenerative arthritis with minimal relief from viscosupplementation. Discussed treatment options with the patient including a repeat cortisone injection versus consideration of total knee arthroplasties. Patient would like to hold off on the cortisone junctions at this time. We will continue with her pain management plan as discussed in the HPI. We will refer her to either Dr. Vazquez Munoz or Dr. Leeann Orellana for consideration for further arthritic treatments  Risk factors include: n/a  2. No cortisone injection indicated today   3. No Physical/Occupational Therapy indicated today  4. No diagnostic test indicated today:   5. No durable medical equipment indicated today  6. No referral indicated today   7. No medications indicated today:   8.  No Narcotic indicated today       RTC PRN       MAGG[facility]

## 2022-03-22 NOTE — PROGRESS NOTES
The INR is above the therapeutic range. Ask the patient about bleeding complications. Please make the following adjustments to Coumadin dosing: HOLD x1. Continue Coumadin 5mg daily except 2.5mg on Tuesday/Thursday/Saturday. Repeat the INR in 3 weeks.

## 2022-03-31 RX ORDER — LEVOCETIRIZINE DIHYDROCHLORIDE 5 MG/1
TABLET, FILM COATED ORAL
Qty: 90 TABLET | Refills: 3 | Status: SHIPPED | OUTPATIENT
Start: 2022-03-31

## 2022-03-31 NOTE — TELEPHONE ENCOUNTER
This patient contacted office for the following prescriptions to be filled:    Medication requested :   Requested Prescriptions     Pending Prescriptions Disp Refills    levocetirizine (XYZAL) 5 mg tablet 90 Tablet 3     Sig: TAKE 1 TABLET BY MOUTH EVERY DAY     PCP: [address] [place] [address] or Print: [facility]  Mail order or Local pharmacy [address]     Scheduled appointment if not seen by current providers in office: LOV 1/2/2022 f/u 4/18/2022.

## 2022-04-01 ENCOUNTER — HOSPITAL ENCOUNTER (OUTPATIENT)
Dept: LAB | Age: 62
Discharge: HOME | End: 2022-04-01
Payer: MEDICARE

## 2022-04-01 DIAGNOSIS — R73.01 IFG (IMPAIRED FASTING GLUCOSE): ICD-10-CM

## 2022-04-01 DIAGNOSIS — I10 ESSENTIAL HYPERTENSION: ICD-10-CM

## 2022-04-01 LAB
ALBUMIN SERPL-MCNC: 3.6 G/DL (ref 3.4–5)
ALBUMIN/GLOB SERPL: 0.9 {RATIO} (ref 0.8–1.7)
ALP SERPL-CCNC: 83 U/L (ref 45–117)
ALT SERPL-CCNC: 28 U/L (ref 13–56)
ANION GAP SERPL CALC-SCNC: 3 MMOL/L (ref 3–18)
AST SERPL-CCNC: 32 U/L (ref 10–38)
BILIRUB SERPL-MCNC: 0.6 MG/DL (ref 0.2–1)
BUN SERPL-MCNC: 13 MG/DL (ref 7–18)
BUN/CREAT SERPL: 15 (ref 12–20)
CALCIUM SERPL-MCNC: 9.2 MG/DL (ref 8.5–10.1)
CHLORIDE SERPL-SCNC: 106 MMOL/L (ref 100–111)
CHOLEST SERPL-MCNC: 136 MG/DL
CO2 SERPL-SCNC: 30 MMOL/L (ref 21–32)
CREAT SERPL-MCNC: 0.88 MG/DL (ref 0.6–1.3)
EST. AVERAGE GLUCOSE BLD GHB EST-MCNC: 94 MG/DL
GLOBULIN SER CALC-MCNC: 4.1 G/DL (ref 2–4)
GLUCOSE SERPL-MCNC: 94 MG/DL (ref 74–99)
HBA1C MFR BLD: 4.9 % (ref 4.2–5.6)
HDLC SERPL-MCNC: 70 MG/DL (ref 40–60)
HDLC SERPL: 1.9 {RATIO} (ref 0–5)
LDLC SERPL CALC-MCNC: 47.4 MG/DL (ref 0–100)
LIPID PROFILE,FLP: ABNORMAL
POTASSIUM SERPL-SCNC: 4.4 MMOL/L (ref 3.5–5.5)
PROT SERPL-MCNC: 7.7 G/DL (ref 6.4–8.2)
SODIUM SERPL-SCNC: 139 MMOL/L (ref 136–145)
TRIGL SERPL-MCNC: 93 MG/DL (ref ?–150)
VLDLC SERPL CALC-MCNC: 18.6 MG/DL

## 2022-04-01 PROCEDURE — 83036 HEMOGLOBIN GLYCOSYLATED A1C: CPT

## 2022-04-01 PROCEDURE — 80061 LIPID PANEL: CPT

## 2022-04-01 PROCEDURE — 80053 COMPREHEN METABOLIC PANEL: CPT

## 2022-04-01 PROCEDURE — 36415 COLL VENOUS BLD VENIPUNCTURE: CPT

## 2022-04-12 ENCOUNTER — OFFICE VISIT (OUTPATIENT)
Dept: CARDIOLOGY CLINIC | Age: 62
End: 2022-04-12

## 2022-04-12 VITALS
BODY MASS INDEX: 30.92 KG/M2 | WEIGHT: 197 LBS | OXYGEN SATURATION: 98 % | SYSTOLIC BLOOD PRESSURE: 140 MMHG | DIASTOLIC BLOOD PRESSURE: 90 MMHG | HEIGHT: 67 IN | HEART RATE: 86 BPM

## 2022-04-12 DIAGNOSIS — I11.0 BENIGN HYPERTENSIVE HEART DISEASE WITH HEART FAILURE (HCC): ICD-10-CM

## 2022-04-12 PROCEDURE — G8417 CALC BMI ABV UP PARAM F/U: HCPCS | Performed by: INTERNAL MEDICINE

## 2022-04-12 PROCEDURE — G8755 DIAS BP > OR = 90: HCPCS | Performed by: INTERNAL MEDICINE

## 2022-04-12 PROCEDURE — G9899 SCRN MAM PERF RSLTS DOC: HCPCS | Performed by: INTERNAL MEDICINE

## 2022-04-12 PROCEDURE — 99214 OFFICE O/P EST MOD 30 MIN: CPT | Performed by: INTERNAL MEDICINE

## 2022-04-12 PROCEDURE — G9717 DOC PT DX DEP/BP F/U NT REQ: HCPCS | Performed by: INTERNAL MEDICINE

## 2022-04-12 PROCEDURE — G8427 DOCREV CUR MEDS BY ELIG CLIN: HCPCS | Performed by: INTERNAL MEDICINE

## 2022-04-12 PROCEDURE — G8753 SYS BP > OR = 140: HCPCS | Performed by: INTERNAL MEDICINE

## 2022-04-12 PROCEDURE — 3017F COLORECTAL CA SCREEN DOC REV: CPT | Performed by: INTERNAL MEDICINE

## 2022-04-12 RX ORDER — AMLODIPINE BESYLATE 5 MG/1
5 TABLET ORAL DAILY
Qty: 90 TABLET | Refills: 3 | Status: SHIPPED | OUTPATIENT
Start: 2022-04-12 | End: 2022-08-21

## 2022-04-12 RX ORDER — FUROSEMIDE 40 MG/1
60 TABLET ORAL DAILY
Qty: 135 TABLET | Refills: 3 | Status: SHIPPED | OUTPATIENT
Start: 2022-04-12

## 2022-04-12 RX ORDER — LOSARTAN POTASSIUM 100 MG/1
100 TABLET ORAL DAILY
Qty: 90 TABLET | Refills: 3 | Status: SHIPPED | OUTPATIENT
Start: 2022-04-12

## 2022-04-12 NOTE — PROGRESS NOTES
Lisa Crenshaw presents today for   Chief Complaint   Patient presents with    Follow-up     c/o swelling        Lisa Crenshaw preferred language for health care discussion is english/other. Is someone accompanying this pt? no    Is the patient using any DME equipment during [address]? no    Depression Screening:  3 most recent PHQ Screens 4/12/2022   PHQ Not Done -   Little interest or pleasure in doing things Not at all   Feeling down, depressed, irritable, or hopeless Not at all   Total Score PHQ 2 0       Learning Assessment:  Learning Assessment 9/27/2021   PRIMARY LEARNER Patient   HIGHEST LEVEL OF EDUCATION - PRIMARY LEARNER  GRADUATED HIGH SCHOOL OR GED   BARRIERS PRIMARY LEARNER NONE   CO-LEARNER CAREGIVER No   PRIMARY LANGUAGE ENGLISH    NEED No   LEARNER PREFERENCE PRIMARY LISTENING     -   LEARNING SPECIAL TOPICS No   ANSWERED BY patient   RELATIONSHIP SELF       Abuse Screening:  Abuse Screening Questionnaire 9/27/2021   Do you ever feel afraid of your partner? N   Are you in a relationship with someone who physically or mentally threatens you? N   Is it safe for you to go home? Y       Fall Risk  Fall Risk Assessment, last 12 mths 9/27/2021   Able to walk? Yes   Fall in past 12 months? 0   Do you feel unsteady? 0   Are you worried about falling 0       Pt currently taking Anticoagulant therapy? Warfarin     Coordination of Care:  1. Have you been to the ER, urgent care clinic since your last visit? Hospitalized since your last visit? no    2. Have you seen or consulted any other health care providers outside of the [address] [place] [address] since your last visit? Include any pap smears or colon screening.  no

## 2022-04-12 NOTE — PATIENT INSTRUCTIONS
Decrease norvasc to 5 mg daily   Increase cozaar to 100 mg daily  Increase lasix to 60 mg for 2 days then as directed

## 2022-04-18 ENCOUNTER — OFFICE VISIT (OUTPATIENT)
Dept: FAMILY MEDICINE CLINIC | Age: 62
End: 2022-04-18
Payer: MEDICARE

## 2022-04-18 VITALS
OXYGEN SATURATION: 98 % | SYSTOLIC BLOOD PRESSURE: 118 MMHG | HEIGHT: 67 IN | WEIGHT: 197 LBS | TEMPERATURE: 97.8 F | DIASTOLIC BLOOD PRESSURE: 84 MMHG | BODY MASS INDEX: 30.92 KG/M2 | HEART RATE: 80 BPM | RESPIRATION RATE: 16 BRPM

## 2022-04-18 DIAGNOSIS — I25.10 CORONARY ARTERY DISEASE INVOLVING NATIVE CORONARY ARTERY OF NATIVE HEART WITHOUT ANGINA PECTORIS: ICD-10-CM

## 2022-04-18 DIAGNOSIS — I10 ESSENTIAL HYPERTENSION: ICD-10-CM

## 2022-04-18 DIAGNOSIS — R53.82 CHRONIC FATIGUE: Primary | ICD-10-CM

## 2022-04-18 DIAGNOSIS — G89.29 BILATERAL CHRONIC KNEE PAIN: ICD-10-CM

## 2022-04-18 DIAGNOSIS — E78.00 PURE HYPERCHOLESTEROLEMIA: ICD-10-CM

## 2022-04-18 DIAGNOSIS — R60.0 BILATERAL LEG EDEMA: ICD-10-CM

## 2022-04-18 DIAGNOSIS — F41.9 ANXIETY: ICD-10-CM

## 2022-04-18 DIAGNOSIS — M25.562 BILATERAL CHRONIC KNEE PAIN: ICD-10-CM

## 2022-04-18 DIAGNOSIS — I48.0 PAROXYSMAL ATRIAL FIBRILLATION (HCC): ICD-10-CM

## 2022-04-18 DIAGNOSIS — I21.4 NSTEMI (NON-ST ELEVATED MYOCARDIAL INFARCTION) (HCC): ICD-10-CM

## 2022-04-18 DIAGNOSIS — M25.561 BILATERAL CHRONIC KNEE PAIN: ICD-10-CM

## 2022-04-18 DIAGNOSIS — I11.0 BENIGN HYPERTENSIVE HEART DISEASE WITH HEART FAILURE (HCC): ICD-10-CM

## 2022-04-18 DIAGNOSIS — I09.1 RHEUMATIC VALVULAR DISEASE: ICD-10-CM

## 2022-04-18 DIAGNOSIS — Z79.01 LONG TERM CURRENT USE OF ANTICOAGULANT THERAPY: ICD-10-CM

## 2022-04-18 DIAGNOSIS — I50.32 CHRONIC DIASTOLIC CONGESTIVE HEART FAILURE (HCC): ICD-10-CM

## 2022-04-18 DIAGNOSIS — R73.01 IFG (IMPAIRED FASTING GLUCOSE): ICD-10-CM

## 2022-04-18 DIAGNOSIS — F33.9 RECURRENT DEPRESSION (HCC): ICD-10-CM

## 2022-04-18 PROCEDURE — 99214 OFFICE O/P EST MOD 30 MIN: CPT | Performed by: FAMILY MEDICINE

## 2022-04-18 PROCEDURE — G0463 HOSPITAL OUTPT CLINIC VISIT: HCPCS | Performed by: FAMILY MEDICINE

## 2022-04-18 NOTE — PROGRESS NOTES
1. \"Have you been to the ER, urgent care clinic since your last visit? Hospitalized since your last visit? \" No    2. \"Have you seen or consulted any other health care providers outside of the [address] [place] [address] since your last visit? \" No     3. For patients over 45: Has the patient had a colonoscopy? No FOBT 06/14/2021  Due 06/14/2021     If the patient is female:    4. For patients over 40: Has the patient had a mammogram? 11/09/2021    5. For patients over 21: Has the patient had a pap smear?  Not indicated

## 2022-04-18 NOTE — PROGRESS NOTES
Nataliia Chua, 59 y.o.,  female    SUBJECTIVE  Ff-up l    Anxiety/depression-reports to be doing well She continues to take  buspar and zoloft along with talk therapy    HTN- continues to take norvasc dose to 7.5 mg, metoprolol and cozaar. H/o nstemi, afib, rheumatic valve disease s/p MV repair 2013- denies any chest pain, sob, palpitations, lightheadedness. On chronic coumadin treatment and lasix, following cardiology. HL-taking lipitor without problems. reviewd labs LDL 56    Elevated globulin/anemia of chronic disease- neg work up, monitoring. Following dr. rae    Chronic rhinitis- on allergy shots, following ENT. No longer on advair, not symptomatic. Fatigue- wondering if she will benefit from vitamin b12 shots. Her energy is low, mostly due to chronic knee pains, she was offered steroid injections/considering TKR, requesting 2nd opinion. Refractory to viscosupplementation. She is made aware of b12 level not covered by medicare, and does not want to do so, low yield, no hx malabsorption, not vegetarian.  She is on PPI      ROS:  See HPI, all others negative        Patient Active Problem List   Diagnosis Code    Rheumatic valvular disease I09.1    Chronic diastolic congestive heart failure ([facility] [address] I50.32    Atrial fibrillation (HCC) I48.91    Long term current use of anticoagulant therapy Z79.01    Benign hypertensive heart disease with heart failure (HCC) I11.0    Hyperlipidemia E78.5    Advanced directives, counseling/discussion Z71.89    Mild intermittent asthma without complication U80.86    NSTEMI (non-ST elevated myocardial infarction) ([facility] [address] I21.4    Chest pain R07.9    IFG (impaired fasting glucose) R73.01    Adjustment disorder with anxious mood F43.22    Essential hypertension I10    Other allergic rhinitis J30.89    Anxiety F41.9    Allergic rhinitis due to pollen J30.1    Chronic sinusitis J32.9    Mitral valve stenosis I05.0    Elevated serum globulin level R77.1    Normocytic anemia D64.9    Anemia of chronic disease D63.8    Perennial allergic rhinitis with seasonal variation J30.89, J30.2    Vasomotor rhinitis J30.0    Coronary artery disease involving native coronary artery of native heart without angina pectoris I25.10    Recurrent depression (HCC) F33.9    Bilateral leg edema R60.0       Current Outpatient Medications   Medication Sig Dispense Refill    amLODIPine (NORVASC) 5 mg tablet Take 1 Tablet by mouth daily. 90 Tablet 3    losartan (COZAAR) 100 mg tablet Take 1 Tablet by mouth daily. 90 Tablet 3    furosemide (LASIX) 40 mg tablet Take 1.5 Tablets by mouth daily. 135 Tablet 3    levocetirizine (XYZAL) 5 mg tablet TAKE 1 TABLET BY MOUTH EVERY DAY 90 Tablet 3    montelukast (SINGULAIR) 10 mg tablet TAKE 1 TABLET BY MOUTH EVERY DAY 90 Tablet 3    Klor-Con M20 20 mEq tablet TAKE 1 TABLET BY MOUTH EVERY DAY 30 Tablet 6    warfarin (COUMADIN) 5 mg tablet TAKE 1 TABLET BY MOUTH DAILY EXCEPT 1/2 TABLET ON TUE-THURS-SAT OR AS DIRECTED 60 Tablet 6    metoprolol succinate (TOPROL-XL) 100 mg tablet TAKE 1 TABLET BY MOUTH EVERY DAY 90 Tablet 1    busPIRone (BUSPAR) 5 mg tablet TAKE 1 TABLET BY MOUTH TWICE A  Tablet 1    sertraline (ZOLOFT) 100 mg tablet TAKE 1 TABLET BY MOUTH EVERY DAY 90 Tablet 1    atorvastatin (LIPITOR) 40 mg tablet TAKE 1 TABLET BY MOUTH EVERY DAY 90 Tablet 2    famotidine (PEPCID) 40 mg tablet Take 1 Tablet by mouth daily. 30 Tablet 1    diclofenac (Voltaren) 1 % gel Apply  to affected area four (4) times daily.  OTHER Vitamin B-12 gummies      cyclobenzaprine (FLEXERIL) 10 mg tablet TAKE 1 TABLET BY MOUTH THREE (3) TIMES DAILY AS NEEDED FOR MUSCLE SPASM(S). 30 Tablet 0    Breo Ellipta 200-25 mcg/dose inhaler TAKE 1 PUFF BY MOUTH EVERY DAY 3 Each 0    acetaminophen (Tylenol Extra Strength) 500 mg tablet Take 650 mg by mouth every six (6) hours as needed for Pain.       budesonide (RHINOCORT AQUA) 32 mcg/actuation nasal spray SPRAY 2 SPRAYS INTO EACH NOSTRIL EVERY DAY 3 Bottle 1    OTHER Allergy injections      aspirin delayed-release 81 mg tablet Take 1 Tab by mouth daily. 30 Tab 0    polyethylene glycol (MIRALAX) 17 gram packet Take 1 Packet by mouth daily. (Patient taking differently: Take  by mouth daily as needed for Other (Constipation). ) 30 Packet 0    esomeprazole (NEXIUM) 20 mg capsule Take 20 mg by mouth daily as needed (heart burn).  sodium chloride (SALINE NASAL) 0.65 % nasal squeeze bottle 1 Spray as needed for Congestion.  melatonin tab tablet Take 10 mg by mouth nightly.  albuterol (PROVENTIL HFA, VENTOLIN HFA, PROAIR HFA) 90 mcg/actuation inhaler INHALE 2 PUFFS BY MOUTH EVERY DAY. 1 Each 2    clobetasoL (TEMOVATE) 0.05 % ointment Apply  to affected area two (2) times a day. (Patient not taking: Reported on 4/18/2022) 15 g 0       Allergies   Allergen Reactions    Acetaminophen Itching     Not allergic    Morphine Rash and Itching    Penicillin Hives     Not allergic       Past Medical History:   Diagnosis Date    Aortic valve disorders 1/13/2011    Asthma     Atrial fibrillation ([facility] [address] 1/13/2011    Congestive heart failure, unspecified March 2005    Cleared quickly with Lasix therapy    Echocardiogram 01/10/2012    A-fib. EF 45%. Mild RVE. RVSP 45-50. Massive LAE. Mod TAMI. Mod MS. Severe MR (mean grad 10). Mild-mod AI. Mild TR. Pulmonic systolic flow reversal.  Mild IVCE.  Hives     Hypertension     Iron deficiency anemias     Long term (current) use of anticoagulants 2/23/2011    Murmur 1/2011    Grade I-II/VI systolic ejection murmur along left sternal border, with radiation to the pulmonic area.  S/P cardiac cath 02/24/2012    Minimal coronary artery disease. Mild LVE. EF 55%. Mod MR. Mod-severe MS. RA 15.  RV 55/16. PA 58/34. W 36.  CO/CI 3.5/1.9 (TD); 3.61/1.9 ([place]). R to L shunt suggested.     Wears glasses     Weight gain        Social History Socioeconomic History    Marital status:      Spouse name: Not on file    Number of children: Not on file    Years of education: Not on file    Highest education level: Not on file   Occupational History    Not on file   Tobacco Use    Smoking status: Former Smoker     Packs/day: 0.50     Years: 21.00     Pack years: 10.50     Quit date:      Years since quittin.3    Smokeless tobacco: Never Used   Vaping Use    Vaping Use: Never used   Substance and Sexual Activity    Alcohol use: Not Currently     Alcohol/week: 1.0 standard drink     Types: 1 Glasses of wine per week     Comment: occassionally    Drug use: No    Sexual activity: Yes     Partners: Male     Birth control/protection: None   Other Topics Concern    Not on file   Social History Narrative    Not on file     Social Determinants of Health     Financial Resource Strain:     Difficulty of Paying Living Expenses: Not on file   Food Insecurity:     Worried About Running Out of Food in the Last Year: Not on file    Maria D of Food in the Last Year: Not on file   Transportation Needs:     Lack of Transportation (Medical): Not on file    Lack of Transportation (Non-Medical):  Not on file   Physical Activity:     Days of Exercise per Week: Not on file    Minutes of Exercise per Session: Not on file   Stress:     Feeling of Stress : Not on file   Social Connections:     Frequency of Communication with Friends and Family: Not on file    Frequency of Social Gatherings with Friends and Family: Not on file    Attends [employer] Services: Not on file    Active Member of Clubs or Organizations: Not on file    Attends Club or Organization Meetings: Not on file    Marital Status: Not on file   Intimate Partner Violence:     Fear of Current or Ex-Partner: Not on file    Emotionally Abused: Not on file    Physically Abused: Not on file    Sexually Abused: Not on file   Housing Stability:     Unable to Pay for Housing in the Last Year: Not on file    Number of Places Lived in the Last Year: Not on file    Unstable Housing in the Last Year: Not on file       Family History   Problem Relation Age of Onset    Heart Surgery Father         Open-Heart Surgery    Other Father         Venous Thromboembolism    Hypertension Father     Heart Disease Mother         Enlarged Heart    Hypertension Mother    [facility] Diabetes Mother     Diabetes Sister     Hypertension Sister     Diabetes Brother     Heart Disease Paternal Grandmother     Diabetes Sister     Hypertension Sister          OBJECTIVE    Physical Exam:     Visit Vitals  /84 (BP 1 Location: Left upper arm, BP Patient Position: Sitting, BP Cuff Size: Adult)   Pulse 80   Temp 97.8 °F (36.6 °C) (Temporal)   Resp 16   Ht 5' 7\" (1.702 m)   Wt 197 lb (89.4 kg)   SpO2 98%   BMI 30.85 kg/m²       General: alert, well-appearing,  in no apparent distress or pain  Neck: no JVD  CVS: irregular, mech valve click, + murmur  Lungs:clear to ausculation bilaterally, no crackles, wheezing or rhonchi noted  Abdomen: soft, NT, ND  Skin:no edema, good dp pulses  Psych:  mood and affect normal      Results for orders placed or performed in visit on 04/12/22   AMB POC PT/INR   Result Value Ref Range    VALID INTERNAL CONTROL POC Yes     Prothrombin time (POC) 31.1 seconds    INR POC 2.6        ASSESSMENT/PLAN  Diagnoses and all orders for this visit:      Chronic fatigue  Multifactorial- chronic pain/depression  -     TSH 3RD GENERATION; Future  -     CBC WITH AUTOMATED DIFF;  Future        -      CMP    Bilateral chronic knee pain  For second opinion  Refractory to viscosupplementation  -     REFERRAL TO ORTHOPEDICS      Leg edema  stable  Likely venous insufficiency, end of day pattern/neg CHF work up  Advised low salt diet, compression stockings  Consider reducing ccb dose, if persistent  Continue to take  take extra lasix dose prn    Anxiety   stable  Cont buspar  Cont [id number] mg  cont talk therapy    Recurrent depression  See above    Mild intermittent asthma without complication  PFTs 33/56 normal  Currently on albuterol prn  Will monitor, previously on advair    Essential hypertension  controlled  Cont  norvasc 7.5 mg  Cont cozaar 100 mg   cont metoprolol 100 mg  DASH diet, monitoring    CAD involving native coronary artery of native heart with angina pectoris ([facility] [address]  Asymptomatic  Mild reversible ischemia on stress test 1/2019  On bb, arb, ASA  LDL is <70  Cont lipitor  Lipid panel 3/2022  Following cardiology    Atrial fibrillation, unspecified type ([facility] [address]  Rate controlled, On BB, coumadin  INR therapeutic  Following cards    Hyperlipidemia, unspecified hyperlipidemia type  Good range on lipitor    LDL goal <70  Lipid panel 3/2022  Long term current use of anticoagulant therapy  Cards following    Rheumatic valvular disease  S/p [facility] valve 2013  Chronic anticoagulation    Allergic rhinitis, unspecified seasonality, unspecified trigger  On allergy shots  Cont flonase, benadryl,  singulair    Anemia of chronic disease  Following hematology, monitoring    Impaired fasting glucose  Tlcs, monitoring      Follow-up and Dispositions    · Return in about 3 months (around 7/18/2022), or if symptoms worsen or fail to improve, for non-fasting labs prior to your next visit, routine chronic illness care. Patient understands plan of care. Patient has provided input and agrees with goals.

## 2022-04-18 NOTE — PATIENT INSTRUCTIONS
A Healthy Heart: Care Instructions  Your Care Instructions     Coronary artery disease, also called heart disease, occurs when a substance called plaque builds up in the vessels that supply oxygen-rich blood to your heart muscle. This can narrow the blood vessels and reduce blood flow. A heart attack happens when blood flow is completely blocked. A high-fat diet, smoking, and other factors increase the risk of heart disease. Your doctor has found that you have a chance of having heart disease. You can do lots of things to keep your heart healthy. It may not be easy, but you can change your diet, exercise more, and quit smoking. These steps really work to lower your chance of heart disease. Follow-up care is a key part of your treatment and safety. Be sure to make and go to all appointments, and call your doctor if you are having problems. It's also a good idea to know your test results and keep a list of the medicines you take. How can you care for yourself at home? Diet    · Use less salt when you cook and eat. This helps lower your blood pressure. Taste food before salting. Add only a little salt when you think you need it. With time, your taste buds will adjust to less salt.     · Eat fewer snack items, fast foods, canned soups, and other high-salt, high-fat, processed foods.     · Read food labels and try to avoid saturated and trans fats. They increase your risk of heart disease by raising cholesterol levels.     · Limit the amount of solid fat-butter, margarine, and shortening-you eat. Use olive, peanut, or canola oil when you cook. Bake, broil, and steam foods instead of frying them.     · Eat a variety of fruit and vegetables every day. Dark green, deep orange, red, or yellow fruits and vegetables are especially good for you. Examples include spinach, carrots, peaches, and berries.     · Foods high in fiber can reduce your cholesterol and provide important vitamins and minerals.  High-fiber foods include whole-grain cereals and breads, oatmeal, beans, brown rice, citrus fruits, and apples.     · Eat lean proteins. Heart-healthy proteins include seafood, lean meats and poultry, eggs, beans, peas, nuts, seeds, and soy products.     · Limit drinks and foods with added sugar. These include candy, desserts, and soda pop. Lifestyle changes    · If your doctor recommends it, get more exercise. Walking is a good choice. Bit by bit, increase the amount you walk every day. Try for at least 30 minutes on most days of the week. You also may want to swim, bike, or do other activities.     · Do not smoke. If you need help quitting, talk to your doctor about stop-smoking programs and medicines. These can increase your chances of quitting for good. Quitting smoking may be the most important step you can take to protect your heart. It is never too late to quit.     · Limit alcohol to 2 drinks a day for men and 1 drink a day for women. Too much alcohol can cause health problems.     · Manage other health problems such as diabetes, high blood pressure, and high cholesterol. If you think you may have a problem with alcohol or drug use, talk to your doctor. Medicines    · Take your medicines exactly as prescribed. Call your doctor if you think you are having a problem with your medicine.     · If your doctor recommends aspirin, take the amount directed each day. Make sure you take aspirin and not another kind of pain reliever, such as acetaminophen (Tylenol). When should you call for help? Call 911 if you have symptoms of a heart attack. These may include:    · Chest pain or pressure, or a strange feeling in the chest.     · Sweating.     · Shortness of breath.     · Pain, pressure, or a strange feeling in the back, neck, jaw, or upper belly or in one or both shoulders or arms.     · Lightheadedness or sudden weakness.     · A fast or irregular heartbeat.    After you call 911, the  may tell you to chew 1 adult-strength or 2 to 4 low-dose aspirin. Wait for an ambulance. Do not try to drive yourself. Watch closely for changes in your health, and be sure to contact your doctor if you have any problems. Where can you learn more? Go to http://www.felton.com/  Enter F075 in the search box to learn more about \"A Healthy Heart: Care Instructions. \"  Current as of: January 10, 2022               Content Version: 13.2  © 2006-2022 [employer]. Care instructions adapted under license by [employer] (which disclaims liability or warranty for this information). If you have questions about a medical condition or this instruction, always ask your healthcare professional. [address] any warranty or liability for your use of this information.

## 2022-04-22 NOTE — PROGRESS NOTES
Kenji Montgomery is in the office today for cardiovascular evaluation of her mitral valve disease. She has been followed by Dr. Jaimie Rodgers for the last 16 - 17 years. She was admitted in 2005 to [facility] with congestive heart failure. An echocardiogram at that time demonstrated mild concentric left ventricular hypertrophy  with an ejection fraction of 55%. She had mild to moderate mitral stenosis and moderate mitral regurgitation.     She was not seen again until 2012 at which time she had advancing mitral stenosis and ultimately underwent a cardiac catheterization which demonstrated moderate to severe mitral stenosis with moderate mitral regurgitation, mild aortic insufficiency and  mild coronary artery disease.      She  was referred for surgery which she had on 5/22/13, which consisted of a mitral valve replacement with a 25/23 mm On-X mechanical valve together with bilateral pulmonary vein isolation and resection of the left atrial appendage.     She had echocardiogram done on 1/15/2021. Her EF was 55 to 60%. Prosthetic mitral valve mean gradient was 4.1 mmHg. Pulmonary artery systolic pressure was estimated at 34 mmHg. She had mild to moderate AI. In the office today, she reports increasing peripheral swelling. Her weight is up 9 pounds from her prior appointment on 11/9/2021. She does report improvement in her swelling after being in bed all night. Plan;      Her blood pressure is mildly elevated in the office today at 140/90. Her present cardiac medications include  the metoprolol succinate 100 mg a day, Cozaar 50 a day and amlodipine 7.5 mg daily. She is also on furosemide 40 mg a day and potassium supplementation with K-Violeta con 20 mEq daily as well as Coumadin. We will plan to;     1. Decrease the amlodipine to 5 mg daily    2. Increase losartan to 100 mg daily. 3.  Increase furosemide to 60 mg daily for 3 days.     Her latest lipid profile was done on 4/1/2022 and is as noted below. Cholesterol was 136. HDL was 70. LDL was 47.4. Triglycerides were 93. She remains on Lipitor 40 mg daily. We will plan to see her back in 8 weeks and repeat an echocardiogram at that time. PCP: Santhosh Lou MD       Past Medical History:   Diagnosis Date    Aortic valve disorders 1/13/2011    Asthma     Atrial fibrillation ([address] 1/13/2011    Congestive heart failure, unspecified March 2005    Cleared quickly with Lasix therapy    Echocardiogram 01/10/2012    A-fib. EF 45%. Mild RVE. RVSP 45-50. Massive LAE. Mod TAMI. Mod MS. Severe MR (mean grad 10). Mild-mod AI. Mild TR. Pulmonic systolic flow reversal.  Mild IVCE.  Hives     Hypertension     Iron deficiency anemias     Long term (current) use of anticoagulants 2/23/2011    Murmur 1/2011    Grade I-II/VI systolic ejection murmur along left sternal border, with radiation to the pulmonic area.  S/P cardiac cath 02/24/2012    Minimal coronary artery disease. Mild LVE. EF 55%. Mod MR. Mod-severe MS. RA 15.  RV 55/16. PA 58/34. W 36.  CO/CI 3.5/1.9 (TD); 3.61/1.9 (Dequan). R to L shunt suggested.  Wears glasses     Weight gain          Past Surgical History:   Procedure Laterality Date    HX HEART CATHETERIZATION  2/24/2012    HX HYSTERECTOMY  2006    HX MITRAL VALVE REPLACEMENT  05/22/2013    25/33 mm On-X mechanical valve with bilateral pulmonary vein isolation and resection of LA appendage       Current Outpatient Medications   Medication Sig    amLODIPine (NORVASC) 5 mg tablet Take 1 Tablet by mouth daily.  losartan (COZAAR) 100 mg tablet Take 1 Tablet by mouth daily.  furosemide (LASIX) 40 mg tablet Take 1.5 Tablets by mouth daily.     levocetirizine (XYZAL) 5 mg tablet TAKE 1 TABLET BY MOUTH EVERY DAY    montelukast (SINGULAIR) 10 mg tablet TAKE 1 TABLET BY MOUTH EVERY DAY    Klor-Con M20 20 mEq tablet TAKE 1 TABLET BY MOUTH EVERY DAY    warfarin (COUMADIN) 5 mg tablet TAKE 1 TABLET BY MOUTH DAILY EXCEPT 1/2 TABLET ON TUE-THURS-SAT OR AS DIRECTED    metoprolol succinate (TOPROL-XL) 100 mg tablet TAKE 1 TABLET BY MOUTH EVERY DAY    busPIRone (BUSPAR) 5 mg tablet TAKE 1 TABLET BY MOUTH TWICE A DAY    sertraline (ZOLOFT) 100 mg tablet TAKE 1 TABLET BY MOUTH EVERY DAY    atorvastatin (LIPITOR) 40 mg tablet TAKE 1 TABLET BY MOUTH EVERY DAY    albuterol (PROVENTIL HFA, VENTOLIN HFA, PROAIR HFA) 90 mcg/actuation inhaler INHALE 2 PUFFS BY MOUTH EVERY DAY.  famotidine (PEPCID) 40 mg tablet Take 1 Tablet by mouth daily.  diclofenac (Voltaren) 1 % gel Apply  to affected area four (4) times daily.  OTHER Vitamin B-12 gummies    cyclobenzaprine (FLEXERIL) 10 mg tablet TAKE 1 TABLET BY MOUTH THREE (3) TIMES DAILY AS NEEDED FOR MUSCLE SPASM(S).  Breo Ellipta 200-25 mcg/dose inhaler TAKE 1 PUFF BY MOUTH EVERY DAY    acetaminophen (Tylenol Extra Strength) 500 mg tablet Take 650 mg by mouth every six (6) hours as needed for Pain.  budesonide (RHINOCORT AQUA) 32 mcg/actuation nasal spray SPRAY 2 SPRAYS INTO EACH NOSTRIL EVERY DAY    clobetasoL (TEMOVATE) 0.05 % ointment Apply  to affected area two (2) times a day. (Patient not taking: Reported on 4/18/2022)    OTHER Allergy injections    aspirin delayed-release 81 mg tablet Take 1 Tab by mouth daily.  polyethylene glycol (MIRALAX) 17 gram packet Take 1 Packet by mouth daily. (Patient taking differently: Take  by mouth daily as needed for Other (Constipation). )    esomeprazole (NEXIUM) 20 mg capsule Take 20 mg by mouth daily as needed (heart burn).  sodium chloride (SALINE NASAL) 0.65 % nasal squeeze bottle 1 Spray as needed for Congestion.  melatonin tab tablet Take 10 mg by mouth nightly. No current facility-administered medications for this visit.            Allergies   Allergen Reactions    Acetaminophen Itching     Not allergic    Morphine Rash and Itching    Penicillin Hives     Not allergic       Social History: reports that she quit smoking about 14 years ago. She has a 10.50 pack-year smoking history. She has never used smokeless tobacco. She reports previous alcohol use of about 1.0 standard drink of alcohol per week. She reports that she does not use drugs. Family History   Problem Relation Age of Onset    Heart Surgery Father         Open-Heart Surgery    Other Father         Venous Thromboembolism    Hypertension Father     Heart Disease Mother         Enlarged Heart    Hypertension Mother    Darnell Cruz Diabetes Mother     Diabetes Sister     Hypertension Sister     Diabetes Brother     Heart Disease Paternal Grandmother     Diabetes Sister     Hypertension Sister      Review of Systems:    Constitutional: . Negative for chills, fever, or weight loss. Respiratory:  Negative for cough, hemoptysis, sputum production and shortness of breath. Cardiovascular:  Negative for chest pain, palpitations, claudication and PND. Positive for peripheral swelling  Gastrointestinal: Negative. Musculoskeletal: Negative for back pain, falls, joint pain, myalgias and neck pain. Neurological: Positive for anxiety. Physical Exam:   Visit Vitals  BP (!) 140/90 (BP 1 Location: Left upper arm, BP Patient Position: Sitting, BP Cuff Size: Adult)   Pulse 86   Ht 5' 7\" (1.702 m)   Wt 89.4 kg (197 lb)   SpO2 98%   BMI 30.85 kg/m²       The patient is cooperative and alert  HEENT:   Conjunctivae white and mucosa moist  Neck: Supple without masses, tenderness or thyromegaly. No jugular venous distention. Carotids are without bruits. Cardiovascular: Chest is symmetrical with good excursion. There is a well healed mid sternotomy scar. Irregularly irregular rhythm without  murmurs, rubs, clicks or gallops . Lungs: Clear to auscultation bilaterally. Abdomen: Soft. No masses, tenderness or organomegaly. No abdominal bruits. Extremities: No edema, with full peripheral pulses. No clubbing or cyanosis.        Review of Data: Please refer to past medical history for most recent cardiac testing. Lab Results   Component Value Date/Time    Cholesterol, total 136 04/01/2022 09:24 AM    HDL Cholesterol 70 (H) 04/01/2022 09:24 AM    LDL, calculated 47.4 04/01/2022 09:24 AM    Triglyceride 93 04/01/2022 09:24 AM    CHOL/HDL Ratio 1.9 04/01/2022 09:24 AM     Results for orders placed or performed in visit on 11/9/2021   AMB POC EKG ROUTINE W/ 12 LEADS, INTER & REP     Status: None    Narrative    Atrial fibrillation with a rate of 94 bpm.  Right bundle branch block. Compared to prior tracing of 11/23/2020, right bundle branch block is new. Dima Wiggins MD, F.A.C.C. [facility]  [facility] [place]  [address] [place] [address]. [address]    PLEASE NOTE:  This document has been produced using voice recognition software. Unrecognized errors in transcription may be present.

## 2022-05-06 ENCOUNTER — TELEPHONE (OUTPATIENT)
Dept: FAMILY MEDICINE CLINIC | Age: 62
End: 2022-05-06

## 2022-05-06 NOTE — TELEPHONE ENCOUNTER
Pt states that she has a cough for about 2wks and she is not getting a lot of sleep. Would like to know if something can get called in to the -1120. Pt is aware that Dr Parisa Arnett is not in the office today and will not be back in till Monday and that this might not be addressed till then. She voiced understanding.  Thank you

## 2022-05-09 NOTE — TELEPHONE ENCOUNTER
Patient identified with 2 identifiers (name and ). Spoke with patient and she has been scheduled for a VV at 2:15 today.

## 2022-05-20 ENCOUNTER — ANTI-COAG VISIT (OUTPATIENT)
Dept: CARDIOLOGY CLINIC | Age: 62
End: 2022-05-20
Payer: MEDICARE

## 2022-05-20 DIAGNOSIS — I48.91 ATRIAL FIBRILLATION, UNSPECIFIED TYPE (HCC): Primary | ICD-10-CM

## 2022-05-20 DIAGNOSIS — Z79.01 LONG TERM CURRENT USE OF ANTICOAGULANT THERAPY: ICD-10-CM

## 2022-05-20 LAB
INR BLD: 2.4
PT POC: 29.2 SECONDS
VALID INTERNAL CONTROL?: YES

## 2022-05-20 PROCEDURE — 85610 PROTHROMBIN TIME: CPT | Performed by: NURSE PRACTITIONER

## 2022-05-20 NOTE — PROGRESS NOTES
The INR is stable and therapeutic. Continue Coumadin 5mg daily except 2.5mg on Tues-Thurs-Sat.   Recheck INR in 4 weeks

## 2022-06-07 ENCOUNTER — OFFICE VISIT (OUTPATIENT)
Dept: CARDIOLOGY CLINIC | Age: 62
End: 2022-06-07

## 2022-06-07 ENCOUNTER — ANTI-COAG VISIT (OUTPATIENT)
Dept: CARDIOLOGY CLINIC | Age: 62
End: 2022-06-07
Payer: MEDICARE

## 2022-06-07 VITALS
BODY MASS INDEX: 30.45 KG/M2 | HEART RATE: 94 BPM | SYSTOLIC BLOOD PRESSURE: 132 MMHG | DIASTOLIC BLOOD PRESSURE: 84 MMHG | HEIGHT: 67 IN | WEIGHT: 194 LBS | OXYGEN SATURATION: 99 %

## 2022-06-07 DIAGNOSIS — I48.91 ATRIAL FIBRILLATION, UNSPECIFIED TYPE (HCC): Primary | ICD-10-CM

## 2022-06-07 DIAGNOSIS — I05.0 RHEUMATIC MITRAL STENOSIS: Primary | ICD-10-CM

## 2022-06-07 DIAGNOSIS — Z79.01 LONG TERM CURRENT USE OF ANTICOAGULANT THERAPY: ICD-10-CM

## 2022-06-07 LAB
INR BLD: 1.9
PT POC: 22.7 SECONDS
VALID INTERNAL CONTROL?: YES

## 2022-06-07 PROCEDURE — G9899 SCRN MAM PERF RSLTS DOC: HCPCS | Performed by: INTERNAL MEDICINE

## 2022-06-07 PROCEDURE — G8427 DOCREV CUR MEDS BY ELIG CLIN: HCPCS | Performed by: INTERNAL MEDICINE

## 2022-06-07 PROCEDURE — 85610 PROTHROMBIN TIME: CPT | Performed by: NURSE PRACTITIONER

## 2022-06-07 PROCEDURE — 99214 OFFICE O/P EST MOD 30 MIN: CPT | Performed by: INTERNAL MEDICINE

## 2022-06-07 PROCEDURE — 3017F COLORECTAL CA SCREEN DOC REV: CPT | Performed by: INTERNAL MEDICINE

## 2022-06-07 PROCEDURE — G8754 DIAS BP LESS 90: HCPCS | Performed by: INTERNAL MEDICINE

## 2022-06-07 PROCEDURE — G8752 SYS BP LESS 140: HCPCS | Performed by: INTERNAL MEDICINE

## 2022-06-07 PROCEDURE — G9717 DOC PT DX DEP/BP F/U NT REQ: HCPCS | Performed by: INTERNAL MEDICINE

## 2022-06-07 PROCEDURE — G8417 CALC BMI ABV UP PARAM F/U: HCPCS | Performed by: INTERNAL MEDICINE

## 2022-06-07 NOTE — PROGRESS NOTES
Angie Baez presents today for   Chief Complaint   Patient presents with    Follow-up     8 weeks        Angie Baez preferred language for health care discussion is english/other. Is someone accompanying this pt? no    Is the patient using any DME equipment during [address]? no    Depression Screening:  3 most recent PHQ Screens 5/9/2022   PHQ Not Done -   Little interest or pleasure in doing things Not at all   Feeling down, depressed, irritable, or hopeless Not at all   Total Score PHQ 2 0       Learning Assessment:  Learning Assessment 9/27/2021   PRIMARY LEARNER Patient   HIGHEST LEVEL OF EDUCATION - PRIMARY LEARNER  GRADUATED HIGH SCHOOL OR GED   BARRIERS PRIMARY LEARNER NONE   CO-LEARNER CAREGIVER No   PRIMARY LANGUAGE ENGLISH    NEED No   LEARNER PREFERENCE PRIMARY LISTENING     -   LEARNING SPECIAL TOPICS No   ANSWERED BY patient   RELATIONSHIP SELF       Abuse Screening:  Abuse Screening Questionnaire 9/27/2021   Do you ever feel afraid of your partner? N   Are you in a relationship with someone who physically or mentally threatens you? N   Is it safe for you to go home? Y       Fall Risk  Fall Risk Assessment, last 12 mths 9/27/2021   Able to walk? Yes   Fall in past 12 months? 0   Do you feel unsteady? 0   Are you worried about falling 0       Pt currently taking Anticoagulant therapy? Warfarin     Coordination of Care:  1. Have you been to the ER, urgent care clinic since your last visit? Hospitalized since your last visit? no    2. Have you seen or consulted any other health care providers outside of the [address] [place] [address] since your last visit? Include any pap smears or colon screening.  no

## 2022-06-07 NOTE — PATIENT INSTRUCTIONS
The INR is below the therapeutic range. Please make the following adjustments to Coumadin dosing: Take 5mg today, then Continue Coumadin 5mg daily except 2.5mg on Tues-Thurs-Sat. Repeat the INR in 3-4 weeks.

## 2022-06-19 NOTE — PROGRESS NOTES
Juan David Alvarez is in the office today for cardiovascular reevaluation of her mitral valve disease. She was followed by Dr. Krista Jarquin for the last 16 - 17 years. She was admitted in 2005 to [facility] with congestive heart failure. An echocardiogram at that time demonstrated mild concentric left ventricular hypertrophy  with an ejection fraction of 55%. She had mild to moderate mitral stenosis and moderate mitral regurgitation.     She was not seen again until 2012 at which time she had advancing mitral stenosis and ultimately underwent a cardiac catheterization which demonstrated moderate to severe mitral stenosis with moderate mitral regurgitation, mild aortic insufficiency and  mild coronary artery disease.      She  was referred for surgery which she had on 5/22/13, which consisted of a mitral valve replacement with a 25/23 mm On-X mechanical valve together with bilateral pulmonary vein isolation and resection of the left atrial appendage.     She had echocardiogram done on 1/15/2021. Her EF was 55 to 60%. Prosthetic mitral valve mean gradient was 4.1 mmHg. Pulmonary artery systolic pressure was estimated at 34 mmHg. She had mild to moderate AI. In the office today, she reports she is doing pretty good. She has been trying to work out. She is walking on a treadmill at home. Her breathing has been [place] \". Plan;      Her blood pressure is controlled in the office today at 132/84. Her present cardiac medications include  the metoprolol succinate 100 mg a day, losartan 100 mg a day and amlodipine 5 mg daily. She is also on furosemide 60 mg a day and potassium supplementation with K-Violeta con 20 mEq daily as well as Coumadin. Her latest lipid profile was done on 4/1/2022 and is as noted below. Cholesterol was 136. HDL was 70. LDL was 47.4. Triglycerides were 93. She remains on Lipitor 40 mg daily. We will plan to see her back in 6 months.     PCP: Brando Alva, MD       Past Medical History:   Diagnosis Date    Aortic valve disorders 1/13/2011    Asthma     Atrial fibrillation ([address] 1/13/2011    Congestive heart failure, unspecified March 2005    Cleared quickly with Lasix therapy    Echocardiogram 01/10/2012    A-fib. EF 45%. Mild RVE. RVSP 45-50. Massive LAE. Mod TAMI. Mod MS. Severe MR (mean grad 10). Mild-mod AI. Mild TR. Pulmonic systolic flow reversal.  Mild IVCE.  Hives     Hypertension     Iron deficiency anemias     Long term (current) use of anticoagulants 2/23/2011    Murmur 1/2011    Grade I-II/VI systolic ejection murmur along left sternal border, with radiation to the pulmonic area.  S/P cardiac cath 02/24/2012    Minimal coronary artery disease. Mild LVE. EF 55%. Mod MR. Mod-severe MS. RA 15.  RV 55/16. PA 58/34. W 36.  CO/CI 3.5/1.9 (TD); 3.61/1.9 (Dequan). R to L shunt suggested.  Wears glasses     Weight gain          Past Surgical History:   Procedure Laterality Date    HX HEART CATHETERIZATION  2/24/2012    HX HYSTERECTOMY  2006    HX MITRAL VALVE REPLACEMENT  05/22/2013    25/33 mm On-X mechanical valve with bilateral pulmonary vein isolation and resection of LA appendage       Current Outpatient Medications   Medication Sig    methylPREDNISolone (Medrol, Moisés,) 4 mg tablet Per dose pack instructions    amLODIPine (NORVASC) 5 mg tablet Take 1 Tablet by mouth daily. (Patient not taking: Reported on 5/9/2022)    losartan (COZAAR) 100 mg tablet Take 1 Tablet by mouth daily.  furosemide (LASIX) 40 mg tablet Take 1.5 Tablets by mouth daily.     levocetirizine (XYZAL) 5 mg tablet TAKE 1 TABLET BY MOUTH EVERY DAY    montelukast (SINGULAIR) 10 mg tablet TAKE 1 TABLET BY MOUTH EVERY DAY    Klor-Con M20 20 mEq tablet TAKE 1 TABLET BY MOUTH EVERY DAY    warfarin (COUMADIN) 5 mg tablet TAKE 1 TABLET BY MOUTH DAILY EXCEPT 1/2 TABLET ON TUE-THURS-SAT OR AS DIRECTED    metoprolol succinate (TOPROL-XL) 100 mg tablet TAKE 1 TABLET BY MOUTH EVERY DAY    busPIRone (BUSPAR) 5 mg tablet TAKE 1 TABLET BY MOUTH TWICE A DAY    sertraline (ZOLOFT) 100 mg tablet TAKE 1 TABLET BY MOUTH EVERY DAY    atorvastatin (LIPITOR) 40 mg tablet TAKE 1 TABLET BY MOUTH EVERY DAY    albuterol (PROVENTIL HFA, VENTOLIN HFA, PROAIR HFA) 90 mcg/actuation inhaler INHALE 2 PUFFS BY MOUTH EVERY DAY.  famotidine (PEPCID) 40 mg tablet Take 1 Tablet by mouth daily.  diclofenac (Voltaren) 1 % gel Apply  to affected area four (4) times daily.  OTHER Vitamin B-12 gummies    cyclobenzaprine (FLEXERIL) 10 mg tablet TAKE 1 TABLET BY MOUTH THREE (3) TIMES DAILY AS NEEDED FOR MUSCLE SPASM(S).  Breo Ellipta 200-25 mcg/dose inhaler TAKE 1 PUFF BY MOUTH EVERY DAY    acetaminophen (Tylenol Extra Strength) 500 mg tablet Take 650 mg by mouth every six (6) hours as needed for Pain.  budesonide (RHINOCORT AQUA) 32 mcg/actuation nasal spray SPRAY 2 SPRAYS INTO EACH NOSTRIL EVERY DAY    clobetasoL (TEMOVATE) 0.05 % ointment Apply  to affected area two (2) times a day. (Patient not taking: Reported on 4/18/2022)    OTHER Allergy injections    aspirin delayed-release 81 mg tablet Take 1 Tab by mouth daily.  polyethylene glycol (MIRALAX) 17 gram packet Take 1 Packet by mouth daily. (Patient taking differently: Take  by mouth daily as needed for Other (Constipation). )    esomeprazole (NEXIUM) 20 mg capsule Take 20 mg by mouth daily as needed (heart burn).  sodium chloride (SALINE NASAL) 0.65 % nasal squeeze bottle 1 Spray as needed for Congestion.  melatonin tab tablet Take 10 mg by mouth nightly. No current facility-administered medications for this visit. Allergies   Allergen Reactions    Acetaminophen Itching     Not allergic    Morphine Rash and Itching    Penicillin Hives     Not allergic       Social History:   reports that she quit smoking about 14 years ago. She has a 10.50 pack-year smoking history.  She has never used smokeless tobacco. She reports previous alcohol use of about 1.0 standard drink of alcohol per week. She reports that she does not use drugs. Family History   Problem Relation Age of Onset    Heart Surgery Father         Open-Heart Surgery    Other Father         Venous Thromboembolism    Hypertension Father     Heart Disease Mother         Enlarged Heart    Hypertension Mother    Murrel Neither Diabetes Mother     Diabetes Sister     Hypertension Sister     Diabetes Brother     Heart Disease Paternal Grandmother     Diabetes Sister     Hypertension Sister      Review of Systems:    Constitutional: . Negative for chills, fever, or weight loss. Respiratory:  Negative for cough, hemoptysis, sputum production and shortness of breath. Cardiovascular:  Negative for chest pain, palpitations, claudication and PND. Positive for peripheral swelling  Gastrointestinal: Negative. Musculoskeletal: Negative for back pain, falls, joint pain, myalgias and neck pain. Neurological: Positive for anxiety. Physical Exam:   Visit Vitals  /84 (BP 1 Location: Left upper arm, BP Patient Position: Sitting, BP Cuff Size: Adult)   Pulse 94   Ht 5' 7\" (1.702 m)   Wt 88 kg (194 lb)   SpO2 99%   BMI 30.38 kg/m²       The patient is cooperative and alert  HEENT:   Conjunctivae white and mucosa moist  Neck: Supple without masses, tenderness or thyromegaly. No jugular venous distention. Carotids are without bruits. Cardiovascular: Chest is symmetrical with good excursion. There is a well healed mid sternotomy scar. Irregularly irregular rhythm without  murmurs, rubs, clicks or gallops . Lungs: Clear to auscultation bilaterally. Abdomen: Soft. No masses, tenderness or organomegaly. No abdominal bruits. Extremities: No edema, with full peripheral pulses. No clubbing or cyanosis. Review of Data: Please refer to past medical history for most recent cardiac testing.   Lab Results   Component Value Date/Time    Cholesterol, total 136 04/01/2022 09:24 AM    HDL Cholesterol 70 (H) 04/01/2022 09:24 AM    LDL, calculated 47.4 04/01/2022 09:24 AM    Triglyceride 93 04/01/2022 09:24 AM    CHOL/HDL Ratio 1.9 04/01/2022 09:24 AM     Results for orders placed or performed in visit on 11/9/2021. AMB POC EKG ROUTINE W/ 12 LEADS, INTER & REP     Status: None    Narrative    Atrial fibrillation with a rate of 94 bpm.  Complete ight bundle branch block . No significant change compared to prior tracing         Reinaldo Draper MD, F.A.C.C. [facility]  [facility] [place]  [facility]. [address]    PLEASE NOTE:  This document has been produced using voice recognition software. Unrecognized errors in transcription may be present.

## 2022-06-28 ENCOUNTER — HOSPITAL ENCOUNTER (OUTPATIENT)
Dept: LAB | Age: 62
Discharge: HOME | End: 2022-06-28
Payer: MEDICARE

## 2022-06-28 DIAGNOSIS — R53.82 CHRONIC FATIGUE: ICD-10-CM

## 2022-06-28 DIAGNOSIS — I50.32 CHRONIC DIASTOLIC CONGESTIVE HEART FAILURE (HCC): ICD-10-CM

## 2022-06-28 LAB
ALBUMIN SERPL-MCNC: 3.8 G/DL (ref 3.4–5)
ALBUMIN/GLOB SERPL: 0.9 {RATIO} (ref 0.8–1.7)
ALP SERPL-CCNC: 99 U/L (ref 45–117)
ALT SERPL-CCNC: 27 U/L (ref 13–56)
ANION GAP SERPL CALC-SCNC: 3 MMOL/L (ref 3–18)
AST SERPL-CCNC: 34 U/L (ref 10–38)
BASOPHILS # BLD: 0 K/UL (ref 0–0.1)
BASOPHILS NFR BLD: 1 % (ref 0–2)
BILIRUB SERPL-MCNC: 0.6 MG/DL (ref 0.2–1)
BUN SERPL-MCNC: 11 MG/DL (ref 7–18)
BUN/CREAT SERPL: 13 (ref 12–20)
CALCIUM SERPL-MCNC: 9.6 MG/DL (ref 8.5–10.1)
CHLORIDE SERPL-SCNC: 105 MMOL/L (ref 100–111)
CO2 SERPL-SCNC: 31 MMOL/L (ref 21–32)
CREAT SERPL-MCNC: 0.83 MG/DL (ref 0.6–1.3)
DIFFERENTIAL METHOD BLD: ABNORMAL
EOSINOPHIL # BLD: 0.2 K/UL (ref 0–0.4)
EOSINOPHIL NFR BLD: 3 % (ref 0–5)
ERYTHROCYTE [DISTWIDTH] IN BLOOD BY AUTOMATED COUNT: 14.4 % (ref 11.6–14.5)
GLOBULIN SER CALC-MCNC: 4.2 G/DL (ref 2–4)
GLUCOSE SERPL-MCNC: 112 MG/DL (ref 74–99)
HCT VFR BLD AUTO: 37.1 % (ref 35–45)
HGB BLD-MCNC: 11.3 G/DL (ref 12–16)
IMM GRANULOCYTES # BLD AUTO: 0 K/UL (ref 0–0.04)
IMM GRANULOCYTES NFR BLD AUTO: 0 % (ref 0–0.5)
LYMPHOCYTES # BLD: 2 K/UL (ref 0.9–3.6)
LYMPHOCYTES NFR BLD: 30 % (ref 21–52)
MCH RBC QN AUTO: 25.8 PG (ref 24–34)
MCHC RBC AUTO-ENTMCNC: 30.5 G/DL (ref 31–37)
MCV RBC AUTO: 84.7 FL (ref 78–100)
MONOCYTES # BLD: 0.7 K/UL (ref 0.05–1.2)
MONOCYTES NFR BLD: 11 % (ref 3–10)
NEUTS SEG # BLD: 3.7 K/UL (ref 1.8–8)
NEUTS SEG NFR BLD: 56 % (ref 40–73)
NRBC # BLD: 0 K/UL (ref 0–0.01)
NRBC BLD-RTO: 0 PER 100 WBC
PLATELET # BLD AUTO: 181 K/UL (ref 135–420)
PMV BLD AUTO: 12.5 FL (ref 9.2–11.8)
POTASSIUM SERPL-SCNC: 4.3 MMOL/L (ref 3.5–5.5)
PROT SERPL-MCNC: 8 G/DL (ref 6.4–8.2)
RBC # BLD AUTO: 4.38 M/UL (ref 4.2–5.3)
SODIUM SERPL-SCNC: 139 MMOL/L (ref 136–145)
TSH SERPL DL<=0.05 MIU/L-ACNC: 2.13 UIU/ML (ref 0.36–3.74)
WBC # BLD AUTO: 6.6 K/UL (ref 4.6–13.2)

## 2022-06-28 PROCEDURE — 80053 COMPREHEN METABOLIC PANEL: CPT

## 2022-06-28 PROCEDURE — 36415 COLL VENOUS BLD VENIPUNCTURE: CPT

## 2022-06-28 PROCEDURE — 85025 COMPLETE CBC W/AUTO DIFF WBC: CPT

## 2022-06-28 PROCEDURE — 84443 ASSAY THYROID STIM HORMONE: CPT

## 2022-07-05 ENCOUNTER — ANTI-COAG VISIT (OUTPATIENT)
Dept: CARDIOLOGY CLINIC | Age: 62
End: 2022-07-05
Payer: MEDICARE

## 2022-07-05 DIAGNOSIS — Z79.01 LONG TERM CURRENT USE OF ANTICOAGULANT THERAPY: ICD-10-CM

## 2022-07-05 DIAGNOSIS — I48.91 ATRIAL FIBRILLATION, UNSPECIFIED TYPE (HCC): Primary | ICD-10-CM

## 2022-07-05 LAB
INR BLD: 2.8
PT POC: 33.9 SECONDS
VALID INTERNAL CONTROL?: YES

## 2022-07-05 PROCEDURE — 85610 PROTHROMBIN TIME: CPT | Performed by: NURSE PRACTITIONER

## 2022-08-04 DIAGNOSIS — I11.0 BENIGN HYPERTENSIVE HEART DISEASE WITH HEART FAILURE (HCC): ICD-10-CM

## 2022-08-04 RX ORDER — METOPROLOL SUCCINATE 100 MG/1
TABLET, EXTENDED RELEASE ORAL
Qty: 30 TABLET | Refills: 5 | Status: SHIPPED | OUTPATIENT
Start: 2022-08-04

## 2022-08-05 DIAGNOSIS — M54.42 ACUTE MIDLINE LOW BACK PAIN WITH LEFT-SIDED SCIATICA: ICD-10-CM

## 2022-08-08 RX ORDER — CYCLOBENZAPRINE HCL 10 MG
10 TABLET ORAL
Qty: 30 TABLET | Refills: 0 | Status: SHIPPED | OUTPATIENT
Start: 2022-08-08

## 2022-08-09 ENCOUNTER — OFFICE VISIT (OUTPATIENT)
Dept: FAMILY MEDICINE CLINIC | Age: 62
End: 2022-08-09
Payer: MEDICARE

## 2022-08-09 VITALS
HEART RATE: 93 BPM | WEIGHT: 190 LBS | SYSTOLIC BLOOD PRESSURE: 128 MMHG | BODY MASS INDEX: 29.82 KG/M2 | DIASTOLIC BLOOD PRESSURE: 78 MMHG | HEIGHT: 67 IN | RESPIRATION RATE: 16 BRPM | TEMPERATURE: 98 F

## 2022-08-09 DIAGNOSIS — I25.10 CORONARY ARTERY DISEASE INVOLVING NATIVE CORONARY ARTERY OF NATIVE HEART WITHOUT ANGINA PECTORIS: ICD-10-CM

## 2022-08-09 DIAGNOSIS — R60.0 LEG EDEMA: ICD-10-CM

## 2022-08-09 DIAGNOSIS — J45.20 MILD INTERMITTENT ASTHMA WITHOUT COMPLICATION: ICD-10-CM

## 2022-08-09 DIAGNOSIS — F41.9 ANXIETY: Primary | ICD-10-CM

## 2022-08-09 DIAGNOSIS — F33.9 RECURRENT DEPRESSION (HCC): ICD-10-CM

## 2022-08-09 DIAGNOSIS — Z12.11 COLON CANCER SCREENING: ICD-10-CM

## 2022-08-09 DIAGNOSIS — E78.00 PURE HYPERCHOLESTEROLEMIA: ICD-10-CM

## 2022-08-09 DIAGNOSIS — I09.1 RHEUMATIC VALVULAR DISEASE: ICD-10-CM

## 2022-08-09 DIAGNOSIS — I48.0 PAROXYSMAL ATRIAL FIBRILLATION (HCC): ICD-10-CM

## 2022-08-09 DIAGNOSIS — R73.01 IFG (IMPAIRED FASTING GLUCOSE): ICD-10-CM

## 2022-08-09 DIAGNOSIS — D63.8 ANEMIA OF CHRONIC DISEASE: ICD-10-CM

## 2022-08-09 DIAGNOSIS — I10 ESSENTIAL HYPERTENSION: ICD-10-CM

## 2022-08-09 PROCEDURE — 3017F COLORECTAL CA SCREEN DOC REV: CPT | Performed by: FAMILY MEDICINE

## 2022-08-09 PROCEDURE — G0463 HOSPITAL OUTPT CLINIC VISIT: HCPCS | Performed by: FAMILY MEDICINE

## 2022-08-09 PROCEDURE — G9899 SCRN MAM PERF RSLTS DOC: HCPCS | Performed by: FAMILY MEDICINE

## 2022-08-09 PROCEDURE — G8417 CALC BMI ABV UP PARAM F/U: HCPCS | Performed by: FAMILY MEDICINE

## 2022-08-09 PROCEDURE — G8427 DOCREV CUR MEDS BY ELIG CLIN: HCPCS | Performed by: FAMILY MEDICINE

## 2022-08-09 PROCEDURE — G9717 DOC PT DX DEP/BP F/U NT REQ: HCPCS | Performed by: FAMILY MEDICINE

## 2022-08-09 PROCEDURE — G9231 DOC ESRD DIA TRANS PREG: HCPCS | Performed by: FAMILY MEDICINE

## 2022-08-09 PROCEDURE — 99214 OFFICE O/P EST MOD 30 MIN: CPT | Performed by: FAMILY MEDICINE

## 2022-08-09 NOTE — PATIENT INSTRUCTIONS
A Healthy Heart: Care Instructions  Your Care Instructions     Coronary artery disease, also called heart disease, occurs when a substance called plaque builds up in the vessels that supply oxygen-rich blood to your heart muscle. This can narrow the blood vessels and reduce blood flow. A heart attack happens when blood flow is completely blocked. A high-fat diet, smoking, and other factors increase the risk of heart disease. Your doctor has found that you have a chance of having heart disease. You can do lots of things to keep your heart healthy. It may not be easy, but you can change your diet, exercise more, and quit smoking. These steps really work to lower your chance of heart disease. Follow-up care is a key part of your treatment and safety. Be sure to make and go to all appointments, and call your doctor if you are having problems. It's also a good idea to know your test results and keep a list of the medicines you take. How can you care for yourself at home? Diet    Use less salt when you cook and eat. This helps lower your blood pressure. Taste food before salting. Add only a little salt when you think you need it. With time, your taste buds will adjust to less salt. Eat fewer snack items, fast foods, canned soups, and other high-salt, high-fat, processed foods. Read food labels and try to avoid saturated and trans fats. They increase your risk of heart disease by raising cholesterol levels. Limit the amount of solid fat-butter, margarine, and shortening-you eat. Use olive, peanut, or canola oil when you cook. Bake, broil, and steam foods instead of frying them. Eat a variety of fruit and vegetables every day. Dark green, deep orange, red, or yellow fruits and vegetables are especially good for you. Examples include spinach, carrots, peaches, and berries. Foods high in fiber can reduce your cholesterol and provide important vitamins and minerals.  High-fiber foods include whole-grain cereals and breads, oatmeal, beans, brown rice, citrus fruits, and apples. Eat lean proteins. Heart-healthy proteins include seafood, lean meats and poultry, eggs, beans, peas, nuts, seeds, and soy products. Limit drinks and foods with added sugar. These include candy, desserts, and soda pop. Lifestyle changes    If your doctor recommends it, get more exercise. Walking is a good choice. Bit by bit, increase the amount you walk every day. Try for at least 30 minutes on most days of the week. You also may want to swim, bike, or do other activities. Do not smoke. If you need help quitting, talk to your doctor about stop-smoking programs and medicines. These can increase your chances of quitting for good. Quitting smoking may be the most important step you can take to protect your heart. It is never too late to quit. Limit alcohol to 2 drinks a day for men and 1 drink a day for women. Too much alcohol can cause health problems. Manage other health problems such as diabetes, high blood pressure, and high cholesterol. If you think you may have a problem with alcohol or drug use, talk to your doctor. Medicines    Take your medicines exactly as prescribed. Call your doctor if you think you are having a problem with your medicine. If your doctor recommends aspirin, take the amount directed each day. Make sure you take aspirin and not another kind of pain reliever, such as acetaminophen (Tylenol). When should you call for help? Call 911 if you have symptoms of a heart attack. These may include:    Chest pain or pressure, or a strange feeling in the chest.     Sweating. Shortness of breath. Pain, pressure, or a strange feeling in the back, neck, jaw, or upper belly or in one or both shoulders or arms. Lightheadedness or sudden weakness. A fast or irregular heartbeat. After you call 911, the  may tell you to chew 1 adult-strength or 2 to 4 low-dose aspirin. Wait for an ambulance. Do not try to drive yourself. Watch closely for changes in your health, and be sure to contact your doctor if you have any problems. Where can you learn more? Go to http://www.[employer].com/  Enter F075 in the search box to learn more about \"A Healthy Heart: Care Instructions. \"  Current as of: January 10, 2022               Content Version: 13.2  © 2006-2022 [employer]. Care instructions adapted under license by [employer] (which disclaims liability or warranty for this information). If you have questions about a medical condition or this instruction, always ask your healthcare professional. [address] any warranty or liability for your use of this information.

## 2022-08-09 NOTE — PROGRESS NOTES
1. Have you been to the ER, urgent care clinic since your last visit? Hospitalized since your last visit? No    2. Have you seen or consulted any other health care providers outside of the [address] [place] [address] since your last visit? Include any pap smears or colon screening.  Dr. Edgar [facility]

## 2022-08-09 NOTE — PROGRESS NOTES
Severiano Poncho, 57 y.o.,  female    SUBJECTIVE  Ff-up     Anxiety/depression-reports to be doing  fairly well, grieving recent loss of sister otherwise good support system. She continues to take  buspar and zoloft     HTN- cardiology modified meds: norvasc 5, metoprolol and cozaar 100 mg and lasix 40 mg daily. Denies any sob, cp, leg edema, pillow orthopnea. H/o nstemi, afib, rheumatic valve disease s/p MV repair 2013- denies any chest pain, sob, palpitations, lightheadedness. On chronic coumadin treatment and lasix, following cardiology. HL-taking lipitor without problems. Elevated globulin/anemia of chronic disease- neg work up, monitoring. Following dr. rae    Chronic rhinitis- on allergy shots, following ENT. No longer on advair, not symptomatic.      ROS:  See HPI, all others negative        Patient Active Problem List   Diagnosis Code    Rheumatic valvular disease I09.1    Chronic diastolic congestive heart failure (HCC) I50.32    Atrial fibrillation (HCC) I48.91    Long term current use of anticoagulant therapy Z79.01    Benign hypertensive heart disease with heart failure (HCC) I11.0    Hyperlipidemia E78.5    Advanced directives, counseling/discussion Z71.89    Mild intermittent asthma without complication R09.79    NSTEMI (non-ST elevated myocardial infarction) ([facility] [address] I21.4    Chest pain R07.9    IFG (impaired fasting glucose) R73.01    Adjustment disorder with anxious mood F43.22    Essential hypertension I10    Other allergic rhinitis J30.89    Anxiety F41.9    Allergic rhinitis due to pollen J30.1    Chronic sinusitis J32.9    Mitral valve stenosis I05.0    Elevated serum globulin level R77.1    Normocytic anemia D64.9    Anemia of chronic disease D63.8    Perennial allergic rhinitis with seasonal variation J30.89, J30.2    Vasomotor rhinitis J30.0    Coronary artery disease involving native coronary artery of native heart without angina pectoris I25.10    Recurrent depression (HCC) F33.9 Bilateral leg edema R60.0       Current Outpatient Medications   Medication Sig Dispense Refill    cyclobenzaprine (FLEXERIL) 10 mg tablet TAKE 1 TABLET BY MOUTH THREE (3) TIMES DAILY AS NEEDED FOR MUSCLE SPASM(S). 30 Tablet 0    metoprolol succinate (TOPROL-XL) 100 mg tablet TAKE 1 TABLET BY MOUTH EVERY DAY 30 Tablet 5    Breo Ellipta 200-25 mcg/dose inhaler INHALE 1 PUFF BY MOUTH EVERY  Each 1    losartan (COZAAR) 100 mg tablet Take 1 Tablet by mouth daily. 90 Tablet 3    furosemide (LASIX) 40 mg tablet Take 1.5 Tablets by mouth daily. 135 Tablet 3    levocetirizine (XYZAL) 5 mg tablet TAKE 1 TABLET BY MOUTH EVERY DAY 90 Tablet 3    montelukast (SINGULAIR) 10 mg tablet TAKE 1 TABLET BY MOUTH EVERY DAY 90 Tablet 3    Klor-Con M20 20 mEq tablet TAKE 1 TABLET BY MOUTH EVERY DAY 30 Tablet 6    warfarin (COUMADIN) 5 mg tablet TAKE 1 TABLET BY MOUTH DAILY EXCEPT 1/2 TABLET ON TUE-THURS-SAT OR AS DIRECTED 60 Tablet 6    busPIRone (BUSPAR) 5 mg tablet TAKE 1 TABLET BY MOUTH TWICE A  Tablet 1    sertraline (ZOLOFT) 100 mg tablet TAKE 1 TABLET BY MOUTH EVERY DAY 90 Tablet 1    atorvastatin (LIPITOR) 40 mg tablet TAKE 1 TABLET BY MOUTH EVERY DAY 90 Tablet 2    albuterol (PROVENTIL HFA, VENTOLIN HFA, PROAIR HFA) 90 mcg/actuation inhaler INHALE 2 PUFFS BY MOUTH EVERY DAY. 1 Each 2    famotidine (PEPCID) 40 mg tablet Take 1 Tablet by mouth daily. 30 Tablet 1    diclofenac (VOLTAREN) 1 % gel Apply  to affected area four (4) times daily. OTHER Vitamin B-12 gummies      acetaminophen (TYLENOL) 500 mg tablet Take 650 mg by mouth every six (6) hours as needed for Pain. budesonide (RHINOCORT AQUA) 32 mcg/actuation nasal spray SPRAY 2 SPRAYS INTO EACH NOSTRIL EVERY DAY 3 Bottle 1    clobetasoL (TEMOVATE) 0.05 % ointment Apply  to affected area two (2) times a day. 15 g 0    OTHER Allergy injections      aspirin delayed-release 81 mg tablet Take 1 Tab by mouth daily.  30 Tab 0    polyethylene glycol (MIRALAX) 17 gram packet Take 1 Packet by mouth daily. (Patient taking differently: Take  by mouth daily as needed for PRN Reason (Other) (Constipation). ) 30 Packet 0    esomeprazole (NEXIUM) 20 mg capsule Take 20 mg by mouth daily as needed (heart burn). sodium chloride (OCEAN) 0.65 % nasal squeeze bottle 1 Spray as needed for Congestion. melatonin tab tablet Take 10 mg by mouth nightly. amLODIPine (NORVASC) 5 mg tablet Take 1 Tablet by mouth daily. (Patient not taking: No sig reported) 90 Tablet 3       Allergies   Allergen Reactions    Morphine Hives, Rash and Itching    Acetaminophen Itching     Per Patient Not allergic    Penicillin Hives     Per Patient Not allergic       Past Medical History:   Diagnosis Date    Aortic valve disorders 1/13/2011    Asthma     Atrial fibrillation ([facility] [address] 1/13/2011    Congestive heart failure, unspecified March 2005    Cleared quickly with Lasix therapy    Echocardiogram 01/10/2012    A-fib. EF 45%. Mild RVE. RVSP 45-50. Massive LAE. Mod TAMI. Mod MS. Severe MR (mean grad 10). Mild-mod AI. Mild TR. Pulmonic systolic flow reversal.  Mild IVCE. Hives     Hypertension     Iron deficiency anemias     Long term (current) use of anticoagulants 2/23/2011    Murmur 1/2011    Grade I-II/VI systolic ejection murmur along left sternal border, with radiation to the pulmonic area. S/P cardiac cath 02/24/2012    Minimal coronary artery disease. Mild LVE. EF 55%. Mod MR. Mod-severe MS. RA 15.  RV 55/16. PA 58/34. W 36.  CO/CI 3.5/1.9 (TD); 3.61/1.9 (Dequan). R to L shunt suggested.     Wears glasses     Weight gain        Social History     Socioeconomic History    Marital status:      Spouse name: Not on file    Number of children: Not on file    Years of education: Not on file    Highest education level: Not on file   Occupational History    Not on file   Tobacco Use    Smoking status: Former     Packs/day: 0.50     Years: 21.00     Pack years: 10.50     Types: Cigarettes     Quit date:      Years since quittin.6    Smokeless tobacco: Never   Vaping Use    Vaping Use: Never used   Substance and Sexual Activity    Alcohol use: Not Currently     Alcohol/week: 1.0 standard drink     Types: 1 Glasses of wine per week     Comment: occassionally    Drug use: No    Sexual activity: Yes     Partners: Male     Birth control/protection: None   Other Topics Concern    Not on file   Social History Narrative    Not on file     Social Determinants of Health     Financial Resource Strain: Not on file   Food Insecurity: Not on file   Transportation Needs: Not on file   Physical Activity: Not on file   Stress: Not on file   Social Connections: Not on file   Intimate Partner Violence: Not on file   Housing Stability: Not on file       Family History   Problem Relation Age of Onset    Heart Surgery Father         Open-Heart Surgery    Other Father         Venous Thromboembolism    Hypertension Father     Heart Disease Mother         Enlarged Heart    Hypertension Mother     Diabetes Mother     Diabetes Sister     Hypertension Sister     Diabetes Brother     Heart Disease Paternal Grandmother     Diabetes Sister     Hypertension Sister          OBJECTIVE    Physical Exam:     Visit Vitals  /78 (BP 1 Location: Left upper arm, BP Patient Position: Sitting, BP Cuff Size: Adult)   Pulse 93   Temp 98 °F (36.7 °C) (Temporal)   Resp 16   Ht 5' 7\" (1.702 m)   Wt 190 lb (86.2 kg)   BMI 29.76 kg/m²       General: alert, well-appearing,  in no apparent distress or pain  Neck: no JVD  CVS: irregular, mech valve click, + murmur  Lungs:clear to ausculation bilaterally, no crackles, wheezing or rhonchi noted  Abdomen: soft, NT, ND  Skin:no edema, good dp pulses  Psych:  mood and affect normal    ASSESSMENT/PLAN  Diagnoses and all orders for this visit:  Anxiety   stable  Cont buspar  Cont [id number] mg    Recurrent depression  Stable  See above    Leg edema  stable  Likely venous insufficiency, end of day pattern/neg CHF work up  Advised low salt diet, compression stockings  Continue to take  take extra lasix dose prn    Mild intermittent asthma without complication  PFTs 02/09 normal  Currently on albuterol prn  Will monitor, previously on advair    Essential hypertension  Controlled  Cardiology recently adjusted meds due to leg edema  Cont  norvasc 5 mg  Cont cozaar 100 mg   cont metoprolol 100 mg  Cont lasix 60 mg   DASH diet, monitoring    CAD involving native coronary artery of native heart with angina pectoris ([facility] [address]  Asymptomatic  Mild reversible ischemia on stress test 1/2019  On bb, arb, ASA  LDL is <70  Cont lipitor  Lipid panel 3/2022  Following cardiology    Atrial fibrillation, unspecified type ([facility] [address]  Rate controlled, On BB, coumadin  INR therapeutic  Following cards    Hyperlipidemia, unspecified hyperlipidemia type  Good range on lipitor    LDL goal <70  Lipid panel 3/2022    Long term current use of anticoagulant therapy  Cards following    Rheumatic valvular disease  S/p [facility] valve 2013  Chronic anticoagulation    Allergic rhinitis, unspecified seasonality, unspecified trigger  On allergy shots  Cont flonase, benadryl,  singulair    Anemia of chronic disease  Following hematology, monitoring    Impaired fasting glucose  Tlcs, monitoring  A1c/ cmp prior to next visit    Colon cancer screening  Discussed options, agreed on FIT test    Follow-up and Dispositions    Return in about 3 months (around 11/9/2022), or if symptoms worsen or fail to improve, for non-fasting labs prior to your next visit, plan on Medicare wellness on next visit. Patient understands plan of care. Patient has provided input and agrees with goals.

## 2022-08-12 ENCOUNTER — ANTI-COAG VISIT (OUTPATIENT)
Dept: CARDIOLOGY CLINIC | Age: 62
End: 2022-08-12
Payer: MEDICARE

## 2022-08-12 DIAGNOSIS — I48.91 ATRIAL FIBRILLATION, UNSPECIFIED TYPE (HCC): Primary | ICD-10-CM

## 2022-08-12 DIAGNOSIS — Z79.01 LONG TERM CURRENT USE OF ANTICOAGULANT THERAPY: ICD-10-CM

## 2022-08-12 LAB
INR BLD: 2.7
PT POC: 32.1 SECONDS
VALID INTERNAL CONTROL?: YES

## 2022-08-12 PROCEDURE — 85610 PROTHROMBIN TIME: CPT | Performed by: NURSE PRACTITIONER

## 2022-08-21 ENCOUNTER — HOSPITAL ENCOUNTER (EMERGENCY)
Age: 62
Discharge: HOME | End: 2022-08-21
Attending: EMERGENCY MEDICINE
Payer: MEDICARE

## 2022-08-21 ENCOUNTER — APPOINTMENT (OUTPATIENT)
Dept: GENERAL RADIOLOGY | Age: 62
End: 2022-08-21
Attending: EMERGENCY MEDICINE
Payer: MEDICARE

## 2022-08-21 VITALS
HEIGHT: 67 IN | OXYGEN SATURATION: 96 % | HEART RATE: 84 BPM | TEMPERATURE: 98.9 F | SYSTOLIC BLOOD PRESSURE: 120 MMHG | DIASTOLIC BLOOD PRESSURE: 99 MMHG | RESPIRATION RATE: 17 BRPM | WEIGHT: 190 LBS | BODY MASS INDEX: 29.82 KG/M2

## 2022-08-21 DIAGNOSIS — Z20.822 PERSON UNDER INVESTIGATION FOR COVID-19: ICD-10-CM

## 2022-08-21 DIAGNOSIS — J06.9 ACUTE UPPER RESPIRATORY INFECTION: Primary | ICD-10-CM

## 2022-08-21 LAB
DEPRECATED S PYO AG THROAT QL EIA: NEGATIVE
FLUAV RNA SPEC QL NAA+PROBE: NOT DETECTED
FLUBV RNA SPEC QL NAA+PROBE: NOT DETECTED
INR PPP: 2.1 (ref 0.8–1.2)
PROTHROMBIN TIME: 23.9 SEC (ref 11.5–15.2)
SARS-COV-2, COV2: DETECTED

## 2022-08-21 PROCEDURE — 99284 EMERGENCY DEPT VISIT MOD MDM: CPT

## 2022-08-21 PROCEDURE — 87636 SARSCOV2 & INF A&B AMP PRB: CPT

## 2022-08-21 PROCEDURE — 87880 STREP A ASSAY W/OPTIC: CPT

## 2022-08-21 PROCEDURE — 85610 PROTHROMBIN TIME: CPT

## 2022-08-21 PROCEDURE — 71045 X-RAY EXAM CHEST 1 VIEW: CPT

## 2022-08-21 PROCEDURE — 87070 CULTURE OTHR SPECIMN AEROBIC: CPT

## 2022-08-21 RX ORDER — BENZONATATE 100 MG/1
100 CAPSULE ORAL
Qty: 30 CAPSULE | Refills: 0 | Status: SHIPPED | OUTPATIENT
Start: 2022-08-21 | End: 2022-08-28

## 2022-08-21 RX ORDER — AZITHROMYCIN 250 MG/1
TABLET, FILM COATED ORAL
Qty: 6 TABLET | Refills: 0 | Status: SHIPPED | OUTPATIENT
Start: 2022-08-21 | End: 2022-08-24 | Stop reason: ALTCHOICE

## 2022-08-21 RX ORDER — PREDNISONE 10 MG/1
30 TABLET ORAL
Qty: 9 TABLET | Refills: 0 | Status: SHIPPED | OUTPATIENT
Start: 2022-08-21 | End: 2022-08-24

## 2022-08-21 NOTE — ED TRIAGE NOTES
Pt reports itchy throat, cough, sneezing headache and limited taste since Thursday. Pt currently in NAD.

## 2022-08-21 NOTE — ED PROVIDER NOTES
EMERGENCY DEPARTMENT HISTORY AND PHYSICAL EXAM          Date: 8/21/2022  Patient Name: Jef Mathur    History of Presenting Illness     Chief Complaint   Patient presents with    Sore Throat       History Provided By: Patient    HPI: Jef Mathur is a 57 y.o. female, pmhx of A. fib, mitral valve replacement on Coumadin, CHF, CAD, who presents ambulatory to the ED c/o sore throat    Patient she started with a scratchy throat on Wednesday. Since that time she has developed symptoms of chest congestion, cough, fevers and weakness. Her  notes her temperature has been varying between 99 and 100. He notes it gives her Tylenol her temperature comes back to normal and she feels more comfortable. Initially she started with a cough with yellow phlegm but she notes its become more clear and occasionally will have some blood-tinged sputum. She denies any nausea, vomiting or diarrhea with her symptoms and states her appetite is decreased but she has been eating and drinking. Patient has received all boosters for her COVID vaccines, has not been around any sick contacts and denies any home COVID tests. PCP: Walt Lindsey MD    Allergies: Morphine and penicillin; it is not allergic to Tylenol. There are no other complaints, changes, or physical findings at this time. Current Outpatient Medications   Medication Sig Dispense Refill    benzonatate (Tessalon Perles) 100 mg capsule Take 1 Capsule by mouth three (3) times daily as needed for Cough for up to 7 days. 30 Capsule 0    predniSONE (DELTASONE) 10 mg tablet Take 30 mg by mouth daily (with breakfast) for 3 days. 9 Tablet 0    azithromycin (Zithromax Z-Moisés) 250 mg tablet Tablets today and then 1 tablet daily 6 Tablet 0    cyclobenzaprine (FLEXERIL) 10 mg tablet TAKE 1 TABLET BY MOUTH THREE (3) TIMES DAILY AS NEEDED FOR MUSCLE SPASM(S).  30 Tablet 0    metoprolol succinate (TOPROL-XL) 100 mg tablet TAKE 1 TABLET BY MOUTH EVERY DAY 30 Tablet 5 Breo Ellipta 200-25 mcg/dose inhaler INHALE 1 PUFF BY MOUTH EVERY  Each 1    losartan (COZAAR) 100 mg tablet Take 1 Tablet by mouth daily. 90 Tablet 3    furosemide (LASIX) 40 mg tablet Take 1.5 Tablets by mouth daily. 135 Tablet 3    levocetirizine (XYZAL) 5 mg tablet TAKE 1 TABLET BY MOUTH EVERY DAY 90 Tablet 3    montelukast (SINGULAIR) 10 mg tablet TAKE 1 TABLET BY MOUTH EVERY DAY 90 Tablet 3    Klor-Con M20 20 mEq tablet TAKE 1 TABLET BY MOUTH EVERY DAY 30 Tablet 6    warfarin (COUMADIN) 5 mg tablet TAKE 1 TABLET BY MOUTH DAILY EXCEPT 1/2 TABLET ON TUE-THURS-SAT OR AS DIRECTED 60 Tablet 6    busPIRone (BUSPAR) 5 mg tablet TAKE 1 TABLET BY MOUTH TWICE A  Tablet 1    sertraline (ZOLOFT) 100 mg tablet TAKE 1 TABLET BY MOUTH EVERY DAY 90 Tablet 1    atorvastatin (LIPITOR) 40 mg tablet TAKE 1 TABLET BY MOUTH EVERY DAY 90 Tablet 2    albuterol (PROVENTIL HFA, VENTOLIN HFA, PROAIR HFA) 90 mcg/actuation inhaler INHALE 2 PUFFS BY MOUTH EVERY DAY. 1 Each 2    famotidine (PEPCID) 40 mg tablet Take 1 Tablet by mouth daily. 30 Tablet 1    diclofenac (VOLTAREN) 1 % gel Apply  to affected area four (4) times daily. OTHER Vitamin B-12 gummies      acetaminophen (TYLENOL) 500 mg tablet Take 650 mg by mouth every six (6) hours as needed for Pain. budesonide (RHINOCORT AQUA) 32 mcg/actuation nasal spray SPRAY 2 SPRAYS INTO EACH NOSTRIL EVERY DAY 3 Bottle 1    clobetasoL (TEMOVATE) 0.05 % ointment Apply  to affected area two (2) times a day. 15 g 0    OTHER Allergy injections      aspirin delayed-release 81 mg tablet Take 1 Tab by mouth daily. 30 Tab 0    polyethylene glycol (MIRALAX) 17 gram packet Take 1 Packet by mouth daily. (Patient taking differently: Take  by mouth daily as needed for PRN Reason (Other) (Constipation). ) 30 Packet 0    esomeprazole (NEXIUM) 20 mg capsule Take 20 mg by mouth daily as needed (heart burn).       sodium chloride (OCEAN) 0.65 % nasal squeeze bottle 1 Spray as needed for Congestion. melatonin tab tablet Take 10 mg by mouth nightly. Past History     Past Medical History:  Past Medical History:   Diagnosis Date    Aortic valve disorders 2011    Asthma     Atrial fibrillation ([address] 2011    Congestive heart failure, unspecified 2005    Cleared quickly with Lasix therapy    Echocardiogram 01/10/2012    A-fib. EF 45%. Mild RVE. RVSP 45-50. Massive LAE. Mod TAMI. Mod MS. Severe MR (mean grad 10). Mild-mod AI. Mild TR. Pulmonic systolic flow reversal.  Mild IVCE. Hives     Hypertension     Iron deficiency anemias     Long term (current) use of anticoagulants 2011    Murmur 2011    Grade I-II/VI systolic ejection murmur along left sternal border, with radiation to the pulmonic area. S/P cardiac cath 2012    Minimal coronary artery disease. Mild LVE. EF 55%. Mod MR. Mod-severe MS. RA 15.  RV 55/16. PA 58/34. W 36.  CO/CI 3.5/1.9 (TD); 3.61/1.9 ([place]). R to L shunt suggested.     Wears glasses     Weight gain        Past Surgical History:  Past Surgical History:   Procedure Laterality Date    HX HEART CATHETERIZATION  2012    HX HYSTERECTOMY      HX MITRAL VALVE REPLACEMENT  2013    25/33 mm On-X mechanical valve with bilateral pulmonary vein isolation and resection of LA appendage       Family History:  Family History   Problem Relation Age of Onset    Heart Surgery Father         Open-Heart Surgery    Other Father         Venous Thromboembolism    Hypertension Father     Heart Disease Mother         Enlarged Heart    Hypertension Mother     Diabetes Mother     Diabetes Sister     Hypertension Sister     Diabetes Brother     Heart Disease Paternal Grandmother     Diabetes Sister     Hypertension Sister        Social History:  Social History     Tobacco Use    Smoking status: Former     Packs/day: 0.50     Years: 21.00     Pack years: 10.50     Types: Cigarettes     Quit date:      Years since quittin.6 Smokeless tobacco: Never   Vaping Use    Vaping Use: Never used   Substance Use Topics    Alcohol use: Not Currently     Alcohol/week: 1.0 standard drink     Types: 1 Glasses of wine per week     Comment: occassionally    Drug use: No       Allergies: Allergies   Allergen Reactions    Morphine Hives, Rash and Itching    Acetaminophen Itching     Per Patient Not allergic    Penicillin Hives     Per Patient Not allergic         Review of Systems   Review of Systems   Constitutional:  Positive for activity change, appetite change, chills, fatigue and fever. Negative for unexpected weight change. HENT:  Positive for sore throat. Negative for congestion. Eyes:  Negative for pain and visual disturbance. Respiratory:  Positive for cough. Negative for shortness of breath. Cardiovascular:  Negative for chest pain. Gastrointestinal:  Negative for abdominal pain, diarrhea, nausea and vomiting. Genitourinary:  Negative for dysuria. Musculoskeletal:  Negative for back pain. Skin:  Negative for rash. Neurological:  Negative for headaches. Physical Exam   Physical Exam  Vitals and nursing note reviewed. Constitutional:       Appearance: She is well-developed. She is not diaphoretic. Comments: Overweight middle-aged female with slightly elevated diastolic pressure, otherwise normal vital signs, in minimal acute distress   HENT:      Head: Normocephalic and atraumatic. Left Ear: A middle ear effusion is present. Nose: Congestion present. Mouth/Throat:      Pharynx: Uvula midline. Posterior oropharyngeal erythema present. No pharyngeal swelling or uvula swelling. Tonsils: 1+ on the right. 1+ on the left. Eyes:      General:         Right eye: No discharge. Left eye: No discharge. Conjunctiva/sclera: Conjunctivae normal.      Pupils: Pupils are equal, round, and reactive to light. Cardiovascular:      Rate and Rhythm: Normal rate and regular rhythm.       Heart sounds: Normal heart sounds. No murmur heard. Pulmonary:      Effort: Pulmonary effort is normal. No respiratory distress. Breath sounds: Normal breath sounds. No stridor. No wheezing, rhonchi or rales. Abdominal:      General: Bowel sounds are normal. There is no distension. Palpations: Abdomen is soft. There is no mass. Tenderness: There is no abdominal tenderness. There is no guarding. Musculoskeletal:         General: Normal range of motion. Cervical back: Normal range of motion and neck supple. Skin:     General: Skin is warm and dry. Capillary Refill: Capillary refill takes less than 2 seconds. Findings: No rash. Neurological:      Mental Status: She is alert and oriented to person, place, and time. Cranial Nerves: No cranial nerve deficit. Motor: No abnormal muscle tone. Diagnostic Study Results     Labs -     Recent Results (from the past 12 hour(s))   STREP AG SCREEN, GROUP A    Collection Time: 08/21/22  8:00 AM    Specimen: Throat   Result Value Ref Range    Group A Strep Ag ID Negative     PROTHROMBIN TIME + INR    Collection Time: 08/21/22  8:00 AM   Result Value Ref Range    Prothrombin time 23.9 (H) 11.5 - 15.2 sec    INR 2.1 (H) 0.8 - 1.2         Radiologic Studies -   XR CHEST PORT   Final Result   1. Patchy opacities in the right mid to lower lung and left lung base, which may   represent a combination of infiltrates and bibasilar atelectasis. 2. Mild pulmonary vascular congestion. 3. Mild cardiomegaly, unchanged. Thank you for enabling us to participate in the care of this patient. CT Results  (Last 48 hours)      None          CXR Results  (Last 48 hours)                 08/21/22 0755  XR CHEST PORT Final result    Impression:  1. Patchy opacities in the right mid to lower lung and left lung base, which may   represent a combination of infiltrates and bibasilar atelectasis. 2. Mild pulmonary vascular congestion. 3. Mild cardiomegaly, unchanged. Thank you for enabling us to participate in the care of this patient. Narrative:  EXAM: CHEST PORTABLE        CLINICAL HISTORY/INDICATION: cough, itchy throat, sneezing, headache       COMPARISON: Multiple prior chest radiographs, most recently dating 12/8/2021. TECHNIQUE: Portable AP view was obtained. FINDINGS:        LINES/DEVICES: Median sternotomy wires and prosthetic cardiac valve. Noted   artifact related to patient's bra. HEART/MEDIASTINUM: Unchanged mild cardiomegaly and ectatic thoracic aorta. LUNGS: Hazy opacities in the right mid to lower lung and left lung base. Mild   pulmonary vascular congestion. No pleural effusion or pneumothorax. SOFT TISSUES: Unremarkable. BONES: Unremarkable for age. Medical Decision Making   I am the first provider for this patient. I reviewed the vital signs, available nursing notes, past medical history, past surgical history, family history and social history. Vital Signs-Reviewed the patient's vital signs. Patient Vitals for the past 12 hrs:   Temp Pulse Resp BP SpO2   08/21/22 0727 98.9 °F (37.2 °C) 84 17 (!) 120/99 96 %       Pulse Oximetry Analysis - 96% on RA    Records Reviewed: Nursing Notes, Old Medical Records, Previous Radiology Studies, and Previous Laboratory Studies    Provider Notes (Medical Decision Making):   MDM: Middle-aged female with significant past history currently on Coumadin presenting with fevers and URI type syndrome. Patient's symptoms have been 5 days but we will go ahead and test her for COVID even though she does not qualify for Paxlovid treatment at this time. Patient is currently hemodynamically stable without evidence of SIRS. Lungs are clear without concern for bronchitis or pneumonia. Abdomen soft without concern for peritonitis. ED Course:   Initial assessment performed.  The patients presenting problems have been discussed, and they are in agreement with the care plan formulated and outlined with them. I have encouraged them to ask questions as they arise throughout their visit. PROGRESS NOTE:    ED Course as of 08/21/22 0912   Sun Aug 21, 2022   [id number] Informed by radiology tech that they cannot perform PA lateral imaging on persons under investigation for COVID. [JT]      ED Course User Index  [JT] Mark Easley MD        Discharge note:  Pt re-evaluated and noted to be feeling better, ready for discharge. Updated pt on all final lab and imaging findings. Will follow up as instructed. All questions have been answered, pt voiced understanding and agreement with plan. Specific return precautions provided as well as instructions to return to the ED should sx worsen at any time. Vital signs stable for discharge. Critical Care Time:   0      Diagnosis     Clinical Impression:   1. Acute upper respiratory infection    2. Person under investigation for COVID-19        PLAN:  1. Current Discharge Medication List        START taking these medications    Details   benzonatate (Tessalon Perles) 100 mg capsule Take 1 Capsule by mouth three (3) times daily as needed for Cough for up to 7 days. Qty: 30 Capsule, Refills: 0  Start date: 8/21/2022, End date: 8/28/2022      predniSONE (DELTASONE) 10 mg tablet Take 30 mg by mouth daily (with breakfast) for 3 days. Qty: 9 Tablet, Refills: 0  Start date: 8/21/2022, End date: 8/24/2022      azithromycin (Zithromax Z-Moisés) 250 mg tablet Tablets today and then 1 tablet daily  Qty: 6 Tablet, Refills: 0  Start date: 8/21/2022           2.    Follow-up Information       Follow up With Specialties Details Why Contact Info    Devan Plok MD Family Medicine  As needed [address] [facility] [phone] [facility]  [phone] [address] [facility] EMERGENCY DEPT Emergency Medicine  If symptoms worsen [address] [facility]  [phone]          Return to ED if worse     Disposition:  Home       Please note, this dictation was completed with [email or web address], the computer voice recognition software. Quite often unanticipated grammatical, syntax, homophones, and other interpretive errors are inadvertently transcribed by the computer software. Please disregard these errors. Please excuse any errors that have escaped final proof reading.

## 2022-08-22 ENCOUNTER — PATIENT OUTREACH (OUTPATIENT)
Dept: CASE MANAGEMENT | Age: 62
End: 2022-08-22

## 2022-08-22 NOTE — PROGRESS NOTES
Patient contacted regarding COVID-19 diagnosis. Discussed COVID-19 related testing which was available at this time. Test results were positive. Patient informed of results, if available? yes. Care Transition Nurse contacted the patient by telephone to perform post discharge assessment. Call within 2 business days of discharge: Yes Verified name and  with patient as identifiers. Provided introduction to self, and explanation of the CTN/ACM role, and reason for call due to risk factors for infection and/or exposure to COVID-19. Symptoms reviewed with patient who verbalized the following symptoms: fatigue, cough, and congestion, sneezing, HA      Due to no new or worsening symptoms encounter was not routed to provider for escalation. Discussed follow-up appointments. If no appointment was previously scheduled, appointment scheduling offered:  yes. Patient has virtual appt with PCP today. [facility] follow up appointment(s):   Future Appointments   Date Time Provider Manpreet Stoddard   2022  9:40 AM CSI, PT INR [facility]   2022  9:00 AM Kaia Preston [facility]   2022  9:00 AM Reji Roa MD [facility]   2023 10:15 AM LAB_BSMO [facility]   3/6/2023 10:15 AM [facility]     [facility] follow up appointment(s): n/a    Interventions to address risk factors: Scheduled appointment with PCP-Cliff- has virtual appt today. Advance Care Planning:   Does patient have an Advance Directive: decision makers updated. Educated patient about risk for severe COVID-19 due to risk factors according to CDC guidelines. CTN reviewed discharge instructions, medical action plan and red flag symptoms with the patient who verbalized understanding. Discussed COVID vaccination status: yes. Education provided on COVID-19 vaccination as appropriate. Discussed exposure protocols and quarantine with CDC Guidelines.  Patient was given an opportunity to verbalize any questions and concerns and agrees to contact CTN or health care provider for questions related to their healthcare. Reviewed and educated patient on any new and changed medications related to discharge diagnosis     Was patient discharged with a pulse oximeter? no    CTN provided contact information. Plan for follow-up call in 5-7 days based on severity of symptoms and risk factors.

## 2022-08-23 LAB
BACTERIA SPEC CULT: NORMAL
SERVICE CMNT-IMP: NORMAL

## 2022-08-24 ENCOUNTER — VIRTUAL VISIT (OUTPATIENT)
Dept: FAMILY MEDICINE CLINIC | Age: 62
End: 2022-08-24
Payer: MEDICARE

## 2022-08-24 DIAGNOSIS — U07.1 COVID-19: Primary | ICD-10-CM

## 2022-08-24 DIAGNOSIS — I48.0 PAROXYSMAL ATRIAL FIBRILLATION (HCC): ICD-10-CM

## 2022-08-24 DIAGNOSIS — I50.32 CHRONIC DIASTOLIC CONGESTIVE HEART FAILURE (HCC): ICD-10-CM

## 2022-08-24 PROCEDURE — 99442 PR PHYS/QHP TELEPHONE EVALUATION 11-20 MIN: CPT | Performed by: FAMILY MEDICINE

## 2022-08-24 RX ORDER — HYDROCODONE POLISTIREX AND CHLORPHENIRAMINE POLISTIREX 10; 8 MG/5ML; MG/5ML
5 SUSPENSION, EXTENDED RELEASE ORAL
Qty: 70 ML | Refills: 0 | Status: SHIPPED | OUTPATIENT
Start: 2022-08-24 | End: 2022-08-31 | Stop reason: SDUPTHER

## 2022-08-24 NOTE — PROGRESS NOTES
Tasia Jiménez is a 57 y.o. female, evaluated via audio-only technology on 8/24/2022 for No chief complaint on file. .    Assessment & Plan:   Diagnoses and all orders for this visit:    1. COVID-19  Symptom onset 8/21, mild sx, w/ risk factors  Start:  -     HYDROcodone-chlorpheniramine (TUSSIONEX) 10-8 mg/5 mL suspension; Take 5 mL by mouth every twelve (12) hours as needed for Cough for up to 7 days. Max Daily Amount: 10 mL. -     nirmatrelvir-ritonavir (PAXLOVID) 300 mg (150 mg x 2)-100 mg tablet; Use as directed    2. Chronic diastolic congestive heart failure (HCC)    3. Paroxysmal atrial fibrillation (HCC)    Keep appt in nov or sooner prn    Subjective:   Covid 19    Developed fever, cough, nasal congestion, chest congestion, difficulty sleeping due to cough 8/21. ED visit reviewed cxr patchy infiltrates R lung, o2 sat 96% RA. Discharged home with tessalon perles, prednisone, zpack. She feels about the same, requesting cough syrup for evenings. No increase in sob. Prior to Admission medications    Medication Sig Start Date End Date Taking? Authorizing Provider   HYDROcodone-chlorpheniramine (TUSSIONEX) 10-8 mg/5 mL suspension Take 5 mL by mouth every twelve (12) hours as needed for Cough for up to 7 days. Max Daily Amount: 10 mL. 8/24/22 8/31/22 Yes Blanca Coronado MD   nirmatrelvir-ritonavir (PAXLOVID) 300 mg (150 mg x 2)-100 mg tablet Use as directed 8/24/22  Yes Jd Aquino MD   benzonatate (Tessalon Perles) 100 mg capsule Take 1 Capsule by mouth three (3) times daily as needed for Cough for up to 7 days. 8/21/22 8/28/22 Yes Trae Nelson MD   predniSONE (DELTASONE) 10 mg tablet Take 30 mg by mouth daily (with breakfast) for 3 days.  8/21/22 8/24/22 Yes Trae Nelson MD   cyclobenzaprine (FLEXERIL) 10 mg tablet TAKE 1 TABLET BY MOUTH THREE (3) TIMES DAILY AS NEEDED FOR MUSCLE SPASM(S). 8/8/22  Yes Jd Aquino MD   metoprolol succinate (TOPROL-XL) 100 mg tablet TAKE 1 TABLET BY MOUTH EVERY DAY 8/4/22  Yes Ari Dyson MD   Breo Ellipta 200-25 mcg/dose inhaler INHALE 1 PUFF BY MOUTH EVERY DAY 6/27/22  Yes Glendy Coronado MD   furosemide (LASIX) 40 mg tablet Take 1.5 Tablets by mouth daily. 4/12/22  Yes Russell Falcon MD   levocetirizine (XYZAL) 5 mg tablet TAKE 1 TABLET BY MOUTH EVERY DAY 3/31/22  Yes Glendy Coronado MD   montelukast (SINGULAIR) 10 mg tablet TAKE 1 TABLET BY MOUTH EVERY DAY 3/21/22  Yes Glendy Coronado MD   Klor-Con M20 20 mEq tablet TAKE 1 TABLET BY MOUTH EVERY DAY 3/14/22  Yes Russell Falcon MD   warfarin (COUMADIN) 5 mg tablet TAKE 1 TABLET BY MOUTH DAILY EXCEPT 1/2 TABLET ON TUE-THURS-SAT OR AS DIRECTED 3/14/22  Yes Rosangela Montero, NP   sertraline (ZOLOFT) 100 mg tablet TAKE 1 TABLET BY MOUTH EVERY DAY 12/14/21  Yes Glendy Coronado MD   atorvastatin (LIPITOR) 40 mg tablet TAKE 1 TABLET BY MOUTH EVERY DAY 11/29/21  Yes Ari Dyson MD   albuterol (PROVENTIL HFA, VENTOLIN HFA, PROAIR HFA) 90 mcg/actuation inhaler INHALE 2 PUFFS BY MOUTH EVERY DAY. 11/17/21  Yes Ari Dyson MD   famotidine (PEPCID) 40 mg tablet Take 1 Tablet by mouth daily. 10/26/21  Yes Jameel Rubio PA-C   diclofenac (VOLTAREN) 1 % gel Apply  to affected area four (4) times daily. Yes Provider, Historical   OTHER Vitamin B-12 gummies   Yes Provider, Historical   acetaminophen (TYLENOL) 500 mg tablet Take 650 mg by mouth every six (6) hours as needed for Pain. Yes Provider, Historical   budesonide (RHINOCORT AQUA) 32 mcg/actuation nasal spray SPRAY 2 SPRAYS INTO EACH NOSTRIL EVERY DAY 11/17/20  Yes Gelndy Coronado MD   clobetasoL (TEMOVATE) 0.05 % ointment Apply  to affected area two (2) times a day. 2/6/20  Yes Ari Dyson MD   OTHER Allergy injections   Yes Provider, Historical   aspirin delayed-release 81 mg tablet Take 1 Tab by mouth daily.  1/11/19  Yes Axel Ramírez MD   polyethylene glycol (MIRALAX) 17 gram packet Take 1 Packet by mouth daily. Patient taking differently: Take  by mouth daily as needed for PRN Reason (Other) (Constipation). 1/11/19  Yes Jessica Ibarra MD   esomeprazole (NEXIUM) 20 mg capsule Take 20 mg by mouth daily as needed (heart burn). Yes Provider, Historical   sodium chloride (OCEAN) 0.65 % nasal squeeze bottle 1 Spray as needed for Congestion. Yes Provider, Historical   melatonin tab tablet Take 10 mg by mouth nightly. Yes Provider, Historical   azithromycin (Zithromax Z-Moisés) 250 mg tablet Tablets today and then 1 tablet daily 8/21/22 8/24/22  Termeer, Domonique Castellano MD   losartan (COZAAR) 100 mg tablet Take 1 Tablet by mouth daily.  4/12/22   Gt Larsen MD   busPIRone (BUSPAR) 5 mg tablet TAKE 1 TABLET BY MOUTH TWICE A DAY 1/15/22   Noa Rangel MD     Patient Active Problem List    Diagnosis Date Noted    Bilateral leg edema 01/12/2022    Recurrent depression ([facility] 03/05/2021    Coronary artery disease involving native coronary artery of native heart without angina pectoris 11/03/2020    Vasomotor rhinitis 02/03/2020    Anemia of chronic disease 11/06/2019    Chronic sinusitis 10/15/2019    Elevated serum globulin level 10/15/2019    Normocytic anemia 10/15/2019    Anxiety 10/08/2019    Essential hypertension 06/24/2019    Adjustment disorder with anxious mood 03/05/2019    IFG (impaired fasting glucose) 02/25/2019    NSTEMI (non-ST elevated myocardial infarction) ([facility] 01/05/2019    Chest pain 01/05/2019    Other allergic rhinitis 11/30/2018    Allergic rhinitis due to pollen 11/30/2018    Perennial allergic rhinitis with seasonal variation 11/30/2018    Mild intermittent asthma without complication 27/29/4853    Advanced directives, counseling/discussion 03/09/2017    Mitral valve stenosis 05/22/2013    Hyperlipidemia 02/02/2012    Benign hypertensive heart disease with heart failure ([address] 08/05/2011    Long term current use of anticoagulant therapy 02/23/2011    Atrial fibrillation ([facility] 01/20/2011    Rheumatic valvular disease     Chronic diastolic congestive heart failure ([facility] 03/01/2005     Current Outpatient Medications   Medication Sig Dispense Refill    HYDROcodone-chlorpheniramine (TUSSIONEX) 10-8 mg/5 mL suspension Take 5 mL by mouth every twelve (12) hours as needed for Cough for up to 7 days. Max Daily Amount: 10 mL. 70 mL 0    nirmatrelvir-ritonavir (PAXLOVID) 300 mg (150 mg x 2)-100 mg tablet Use as directed 1 Box 0    benzonatate (Tessalon Perles) 100 mg capsule Take 1 Capsule by mouth three (3) times daily as needed for Cough for up to 7 days. 30 Capsule 0    predniSONE (DELTASONE) 10 mg tablet Take 30 mg by mouth daily (with breakfast) for 3 days. 9 Tablet 0    cyclobenzaprine (FLEXERIL) 10 mg tablet TAKE 1 TABLET BY MOUTH THREE (3) TIMES DAILY AS NEEDED FOR MUSCLE SPASM(S). 30 Tablet 0    metoprolol succinate (TOPROL-XL) 100 mg tablet TAKE 1 TABLET BY MOUTH EVERY DAY 30 Tablet 5    Breo Ellipta 200-25 mcg/dose inhaler INHALE 1 PUFF BY MOUTH EVERY  Each 1    furosemide (LASIX) 40 mg tablet Take 1.5 Tablets by mouth daily. 135 Tablet 3    levocetirizine (XYZAL) 5 mg tablet TAKE 1 TABLET BY MOUTH EVERY DAY 90 Tablet 3    montelukast (SINGULAIR) 10 mg tablet TAKE 1 TABLET BY MOUTH EVERY DAY 90 Tablet 3    Klor-Con M20 20 mEq tablet TAKE 1 TABLET BY MOUTH EVERY DAY 30 Tablet 6    warfarin (COUMADIN) 5 mg tablet TAKE 1 TABLET BY MOUTH DAILY EXCEPT 1/2 TABLET ON TUE-THURS-SAT OR AS DIRECTED 60 Tablet 6    sertraline (ZOLOFT) 100 mg tablet TAKE 1 TABLET BY MOUTH EVERY DAY 90 Tablet 1    atorvastatin (LIPITOR) 40 mg tablet TAKE 1 TABLET BY MOUTH EVERY DAY 90 Tablet 2    albuterol (PROVENTIL HFA, VENTOLIN HFA, PROAIR HFA) 90 mcg/actuation inhaler INHALE 2 PUFFS BY MOUTH EVERY DAY. 1 Each 2    famotidine (PEPCID) 40 mg tablet Take 1 Tablet by mouth daily. 30 Tablet 1    diclofenac (VOLTAREN) 1 % gel Apply  to affected area four (4) times daily.       OTHER Vitamin B-12 gummies      acetaminophen (TYLENOL) 500 mg tablet Take 650 mg by mouth every six (6) hours as needed for Pain. budesonide (RHINOCORT AQUA) 32 mcg/actuation nasal spray SPRAY 2 SPRAYS INTO EACH NOSTRIL EVERY DAY 3 Bottle 1    clobetasoL (TEMOVATE) 0.05 % ointment Apply  to affected area two (2) times a day. 15 g 0    OTHER Allergy injections      aspirin delayed-release 81 mg tablet Take 1 Tab by mouth daily. 30 Tab 0    polyethylene glycol (MIRALAX) 17 gram packet Take 1 Packet by mouth daily. (Patient taking differently: Take  by mouth daily as needed for PRN Reason (Other) (Constipation). ) 30 Packet 0    esomeprazole (NEXIUM) 20 mg capsule Take 20 mg by mouth daily as needed (heart burn). sodium chloride (OCEAN) 0.65 % nasal squeeze bottle 1 Spray as needed for Congestion. melatonin tab tablet Take 10 mg by mouth nightly. losartan (COZAAR) 100 mg tablet Take 1 Tablet by mouth daily. 90 Tablet 3    busPIRone (BUSPAR) 5 mg tablet TAKE 1 TABLET BY MOUTH TWICE A  Tablet 1     Allergies   Allergen Reactions    Morphine Hives, Rash and Itching    Acetaminophen Itching     Per Patient Not allergic    Penicillin Hives     Per Patient Not allergic     Past Medical History:   Diagnosis Date    Aortic valve disorders 1/13/2011    Asthma     Atrial fibrillation ([facility] 1/13/2011    Congestive heart failure, unspecified March 2005    Cleared quickly with Lasix therapy    Echocardiogram 01/10/2012    A-fib. EF 45%. Mild RVE. RVSP 45-50. Massive LAE. Mod TAMI. Mod MS. Severe MR (mean grad 10). Mild-mod AI. Mild TR. Pulmonic systolic flow reversal.  Mild IVCE. Hives     Hypertension     Iron deficiency anemias     Long term (current) use of anticoagulants 2/23/2011    Murmur 1/2011    Grade I-II/VI systolic ejection murmur along left sternal border, with radiation to the pulmonic area. S/P cardiac cath 02/24/2012    Minimal coronary artery disease. Mild LVE. EF 55%. Mod MR.   Mod-severe MS.  RA 15.  RV 55/16. PA 58/34. W 36.  CO/CI 3.5/1.9 (TD); 3.61/1.9 (D[place]an). R to L shunt suggested. Wears glasses     Weight gain      Past Surgical History:   Procedure Laterality Date    HX HEART CATHETERIZATION  2012    HX HYSTERECTOMY  2006    HX MITRAL VALVE REPLACEMENT  2013    25/33 mm On-X mechanical valve with bilateral pulmonary vein isolation and resection of LA appendage     Family History   Problem Relation Age of Onset    Heart Surgery Father         Open-Heart Surgery    Other Father         Venous Thromboembolism    Hypertension Father     Heart Disease Mother         Enlarged Heart    Hypertension Mother     Diabetes Mother     Diabetes Sister     Hypertension Sister     Diabetes Brother     Heart Disease Paternal Grandmother     Diabetes Sister     Hypertension Sister      Social History     Tobacco Use    Smoking status: Former     Packs/day: 0.50     Years: 21.00     Pack years: 10.50     Types: Cigarettes     Quit date:      Years since quittin.6    Smokeless tobacco: Never   Substance Use Topics    Alcohol use: Not Currently     Alcohol/week: 1.0 standard drink     Types: 1 Glasses of wine per week     Comment: occassionally       ROS    No data recorded     Mami Marx was evaluated through a patient-initiated, synchronous (real-time) audio only encounter. She (or guardian if applicable) is aware that it is a billable service, which includes applicable co-pays, with coverage as determined by her insurance carrier. This visit was conducted with the patient's (and/or Vicente Daughters guardian's) verbal consent. She has not had a related appointment within my department in the past 7 days or scheduled within the next 24 hours. The patient was located in a state where the provider was licensed to provide care. The patient was located at: Home: [address] [place] [address]  The provider was located at:  Facility (Appt Department): [address] [place] [address],Fourth Floor [phone]    Total Time: minutes: 11-20 minutes    Angelo Cazares MD

## 2022-08-27 RX ORDER — BUSPIRONE HYDROCHLORIDE 5 MG/1
TABLET ORAL
Qty: 180 TABLET | Refills: 1 | Status: SHIPPED | OUTPATIENT
Start: 2022-08-27

## 2022-08-29 ENCOUNTER — PATIENT OUTREACH (OUTPATIENT)
Dept: CASE MANAGEMENT | Age: 62
End: 2022-08-29

## 2022-08-29 NOTE — PROGRESS NOTES
8/29/2022     3:14 PM    CTN reviewed patient chart and then attempted to call patient for routine follow up. She was unavailable at the time of the call. Message left introducing myself, the purpose of the call and giving my contact information. Requested that patient call back at her earliest convenience. Will follow.

## 2022-08-30 ENCOUNTER — PATIENT OUTREACH (OUTPATIENT)
Dept: CASE MANAGEMENT | Age: 62
End: 2022-08-30

## 2022-08-30 NOTE — PROGRESS NOTES
Patient resolved from [address] Transitions episode on 8/30/2022. Discussed COVID-19 related testing which was available at this time. Test results were positive. Patient informed of results, if available? no     Patient/family has been provided the following resources and education related to COVID-19:                         Signs, symptoms and red flags related to COVID-19            [facility] exposure and quarantine guidelines            [facility] exposure contact - [phone]            Contact for their local Department of Health                 Patient currently reports that the following symptoms have improved:  no new symptoms and no worsening symptoms. Patient stated that she is feeling great. No complaints. She is back to activities without difficulty. No further outreach scheduled with this CTN/ACM/LPN/HC/ MA. Episode of Care resolved. Patient has this CTN/ACM/LPN/HC/MA contact information if future needs arise.

## 2022-08-31 ENCOUNTER — TELEPHONE (OUTPATIENT)
Dept: FAMILY MEDICINE CLINIC | Age: 62
End: 2022-08-31

## 2022-08-31 DIAGNOSIS — U07.1 COVID-19: ICD-10-CM

## 2022-08-31 NOTE — TELEPHONE ENCOUNTER
Last OV: 8/24/2022  Last labs: 8/21/2022 (ER)  Next OV and labs: 11/8/2022    Tussionex 10-8 mg/5 mL suspension- 5 mL by mouth every twelve (12) hours as needed for Cough for up to 7 days Quantity 70 mL/0 RF sent in to pharmacy on 8/24/2022.

## 2022-08-31 NOTE — TELEPHONE ENCOUNTER
Duplicate request. Patient sent a Mychart refill request. The request has been forwarded to Dr. Naomi Hernandez for review.

## 2022-08-31 NOTE — TELEPHONE ENCOUNTER
Pt stats that she is still having trouble with the cough from the Covid and would like to know if something can get called In today to [facility] . Please advise.

## 2022-09-01 RX ORDER — HYDROCODONE POLISTIREX AND CHLORPHENIRAMINE POLISTIREX 10; 8 MG/5ML; MG/5ML
5 SUSPENSION, EXTENDED RELEASE ORAL
Qty: 70 ML | Refills: 0 | Status: SHIPPED | OUTPATIENT
Start: 2022-09-01 | End: 2022-09-08

## 2022-09-06 RX ORDER — SERTRALINE HYDROCHLORIDE 100 MG/1
TABLET, FILM COATED ORAL
Qty: 90 TABLET | Refills: 1 | Status: SHIPPED | OUTPATIENT
Start: 2022-09-06

## 2022-09-09 NOTE — TELEPHONE ENCOUNTER
This patient contacted office for the following prescriptions to be filled:    Last office visit: 8/24/2022  Follow up appointment: 11/8/2022  Medication requested :   Requested Prescriptions     Pending Prescriptions Disp Refills    fluticasone furoate-vilanteroL (Breo Ellipta) 200-25 mcg/dose inhaler 180 Each 1     PCP: [address]  Mail order or Local pharmacy name [address] [phone]

## 2022-09-12 DIAGNOSIS — E78.5 HYPERLIPIDEMIA LDL GOAL <70: ICD-10-CM

## 2022-09-12 RX ORDER — WARFARIN SODIUM 5 MG/1
TABLET ORAL
Qty: 60 TABLET | Refills: 6 | Status: SHIPPED | OUTPATIENT
Start: 2022-09-12

## 2022-09-12 RX ORDER — FLUTICASONE FUROATE AND VILANTEROL 200; 25 UG/1; UG/1
POWDER RESPIRATORY (INHALATION)
Qty: 180 EACH | Refills: 1 | Status: SHIPPED | OUTPATIENT
Start: 2022-09-12 | End: 2022-09-13

## 2022-09-12 RX ORDER — ATORVASTATIN CALCIUM 40 MG/1
TABLET, FILM COATED ORAL
Qty: 90 TABLET | Refills: 2 | Status: SHIPPED | OUTPATIENT
Start: 2022-09-12

## 2022-09-13 ENCOUNTER — TELEPHONE (OUTPATIENT)
Dept: FAMILY MEDICINE CLINIC | Age: 62
End: 2022-09-13

## 2022-09-13 ENCOUNTER — ANTI-COAG VISIT (OUTPATIENT)
Dept: CARDIOLOGY CLINIC | Age: 62
End: 2022-09-13
Payer: MEDICARE

## 2022-09-13 DIAGNOSIS — I48.91 ATRIAL FIBRILLATION, UNSPECIFIED TYPE (HCC): Primary | ICD-10-CM

## 2022-09-13 DIAGNOSIS — Z79.01 LONG TERM CURRENT USE OF ANTICOAGULANT THERAPY: ICD-10-CM

## 2022-09-13 LAB
INR BLD: 1.8
PT POC: 21.7 SECONDS
VALID INTERNAL CONTROL?: YES

## 2022-09-13 PROCEDURE — 85610 PROTHROMBIN TIME: CPT | Performed by: NURSE PRACTITIONER

## 2022-09-13 RX ORDER — BUDESONIDE AND FORMOTEROL FUMARATE DIHYDRATE 160; 4.5 UG/1; UG/1
2 AEROSOL RESPIRATORY (INHALATION) 2 TIMES DAILY
Qty: 10.2 G | Refills: 3 | Status: SHIPPED | OUTPATIENT
Start: 2022-09-13 | End: 2022-09-14

## 2022-09-13 NOTE — TELEPHONE ENCOUNTER
Pt states that she the Webb cost $100 even after insurance. She would like to know if there something else that can get called in. She states that it was helping. Please advise.

## 2022-09-13 NOTE — TELEPHONE ENCOUNTER
Patient identified with 2 identifiers (name and ). Patient aware that Dr. Yolette Weiss switched her inhaler to symbicort. Hopefully cheaper.

## 2022-09-13 NOTE — PROGRESS NOTES
The INR is below the therapeutic range. Had Covid in late August, took antibiotics and Paxlovid  Please make the following adjustments to Coumadin dosinmg today then Continue Coumadin 5mg daily except 2.5mg on Tues-Thurs-Sat. .  Repeat the INR in 4 weeks.

## 2022-09-14 ENCOUNTER — VIRTUAL VISIT (OUTPATIENT)
Dept: FAMILY MEDICINE CLINIC | Age: 62
End: 2022-09-14
Payer: MEDICARE

## 2022-09-14 DIAGNOSIS — J20.8 ACUTE BRONCHITIS DUE TO OTHER SPECIFIED ORGANISMS: ICD-10-CM

## 2022-09-14 DIAGNOSIS — J45.40 MODERATE PERSISTENT ASTHMA WITHOUT COMPLICATION: ICD-10-CM

## 2022-09-14 DIAGNOSIS — U07.1 COVID-19: Primary | ICD-10-CM

## 2022-09-14 PROCEDURE — 99442 PR PHYS/QHP TELEPHONE EVALUATION 11-20 MIN: CPT | Performed by: FAMILY MEDICINE

## 2022-09-14 RX ORDER — FLUTICASONE PROPIONATE AND SALMETEROL XINAFOATE 230; 21 UG/1; UG/1
2 AEROSOL, METERED RESPIRATORY (INHALATION) 2 TIMES DAILY
Qty: 1 EACH | Refills: 2 | Status: SHIPPED | OUTPATIENT
Start: 2022-09-14

## 2022-09-14 RX ORDER — AZITHROMYCIN 250 MG/1
TABLET, FILM COATED ORAL
Qty: 6 TABLET | Refills: 0 | Status: SHIPPED | OUTPATIENT
Start: 2022-09-14 | End: 2022-09-26 | Stop reason: ALTCHOICE

## 2022-09-14 NOTE — PROGRESS NOTES
Akbar Sy is a 57 y.o. female, evaluated via audio-only technology on 9/14/2022 for Positive For Covid-19 (X 2 weeks) and Cough (Productive yellow mucus)  . Assessment & Plan:   Diagnoses and all orders for this visit:    1. COVID-19  Clinically improved  Some lingering cough- will give zpack to cover atypical pneumonia/bronchitis  Cont with laba/ics- previously on breo, trying to find alternative more cost effective, symbicort still too expensive  Will try advair  She is to call by next week, If not improved will give medrol dose pack    2. Acute bronchitis due to other specified organisms    3. Moderate persistent asthma without complication    Other orders  -     azithromycin (ZITHROMAX) 250 mg tablet; Take two tablets today then one tablet daily  -     fluticasone propion-salmeteroL (Advair HFA) 230-21 mcg/actuation inhaler; Take 2 Puffs by inhalation two (2) times a day. Keep appt in nov or sooner prn  Subjective:   Cough ff-up    Dx with covid last month, mostly improved symptoms aside from cough, with yellowish sputum. No longer febrile, no sob, no cp, leg edema, pillow orthopnea  She has completed paxlovid, 2 rounds of tussionex, tessalon perles    Prior to Admission medications    Medication Sig Start Date End Date Taking?  Authorizing Provider   atorvastatin (LIPITOR) 40 mg tablet TAKE 1 TABLET BY MOUTH EVERY DAY 9/12/22  Yes Buck Coronado MD   warfarin (COUMADIN) 5 mg tablet TAKE 1 TABLET BY MOUTH DAILY EXCEPT 1/2 TABLET ON TUE-THURS-SAT OR AS DIRECTED 9/12/22  Yes Crispin Hopkins NP   sertraline (ZOLOFT) 100 mg tablet TAKE 1 TABLET BY MOUTH EVERY DAY 9/6/22  Yes Buck Coronado MD   busPIRone (BUSPAR) 5 mg tablet TAKE 1 TABLET BY MOUTH TWICE A DAY 8/27/22  Yes Buck Coronado MD   cyclobenzaprine (FLEXERIL) 10 mg tablet TAKE 1 TABLET BY MOUTH THREE (3) TIMES DAILY AS NEEDED FOR MUSCLE SPASM(S). 8/8/22  Yes Helena Maddox MD   metoprolol succinate (TOPROL-XL) 100 mg tablet TAKE 1 TABLET BY MOUTH EVERY DAY 8/4/22  Yes Rodo Coronado MD   losartan (COZAAR) 100 mg tablet Take 1 Tablet by mouth daily. 4/12/22  Yes Angélica Juan MD   furosemide (LASIX) 40 mg tablet Take 1.5 Tablets by mouth daily. 4/12/22  Yes Angélica Juan MD   levocetirizine (XYZAL) 5 mg tablet TAKE 1 TABLET BY MOUTH EVERY DAY 3/31/22  Yes Rodo Coronado MD   montelukast (SINGULAIR) 10 mg tablet TAKE 1 TABLET BY MOUTH EVERY DAY 3/21/22  Yes Rodo Coronado MD   Klor-Con M20 20 mEq tablet TAKE 1 TABLET BY MOUTH EVERY DAY 3/14/22  Yes Angélica Juan MD   albuterol (PROVENTIL HFA, VENTOLIN HFA, PROAIR HFA) 90 mcg/actuation inhaler INHALE 2 PUFFS BY MOUTH EVERY DAY. 11/17/21  Yes Mariya Fallon MD   famotidine (PEPCID) 40 mg tablet Take 1 Tablet by mouth daily. 10/26/21  Yes Varun Vazquez PA-C   diclofenac (VOLTAREN) 1 % gel Apply  to affected area four (4) times daily. Yes Provider, Historical   OTHER Vitamin B-12 gummies   Yes Provider, Historical   acetaminophen (TYLENOL) 500 mg tablet Take 650 mg by mouth every six (6) hours as needed for Pain. Yes Provider, Historical   budesonide (RHINOCORT AQUA) 32 mcg/actuation nasal spray SPRAY 2 SPRAYS INTO EACH NOSTRIL EVERY DAY 11/17/20  Yes Rodo Coronado MD   clobetasoL (TEMOVATE) 0.05 % ointment Apply  to affected area two (2) times a day. 2/6/20  Yes Mariya Fallon MD   OTHER Allergy injections   Yes Provider, Historical   aspirin delayed-release 81 mg tablet Take 1 Tab by mouth daily. 1/11/19  Yes Gilberto Mancilla MD   polyethylene glycol (MIRALAX) 17 gram packet Take 1 Packet by mouth daily. Patient taking differently: Take  by mouth daily as needed for PRN Reason (Other) (Constipation). 1/11/19  Yes Gilberto Mancilla MD   esomeprazole (NEXIUM) 20 mg capsule Take 20 mg by mouth daily as needed (heart burn).    Yes Provider, Historical   sodium chloride (OCEAN) 0.65 % nasal squeeze bottle 1 Spray as needed for Congestion. Yes Provider, Historical   melatonin tab tablet Take 10 mg by mouth nightly. Yes Provider, Historical   budesonide-formoteroL (SYMBICORT) 160-4.5 mcg/actuation HFAA Take 2 Puffs by inhalation two (2) times a day. Patient not taking: Reported on 9/14/2022 9/13/22   Walt Lindsey MD     Patient Active Problem List    Diagnosis Date Noted    Bilateral leg edema 01/12/2022    Recurrent depression ([facility] 03/05/2021    Coronary artery disease involving native coronary artery of native heart without angina pectoris 11/03/2020    Vasomotor rhinitis 02/03/2020    Anemia of chronic disease 11/06/2019    Chronic sinusitis 10/15/2019    Elevated serum globulin level 10/15/2019    Normocytic anemia 10/15/2019    Anxiety 10/08/2019    Essential hypertension 06/24/2019    Adjustment disorder with anxious mood 03/05/2019    IFG (impaired fasting glucose) 02/25/2019    NSTEMI (non-ST elevated myocardial infarction) ([facility] 01/05/2019    Chest pain 01/05/2019    Other allergic rhinitis 11/30/2018    Allergic rhinitis due to pollen 11/30/2018    Perennial allergic rhinitis with seasonal variation 11/30/2018    Mild intermittent asthma without complication 23/22/5988    Advanced directives, counseling/discussion 03/09/2017    Mitral valve stenosis 05/22/2013    Hyperlipidemia 02/02/2012    Benign hypertensive heart disease with heart failure ([facility] 08/05/2011    Long term current use of anticoagulant therapy 02/23/2011    Atrial fibrillation ([facility] 01/20/2011    Rheumatic valvular disease     Chronic diastolic congestive heart failure ([facility] 03/01/2005     Current Outpatient Medications   Medication Sig Dispense Refill    azithromycin (ZITHROMAX) 250 mg tablet Take two tablets today then one tablet daily 6 Tablet 0    fluticasone propion-salmeteroL (Advair HFA) 230-21 mcg/actuation inhaler Take 2 Puffs by inhalation two (2) times a day.  1 Each 2    atorvastatin (LIPITOR) 40 mg tablet TAKE 1 TABLET BY MOUTH EVERY DAY 90 Tablet 2    warfarin (COUMADIN) 5 mg tablet TAKE 1 TABLET BY MOUTH DAILY EXCEPT 1/2 TABLET ON TUE-THURS-SAT OR AS DIRECTED 60 Tablet 6    sertraline (ZOLOFT) 100 mg tablet TAKE 1 TABLET BY MOUTH EVERY DAY 90 Tablet 1    busPIRone (BUSPAR) 5 mg tablet TAKE 1 TABLET BY MOUTH TWICE A  Tablet 1    cyclobenzaprine (FLEXERIL) 10 mg tablet TAKE 1 TABLET BY MOUTH THREE (3) TIMES DAILY AS NEEDED FOR MUSCLE SPASM(S). 30 Tablet 0    metoprolol succinate (TOPROL-XL) 100 mg tablet TAKE 1 TABLET BY MOUTH EVERY DAY 30 Tablet 5    losartan (COZAAR) 100 mg tablet Take 1 Tablet by mouth daily. 90 Tablet 3    furosemide (LASIX) 40 mg tablet Take 1.5 Tablets by mouth daily. 135 Tablet 3    levocetirizine (XYZAL) 5 mg tablet TAKE 1 TABLET BY MOUTH EVERY DAY 90 Tablet 3    montelukast (SINGULAIR) 10 mg tablet TAKE 1 TABLET BY MOUTH EVERY DAY 90 Tablet 3    Klor-Con M20 20 mEq tablet TAKE 1 TABLET BY MOUTH EVERY DAY 30 Tablet 6    albuterol (PROVENTIL HFA, VENTOLIN HFA, PROAIR HFA) 90 mcg/actuation inhaler INHALE 2 PUFFS BY MOUTH EVERY DAY. 1 Each 2    famotidine (PEPCID) 40 mg tablet Take 1 Tablet by mouth daily. 30 Tablet 1    diclofenac (VOLTAREN) 1 % gel Apply  to affected area four (4) times daily. OTHER Vitamin B-12 gummies      acetaminophen (TYLENOL) 500 mg tablet Take 650 mg by mouth every six (6) hours as needed for Pain. budesonide (RHINOCORT AQUA) 32 mcg/actuation nasal spray SPRAY 2 SPRAYS INTO EACH NOSTRIL EVERY DAY 3 Bottle 1    clobetasoL (TEMOVATE) 0.05 % ointment Apply  to affected area two (2) times a day. 15 g 0    OTHER Allergy injections      aspirin delayed-release 81 mg tablet Take 1 Tab by mouth daily. 30 Tab 0    polyethylene glycol (MIRALAX) 17 gram packet Take 1 Packet by mouth daily. (Patient taking differently: Take  by mouth daily as needed for PRN Reason (Other) (Constipation). ) 30 Packet 0    esomeprazole (NEXIUM) 20 mg capsule Take 20 mg by mouth daily as needed (heart burn).       sodium chloride (OCEAN) 0.65 % nasal squeeze bottle 1 Spray as needed for Congestion. melatonin tab tablet Take 10 mg by mouth nightly. Allergies   Allergen Reactions    Penicillin Hives     Per Patient Not allergic     Past Medical History:   Diagnosis Date    Aortic valve disorders 1/13/2011    Asthma     Atrial fibrillation ([facility] 1/13/2011    Congestive heart failure, unspecified March 2005    Cleared quickly with Lasix therapy    Echocardiogram 01/10/2012    A-fib. EF 45%. Mild RVE. RVSP 45-50. Massive LAE. Mod TAMI. Mod MS. Severe MR (mean grad 10). Mild-mod AI. Mild TR. Pulmonic systolic flow reversal.  Mild IVCE. Hives     Hypertension     Iron deficiency anemias     Long term (current) use of anticoagulants 2/23/2011    Murmur 1/2011    Grade I-II/VI systolic ejection murmur along left sternal border, with radiation to the pulmonic area. S/P cardiac cath 02/24/2012    Minimal coronary artery disease. Mild LVE. EF 55%. Mod MR. Mod-severe MS. RA 15.  RV 55/16. PA 58/34. W 36.  CO/CI 3.5/1.9 (TD); 3.61/1.9 ([place]). R to L shunt suggested.     Wears glasses     Weight gain      Past Surgical History:   Procedure Laterality Date    HX HEART CATHETERIZATION  2/24/2012    HX HYSTERECTOMY  2006    HX MITRAL VALVE REPLACEMENT  05/22/2013    25/33 mm On-X mechanical valve with bilateral pulmonary vein isolation and resection of LA appendage     Family History   Problem Relation Age of Onset    Heart Surgery Father         Open-Heart Surgery    Other Father         Venous Thromboembolism    Hypertension Father     Heart Disease Mother         Enlarged Heart    Hypertension Mother     Diabetes Mother     Diabetes Sister     Hypertension Sister     Diabetes Brother     Heart Disease Paternal Grandmother     Diabetes Sister     Hypertension Sister      Social History     Tobacco Use    Smoking status: Former     Packs/day: 0.50     Years: 21.00     Pack years: 10.50     Types: Cigarettes     Quit date:      Years since quittin.7    Smokeless tobacco: Never   Substance Use Topics    Alcohol use: Not Currently     Alcohol/week: 1.0 standard drink     Types: 1 Glasses of wine per week     Comment: occassionally       ROS    No data recorded     Margie Pavon was evaluated through a patient-initiated, synchronous (real-time) audio only encounter. She (or guardian if applicable) is aware that it is a billable service, which includes applicable co-pays, with coverage as determined by her insurance carrier. This visit was conducted with the patient's (and/or Mark Simpson guardian's) verbal consent. She has not had a related appointment within my department in the past 7 days or scheduled within the next 24 hours. The patient was located in a state where the provider was licensed to provide care. The patient was located at: Home: [address] [place] [address]  The provider was located at:  Facility (Appt Department): [address] [place] [address],[address] Floor [address]  [address] [facility] [id number]    Total Time: minutes: 11-20 minutes    Veronica Kan MD

## 2022-09-19 ENCOUNTER — HOSPITAL ENCOUNTER (OUTPATIENT)
Dept: GENERAL RADIOLOGY | Age: 62
Discharge: HOME | End: 2022-09-19
Payer: MEDICARE

## 2022-09-19 DIAGNOSIS — R05.9 COUGH: Primary | ICD-10-CM

## 2022-09-19 DIAGNOSIS — R05.9 COUGH: ICD-10-CM

## 2022-09-19 PROCEDURE — 71046 X-RAY EXAM CHEST 2 VIEWS: CPT

## 2022-09-19 RX ORDER — METHYLPREDNISOLONE 4 MG/1
TABLET ORAL
Qty: 1 DOSE PACK | Refills: 0 | Status: SHIPPED | OUTPATIENT
Start: 2022-09-19 | End: 2022-09-26 | Stop reason: ALTCHOICE

## 2022-09-21 ENCOUNTER — TELEPHONE (OUTPATIENT)
Dept: FAMILY MEDICINE CLINIC | Age: 62
End: 2022-09-21

## 2022-09-21 NOTE — PROGRESS NOTES
Patient identified with 2 identifiers (name and ). Patient aware of Stable enlarged heart, minimal improving lung base opacity could be fluid/infection. Patient states she is still struggling with cough at night. Getting very little sleep. Asking if there is anything that can be prescribed for her cough. Taking medrol,dose mayra.    Scheduled to be seen 2022

## 2022-09-22 NOTE — PROGRESS NOTES
Chief Complaint   Patient presents with    Anemia    Anxiety    Cholesterol Problem    Coronary Artery Disease    Cough     Hx of asthma     Hypertension    Irregular Heart Beat     Hx of A-fib    Other     Hx of IFG          1. \"Have you been to the ER, urgent care clinic since your last visit? Hospitalized since your last visit? \" No    2. \"Have you seen or consulted any other health care providers outside of the [address] [place] [address] since your last visit? \" No     3. For patients aged 39-70: Has the patient had a colonoscopy / FIT/ Cologuard? Yes - Care Gap present. Most recent result on file 06-      If the patient is female:    4. For patients aged 41-77: Has the patient had a mammogram within the past 2 years? Yes - Care Gap present. Most recent result on file 11-      5. For patients aged 21-65: Has the patient had a pap smear?  No hx of hysterectomy

## 2022-09-26 ENCOUNTER — OFFICE VISIT (OUTPATIENT)
Dept: FAMILY MEDICINE CLINIC | Age: 62
End: 2022-09-26
Payer: MEDICARE

## 2022-09-26 VITALS
OXYGEN SATURATION: 98 % | SYSTOLIC BLOOD PRESSURE: 124 MMHG | BODY MASS INDEX: 29.72 KG/M2 | WEIGHT: 189.38 LBS | RESPIRATION RATE: 16 BRPM | TEMPERATURE: 98.7 F | DIASTOLIC BLOOD PRESSURE: 78 MMHG | HEART RATE: 75 BPM | HEIGHT: 67 IN

## 2022-09-26 DIAGNOSIS — F41.9 ANXIETY: ICD-10-CM

## 2022-09-26 DIAGNOSIS — R06.02 SOB (SHORTNESS OF BREATH): Primary | ICD-10-CM

## 2022-09-26 DIAGNOSIS — J45.40 MODERATE PERSISTENT ASTHMA WITHOUT COMPLICATION: ICD-10-CM

## 2022-09-26 DIAGNOSIS — I09.1 RHEUMATIC VALVULAR DISEASE: ICD-10-CM

## 2022-09-26 DIAGNOSIS — I10 ESSENTIAL HYPERTENSION: ICD-10-CM

## 2022-09-26 DIAGNOSIS — I11.0 BENIGN HYPERTENSIVE HEART DISEASE WITH HEART FAILURE (HCC): ICD-10-CM

## 2022-09-26 DIAGNOSIS — J30.1 ALLERGIC RHINITIS DUE TO POLLEN, UNSPECIFIED SEASONALITY: ICD-10-CM

## 2022-09-26 DIAGNOSIS — I25.10 CORONARY ARTERY DISEASE INVOLVING NATIVE CORONARY ARTERY OF NATIVE HEART WITHOUT ANGINA PECTORIS: ICD-10-CM

## 2022-09-26 DIAGNOSIS — Z12.11 COLON CANCER SCREENING: ICD-10-CM

## 2022-09-26 DIAGNOSIS — Z12.31 BREAST CANCER SCREENING BY MAMMOGRAM: ICD-10-CM

## 2022-09-26 DIAGNOSIS — F33.9 RECURRENT DEPRESSION (HCC): ICD-10-CM

## 2022-09-26 DIAGNOSIS — E78.00 PURE HYPERCHOLESTEROLEMIA: ICD-10-CM

## 2022-09-26 DIAGNOSIS — I48.0 PAROXYSMAL ATRIAL FIBRILLATION (HCC): ICD-10-CM

## 2022-09-26 DIAGNOSIS — R73.01 IFG (IMPAIRED FASTING GLUCOSE): ICD-10-CM

## 2022-09-26 DIAGNOSIS — I50.32 CHRONIC DIASTOLIC CONGESTIVE HEART FAILURE (HCC): ICD-10-CM

## 2022-09-26 DIAGNOSIS — D63.8 ANEMIA OF CHRONIC DISEASE: ICD-10-CM

## 2022-09-26 DIAGNOSIS — Z00.00 MEDICARE ANNUAL WELLNESS VISIT, SUBSEQUENT: ICD-10-CM

## 2022-09-26 PROCEDURE — G0439 PPPS, SUBSEQ VISIT: HCPCS | Performed by: FAMILY MEDICINE

## 2022-09-26 PROCEDURE — G8428 CUR MEDS NOT DOCUMENT: HCPCS | Performed by: FAMILY MEDICINE

## 2022-09-26 PROCEDURE — G9717 DOC PT DX DEP/BP F/U NT REQ: HCPCS | Performed by: FAMILY MEDICINE

## 2022-09-26 PROCEDURE — G9231 DOC ESRD DIA TRANS PREG: HCPCS | Performed by: FAMILY MEDICINE

## 2022-09-26 PROCEDURE — 99214 OFFICE O/P EST MOD 30 MIN: CPT | Performed by: FAMILY MEDICINE

## 2022-09-26 PROCEDURE — G8417 CALC BMI ABV UP PARAM F/U: HCPCS | Performed by: FAMILY MEDICINE

## 2022-09-26 PROCEDURE — 3017F COLORECTAL CA SCREEN DOC REV: CPT | Performed by: FAMILY MEDICINE

## 2022-09-26 PROCEDURE — G0463 HOSPITAL OUTPT CLINIC VISIT: HCPCS | Performed by: FAMILY MEDICINE

## 2022-09-26 PROCEDURE — G9899 SCRN MAM PERF RSLTS DOC: HCPCS | Performed by: FAMILY MEDICINE

## 2022-09-26 RX ORDER — AMLODIPINE BESYLATE 5 MG/1
5 TABLET ORAL DAILY
COMMUNITY
Start: 2022-09-05

## 2022-09-26 NOTE — PROGRESS NOTES
This is the Subsequent Medicare Annual Wellness Exam, performed 12 months or more after the Initial AWV or the last Subsequent AWV    I have reviewed the patient's medical history in detail and updated the computerized patient record. Assessment/Plan   Education and counseling provided:  Are appropriate based on today's review and evaluation  Influenza Vaccine- annually  Screening Mammography- 11/2021 order placed  Colorectal cancer screening tests due- FIT test provided  Cardiovascular screening blood test- 4/2022  Diabetes screening test 4/2022    Depression Risk Factor Screening     3 most recent PHQ Screens 9/26/2022   PHQ Not Done -   Little interest or pleasure in doing things Not at all   Feeling down, depressed, irritable, or hopeless Not at all   Total Score PHQ 2 0   Trouble falling or staying asleep, or sleeping too much Not at all   Feeling tired or having little energy Several days   Poor appetite, weight loss, or overeating Not at all   Feeling bad about yourself - or that you are a failure or have let yourself or your family down Not at all   Trouble concentrating on things such as school, work, reading, or watching TV Not at all   Moving or speaking so slowly that other people could have noticed; or the opposite being so fidgety that others notice Not at all   Thoughts of being better off dead, or hurting yourself in some way Not at all   PHQ 9 Score 1   How difficult have these problems made it for you to do your work, take care of your home and get along with others Not difficult at all       Alcohol & Drug Abuse Risk Screen    Do you average more than 1 drink per night or more than 7 drinks a week:  No    On any one occasion in the past three months have you have had more than 3 drinks containing alcohol:  No          Functional Ability and Level of Safety    Hearing: Hearing is good. Activities of Daily Living: The home contains: no safety equipment.   Patient does total self care Ambulation: with no difficulty     Fall Risk:  Fall Risk Assessment, last 12 mths 9/26/2022   Able to walk? Yes   Fall in past 12 months? 0   Do you feel unsteady?  0   Are you worried about falling 0      Abuse Screen:  Patient is not abused       Cognitive Screening    Has your family/caregiver stated any concerns about your memory: no      Health Maintenance Due     Health Maintenance Due   Topic Date Due    DTaP/Tdap/Td series (1 - Tdap) Never done    Shingrix Vaccine Age 49> (1 of 2) Never done    Cervical cancer screen  04/23/2016    Colorectal Cancer Screening Combo  06/14/2022    Flu Vaccine (1) 08/01/2022       Patient Care Team   Patient Care Team:  Alexander Daniels MD as PCP - General (Family Medicine)  Alexander Daniels MD as PCP - [facility] Provider  Gomez Morales DO as Physician (Cardiovascular Disease Physician)  Gomez Morales DO (Cardiovascular Disease Physician)  Rosalinda Carty MD (Otolaryngology)  Nahomy Landry MD (Pulmonary Disease)    History     Patient Active Problem List   Diagnosis Code    Rheumatic valvular disease I09.1    Chronic diastolic congestive heart failure ([address] I50.32    Atrial fibrillation ([address] I48.91    Long term current use of anticoagulant therapy Z79.01    Benign hypertensive heart disease with heart failure ([address] I11.0    Hyperlipidemia E78.5    Advanced directives, counseling/discussion Z71.89    Mild intermittent asthma without complication F34.07    NSTEMI (non-ST elevated myocardial infarction) ([address] I21.4    Chest pain R07.9    IFG (impaired fasting glucose) R73.01    Adjustment disorder with anxious mood F43.22    Essential hypertension I10    Other allergic rhinitis J30.89    Anxiety F41.9    Allergic rhinitis due to pollen J30.1    Chronic sinusitis J32.9    Mitral valve stenosis I05.0    Elevated serum globulin level R77.1    Normocytic anemia D64.9    Anemia of chronic disease D63.8    Perennial allergic rhinitis with seasonal variation J30.89, J30.2    Vasomotor rhinitis J30.0    Coronary artery disease involving native coronary artery of native heart without angina pectoris I25.10    Recurrent depression (HCC) F33.9    Bilateral leg edema R60.0     Past Medical History:   Diagnosis Date    Aortic valve disorders 1/13/2011    Asthma     Atrial fibrillation ([address] 1/13/2011    Congestive heart failure, unspecified March 2005    Cleared quickly with Lasix therapy    Echocardiogram 01/10/2012    A-fib. EF 45%. Mild RVE. RVSP 45-50. Massive LAE. Mod TAMI. Mod MS. Severe MR (mean grad 10). Mild-mod AI. Mild TR. Pulmonic systolic flow reversal.  Mild IVCE. Hives     Hypertension     Iron deficiency anemias     Long term (current) use of anticoagulants 2/23/2011    Murmur 1/2011    Grade I-II/VI systolic ejection murmur along left sternal border, with radiation to the pulmonic area. S/P cardiac cath 02/24/2012    Minimal coronary artery disease. Mild LVE. EF 55%. Mod MR. Mod-severe MS. RA 15.  RV 55/16. PA 58/34. W 36.  CO/CI 3.5/1.9 (TD); 3.61/1.9 (Dequan). R to L shunt suggested. Wears glasses     Weight gain       Past Surgical History:   Procedure Laterality Date    HX HEART CATHETERIZATION  2/24/2012    HX HYSTERECTOMY  2006    HX MITRAL VALVE REPLACEMENT  05/22/2013    25/33 mm On-X mechanical valve with bilateral pulmonary vein isolation and resection of LA appendage     Current Outpatient Medications   Medication Sig Dispense Refill    amLODIPine (NORVASC) 5 mg tablet Take 5 mg by mouth daily. fluticasone propion-salmeteroL (Advair HFA) 230-21 mcg/actuation inhaler Take 2 Puffs by inhalation two (2) times a day.  1 Each 2    atorvastatin (LIPITOR) 40 mg tablet TAKE 1 TABLET BY MOUTH EVERY DAY 90 Tablet 2    busPIRone (BUSPAR) 5 mg tablet TAKE 1 TABLET BY MOUTH TWICE A  Tablet 1    metoprolol succinate (TOPROL-XL) 100 mg tablet TAKE 1 TABLET BY MOUTH EVERY DAY 30 Tablet 5    losartan (COZAAR) 100 mg tablet Take 1 Tablet by mouth daily. 90 Tablet 3    furosemide (LASIX) 40 mg tablet Take 1.5 Tablets by mouth daily. 135 Tablet 3    levocetirizine (XYZAL) 5 mg tablet TAKE 1 TABLET BY MOUTH EVERY DAY 90 Tablet 3    montelukast (SINGULAIR) 10 mg tablet TAKE 1 TABLET BY MOUTH EVERY DAY 90 Tablet 3    Klor-Con M20 20 mEq tablet TAKE 1 TABLET BY MOUTH EVERY DAY 30 Tablet 6    OTHER Vitamin B-12 gummies      OTHER Allergy injections      aspirin delayed-release 81 mg tablet Take 1 Tab by mouth daily. 30 Tab 0    esomeprazole (NEXIUM) 20 mg capsule Take 20 mg by mouth daily as needed (heart burn). warfarin (COUMADIN) 5 mg tablet TAKE 1 TABLET BY MOUTH DAILY EXCEPT 1/2 TABLET ON TUE-THURS-SAT OR AS DIRECTED 60 Tablet 6    sertraline (ZOLOFT) 100 mg tablet TAKE 1 TABLET BY MOUTH EVERY DAY 90 Tablet 1    cyclobenzaprine (FLEXERIL) 10 mg tablet TAKE 1 TABLET BY MOUTH THREE (3) TIMES DAILY AS NEEDED FOR MUSCLE SPASM(S). 30 Tablet 0    albuterol (PROVENTIL HFA, VENTOLIN HFA, PROAIR HFA) 90 mcg/actuation inhaler INHALE 2 PUFFS BY MOUTH EVERY DAY. 1 Each 2    famotidine (PEPCID) 40 mg tablet Take 1 Tablet by mouth daily. 30 Tablet 1    diclofenac (VOLTAREN) 1 % gel Apply  to affected area four (4) times daily. acetaminophen (TYLENOL) 500 mg tablet Take 650 mg by mouth every six (6) hours as needed for Pain. budesonide (RHINOCORT AQUA) 32 mcg/actuation nasal spray SPRAY 2 SPRAYS INTO EACH NOSTRIL EVERY DAY 3 Bottle 1    clobetasoL (TEMOVATE) 0.05 % ointment Apply  to affected area two (2) times a day. 15 g 0    polyethylene glycol (MIRALAX) 17 gram packet Take 1 Packet by mouth daily. (Patient taking differently: Take  by mouth daily as needed for PRN Reason (Other) (Constipation). ) 30 Packet 0    sodium chloride (OCEAN) 0.65 % nasal squeeze bottle 1 Spray as needed for Congestion. melatonin tab tablet Take 10 mg by mouth nightly.        Allergies   Allergen Reactions    Penicillin Hives     Per Patient Not allergic       Family History   Problem Relation Age of Onset    Heart Surgery Father         Open-Heart Surgery    Other Father         Venous Thromboembolism    Hypertension Father     Heart Disease Mother         Enlarged Heart    Hypertension Mother     Diabetes Mother     Diabetes Sister     Hypertension Sister     Diabetes Brother     Heart Disease Paternal Grandmother     Diabetes Sister     Hypertension Sister              Baldomero Millard MD   Julienne Whitfield, 57 y.o.,  female    SUBJECTIVE  Ff-up     SOB- acute on chronic, has known CHF/afib/asthma/allergies, last month developed URI symptoms with dyspnea on exertion. Found to have covid 19 infection, ED visit 8/21 and subsequent visits with me gradual improvement. Repeat CXR with improvement on minimal bibasal infiltrate. She has received antitussives, and medrol dose pack. She has been using advair, switched from breo due to cost, and allergy shots interrupted due to covid. Most bothersome complaint of night time cough has improved, denies leg edema, pillow orthopnea    Anxiety/depression-reports to be doing well She continues to take  buspar and zoloft     HTN- continues to take norvasc dose to 7.5 mg, metoprolol and cozaar. H/o nstemi, afib, rheumatic valve disease s/p MV repair 2013- denies any chest pain, sob, palpitations, lightheadedness. On chronic coumadin treatment and lasix, following cardiology. HL-taking lipitor without problems. reviewd labs LDL 56 4/2022    Elevated globulin/anemia of chronic disease- neg work up, monitoring. Following dr. rae    Chronic rhinitis- on allergy shots, following ENT. No longer on advair, not symptomatic.      ROS:  See HPI, all others negative        Patient Active Problem List   Diagnosis Code    Rheumatic valvular disease I09.1    Chronic diastolic congestive heart failure ([address] I50.32    Atrial fibrillation ([address] I48.91    Long term current use of anticoagulant therapy Z79.01    Benign hypertensive heart disease with heart failure (HCC) I11.0    Hyperlipidemia E78.5    Advanced directives, counseling/discussion Z71.89    Mild intermittent asthma without complication E92.50    NSTEMI (non-ST elevated myocardial infarction) [address] I21.4    Chest pain R07.9    IFG (impaired fasting glucose) R73.01    Adjustment disorder with anxious mood F43.22    Essential hypertension I10    Other allergic rhinitis J30.89    Anxiety F41.9    Allergic rhinitis due to pollen J30.1    Chronic sinusitis J32.9    Mitral valve stenosis I05.0    Elevated serum globulin level R77.1    Normocytic anemia D64.9    Anemia of chronic disease D63.8    Perennial allergic rhinitis with seasonal variation J30.89, J30.2    Vasomotor rhinitis J30.0    Coronary artery disease involving native coronary artery of native heart without angina pectoris I25.10    Recurrent depression (HCC) F33.9    Bilateral leg edema R60.0       Current Outpatient Medications   Medication Sig Dispense Refill    amLODIPine (NORVASC) 5 mg tablet Take 5 mg by mouth daily. fluticasone propion-salmeteroL (Advair HFA) 230-21 mcg/actuation inhaler Take 2 Puffs by inhalation two (2) times a day. 1 Each 2    atorvastatin (LIPITOR) 40 mg tablet TAKE 1 TABLET BY MOUTH EVERY DAY 90 Tablet 2    busPIRone (BUSPAR) 5 mg tablet TAKE 1 TABLET BY MOUTH TWICE A  Tablet 1    metoprolol succinate (TOPROL-XL) 100 mg tablet TAKE 1 TABLET BY MOUTH EVERY DAY 30 Tablet 5    losartan (COZAAR) 100 mg tablet Take 1 Tablet by mouth daily. 90 Tablet 3    furosemide (LASIX) 40 mg tablet Take 1.5 Tablets by mouth daily. 135 Tablet 3    levocetirizine (XYZAL) 5 mg tablet TAKE 1 TABLET BY MOUTH EVERY DAY 90 Tablet 3    montelukast (SINGULAIR) 10 mg tablet TAKE 1 TABLET BY MOUTH EVERY DAY 90 Tablet 3    Klor-Con M20 20 mEq tablet TAKE 1 TABLET BY MOUTH EVERY DAY 30 Tablet 6    OTHER Vitamin B-12 gummies      OTHER Allergy injections      aspirin delayed-release 81 mg tablet Take 1 Tab by mouth daily.  30 Tab 0 esomeprazole (NEXIUM) 20 mg capsule Take 20 mg by mouth daily as needed (heart burn). warfarin (COUMADIN) 5 mg tablet TAKE 1 TABLET BY MOUTH DAILY EXCEPT 1/2 TABLET ON TUE-THURS-SAT OR AS DIRECTED 60 Tablet 6    sertraline (ZOLOFT) 100 mg tablet TAKE 1 TABLET BY MOUTH EVERY DAY 90 Tablet 1    cyclobenzaprine (FLEXERIL) 10 mg tablet TAKE 1 TABLET BY MOUTH THREE (3) TIMES DAILY AS NEEDED FOR MUSCLE SPASM(S). 30 Tablet 0    albuterol (PROVENTIL HFA, VENTOLIN HFA, PROAIR HFA) 90 mcg/actuation inhaler INHALE 2 PUFFS BY MOUTH EVERY DAY. 1 Each 2    famotidine (PEPCID) 40 mg tablet Take 1 Tablet by mouth daily. 30 Tablet 1    diclofenac (VOLTAREN) 1 % gel Apply  to affected area four (4) times daily. acetaminophen (TYLENOL) 500 mg tablet Take 650 mg by mouth every six (6) hours as needed for Pain. budesonide (RHINOCORT AQUA) 32 mcg/actuation nasal spray SPRAY 2 SPRAYS INTO EACH NOSTRIL EVERY DAY 3 Bottle 1    clobetasoL (TEMOVATE) 0.05 % ointment Apply  to affected area two (2) times a day. 15 g 0    polyethylene glycol (MIRALAX) 17 gram packet Take 1 Packet by mouth daily. (Patient taking differently: Take  by mouth daily as needed for PRN Reason (Other) (Constipation). ) 30 Packet 0    sodium chloride (OCEAN) 0.65 % nasal squeeze bottle 1 Spray as needed for Congestion. melatonin tab tablet Take 10 mg by mouth nightly. Allergies   Allergen Reactions    Penicillin Hives     Per Patient Not allergic       Past Medical History:   Diagnosis Date    Aortic valve disorders 1/13/2011    Asthma     Atrial fibrillation ([facility] [address] 1/13/2011    Congestive heart failure, unspecified March 2005    Cleared quickly with Lasix therapy    Echocardiogram 01/10/2012    A-fib. EF 45%. Mild RVE. RVSP 45-50. Massive LAE. Mod TAMI. Mod MS. Severe MR (mean grad 10). Mild-mod AI. Mild TR. Pulmonic systolic flow reversal.  Mild IVCE.     Hives     Hypertension     Iron deficiency anemias     Long term (current) use of anticoagulants 2011    Murmur 2011    Grade I-II/VI systolic ejection murmur along left sternal border, with radiation to the pulmonic area. S/P cardiac cath 2012    Minimal coronary artery disease. Mild LVE. EF 55%. Mod MR. Mod-severe MS. RA 15.  RV 55/16. PA 58/34. W 36.  CO/CI 3.5/1.9 (TD); 3.61/1.9 (Dequan). R to L shunt suggested.     Wears glasses     Weight gain        Social History     Socioeconomic History    Marital status:      Spouse name: Not on file    Number of children: Not on file    Years of education: Not on file    Highest education level: Not on file   Occupational History    Not on file   Tobacco Use    Smoking status: Former     Packs/day: 0.50     Years: 21.00     Pack years: 10.50     Types: Cigarettes     Quit date:      Years since quittin.7    Smokeless tobacco: Never   Vaping Use    Vaping Use: Never used   Substance and Sexual Activity    Alcohol use: Not Currently     Alcohol/week: 1.0 standard drink     Types: 1 Glasses of wine per week     Comment: occassionally    Drug use: Never    Sexual activity: Yes     Partners: Male     Birth control/protection: None   Other Topics Concern    Not on file   Social History Narrative    Not on file     Social Determinants of Health     Financial Resource Strain: Not on file   Food Insecurity: Not on file   Transportation Needs: Not on file   Physical Activity: Not on file   Stress: Not on file   Social Connections: Not on file   Intimate Partner Violence: Not on file   Housing Stability: Not on file       Family History   Problem Relation Age of Onset    Heart Surgery Father         Open-Heart Surgery    Other Father         Venous Thromboembolism    Hypertension Father     Heart Disease Mother         Enlarged Heart    Hypertension Mother     Diabetes Mother     Diabetes Sister     Hypertension Sister     Diabetes Brother     Heart Disease Paternal Grandmother     Diabetes Sister     Hypertension Sister OBJECTIVE    Physical Exam:     Visit Vitals  /78 (BP 1 Location: Left arm, BP Patient Position: Sitting, BP Cuff Size: Large adult)   Pulse 75   Temp 98.7 °F (37.1 °C) (Temporal)   Resp 16   Ht 5' 7\" (1.702 m)   Wt 189 lb 6 oz (85.9 kg)   SpO2 98%   Breastfeeding No   BMI 29.66 kg/m²       General: alert, well-appearing,  in no apparent distress or pain  Neck: no JVD  CVS: irregular, mech valve click, + murmur  Lungs:clear to ausculation bilaterally, no crackles, wheezing or rhonchi noted  Abdomen: soft, NT, ND  Skin:no edema, good dp pulses  Psych:  mood and affect normal      Results for orders placed or performed in visit on 09/13/22   AMB POC PT/INR   Result Value Ref Range    VALID INTERNAL CONTROL POC Yes     Prothrombin time (POC) 21.7 seconds    INR POC 1.8        ASSESSMENT/PLAN  Diagnoses and all orders for this visit:    Shortness of breath  Acute on chronic , in the setting of covid 19 infection/asthma exacerbation  Clinically improved  Appears well compensated  Cont advair, allergy shots  Will consult pulmonology to optimize asthma management    Anxiety   stable  Cont buspar  Cont [id number] mg  cont talk therapy    Recurrent depression  See above    Moderate persistent asthma without complication  PFTs 65/90 normal  Cont advair, prn albuterol prn  Annual flu shots, consider covid booster around nov given recent infection  Pcv 23 completed, offer PCV 20 on next visit    Essential hypertension  controlled  Cont  norvasc 7.5 mg  Cont cozaar 100 mg   cont metoprolol 100 mg  DASH diet, monitoring    CAD involving native coronary artery of native heart with angina pectoris (HCC)  Asymptomatic  Mild reversible ischemia on stress test 1/2019  On bb, arb, ASA  LDL is <70  Cont lipitor  Lipid panel 3/2022  Following cardiology    Atrial fibrillation, unspecified type (HCC)  Rate controlled, On BB, coumadin  INR therapeutic  Following cards    Hyperlipidemia, unspecified hyperlipidemia type  Good range on lipitor    LDL goal <70  Lipid panel 3/2022  Long term current use of anticoagulant therapy  Cards following    Rheumatic valvular disease  S/p [facility] valve 2013  Chronic anticoagulation    Allergic rhinitis, unspecified seasonality, unspecified trigger  On allergy shots  Cont flonase, benadryl,  singulair    Anemia of chronic disease  Following hematology, monitoring    Impaired fasting glucose  Tlcs, monitoring  Reminded on standing lab orders    Follow-up and Dispositions    Return in about 3 months (around 12/26/2022), or if symptoms worsen or fail to improve, for routine chronic illness care, labs soon. Patient understands plan of care. Patient has provided input and agrees with goals.

## 2022-09-26 NOTE — PATIENT INSTRUCTIONS

## 2022-10-11 ENCOUNTER — ANTI-COAG VISIT (OUTPATIENT)
Dept: CARDIOLOGY CLINIC | Age: 62
End: 2022-10-11
Payer: MEDICARE

## 2022-10-11 DIAGNOSIS — I48.91 ATRIAL FIBRILLATION, UNSPECIFIED TYPE (HCC): Primary | ICD-10-CM

## 2022-10-11 DIAGNOSIS — Z79.01 LONG TERM CURRENT USE OF ANTICOAGULANT THERAPY: ICD-10-CM

## 2022-10-11 LAB
INR BLD: 1.5
PT POC: 17.9 SECONDS
VALID INTERNAL CONTROL?: YES

## 2022-10-11 PROCEDURE — 85610 PROTHROMBIN TIME: CPT | Performed by: NURSE PRACTITIONER

## 2022-10-11 NOTE — PROGRESS NOTES
The INR is below the therapeutic range. Please make the following adjustments to Coumadin dosin.5mg today then Continue Coumadin 5mg daily except 2.5mg on Tues-Thurs-Sat. .  Repeat the INR in 4 weeks.

## 2022-11-03 RX ORDER — POTASSIUM CHLORIDE 1500 MG/1
TABLET, EXTENDED RELEASE ORAL
Qty: 30 TABLET | Refills: 6 | Status: SHIPPED | OUTPATIENT
Start: 2022-11-03

## 2022-11-08 ENCOUNTER — ANTI-COAG VISIT (OUTPATIENT)
Dept: CARDIOLOGY CLINIC | Age: 62
End: 2022-11-08
Payer: MEDICARE

## 2022-11-08 DIAGNOSIS — Z79.01 LONG TERM CURRENT USE OF ANTICOAGULANT THERAPY: ICD-10-CM

## 2022-11-08 DIAGNOSIS — I48.91 ATRIAL FIBRILLATION, UNSPECIFIED TYPE (HCC): Primary | ICD-10-CM

## 2022-11-08 LAB
INR BLD: 1.7
PT POC: 20.9 SECONDS
VALID INTERNAL CONTROL?: YES

## 2022-11-08 PROCEDURE — 85610 PROTHROMBIN TIME: CPT | Performed by: NURSE PRACTITIONER

## 2022-11-08 NOTE — PROGRESS NOTES
The INR is below the therapeutic range. Please make the following adjustments to Coumadin dosinmg today then Continue Coumadin 5mg daily except 2.5mg on Thurs & Sat. Repeat the INR in 2 weeks.

## 2022-11-11 ENCOUNTER — TELEPHONE (OUTPATIENT)
Dept: FAMILY MEDICINE CLINIC | Age: 62
End: 2022-11-11

## 2022-11-11 NOTE — TELEPHONE ENCOUNTER
Pt called stating she fell off the curb and slipped on a pine cone on Wednesday evening. Pt states she fell on her left shoulder, she has scrapes on her knees, and scrapes on the back of her hands. Pt states she has been in a lot of pain and is very sore. Pt wants to know can she get a prescription for pain medications. Please advise.

## 2022-11-11 NOTE — TELEPHONE ENCOUNTER
Spoke with patient and she has been advised that Dr. Hari Schaefer is not in the office today. Patient has been advised to got to the Urgent care to be evaluated. Patient verbalizes understanding.

## 2022-11-13 DIAGNOSIS — J45.40 MODERATE PERSISTENT ASTHMA WITHOUT COMPLICATION: ICD-10-CM

## 2022-11-14 ENCOUNTER — APPOINTMENT (OUTPATIENT)
Dept: GENERAL RADIOLOGY | Age: 62
End: 2022-11-14
Attending: PHYSICIAN ASSISTANT
Payer: MEDICARE

## 2022-11-14 ENCOUNTER — HOSPITAL ENCOUNTER (EMERGENCY)
Age: 62
Discharge: HOME | End: 2022-11-14
Attending: EMERGENCY MEDICINE
Payer: MEDICARE

## 2022-11-14 ENCOUNTER — APPOINTMENT (OUTPATIENT)
Dept: VASCULAR SURGERY | Age: 62
End: 2022-11-14
Attending: PHYSICIAN ASSISTANT
Payer: MEDICARE

## 2022-11-14 VITALS
OXYGEN SATURATION: 98 % | HEART RATE: 74 BPM | HEIGHT: 67 IN | RESPIRATION RATE: 18 BRPM | WEIGHT: 190 LBS | DIASTOLIC BLOOD PRESSURE: 103 MMHG | SYSTOLIC BLOOD PRESSURE: 139 MMHG | TEMPERATURE: 98.7 F | BODY MASS INDEX: 29.82 KG/M2

## 2022-11-14 DIAGNOSIS — R03.0 ELEVATED BLOOD PRESSURE READING: ICD-10-CM

## 2022-11-14 DIAGNOSIS — M25.512 PAIN IN JOINT OF LEFT SHOULDER: Primary | ICD-10-CM

## 2022-11-14 DIAGNOSIS — M25.562 ACUTE PAIN OF LEFT KNEE: ICD-10-CM

## 2022-11-14 PROCEDURE — 73030 X-RAY EXAM OF SHOULDER: CPT

## 2022-11-14 PROCEDURE — 93971 EXTREMITY STUDY: CPT

## 2022-11-14 PROCEDURE — 99284 EMERGENCY DEPT VISIT MOD MDM: CPT

## 2022-11-14 PROCEDURE — 73564 X-RAY EXAM KNEE 4 OR MORE: CPT

## 2022-11-14 RX ORDER — ALBUTEROL SULFATE 90 UG/1
AEROSOL, METERED RESPIRATORY (INHALATION)
Qty: 18 EACH | Refills: 2 | Status: SHIPPED | OUTPATIENT
Start: 2022-11-14

## 2022-11-14 RX ORDER — CYCLOBENZAPRINE HCL 5 MG
5 TABLET ORAL 2 TIMES DAILY
Qty: 10 TABLET | Refills: 0 | Status: SHIPPED | OUTPATIENT
Start: 2022-11-14 | End: 2022-11-17 | Stop reason: DRUGHIGH

## 2022-11-14 RX ORDER — LIDOCAINE 50 MG/G
PATCH TOPICAL
Qty: 30 EACH | Refills: 0 | Status: SHIPPED | OUTPATIENT
Start: 2022-11-14

## 2022-11-14 NOTE — Clinical Note
[address] [facility] EMERGENCY DEPT  [address] [address] [phone]    Work/School Note    Date: 11/14/2022    To Whom It May concern:    Laura Ayala was seen and treated today in the emergency room by the following provider(s):  Attending Provider: Thom Oliver DO  Physician Assistant: Jeffery Bangura. Laura Ayala is excused from work/school on 11/14/22 and 11/15/22. She is medically clear to return to work/school on 11/16/2022.        Sincerely,          SAMANTHA Bryson

## 2022-11-14 NOTE — Clinical Note
[address] [facility] EMERGENCY DEPT  [address] [address] [phone]    Work/School Note    Date: 11/14/2022    To Whom It May concern:    Harjinder Baker was seen and treated today in the emergency room by the following provider(s):  Attending Provider: Mily Michael DO  Physician Assistant: Tobin Elliott, [address]. Harjinder Baker is excused from work/school on 11/14/22 and 11/15/22. She is medically clear to return to work/school on 11/16/2022.        Sincerely,          SAMANTHA Mendez

## 2022-11-14 NOTE — ED TRIAGE NOTES
Patient states \"she [place] on [place] [employer] in [place] parking lot\". Leeann states :her left shoulder, left knee and left thigh is causing her pain. Patient also states \"she tried to brace herself from the fall with her right wrist and now its hurting as well\". Patient states Pierce Valencia has used a heating pad, and tylenol with no relief\".

## 2022-11-14 NOTE — ED PROVIDER NOTES
EMERGENCY DEPARTMENT HISTORY AND PHYSICAL EXAM    11:18 AM      Date: 11/14/2022  Patient Name: Sasha Castanon    History of Presenting Illness     Chief Complaint   Patient presents with    Shoulder Injury     Left shoulder    Knee Pain     Left knee    Wrist Pain     Right wrist    Leg Pain     Left thigh         History Provided By: Patient    Additional History (Context): Sasha Castanon is a 57 y.o. female with  noted PMH  who presents with complaint of left shoulder and left knee pain after fall that occurred on Wednesday. Patient notes she tripped on something in the parking lot and landed on her left side. Patient denies head injury, loss of consciousness, dizziness, chest pain, shortness of breath, weakness, nausea or vomiting. Patient notes left knee pain radiating to the thigh today, notes concern for DVT. Patient denies history of DVT or PE, hemoptysis, recent surgery or travel, leg swelling. Notes she is compliant with her Coumadin, as her last INR was 1.7, has a recheck next week. Has been taking Tylenol and applying heating pad for symptoms with mild relief. PCP: Tasia Tran MD    Current Outpatient Medications   Medication Sig Dispense Refill    cyclobenzaprine (FLEXERIL) 5 mg tablet Take 1 Tablet by mouth two (2) times a day. 10 Tablet 0    lidocaine (Lidoderm) 5 % Apply patch to the affected area for 12 hours a day and remove for 12 hours a day. 30 Each 0    albuterol (Ventolin HFA) 90 mcg/actuation inhaler INHALE 2 PUFFS BY MOUTH EVERY DAY 18 Each 2    Klor-Con M20 20 mEq tablet TAKE 1 TABLET BY MOUTH EVERY DAY 30 Tablet 6    amLODIPine (NORVASC) 5 mg tablet Take 5 mg by mouth daily. fluticasone propion-salmeteroL (Advair HFA) 230-21 mcg/actuation inhaler Take 2 Puffs by inhalation two (2) times a day.  1 Each 2    atorvastatin (LIPITOR) 40 mg tablet TAKE 1 TABLET BY MOUTH EVERY DAY 90 Tablet 2    warfarin (COUMADIN) 5 mg tablet TAKE 1 TABLET BY MOUTH DAILY EXCEPT 1/2 TABLET ON TUE-THURS-SAT OR AS DIRECTED 60 Tablet 6    sertraline (ZOLOFT) 100 mg tablet TAKE 1 TABLET BY MOUTH EVERY DAY 90 Tablet 1    busPIRone (BUSPAR) 5 mg tablet TAKE 1 TABLET BY MOUTH TWICE A  Tablet 1    metoprolol succinate (TOPROL-XL) 100 mg tablet TAKE 1 TABLET BY MOUTH EVERY DAY 30 Tablet 5    losartan (COZAAR) 100 mg tablet Take 1 Tablet by mouth daily. 90 Tablet 3    furosemide (LASIX) 40 mg tablet Take 1.5 Tablets by mouth daily. 135 Tablet 3    levocetirizine (XYZAL) 5 mg tablet TAKE 1 TABLET BY MOUTH EVERY DAY 90 Tablet 3    montelukast (SINGULAIR) 10 mg tablet TAKE 1 TABLET BY MOUTH EVERY DAY 90 Tablet 3    famotidine (PEPCID) 40 mg tablet Take 1 Tablet by mouth daily. 30 Tablet 1    diclofenac (VOLTAREN) 1 % gel Apply  to affected area four (4) times daily. OTHER Vitamin B-12 gummies      acetaminophen (TYLENOL) 500 mg tablet Take 650 mg by mouth every six (6) hours as needed for Pain. budesonide (RHINOCORT AQUA) 32 mcg/actuation nasal spray SPRAY 2 SPRAYS INTO EACH NOSTRIL EVERY DAY 3 Bottle 1    clobetasoL (TEMOVATE) 0.05 % ointment Apply  to affected area two (2) times a day. 15 g 0    OTHER Allergy injections      aspirin delayed-release 81 mg tablet Take 1 Tab by mouth daily. 30 Tab 0    polyethylene glycol (MIRALAX) 17 gram packet Take 1 Packet by mouth daily. (Patient taking differently: Take  by mouth daily as needed for PRN Reason (Other) (Constipation). ) 30 Packet 0    esomeprazole (NEXIUM) 20 mg capsule Take 20 mg by mouth daily as needed (heart burn). sodium chloride (OCEAN) 0.65 % nasal squeeze bottle 1 Spray as needed for Congestion. melatonin tab tablet Take 10 mg by mouth nightly.          Past History     Past Medical History:  Past Medical History:   Diagnosis Date    Aortic valve disorders 1/13/2011    Asthma     Atrial fibrillation ([facility] [address] 1/13/2011    Congestive heart failure, unspecified March 2005    Cleared quickly with Lasix therapy    Echocardiogram 01/10/2012    A-fib. EF 45%. Mild RVE. RVSP 45-50. Massive LAE. Mod TAMI. Mod MS. Severe MR (mean grad 10). Mild-mod AI. Mild TR. Pulmonic systolic flow reversal.  Mild IVCE. Hives     Hypertension     Iron deficiency anemias     Long term (current) use of anticoagulants 2011    Murmur 2011    Grade I-II/VI systolic ejection murmur along left sternal border, with radiation to the pulmonic area. S/P cardiac cath 2012    Minimal coronary artery disease. Mild LVE. EF 55%. Mod MR. Mod-severe MS. RA 15.  RV 55/16. PA 58/34. W 36.  CO/CI 3.5/1.9 (TD); 3.61/1.9 (Dequan). R to L shunt suggested. Wears glasses     Weight gain        Past Surgical History:  Past Surgical History:   Procedure Laterality Date    HX HEART CATHETERIZATION  2012    HX HYSTERECTOMY  2006    HX MITRAL VALVE REPLACEMENT  2013    25/33 mm On-X mechanical valve with bilateral pulmonary vein isolation and resection of LA appendage       Family History:  Family History   Problem Relation Age of Onset    Heart Surgery Father         Open-Heart Surgery    Other Father         Venous Thromboembolism    Hypertension Father     Heart Disease Mother         Enlarged Heart    Hypertension Mother     Diabetes Mother     Diabetes Sister     Hypertension Sister     Diabetes Brother     Heart Disease Paternal Grandmother     Diabetes Sister     Hypertension Sister        Social History:  Social History     Tobacco Use    Smoking status: Former     Packs/day: 0.50     Years: 21.00     Pack years: 10.50     Types: Cigarettes     Quit date:      Years since quittin.8    Smokeless tobacco: Never   Vaping Use    Vaping Use: Never used   Substance Use Topics    Alcohol use: Not Currently     Alcohol/week: 1.0 standard drink     Types: 1 Glasses of wine per week     Comment: occassionally    Drug use: Never       Allergies:   Allergies   Allergen Reactions    Penicillin Hives     Per Patient Not allergic Review of Systems       Review of Systems   Constitutional:  Negative for chills and fever. Respiratory:  Negative for shortness of breath. Cardiovascular:  Negative for chest pain. Gastrointestinal:  Negative for abdominal pain, nausea and vomiting. Musculoskeletal:  Positive for arthralgias and myalgias. Skin:  Negative for rash. Neurological:  Negative for weakness. All other systems reviewed and are negative. Physical Exam   Visit Vitals  BP (!) 139/103 (BP 1 Location: Left upper arm, BP Patient Position: At rest;Sitting)   Pulse 74   Temp 98.7 °F (37.1 °C)   Resp 18   Ht 5' 7\" (1.702 m)   Wt 86.2 kg (190 lb)   SpO2 98%   BMI 29.76 kg/m²         Physical Exam  Vitals and nursing note reviewed. Constitutional:       General: She is not in acute distress. Appearance: Normal appearance. She is well-developed. She is not ill-appearing, toxic-appearing or diaphoretic. HENT:      Head: Normocephalic and atraumatic. Cardiovascular:      Rate and Rhythm: Normal rate and regular rhythm. Heart sounds: Normal heart sounds. No murmur heard. No friction rub. No gallop. Pulmonary:      Effort: Pulmonary effort is normal. No respiratory distress. Breath sounds: Normal breath sounds. No wheezing or rales. Musculoskeletal:         General: Normal range of motion. Left shoulder: Bony tenderness present. No deformity, effusion or crepitus. Normal range of motion. Normal strength. Normal pulse. Left elbow: Normal.      Cervical back: Normal, normal range of motion and neck supple. Thoracic back: Normal.      Lumbar back: Normal.      Left hip: Normal.      Left knee: Bony tenderness (anterior) present. No swelling, deformity, effusion, erythema, ecchymosis or crepitus. Normal range of motion. Left ankle: Normal.   Skin:     General: Skin is warm. Findings: No rash. Neurological:      General: No focal deficit present.       Mental Status: She is alert and oriented to person, place, and time. Cranial Nerves: No cranial nerve deficit. Sensory: No sensory deficit. Motor: No weakness. Gait: Gait normal.         Diagnostic Study Results     Labs -  No results found for this or any previous visit (from the past 12 hour(s)). Radiologic Studies -   XR KNEE LT MIN 4 V   Final Result   Findings/impression:      No fracture, dislocation or effusion. Minimal atherosclerosis. XR SHOULDER LT AP/LAT MIN 2 V   Final Result   Findings/impression:      Heart is enlarged but partially imaged. Hazy bilateral airspace opacities   partially imaged. Recommend further evaluation with dedicated chest radiograph. No fracture or dislocation. Mild osseous degenerative changes. No bony Bankart   or Hill-Sachs deformity. DUPLEX LOWER EXT VENOUS LEFT    (Results Pending)     No evidence of deep venous thrombosis of the left lower extremity. No evidence of superficial venous thrombosis of the left lower extremity. Medical Decision Making   I am the first provider for this patient. I reviewed the vital signs, available nursing notes, past medical history, past surgical history, family history and social history. Vital Signs-Reviewed the patient's vital signs. Records Reviewed: Nursing Notes and Old Medical Records (Time of Review: 11:18 AM)    ED Course: Progress Notes, Reevaluation, and Consults:  12:30 PM: Reviewed results and plan with patient. Denies chest pain, dyspnea, orthopnea, cough. Discussed need for close outpatient follow-up this week for reassessment. Discussed strict return precautions, including weakness or any other medical concerns    One or more blood pressure readings were noted elevated during the patient's presentation in the emergency department today.   This abnormal reading has been cited in the patients' diagnosis, and they have been encouraged to follow up with their primary care physician, or referred to a consultant for further evaluation and treatment. Provider Notes (Medical Decision Making): 15-year-old female who presents the ED due to left shoulder and left knee pain after fall that occurred on Wednesday. No head injury or loss of consciousness. No ecchymosis, edema, deformity. Full range of motion of joints, ambulatory with steady gait. X-rays negative for acute fracture or dislocation, Doppler negative for DVT. Patient is stable for discharge with symptomatic management, close outpatient follow-up for further assessment. Strict return precautions provided      Diagnosis     Clinical Impression:   1. Pain in joint of left shoulder    2. Acute pain of left knee    3. Elevated blood pressure reading        Disposition: home     Follow-up Information       Follow up With Specialties Details Why [place] [id number] EMERGENCY DEPT Emergency Medicine  If symptoms worsen [address] MD Family Medicine Schedule an appointment as soon as possible for a visit   [address] [place] [facility]  [phone]               Discharge Medication List as of 11/14/2022 12:43 PM        START taking these medications    Details   lidocaine (Lidoderm) 5 % Apply patch to the affected area for 12 hours a day and remove for 12 hours a day., Normal, Disp-30 Each, R-0           CONTINUE these medications which have CHANGED    Details   cyclobenzaprine (FLEXERIL) 5 mg tablet Take 1 Tablet by mouth two (2) times a day., Normal, Disp-10 Tablet, R-0      albuterol (Ventolin HFA) 90 mcg/actuation inhaler INHALE 2 PUFFS BY MOUTH EVERY DAY, Normal, Disp-18 Each, R-2           CONTINUE these medications which have NOT CHANGED    Details   Klor-Con M20 20 mEq tablet TAKE 1 TABLET BY MOUTH EVERY DAY, Normal, Disp-30 Tablet, R-6      amLODIPine (NORVASC) 5 mg tablet Take 5 mg by mouth daily. , Historical Med      fluticasone propion-salmeteroL (Advair HFA) 230-21 mcg/actuation inhaler Take 2 Puffs by inhalation two (2) times a day., Normal, Disp-1 Each, R-2      atorvastatin (LIPITOR) 40 mg tablet TAKE 1 TABLET BY MOUTH EVERY DAY, Normal, Disp-90 Tablet, R-2      warfarin (COUMADIN) 5 mg tablet TAKE 1 TABLET BY MOUTH DAILY EXCEPT 1/2 TABLET ON TUE-THURS-SAT OR AS DIRECTED, Normal, Disp-60 Tablet, R-6      sertraline (ZOLOFT) 100 mg tablet TAKE 1 TABLET BY MOUTH EVERY DAY, Normal, Disp-90 Tablet, R-1      busPIRone (BUSPAR) 5 mg tablet TAKE 1 TABLET BY MOUTH TWICE A DAY, Normal, Disp-180 Tablet, R-1      metoprolol succinate (TOPROL-XL) 100 mg tablet TAKE 1 TABLET BY MOUTH EVERY DAY, Normal, Disp-30 Tablet, R-5      losartan (COZAAR) 100 mg tablet Take 1 Tablet by mouth daily. , Normal, Disp-90 Tablet, R-3      furosemide (LASIX) 40 mg tablet Take 1.5 Tablets by mouth daily. , Normal, Disp-135 Tablet, R-3      levocetirizine (XYZAL) 5 mg tablet TAKE 1 TABLET BY MOUTH EVERY DAY, Normal, Disp-90 Tablet, R-3      montelukast (SINGULAIR) 10 mg tablet TAKE 1 TABLET BY MOUTH EVERY DAY, Normal, Disp-90 Tablet, R-3      famotidine (PEPCID) 40 mg tablet Take 1 Tablet by mouth daily. , Normal, Disp-30 Tablet, R-1      diclofenac (VOLTAREN) 1 % gel Apply  to affected area four (4) times daily. , Historical Med      !! OTHER Vitamin B-12 gummies, Historical Med      acetaminophen (TYLENOL) 500 mg tablet Take 650 mg by mouth every six (6) hours as needed for Pain., Historical Med      budesonide (RHINOCORT AQUA) 32 mcg/actuation nasal spray SPRAY 2 SPRAYS INTO EACH NOSTRIL EVERY DAY, Normal, Disp-3 Bottle,R-1      clobetasoL (TEMOVATE) 0.05 % ointment Apply  to affected area two (2) times a day., Normal, Disp-15 g, R-0      !! OTHER Allergy injections, Historical Med      aspirin delayed-release 81 mg tablet Take 1 Tab by mouth daily. , Print, Disp-30 Tab, R-0      polyethylene glycol (MIRALAX) 17 gram packet Take 1 Packet by mouth daily. , Print, Disp-30 Packet, R-0      esomeprazole (NEXIUM) 20 mg capsule Take 20 mg by mouth daily as needed (heart burn). , Historical Med      sodium chloride (OCEAN) 0.65 % nasal squeeze bottle 1 Spray as needed for Congestion. , Historical Med      melatonin tab tablet Take 10 mg by mouth nightly., Historical Med       !! - Potential duplicate medications found. Please discuss with provider. Dictation disclaimer:  Please note that this dictation was completed with [employer], the computer voice recognition software. Quite often unanticipated grammatical, syntax, homophones, and other interpretive errors are inadvertently transcribed by the computer software. Please disregard these errors. Please excuse any errors that have escaped final proofreading.

## 2022-11-17 ENCOUNTER — OFFICE VISIT (OUTPATIENT)
Dept: FAMILY MEDICINE CLINIC | Age: 62
End: 2022-11-17
Payer: MEDICARE

## 2022-11-17 VITALS
BODY MASS INDEX: 29.72 KG/M2 | HEART RATE: 81 BPM | HEIGHT: 67 IN | DIASTOLIC BLOOD PRESSURE: 79 MMHG | OXYGEN SATURATION: 97 % | RESPIRATION RATE: 16 BRPM | SYSTOLIC BLOOD PRESSURE: 120 MMHG | WEIGHT: 189.38 LBS | TEMPERATURE: 97.8 F

## 2022-11-17 DIAGNOSIS — I48.0 PAROXYSMAL ATRIAL FIBRILLATION (HCC): ICD-10-CM

## 2022-11-17 DIAGNOSIS — S40.012D CONTUSION OF LEFT SHOULDER, SUBSEQUENT ENCOUNTER: ICD-10-CM

## 2022-11-17 DIAGNOSIS — I10 ESSENTIAL HYPERTENSION: ICD-10-CM

## 2022-11-17 DIAGNOSIS — S80.02XD CONTUSION OF LEFT KNEE, SUBSEQUENT ENCOUNTER: Primary | ICD-10-CM

## 2022-11-17 PROCEDURE — G8427 DOCREV CUR MEDS BY ELIG CLIN: HCPCS | Performed by: FAMILY MEDICINE

## 2022-11-17 PROCEDURE — G9231 DOC ESRD DIA TRANS PREG: HCPCS | Performed by: FAMILY MEDICINE

## 2022-11-17 PROCEDURE — G9717 DOC PT DX DEP/BP F/U NT REQ: HCPCS | Performed by: FAMILY MEDICINE

## 2022-11-17 PROCEDURE — 3078F DIAST BP <80 MM HG: CPT | Performed by: FAMILY MEDICINE

## 2022-11-17 PROCEDURE — G0463 HOSPITAL OUTPT CLINIC VISIT: HCPCS | Performed by: FAMILY MEDICINE

## 2022-11-17 PROCEDURE — 3017F COLORECTAL CA SCREEN DOC REV: CPT | Performed by: FAMILY MEDICINE

## 2022-11-17 PROCEDURE — 99214 OFFICE O/P EST MOD 30 MIN: CPT | Performed by: FAMILY MEDICINE

## 2022-11-17 PROCEDURE — 3074F SYST BP LT 130 MM HG: CPT | Performed by: FAMILY MEDICINE

## 2022-11-17 PROCEDURE — G8417 CALC BMI ABV UP PARAM F/U: HCPCS | Performed by: FAMILY MEDICINE

## 2022-11-17 PROCEDURE — G9899 SCRN MAM PERF RSLTS DOC: HCPCS | Performed by: FAMILY MEDICINE

## 2022-11-17 RX ORDER — CYCLOBENZAPRINE HCL 10 MG
10 TABLET ORAL
Qty: 30 TABLET | Refills: 0 | Status: SHIPPED | OUTPATIENT
Start: 2022-11-17

## 2022-11-17 NOTE — PROGRESS NOTES
Iban Chowdhury, 57 y.o.,  female    SUBJECTIVE  Ff-up ED fall  11/14/2022    Accidentally stepped on pine cone and fell on left side landing on L knee and shoulder. Went to ED, reviewed Xrays, doppler neg. She is on long term anticoag with afib. Using lidoderm patches and flexeril with some relief, requesting increase of flexeril dose. Requesting DMV card until she gets better. ROS:  See HPI, all others negative        Patient Active Problem List   Diagnosis Code    Rheumatic valvular disease I09.1    Chronic diastolic congestive heart failure (HCC) I50.32    Atrial fibrillation (HCC) I48.91    Long term current use of anticoagulant therapy Z79.01    Benign hypertensive heart disease with heart failure (HCC) I11.0    Hyperlipidemia E78.5    Advanced directives, counseling/discussion Z71.89    Mild intermittent asthma without complication L54.74    NSTEMI (non-ST elevated myocardial infarction) ([facility] [address] I21.4    Chest pain R07.9    IFG (impaired fasting glucose) R73.01    Adjustment disorder with anxious mood F43.22    Essential hypertension I10    Other allergic rhinitis J30.89    Anxiety F41.9    Allergic rhinitis due to pollen J30.1    Chronic sinusitis J32.9    Mitral valve stenosis I05.0    Elevated serum globulin level R77.1    Normocytic anemia D64.9    Anemia of chronic disease D63.8    Perennial allergic rhinitis with seasonal variation J30.89, J30.2    Vasomotor rhinitis J30.0    Coronary artery disease involving native coronary artery of native heart without angina pectoris I25.10    Recurrent depression (HCC) F33.9    Bilateral leg edema R60.0       Current Outpatient Medications   Medication Sig Dispense Refill    cyclobenzaprine (FLEXERIL) 10 mg tablet Take 1 Tablet by mouth three (3) times daily as needed for Muscle Spasm(s).  30 Tablet 0    albuterol (Ventolin HFA) 90 mcg/actuation inhaler INHALE 2 PUFFS BY MOUTH EVERY DAY 18 Each 2    lidocaine (Lidoderm) 5 % Apply patch to the affected area for 12 hours a day and remove for 12 hours a day. 30 Each 0    Klor-Con M20 20 mEq tablet TAKE 1 TABLET BY MOUTH EVERY DAY 30 Tablet 6    amLODIPine (NORVASC) 5 mg tablet Take 5 mg by mouth daily. fluticasone propion-salmeteroL (Advair HFA) 230-21 mcg/actuation inhaler Take 2 Puffs by inhalation two (2) times a day. 1 Each 2    atorvastatin (LIPITOR) 40 mg tablet TAKE 1 TABLET BY MOUTH EVERY DAY 90 Tablet 2    warfarin (COUMADIN) 5 mg tablet TAKE 1 TABLET BY MOUTH DAILY EXCEPT 1/2 TABLET ON TUE-THURS-SAT OR AS DIRECTED 60 Tablet 6    sertraline (ZOLOFT) 100 mg tablet TAKE 1 TABLET BY MOUTH EVERY DAY 90 Tablet 1    busPIRone (BUSPAR) 5 mg tablet TAKE 1 TABLET BY MOUTH TWICE A  Tablet 1    metoprolol succinate (TOPROL-XL) 100 mg tablet TAKE 1 TABLET BY MOUTH EVERY DAY 30 Tablet 5    losartan (COZAAR) 100 mg tablet Take 1 Tablet by mouth daily. 90 Tablet 3    furosemide (LASIX) 40 mg tablet Take 1.5 Tablets by mouth daily. 135 Tablet 3    levocetirizine (XYZAL) 5 mg tablet TAKE 1 TABLET BY MOUTH EVERY DAY 90 Tablet 3    montelukast (SINGULAIR) 10 mg tablet TAKE 1 TABLET BY MOUTH EVERY DAY 90 Tablet 3    famotidine (PEPCID) 40 mg tablet Take 1 Tablet by mouth daily. 30 Tablet 1    diclofenac (VOLTAREN) 1 % gel Apply  to affected area four (4) times daily. OTHER Vitamin B-12 gummies      acetaminophen (TYLENOL) 500 mg tablet Take 650 mg by mouth every six (6) hours as needed for Pain. budesonide (RHINOCORT AQUA) 32 mcg/actuation nasal spray SPRAY 2 SPRAYS INTO EACH NOSTRIL EVERY DAY 3 Bottle 1    OTHER Allergy injections      aspirin delayed-release 81 mg tablet Take 1 Tab by mouth daily. 30 Tab 0    polyethylene glycol (MIRALAX) 17 gram packet Take 1 Packet by mouth daily. (Patient taking differently: Take  by mouth daily as needed for PRN Reason (Other) (Constipation). ) 30 Packet 0    esomeprazole (NEXIUM) 20 mg capsule Take 20 mg by mouth daily as needed (heart burn).       sodium chloride (OCEAN) 0.65 % nasal squeeze bottle 1 Spray as needed for Congestion. melatonin tab tablet Take 10 mg by mouth nightly. clobetasoL (TEMOVATE) 0.05 % ointment Apply  to affected area two (2) times a day. 15 g 0       Allergies   Allergen Reactions    Penicillin Hives     Per Patient Not allergic       Past Medical History:   Diagnosis Date    Aortic valve disorders 2011    Asthma     Atrial fibrillation ([address] 2011    Congestive heart failure, unspecified 2005    Cleared quickly with Lasix therapy    Echocardiogram 01/10/2012    A-fib. EF 45%. Mild RVE. RVSP 45-50. Massive LAE. Mod TAMI. Mod MS. Severe MR (mean grad 10). Mild-mod AI. Mild TR. Pulmonic systolic flow reversal.  Mild IVCE. Hives     Hypertension     Iron deficiency anemias     Long term (current) use of anticoagulants 2011    Murmur 2011    Grade I-II/VI systolic ejection murmur along left sternal border, with radiation to the pulmonic area. S/P cardiac cath 2012    Minimal coronary artery disease. Mild LVE. EF 55%. Mod MR. Mod-severe MS. RA 15.  RV 55/16. PA 58/34. W 36.  CO/CI 3.5/1.9 (TD); 3.61/1.9 (Dequan). R to L shunt suggested.     Wears glasses     Weight gain        Social History     Socioeconomic History    Marital status:      Spouse name: Not on file    Number of children: Not on file    Years of education: Not on file    Highest education level: Not on file   Occupational History    Not on file   Tobacco Use    Smoking status: Former     Packs/day: 0.50     Years: 21.00     Pack years: 10.50     Types: Cigarettes     Quit date:      Years since quittin.8    Smokeless tobacco: Never   Vaping Use    Vaping Use: Never used   Substance and Sexual Activity    Alcohol use: Not Currently     Alcohol/week: 1.0 standard drink     Types: 1 Glasses of wine per week     Comment: occassionally    Drug use: Never    Sexual activity: Yes     Partners: Male     Birth control/protection: None Other Topics Concern    Not on file   Social History Narrative    Not on file     Social Determinants of Health     Financial Resource Strain: Low Risk     Difficulty of Paying Living Expenses: Not hard at all   Food Insecurity: No Food Insecurity    Worried About Running Out of Food in the Last Year: Never true    Ran Out of Food in the Last Year: Never true   Transportation Needs: Not on file   Physical Activity: Not on file   Stress: Not on file   Social Connections: Not on file   Intimate Partner Violence: Not on file   Housing Stability: Not on file       Family History   Problem Relation Age of Onset    Heart Surgery Father         Open-Heart Surgery    Other Father         Venous Thromboembolism    Hypertension Father     Heart Disease Mother         Enlarged Heart    Hypertension Mother     Diabetes Mother     Diabetes Sister     Hypertension Sister     Diabetes Brother     Heart Disease Paternal Grandmother     Diabetes Sister     Hypertension Sister          OBJECTIVE    Physical Exam:     Visit Vitals  /79 (BP 1 Location: Left upper arm, BP Patient Position: Sitting, BP Cuff Size: Adult)   Pulse 81   Temp 97.8 °F (36.6 °C) (Temporal)   Resp 16   Ht 5' 7\" (1.702 m)   Wt 189 lb 6 oz (85.9 kg)   SpO2 97%   BMI 29.66 kg/m²       General: alert, well-appearing, in no apparent distress or pain  Neck: supple, no adenopathy palpated  CVS: normal rate, rIR  Lungs:clear to ausculation bilaterally, no crackles, wheezing or rhonchi noted  Extremities: L knee mild effusion, FROM, L shoulder FROM  Skin: warm, no lesions, rashes noted  Psych:  mood and affect normal        ASSESSMENT/PLAN  Diagnoses and all orders for this visit:    1. Contusion of left knee, subsequent encounter  Improving  Cont flexeril/lidoderm patches  DMV from completed    2. Contusion of left shoulder, subsequent encounter    3. Essential hypertension  Now controlled    4.  Paroxysmal atrial fibrillation (HCC)    Other orders  - cyclobenzaprine (FLEXERIL) 10 mg tablet; Take 1 Tablet by mouth three (3) times daily as needed for Muscle Spasm(s). Follow-up and Dispositions    Return if symptoms worsen or fail to improve, for keep appt jessy, routine chronic illness care, non-fasting labs prior to your next visit. Patient understands plan of care. Patient has provided input and agrees with goals.

## 2022-11-17 NOTE — PROGRESS NOTES
1. \"Have you been to the ER, urgent care clinic since your last visit? Hospitalized since your last visit? \" Yes 11- [facility] for multiple joint pain     2. \"Have you seen or consulted any other health care providers outside of the [address] [place] [address] since your last visit? \" {Yes when where and reason for visit:[id number]}     3. For patients aged 39-70: Has the patient had a colonoscopy / FIT/ Cologuard? Yes - Care Gap present. Most recent result on file 06- (new order placed on 09-)      If the patient is female:    4. For patients aged 41-77: Has the patient had a mammogram within the past 2 years? Yes - Care Gap present. Most recent result on file scheduled for 12-    5. For patients aged 21-65: Has the patient had a pap smear?  No hx of hysterectomy

## 2022-11-17 NOTE — PROGRESS NOTES
Chief Complaint   Patient presents with    Shoulder Injury     C/o left shoulder pain seen in [facility]     Knee Pain     C/o left knee pain     Leg Pain     C/o left thigh area                Visit Vitals  /79 (BP 1 Location: Left upper arm, BP Patient Position: Sitting, BP Cuff Size: Adult)   Pulse 81   Temp 97.8 °F (36.6 °C) (Temporal)   Resp 16   Ht 5' 7\" (1.702 m)   Wt 189 lb 6 oz (85.9 kg)   SpO2 97%   BMI 29.66 kg/m²        1. \"Have you been to the ER, urgent care clinic since your last visit? Hospitalized since your last visit? \" Yes 11- [facility] for multiple joint pain     2. \"Have you seen or consulted any other health care providers outside of the [address] [place] [address] since your last visit? \" No     3. For patients aged 39-70: Has the patient had a colonoscopy / FIT/ Cologuard? Yes - Care Gap present. Most recent result on file 06- (new order placed on 09-)      If the patient is female:    4. For patients aged 41-77: Has the patient had a mammogram within the past 2 years? Yes - Care Gap present. Most recent result on file scheduled for 12-    5. For patients aged 21-65: Has the patient had a pap smear?  No hx of hysterectomy

## 2022-11-22 ENCOUNTER — ANTI-COAG VISIT (OUTPATIENT)
Dept: CARDIOLOGY CLINIC | Age: 62
End: 2022-11-22
Payer: MEDICARE

## 2022-11-22 DIAGNOSIS — Z79.01 LONG TERM CURRENT USE OF ANTICOAGULANT THERAPY: ICD-10-CM

## 2022-11-22 DIAGNOSIS — I48.91 ATRIAL FIBRILLATION, UNSPECIFIED TYPE (HCC): Primary | ICD-10-CM

## 2022-11-22 LAB
INR BLD: 3
PT POC: 36.1 SECONDS
VALID INTERNAL CONTROL?: YES

## 2022-11-22 PROCEDURE — 85610 PROTHROMBIN TIME: CPT | Performed by: NURSE PRACTITIONER

## 2022-11-22 NOTE — PROGRESS NOTES
The INR is stable and therapeutic but in upper part of range. Taking a muscle relaxer daily  Only 2.5mg today then Continue Coumadin 5mg daily except 2.5mg on Thurs & Sat.   Recheck INR in 3 weeks with appt

## 2022-12-07 RX ORDER — CYCLOBENZAPRINE HCL 10 MG
TABLET ORAL
Qty: 30 TABLET | Refills: 0 | Status: SHIPPED | OUTPATIENT
Start: 2022-12-07

## 2022-12-13 ENCOUNTER — ANTI-COAG VISIT (OUTPATIENT)
Dept: CARDIOLOGY CLINIC | Age: 62
End: 2022-12-13

## 2022-12-13 ENCOUNTER — OFFICE VISIT (OUTPATIENT)
Dept: CARDIOLOGY CLINIC | Age: 62
End: 2022-12-13
Payer: MEDICARE

## 2022-12-13 VITALS
OXYGEN SATURATION: 96 % | DIASTOLIC BLOOD PRESSURE: 70 MMHG | SYSTOLIC BLOOD PRESSURE: 130 MMHG | BODY MASS INDEX: 30.13 KG/M2 | HEART RATE: 85 BPM | HEIGHT: 67 IN | WEIGHT: 192 LBS

## 2022-12-13 DIAGNOSIS — Z79.01 LONG TERM CURRENT USE OF ANTICOAGULANT THERAPY: ICD-10-CM

## 2022-12-13 DIAGNOSIS — I05.0 RHEUMATIC MITRAL STENOSIS: ICD-10-CM

## 2022-12-13 DIAGNOSIS — I48.91 ATRIAL FIBRILLATION, UNSPECIFIED TYPE (HCC): Primary | ICD-10-CM

## 2022-12-13 DIAGNOSIS — I48.0 PAROXYSMAL ATRIAL FIBRILLATION (HCC): Primary | ICD-10-CM

## 2022-12-13 DIAGNOSIS — I11.0 BENIGN HYPERTENSIVE HEART DISEASE WITH HEART FAILURE (HCC): ICD-10-CM

## 2022-12-13 LAB
INR BLD: 2.7
PT POC: 32.2 SECONDS
VALID INTERNAL CONTROL?: YES

## 2022-12-13 PROCEDURE — G8427 DOCREV CUR MEDS BY ELIG CLIN: HCPCS | Performed by: INTERNAL MEDICINE

## 2022-12-13 PROCEDURE — 93000 ELECTROCARDIOGRAM COMPLETE: CPT | Performed by: INTERNAL MEDICINE

## 2022-12-13 PROCEDURE — 99214 OFFICE O/P EST MOD 30 MIN: CPT | Performed by: INTERNAL MEDICINE

## 2022-12-13 PROCEDURE — G9899 SCRN MAM PERF RSLTS DOC: HCPCS | Performed by: INTERNAL MEDICINE

## 2022-12-13 PROCEDURE — 3017F COLORECTAL CA SCREEN DOC REV: CPT | Performed by: INTERNAL MEDICINE

## 2022-12-13 PROCEDURE — 3074F SYST BP LT 130 MM HG: CPT | Performed by: INTERNAL MEDICINE

## 2022-12-13 PROCEDURE — 3078F DIAST BP <80 MM HG: CPT | Performed by: INTERNAL MEDICINE

## 2022-12-13 PROCEDURE — G8417 CALC BMI ABV UP PARAM F/U: HCPCS | Performed by: INTERNAL MEDICINE

## 2022-12-13 PROCEDURE — 85610 PROTHROMBIN TIME: CPT | Performed by: NURSE PRACTITIONER

## 2022-12-13 PROCEDURE — G9717 DOC PT DX DEP/BP F/U NT REQ: HCPCS | Performed by: INTERNAL MEDICINE

## 2022-12-13 NOTE — PATIENT INSTRUCTIONS
The INR is stable and therapeutic. Only 2.5mg today then Continue Coumadin 5mg daily except 2.5mg on Thurs & Sat. Recheck in 1 month.

## 2022-12-22 ENCOUNTER — OFFICE VISIT (OUTPATIENT)
Dept: PULMONOLOGY | Age: 62
End: 2022-12-22
Payer: MEDICARE

## 2022-12-22 VITALS — BODY MASS INDEX: 30.13 KG/M2 | HEIGHT: 67 IN | WEIGHT: 192 LBS

## 2022-12-22 DIAGNOSIS — J45.20 MILD INTERMITTENT ASTHMA WITHOUT COMPLICATION: Primary | ICD-10-CM

## 2022-12-26 NOTE — PROGRESS NOTES
Jason Prieto is in the office today for cardiovascular reevaluation of her mitral valve disease. She was followed by Dr. Edda Nissen for the last 16 - 17 years. She was admitted in 2005 to [facility] with congestive heart failure. An echocardiogram at that time demonstrated mild concentric left ventricular hypertrophy  with an ejection fraction of 55%. She had mild to moderate mitral stenosis and moderate mitral regurgitation. She was not seen again until 2012 at which time she had advancing mitral stenosis and ultimately underwent a cardiac catheterization which demonstrated moderate to severe mitral stenosis with moderate mitral regurgitation, mild aortic insufficiency and  mild coronary artery disease. She  was referred for surgery which she had on 5/22/13, which consisted of a mitral valve replacement with a 25/23 mm On-X mechanical valve together with bilateral pulmonary vein isolation and resection of the left atrial appendage. She had echocardiogram done on 1/15/2021. Her EF was 55 to 60%. Prosthetic mitral valve mean gradient was 4.1 mmHg. Pulmonary artery systolic pressure was estimated at 34 mmHg. She had mild to moderate AI. In the office today, she reports that she had a COVID infection in October 2022. She was quite short of breath and those symptoms have improved. She is scheduled to see a pulmonary consultant. Plan;      Her blood pressure is controlled in the office today at 130/70. Her present cardiac medications include  the metoprolol succinate 100 mg a day, losartan 100 mg a day and amlodipine 5 mg daily. She is also on furosemide 60 mg a day and potassium supplementation with K-Violeta con 20 mEq daily as well as Coumadin. Her latest lipid profile was done on 4/1/2022 and is as noted below. Cholesterol was 136. HDL was 70. LDL was 47.4. Triglycerides were 93. She remains on Lipitor 40 mg daily. We will plan to see her back in 6 months. We will order a repeat echocardiogram to evaluate her valvular disease. PCP: Jose E Giles MD       Past Medical History:   Diagnosis Date    Aortic valve disorders 1/13/2011    Asthma     Atrial fibrillation ([address] 1/13/2011    Congestive heart failure, unspecified March 2005    Cleared quickly with Lasix therapy    Echocardiogram 01/10/2012    A-fib. EF 45%. Mild RVE. RVSP 45-50. Massive LAE. Mod TAMI. Mod MS. Severe MR (mean grad 10). Mild-mod AI. Mild TR. Pulmonic systolic flow reversal.  Mild IVCE. Hives     Hypertension     Iron deficiency anemias     Long term (current) use of anticoagulants 2/23/2011    Murmur 1/2011    Grade I-II/VI systolic ejection murmur along left sternal border, with radiation to the pulmonic area. S/P cardiac cath 02/24/2012    Minimal coronary artery disease. Mild LVE. EF 55%. Mod MR. Mod-severe MS. RA 15.  RV 55/16. PA 58/34. W 36.  CO/CI 3.5/1.9 (TD); 3.61/1.9 ([place]). R to L shunt suggested. Wears glasses     Weight gain          Past Surgical History:   Procedure Laterality Date    HX HEART CATHETERIZATION  2/24/2012    HX HYSTERECTOMY  2006    HX MITRAL VALVE REPLACEMENT  05/22/2013    25/33 mm On-X mechanical valve with bilateral pulmonary vein isolation and resection of LA appendage       Current Outpatient Medications   Medication Sig    cyclobenzaprine (FLEXERIL) 10 mg tablet TAKE 1 TABLET BY MOUTH THREE TIMES A DAY AS NEEDED FOR MUSCLE SPASMS    albuterol (Ventolin HFA) 90 mcg/actuation inhaler INHALE 2 PUFFS BY MOUTH EVERY DAY    lidocaine (Lidoderm) 5 % Apply patch to the affected area for 12 hours a day and remove for 12 hours a day. Klor-Con M20 20 mEq tablet TAKE 1 TABLET BY MOUTH EVERY DAY    amLODIPine (NORVASC) 5 mg tablet Take 5 mg by mouth daily. fluticasone propion-salmeteroL (Advair HFA) 230-21 mcg/actuation inhaler Take 2 Puffs by inhalation two (2) times a day.     atorvastatin (LIPITOR) 40 mg tablet TAKE 1 TABLET BY MOUTH EVERY DAY    warfarin (COUMADIN) 5 mg tablet TAKE 1 TABLET BY MOUTH DAILY EXCEPT 1/2 TABLET ON TUE-THURS-SAT OR AS DIRECTED    sertraline (ZOLOFT) 100 mg tablet TAKE 1 TABLET BY MOUTH EVERY DAY    busPIRone (BUSPAR) 5 mg tablet TAKE 1 TABLET BY MOUTH TWICE A DAY    metoprolol succinate (TOPROL-XL) 100 mg tablet TAKE 1 TABLET BY MOUTH EVERY DAY    losartan (COZAAR) 100 mg tablet Take 1 Tablet by mouth daily. furosemide (LASIX) 40 mg tablet Take 1.5 Tablets by mouth daily. levocetirizine (XYZAL) 5 mg tablet TAKE 1 TABLET BY MOUTH EVERY DAY    montelukast (SINGULAIR) 10 mg tablet TAKE 1 TABLET BY MOUTH EVERY DAY    famotidine (PEPCID) 40 mg tablet Take 1 Tablet by mouth daily. diclofenac (VOLTAREN) 1 % gel Apply  to affected area four (4) times daily. OTHER Vitamin B-12 gummies    acetaminophen (TYLENOL) 500 mg tablet Take 650 mg by mouth every six (6) hours as needed for Pain. budesonide (RHINOCORT AQUA) 32 mcg/actuation nasal spray SPRAY 2 SPRAYS INTO EACH NOSTRIL EVERY DAY    clobetasoL (TEMOVATE) 0.05 % ointment Apply  to affected area two (2) times a day. OTHER Allergy injections    aspirin delayed-release 81 mg tablet Take 1 Tab by mouth daily. polyethylene glycol (MIRALAX) 17 gram packet Take 1 Packet by mouth daily. (Patient taking differently: Take  by mouth daily as needed for PRN Reason (Other) (Constipation). )    esomeprazole (NEXIUM) 20 mg capsule Take 20 mg by mouth daily as needed (heart burn). sodium chloride (OCEAN) 0.65 % nasal squeeze bottle 1 Spray as needed for Congestion. melatonin tab tablet Take 10 mg by mouth nightly. No current facility-administered medications for this visit. Allergies   Allergen Reactions    Penicillin Hives     Per Patient Not allergic       Social History:   reports that she quit smoking about 14 years ago. Her smoking use included cigarettes. She has a 10.50 pack-year smoking history.  She has never used smokeless tobacco. She reports that she does not currently use alcohol after a past usage of about 1.0 standard drink per week. She reports that she does not use drugs. Family History   Problem Relation Age of Onset    Heart Surgery Father         Open-Heart Surgery    Other Father         Venous Thromboembolism    Hypertension Father     Heart Disease Mother         Enlarged Heart    Hypertension Mother     Diabetes Mother     Diabetes Sister     Hypertension Sister     Diabetes Brother     Heart Disease Paternal Grandmother     Diabetes Sister     Hypertension Sister      Review of Systems:    Constitutional: . Negative for chills, fever, or weight loss. Respiratory:  Negative for cough, hemoptysis, sputum production and shortness of breath. Cardiovascular:  Negative for chest pain, palpitations, claudication and PND. Positive for peripheral swelling  Gastrointestinal: Negative. Musculoskeletal: Negative for back pain, falls, joint pain, myalgias and neck pain. Neurological: Positive for anxiety. Physical Exam:   Visit Vitals  /70 (BP 1 Location: Left upper arm, BP Patient Position: Sitting, BP Cuff Size: Adult)   Pulse 85   Ht 5' 7\" (1.702 m)   Wt 87.1 kg (192 lb)   SpO2 96%   BMI 30.07 kg/m²       The patient is cooperative and alert  HEENT:   Conjunctivae white and mucosa moist  Neck: Supple without masses, tenderness or thyromegaly. No jugular venous distention. Carotids are without bruits. Cardiovascular: Chest is symmetrical with good excursion. There is a well healed mid sternotomy scar. Irregularly irregular rhythm without  murmurs, rubs, clicks or gallops . Lungs: Clear to auscultation bilaterally. Abdomen: Soft. No masses, tenderness or organomegaly. No abdominal bruits. Extremities: No edema, with full peripheral pulses. No clubbing or cyanosis. Review of Data: Please refer to past medical history for most recent cardiac testing.   Lab Results   Component Value Date/Time Cholesterol, total 136 04/01/2022 09:24 AM    HDL Cholesterol 70 (H) 04/01/2022 09:24 AM    LDL, calculated 47.4 04/01/2022 09:24 AM    Triglyceride 93 04/01/2022 09:24 AM    CHOL/HDL Ratio 1.9 04/01/2022 09:24 AM     Results for orders placed or performed in visit on 12/13/2022. Shadi HERNANDEZ POC EKG ROUTINE W/ 12 LEADS, INTER & REP     Status: None    Narrative    Atrial fibrillation with a rate of 85 bpm.  Complete right bundle branch block . No significant change compared to prior tracing         Luanne Chong MD   [facility]  [facility]  [address]    PLEASE NOTE:  This document has been produced using voice recognition software. Unrecognized errors in transcription may be present.

## 2022-12-29 ENCOUNTER — OFFICE VISIT (OUTPATIENT)
Dept: PULMONOLOGY | Age: 62
End: 2022-12-29
Payer: MEDICARE

## 2022-12-29 VITALS
OXYGEN SATURATION: 96 % | SYSTOLIC BLOOD PRESSURE: 133 MMHG | WEIGHT: 194.1 LBS | RESPIRATION RATE: 18 BRPM | BODY MASS INDEX: 30.46 KG/M2 | HEART RATE: 74 BPM | TEMPERATURE: 97.6 F | DIASTOLIC BLOOD PRESSURE: 89 MMHG | HEIGHT: 67 IN

## 2022-12-29 DIAGNOSIS — J45.40 MODERATE PERSISTENT ASTHMA WITHOUT COMPLICATION: ICD-10-CM

## 2022-12-29 DIAGNOSIS — R06.09 DYSPNEA ON EXERTION: Primary | ICD-10-CM

## 2022-12-29 PROCEDURE — 3017F COLORECTAL CA SCREEN DOC REV: CPT | Performed by: INTERNAL MEDICINE

## 2022-12-29 PROCEDURE — 99214 OFFICE O/P EST MOD 30 MIN: CPT | Performed by: INTERNAL MEDICINE

## 2022-12-29 PROCEDURE — 3078F DIAST BP <80 MM HG: CPT | Performed by: INTERNAL MEDICINE

## 2022-12-29 PROCEDURE — G9717 DOC PT DX DEP/BP F/U NT REQ: HCPCS | Performed by: INTERNAL MEDICINE

## 2022-12-29 PROCEDURE — G9899 SCRN MAM PERF RSLTS DOC: HCPCS | Performed by: INTERNAL MEDICINE

## 2022-12-29 PROCEDURE — 3074F SYST BP LT 130 MM HG: CPT | Performed by: INTERNAL MEDICINE

## 2022-12-29 PROCEDURE — G8417 CALC BMI ABV UP PARAM F/U: HCPCS | Performed by: INTERNAL MEDICINE

## 2022-12-29 PROCEDURE — G8427 DOCREV CUR MEDS BY ELIG CLIN: HCPCS | Performed by: INTERNAL MEDICINE

## 2022-12-29 NOTE — PROGRESS NOTES
[facility]                                                       Pulmonary, Critical Care, and Sleep Medicine      Pulmonary Office Progress Notes. Name: Luna Drake     : 1960     Date: 2022        Subjective:   Chief complaint: SOB  Patient is a 57 y.o. female with a PMHx of Asthma, Afib (on Coumadin), HFpEF, Rheumatic valve disease - s/p Mechanical valve, HTN, MV stenosis, HLD, Anemia of chronic disease, Depression,     HPI (22): She is a former patient of Dr. Oxana Rader and was last seen in this clinic on 3/4/2020 due to SOB and it was recommended she complete methacholine challenge testing and echocardiogram.    Today in clinic, she reports history of recurrent episodes of dyspnea. She is able to walk about 100 yards before needing to stop and catch her breath. Walking at fast paced also triggers dyspnea. Notes wheezing at night and if she is around some household chemicals. Notes coughing and wheezing if air is too cold or too hot. Cough is sometimes productive of clear phlegm. Using Delsium cough serup prn. Admits to night time symptoms (2-3x/week) around 1-3 am - coughing and needing to take her inhaler. Other triggers of dyspnea include colognes/perfumes, household chemicals, weather changes, and dust. Denies history of childhood asthma. No family history of Asthma. Reports having flares of URI at the end of Spring and end of Summer each year. Reports h/o COVID-19 infection in 2022 - cough. Did not require hospitalization. Denies acid reflux/heartburn. Currently on:  -Advair /21 mcg - using 2-3x/week; rinsing mouth after use.  -Albuterol rescue inhaler - using 4x/week.  -Singulair - taking daily. Has been on this for ~ 5 years. Allergic rhinitis - Budesonide nasal spray - using sporadically.  Reports a history of seasonal allergies and is currently following with an allergist.    Tobacco/Substance/Vape/Hookah: former smoker; quit in 2012 after smoking 1/2 ppd x 23 years. No MJ, vape, or hookah use  Occupation: retired CNA. Travel: none   Pets: none  TB exposure/testing: no known exposures  VTE/DVT history: none   service: none  Hobbies: cook, play [email or web address] and [email or web address] study  Other exposures: admits exposure to dust  Autoimmune disease: none in self  Influenza vaccination: completed (10/2022)  COVID-19 (+) testing: as above  COVID-19 vaccination: s/p Pfizer x4 doses    H/o ER visits: Sept - COVID-19 viral infection  H/o previous intubation: none  H/o itchy eyes, skin rash, itching: none  H/o nasal /sinus surgery: none  H/o Aspirin/NSAID allergy: none    Past Medical History:   Diagnosis Date    Aortic valve disorders 1/13/2011    Asthma     Atrial fibrillation ([address] 1/13/2011    Congestive heart failure, unspecified March 2005    Cleared quickly with Lasix therapy    Echocardiogram 01/10/2012    A-fib. EF 45%. Mild RVE. RVSP 45-50. Massive LAE. Mod TAMI. Mod MS. Severe MR (mean grad 10). Mild-mod AI. Mild TR. Pulmonic systolic flow reversal.  Mild IVCE. Hives     Hypertension     Iron deficiency anemias     Long term (current) use of anticoagulants 2/23/2011    Murmur 1/2011    Grade I-II/VI systolic ejection murmur along left sternal border, with radiation to the pulmonic area. S/P cardiac cath 02/24/2012    Minimal coronary artery disease. Mild LVE. EF 55%. Mod MR. Mod-severe MS. RA 15.  RV 55/16. PA 58/34. W 36.  CO/CI 3.5/1.9 (TD); 3.61/1.9 (Dequan). R to L shunt suggested.     Wears glasses     Weight gain        Past Surgical History:   Procedure Laterality Date    HX HEART CATHETERIZATION  2/24/2012    HX HYSTERECTOMY  2006    HX MITRAL VALVE REPLACEMENT  05/22/2013    25/33 mm On-X mechanical valve with bilateral pulmonary vein isolation and resection of LA appendage       Social History     Socioeconomic History    Marital status:  Tobacco Use    Smoking status: Former     Packs/day: 0.50     Years: 21.00     Pack years: 10.50     Types: Cigarettes     Quit date: 2008     Years since quitting: 15.0    Smokeless tobacco: Never   Vaping Use    Vaping Use: Never used   Substance and Sexual Activity    Alcohol use: Not Currently     Alcohol/week: 1.0 standard drink     Types: 1 Glasses of wine per week     Comment: occassionally    Drug use: Never    Sexual activity: Yes     Partners: Male     Birth control/protection: None     Social Determinants of Health     Financial Resource Strain: Low Risk     Difficulty of Paying Living Expenses: Not hard at all   Food Insecurity: No Food Insecurity    Worried About Running Out of Food in the Last Year: Never true    Ran Out of Food in the Last Year: Never true       Family History   Problem Relation Age of Onset    Heart Surgery Father         Open-Heart Surgery    Other Father         Venous Thromboembolism    Hypertension Father     Heart Disease Mother         Enlarged Heart    Hypertension Mother     Diabetes Mother     Diabetes Sister     Hypertension Sister     Diabetes Brother     Heart Disease Paternal Grandmother     Diabetes Sister     Hypertension Sister        Allergies   Allergen Reactions    Penicillin Hives     Per Patient Not allergic       Current Outpatient Medications   Medication Sig Dispense Refill    cyclobenzaprine (FLEXERIL) 10 mg tablet TAKE 1 TABLET BY MOUTH THREE TIMES A DAY AS NEEDED FOR MUSCLE SPASMS 30 Tablet 0    albuterol (Ventolin HFA) 90 mcg/actuation inhaler INHALE 2 PUFFS BY MOUTH EVERY DAY 18 Each 2    lidocaine (Lidoderm) 5 % Apply patch to the affected area for 12 hours a day and remove for 12 hours a day. 30 Each 0    Klor-Con M20 20 mEq tablet TAKE 1 TABLET BY MOUTH EVERY DAY 30 Tablet 6    amLODIPine (NORVASC) 5 mg tablet Take 5 mg by mouth daily.       fluticasone propion-salmeteroL (Advair HFA) 230-21 mcg/actuation inhaler Take 2 Puffs by inhalation two (2) times a day. 1 Each 2    atorvastatin (LIPITOR) 40 mg tablet TAKE 1 TABLET BY MOUTH EVERY DAY 90 Tablet 2    warfarin (COUMADIN) 5 mg tablet TAKE 1 TABLET BY MOUTH DAILY EXCEPT 1/2 TABLET ON TUE-THURS-SAT OR AS DIRECTED 60 Tablet 6    sertraline (ZOLOFT) 100 mg tablet TAKE 1 TABLET BY MOUTH EVERY DAY 90 Tablet 1    busPIRone (BUSPAR) 5 mg tablet TAKE 1 TABLET BY MOUTH TWICE A  Tablet 1    metoprolol succinate (TOPROL-XL) 100 mg tablet TAKE 1 TABLET BY MOUTH EVERY DAY 30 Tablet 5    losartan (COZAAR) 100 mg tablet Take 1 Tablet by mouth daily. 90 Tablet 3    furosemide (LASIX) 40 mg tablet Take 1.5 Tablets by mouth daily. 135 Tablet 3    levocetirizine (XYZAL) 5 mg tablet TAKE 1 TABLET BY MOUTH EVERY DAY 90 Tablet 3    montelukast (SINGULAIR) 10 mg tablet TAKE 1 TABLET BY MOUTH EVERY DAY 90 Tablet 3    famotidine (PEPCID) 40 mg tablet Take 1 Tablet by mouth daily. 30 Tablet 1    diclofenac (VOLTAREN) 1 % gel Apply  to affected area four (4) times daily. OTHER Vitamin B-12 gummies      acetaminophen (TYLENOL) 500 mg tablet Take 650 mg by mouth every six (6) hours as needed for Pain. budesonide (RHINOCORT AQUA) 32 mcg/actuation nasal spray SPRAY 2 SPRAYS INTO EACH NOSTRIL EVERY DAY 3 Bottle 1    clobetasoL (TEMOVATE) 0.05 % ointment Apply  to affected area two (2) times a day. 15 g 0    OTHER Allergy injections      aspirin delayed-release 81 mg tablet Take 1 Tab by mouth daily. 30 Tab 0    polyethylene glycol (MIRALAX) 17 gram packet Take 1 Packet by mouth daily. (Patient taking differently: Take  by mouth daily as needed for PRN Reason (Other) (Constipation). ) 30 Packet 0    esomeprazole (NEXIUM) 20 mg capsule Take 20 mg by mouth daily as needed (heart burn). sodium chloride (OCEAN) 0.65 % nasal squeeze bottle 1 Spray as needed for Congestion. melatonin tab tablet Take 10 mg by mouth nightly.          Review of Systems:  HEENT: No epistaxis, no nasal drainage, no difficulty in swallowing, no redness in eyes  Respiratory: as above  Cardiovascular: no chest pain, no palpitations, no chronic leg edema, no syncope  Gastrointestinal: no abd pain, no vomiting, no diarrhea, no bleeding symptoms  Genitourinary: No urinary symptoms or hematuria  Integument/breast: No ulcers or rashes  Musculoskeletal:Neg  Neurological: No focal weakness, no seizures, no headaches  Behvioral/Psych: No anxiety, no depression  Constitutional: No fever, no chills, no weight loss, no night sweats     Objective:   Visit Vitals  /89 (BP 1 Location: Left upper arm, BP Patient Position: Sitting, BP Cuff Size: Adult)   Pulse 74   Temp 97.6 °F (36.4 °C) (Temporal)   Resp 18   Ht 5' 7\" (1.702 m)   Wt 88 kg (194 lb 1.6 oz)   SpO2 96%   BMI 30.40 kg/m²      Physical Exam:   General: comfortable, no acute distress  HEENT: pupils reactive, sclera anicteric, EOM intact, moist mucus membranes. Neck: No adenopathy or thyroid swelling, no lymphadenopathy or JVD, supple  CVS: L1U2 , +Systolic murmur, +Mitral valve click  RS: Clear to auscultation bilaterally. No tachypnea or accessory muscle use. Abd: soft, non tender, no hepatosplenomegaly, BS normal  Neuro: non focal, awake, alert  Extrm: no leg edema, clubbing or cyanosis  Skin: no rash on exposed skin    Data review:   Pertinent labs: CBC, BMP, LFT's  Serologies (ILD, DERICK): n/a  IgE: n/a  Peripheral Eos: 200  Allergy panel: n/a  Alpha-1 antitrypsin: n/a  Phenotype: n/a  ACE level: n/a    PFT:  Date_____FEV1______FVC___FEV1/FVC___BDFEV1___BDFVC___pstBDratio__FEV1/SVC  10/2019. ...2.22(95%). ....2. 57(86%). ....86%. .....n/a  12/2022. ...1. 96(86%). ....2.24(77%). .. ..87%. .....n/a    Date_____TLC_________RV________RV/TLC  10/2019. ...4.07(83%). ....1. 47(77%). .... 36%  12/2022. ...3. 57(74%). ....1.24(63%). .. Janett Red Janett Red 35%    Date_____DLCO  10/2019. ...12.33(58%)   12/2022. ...10.57(50%)     Six Minute Walk Test: n/a    Echocardiogram  Date: 1/15/21  LV: Estimated LVEF is 55 - 60%.  Visually measured ejection fraction. Normal cavity size and systolic function (ejection fraction normal). Moderately increased wall thickness. Wall motion: normal. Inconclusive left ventricular diastolic function. MV: Mitral valve is prosthetic. Mitral valve mean gradient is 4.1 mmHg. There is a 25/23mm On-X mechanical prosthetic mitral valve. Prosthesis is normal.  PA: Pulmonary arterial systolic pressure is 34 mmHg. Pulmonary hypertension not suggested by Doppler findings. AV: With no significant stenosis. Mild to moderate aortic valve regurgitation is present. LA: Severely dilated left atrium. Left Atrium volume index is 51 mL/m2. Imaging:  I have personally reviewed the patients radiographs and have reviewed the reports:  XR Results (most recent):  Results from Hospital Encounter encounter on 11/14/22    XR CHEST (9/19/22): FINDINGS:     SUPPORT DEVICES: Median sternotomy wires seen and artificial valve from mitral  valve replacement. BONES AND SOFT TISSUES: No acute osseous abnormality. HEART AND MEDIASTINUM: Moderately enlarged cardiac silhouette, stable. Normal  caliber thoracic aorta. No central vascular congestion. LUNGS AND PLEURAL SPACES: Minimal bibasilar streaky interstitial opacities with  some improvement from prior, likely atelectasis versus improving infiltrate.       _______________     IMPRESSION     Stable moderate cardiomegaly with improving minimal bibasilar atelectasis versus  infiltrate/edema.           CT Results (most recent):  Results from Hospital Encounter encounter on 02/28/19       Patient Active Problem List   Diagnosis Code    Rheumatic valvular disease I09.1    Chronic diastolic congestive heart failure ([address] I50.32    Atrial fibrillation ([address] I48.91    Long term current use of anticoagulant therapy Z79.01    Benign hypertensive heart disease with heart failure (HCC) I11.0    Hyperlipidemia E78.5    Advanced directives, counseling/discussion Z71.89    Mild intermittent asthma without complication Q93.96    NSTEMI (non-ST elevated myocardial infarction) ([facility] I21.4    Chest pain R07.9    IFG (impaired fasting glucose) R73.01    Adjustment disorder with anxious mood F43.22    Essential hypertension I10    Other allergic rhinitis J30.89    Anxiety F41.9    Allergic rhinitis due to pollen J30.1    Chronic sinusitis J32.9    Mitral valve stenosis I05.0    Elevated serum globulin level R77.1    Normocytic anemia D64.9    Anemia of chronic disease D63.8    Perennial allergic rhinitis with seasonal variation J30.89, J30.2    Vasomotor rhinitis J30.0    Coronary artery disease involving native coronary artery of native heart without angina pectoris I25.10    Recurrent depression (HCC) F33.9    Bilateral leg edema R60.0       IMPRESSION:   Dyspnea on exertion: presentation likely secondary to Asthma, allergic subtype vs restrictive lung disease vs pulmonary edema. SpO2 96% on RA in clinic today. Labwork without anemia. CXR with basilar streaky opacities. No recent CT chest. TTE w/o pulmonary hypertension. Restrictive lung disease: PFT with mild restrictive defect and decreased DLCO. Noted progression from prior PFT in 2019. Etiology unclear at this time. Dx does include left heart failure, ILD, neuromuscular disease, pulmonary vascular disease and chronic PE (less likely),   H/o COVID-19 viral infection: Sept, 2022. H/o Tobacco abuse: 11.5 pack year; quit in 2012. Allergic rhinitis: on Budesonide nasal spray. Following with Allergist.  Comorbid conditions: HFpEF, Mechanical valve  Afib - on coumadin. RECOMMENDATIONS:   Obtain Methacholine challenge testing - r/o Asthma  Obtain HRCT - r/o ILD given PFT findings.   Advair 230/21 mcg - patient instructed to take 2 puffs BID; rinse mouth after each use  Albuterol rescue inhaler 1-2 puffs every 4-6 hours as needed for shortness of breath  Pulmonary rehab - will discuss on next visit  Continued follow-up with Allergist recommended  Vaccination: recommend all age-appropriate immunizations  LCS: not a candidate for LCS given h/o tobacco use. Follow up - 3 months & prn.     Education:  Inhaler techniques, MDI/spacers, nebulizers use, goal setting, monitoring  Reducing anxiety, energy conservation tips, exercise, medications and Nutrition    Health maintenance screens deferred to Primary care provider.      Eric Arana, DO  12/29/2022  Pulmonary, Critical Care Medicine  [facility]

## 2022-12-29 NOTE — PROGRESS NOTES
Yunior Jerome presents today for   Chief Complaint   Patient presents with    Shortness of Breath       Is someone accompanying this pt? no    Is the patient using any DME equipment during OV? No    -DME Company NA    Depression Screening:  3 most recent PHQ Screens 12/29/2022   PHQ Not Done -   Little interest or pleasure in doing things Not at all   Feeling down, depressed, irritable, or hopeless Not at all   Total Score PHQ 2 0   Trouble falling or staying asleep, or sleeping too much -   Feeling tired or having little energy -   Poor appetite, weight loss, or overeating -   Feeling bad about yourself - or that you are a failure or have let yourself or your family down -   Trouble concentrating on things such as school, work, reading, or watching TV -   Moving or speaking so slowly that other people could have noticed; or the opposite being so fidgety that others notice -   Thoughts of being better off dead, or hurting yourself in some way -   PHQ 9 Score -   How difficult have these problems made it for you to do your work, take care of your home and get along with others -       Learning Assessment:  Learning Assessment 9/27/2021   PRIMARY LEARNER Patient   HIGHEST LEVEL OF EDUCATION - PRIMARY LEARNER  GRADUATED HIGH SCHOOL OR GED   BARRIERS PRIMARY LEARNER NONE   CO-LEARNER CAREGIVER No   PRIMARY LANGUAGE ENGLISH    NEED No   LEARNER PREFERENCE PRIMARY LISTENING     -   LEARNING SPECIAL TOPICS No   ANSWERED BY patient   RELATIONSHIP SELF       Abuse Screening:  Abuse Screening Questionnaire 9/26/2022   Do you ever feel afraid of your partner? N   Are you in a relationship with someone who physically or mentally threatens you? -   Is it safe for you to go home? Y       Fall Risk  Fall Risk Assessment, last 12 mths 9/26/2022   Able to walk? Yes   Fall in past 12 months? 0   Do you feel unsteady? 0   Are you worried about falling 0         Coordination of Care:  1.  Have you been to the ER, urgent care clinic since your last visit? Hospitalized since your last visit? No    2. Have you seen or consulted any other health care providers outside of the [address] [place] [address] since your last visit? Include any pap smears or colon screening.  No

## 2022-12-29 NOTE — PATIENT INSTRUCTIONS
What is the plan?  -Start Advair - 2 puffs TWICE daily  - rinse mouth  -Albuterol rescue inhaler as needed for shortness of breath   -Complete methacholine challenge test   -Complete CAT/CT chest    -Continue to Follow-up with your allergist

## 2022-12-29 NOTE — LETTER
12/29/2022    Patient: Mello Neil   YOB: 1960   Date of Visit: 12/29/2022     Sharath Dominguez [facility]  [address]  [address] [place] [address]    Dear Sharath Dominguez MD,      Thank you for referring Ms. Mello Neil to [address] [place] [address] for evaluation. My notes for this consultation are attached. If you have questions, please do not hesitate to call me. I look forward to following your patient along with you.       Sincerely,    Inder Tamayo, DO

## 2022-12-30 ENCOUNTER — HOSPITAL ENCOUNTER (OUTPATIENT)
Dept: LAB | Age: 62
End: 2022-12-30
Payer: MEDICARE

## 2022-12-30 DIAGNOSIS — I10 ESSENTIAL HYPERTENSION: ICD-10-CM

## 2022-12-30 DIAGNOSIS — R73.01 IFG (IMPAIRED FASTING GLUCOSE): ICD-10-CM

## 2022-12-30 LAB
ALBUMIN SERPL-MCNC: 3.7 G/DL (ref 3.4–5)
ALBUMIN/GLOB SERPL: 0.9 {RATIO} (ref 0.8–1.7)
ALP SERPL-CCNC: 93 U/L (ref 45–117)
ALT SERPL-CCNC: 23 U/L (ref 13–56)
ANION GAP SERPL CALC-SCNC: 2 MMOL/L (ref 3–18)
AST SERPL-CCNC: 27 U/L (ref 10–38)
BASOPHILS # BLD: 0.1 K/UL (ref 0–0.1)
BASOPHILS NFR BLD: 1 % (ref 0–2)
BILIRUB SERPL-MCNC: 0.6 MG/DL (ref 0.2–1)
BUN SERPL-MCNC: 12 MG/DL (ref 7–18)
BUN/CREAT SERPL: 13 (ref 12–20)
CALCIUM SERPL-MCNC: 9.2 MG/DL (ref 8.5–10.1)
CHLORIDE SERPL-SCNC: 106 MMOL/L (ref 100–111)
CO2 SERPL-SCNC: 32 MMOL/L (ref 21–32)
CREAT SERPL-MCNC: 0.91 MG/DL (ref 0.6–1.3)
DIFFERENTIAL METHOD BLD: ABNORMAL
EOSINOPHIL # BLD: 0.2 K/UL (ref 0–0.4)
EOSINOPHIL NFR BLD: 2 % (ref 0–5)
ERYTHROCYTE [DISTWIDTH] IN BLOOD BY AUTOMATED COUNT: 13.9 % (ref 11.6–14.5)
EST. AVERAGE GLUCOSE BLD GHB EST-MCNC: 103 MG/DL
GLOBULIN SER CALC-MCNC: 4.3 G/DL (ref 2–4)
GLUCOSE SERPL-MCNC: 97 MG/DL (ref 74–99)
HBA1C MFR BLD: 5.2 % (ref 4.2–5.6)
HCT VFR BLD AUTO: 36.8 % (ref 35–45)
HGB BLD-MCNC: 11.1 G/DL (ref 12–16)
IMM GRANULOCYTES # BLD AUTO: 0 K/UL (ref 0–0.04)
IMM GRANULOCYTES NFR BLD AUTO: 0 % (ref 0–0.5)
LYMPHOCYTES # BLD: 2.1 K/UL (ref 0.9–3.6)
LYMPHOCYTES NFR BLD: 29 % (ref 21–52)
MCH RBC QN AUTO: 26.1 PG (ref 24–34)
MCHC RBC AUTO-ENTMCNC: 30.2 G/DL (ref 31–37)
MCV RBC AUTO: 86.4 FL (ref 78–100)
MONOCYTES # BLD: 0.9 K/UL (ref 0.05–1.2)
MONOCYTES NFR BLD: 13 % (ref 3–10)
NEUTS SEG # BLD: 3.9 K/UL (ref 1.8–8)
NEUTS SEG NFR BLD: 55 % (ref 40–73)
NRBC # BLD: 0 K/UL (ref 0–0.01)
NRBC BLD-RTO: 0 PER 100 WBC
PLATELET # BLD AUTO: 185 K/UL (ref 135–420)
PMV BLD AUTO: 11.8 FL (ref 9.2–11.8)
POTASSIUM SERPL-SCNC: 4.1 MMOL/L (ref 3.5–5.5)
PROT SERPL-MCNC: 8 G/DL (ref 6.4–8.2)
RBC # BLD AUTO: 4.26 M/UL (ref 4.2–5.3)
SODIUM SERPL-SCNC: 140 MMOL/L (ref 136–145)
WBC # BLD AUTO: 7.1 K/UL (ref 4.6–13.2)

## 2022-12-30 PROCEDURE — 83036 HEMOGLOBIN GLYCOSYLATED A1C: CPT

## 2022-12-30 PROCEDURE — 85025 COMPLETE CBC W/AUTO DIFF WBC: CPT

## 2022-12-30 PROCEDURE — 36415 COLL VENOUS BLD VENIPUNCTURE: CPT

## 2022-12-30 PROCEDURE — 80053 COMPREHEN METABOLIC PANEL: CPT

## 2023-01-03 ENCOUNTER — TELEPHONE (OUTPATIENT)
Dept: FAMILY MEDICINE CLINIC | Age: 63
End: 2023-01-03

## 2023-01-03 NOTE — TELEPHONE ENCOUNTER
Message  Received:  Today  Sage Giron [facility] Nurse Pool  Subject: Medication Problem     Medication: fluticasone propion-salmeteroL (Advair HFA) 884-42   mcg/actuation inhaler   Dosage: 2puffs BID   Ordering Provider: maria eugenia     Question/Problem: Pt would like to know if this can be changed to a   different med because it's cost too much       Pharmacy: [id number] [phone]     ---------------------------------------------------------------------------   --------------   John Aquino FORM   [phone]; OK to leave message on voicemail   ---------------------------------------------------------------------------   --------------     SCRIPT ANSWERS   Relationship to Patient: Self     Please advise

## 2023-01-03 NOTE — PROGRESS NOTES
Labs without significant abnormality, similar to previous,   Cancelled her ff-up today, was intending to discuss in detail then  Pls notify pt/resched

## 2023-01-04 DIAGNOSIS — I11.0 BENIGN HYPERTENSIVE HEART DISEASE WITH HEART FAILURE (HCC): ICD-10-CM

## 2023-01-04 RX ORDER — METOPROLOL SUCCINATE 100 MG/1
TABLET, EXTENDED RELEASE ORAL
Qty: 30 TABLET | Refills: 5 | Status: SHIPPED | OUTPATIENT
Start: 2023-01-04

## 2023-01-05 ENCOUNTER — OFFICE VISIT (OUTPATIENT)
Dept: FAMILY MEDICINE CLINIC | Age: 63
End: 2023-01-05
Payer: MEDICARE

## 2023-01-05 ENCOUNTER — HOSPITAL ENCOUNTER (OUTPATIENT)
Dept: MAMMOGRAPHY | Age: 63
Discharge: HOME | End: 2023-01-05
Attending: FAMILY MEDICINE
Payer: MEDICARE

## 2023-01-05 VITALS
TEMPERATURE: 97.1 F | HEART RATE: 76 BPM | SYSTOLIC BLOOD PRESSURE: 126 MMHG | RESPIRATION RATE: 16 BRPM | WEIGHT: 192 LBS | BODY MASS INDEX: 30.13 KG/M2 | DIASTOLIC BLOOD PRESSURE: 74 MMHG | OXYGEN SATURATION: 98 % | HEIGHT: 67 IN

## 2023-01-05 DIAGNOSIS — I25.119 CORONARY ARTERY DISEASE INVOLVING NATIVE CORONARY ARTERY OF NATIVE HEART WITH ANGINA PECTORIS (HCC): ICD-10-CM

## 2023-01-05 DIAGNOSIS — Z12.31 BREAST CANCER SCREENING BY MAMMOGRAM: ICD-10-CM

## 2023-01-05 DIAGNOSIS — F33.9 RECURRENT DEPRESSION (HCC): ICD-10-CM

## 2023-01-05 DIAGNOSIS — I48.91 ATRIAL FIBRILLATION, UNSPECIFIED TYPE (HCC): ICD-10-CM

## 2023-01-05 DIAGNOSIS — I09.1 RHEUMATIC VALVULAR DISEASE: ICD-10-CM

## 2023-01-05 DIAGNOSIS — R06.02 SOB (SHORTNESS OF BREATH): Primary | ICD-10-CM

## 2023-01-05 DIAGNOSIS — J30.1 ALLERGIC RHINITIS DUE TO POLLEN, UNSPECIFIED SEASONALITY: ICD-10-CM

## 2023-01-05 DIAGNOSIS — R73.01 IFG (IMPAIRED FASTING GLUCOSE): ICD-10-CM

## 2023-01-05 DIAGNOSIS — F41.9 ANXIETY: ICD-10-CM

## 2023-01-05 DIAGNOSIS — R77.1 ELEVATED SERUM GLOBULIN LEVEL: ICD-10-CM

## 2023-01-05 DIAGNOSIS — I10 ESSENTIAL HYPERTENSION: ICD-10-CM

## 2023-01-05 DIAGNOSIS — J45.40 MODERATE PERSISTENT ASTHMA WITHOUT COMPLICATION: ICD-10-CM

## 2023-01-05 DIAGNOSIS — I11.0 BENIGN HYPERTENSIVE HEART DISEASE WITH HEART FAILURE (HCC): ICD-10-CM

## 2023-01-05 DIAGNOSIS — D63.8 ANEMIA OF CHRONIC DISEASE: ICD-10-CM

## 2023-01-05 DIAGNOSIS — E78.00 PURE HYPERCHOLESTEROLEMIA: ICD-10-CM

## 2023-01-05 DIAGNOSIS — I25.10 CORONARY ARTERY DISEASE INVOLVING NATIVE CORONARY ARTERY OF NATIVE HEART WITHOUT ANGINA PECTORIS: ICD-10-CM

## 2023-01-05 PROCEDURE — 77063 BREAST TOMOSYNTHESIS BI: CPT

## 2023-01-05 PROCEDURE — G0463 HOSPITAL OUTPT CLINIC VISIT: HCPCS | Performed by: FAMILY MEDICINE

## 2023-01-05 RX ORDER — BUDESONIDE AND FORMOTEROL FUMARATE DIHYDRATE 80; 4.5 UG/1; UG/1
2 AEROSOL RESPIRATORY (INHALATION) 2 TIMES DAILY
Qty: 10.2 G | Refills: 0 | Status: SHIPPED | OUTPATIENT
Start: 2023-01-05

## 2023-01-05 NOTE — PROGRESS NOTES
Erin Baeza, 57 y.o.,  female    SUBJECTIVE  Ff-up     SOB- about the same, chronic issue. has known CHF/afib/asthma/allergies, worsened after covid infection august 2022  Reviewed pulm notes dr. Van Sommers asthma/Upper airway cough syndrome. He has resumed advair, previously on breo but both are too costly she says. I will send rx for symbicort and see if less expensive, if not, advised to call pulm office regarding alternatives. Chronic rhinitis- on allergy shots, following ENT    Anxiety/depression-reports to be doing fairly well. She has low moments of grief with passing of sister, but has good support system. She continues to take  buspar and zoloft     HTN- continues to take norvasc dose to 7.5 mg, metoprolol and cozaar. H/o nstemi, afib, rheumatic valve disease s/p MV repair 2013- denies any chest pain, sob, palpitations, lightheadedness. On chronic coumadin treatment and lasix, following cardiology. HL-taking lipitor without problems. reviewd labs LDL 56 4/2022    Elevated globulin/anemia of chronic disease- neg work up, monitoring.  Following dr. Pillai:  See HPI, all others negative    Patient Active Problem List   Diagnosis Code    Rheumatic valvular disease I09.1    Chronic diastolic congestive heart failure ([address] I50.32    Atrial fibrillation ([address] I48.91    Long term current use of anticoagulant therapy Z79.01    Benign hypertensive heart disease with heart failure (HCC) I11.0    Hyperlipidemia E78.5    Advanced directives, counseling/discussion Z71.89    Mild intermittent asthma without complication K69.01    NSTEMI (non-ST elevated myocardial infarction) ([address] I21.4    Chest pain R07.9    IFG (impaired fasting glucose) R73.01    Adjustment disorder with anxious mood F43.22    Essential hypertension I10    Other allergic rhinitis J30.89    Anxiety F41.9    Allergic rhinitis due to pollen J30.1    Chronic sinusitis J32.9    Mitral valve stenosis I05.0    Elevated serum globulin level R77.1    Normocytic anemia D64.9    Anemia of chronic disease D63.8    Perennial allergic rhinitis with seasonal variation J30.89, J30.2    Vasomotor rhinitis J30.0    Coronary artery disease involving native coronary artery of native heart without angina pectoris I25.10    Recurrent depression (HCC) F33.9    Bilateral leg edema R60.0       Current Outpatient Medications   Medication Sig Dispense Refill    budesonide-formoteroL (Symbicort) 80-4.5 mcg/actuation HFAA Take 2 Puffs by inhalation two (2) times a day. 10.2 g 0    metoprolol succinate (TOPROL-XL) 100 mg tablet TAKE 1 TABLET BY MOUTH EVERY DAY 30 Tablet 5    cyclobenzaprine (FLEXERIL) 10 mg tablet TAKE 1 TABLET BY MOUTH THREE TIMES A DAY AS NEEDED FOR MUSCLE SPASMS 30 Tablet 0    albuterol (Ventolin HFA) 90 mcg/actuation inhaler INHALE 2 PUFFS BY MOUTH EVERY DAY 18 Each 2    lidocaine (Lidoderm) 5 % Apply patch to the affected area for 12 hours a day and remove for 12 hours a day. 30 Each 0    Klor-Con M20 20 mEq tablet TAKE 1 TABLET BY MOUTH EVERY DAY 30 Tablet 6    amLODIPine (NORVASC) 5 mg tablet Take 5 mg by mouth daily. atorvastatin (LIPITOR) 40 mg tablet TAKE 1 TABLET BY MOUTH EVERY DAY 90 Tablet 2    warfarin (COUMADIN) 5 mg tablet TAKE 1 TABLET BY MOUTH DAILY EXCEPT 1/2 TABLET ON TUE-THURS-SAT OR AS DIRECTED 60 Tablet 6    sertraline (ZOLOFT) 100 mg tablet TAKE 1 TABLET BY MOUTH EVERY DAY 90 Tablet 1    busPIRone (BUSPAR) 5 mg tablet TAKE 1 TABLET BY MOUTH TWICE A  Tablet 1    losartan (COZAAR) 100 mg tablet Take 1 Tablet by mouth daily. 90 Tablet 3    furosemide (LASIX) 40 mg tablet Take 1.5 Tablets by mouth daily. 135 Tablet 3    levocetirizine (XYZAL) 5 mg tablet TAKE 1 TABLET BY MOUTH EVERY DAY 90 Tablet 3    montelukast (SINGULAIR) 10 mg tablet TAKE 1 TABLET BY MOUTH EVERY DAY 90 Tablet 3    famotidine (PEPCID) 40 mg tablet Take 1 Tablet by mouth daily.  30 Tablet 1    diclofenac (VOLTAREN) 1 % gel Apply  to affected area four (4) times daily. OTHER Vitamin B-12 gummies      acetaminophen (TYLENOL) 500 mg tablet Take 650 mg by mouth every six (6) hours as needed for Pain. budesonide (RHINOCORT AQUA) 32 mcg/actuation nasal spray SPRAY 2 SPRAYS INTO EACH NOSTRIL EVERY DAY 3 Bottle 1    clobetasoL (TEMOVATE) 0.05 % ointment Apply  to affected area two (2) times a day. 15 g 0    OTHER Allergy injections      aspirin delayed-release 81 mg tablet Take 1 Tab by mouth daily. 30 Tab 0    polyethylene glycol (MIRALAX) 17 gram packet Take 1 Packet by mouth daily. (Patient taking differently: Take  by mouth daily as needed for PRN Reason (Other) (Constipation). ) 30 Packet 0    esomeprazole (NEXIUM) 20 mg capsule Take 20 mg by mouth daily as needed (heart burn). sodium chloride (OCEAN) 0.65 % nasal squeeze bottle 1 Spray as needed for Congestion. melatonin tab tablet Take 10 mg by mouth nightly. Allergies   Allergen Reactions    Penicillin Hives     Per Patient Not allergic       Past Medical History:   Diagnosis Date    Aortic valve disorders 1/13/2011    Asthma     Atrial fibrillation ([facility] 1/13/2011    Congestive heart failure, unspecified March 2005    Cleared quickly with Lasix therapy    Echocardiogram 01/10/2012    A-fib. EF 45%. Mild RVE. RVSP 45-50. Massive LAE. Mod TAMI. Mod MS. Severe MR (mean grad 10). Mild-mod AI. Mild TR. Pulmonic systolic flow reversal.  Mild IVCE. Hives     Hypertension     Iron deficiency anemias     Long term (current) use of anticoagulants 2/23/2011    Murmur 1/2011    Grade I-II/VI systolic ejection murmur along left sternal border, with radiation to the pulmonic area. S/P cardiac cath 02/24/2012    Minimal coronary artery disease. Mild LVE. EF 55%. Mod MR. Mod-severe MS. RA 15.  RV 55/16. PA 58/34. W 36.  CO/CI 3.5/1.9 (TD); 3.61/1.9 (Dequan). R to L shunt suggested.     Wears glasses     Weight gain        Social History     Socioeconomic History    Marital status:      Spouse name: Not on file    Number of children: Not on file    Years of education: Not on file    Highest education level: Not on file   Occupational History    Not on file   Tobacco Use    Smoking status: Former     Packs/day: 0.50     Years: 21.00     Pack years: 10.50     Types: Cigarettes     Quit date: 2008     Years since quitting: 15.0    Smokeless tobacco: Never   Vaping Use    Vaping Use: Never used   Substance and Sexual Activity    Alcohol use: Not Currently     Alcohol/week: 1.0 standard drink     Types: 1 Glasses of wine per week     Comment: occassionally    Drug use: Never    Sexual activity: Yes     Partners: Male     Birth control/protection: None   Other Topics Concern    Not on file   Social History Narrative    Not on file     Social Determinants of Health     Financial Resource Strain: Medium Risk    Difficulty of Paying Living Expenses: Somewhat hard   Food Insecurity: No Food Insecurity    Worried About Running Out of Food in the Last Year: Never true    Ran Out of Food in the Last Year: Never true   Transportation Needs: Not on file   Physical Activity: Not on file   Stress: Not on file   Social Connections: Not on file   Intimate Partner Violence: Not on file   Housing Stability: Not on file       Family History   Problem Relation Age of Onset    Heart Surgery Father         Open-Heart Surgery    Other Father         Venous Thromboembolism    Hypertension Father     Heart Disease Mother         Enlarged Heart    Hypertension Mother     Diabetes Mother     Diabetes Sister     Hypertension Sister     Diabetes Brother     Heart Disease Paternal Grandmother     Diabetes Sister     Hypertension Sister          OBJECTIVE    Physical Exam:     Visit Vitals  /74 (BP 1 Location: Left upper arm, BP Patient Position: Sitting, BP Cuff Size: Adult)   Pulse 76   Temp 97.1 °F (36.2 °C) (Temporal)   Resp 16   Ht 5' 7\" (1.702 m)   Wt 192 lb (87.1 kg)   SpO2 98%   BMI 30.07 kg/m² General: alert, well-appearing,  in no apparent distress or pain  Neck: no JVD  CVS: irregular, mech valve click, + murmur  Lungs:clear to ausculation bilaterally, no crackles, wheezing or rhonchi noted  Skin:no edema, good dp pulses  Psych:  mood and affect normal      Results for orders placed or performed during the hospital encounter of 12/30/22   HEMOGLOBIN A1C WITH EAG   Result Value Ref Range    Hemoglobin A1c 5.2 4.2 - 5.6 %    Est. average glucose 282 mg/dL   METABOLIC PANEL, COMPREHENSIVE   Result Value Ref Range    Sodium 140 136 - 145 mmol/L    Potassium 4.1 3.5 - 5.5 mmol/L    Chloride 106 100 - 111 mmol/L    CO2 32 21 - 32 mmol/L    Anion gap 2 (L) 3.0 - 18 mmol/L    Glucose 97 74 - 99 mg/dL    BUN 12 7.0 - 18 MG/DL    Creatinine 0.91 0.6 - 1.3 MG/DL    BUN/Creatinine ratio 13 12 - 20      eGFR >60 >60 ml/min/1.73m2    Calcium 9.2 8.5 - 10.1 MG/DL    Bilirubin, total 0.6 0.2 - 1.0 MG/DL    ALT (SGPT) 23 13 - 56 U/L    AST (SGOT) 27 10 - 38 U/L    Alk. phosphatase 93 45 - 117 U/L    Protein, total 8.0 6.4 - 8.2 g/dL    Albumin 3.7 3.4 - 5.0 g/dL    Globulin 4.3 (H) 2.0 - 4.0 g/dL    A-G Ratio 0.9 0.8 - 1.7     CBC WITH AUTOMATED DIFF   Result Value Ref Range    WBC 7.1 4.6 - 13.2 K/uL    RBC 4.26 4.20 - 5.30 M/uL    HGB 11.1 (L) 12.0 - 16.0 g/dL    HCT 36.8 35.0 - 45.0 %    MCV 86.4 78.0 - 100.0 FL    MCH 26.1 24.0 - 34.0 PG    MCHC 30.2 (L) 31.0 - 37.0 g/dL    RDW 13.9 11.6 - 14.5 %    PLATELET 438 026 - 542 K/uL    MPV 11.8 9.2 - 11.8 FL    NRBC 0.0 0  WBC    ABSOLUTE NRBC 0.00 0.00 - 0.01 K/uL    NEUTROPHILS 55 40 - 73 %    LYMPHOCYTES 29 21 - 52 %    MONOCYTES 13 (H) 3 - 10 %    EOSINOPHILS 2 0 - 5 %    BASOPHILS 1 0 - 2 %    IMMATURE GRANULOCYTES 0 0.0 - 0.5 %    ABS. NEUTROPHILS 3.9 1.8 - 8.0 K/UL    ABS. LYMPHOCYTES 2.1 0.9 - 3.6 K/UL    ABS. MONOCYTES 0.9 0.05 - 1.2 K/UL    ABS. EOSINOPHILS 0.2 0.0 - 0.4 K/UL    ABS. BASOPHILS 0.1 0.0 - 0.1 K/UL    ABS. IMM.  GRANS. 0.0 0.00 - 0.04 K/UL    DF AUTOMATED         ASSESSMENT/PLAN  Diagnoses and all orders for this visit:    Shortness of breath  Chronic, multifactorial likely asthma contributing worse post covid 8/2022 already some improvement  Appears well compensated  Switch advair to symbicort due to cost (future refills with pulm)   cont allergy shots  Following ENT/pulm/cards    Anxiety   stable  Cont buspar  Cont [id number] mg  Pursue talk therapy again    Recurrent depression  See above    Moderate persistent asthma without complication  PFTs 35/54 normal  Cont advair, prn albuterol prn  Annual flu shots/covid booster  Pcv 23/20 completed    Essential hypertension  controlled  Cont  norvasc 7.5 mg  Cont cozaar 100 mg   cont metoprolol 100 mg  DASH diet, monitoring    CAD involving native coronary artery of native heart with angina pectoris ([address]  Asymptomatic  Mild reversible ischemia on stress test 1/2019  On bb, arb, ASA  LDL is <70  Cont lipitor  Lipid panel 3/2022  Following cardiology    Atrial fibrillation, unspecified type ([address]  Rate controlled, On BB, coumadin  INR therapeutic  Following cards    Hyperlipidemia, unspecified hyperlipidemia type  Good range on lipitor    LDL goal <70  Lipid panel/cmp prior to next visit    Long term current use of anticoagulant therapy  Cards following    Rheumatic valvular disease  S/p [facility] valve 2013  Chronic anticoagulation    Allergic rhinitis, unspecified seasonality, unspecified trigger  On allergy shots  Cont flonase, benadryl,  singulair    Anemia of chronic disease  Following hematology, monitoring    Impaired fasting glucose  Now wnl  Tlcs, monitoring    Follow-up and Dispositions    Return in about 3 months (around 4/5/2023), or if symptoms worsen or fail to improve, for routine chronic illness care, fasting labs a week prior to your next visit. Patient understands plan of care. Patient has provided input and agrees with goals.

## 2023-01-05 NOTE — PROGRESS NOTES
1. \"Have you been to the ER, urgent care clinic since your last visit? Hospitalized since your last visit? \" No    2. \"Have you seen or consulted any other health care providers outside of the [address] [place] [address] since your last visit? \" No     3. For patients over 45: Has the patient had a colonoscopy? Yes - no Care Gap present     If the patient is female:    4. For patients over 40: Has the patient had a mammogram? No scheduled to be done today 01/05/2023    5. For patients over 21: Has the patient had a pap smear?  NA - based on age

## 2023-01-12 ENCOUNTER — TELEPHONE (OUTPATIENT)
Dept: PULMONOLOGY | Age: 63
End: 2023-01-12

## 2023-01-12 NOTE — TELEPHONE ENCOUNTER
Patient has not met her deductible for this year. This will have to be met for all inhalers.  Patient understands

## 2023-01-17 ENCOUNTER — ANTI-COAG VISIT (OUTPATIENT)
Dept: CARDIOLOGY CLINIC | Age: 63
End: 2023-01-17
Payer: MEDICARE

## 2023-01-17 DIAGNOSIS — Z79.01 LONG TERM CURRENT USE OF ANTICOAGULANT THERAPY: ICD-10-CM

## 2023-01-17 DIAGNOSIS — I48.91 ATRIAL FIBRILLATION, UNSPECIFIED TYPE (HCC): Primary | ICD-10-CM

## 2023-01-17 LAB
INR BLD: 1.7
INR BLD: 2.2
PT POC: 26.3 SECONDS
VALID INTERNAL CONTROL?: YES

## 2023-01-17 PROCEDURE — 85610 PROTHROMBIN TIME: CPT | Performed by: NURSE PRACTITIONER

## 2023-01-17 NOTE — PROGRESS NOTES
The INR is stable and therapeutic. Continue Coumadin 5mg daily except 2.5mg on Thurs & Sat.   Recheck INR in 5 weeks

## 2023-01-24 ENCOUNTER — HOSPITAL ENCOUNTER (OUTPATIENT)
Dept: CT IMAGING | Age: 63
Discharge: HOME | End: 2023-01-24
Attending: INTERNAL MEDICINE
Payer: MEDICARE

## 2023-01-24 DIAGNOSIS — R06.09 DYSPNEA ON EXERTION: ICD-10-CM

## 2023-01-24 PROCEDURE — 71250 CT THORAX DX C-: CPT

## 2023-02-20 ENCOUNTER — NURSE ONLY (OUTPATIENT)
Age: 63
End: 2023-02-20

## 2023-02-20 ENCOUNTER — HOSPITAL ENCOUNTER (OUTPATIENT)
Facility: HOSPITAL | Age: 63
Discharge: HOME | End: 2023-02-23
Payer: MEDICARE

## 2023-02-20 DIAGNOSIS — R77.1 ABNORMALITY OF GLOBULIN: Primary | ICD-10-CM

## 2023-02-20 DIAGNOSIS — D63.8 ANEMIA IN OTHER CHRONIC DISEASES CLASSIFIED ELSEWHERE: ICD-10-CM

## 2023-02-20 LAB
ALBUMIN SERPL-MCNC: 3.6 G/DL (ref 3.4–5)
ALBUMIN/GLOB SERPL: 0.9 (ref 0.8–1.7)
ALP SERPL-CCNC: 91 U/L (ref 45–117)
ALT SERPL-CCNC: 29 U/L (ref 13–56)
ANION GAP SERPL CALC-SCNC: 5 MMOL/L (ref 3–18)
AST SERPL-CCNC: 34 U/L (ref 10–38)
BASOPHILS # BLD: 0.1 K/UL (ref 0–0.1)
BASOPHILS NFR BLD: 1 % (ref 0–2)
BILIRUB SERPL-MCNC: 0.4 MG/DL (ref 0.2–1)
BUN SERPL-MCNC: 15 MG/DL (ref 7–18)
BUN/CREAT SERPL: 19 (ref 12–20)
CALCIUM SERPL-MCNC: 9.2 MG/DL (ref 8.5–10.1)
CHLORIDE SERPL-SCNC: 106 MMOL/L (ref 100–111)
CO2 SERPL-SCNC: 29 MMOL/L (ref 21–32)
CREAT SERPL-MCNC: 0.77 MG/DL (ref 0.6–1.3)
DIFFERENTIAL METHOD BLD: ABNORMAL
EOSINOPHIL # BLD: 0.2 K/UL (ref 0–0.4)
EOSINOPHIL NFR BLD: 3 % (ref 0–5)
ERYTHROCYTE [DISTWIDTH] IN BLOOD BY AUTOMATED COUNT: 13.9 % (ref 11.6–14.5)
FERRITIN SERPL-MCNC: 138 NG/ML (ref 8–388)
GLOBULIN SER CALC-MCNC: 3.9 G/DL (ref 2–4)
GLUCOSE SERPL-MCNC: 107 MG/DL (ref 74–99)
HCT VFR BLD AUTO: 35.4 % (ref 35–45)
HGB BLD-MCNC: 10.8 G/DL (ref 12–16)
IMM GRANULOCYTES # BLD AUTO: 0 K/UL (ref 0–0.04)
IMM GRANULOCYTES NFR BLD AUTO: 0 % (ref 0–0.5)
IRON SATN MFR SERPL: 30 % (ref 20–50)
IRON SERPL-MCNC: 89 UG/DL (ref 50–175)
LYMPHOCYTES # BLD: 1.9 K/UL (ref 0.9–3.6)
LYMPHOCYTES NFR BLD: 30 % (ref 21–52)
MCH RBC QN AUTO: 25.7 PG (ref 24–34)
MCHC RBC AUTO-ENTMCNC: 30.5 G/DL (ref 31–37)
MCV RBC AUTO: 84.3 FL (ref 78–100)
MONOCYTES # BLD: 0.8 K/UL (ref 0.05–1.2)
MONOCYTES NFR BLD: 13 % (ref 3–10)
NEUTS SEG # BLD: 3.4 K/UL (ref 1.8–8)
NEUTS SEG NFR BLD: 53 % (ref 40–73)
NRBC # BLD: 0 K/UL (ref 0–0.01)
NRBC BLD-RTO: 0 PER 100 WBC
PLATELET # BLD AUTO: 158 K/UL (ref 135–420)
PMV BLD AUTO: 12.1 FL (ref 9.2–11.8)
POTASSIUM SERPL-SCNC: 3.9 MMOL/L (ref 3.5–5.5)
PROT SERPL-MCNC: 7.5 G/DL (ref 6.4–8.2)
RBC # BLD AUTO: 4.2 M/UL (ref 4.2–5.3)
SODIUM SERPL-SCNC: 140 MMOL/L (ref 136–145)
TIBC SERPL-MCNC: 292 UG/DL (ref 250–450)
WBC # BLD AUTO: 6.5 K/UL (ref 4.6–13.2)

## 2023-02-20 PROCEDURE — 36415 COLL VENOUS BLD VENIPUNCTURE: CPT

## 2023-02-20 PROCEDURE — 83550 IRON BINDING TEST: CPT

## 2023-02-20 PROCEDURE — 80053 COMPREHEN METABOLIC PANEL: CPT

## 2023-02-20 PROCEDURE — 82728 ASSAY OF FERRITIN: CPT

## 2023-02-20 PROCEDURE — 85025 COMPLETE CBC W/AUTO DIFF WBC: CPT

## 2023-02-28 ENCOUNTER — OFFICE VISIT (OUTPATIENT)
Age: 63
End: 2023-02-28
Payer: MEDICARE

## 2023-02-28 ENCOUNTER — ANTI-COAG VISIT (OUTPATIENT)
Age: 63
End: 2023-02-28
Payer: MEDICARE

## 2023-02-28 VITALS — HEIGHT: 67 IN | WEIGHT: 190 LBS | BODY MASS INDEX: 29.82 KG/M2

## 2023-02-28 DIAGNOSIS — M17.12 UNILATERAL PRIMARY OSTEOARTHRITIS, LEFT KNEE: ICD-10-CM

## 2023-02-28 DIAGNOSIS — M17.11 UNILATERAL PRIMARY OSTEOARTHRITIS, RIGHT KNEE: Primary | ICD-10-CM

## 2023-02-28 DIAGNOSIS — I48.91 ATRIAL FIBRILLATION, UNSPECIFIED TYPE (HCC): Primary | ICD-10-CM

## 2023-02-28 LAB
POC INR: 2.2
PROTHROMBIN TIME, POC: 26.1

## 2023-02-28 PROCEDURE — G8427 DOCREV CUR MEDS BY ELIG CLIN: HCPCS | Performed by: PHYSICIAN ASSISTANT

## 2023-02-28 PROCEDURE — 1036F TOBACCO NON-USER: CPT | Performed by: PHYSICIAN ASSISTANT

## 2023-02-28 PROCEDURE — 3017F COLORECTAL CA SCREEN DOC REV: CPT | Performed by: PHYSICIAN ASSISTANT

## 2023-02-28 PROCEDURE — 73564 X-RAY EXAM KNEE 4 OR MORE: CPT | Performed by: PHYSICIAN ASSISTANT

## 2023-02-28 PROCEDURE — 99214 OFFICE O/P EST MOD 30 MIN: CPT | Performed by: PHYSICIAN ASSISTANT

## 2023-02-28 PROCEDURE — G8417 CALC BMI ABV UP PARAM F/U: HCPCS | Performed by: PHYSICIAN ASSISTANT

## 2023-02-28 PROCEDURE — G8484 FLU IMMUNIZE NO ADMIN: HCPCS | Performed by: PHYSICIAN ASSISTANT

## 2023-02-28 PROCEDURE — 85610 PROTHROMBIN TIME: CPT | Performed by: NURSE PRACTITIONER

## 2023-02-28 RX ORDER — BETAMETHASONE SODIUM PHOSPHATE AND BETAMETHASONE ACETATE 3; 3 MG/ML; MG/ML
6 INJECTION, SUSPENSION INTRA-ARTICULAR; INTRALESIONAL; INTRAMUSCULAR; SOFT TISSUE ONCE
Status: CANCELLED | OUTPATIENT
Start: 2023-02-28 | End: 2023-02-28

## 2023-02-28 NOTE — PROGRESS NOTES
The INR is stable and therapeutic. Continue Coumadin 5mg daily except 2.5mg on Tues-Thurs-Sat.  (States that she has been taking this dosing schedule)  Recheck INR in 5 weeks

## 2023-02-28 NOTE — PROGRESS NOTES
Patient: Real Steinberg                MRN: [id number]       SSN: [id number]  YOB: 1960        AGE: 57 y.o. SEX: female  Body mass index is 29.76 kg/m². PCP: Vineet Salas MD  02/28/23      This office note has been dictated. REVIEW OF SYSTEMS:  Constitutional: Negative for fever, chills, weight loss and malaise/fatigue. HENT: Negative. Eyes: Negative. Respiratory: Negative. Cardiovascular: Negative. Gastrointestinal: No bowel incontinence or constipation. Genitourinary: No bladder incontinence or saddle anesthesia. Skin: Negative. Neurological: Negative. Endo/Heme/Allergies: Negative. Psychiatric/Behavioral: Negative. Musculoskeletal: As per HPI above. Past Medical History:   Diagnosis Date    Aortic valve disorders 1/13/2011    Asthma     Atrial fibrillation ([facility] [address] 1/13/2011    Congestive heart failure, unspecified March 2005    Cleared quickly with Lasix therapy    Echocardiogram abnormal 01/10/2012    A-fib. EF 45%. Mild RVE. RVSP 45-50. Massive LAE. Mod MARIA G. Mod MS. Severe MR (mean grad 10). Mild-mod AI. Mild TR. Pulmonic systolic flow reversal.  Mild IVCE. Hives     Hypertension     Iron deficiency anemias     Long term (current) use of anticoagulants 2/23/2011    Murmur 1/2011    Grade I-II/VI systolic ejection murmur along left sternal border, with radiation to the pulmonic area. S/P cardiac cath 02/24/2012    Minimal coronary artery disease. Mild LVE. EF 55%. Mod MR. Mod-severe MS. RA 15.  RV 55/16. PA 58/34. W 36.  CO/CI 3.5/1.9 (TD); 3.61/1.9 (Estefani). R to L shunt suggested.     Wears glasses     Weight gain          Current Outpatient Medications:     acetaminophen (TYLENOL) 500 MG tablet, Take 650 mg by mouth every 6 hours as needed, Disp: , Rfl:     albuterol sulfate HFA (PROVENTIL;VENTOLIN;PROAIR) 108 (90 Base) MCG/ACT inhaler, INHALE 2 PUFFS BY MOUTH EVERY DAY, Disp: , Rfl:     amLODIPine (NORVASC) 5 MG tablet, Take 5 mg by mouth daily, Disp: , Rfl:     aspirin 81 MG EC tablet, Take 81 mg by mouth daily, Disp: , Rfl:     atorvastatin (LIPITOR) 40 MG tablet, TAKE 1 TABLET BY MOUTH EVERY DAY, Disp: , Rfl:     budesonide (RINOCORT AQUA) 32 MCG/ACT nasal spray, SPRAY 2 SPRAYS INTO EACH NOSTRIL EVERY DAY, Disp: , Rfl:     budesonide-formoterol (SYMBICORT) 80-4.5 MCG/ACT AERO, Inhale 2 puffs into the lungs 2 times daily, Disp: , Rfl:     busPIRone (BUSPAR) 5 MG tablet, TAKE 1 TABLET BY MOUTH TWICE A DAY, Disp: , Rfl:     clobetasol (TEMOVATE) 0.05 % ointment, Apply topically 2 times daily, Disp: , Rfl:     cyclobenzaprine (FLEXERIL) 10 MG tablet, TAKE 1 TABLET BY MOUTH THREE TIMES A DAY AS NEEDED FOR MUSCLE SPASMS, Disp: , Rfl:     diclofenac sodium (VOLTAREN) 1 % GEL, Apply topically 4 times daily, Disp: , Rfl:     esomeprazole (NEXIUM) 20 MG delayed release capsule, Take 20 mg by mouth daily as needed, Disp: , Rfl:     famotidine (PEPCID) 40 MG tablet, Take 40 mg by mouth daily, Disp: , Rfl:     furosemide (LASIX) 40 MG tablet, Take 60 mg by mouth daily, Disp: , Rfl:     levocetirizine (XYZAL) 5 MG tablet, TAKE 1 TABLET BY MOUTH EVERY DAY, Disp: , Rfl:     lidocaine (LIDODERM) 5 %, Apply patch to the affected area for 12 hours a day and remove for 12 hours a day., Disp: , Rfl:     losartan (COZAAR) 100 MG tablet, Take 100 mg by mouth daily, Disp: , Rfl:     melatonin 5 MG TABS tablet, Take 10 mg by mouth, Disp: , Rfl:     metoprolol succinate (TOPROL XL) 100 MG extended release tablet, TAKE 1 TABLET BY MOUTH EVERY DAY, Disp: , Rfl:     montelukast (SINGULAIR) 10 MG tablet, TAKE 1 TABLET BY MOUTH EVERY DAY, Disp: , Rfl:     polyethylene glycol (GLYCOLAX) 17 GM/SCOOP powder, Take 17 g by mouth daily, Disp: , Rfl:     potassium chloride (KLOR-CON M20) 20 MEQ extended release tablet, TAKE 1 TABLET BY MOUTH EVERY DAY, Disp: , Rfl:     sodium chloride (OCEAN) 0.65 % nasal spray, 1 spray as needed, Disp: , Rfl:     sertraline (ZOLOFT) 100 MG tablet, TAKE 1 TABLET BY MOUTH EVERY DAY, Disp: , Rfl:     warfarin (COUMADIN) 5 MG tablet, TAKE 1 TABLET BY MOUTH DAILY EXCEPT 1/2 TABLET ON TUE-THURS-SAT OR AS DIRECTED, Disp: , Rfl:     cyclobenzaprine (FLEXERIL) 10 MG tablet, TAKE 1 TABLET BY MOUTH THREE TIMES A DAY AS NEEDED FOR MUSCLE SPASMS, Disp: 30 tablet, Rfl: 0    Allergies   Allergen Reactions    Penicillin G Hives     Per Patient Not allergic       Social History     Socioeconomic History    Marital status:      Spouse name: Not on file    Number of children: Not on file    Years of education: Not on file    Highest education level: Not on file   Occupational History    Not on file   Tobacco Use    Smoking status: Former     Packs/day: 0.50     Types: Cigarettes     Quit date: 1/1/2008     Years since quitting: 15.1    Smokeless tobacco: Never   Substance and Sexual Activity    Alcohol use: Not Currently     Alcohol/week: 1.0 standard drink    Drug use: Never    Sexual activity: Not on file   Other Topics Concern    Not on file   Social History Narrative    Not on file     Social Determinants of Health     Financial Resource Strain: Medium Risk    Difficulty of Paying Living Expenses: Somewhat hard   Food Insecurity: No Food Insecurity    Worried About Running Out of Food in the Last Year: Never true    Ran Out of Food in the Last Year: Never true   Transportation Needs: Not on file   Physical Activity: Not on file   Stress: Not on file   Social Connections: Not on file   Intimate Partner Violence: Not on file   Housing Stability: Not on file       Past Surgical History:   Procedure Laterality Date    CARDIAC CATHETERIZATION  2/24/2012    HYSTERECTOMY (CERVIX STATUS UNKNOWN)  2006    MITRAL VALVE REPLACEMENT  05/22/2013    25/33 mm On-X mechanical valve with bilateral pulmonary vein isolation and resection of LA appendage           Patient seen evaluated today for complaints of left knee pain.'s been ongoing for quite some time.   She has had cortisone as well as viscosupplementation in the past which really have not helped her much. She had her knee give way on her and caused her couple falls over the past couple months. No specific injury with the falls. She reports some discomfort at night. She is unable to take anti-inflammatories as she is status post mechanical mitral valve    Patient denies recent fevers, chills, chest pain, SOB, or injuries. No recent systemic changes noted. A 12-point review of systems is performed today. Pertinent positives are noted. All other systems reviewed and otherwise are negative. Physical exam: General: Alert and oriented x3, nad.  well-developed, well nourished. normal affect, AF. NC/AT, EOMI, neck supple, trachea midline, no JVD present. Breathing is non-labored. Examination of the lower extremities reveals pain-free range of motion the hips. There is no pain to palpation the trochanter bursa. Negative straight leg raise. Negative calf tenderness. Negative Homans. No signs of DVT present. The left knee reveals skin intact there is no erythema or ecchymosis noted. There is minimal effusion. Negative patellar ballottement. There are no signs for infection or cellulitis present. Does have pain to palpation medial joint line as well as Kelly's and a palpable present medially. Mild crepitus anteriorly with range of motion activities noted. Radiographs obtained in the office today 2/28/2023 at [address] location including AP, tunnel, lateral, skyline of each of the knees shows moderate arthritic changes without acute abnormalities noted. Assessment: Left knee moderate arthritis with likely meniscal pathology    Plan: At this point, we discussed treatment options. We will move forth a CT scan of the left knee for further evaluation of meniscal pathology. Again she is unable to have an MRI due to her mechanical mitral valve. She will continue with Tylenol.   She is unable to take anti-inflammatories. We will see her back in the office after the CT scan for further evaluation. She was given a DMV placard today.             JR Mitul PEDRO, PASonaC, ATC

## 2023-03-07 ENCOUNTER — HOSPITAL ENCOUNTER (OUTPATIENT)
Facility: HOSPITAL | Age: 63
Discharge: HOME | End: 2023-03-10
Payer: MEDICARE

## 2023-03-07 DIAGNOSIS — M17.12 UNILATERAL PRIMARY OSTEOARTHRITIS, LEFT KNEE: ICD-10-CM

## 2023-03-07 PROCEDURE — 73700 CT LOWER EXTREMITY W/O DYE: CPT

## 2023-03-20 ENCOUNTER — HOSPITAL ENCOUNTER (OUTPATIENT)
Facility: HOSPITAL | Age: 63
Discharge: HOME | End: 2023-03-23
Payer: MEDICARE

## 2023-03-20 DIAGNOSIS — I10 HYPERTENSION, UNSPECIFIED TYPE: ICD-10-CM

## 2023-03-20 LAB
ALBUMIN SERPL-MCNC: 3.6 G/DL (ref 3.4–5)
ALBUMIN/GLOB SERPL: 0.9 (ref 0.8–1.7)
ALP SERPL-CCNC: 87 U/L (ref 45–117)
ALT SERPL-CCNC: 27 U/L (ref 13–56)
ANION GAP SERPL CALC-SCNC: 5 MMOL/L (ref 3–18)
AST SERPL-CCNC: 32 U/L (ref 10–38)
BILIRUB SERPL-MCNC: 0.4 MG/DL (ref 0.2–1)
BUN SERPL-MCNC: 13 MG/DL (ref 7–18)
BUN/CREAT SERPL: 16 (ref 12–20)
CALCIUM SERPL-MCNC: 9 MG/DL (ref 8.5–10.1)
CHLORIDE SERPL-SCNC: 105 MMOL/L (ref 100–111)
CHOLEST SERPL-MCNC: 140 MG/DL
CO2 SERPL-SCNC: 30 MMOL/L (ref 21–32)
CREAT SERPL-MCNC: 0.81 MG/DL (ref 0.6–1.3)
GLOBULIN SER CALC-MCNC: 4.1 G/DL (ref 2–4)
GLUCOSE SERPL-MCNC: 88 MG/DL (ref 74–99)
HDLC SERPL-MCNC: 70 MG/DL (ref 40–60)
HDLC SERPL: 2 (ref 0–5)
LDLC SERPL CALC-MCNC: 48.2 MG/DL (ref 0–100)
LIPID PANEL: ABNORMAL
POTASSIUM SERPL-SCNC: 4.3 MMOL/L (ref 3.5–5.5)
PROT SERPL-MCNC: 7.7 G/DL (ref 6.4–8.2)
SODIUM SERPL-SCNC: 140 MMOL/L (ref 136–145)
TRIGL SERPL-MCNC: 109 MG/DL
VLDLC SERPL CALC-MCNC: 21.8 MG/DL

## 2023-03-20 PROCEDURE — 36415 COLL VENOUS BLD VENIPUNCTURE: CPT

## 2023-03-20 PROCEDURE — 80061 LIPID PANEL: CPT

## 2023-03-20 PROCEDURE — 80053 COMPREHEN METABOLIC PANEL: CPT

## 2023-03-20 RX ORDER — BUSPIRONE HYDROCHLORIDE 5 MG/1
TABLET ORAL
Qty: 60 TABLET | Refills: 5 | Status: SHIPPED | OUTPATIENT
Start: 2023-03-20

## 2023-03-23 ENCOUNTER — OFFICE VISIT (OUTPATIENT)
Age: 63
End: 2023-03-23
Payer: MEDICARE

## 2023-03-23 VITALS — BODY MASS INDEX: 30.39 KG/M2 | WEIGHT: 193.6 LBS | HEIGHT: 67 IN

## 2023-03-23 DIAGNOSIS — M25.562 LEFT KNEE PAIN, UNSPECIFIED CHRONICITY: ICD-10-CM

## 2023-03-23 DIAGNOSIS — M17.12 UNILATERAL PRIMARY OSTEOARTHRITIS, LEFT KNEE: Primary | ICD-10-CM

## 2023-03-23 PROCEDURE — 99214 OFFICE O/P EST MOD 30 MIN: CPT | Performed by: PHYSICIAN ASSISTANT

## 2023-03-23 PROCEDURE — 1036F TOBACCO NON-USER: CPT | Performed by: PHYSICIAN ASSISTANT

## 2023-03-23 PROCEDURE — 20611 DRAIN/INJ JOINT/BURSA W/US: CPT | Performed by: PHYSICIAN ASSISTANT

## 2023-03-23 PROCEDURE — G8427 DOCREV CUR MEDS BY ELIG CLIN: HCPCS | Performed by: PHYSICIAN ASSISTANT

## 2023-03-23 PROCEDURE — G8484 FLU IMMUNIZE NO ADMIN: HCPCS | Performed by: PHYSICIAN ASSISTANT

## 2023-03-23 PROCEDURE — G8417 CALC BMI ABV UP PARAM F/U: HCPCS | Performed by: PHYSICIAN ASSISTANT

## 2023-03-23 PROCEDURE — 3017F COLORECTAL CA SCREEN DOC REV: CPT | Performed by: PHYSICIAN ASSISTANT

## 2023-03-23 RX ORDER — BETAMETHASONE SODIUM PHOSPHATE AND BETAMETHASONE ACETATE 3; 3 MG/ML; MG/ML
6 INJECTION, SUSPENSION INTRA-ARTICULAR; INTRALESIONAL; INTRAMUSCULAR; SOFT TISSUE ONCE
Status: COMPLETED | OUTPATIENT
Start: 2023-03-23 | End: 2023-03-23

## 2023-03-23 RX ADMIN — BETAMETHASONE SODIUM PHOSPHATE AND BETAMETHASONE ACETATE 6 MG: 3; 3 INJECTION, SUSPENSION INTRA-ARTICULAR; INTRALESIONAL; INTRAMUSCULAR; SOFT TISSUE at 13:10

## 2023-03-23 NOTE — PROGRESS NOTES
Patient: Yamilet Lyman                MRN: [id number]       SSN: [id number]  YOB: 1960        AGE: 57 y.o. SEX: female  Body mass index is 30.32 kg/m². PCP: Ramon Sandoval MD  03/23/23            REVIEW OF SYSTEMS:  Constitutional: Negative for fever, chills, weight loss and malaise/fatigue. HENT: Negative. Eyes: Negative. Respiratory: Negative. Cardiovascular: Negative. Gastrointestinal: No bowel incontinence or constipation. Genitourinary: No bladder incontinence or saddle anesthesia. Skin: Negative. Neurological: Negative. Endo/Heme/Allergies: Negative. Psychiatric/Behavioral: Negative. Musculoskeletal: As per HPI above. Past Medical History:   Diagnosis Date    Aortic valve disorders 1/13/2011    Asthma     Atrial fibrillation ([facility] [address] 1/13/2011    Congestive heart failure, unspecified March 2005    Cleared quickly with Lasix therapy    Echocardiogram abnormal 01/10/2012    A-fib. EF 45%. Mild RVE. RVSP 45-50. Massive LAE. Mod MARIA G. Mod MS. Severe MR (mean grad 10). Mild-mod AI. Mild TR. Pulmonic systolic flow reversal.  Mild IVCE. Hives     Hypertension     Iron deficiency anemias     Long term (current) use of anticoagulants 2/23/2011    Murmur 1/2011    Grade I-II/VI systolic ejection murmur along left sternal border, with radiation to the pulmonic area. S/P cardiac cath 02/24/2012    Minimal coronary artery disease. Mild LVE. EF 55%. Mod MR. Mod-severe MS. RA 15.  RV 55/16. PA 58/34. W 36.  CO/CI 3.5/1.9 (TD); 3.61/1.9 (Estefani). R to L shunt suggested.     Wears glasses     Weight gain          Current Outpatient Medications:     busPIRone (BUSPAR) 5 MG tablet, TAKE 1 TABLET BY MOUTH TWICE A DAY, Disp: 60 tablet, Rfl: 5    acetaminophen (TYLENOL) 500 MG tablet, Take 650 mg by mouth every 6 hours as needed, Disp: , Rfl:     albuterol sulfate HFA (PROVENTIL;VENTOLIN;PROAIR) 108 (90 Base) MCG/ACT inhaler, INHALE 2 PUFFS BY MOUTH

## 2023-03-31 RX ORDER — MONTELUKAST SODIUM 10 MG/1
TABLET ORAL
Qty: 90 TABLET | Refills: 3 | Status: SHIPPED | OUTPATIENT
Start: 2023-03-31

## 2023-03-31 RX ORDER — LOSARTAN POTASSIUM 100 MG/1
TABLET ORAL
Qty: 30 TABLET | Refills: 11 | Status: SHIPPED | OUTPATIENT
Start: 2023-03-31

## 2023-04-06 ENCOUNTER — OFFICE VISIT (OUTPATIENT)
Age: 63
End: 2023-04-06
Payer: MEDICARE

## 2023-04-06 VITALS
DIASTOLIC BLOOD PRESSURE: 70 MMHG | SYSTOLIC BLOOD PRESSURE: 130 MMHG | BODY MASS INDEX: 29.82 KG/M2 | HEART RATE: 75 BPM | WEIGHT: 190 LBS | RESPIRATION RATE: 18 BRPM | TEMPERATURE: 98 F | OXYGEN SATURATION: 98 % | HEIGHT: 67 IN

## 2023-04-06 DIAGNOSIS — I10 ESSENTIAL (PRIMARY) HYPERTENSION: ICD-10-CM

## 2023-04-06 DIAGNOSIS — I48.91 ATRIAL FIBRILLATION, UNSPECIFIED TYPE (HCC): ICD-10-CM

## 2023-04-06 DIAGNOSIS — E78.00 PURE HYPERCHOLESTEROLEMIA, UNSPECIFIED: ICD-10-CM

## 2023-04-06 DIAGNOSIS — R06.02 SHORTNESS OF BREATH: ICD-10-CM

## 2023-04-06 DIAGNOSIS — F33.1 MODERATE EPISODE OF RECURRENT MAJOR DEPRESSIVE DISORDER (HCC): Primary | ICD-10-CM

## 2023-04-06 DIAGNOSIS — I11.0 HYPERTENSIVE HEART DISEASE WITH HEART FAILURE (HCC): ICD-10-CM

## 2023-04-06 DIAGNOSIS — R73.01 IMPAIRED FASTING GLUCOSE: ICD-10-CM

## 2023-04-06 DIAGNOSIS — F41.1 GENERALIZED ANXIETY DISORDER: ICD-10-CM

## 2023-04-06 DIAGNOSIS — I50.22 CHRONIC SYSTOLIC (CONGESTIVE) HEART FAILURE (HCC): ICD-10-CM

## 2023-04-06 DIAGNOSIS — J45.40 MODERATE PERSISTENT ASTHMA, UNCOMPLICATED: ICD-10-CM

## 2023-04-06 DIAGNOSIS — I25.119 ATHEROSCLEROSIS OF NATIVE CORONARY ARTERY OF NATIVE HEART WITH ANGINA PECTORIS (HCC): ICD-10-CM

## 2023-04-06 PROCEDURE — 1036F TOBACCO NON-USER: CPT | Performed by: FAMILY MEDICINE

## 2023-04-06 PROCEDURE — G8427 DOCREV CUR MEDS BY ELIG CLIN: HCPCS | Performed by: FAMILY MEDICINE

## 2023-04-06 PROCEDURE — 3017F COLORECTAL CA SCREEN DOC REV: CPT | Performed by: FAMILY MEDICINE

## 2023-04-06 PROCEDURE — 3078F DIAST BP <80 MM HG: CPT | Performed by: FAMILY MEDICINE

## 2023-04-06 PROCEDURE — 99214 OFFICE O/P EST MOD 30 MIN: CPT | Performed by: FAMILY MEDICINE

## 2023-04-06 PROCEDURE — G8417 CALC BMI ABV UP PARAM F/U: HCPCS | Performed by: FAMILY MEDICINE

## 2023-04-06 PROCEDURE — 3075F SYST BP GE 130 - 139MM HG: CPT | Performed by: FAMILY MEDICINE

## 2023-04-06 RX ORDER — FAMOTIDINE 40 MG/1
40 TABLET, FILM COATED ORAL DAILY
Qty: 90 TABLET | Refills: 3 | Status: SHIPPED | OUTPATIENT
Start: 2023-04-06

## 2023-04-06 RX ORDER — AMOXICILLIN 500 MG/1
500 CAPSULE ORAL
COMMUNITY

## 2023-04-06 RX ORDER — SERTRALINE HYDROCHLORIDE 100 MG/1
TABLET, FILM COATED ORAL
Qty: 90 TABLET | Refills: 1 | Status: SHIPPED | OUTPATIENT
Start: 2023-04-06

## 2023-04-06 SDOH — ECONOMIC STABILITY: FOOD INSECURITY: WITHIN THE PAST 12 MONTHS, YOU WORRIED THAT YOUR FOOD WOULD RUN OUT BEFORE YOU GOT MONEY TO BUY MORE.: NEVER TRUE

## 2023-04-06 SDOH — ECONOMIC STABILITY: FOOD INSECURITY: WITHIN THE PAST 12 MONTHS, THE FOOD YOU BOUGHT JUST DIDN'T LAST AND YOU DIDN'T HAVE MONEY TO GET MORE.: NEVER TRUE

## 2023-04-06 SDOH — ECONOMIC STABILITY: INCOME INSECURITY: HOW HARD IS IT FOR YOU TO PAY FOR THE VERY BASICS LIKE FOOD, HOUSING, MEDICAL CARE, AND HEATING?: NOT HARD AT ALL

## 2023-04-06 SDOH — ECONOMIC STABILITY: HOUSING INSECURITY
IN THE LAST 12 MONTHS, WAS THERE A TIME WHEN YOU DID NOT HAVE A STEADY PLACE TO SLEEP OR SLEPT IN A SHELTER (INCLUDING NOW)?: NO

## 2023-04-06 ASSESSMENT — PATIENT HEALTH QUESTIONNAIRE - PHQ9
10. IF YOU CHECKED OFF ANY PROBLEMS, HOW DIFFICULT HAVE THESE PROBLEMS MADE IT FOR YOU TO DO YOUR WORK, TAKE CARE OF THINGS AT HOME, OR GET ALONG WITH OTHER PEOPLE: 0
3. TROUBLE FALLING OR STAYING ASLEEP: 0
SUM OF ALL RESPONSES TO PHQ9 QUESTIONS 1 & 2: 1
SUM OF ALL RESPONSES TO PHQ QUESTIONS 1-9: 1
4. FEELING TIRED OR HAVING LITTLE ENERGY: 0
SUM OF ALL RESPONSES TO PHQ QUESTIONS 1-9: 1
1. LITTLE INTEREST OR PLEASURE IN DOING THINGS: 0
5. POOR APPETITE OR OVEREATING: 0
2. FEELING DOWN, DEPRESSED OR HOPELESS: 1
SUM OF ALL RESPONSES TO PHQ QUESTIONS 1-9: 1
SUM OF ALL RESPONSES TO PHQ QUESTIONS 1-9: 1
9. THOUGHTS THAT YOU WOULD BE BETTER OFF DEAD, OR OF HURTING YOURSELF: 0
6. FEELING BAD ABOUT YOURSELF - OR THAT YOU ARE A FAILURE OR HAVE LET YOURSELF OR YOUR FAMILY DOWN: 0
8. MOVING OR SPEAKING SO SLOWLY THAT OTHER PEOPLE COULD HAVE NOTICED. OR THE OPPOSITE, BEING SO FIGETY OR RESTLESS THAT YOU HAVE BEEN MOVING AROUND A LOT MORE THAN USUAL: 0
7. TROUBLE CONCENTRATING ON THINGS, SUCH AS READING THE NEWSPAPER OR WATCHING TELEVISION: 0
DEPRESSION UNABLE TO ASSESS: FUNCTIONAL CAPACITY MOTIVATION LIMITS ACCURACY

## 2023-04-06 ASSESSMENT — ANXIETY QUESTIONNAIRES
GAD7 TOTAL SCORE: 0
IF YOU CHECKED OFF ANY PROBLEMS ON THIS QUESTIONNAIRE, HOW DIFFICULT HAVE THESE PROBLEMS MADE IT FOR YOU TO DO YOUR WORK, TAKE CARE OF THINGS AT HOME, OR GET ALONG WITH OTHER PEOPLE: NOT DIFFICULT AT ALL
3. WORRYING TOO MUCH ABOUT DIFFERENT THINGS: 0
7. FEELING AFRAID AS IF SOMETHING AWFUL MIGHT HAPPEN: 0
5. BEING SO RESTLESS THAT IT IS HARD TO SIT STILL: 0
4. TROUBLE RELAXING: 0
2. NOT BEING ABLE TO STOP OR CONTROL WORRYING: 0
6. BECOMING EASILY ANNOYED OR IRRITABLE: 0
1. FEELING NERVOUS, ANXIOUS, OR ON EDGE: 0

## 2023-04-06 NOTE — PROGRESS NOTES
Chief Complaint   Patient presents with    Results     Review of results     Allergic Rhinitis     Anxiety    Asthma    Cholesterol Problem    Hypertension     Hx of A-fib     Coronary Artery Disease      1. \"Have you been to the ER, urgent care clinic since your last visit? Hospitalized since your last visit? \" No    2. \"Have you seen or consulted any other health care providers outside of the [address] [place] [address] since your last visit? \" No     3. For patients aged 39-70: Has the patient had a colonoscopy / FIT/ Cologuard? Yes - no Care Gap present      If the patient is female:    4. For patients aged 41-77: Has the patient had a mammogram within the past 2 years? Yes - no Care Gap present      5. For patients aged 21-65: Has the patient had a pap smear?  No hx of hysterectomy
MOUTH EVERY DAY, Disp: , Rfl:     warfarin (COUMADIN) 5 MG tablet, TAKE 1 TABLET BY MOUTH DAILY EXCEPT 1/2 TABLET ON TUE-THURS-SAT OR AS DIRECTED, Disp: , Rfl:     cyclobenzaprine (FLEXERIL) 10 MG tablet, TAKE 1 TABLET BY MOUTH THREE TIMES A DAY AS NEEDED FOR MUSCLE SPASMS, Disp: 30 tablet, Rfl: 0    acetaminophen (TYLENOL) 500 MG tablet, Take 650 mg by mouth every 6 hours as needed, Disp: , Rfl:     albuterol sulfate HFA (PROVENTIL;VENTOLIN;PROAIR) 108 (90 Base) MCG/ACT inhaler, INHALE 2 PUFFS BY MOUTH EVERY DAY, Disp: , Rfl:     budesonide (RINOCORT AQUA) 32 MCG/ACT nasal spray, SPRAY 2 SPRAYS INTO EACH NOSTRIL EVERY DAY, Disp: , Rfl:     polyethylene glycol (GLYCOLAX) 17 GM/SCOOP powder, Take 17 g by mouth daily, Disp: , Rfl:     sodium chloride (OCEAN) 0.65 % nasal spray, 1 spray as needed, Disp: , Rfl:     Allergies   Allergen Reactions    Penicillin Hives     Per Patient Not allergic       Past Medical History:   Diagnosis Date    Aortic valve disorders 1/13/2011    Asthma     Atrial fibrillation ([facility] [address] 1/13/2011    Congestive heart failure, unspecified March 2005    Cleared quickly with Lasix therapy    Echocardiogram 01/10/2012    A-fib. EF 45%. Mild RVE. RVSP 45-50. Massive LAE. Mod MARIA G. Mod MS. Severe MR (mean grad 10). Mild-mod AI. Mild TR. Pulmonic systolic flow reversal.  Mild IVCE. Hives     Hypertension     Iron deficiency anemias     Long term (current) use of anticoagulants 2/23/2011    Murmur 1/2011    Grade I-II/VI systolic ejection murmur along left sternal border, with radiation to the pulmonic area. S/P cardiac cath 02/24/2012    Minimal coronary artery disease. Mild LVE. EF 55%. Mod MR. Mod-severe MS. RA 15.  RV 55/16. PA 58/34. W 36.  CO/CI 3.5/1.9 (TD); 3.61/1.9 (Estefani). R to L shunt suggested.     Wears glasses     Weight gain        Social History     Socioeconomic History    Marital status:      Spouse name: Not on file    Number of children: Not on file

## 2023-04-07 RX ORDER — FUROSEMIDE 40 MG/1
TABLET ORAL
Qty: 135 TABLET | Refills: 3 | Status: SHIPPED | OUTPATIENT
Start: 2023-04-07

## 2023-04-18 ENCOUNTER — HOSPITAL ENCOUNTER (OUTPATIENT)
Facility: HOSPITAL | Age: 63
Discharge: HOME | End: 2023-04-21
Payer: MEDICARE

## 2023-04-18 DIAGNOSIS — R77.1 ABNORMALITY OF GLOBULIN: ICD-10-CM

## 2023-04-18 DIAGNOSIS — D63.8 ANEMIA IN OTHER CHRONIC DISEASES CLASSIFIED ELSEWHERE: ICD-10-CM

## 2023-04-18 LAB
ALBUMIN SERPL-MCNC: 3.6 G/DL (ref 3.4–5)
ALBUMIN/GLOB SERPL: 0.9 (ref 0.8–1.7)
ALP SERPL-CCNC: 89 U/L (ref 45–117)
ALT SERPL-CCNC: 24 U/L (ref 13–56)
ANION GAP SERPL CALC-SCNC: 3 MMOL/L (ref 3–18)
AST SERPL-CCNC: 27 U/L (ref 10–38)
BASOPHILS # BLD: 0 K/UL (ref 0–0.1)
BASOPHILS NFR BLD: 1 % (ref 0–2)
BILIRUB SERPL-MCNC: 0.5 MG/DL (ref 0.2–1)
BUN SERPL-MCNC: 14 MG/DL (ref 7–18)
BUN/CREAT SERPL: 17 (ref 12–20)
CALCIUM SERPL-MCNC: 9.1 MG/DL (ref 8.5–10.1)
CHLORIDE SERPL-SCNC: 105 MMOL/L (ref 100–111)
CO2 SERPL-SCNC: 29 MMOL/L (ref 21–32)
CREAT SERPL-MCNC: 0.84 MG/DL (ref 0.6–1.3)
DIFFERENTIAL METHOD BLD: ABNORMAL
EOSINOPHIL # BLD: 0.2 K/UL (ref 0–0.4)
EOSINOPHIL NFR BLD: 3 % (ref 0–5)
ERYTHROCYTE [DISTWIDTH] IN BLOOD BY AUTOMATED COUNT: 13.9 % (ref 11.6–14.5)
FERRITIN SERPL-MCNC: 124 NG/ML (ref 8–388)
GLOBULIN SER CALC-MCNC: 4.1 G/DL (ref 2–4)
GLUCOSE SERPL-MCNC: 98 MG/DL (ref 74–99)
HCT VFR BLD AUTO: 35.6 % (ref 35–45)
HGB BLD-MCNC: 11.1 G/DL (ref 12–16)
IMM GRANULOCYTES # BLD AUTO: 0 K/UL (ref 0–0.04)
IMM GRANULOCYTES NFR BLD AUTO: 0 % (ref 0–0.5)
IRON SATN MFR SERPL: 23 % (ref 20–50)
IRON SERPL-MCNC: 72 UG/DL (ref 50–175)
LYMPHOCYTES # BLD: 1.9 K/UL (ref 0.9–3.6)
LYMPHOCYTES NFR BLD: 36 % (ref 21–52)
MCH RBC QN AUTO: 26.1 PG (ref 24–34)
MCHC RBC AUTO-ENTMCNC: 31.2 G/DL (ref 31–37)
MCV RBC AUTO: 83.8 FL (ref 78–100)
MONOCYTES # BLD: 0.7 K/UL (ref 0.05–1.2)
MONOCYTES NFR BLD: 13 % (ref 3–10)
NEUTS SEG # BLD: 2.5 K/UL (ref 1.8–8)
NEUTS SEG NFR BLD: 48 % (ref 40–73)
NRBC # BLD: 0 K/UL (ref 0–0.01)
NRBC BLD-RTO: 0 PER 100 WBC
PLATELET # BLD AUTO: 184 K/UL (ref 135–420)
PMV BLD AUTO: 12.5 FL (ref 9.2–11.8)
POTASSIUM SERPL-SCNC: 3.9 MMOL/L (ref 3.5–5.5)
PROT SERPL-MCNC: 7.7 G/DL (ref 6.4–8.2)
RBC # BLD AUTO: 4.25 M/UL (ref 4.2–5.3)
SODIUM SERPL-SCNC: 137 MMOL/L (ref 136–145)
TIBC SERPL-MCNC: 309 UG/DL (ref 250–450)
WBC # BLD AUTO: 5.3 K/UL (ref 4.6–13.2)

## 2023-04-18 PROCEDURE — 83540 ASSAY OF IRON: CPT

## 2023-04-18 PROCEDURE — 83550 IRON BINDING TEST: CPT

## 2023-04-18 PROCEDURE — 80053 COMPREHEN METABOLIC PANEL: CPT

## 2023-04-18 PROCEDURE — 85025 COMPLETE CBC W/AUTO DIFF WBC: CPT

## 2023-04-18 PROCEDURE — 82728 ASSAY OF FERRITIN: CPT

## 2023-04-18 PROCEDURE — 36415 COLL VENOUS BLD VENIPUNCTURE: CPT

## 2023-04-24 ENCOUNTER — TELEPHONE (OUTPATIENT)
Age: 63
End: 2023-04-24

## 2023-04-24 NOTE — TELEPHONE ENCOUNTER
Patient called to check up on referral.     The referral was incomplete by Freda Slaughter. I sent an email to Richardson Schwab to have referral approved and possibly corrected or reentered before patients appt on 4/28/23.

## 2023-04-25 RX ORDER — AMLODIPINE BESYLATE 5 MG/1
TABLET ORAL
Qty: 90 TABLET | Refills: 3 | Status: SHIPPED | OUTPATIENT
Start: 2023-04-25

## 2023-04-28 ENCOUNTER — OFFICE VISIT (OUTPATIENT)
Age: 63
End: 2023-04-28

## 2023-04-28 VITALS — BODY MASS INDEX: 28.25 KG/M2 | WEIGHT: 180 LBS | HEIGHT: 67 IN

## 2023-04-28 DIAGNOSIS — M17.11 UNILATERAL PRIMARY OSTEOARTHRITIS, RIGHT KNEE: ICD-10-CM

## 2023-04-28 DIAGNOSIS — M17.12 UNILATERAL PRIMARY OSTEOARTHRITIS, LEFT KNEE: Primary | ICD-10-CM

## 2023-04-28 RX ORDER — HYALURONATE SODIUM 10 MG/ML
20 SYRINGE (ML) INTRAARTICULAR ONCE
Status: COMPLETED | OUTPATIENT
Start: 2023-04-28 | End: 2023-04-28

## 2023-04-28 RX ADMIN — Medication 20 MG: at 10:35

## 2023-04-28 RX ADMIN — Medication 20 MG: at 10:25

## 2023-04-28 NOTE — PROGRESS NOTES
Patient: Frances Bright                MRN: [id number]       SSN: [id number]  YOB: 1960        AGE: 57 y.o. SEX: female  Body mass index is 28.19 kg/m². PCP: Leelee Marina MD  04/28/23        Pt presents today for their 1st euflexxa  injection for bilateral knee . There has been no recent fevers/chills, systemic changes, or injuries to report. PE:  General A&Ox3, NAD  Exam of the knees reveals skin intact, no erythema, no ecchymosis, no signs for infection. No cyanosis, clubbing, or edema present distally. Neg calf tenderness, neg homans, no signs for DVT present. A: bilateral knee OA    P: The bilateral knee was injected with 2ml euflexxa each knee with US guided assistance without complications. Patient was instructed on post injection care. Chart reviewed for the following:  Liela BELLA PA-C, have reviewed the History, Physical and updated the Allergic reactions for Frances Bright? TIME OUT performed immediately prior to start of procedure:  Leila BELLA PA-C, have performed the following reviews on Frances Bright prior to the start of the procedure:  ????????  * Patient was identified by name and date of birth   * Agreement on procedure being performed was verified  * Risks and Benefits explained to the patient  * Procedure site verified and marked as necessary  * Patient was positioned for comfort  * Consent was signed and verified    Time: 10:27 AM    Body part: bilateral knee, intra-articular    Medication & Dose: 2ml euflexxa each knee    Date of procedure: 04/28/23    Procedure performed by: Meng Klein PA-C    Patient assisted by: self    How tolerated by patient: tolerated the procedure well with no complications    Post Procedural Pain Scale: 0    Comments:  [facility] using a frequency of 10MHz with a 12L-RS transducer head was used to confirm needle placement.   Ultrasound images captured using [facility] Ultrasound machine and

## 2023-05-05 ENCOUNTER — OFFICE VISIT (OUTPATIENT)
Age: 63
End: 2023-05-05

## 2023-05-05 VITALS — BODY MASS INDEX: 30.13 KG/M2 | WEIGHT: 192 LBS | HEIGHT: 67 IN

## 2023-05-05 DIAGNOSIS — M17.12 UNILATERAL PRIMARY OSTEOARTHRITIS, LEFT KNEE: Primary | ICD-10-CM

## 2023-05-05 DIAGNOSIS — M17.11 UNILATERAL PRIMARY OSTEOARTHRITIS, RIGHT KNEE: ICD-10-CM

## 2023-05-05 RX ORDER — HYALURONATE SODIUM 10 MG/ML
20 SYRINGE (ML) INTRAARTICULAR ONCE
Status: COMPLETED | OUTPATIENT
Start: 2023-05-05 | End: 2023-05-05

## 2023-05-05 RX ADMIN — Medication 20 MG: at 08:51

## 2023-05-05 RX ADMIN — Medication 20 MG: at 08:52

## 2023-05-05 NOTE — PROGRESS NOTES
Patient: Yony Quinones                MRN: [id number]       SSN: [id number]  YOB: 1960        AGE: 57 y.o. SEX: female  Body mass index is 30.07 kg/m². PCP: Letha Bautista MD  05/05/23        Pt presents today for their 2nd euflexxa  injection for bilateral knee . There has been no recent fevers/chills, systemic changes, or injuries to report. PE:  General A&Ox3, NAD  Exam of the knees reveals skin intact, no erythema, no ecchymosis, no signs for infection. No cyanosis, clubbing, or edema present distally. Neg calf tenderness, neg homans, no signs for DVT present. A: bilateral knee OA    P: The bilateral knee was injected with 2ml euflexxa with US guided assistance without complications. Patient was instructed on post injection care. Chart reviewed for the following:  Obed BELLA PA-C, have reviewed the History, Physical and updated the Allergic reactions for Yony Quinones? TIME OUT performed immediately prior to start of procedure:  Obed BELLA PA-C, have performed the following reviews on Yony Quinones prior to the start of the procedure:  ????????  * Patient was identified by name and date of birth   * Agreement on procedure being performed was verified  * Risks and Benefits explained to the patient  * Procedure site verified and marked as necessary  * Patient was positioned for comfort  * Consent was signed and verified    Time: 8:49 AM    Body part: bilateral knee, intra-articular    Medication & Dose: 2ml euflexxa each knee    Date of procedure: 05/05/23    Procedure performed by: Damaso Caputo PA-C    Patient assisted by: self    How tolerated by patient: tolerated the procedure well with no complications    Post Procedural Pain Scale: 2    Comments:  [employer] using a frequency of 10MHz with a 12L-RS transducer head was used to confirm needle placement.   Ultrasound images captured using [employer] Ultrasound machine and scanned into

## 2023-05-08 ENCOUNTER — TELEPHONE (OUTPATIENT)
Age: 63
End: 2023-05-08

## 2023-05-08 ENCOUNTER — CLINICAL DOCUMENTATION (OUTPATIENT)
Age: 63
End: 2023-05-08

## 2023-05-08 RX ORDER — ALBUTEROL SULFATE 90 UG/1
AEROSOL, METERED RESPIRATORY (INHALATION)
Qty: 18 G | Refills: 4 | Status: SHIPPED | OUTPATIENT
Start: 2023-05-08

## 2023-05-08 NOTE — TELEPHONE ENCOUNTER
Pt states she is completely out of her albuterol inhaler.   Can another dr please send in a new prescription to [facility]?

## 2023-05-09 ENCOUNTER — OFFICE VISIT (OUTPATIENT)
Age: 63
End: 2023-05-09
Payer: MEDICARE

## 2023-05-09 VITALS
RESPIRATION RATE: 16 BRPM | DIASTOLIC BLOOD PRESSURE: 83 MMHG | SYSTOLIC BLOOD PRESSURE: 126 MMHG | OXYGEN SATURATION: 97 % | BODY MASS INDEX: 29.85 KG/M2 | HEIGHT: 67 IN | WEIGHT: 190.2 LBS | HEART RATE: 82 BPM

## 2023-05-09 DIAGNOSIS — D50.8 IRON DEFICIENCY ANEMIA SECONDARY TO INADEQUATE DIETARY IRON INTAKE: ICD-10-CM

## 2023-05-09 DIAGNOSIS — R77.1 ABNORMALITY OF GLOBULIN: Primary | ICD-10-CM

## 2023-05-09 PROCEDURE — 1036F TOBACCO NON-USER: CPT | Performed by: INTERNAL MEDICINE

## 2023-05-09 PROCEDURE — 3078F DIAST BP <80 MM HG: CPT | Performed by: INTERNAL MEDICINE

## 2023-05-09 PROCEDURE — G8417 CALC BMI ABV UP PARAM F/U: HCPCS | Performed by: INTERNAL MEDICINE

## 2023-05-09 PROCEDURE — 3074F SYST BP LT 130 MM HG: CPT | Performed by: INTERNAL MEDICINE

## 2023-05-09 PROCEDURE — 99213 OFFICE O/P EST LOW 20 MIN: CPT | Performed by: INTERNAL MEDICINE

## 2023-05-09 PROCEDURE — G8427 DOCREV CUR MEDS BY ELIG CLIN: HCPCS | Performed by: INTERNAL MEDICINE

## 2023-05-09 PROCEDURE — 99214 OFFICE O/P EST MOD 30 MIN: CPT | Performed by: INTERNAL MEDICINE

## 2023-05-09 PROCEDURE — 3017F COLORECTAL CA SCREEN DOC REV: CPT | Performed by: INTERNAL MEDICINE

## 2023-05-09 ASSESSMENT — ENCOUNTER SYMPTOMS
VOMITING: 0
COLOR CHANGE: 0
SHORTNESS OF BREATH: 0
CONSTIPATION: 0
BLOOD IN STOOL: 0
DIARRHEA: 0
CHEST TIGHTNESS: 0
ABDOMINAL PAIN: 0
NAUSEA: 0
ANAL BLEEDING: 0

## 2023-05-11 RX ORDER — AMLODIPINE BESYLATE 5 MG/1
TABLET ORAL
Qty: 90 TABLET | Refills: 3 | Status: SHIPPED | OUTPATIENT
Start: 2023-05-11

## 2023-05-12 ENCOUNTER — OFFICE VISIT (OUTPATIENT)
Age: 63
End: 2023-05-12

## 2023-05-12 DIAGNOSIS — M17.11 UNILATERAL PRIMARY OSTEOARTHRITIS, RIGHT KNEE: ICD-10-CM

## 2023-05-12 DIAGNOSIS — M17.12 UNILATERAL PRIMARY OSTEOARTHRITIS, LEFT KNEE: Primary | ICD-10-CM

## 2023-05-12 RX ORDER — HYALURONATE SODIUM 10 MG/ML
20 SYRINGE (ML) INTRAARTICULAR ONCE
Status: COMPLETED | OUTPATIENT
Start: 2023-05-12 | End: 2023-05-12

## 2023-05-12 RX ADMIN — Medication 20 MG: at 09:15

## 2023-05-12 RX ADMIN — Medication 20 MG: at 09:14

## 2023-05-16 ENCOUNTER — ANTI-COAG VISIT (OUTPATIENT)
Age: 63
End: 2023-05-16
Payer: MEDICARE

## 2023-05-16 DIAGNOSIS — Z79.01 LONG TERM CURRENT USE OF ANTICOAGULANT THERAPY: Primary | ICD-10-CM

## 2023-05-16 DIAGNOSIS — I48.91 ATRIAL FIBRILLATION, UNSPECIFIED TYPE (HCC): ICD-10-CM

## 2023-05-16 LAB
POC INR: 2.2
PROTHROMBIN TIME, POC: 26.9

## 2023-05-16 PROCEDURE — 85610 PROTHROMBIN TIME: CPT | Performed by: NURSE PRACTITIONER

## 2023-05-16 NOTE — PROGRESS NOTES
The INR is stable and therapeutic. Continue Coumadin 5mg daily except 2.5mg on Tues-Thurs-Sat.   Recheck INR in 4 weeks with appt

## 2023-06-07 ENCOUNTER — TELEMEDICINE (OUTPATIENT)
Age: 63
End: 2023-06-07
Payer: MEDICARE

## 2023-06-07 DIAGNOSIS — J45.20 MILD INTERMITTENT ASTHMA WITHOUT COMPLICATION: ICD-10-CM

## 2023-06-07 DIAGNOSIS — J01.90 ACUTE SINUSITIS, RECURRENCE NOT SPECIFIED, UNSPECIFIED LOCATION: ICD-10-CM

## 2023-06-07 DIAGNOSIS — J30.1 ALLERGIC RHINITIS DUE TO POLLEN, UNSPECIFIED SEASONALITY: ICD-10-CM

## 2023-06-07 DIAGNOSIS — J20.9 ACUTE BRONCHITIS, UNSPECIFIED ORGANISM: Primary | ICD-10-CM

## 2023-06-07 PROCEDURE — 3017F COLORECTAL CA SCREEN DOC REV: CPT | Performed by: FAMILY MEDICINE

## 2023-06-07 PROCEDURE — G8427 DOCREV CUR MEDS BY ELIG CLIN: HCPCS | Performed by: FAMILY MEDICINE

## 2023-06-07 PROCEDURE — 99214 OFFICE O/P EST MOD 30 MIN: CPT | Performed by: FAMILY MEDICINE

## 2023-06-07 RX ORDER — AZITHROMYCIN 250 MG/1
250 TABLET, FILM COATED ORAL SEE ADMIN INSTRUCTIONS
Qty: 6 TABLET | Refills: 0 | Status: SHIPPED | OUTPATIENT
Start: 2023-06-07 | End: 2023-06-12

## 2023-06-07 RX ORDER — HYDROCODONE POLISTIREX AND CHLORPHENIRAMINE POLISTIREX 10; 8 MG/5ML; MG/5ML
5 SUSPENSION, EXTENDED RELEASE ORAL EVERY 12 HOURS PRN
Qty: 100 ML | Refills: 0 | Status: SHIPPED | OUTPATIENT
Start: 2023-06-07 | End: 2023-06-17

## 2023-06-13 RX ORDER — ATORVASTATIN CALCIUM 40 MG/1
40 TABLET, FILM COATED ORAL DAILY
Qty: 90 TABLET | Refills: 3 | Status: SHIPPED | OUTPATIENT
Start: 2023-06-13

## 2023-06-13 RX ORDER — POTASSIUM CHLORIDE 20 MEQ/1
20 TABLET, EXTENDED RELEASE ORAL DAILY
Qty: 90 TABLET | Refills: 1 | Status: SHIPPED | OUTPATIENT
Start: 2023-06-13 | End: 2023-07-10 | Stop reason: SDUPTHER

## 2023-06-30 ENCOUNTER — ANTI-COAG VISIT (OUTPATIENT)
Age: 63
End: 2023-06-30
Payer: MEDICARE

## 2023-06-30 DIAGNOSIS — Z79.01 LONG TERM CURRENT USE OF ANTICOAGULANT THERAPY: Primary | ICD-10-CM

## 2023-06-30 DIAGNOSIS — I48.91 ATRIAL FIBRILLATION, UNSPECIFIED TYPE (HCC): ICD-10-CM

## 2023-06-30 LAB
POC INR: 1.9
PROTHROMBIN TIME, POC: 22.6

## 2023-06-30 PROCEDURE — 85610 PROTHROMBIN TIME: CPT | Performed by: NURSE PRACTITIONER

## 2023-07-05 RX ORDER — POTASSIUM CHLORIDE 1500 MG/1
TABLET, EXTENDED RELEASE ORAL
Qty: 30 TABLET | Refills: 6 | OUTPATIENT
Start: 2023-07-05

## 2023-07-06 ENCOUNTER — TELEPHONE (OUTPATIENT)
Age: 63
End: 2023-07-06

## 2023-07-06 NOTE — TELEPHONE ENCOUNTER
Spoke with Mrs. Madalyn Medina whom has been scheduled for a virtual on August 03,2023 @ 11:45 am with Dr. Gonzalez Hernández

## 2023-07-06 NOTE — TELEPHONE ENCOUNTER
----- Message from [place] sent at 7/6/2023  9:35 AM EDT -----  Subject: Appointment Request    Reason for Call: Established Patient Appointment needed: Routine Existing   Condition Follow Up    QUESTIONS    Reason for appointment request? Available appointments did not meet   patient need     Additional Information for Provider? PT called to reschedule 3 mo f/u as a   virtual visit for next Thursday.  Please f/u.  ---------------------------------------------------------------------------  --------------  Jay Jay HAGRE  [phone]; OK to leave message on voicemail  ---------------------------------------------------------------------------  --------------  SCRIPT ANSWERS

## 2023-07-07 RX ORDER — POTASSIUM CHLORIDE 1500 MG/1
TABLET, EXTENDED RELEASE ORAL
Qty: 30 TABLET | Refills: 6 | OUTPATIENT
Start: 2023-07-07

## 2023-07-10 ENCOUNTER — TELEPHONE (OUTPATIENT)
Age: 63
End: 2023-07-10

## 2023-07-10 RX ORDER — CYCLOBENZAPRINE HCL 10 MG
10 TABLET ORAL 3 TIMES DAILY PRN
Qty: 30 TABLET | Refills: 0 | Status: SHIPPED | OUTPATIENT
Start: 2023-07-10

## 2023-07-10 NOTE — TELEPHONE ENCOUNTER
This patient contacted office for the following prescriptions to be filled:    Medication requested : Cyclobenzaprine 10 mg  PCP: Tello HUI [facility] Street or Print[facility]  Mail [facility] or Local pharmacy [address] [facility] [phone]    Scheduled appointment if not seen by current providers in office: lov 6/7/2023 fu 8/3/2023    Pt states she is currently having pain down the left side of her body

## 2023-07-10 NOTE — TELEPHONE ENCOUNTER
Spoke with DR. Nguyen and verbal order given to approve patient refill request on Flexeril.    Patient aware that [facility] was [facility]

## 2023-07-11 RX ORDER — CYCLOBENZAPRINE HCL 10 MG
TABLET ORAL
Qty: 30 TABLET | Refills: 0 | OUTPATIENT
Start: 2023-07-11

## 2023-07-11 RX ORDER — POTASSIUM CHLORIDE 20 MEQ/1
20 TABLET, EXTENDED RELEASE ORAL DAILY
Qty: 90 TABLET | Refills: 1 | Status: SHIPPED | OUTPATIENT
Start: 2023-07-11

## 2023-07-13 NOTE — DISCHARGE INSTRUCTIONS
I would like to continue him on the 20mg in the morning and we can try a 10mg of instant release in the afternoon to see if this would cover him for the rest of his shift. Script was sent in. Please make patient aware. He would then not need to take this smaller dose on days he does not work.    Upper Respiratory Infection (Cold): Care Instructions  Your Care Instructions    An upper respiratory infection, or URI, is an infection of the nose, sinuses, or throat. URIs are spread by coughs, sneezes, and direct contact. The common cold is the most frequent kind of URI. The flu and sinus infections are other kinds of URIs. Almost all URIs are caused by viruses. Antibiotics won't cure them. But you can treat most infections with home care. This may include drinking lots of fluids and taking over-the-counter pain medicine. You will probably feel better in 4 to 10 days. The doctor has checked you carefully, but problems can develop later. If you notice any problems or new symptoms, get medical treatment right away. Follow-up care is a key part of your treatment and safety. Be sure to make and go to all appointments, and call your doctor if you are having problems. It's also a good idea to know your test results and keep a list of the medicines you take. How can you care for yourself at home? · To prevent dehydration, drink plenty of fluids, enough so that your urine is light yellow or clear like water. Choose water and other caffeine-free clear liquids until you feel better. If you have kidney, heart, or liver disease and have to limit fluids, talk with your doctor before you increase the amount of fluids you drink. · Take an over-the-counter pain medicine, such as acetaminophen (Tylenol), ibuprofen (Advil, Motrin), or naproxen (Aleve). Read and follow all instructions on the label. · Before you use cough and cold medicines, check the label. These medicines may not be safe for young children or for people with certain health problems. · Be careful when taking over-the-counter cold or flu medicines and Tylenol at the same time. Many of these medicines have acetaminophen, which is Tylenol. Read the labels to make sure that you are not taking more than the recommended dose.  Too much acetaminophen (Tylenol) can be harmful. · Get plenty of rest.  · Do not smoke or allow others to smoke around you. If you need help quitting, talk to your doctor about stop-smoking programs and medicines. These can increase your chances of quitting for good. When should you call for help? Call 911 anytime you think you may need emergency care. For example, call if:  · You have severe trouble breathing. Call your doctor now or seek immediate medical care if:  · You seem to be getting much sicker. · You have new or worse trouble breathing. · You have a new or higher fever. · You have a new rash. Watch closely for changes in your health, and be sure to contact your doctor if:  · You have a new symptom, such as a sore throat, an earache, or sinus pain. · You cough more deeply or more often, especially if you notice more mucus or a change in the color of your mucus. · You do not get better as expected. Where can you learn more? Go to http://greyson-andrea.info/. Enter E954 in the search box to learn more about \"Upper Respiratory Infection (Cold): Care Instructions. \"  Current as of: June 30, 2016  Content Version: 11.1  © 2608-1749 [employer]. Care instructions adapted under license by [employer] (which disclaims liability or warranty for this information). If you have questions about a medical condition or this instruction, always ask your healthcare professional. [place] [address] any warranty or liability for your use of this information.

## 2023-07-20 ENCOUNTER — OFFICE VISIT (OUTPATIENT)
Age: 63
End: 2023-07-20
Payer: MEDICARE

## 2023-07-20 VITALS
TEMPERATURE: 97.8 F | SYSTOLIC BLOOD PRESSURE: 134 MMHG | DIASTOLIC BLOOD PRESSURE: 66 MMHG | RESPIRATION RATE: 16 BRPM | WEIGHT: 190 LBS | OXYGEN SATURATION: 95 % | BODY MASS INDEX: 29.82 KG/M2 | HEIGHT: 67 IN | HEART RATE: 78 BPM

## 2023-07-20 DIAGNOSIS — J98.4 RESTRICTIVE LUNG DISEASE: ICD-10-CM

## 2023-07-20 DIAGNOSIS — J45.40 MODERATE PERSISTENT ASTHMA, UNCOMPLICATED: Primary | ICD-10-CM

## 2023-07-20 DIAGNOSIS — J30.1 ALLERGIC RHINITIS DUE TO POLLEN, UNSPECIFIED SEASONALITY: ICD-10-CM

## 2023-07-20 PROCEDURE — 1090F PRES/ABSN URINE INCON ASSESS: CPT | Performed by: INTERNAL MEDICINE

## 2023-07-20 PROCEDURE — 3017F COLORECTAL CA SCREEN DOC REV: CPT | Performed by: INTERNAL MEDICINE

## 2023-07-20 PROCEDURE — 1123F ACP DISCUSS/DSCN MKR DOCD: CPT | Performed by: INTERNAL MEDICINE

## 2023-07-20 PROCEDURE — G8417 CALC BMI ABV UP PARAM F/U: HCPCS | Performed by: INTERNAL MEDICINE

## 2023-07-20 PROCEDURE — 1036F TOBACCO NON-USER: CPT | Performed by: INTERNAL MEDICINE

## 2023-07-20 PROCEDURE — 3078F DIAST BP <80 MM HG: CPT | Performed by: INTERNAL MEDICINE

## 2023-07-20 PROCEDURE — 99213 OFFICE O/P EST LOW 20 MIN: CPT | Performed by: INTERNAL MEDICINE

## 2023-07-20 PROCEDURE — G8400 PT W/DXA NO RESULTS DOC: HCPCS | Performed by: INTERNAL MEDICINE

## 2023-07-20 PROCEDURE — G8428 CUR MEDS NOT DOCUMENT: HCPCS | Performed by: INTERNAL MEDICINE

## 2023-07-20 PROCEDURE — 3074F SYST BP LT 130 MM HG: CPT | Performed by: INTERNAL MEDICINE

## 2023-07-20 RX ORDER — BUDESONIDE AND FORMOTEROL FUMARATE DIHYDRATE 160; 4.5 UG/1; UG/1
2 AEROSOL RESPIRATORY (INHALATION) 2 TIMES DAILY
Qty: 10.2 G | Refills: 3 | Status: SHIPPED | OUTPATIENT
Start: 2023-07-20

## 2023-07-20 RX ORDER — ALBUTEROL SULFATE 90 UG/1
AEROSOL, METERED RESPIRATORY (INHALATION)
Qty: 18 G | Refills: 4 | Status: SHIPPED | OUTPATIENT
Start: 2023-07-20

## 2023-07-20 RX ORDER — MONTELUKAST SODIUM 10 MG/1
10 TABLET ORAL DAILY
Qty: 90 TABLET | Refills: 3 | Status: SHIPPED | OUTPATIENT
Start: 2023-07-20

## 2023-07-20 RX ORDER — ZAFIRLUKAST 20 MG/1
20 TABLET, FILM COATED ORAL 2 TIMES DAILY
Qty: 60 TABLET | Refills: 5 | Status: SHIPPED | OUTPATIENT
Start: 2023-07-20 | End: 2023-07-20 | Stop reason: ALTCHOICE

## 2023-07-20 NOTE — PROGRESS NOTES
of children: None    Years of education: None    Highest education level: None   Tobacco Use    Smoking status: Former     Packs/day: 0.50     Years: 26.00     Pack years: 13.00     Types: Cigarettes     Quit date: 1/1/2008     Years since quitting: 15.5     Passive exposure: Never    Smokeless tobacco: Never   Vaping Use    Vaping Use: Never used   Substance and Sexual Activity    Alcohol use: Not Currently     Alcohol/week: 1.0 standard drink    Drug use: Never    Sexual activity: Yes     Partners: Male     Birth control/protection: None     Social Determinants of Health     Financial Resource Strain: Low Risk     Difficulty of Paying Living Expenses: Not hard at all   Food Insecurity: No Food Insecurity    Worried About Running Out of Food in the Last Year: Never true    Ran Out of Food in the Last Year: Never true   Transportation Needs: Unknown    Lack of Transportation (Non-Medical): No   Housing Stability: Unknown    Unstable Housing in the Last Year: No        Family History   Problem Relation Age of Onset    Hypertension Sister     Heart Surgery Father         Open-Heart Surgery    Other Father         Venous Thromboembolism    Hypertension Father     Heart Disease Mother         Enlarged Heart    Hypertension Mother     Heart Disease Paternal Grandmother     Diabetes Brother     Hypertension Sister     Diabetes Sister     Diabetes Mother     Diabetes Sister         Allergies   Allergen Reactions    Acetaminophen Itching    Hydrocodone Itching    Morphine      Other reaction(s): rash/itching    Penicillin G Hives     Per Patient Not allergic        Current Outpatient Medications   Medication Sig Dispense Refill    budesonide-formoterol (SYMBICORT) 160-4.5 MCG/ACT AERO Inhale 2 puffs into the lungs 2 times daily 10.2 g 3    montelukast (SINGULAIR) 10 MG tablet Take 1 tablet by mouth daily 90 tablet 3    albuterol sulfate HFA (PROVENTIL;VENTOLIN;PROAIR) 108 (90 Base) MCG/ACT inhaler INHALE 2 PUFFS BY MOUTH

## 2023-07-20 NOTE — PATIENT INSTRUCTIONS
What is the plan?  -Start on Symbicort as prescribed - rinse mouth after each use  -Continue Albuterol rescue inhaler - use only as needed  -STOP Advair     -Continue Singulair once daily    -Continue to avoid triggers  -Increase activity as much as able

## 2023-07-21 ENCOUNTER — ANTI-COAG VISIT (OUTPATIENT)
Age: 63
End: 2023-07-21
Payer: MEDICARE

## 2023-07-21 DIAGNOSIS — Z79.01 LONG TERM CURRENT USE OF ANTICOAGULANT THERAPY: Primary | ICD-10-CM

## 2023-07-21 DIAGNOSIS — I48.91 ATRIAL FIBRILLATION, UNSPECIFIED TYPE (HCC): ICD-10-CM

## 2023-07-21 LAB
POC INR: 2.4
PROTHROMBIN TIME, POC: 28.7

## 2023-07-21 PROCEDURE — 85610 PROTHROMBIN TIME: CPT | Performed by: NURSE PRACTITIONER

## 2023-07-24 ENCOUNTER — TELEPHONE (OUTPATIENT)
Age: 63
End: 2023-07-24

## 2023-07-24 NOTE — TELEPHONE ENCOUNTER
Pt would like to speak nurse in regards to changing her Symbicort d/t it being expensive.  For further information please call [phone]

## 2023-07-26 ENCOUNTER — TELEPHONE (OUTPATIENT)
Age: 63
End: 2023-07-26

## 2023-07-26 NOTE — TELEPHONE ENCOUNTER
Pt is wanting a cheaper inhaler, she states she can't afford the Symbicort or Advair. Please call [phone].

## 2023-07-26 NOTE — TELEPHONE ENCOUNTER
Patient had questions re Symbicort vs Advair once she gets insurance next month. Both are covered by [employer].  She will need to get insurance and then decide which one will be cheaper

## 2023-08-01 ENCOUNTER — TELEPHONE (OUTPATIENT)
Age: 63
End: 2023-08-01

## 2023-08-01 RX ORDER — FLUTICASONE PROPIONATE AND SALMETEROL 250; 50 UG/1; UG/1
1 POWDER RESPIRATORY (INHALATION) EVERY 12 HOURS
Qty: 60 EACH | Refills: 3 | Status: SHIPPED | OUTPATIENT
Start: 2023-08-01

## 2023-08-03 ENCOUNTER — OFFICE VISIT (OUTPATIENT)
Age: 63
End: 2023-08-03
Payer: MEDICARE

## 2023-08-03 VITALS
OXYGEN SATURATION: 98 % | TEMPERATURE: 98.3 F | HEIGHT: 67 IN | SYSTOLIC BLOOD PRESSURE: 130 MMHG | WEIGHT: 191 LBS | DIASTOLIC BLOOD PRESSURE: 68 MMHG | RESPIRATION RATE: 14 BRPM | BODY MASS INDEX: 29.98 KG/M2 | HEART RATE: 83 BPM

## 2023-08-03 DIAGNOSIS — I25.10 CORONARY ARTERY DISEASE INVOLVING NATIVE CORONARY ARTERY OF NATIVE HEART WITHOUT ANGINA PECTORIS: ICD-10-CM

## 2023-08-03 DIAGNOSIS — E78.00 PURE HYPERCHOLESTEROLEMIA: ICD-10-CM

## 2023-08-03 DIAGNOSIS — I10 ESSENTIAL HYPERTENSION: Primary | ICD-10-CM

## 2023-08-03 DIAGNOSIS — F33.9 RECURRENT DEPRESSION (HCC): ICD-10-CM

## 2023-08-03 DIAGNOSIS — I48.91 ATRIAL FIBRILLATION, UNSPECIFIED TYPE (HCC): ICD-10-CM

## 2023-08-03 DIAGNOSIS — Z78.0 POST-MENOPAUSAL: ICD-10-CM

## 2023-08-03 DIAGNOSIS — Z91.81 AT HIGH RISK FOR FALLS: ICD-10-CM

## 2023-08-03 DIAGNOSIS — I50.32 CHRONIC DIASTOLIC CONGESTIVE HEART FAILURE (HCC): ICD-10-CM

## 2023-08-03 DIAGNOSIS — D63.8 ANEMIA OF CHRONIC DISEASE: ICD-10-CM

## 2023-08-03 DIAGNOSIS — F41.9 ANXIETY: ICD-10-CM

## 2023-08-03 DIAGNOSIS — M62.830 BACK SPASM: ICD-10-CM

## 2023-08-03 PROCEDURE — G8427 DOCREV CUR MEDS BY ELIG CLIN: HCPCS | Performed by: FAMILY MEDICINE

## 2023-08-03 PROCEDURE — G8417 CALC BMI ABV UP PARAM F/U: HCPCS | Performed by: FAMILY MEDICINE

## 2023-08-03 PROCEDURE — 3078F DIAST BP <80 MM HG: CPT | Performed by: FAMILY MEDICINE

## 2023-08-03 PROCEDURE — 3017F COLORECTAL CA SCREEN DOC REV: CPT | Performed by: FAMILY MEDICINE

## 2023-08-03 PROCEDURE — 99214 OFFICE O/P EST MOD 30 MIN: CPT | Performed by: FAMILY MEDICINE

## 2023-08-03 PROCEDURE — 3075F SYST BP GE 130 - 139MM HG: CPT | Performed by: FAMILY MEDICINE

## 2023-08-03 PROCEDURE — 1036F TOBACCO NON-USER: CPT | Performed by: FAMILY MEDICINE

## 2023-08-03 PROCEDURE — 1090F PRES/ABSN URINE INCON ASSESS: CPT | Performed by: FAMILY MEDICINE

## 2023-08-03 PROCEDURE — 1123F ACP DISCUSS/DSCN MKR DOCD: CPT | Performed by: FAMILY MEDICINE

## 2023-08-03 PROCEDURE — G8400 PT W/DXA NO RESULTS DOC: HCPCS | Performed by: FAMILY MEDICINE

## 2023-08-03 RX ORDER — CYCLOBENZAPRINE HCL 10 MG
10 TABLET ORAL NIGHTLY PRN
Qty: 30 TABLET | Refills: 0 | Status: SHIPPED | OUTPATIENT
Start: 2023-08-03

## 2023-08-03 NOTE — PROGRESS NOTES
1. \"Have you been to the ER, urgent care clinic since your last visit? Hospitalized since your last visit? \" No    2. \"Have you seen or consulted any other health care providers outside of the [address] [place] [address] since your last visit? \" No     3. For patients aged 43-73: Has the patient had a colonoscopy / FIT/ Cologuard? Yes - no Care Gap present FIT 12/27/22      If the patient is female:    4. For patients aged 43-66: Has the patient had a mammogram within the past 2 years? Yes - no Care Gap present 1/05/23    5. For patients aged 21-65: Has the patient had a pap smear?  No - hysterectomy
1. \"Have you been to the ER, urgent care clinic since your last visit? Hospitalized since your last visit? \" No    2. \"Have you seen or consulted any other health care providers outside of the [address] [place] [address] since your last visit? \" No     3. For patients aged 43-73: Has the patient had a colonoscopy / FIT/ Cologuard? Yes - no Care Gap present FIT 12/27/22      If the patient is female:    4. For patients aged 43-66: Has the patient had a mammogram within the past 2 years? Yes - no Care Gap present 1/05/23    5. For patients aged 21-65: Has the patient had a pap smear?  No - hysterectomy
and affect normal    CMP:   Lab Results   Component Value Date/Time     04/18/2023 10:00 AM    K 3.9 04/18/2023 10:00 AM     04/18/2023 10:00 AM    CO2 29 04/18/2023 10:00 AM    BUN 14 04/18/2023 10:00 AM    CREATININE 0.84 04/18/2023 10:00 AM    GLUCOSE 98 04/18/2023 10:00 AM    CALCIUM 9.1 04/18/2023 10:00 AM    PROT 7.7 04/18/2023 10:00 AM    LABALBU 3.6 04/18/2023 10:00 AM    BILITOT 0.5 04/18/2023 10:00 AM    AST 27 04/18/2023 10:00 AM    ALT 24 04/18/2023 10:00 AM        CBC:   Lab Results   Component Value Date/Time    WBC 5.3 04/18/2023 10:00 AM    RBC 4.25 04/18/2023 10:00 AM    HGB 11.1 04/18/2023 10:00 AM    HCT 35.6 04/18/2023 10:00 AM    MCV 83.8 04/18/2023 10:00 AM    MCH 26.1 04/18/2023 10:00 AM    MCHC 31.2 04/18/2023 10:00 AM    RDW 13.9 04/18/2023 10:00 AM     04/18/2023 10:00 AM    MPV 12.5 04/18/2023 10:00 AM        Lipids   Lab Results   Component Value Date/Time    CHOL 140 03/20/2023 09:18 AM    TRIG 109 03/20/2023 09:18 AM    LDLCALC 48.2 03/20/2023 09:18 AM    LABVLDL 21.8 03/20/2023 09:18 AM    CHOLHDLRATIO 2.0 03/20/2023 09:18 AM         Imaging results last 24 hrs :No results found. Imaging results impression onlyNo results found.    DEXA BONE DENSITY AXIAL SKELETON    (Results Pending)       A1c:   Hemoglobin A1C   Date Value Ref Range Status   12/30/2022 5.2 4.2 - 5.6 % Final     Comment:     (NOTE)  HbA1C Interpretive Ranges  <5.7              Normal  5.7 - 6.4         Consider Prediabetes  >6.5              Consider Diabetes     04/01/2022 4.9 4.2 - 5.6 % Final     Comment:     (NOTE)  HbA1C Interpretive Ranges  <5.7              Normal  5.7 - 6.4         Consider Prediabetes  >6.5              Consider Diabetes     05/10/2021 5.0 4.2 - 5.6 % Final     Comment:     (NOTE)  HbA1C Interpretive Ranges  <5.7              Normal  5.7 - 6.4         Consider Prediabetes  >6.5              Consider Diabetes       ASSESSMENT/PLAN  Diagnoses and all orders for this

## 2023-08-04 ENCOUNTER — TELEPHONE (OUTPATIENT)
Age: 63
End: 2023-08-04

## 2023-08-04 NOTE — TELEPHONE ENCOUNTER
Patient is stating her current [facility] [employer] [facility] does not cover her current inhaler. She is requesting something else.   Pt phone #[phone]

## 2023-08-04 NOTE — TELEPHONE ENCOUNTER
Patient states she doesn't think [employer] covers any inhalers except her rescue inhaler. Advised they do cover and she was given rx for Advair which is on formulary. She will call pharmacy. She states with her a rx card the cost would be $80.  She will check with her insurance to see if she is in donut hole

## 2023-08-15 RX ORDER — POTASSIUM CHLORIDE 20 MEQ/1
20 TABLET, EXTENDED RELEASE ORAL DAILY
Qty: 90 TABLET | Refills: 1 | Status: SHIPPED | OUTPATIENT
Start: 2023-08-15

## 2023-08-15 RX ORDER — CYCLOBENZAPRINE HCL 10 MG
10 TABLET ORAL NIGHTLY PRN
Qty: 30 TABLET | Refills: 0 | Status: SHIPPED | OUTPATIENT
Start: 2023-08-15

## 2023-08-22 ENCOUNTER — ANTI-COAG VISIT (OUTPATIENT)
Age: 63
End: 2023-08-22
Payer: MEDICARE

## 2023-08-22 DIAGNOSIS — Z79.01 LONG TERM CURRENT USE OF ANTICOAGULANT THERAPY: Primary | ICD-10-CM

## 2023-08-22 DIAGNOSIS — I48.91 ATRIAL FIBRILLATION, UNSPECIFIED TYPE (HCC): ICD-10-CM

## 2023-08-22 LAB
POC INR: 2.6
PROTHROMBIN TIME, POC: 31.8

## 2023-08-22 PROCEDURE — 85610 PROTHROMBIN TIME: CPT | Performed by: NURSE PRACTITIONER

## 2023-08-22 NOTE — PROGRESS NOTES
The INR is stable and therapeutic. Continue Coumadin 5mg daily except 2.5mg on Tues-Thurs-Sat.    Recheck INR in 3.5 weeks with appt

## 2023-09-05 DIAGNOSIS — I11.0 HYPERTENSIVE HEART DISEASE WITH HEART FAILURE (HCC): ICD-10-CM

## 2023-09-06 RX ORDER — BUSPIRONE HYDROCHLORIDE 5 MG/1
TABLET ORAL
Qty: 60 TABLET | Refills: 5 | Status: SHIPPED | OUTPATIENT
Start: 2023-09-06

## 2023-09-06 RX ORDER — METOPROLOL SUCCINATE 100 MG/1
TABLET, EXTENDED RELEASE ORAL
Qty: 90 TABLET | Refills: 1 | Status: SHIPPED | OUTPATIENT
Start: 2023-09-06

## 2023-09-13 DIAGNOSIS — E78.5 HYPERLIPIDEMIA, UNSPECIFIED: ICD-10-CM

## 2023-09-13 RX ORDER — CYCLOBENZAPRINE HCL 10 MG
10 TABLET ORAL NIGHTLY PRN
Qty: 30 TABLET | Refills: 1 | Status: SHIPPED | OUTPATIENT
Start: 2023-09-13 | End: 2023-10-04 | Stop reason: SDUPTHER

## 2023-09-13 RX ORDER — WARFARIN SODIUM 5 MG/1
TABLET ORAL
Qty: 60 TABLET | Refills: 6 | Status: SHIPPED | OUTPATIENT
Start: 2023-09-13

## 2023-09-13 RX ORDER — ATORVASTATIN CALCIUM 40 MG/1
40 TABLET, FILM COATED ORAL DAILY
Qty: 90 TABLET | Refills: 2 | Status: SHIPPED | OUTPATIENT
Start: 2023-09-13

## 2023-09-14 ENCOUNTER — ANTI-COAG VISIT (OUTPATIENT)
Age: 63
End: 2023-09-14

## 2023-09-14 ENCOUNTER — TELEPHONE (OUTPATIENT)
Age: 63
End: 2023-09-14

## 2023-09-14 ENCOUNTER — OFFICE VISIT (OUTPATIENT)
Age: 63
End: 2023-09-14

## 2023-09-14 VITALS
OXYGEN SATURATION: 97 % | HEART RATE: 82 BPM | WEIGHT: 193 LBS | BODY MASS INDEX: 30.29 KG/M2 | HEIGHT: 67 IN | DIASTOLIC BLOOD PRESSURE: 68 MMHG | SYSTOLIC BLOOD PRESSURE: 124 MMHG

## 2023-09-14 DIAGNOSIS — Z79.01 LONG TERM CURRENT USE OF ANTICOAGULANT THERAPY: Primary | ICD-10-CM

## 2023-09-14 DIAGNOSIS — I05.0 RHEUMATIC MITRAL STENOSIS: Primary | ICD-10-CM

## 2023-09-14 DIAGNOSIS — I48.91 ATRIAL FIBRILLATION, UNSPECIFIED TYPE (HCC): ICD-10-CM

## 2023-09-14 LAB
POC INR: 2.4
PROTHROMBIN TIME, POC: 28.5

## 2023-09-14 ASSESSMENT — PATIENT HEALTH QUESTIONNAIRE - PHQ9
9. THOUGHTS THAT YOU WOULD BE BETTER OFF DEAD, OR OF HURTING YOURSELF: 0
6. FEELING BAD ABOUT YOURSELF - OR THAT YOU ARE A FAILURE OR HAVE LET YOURSELF OR YOUR FAMILY DOWN: 0
SUM OF ALL RESPONSES TO PHQ QUESTIONS 1-9: 0
SUM OF ALL RESPONSES TO PHQ QUESTIONS 1-9: 0
8. MOVING OR SPEAKING SO SLOWLY THAT OTHER PEOPLE COULD HAVE NOTICED. OR THE OPPOSITE, BEING SO FIGETY OR RESTLESS THAT YOU HAVE BEEN MOVING AROUND A LOT MORE THAN USUAL: 0
SUM OF ALL RESPONSES TO PHQ9 QUESTIONS 1 & 2: 0
SUM OF ALL RESPONSES TO PHQ QUESTIONS 1-9: 0
3. TROUBLE FALLING OR STAYING ASLEEP: 0
5. POOR APPETITE OR OVEREATING: 0
1. LITTLE INTEREST OR PLEASURE IN DOING THINGS: 0
SUM OF ALL RESPONSES TO PHQ QUESTIONS 1-9: 0
7. TROUBLE CONCENTRATING ON THINGS, SUCH AS READING THE NEWSPAPER OR WATCHING TELEVISION: 0
2. FEELING DOWN, DEPRESSED OR HOPELESS: 0
4. FEELING TIRED OR HAVING LITTLE ENERGY: 0
10. IF YOU CHECKED OFF ANY PROBLEMS, HOW DIFFICULT HAVE THESE PROBLEMS MADE IT FOR YOU TO DO YOUR WORK, TAKE CARE OF THINGS AT HOME, OR GET ALONG WITH OTHER PEOPLE: 0

## 2023-09-14 NOTE — PROGRESS NOTES
Verbal order and read back per Itz Barnhart MD    The INR is stable and therapeutic. Continue Coumadin 5mg daily except 2.5mg on Tues-Thurs-Sat. Recheck INR in 4 weeks    This has been fully explained to the patient, who indicates understanding.

## 2023-09-14 NOTE — TELEPHONE ENCOUNTER
Pt states that she is having coughing spells and she would like to know [facility]. She has tried the OTC meds and they are not working . The cough is keeping her up at night. Please advise.

## 2023-09-14 NOTE — PROGRESS NOTES
Maryan Mendenhall presents today for   Chief Complaint   Patient presents with    Follow-up     6 month       Maryan Mendenhall preferred language for health care discussion is english/other. Is someone accompanying this pt? o    Is the patient using any DME equipment during OV? no    Depression Screening:  Depression: Not at risk (4/6/2023)    PHQ-2     PHQ-2 Score: 1        Learning Assessment:  Who is the primary learner? Patient    What is the preferred language for health care of the primary learner? ENGLISH    How does the primary learner prefer to learn new concepts? DEMONSTRATION    Answered By patient    Relationship to Learner SELF           Pt currently taking Anticoagulant therapy? no    Pt currently taking Antiplatelet therapy ? Aspirin  warfarin      Coordination of Care:  1. Have you been to the ER, urgent care clinic since your last visit? Hospitalized since your last visit? no    2. Have you seen or consulted any other health care providers outside of the [address] [place] [address] since your last visit? Include any pap smears or colon screening.  no

## 2023-09-15 NOTE — TELEPHONE ENCOUNTER
Spoke with patient and she is aware of Dr. Carey Begun recommendations. I'm afraid I do not have alternative medications that your lung and allergy specialist have you on.    Inquire with allergist regarding other options for this chronic cough    Patient verbalizes understanding

## 2023-09-18 ENCOUNTER — TELEPHONE (OUTPATIENT)
Age: 63
End: 2023-09-18

## 2023-09-18 NOTE — TELEPHONE ENCOUNTER
Patient states she started with the cough Saturday 2 days ago. She is only using the Delsym at night which it is helping. When she coughs during the day she is using her rescue inhaler but no Delsym. Patient advised ok to also us Delsym during the day and cough drops. Patient agrees with plan. If sxs worsens she will call back. She also does not feel it is COVID.  Advised it the cough gets worse that is something she would need to be tested for

## 2023-09-18 NOTE — TELEPHONE ENCOUNTER
Coughing since Monday. Coughing up phlegm. Coughing hard. Patient is taking Delysm DM    For further information call patient at [phone]    [facility]

## 2023-09-28 NOTE — PROGRESS NOTES
Yordan Richardson is in the office today for cardiovascular reevaluation of her mitral valve disease. She was followed by Dr. Luis Whiteside for the last 16 - 17 years. She was admitted in 2005 to [facility] with congestive heart failure. An echocardiogram at that time demonstrated mild concentric left ventricular hypertrophy  with an ejection fraction of 55%. She had mild to moderate mitral stenosis and moderate mitral regurgitation. She was not seen again until 2012 at which time she had advancing mitral stenosis and ultimately underwent a cardiac catheterization which demonstrated moderate to severe mitral stenosis with moderate mitral regurgitation, mild aortic insufficiency and  mild coronary artery disease. She  was referred for surgery which she had on 5/22/13, which consisted of a mitral valve replacement with a 25/23 mm On-X mechanical valve together with bilateral pulmonary vein isolation and resection of the left atrial appendage. She had echocardiogram done on 1/15/2021. Her EF was 55 to 60%. Prosthetic mitral valve mean gradient was 4.1 mmHg. Pulmonary artery systolic pressure was estimated at 34 mmHg. She had mild to moderate AI. In the office today, she reports that she is tired \"all the time\". Occasionally feels a type of \"pinch \"above her left breast.  She has had no shortness of breath. Her breathing is about the same. Her echocardiogram was repeated on 1/12/2023. This was 50 to 55%. Ventricular size was normal.  Mildly increased wall thickness. Her mechanical mitral valve is well-seated. Patient gradient was 32 mmHg. Mean gradient is 4 mmHg. There was moderate AI. Plan;      Her blood pressure is controlled in the office today at 124/68. Her present cardiac medications include  the metoprolol succinate 100 mg a day, losartan 100 mg a day and amlodipine 5 mg daily.   She is also on furosemide 60 mg a day and potassium supplementation with K-Phuong con 20 mEq

## 2023-10-04 DIAGNOSIS — I11.0 HYPERTENSIVE HEART DISEASE WITH HEART FAILURE (HCC): ICD-10-CM

## 2023-10-04 RX ORDER — LOSARTAN POTASSIUM 100 MG/1
100 TABLET ORAL DAILY
Qty: 90 TABLET | Refills: 3 | Status: SHIPPED | OUTPATIENT
Start: 2023-10-04

## 2023-10-04 RX ORDER — FUROSEMIDE 40 MG/1
60 TABLET ORAL DAILY
Qty: 135 TABLET | Refills: 3 | Status: SHIPPED | OUTPATIENT
Start: 2023-10-04

## 2023-10-06 ENCOUNTER — ANTI-COAG VISIT (OUTPATIENT)
Age: 63
End: 2023-10-06
Payer: MEDICARE

## 2023-10-06 DIAGNOSIS — I48.91 ATRIAL FIBRILLATION, UNSPECIFIED TYPE (HCC): ICD-10-CM

## 2023-10-06 DIAGNOSIS — Z79.01 LONG TERM CURRENT USE OF ANTICOAGULANT THERAPY: Primary | ICD-10-CM

## 2023-10-06 LAB
POC INR: 2.4
PROTHROMBIN TIME, POC: 28.4

## 2023-10-06 PROCEDURE — 85610 PROTHROMBIN TIME: CPT | Performed by: NURSE PRACTITIONER

## 2023-10-09 ENCOUNTER — HOSPITAL ENCOUNTER (OUTPATIENT)
Facility: HOSPITAL | Age: 63
Discharge: HOME | End: 2023-10-12
Attending: FAMILY MEDICINE
Payer: MEDICARE

## 2023-10-09 DIAGNOSIS — Z78.0 POST-MENOPAUSAL: ICD-10-CM

## 2023-10-09 LAB
ALBUMIN SERPL-MCNC: 4 G/DL (ref 3.4–5)
ALBUMIN/GLOB SERPL: 0.8 (ref 0.8–1.7)
ALP SERPL-CCNC: 111 U/L (ref 45–117)
ALT SERPL-CCNC: 33 U/L (ref 13–56)
ANION GAP SERPL CALC-SCNC: 6 MMOL/L (ref 3–18)
AST SERPL-CCNC: 35 U/L (ref 10–38)
BILIRUB SERPL-MCNC: 0.6 MG/DL (ref 0.2–1)
BUN SERPL-MCNC: 14 MG/DL (ref 7–18)
BUN/CREAT SERPL: 15 (ref 12–20)
CALCIUM SERPL-MCNC: 9.3 MG/DL (ref 8.5–10.1)
CHLORIDE SERPL-SCNC: 100 MMOL/L (ref 100–111)
CO2 SERPL-SCNC: 30 MMOL/L (ref 21–32)
CREAT SERPL-MCNC: 0.95 MG/DL (ref 0.6–1.3)
GLOBULIN SER CALC-MCNC: 4.9 G/DL (ref 2–4)
GLUCOSE SERPL-MCNC: 80 MG/DL (ref 74–99)
POTASSIUM SERPL-SCNC: 3.7 MMOL/L (ref 3.5–5.5)
PROT SERPL-MCNC: 8.9 G/DL (ref 6.4–8.2)
SODIUM SERPL-SCNC: 136 MMOL/L (ref 136–145)

## 2023-10-09 PROCEDURE — 36415 COLL VENOUS BLD VENIPUNCTURE: CPT

## 2023-10-09 PROCEDURE — 80053 COMPREHEN METABOLIC PANEL: CPT

## 2023-10-09 PROCEDURE — 77080 DXA BONE DENSITY AXIAL: CPT

## 2023-10-09 RX ORDER — LEVOCETIRIZINE DIHYDROCHLORIDE 5 MG/1
5 TABLET, FILM COATED ORAL DAILY
Qty: 30 TABLET | Refills: 11 | Status: SHIPPED | OUTPATIENT
Start: 2023-10-09

## 2023-10-09 RX ORDER — CYCLOBENZAPRINE HCL 10 MG
10 TABLET ORAL NIGHTLY PRN
Qty: 30 TABLET | Refills: 1 | Status: SHIPPED | OUTPATIENT
Start: 2023-10-09

## 2023-10-09 RX ORDER — SERTRALINE HYDROCHLORIDE 100 MG/1
100 TABLET, FILM COATED ORAL DAILY
Qty: 90 TABLET | Refills: 1 | Status: SHIPPED | OUTPATIENT
Start: 2023-10-09

## 2023-11-09 ENCOUNTER — OFFICE VISIT (OUTPATIENT)
Age: 63
End: 2023-11-09

## 2023-11-09 VITALS — BODY MASS INDEX: 30.29 KG/M2 | HEIGHT: 67 IN | WEIGHT: 193 LBS

## 2023-11-09 DIAGNOSIS — M25.552 LEFT HIP PAIN: ICD-10-CM

## 2023-11-09 DIAGNOSIS — M17.11 PRIMARY OSTEOARTHRITIS OF RIGHT KNEE: Primary | ICD-10-CM

## 2023-11-09 DIAGNOSIS — M17.12 PRIMARY OSTEOARTHRITIS OF LEFT KNEE: ICD-10-CM

## 2023-11-09 DIAGNOSIS — G89.29 CHRONIC PAIN OF LEFT KNEE: ICD-10-CM

## 2023-11-09 DIAGNOSIS — M25.562 CHRONIC PAIN OF LEFT KNEE: ICD-10-CM

## 2023-11-09 DIAGNOSIS — M16.12 PRIMARY OSTEOARTHRITIS OF LEFT HIP: ICD-10-CM

## 2023-11-09 NOTE — PROGRESS NOTES
which is changed from previous we will move forth an MRI of the left knee for further evaluation. We will see her back after the MRI for review. In the meantime we will get a series of viscosupplementation preapproved for her left knee. Depending on her symptomatology upon her return may obtain an x-ray of her lumbar spine. Care plan outlined and precautions discussed. Radiographs and Results were reviewed with the patient. Medications were reviewed with the patient. All of pt's questions and concerns were addressed. Increasing symptoms and return precautions associated with chief complaint and evaluation were reviewed with the patient in detail. The patient demonstrated adequate understanding.               JR Mitul PEDRO, DESTINY, ATC

## 2023-11-10 RX ORDER — LOSARTAN POTASSIUM 100 MG/1
100 TABLET ORAL DAILY
Qty: 90 TABLET | Refills: 3 | Status: SHIPPED | OUTPATIENT
Start: 2023-11-10

## 2023-11-17 ENCOUNTER — OFFICE VISIT (OUTPATIENT)
Age: 63
End: 2023-11-17

## 2023-11-17 ENCOUNTER — ANTI-COAG VISIT (OUTPATIENT)
Age: 63
End: 2023-11-17

## 2023-11-17 VITALS
DIASTOLIC BLOOD PRESSURE: 84 MMHG | TEMPERATURE: 98.7 F | WEIGHT: 195 LBS | RESPIRATION RATE: 16 BRPM | OXYGEN SATURATION: 96 % | BODY MASS INDEX: 30.61 KG/M2 | SYSTOLIC BLOOD PRESSURE: 122 MMHG | HEIGHT: 67 IN | HEART RATE: 86 BPM

## 2023-11-17 DIAGNOSIS — M85.80 OSTEOPENIA, UNSPECIFIED LOCATION: ICD-10-CM

## 2023-11-17 DIAGNOSIS — Z00.00 MEDICARE ANNUAL WELLNESS VISIT, SUBSEQUENT: Primary | ICD-10-CM

## 2023-11-17 DIAGNOSIS — I48.91 ATRIAL FIBRILLATION, UNSPECIFIED TYPE (HCC): ICD-10-CM

## 2023-11-17 DIAGNOSIS — D68.69 SECONDARY HYPERCOAGULABLE STATE (HCC): ICD-10-CM

## 2023-11-17 DIAGNOSIS — I10 ESSENTIAL HYPERTENSION: ICD-10-CM

## 2023-11-17 DIAGNOSIS — D63.8 ANEMIA OF CHRONIC DISEASE: ICD-10-CM

## 2023-11-17 DIAGNOSIS — R77.1 ABNORMALITY OF GLOBULIN: ICD-10-CM

## 2023-11-17 DIAGNOSIS — I25.10 CORONARY ARTERY DISEASE INVOLVING NATIVE CORONARY ARTERY OF NATIVE HEART WITHOUT ANGINA PECTORIS: ICD-10-CM

## 2023-11-17 DIAGNOSIS — E78.00 PURE HYPERCHOLESTEROLEMIA: ICD-10-CM

## 2023-11-17 DIAGNOSIS — F33.9 RECURRENT DEPRESSION (HCC): ICD-10-CM

## 2023-11-17 DIAGNOSIS — M62.830 BACK SPASM: ICD-10-CM

## 2023-11-17 DIAGNOSIS — F41.9 ANXIETY: ICD-10-CM

## 2023-11-17 DIAGNOSIS — I50.32 CHRONIC DIASTOLIC CONGESTIVE HEART FAILURE (HCC): ICD-10-CM

## 2023-11-17 DIAGNOSIS — Z79.01 LONG TERM CURRENT USE OF ANTICOAGULANT THERAPY: Primary | ICD-10-CM

## 2023-11-17 LAB
POC INR: 3.3
PROTHROMBIN TIME, POC: 39.2

## 2023-11-17 RX ORDER — CYCLOBENZAPRINE HCL 10 MG
10 TABLET ORAL NIGHTLY PRN
Qty: 90 TABLET | Refills: 1 | Status: SHIPPED | OUTPATIENT
Start: 2023-11-17

## 2023-11-17 ASSESSMENT — LIFESTYLE VARIABLES
HOW MANY STANDARD DRINKS CONTAINING ALCOHOL DO YOU HAVE ON A TYPICAL DAY: PATIENT DOES NOT DRINK
HOW OFTEN DO YOU HAVE A DRINK CONTAINING ALCOHOL: NEVER

## 2023-11-17 ASSESSMENT — PATIENT HEALTH QUESTIONNAIRE - PHQ9
SUM OF ALL RESPONSES TO PHQ QUESTIONS 1-9: 0
SUM OF ALL RESPONSES TO PHQ9 QUESTIONS 1 & 2: 0
SUM OF ALL RESPONSES TO PHQ QUESTIONS 1-9: 0
7. TROUBLE CONCENTRATING ON THINGS, SUCH AS READING THE NEWSPAPER OR WATCHING TELEVISION: 0
6. FEELING BAD ABOUT YOURSELF - OR THAT YOU ARE A FAILURE OR HAVE LET YOURSELF OR YOUR FAMILY DOWN: 0
SUM OF ALL RESPONSES TO PHQ QUESTIONS 1-9: 0
9. THOUGHTS THAT YOU WOULD BE BETTER OFF DEAD, OR OF HURTING YOURSELF: 0
10. IF YOU CHECKED OFF ANY PROBLEMS, HOW DIFFICULT HAVE THESE PROBLEMS MADE IT FOR YOU TO DO YOUR WORK, TAKE CARE OF THINGS AT HOME, OR GET ALONG WITH OTHER PEOPLE: 0
SUM OF ALL RESPONSES TO PHQ QUESTIONS 1-9: 0
4. FEELING TIRED OR HAVING LITTLE ENERGY: 0
1. LITTLE INTEREST OR PLEASURE IN DOING THINGS: 0
8. MOVING OR SPEAKING SO SLOWLY THAT OTHER PEOPLE COULD HAVE NOTICED. OR THE OPPOSITE, BEING SO FIGETY OR RESTLESS THAT YOU HAVE BEEN MOVING AROUND A LOT MORE THAN USUAL: 0
5. POOR APPETITE OR OVEREATING: 0
3. TROUBLE FALLING OR STAYING ASLEEP: 0
2. FEELING DOWN, DEPRESSED OR HOPELESS: 0

## 2023-11-17 NOTE — PROGRESS NOTES
The INR is above the therapeutic range. Taking Tylenol and Flexeril for arthritic pain  Ask the patient about bleeding complications. Please make the following adjustments to Coumadin dosing: Hold today then Continue Coumadin 5mg daily except 2.5mg on Tues-Thurs-Sat. .  Repeat the INR in 4 weeks.

## 2023-11-27 ENCOUNTER — HOSPITAL ENCOUNTER (OUTPATIENT)
Facility: HOSPITAL | Age: 63
Discharge: HOME | End: 2023-11-30
Payer: MEDICARE

## 2023-11-27 ENCOUNTER — TELEPHONE (OUTPATIENT)
Age: 63
End: 2023-11-27

## 2023-11-27 DIAGNOSIS — M17.12 PRIMARY OSTEOARTHRITIS OF LEFT KNEE: ICD-10-CM

## 2023-11-27 DIAGNOSIS — M25.562 CHRONIC PAIN OF LEFT KNEE: ICD-10-CM

## 2023-11-27 DIAGNOSIS — G89.29 CHRONIC PAIN OF LEFT KNEE: ICD-10-CM

## 2023-11-27 PROCEDURE — 73721 MRI JNT OF LWR EXTRE W/O DYE: CPT

## 2023-11-27 RX ORDER — POTASSIUM CHLORIDE 1500 MG/1
20 TABLET, EXTENDED RELEASE ORAL DAILY
Qty: 90 TABLET | Refills: 1 | Status: SHIPPED | OUTPATIENT
Start: 2023-11-27

## 2023-11-27 RX ORDER — BENZONATATE 100 MG/1
100 CAPSULE ORAL 3 TIMES DAILY PRN
Qty: 30 CAPSULE | Refills: 0 | Status: SHIPPED | OUTPATIENT
Start: 2023-11-27 | End: 2023-12-07

## 2023-11-27 NOTE — TELEPHONE ENCOUNTER
Pt states that she has a terrible cough since Thurs and would like to kow if Dr Maureen Fierro would send something to pharmacy. She has tried the OTC meds and they are not working. [facility] uses [facility].

## 2023-12-07 DIAGNOSIS — I11.0 HYPERTENSIVE HEART DISEASE WITH HEART FAILURE (HCC): ICD-10-CM

## 2023-12-07 RX ORDER — METOPROLOL SUCCINATE 100 MG/1
TABLET, EXTENDED RELEASE ORAL
Qty: 90 TABLET | Refills: 1 | OUTPATIENT
Start: 2023-12-07

## 2023-12-07 NOTE — TELEPHONE ENCOUNTER
Spoke with the patient to explain that she have one refill remaining on requested medication.  Last refilled on 09/06/23 with 1 refill.    metoprolol succinate (TOPROL XL) 100 MG extended release tablet [[id number]]    Order Details  Dose, Route, Frequency: As Directed   Dispense Quantity: 90 tablet Refills: 1          Sig: TAKE 1 TABLET BY MOUTH EVERY DAY         Start Date: 09/06/23 End Date: --   Written Date: 09/06/23 Expiration Date: 09/05/24       Associated Diagnoses: Hypertensive heart disease with heart failure (HCC) [I11.0]   Original Order: metoprolol succinate (TOPROL XL) 100 MG extended release tablet [[id number]]   Providers    Authorizing Provider: Leda Nguyen MD NPI: [id number]   Ordering User: Leda Nguyen MD          Pharmacy    [facility]/pharmacy #[id number] [place], [place] - [address] [facility] -  [phone] - F [phone]  [address] [place] [id number]  Phone: [phone]  Fax: [phone]     
Traveling alone

## 2023-12-12 ENCOUNTER — OFFICE VISIT (OUTPATIENT)
Age: 63
End: 2023-12-12
Payer: MEDICARE

## 2023-12-12 VITALS — WEIGHT: 193 LBS | HEIGHT: 67 IN | BODY MASS INDEX: 30.29 KG/M2

## 2023-12-12 DIAGNOSIS — M17.12 PRIMARY OSTEOARTHRITIS OF LEFT KNEE: ICD-10-CM

## 2023-12-12 DIAGNOSIS — M16.11 PRIMARY OSTEOARTHRITIS OF RIGHT HIP: ICD-10-CM

## 2023-12-12 DIAGNOSIS — M16.12 PRIMARY OSTEOARTHRITIS OF LEFT HIP: ICD-10-CM

## 2023-12-12 DIAGNOSIS — M70.62 TROCHANTERIC BURSITIS, LEFT HIP: ICD-10-CM

## 2023-12-12 DIAGNOSIS — M17.11 PRIMARY OSTEOARTHRITIS OF RIGHT KNEE: Primary | ICD-10-CM

## 2023-12-12 PROCEDURE — G8399 PT W/DXA RESULTS DOCUMENT: HCPCS | Performed by: PHYSICIAN ASSISTANT

## 2023-12-12 PROCEDURE — 1090F PRES/ABSN URINE INCON ASSESS: CPT | Performed by: PHYSICIAN ASSISTANT

## 2023-12-12 PROCEDURE — 99214 OFFICE O/P EST MOD 30 MIN: CPT | Performed by: PHYSICIAN ASSISTANT

## 2023-12-12 PROCEDURE — 3017F COLORECTAL CA SCREEN DOC REV: CPT | Performed by: PHYSICIAN ASSISTANT

## 2023-12-12 PROCEDURE — G8427 DOCREV CUR MEDS BY ELIG CLIN: HCPCS | Performed by: PHYSICIAN ASSISTANT

## 2023-12-12 PROCEDURE — 20611 DRAIN/INJ JOINT/BURSA W/US: CPT | Performed by: PHYSICIAN ASSISTANT

## 2023-12-12 PROCEDURE — G8417 CALC BMI ABV UP PARAM F/U: HCPCS | Performed by: PHYSICIAN ASSISTANT

## 2023-12-12 PROCEDURE — 1123F ACP DISCUSS/DSCN MKR DOCD: CPT | Performed by: PHYSICIAN ASSISTANT

## 2023-12-12 PROCEDURE — G8484 FLU IMMUNIZE NO ADMIN: HCPCS | Performed by: PHYSICIAN ASSISTANT

## 2023-12-12 PROCEDURE — 1036F TOBACCO NON-USER: CPT | Performed by: PHYSICIAN ASSISTANT

## 2023-12-12 RX ORDER — BETAMETHASONE SODIUM PHOSPHATE AND BETAMETHASONE ACETATE 3; 3 MG/ML; MG/ML
6 INJECTION, SUSPENSION INTRA-ARTICULAR; INTRALESIONAL; INTRAMUSCULAR; SOFT TISSUE ONCE
Status: CANCELLED | OUTPATIENT
Start: 2023-12-12 | End: 2023-12-12

## 2023-12-12 RX ORDER — BETAMETHASONE SODIUM PHOSPHATE AND BETAMETHASONE ACETATE 3; 3 MG/ML; MG/ML
6 INJECTION, SUSPENSION INTRA-ARTICULAR; INTRALESIONAL; INTRAMUSCULAR; SOFT TISSUE ONCE
Status: COMPLETED | OUTPATIENT
Start: 2023-12-12 | End: 2023-12-12

## 2023-12-12 RX ADMIN — BETAMETHASONE SODIUM PHOSPHATE AND BETAMETHASONE ACETATE 6 MG: 3; 3 INJECTION, SUSPENSION INTRA-ARTICULAR; INTRALESIONAL; INTRAMUSCULAR; SOFT TISSUE at 10:34

## 2023-12-12 NOTE — PROGRESS NOTES
necessary  * Patient was positioned for comfort  * Consent was signed and verified    Time:10:10 AM    Body part: left hip, intra-bursal: bilateral knee, intra-articular    Medication & Dose: 3ml 1% lidocaine and 6mg celestone, left hip: 2ml euflexxa each knee    Date of procedure: 12/12/23    Procedure performed by: Cm Hodge PA-C    Provider assisted by: none    Patient assisted by: self    How tolerated by patient: tolerated the procedure well with no complications    Post Procedural Pain Scale: 4    Comments:   [address] using a frequency of 10MHz with a 12L-RS transducer head was used to confirm needle placement.   Ultrasound images captured using [address] Ultrasound machine and scanned into patient's chart       Dallin Franz PA-C, ATC

## 2023-12-15 ENCOUNTER — OFFICE VISIT (OUTPATIENT)
Age: 63
End: 2023-12-15
Payer: MEDICARE

## 2023-12-15 ENCOUNTER — ANTI-COAG VISIT (OUTPATIENT)
Age: 63
End: 2023-12-15
Payer: MEDICARE

## 2023-12-15 VITALS
BODY MASS INDEX: 30.45 KG/M2 | HEART RATE: 96 BPM | SYSTOLIC BLOOD PRESSURE: 134 MMHG | OXYGEN SATURATION: 98 % | DIASTOLIC BLOOD PRESSURE: 84 MMHG | HEIGHT: 67 IN | TEMPERATURE: 97 F | RESPIRATION RATE: 20 BRPM | WEIGHT: 194 LBS

## 2023-12-15 DIAGNOSIS — J30.1 ALLERGIC RHINITIS DUE TO POLLEN, UNSPECIFIED SEASONALITY: ICD-10-CM

## 2023-12-15 DIAGNOSIS — J98.4 RESTRICTIVE LUNG DISEASE: ICD-10-CM

## 2023-12-15 DIAGNOSIS — J45.40 MODERATE PERSISTENT ASTHMA, UNCOMPLICATED: Primary | ICD-10-CM

## 2023-12-15 DIAGNOSIS — I48.91 ATRIAL FIBRILLATION, UNSPECIFIED TYPE (HCC): ICD-10-CM

## 2023-12-15 DIAGNOSIS — Z79.01 LONG TERM CURRENT USE OF ANTICOAGULANT THERAPY: Primary | ICD-10-CM

## 2023-12-15 PROBLEM — M19.90 ARTHRITIS: Status: ACTIVE | Noted: 2023-12-15

## 2023-12-15 PROBLEM — R60.0 BILATERAL LEG EDEMA: Status: RESOLVED | Noted: 2022-01-12 | Resolved: 2023-12-15

## 2023-12-15 PROBLEM — J45.909 ASTHMA: Status: ACTIVE | Noted: 2023-12-15

## 2023-12-15 LAB
DLCO %PRED: NORMAL
DLCO PRED: NORMAL
DLCO/VA %PRED: NORMAL
DLCO/VA PRED: NORMAL
DLCO/VA: NORMAL
DLCO: NORMAL
POC INR: 2.7
PROTHROMBIN TIME, POC: 32.9

## 2023-12-15 PROCEDURE — G8484 FLU IMMUNIZE NO ADMIN: HCPCS | Performed by: INTERNAL MEDICINE

## 2023-12-15 PROCEDURE — 1090F PRES/ABSN URINE INCON ASSESS: CPT | Performed by: INTERNAL MEDICINE

## 2023-12-15 PROCEDURE — 99213 OFFICE O/P EST LOW 20 MIN: CPT | Performed by: INTERNAL MEDICINE

## 2023-12-15 PROCEDURE — G8427 DOCREV CUR MEDS BY ELIG CLIN: HCPCS | Performed by: INTERNAL MEDICINE

## 2023-12-15 PROCEDURE — 94010 BREATHING CAPACITY TEST: CPT | Performed by: INTERNAL MEDICINE

## 2023-12-15 PROCEDURE — G8417 CALC BMI ABV UP PARAM F/U: HCPCS | Performed by: INTERNAL MEDICINE

## 2023-12-15 PROCEDURE — 1123F ACP DISCUSS/DSCN MKR DOCD: CPT | Performed by: INTERNAL MEDICINE

## 2023-12-15 PROCEDURE — 3075F SYST BP GE 130 - 139MM HG: CPT | Performed by: INTERNAL MEDICINE

## 2023-12-15 PROCEDURE — G8399 PT W/DXA RESULTS DOCUMENT: HCPCS | Performed by: INTERNAL MEDICINE

## 2023-12-15 PROCEDURE — 85610 PROTHROMBIN TIME: CPT | Performed by: NURSE PRACTITIONER

## 2023-12-15 PROCEDURE — 1036F TOBACCO NON-USER: CPT | Performed by: INTERNAL MEDICINE

## 2023-12-15 PROCEDURE — 3079F DIAST BP 80-89 MM HG: CPT | Performed by: INTERNAL MEDICINE

## 2023-12-15 PROCEDURE — 94729 DIFFUSING CAPACITY: CPT | Performed by: INTERNAL MEDICINE

## 2023-12-15 PROCEDURE — 94727 GAS DIL/WSHOT DETER LNG VOL: CPT | Performed by: INTERNAL MEDICINE

## 2023-12-15 PROCEDURE — 3017F COLORECTAL CA SCREEN DOC REV: CPT | Performed by: INTERNAL MEDICINE

## 2023-12-15 RX ORDER — FLUTICASONE PROPIONATE AND SALMETEROL 500; 50 UG/1; UG/1
1 POWDER RESPIRATORY (INHALATION) EVERY 12 HOURS
Qty: 60 EACH | Refills: 3 | Status: SHIPPED | OUTPATIENT
Start: 2023-12-15

## 2023-12-15 NOTE — PATIENT INSTRUCTIONS
What is the plan?  -Take Wixella at increased dosing - 1 puff in the morning and 1 puff at night; rinse mouth after each use.  -Continue Albuterol rescue inhaler as needed  -Start Pulmonary rehabilitation  -Increase activity as much as able

## 2023-12-15 NOTE — PROGRESS NOTES
[facility]                                                       Pulmonary, Critical Care, and Sleep Medicine      Pulmonary Office Progress Notes.    Name: Milka Singh     : 1960     Date: 12/15/2023        Subjective:   Chief complaint: SOB  Patient is a 63 y.o. female with a PMHx of Asthma, Afib (on Coumadin), HFpEF, Rheumatic valve disease - s/p Mechanical valve, HTN, MV stenosis, HLD, Anemia of chronic disease, Depression.    HPI(12/15/23):  Today in clinic, she states that her symptoms are overall improved.  She notes wheezing in the morning.  Notes night time symptoms 3-4x/week.   Cough is nonproductive. No cough during the day.  No fever, chills, chest pain or tightness.   Notes chronic night sweats.  No recent ED visits, exacerbations or need for corticosteroid therapy.  She is on Pepcid prn.    Currently on:  -Wixella/Advair 250/50 mcg - taking BID.  -Symbicort - was too expensive; not using.  -Albuterol rescue inhaler - using ~5x/week.  -Singulair - taking nightly.    Allergic rhinitis - worse in Spring.   Flue vaccine - already completed.      HPI (23):  Today in clinic, she states that she has noted that over the past few days, she has been having more cough and wheezing.   Notes that she is more easily triggered (perfume exposure from someone sitting next to her at [employer]).   Admits that other smells also worsen her symptoms.   Notes substernal chest discomfort with cough. Cough productive of clear phlegm.   States symptoms are unchanged.  No fever, chills, night sweats, or significant change in weight. No hemoptysis.  No second-hand smoker exposure.  No recent ED visits. Last used prednisone about 1 year ago.    Currently on:  -Advair - using prn - only when she has an \"attack.\" States this is cost prohibitive.  -Albuterol rescue inhaler - using 1-2x/day.  -Singulair - using daily    Allergic rhinitis -

## 2023-12-15 NOTE — PROGRESS NOTES
Milka Singh presents today for   Chief Complaint   Patient presents with    Asthma     Mild, intermittent asthma, uncomplicated       Is someone accompanying this pt? No    Is the patient using any DME equipment during OV?     -DME Company No    Depression Screenin/17/2023     9:58 AM   PHQ-9 Questionaire   Little interest or pleasure in doing things 0   Feeling down, depressed, or hopeless 0   Trouble falling or staying asleep, or sleeping too much 0   Feeling tired or having little energy 0   Poor appetite or overeating 0   Feeling bad about yourself - or that you are a failure or have let yourself or your family down 0   Trouble concentrating on things, such as reading the newspaper or watching television 0   Moving or speaking so slowly that other people could have noticed. Or the opposite - being so fidgety or restless that you have been moving around a lot more than usual 0   Thoughts that you would be better off dead, or of hurting yourself in some way 0   PHQ-9 Total Score 0   If you checked off any problems, how difficult have these problems made it for you to do your work, take care of things at home, or get along with other people? 0       Learning Assessment:        Abuse Screening:         No data to display                Fall Risk        Coordination of Care:    1. Have you been to the ER, urgent care clinic since your last visit? Hospitalized since your last visit? No    2. Have you seen or consulted any other health care providers outside of the [facility] since your last visit? Include any pap smears or colon screening. No    Medication list has been update per patient.

## 2023-12-18 DIAGNOSIS — E78.5 HYPERLIPIDEMIA, UNSPECIFIED: ICD-10-CM

## 2023-12-18 RX ORDER — ATORVASTATIN CALCIUM 40 MG/1
40 TABLET, FILM COATED ORAL DAILY
Qty: 90 TABLET | Refills: 0 | Status: SHIPPED | OUTPATIENT
Start: 2023-12-18

## 2023-12-18 NOTE — TELEPHONE ENCOUNTER
performed during the hospital encounter of 11/27/23 (from the past 2160 hour(s))   MRI KNEE LEFT WO CONTRAST    Impression    1. No meniscal tear or ligamentous injuries.  2. 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 Slightly increased TT TG  distance.  3. Grade II chondromalacia patella, lateral facet. Small popliteal cyst   Results for orders placed or performed in visit on 11/17/23 (from the past 2160 hour(s))   AMB POC PT/INR   Result Value Ref Range    Prothrombin time, POC 39.2     POC INR 3.3    Results for orders placed or performed during the hospital encounter of 10/09/23 (from the past 2160 hour(s))   Comprehensive Metabolic Panel   Result Value Ref Range    Sodium 136 136 - 145 mmol/L    Potassium 3.7 3.5 - 5.5 mmol/L    Chloride 100 100 - 111 mmol/L    CO2 30 21 - 32 mmol/L    Anion Gap 6 3.0 - 18 mmol/L    Glucose 80 74 - 99 mg/dL    BUN 14 7.0 - 18 MG/DL    Creatinine 0.95 0.6 - 1.3 MG/DL    Bun/Cre Ratio 15 12 - 20      Est, Glom Filt Rate >60 >60 ml/min/1.73m2    Calcium 9.3 8.5 - 10.1 MG/DL    Total Bilirubin 0.6 0.2 - 1.0 MG/DL    ALT 33 13 - 56 U/L    AST 35 10 - 38 U/L    Alk Phosphatase 111 45 - 117 U/L    Total Protein 8.9 (H) 6.4 - 8.2 g/dL    Albumin 4.0 3.4 - 5.0 g/dL    Globulin 4.9 (H) 2.0 - 4.0 g/dL    Albumin/Globulin Ratio 0.8 0.8 - 1.7     Results for orders placed or performed during the hospital encounter of 10/09/23 (from the past 2160 hour(s))   DEXA BONE DENSITY AXIAL SKELETON    Impression    BMD measures consistent with osteopenia/low bone density.     Based upon current ISCD guidelines, the patient's overall diagnostic category,  selected using WHO criteria in postmenopausal women and males aged 50 and above,  is selected based upon the lowest T Score from among the lumbar spine, total  femur, femoral neck, (or distal third radius if measured).    WHO Definition of Osteoporosis and Osteopenia on

## 2024-01-02 ENCOUNTER — OFFICE VISIT (OUTPATIENT)
Age: 64
End: 2024-01-02
Payer: MEDICARE

## 2024-01-02 ENCOUNTER — TELEPHONE (OUTPATIENT)
Age: 64
End: 2024-01-02

## 2024-01-02 VITALS — HEIGHT: 67 IN | BODY MASS INDEX: 30.38 KG/M2

## 2024-01-02 DIAGNOSIS — M17.12 UNILATERAL PRIMARY OSTEOARTHRITIS, LEFT KNEE: ICD-10-CM

## 2024-01-02 DIAGNOSIS — I11.0 HYPERTENSIVE HEART DISEASE WITH HEART FAILURE (HCC): ICD-10-CM

## 2024-01-02 DIAGNOSIS — M17.11 UNILATERAL PRIMARY OSTEOARTHRITIS, RIGHT KNEE: Primary | ICD-10-CM

## 2024-01-02 PROCEDURE — G8399 PT W/DXA RESULTS DOCUMENT: HCPCS | Performed by: PHYSICIAN ASSISTANT

## 2024-01-02 PROCEDURE — 3017F COLORECTAL CA SCREEN DOC REV: CPT | Performed by: PHYSICIAN ASSISTANT

## 2024-01-02 PROCEDURE — 1036F TOBACCO NON-USER: CPT | Performed by: PHYSICIAN ASSISTANT

## 2024-01-02 PROCEDURE — G8428 CUR MEDS NOT DOCUMENT: HCPCS | Performed by: PHYSICIAN ASSISTANT

## 2024-01-02 PROCEDURE — 20611 DRAIN/INJ JOINT/BURSA W/US: CPT | Performed by: PHYSICIAN ASSISTANT

## 2024-01-02 PROCEDURE — 1090F PRES/ABSN URINE INCON ASSESS: CPT | Performed by: PHYSICIAN ASSISTANT

## 2024-01-02 PROCEDURE — 1123F ACP DISCUSS/DSCN MKR DOCD: CPT | Performed by: PHYSICIAN ASSISTANT

## 2024-01-02 PROCEDURE — G8484 FLU IMMUNIZE NO ADMIN: HCPCS | Performed by: PHYSICIAN ASSISTANT

## 2024-01-02 PROCEDURE — 99214 OFFICE O/P EST MOD 30 MIN: CPT | Performed by: PHYSICIAN ASSISTANT

## 2024-01-02 PROCEDURE — G8417 CALC BMI ABV UP PARAM F/U: HCPCS | Performed by: PHYSICIAN ASSISTANT

## 2024-01-02 RX ORDER — HYALURONATE SODIUM 10 MG/ML
20 SYRINGE (ML) INTRAARTICULAR ONCE
Status: COMPLETED | OUTPATIENT
Start: 2024-01-02 | End: 2024-01-02

## 2024-01-02 RX ADMIN — Medication 20 MG: at 10:07

## 2024-01-02 RX ADMIN — Medication 20 MG: at 10:08

## 2024-01-02 NOTE — PROGRESS NOTES
Patient: Milka Singh                MRN: [id number]       SSN: [id number]  YOB: 1960        AGE: 63 y.o.        SEX: female  Body mass index is 30.38 kg/m².    PCP: Leda Nguyen MD  01/02/24        Pt presents today for their 3rd euflexxa  injection for bilateral knee .  There has been no recent fevers/chills, systemic changes, or injuries to report.    PE:  General A&Ox3, NAD  Exam of the knees reveals skin intact, no erythema, no ecchymosis, no signs for infection.  No cyanosis, clubbing, or edema present distally.  Neg calf tenderness, neg homans, no signs for DVT present.    A: bilateral knee OA    P: The bilateral knee was injected with 2ml euflexxa each knee with US guided assistance without complications.  Patient was instructed on post injection care.     Chart reviewed for the following:  Kareem BELLA PA-C, have reviewed the History, Physical and updated the Allergic reactions for Milka Singh?    TIME OUT performed immediately prior to start of procedure:  Kareem BELLA PA-C, have performed the following reviews on Milka Singh prior to the start of the procedure:  ????????  * Patient was identified by name and date of birth   * Agreement on procedure being performed was verified  * Risks and Benefits explained to the patient  * Procedure site verified and marked as necessary  * Patient was positioned for comfort  * Consent was signed and verified    Time: 10:02 AM    Body part: bilateral knee, intra-articular    Medication & Dose: 2ml euflexxa each knee    Date of procedure: 01/02/24    Procedure performed by: JR Mitul PA-C    Patient assisted by: self    How tolerated by patient: tolerated the procedure well with no complications    Post Procedural Pain Scale: 3    Comments:  [employer] using a frequency of 10MHz with a 12L-RS transducer head was used to confirm needle placement.  Ultrasound images captured using [employer] Ultrasound machine and

## 2024-01-02 NOTE — TELEPHONE ENCOUNTER
Osteoporosis and Osteopenia on DXA (specified for post  menopausal  females):      Normal:                     T Score at or above -1 SD    Osteopenia:              T Score between -1 and -2.5 SD    Osteoporosis:          T Score at or below -2.5 SD    The risk of fracture approximately doubles for each 1 SD decrease in T Score.   It is important to consider other factors in assessing a patient's risk of  fracture, including age, risk of falling/injury, history of fragility fracture,  family history of osteoporosis, smoking, low weight.      Various fracture risk tools have been developed for adult patients and are  available online. For example, the FRAX tool developed by WHO is widely used.  Reference www.iscd.org.    It is also important to note that DXA measures bone density but does not  distinguish among causes of decreased bone density, which include primary versus  secondary osteoporosis (such as metabolic bone disorders or possible effects of  medications) and also other conditions (such as osteomalacia).  Clinical  considerations should determine what additional evaluation may be warranted to  exclude secondary conditions in a patient with low bone density.    Please note that reliable, valid comparisons can not be made between studies  which have been performed on different densitometers.  If clinically warranted,  follow up study performed at this site would best permit assessment of trend for  possible change in bone mineral density over time in comparison to this study.    Thank you for this referral.   Results for orders placed or performed in visit on 10/06/23 (from the past 2160 hour(s))   AMB POC PT/INR   Result Value Ref Range    Prothrombin time, POC 28.4     POC INR 2.4          Thank you.

## 2024-01-02 NOTE — PROGRESS NOTES
I was called by pt for ongoing nocturnal cough.  Pt requesting tessalon pearls.  However cough seems to be occurring since pt had COVID. advised pt to continue Wixela 500 and advised to call office for sooner appointment to discuss sx and see if additional therapy is needed.

## 2024-01-02 NOTE — TELEPHONE ENCOUNTER
Pt states that her cough has returned and she has been using OTC med but it is not working. Would like to know if Dr Nguyen will send in   benzonatate (TESSALON) 100 MG capsule  to the [facility] on [facility]. Please advise. Thank you

## 2024-01-03 ENCOUNTER — TELEPHONE (OUTPATIENT)
Age: 64
End: 2024-01-03

## 2024-01-03 NOTE — TELEPHONE ENCOUNTER
Patient calling re the lingering cough. She spoke with Dr. GIULIANA Pearson last night. Another doctor had given Tessalon for it and she had a few pills left. Dr. GIULIANA Pearson wanted her to touch base with Dr. Ordoñez re next step. She does see her allergist on Friday.  She will continue on the Delsym since the Tessalon is finished now.

## 2024-01-03 NOTE — TELEPHONE ENCOUNTER
Pt would like to speak with nurse in regards to a cough that she has. She spoke with HP on last night. Please call [phone]

## 2024-01-07 RX ORDER — METOPROLOL SUCCINATE 100 MG/1
TABLET, EXTENDED RELEASE ORAL
Qty: 90 TABLET | Refills: 0 | Status: SHIPPED | OUTPATIENT
Start: 2024-01-07

## 2024-01-12 ENCOUNTER — HOSPITAL ENCOUNTER (OUTPATIENT)
Facility: HOSPITAL | Age: 64
End: 2024-01-12
Payer: MEDICARE

## 2024-01-12 ENCOUNTER — OFFICE VISIT (OUTPATIENT)
Age: 64
End: 2024-01-12
Payer: MEDICARE

## 2024-01-12 VITALS
TEMPERATURE: 97.2 F | DIASTOLIC BLOOD PRESSURE: 87 MMHG | HEART RATE: 85 BPM | WEIGHT: 196.8 LBS | RESPIRATION RATE: 16 BRPM | BODY MASS INDEX: 30.89 KG/M2 | OXYGEN SATURATION: 98 % | SYSTOLIC BLOOD PRESSURE: 132 MMHG | HEIGHT: 67 IN

## 2024-01-12 DIAGNOSIS — J98.4 RESTRICTIVE LUNG DISEASE: ICD-10-CM

## 2024-01-12 DIAGNOSIS — R06.09 DYSPNEA ON EXERTION: ICD-10-CM

## 2024-01-12 DIAGNOSIS — J45.40 MODERATE PERSISTENT ASTHMA, UNCOMPLICATED: Primary | ICD-10-CM

## 2024-01-12 DIAGNOSIS — J30.1 ALLERGIC RHINITIS DUE TO POLLEN, UNSPECIFIED SEASONALITY: ICD-10-CM

## 2024-01-12 LAB
BASOPHILS # BLD: 0 K/UL (ref 0–0.1)
BASOPHILS NFR BLD: 1 % (ref 0–2)
DIFFERENTIAL METHOD BLD: ABNORMAL
EOSINOPHIL # BLD: 0.2 K/UL (ref 0–0.4)
EOSINOPHIL NFR BLD: 2 % (ref 0–5)
ERYTHROCYTE [DISTWIDTH] IN BLOOD BY AUTOMATED COUNT: 13.8 % (ref 11.6–14.5)
HCT VFR BLD AUTO: 38.8 % (ref 35–45)
HGB BLD-MCNC: 11.7 G/DL (ref 12–16)
IMM GRANULOCYTES # BLD AUTO: 0 K/UL (ref 0–0.04)
IMM GRANULOCYTES NFR BLD AUTO: 0 % (ref 0–0.5)
LYMPHOCYTES # BLD: 2.1 K/UL (ref 0.9–3.6)
LYMPHOCYTES NFR BLD: 29 % (ref 21–52)
MCH RBC QN AUTO: 26.2 PG (ref 24–34)
MCHC RBC AUTO-ENTMCNC: 30.2 G/DL (ref 31–37)
MCV RBC AUTO: 86.8 FL (ref 78–100)
MONOCYTES # BLD: 1 K/UL (ref 0.05–1.2)
MONOCYTES NFR BLD: 14 % (ref 3–10)
NEUTS SEG # BLD: 4 K/UL (ref 1.8–8)
NEUTS SEG NFR BLD: 55 % (ref 40–73)
NRBC # BLD: 0 K/UL (ref 0–0.01)
NRBC BLD-RTO: 0 PER 100 WBC
PLATELET # BLD AUTO: 215 K/UL (ref 135–420)
PMV BLD AUTO: 12.2 FL (ref 9.2–11.8)
RBC # BLD AUTO: 4.47 M/UL (ref 4.2–5.3)
WBC # BLD AUTO: 7.2 K/UL (ref 4.6–13.2)

## 2024-01-12 PROCEDURE — G8427 DOCREV CUR MEDS BY ELIG CLIN: HCPCS | Performed by: INTERNAL MEDICINE

## 2024-01-12 PROCEDURE — 3075F SYST BP GE 130 - 139MM HG: CPT | Performed by: INTERNAL MEDICINE

## 2024-01-12 PROCEDURE — 1036F TOBACCO NON-USER: CPT | Performed by: INTERNAL MEDICINE

## 2024-01-12 PROCEDURE — 85025 COMPLETE CBC W/AUTO DIFF WBC: CPT

## 2024-01-12 PROCEDURE — 36415 COLL VENOUS BLD VENIPUNCTURE: CPT

## 2024-01-12 PROCEDURE — G8484 FLU IMMUNIZE NO ADMIN: HCPCS | Performed by: INTERNAL MEDICINE

## 2024-01-12 PROCEDURE — 3079F DIAST BP 80-89 MM HG: CPT | Performed by: INTERNAL MEDICINE

## 2024-01-12 PROCEDURE — 3017F COLORECTAL CA SCREEN DOC REV: CPT | Performed by: INTERNAL MEDICINE

## 2024-01-12 PROCEDURE — 99213 OFFICE O/P EST LOW 20 MIN: CPT | Performed by: INTERNAL MEDICINE

## 2024-01-12 PROCEDURE — 1123F ACP DISCUSS/DSCN MKR DOCD: CPT | Performed by: INTERNAL MEDICINE

## 2024-01-12 PROCEDURE — 86003 ALLG SPEC IGE CRUDE XTRC EA: CPT

## 2024-01-12 PROCEDURE — 82785 ASSAY OF IGE: CPT

## 2024-01-12 PROCEDURE — G8417 CALC BMI ABV UP PARAM F/U: HCPCS | Performed by: INTERNAL MEDICINE

## 2024-01-12 PROCEDURE — G8399 PT W/DXA RESULTS DOCUMENT: HCPCS | Performed by: INTERNAL MEDICINE

## 2024-01-12 PROCEDURE — 1090F PRES/ABSN URINE INCON ASSESS: CPT | Performed by: INTERNAL MEDICINE

## 2024-01-12 RX ORDER — TIOTROPIUM BROMIDE INHALATION SPRAY 1.56 UG/1
2 SPRAY, METERED RESPIRATORY (INHALATION) DAILY
Qty: 1 EACH | Refills: 5 | Status: SHIPPED | OUTPATIENT
Start: 2024-01-12

## 2024-01-12 NOTE — PROGRESS NOTES
Milka Singh presents today for   Chief Complaint   Patient presents with    Sick Visit     Shortness of Breath    Cough    Wheezing       Is someone accompanying this pt? No    Is the patient using any DME equipment during OV? No    -DME Company NA    Depression Screenin/17/2023     9:58 AM   PHQ-9 Questionaire   Little interest or pleasure in doing things 0   Feeling down, depressed, or hopeless 0   Trouble falling or staying asleep, or sleeping too much 0   Feeling tired or having little energy 0   Poor appetite or overeating 0   Feeling bad about yourself - or that you are a failure or have let yourself or your family down 0   Trouble concentrating on things, such as reading the newspaper or watching television 0   Moving or speaking so slowly that other people could have noticed. Or the opposite - being so fidgety or restless that you have been moving around a lot more than usual 0   Thoughts that you would be better off dead, or of hurting yourself in some way 0   PHQ-9 Total Score 0   If you checked off any problems, how difficult have these problems made it for you to do your work, take care of things at home, or get along with other people? 0       Learning Needs Questionnaire:     No question data found.      Fall Risk:         2023     9:58 AM 2023     9:19 AM 8/3/2023    12:13 PM   Fall Risk   2 or more falls in past year? yes no yes   Fall with injury in past year? no no yes        Abuse Screenin/6/2023    11:00 AM   AMB Abuse Screening   Do you ever feel afraid of your partner? N   Are you in a relationship with someone who physically or mentally threatens you? N   Is it safe for you to go home? Y         Coordination of Care:    1. Have you been to the ER, urgent care clinic since your last visit? Hospitalized since your last visit? No    2. Have you seen or consulted any other health care providers outside of the [facility] since your last visit?

## 2024-01-12 NOTE — PROGRESS NOTES
[facility]                                                       Pulmonary, Critical Care, and Sleep Medicine      Pulmonary Office Progress Notes.    Name: Milka Singh     : 1960     Date: 2024        Subjective:   Chief complaint: SOB  Patient is a 63 y.o. female with a PMHx of Asthma, Afib (on Coumadin), HFpEF, Rheumatic valve disease - s/p Mechanical valve, HTN, MV stenosis, HLD, Anemia of chronic disease, Depression.    Patient is a [employer].     HPI(24):  Interim events: has noted increased dyspnea on exertion.     Today in clinic, she states that she continues to have dyspnea on exertion.  Cough: complains of a cough which has worsened. Worse at night. Coughing about 3x/week at night.  Phlegm and/or sputum production: dry cough usually but sometimes clear phlegm.  Hemoptysis: none  Wheezing: notes wheezing in the morning which continues later in the day.  Notes worsened symptoms.  Not seen in ED or needed prednisone since last visit.    Using Delsium and was started on Tessalon perls by PCP.    Current inhalers and/or respiratory treatments:  -Advair/Wixela 500/50 mcg -using 1 puff BID; rinsing mouth after each use  -Albuterol rescue inhaler  - uses it prn; mostly when she is out of her home. On average, using 2-3x/week.  -Singulair 10 mg - taking daily.    SC - she is scheduled to start next week.    Allergist - next apt at the end of 2024. Getting allergy shots biweekly. Bleach, mold, grass, perfurmes, household detergents found to be her allergens.      HPI(12/15/23):  Today in clinic, she states that her symptoms are overall improved.  She notes wheezing in the morning.  Notes night time symptoms 3-4x/week.   Cough is nonproductive. No cough during the day.  No fever, chills, chest pain or tightness.   Notes chronic night sweats.  No recent ED visits, exacerbations or need for

## 2024-01-12 NOTE — PATIENT INSTRUCTIONS
What is the plan?  -Continue Advair as prescribed  -Start on Spiriva respimat 2 puffs once daily  -Continue Albuterol rescue inhaler as needed  -Continue Singulair    -Complete labwork  -Start on Pulmonary rehabilitation

## 2024-01-15 ENCOUNTER — HOSPITAL ENCOUNTER (OUTPATIENT)
Facility: HOSPITAL | Age: 64
Setting detail: RECURRING SERIES
Discharge: HOME | End: 2024-01-18
Payer: MEDICARE

## 2024-01-15 LAB
A ALTERNATA IGE QN: <0.1 KU/L
A FUMIGATUS IGE QN: <0.1 KU/L
AMER ROACH IGE QN: <0.1 KU/L
AMER SYCAMORE IGE QN: <0.1 KU/L
BAHIA GRASS IGE QN: <0.1 KU/L
BERMUDA GRASS IGE QN: <0.1 KU/L
BOXELDER IGE QN: <0.1 KU/L
C HERBARUM IGE QN: <0.1 KU/L
CAT DANDER IGG QN: <0.1 KU/L
COMMON RAGWEED IGE QN: <0.1 KU/L
D FARINAE IGE QN: <0.1 KU/L
D PTERONYSS IGE QN: <0.1 KU/L
DEPRECATED IGE QN: <0.1 KU/L
DOG DANDER IGE QN: <0.1 KU/L
ENGL PLANTAIN IGE QN: <0.1 KU/L
IGE SERPL-ACNC: 23 IU/ML (ref 6–495)
JOHNSON GRASS IGE QN: <0.1 KU/L
Lab: NORMAL
M RACEMOSUS IGE QN: <0.1 KU/L
MT JUNIPER IGE QN: <0.1 KU/L
MUGWORT IGE QN: <0.1 KU/L
NETTLE IGE QN: <0.1 KU/L
P NOTATUM IGE QN: <0.1 KU/L
S BOTRYOSUM IGE QN: <0.1 KU/L
SHEEP SORREL IGE QN: <0.1 KU/L
SWEET GUM IGE QN: <0.1 KU/L
TIMOTHY IGE QN: <0.1 KU/L
WHITE BIRCH IGE QN: <0.1 KU/L
WHITE ELM IGG QN: <0.1 KU/L
WHITE HICKORY IGE QN: <0.1 KU/L
WHITE MULBERRY IGE QN: <0.1 KU/L
WHITE OAK IGE QN: <0.1 KU/L

## 2024-01-15 PROCEDURE — 94618 PULMONARY STRESS TESTING: CPT

## 2024-01-15 ASSESSMENT — PULMONARY FUNCTION TESTS
FEV1 (%PREDICTED): 96
FVC (LITERS): 85
FEV1/FVC: 88

## 2024-01-15 ASSESSMENT — LIFESTYLE VARIABLES
ALCOHOL_AMOUNT: RARELY
ALCOHOL_TYPE: BEER

## 2024-01-15 ASSESSMENT — EXERCISE STRESS TEST
PEAK_BP: 145/84
PEAK_BP: 145/83
PEAK_RPE: 15
PEAK_HR: 96

## 2024-01-15 ASSESSMENT — PATIENT HEALTH QUESTIONNAIRE - PHQ9
7. TROUBLE CONCENTRATING ON THINGS, SUCH AS READING THE NEWSPAPER OR WATCHING TELEVISION: 0
SUM OF ALL RESPONSES TO PHQ QUESTIONS 1-9: 4
SUM OF ALL RESPONSES TO PHQ QUESTIONS 1-9: 4
6. FEELING BAD ABOUT YOURSELF - OR THAT YOU ARE A FAILURE OR HAVE LET YOURSELF OR YOUR FAMILY DOWN: 0
3. TROUBLE FALLING OR STAYING ASLEEP: 1
9. THOUGHTS THAT YOU WOULD BE BETTER OFF DEAD, OR OF HURTING YOURSELF: 0
SUM OF ALL RESPONSES TO PHQ QUESTIONS 1-9: 4
5. POOR APPETITE OR OVEREATING: 0
8. MOVING OR SPEAKING SO SLOWLY THAT OTHER PEOPLE COULD HAVE NOTICED. OR THE OPPOSITE, BEING SO FIGETY OR RESTLESS THAT YOU HAVE BEEN MOVING AROUND A LOT MORE THAN USUAL: 0
SUM OF ALL RESPONSES TO PHQ9 QUESTIONS 1 & 2: 0
1. LITTLE INTEREST OR PLEASURE IN DOING THINGS: 0
10. IF YOU CHECKED OFF ANY PROBLEMS, HOW DIFFICULT HAVE THESE PROBLEMS MADE IT FOR YOU TO DO YOUR WORK, TAKE CARE OF THINGS AT HOME, OR GET ALONG WITH OTHER PEOPLE: 0
2. FEELING DOWN, DEPRESSED OR HOPELESS: 0
SUM OF ALL RESPONSES TO PHQ QUESTIONS 1-9: 4
4. FEELING TIRED OR HAVING LITTLE ENERGY: 3

## 2024-01-15 ASSESSMENT — EJECTION FRACTION: EF_VALUE: 55

## 2024-01-15 NOTE — PROGRESS NOTES
Milka Singh 63 y.o. year old female with Moderate persistent asthma, uncomplicated [J45.40]        Social  History & Family Component    Living Situation:      LIVES:  with family:  spouse     Does patient have family/friends able to provide support  [x]  Yes  []  No     What type of home does patient live in?   [x] one level   []   one level with basement      []   2 story    # of stairs to enter home:     # of stairs in home:     []  other:    Does patient have a yard?  []  No   [x] Yes    If yes, can patient care for yard?  []  Yes [x]  No   If no, does patient require assistance to care for yard?  []  Yes   [x]  No     Transportation Resources:  Can patient drive themselves?  [x]  Yes   []   No   If no, who is principle ?     Does patient need referral for community resources (i.e., Taxi, Van, etc.)      []  No  []  Yes, which option?         Services/[place]/Visual Impaired: []  Yes   [x]   No   If yes, describe:    Occupation: Retired CNA    Education/Grade Level: College    Occupational exposures: none known     intermediate/disability date: 7/2014    Psychosocial Assessment Tools    COPD Assessment Test (CAT) Score: 32  Date of Test: 1/15/2024  Impact Level: very high    CAT measures COPD health status and quantifies the impact of COPD on patient’s life  and how this changes over time  Score and Impact Level: >30 = very high; > 20 = high; 10 - 20 = medium; <10 = low    PHQ-9 Score: 4  Date of Test: 1/15/2024    Scale includes 9 depression questions including 1 suicidal ideation and a 10th question asking about work, home, and socialization  Each item on the questionnaire is scored from 0-3 and this means that a person can score between 0 and 27 for depression.   Results5-9 minimal symptoms, 10-14 minor depression, 15-19 major depression, moderately severe, >20 major depression, severe.     Assessment:    Limited participation in family and community activities [] No [x] Yes      Limited coping

## 2024-01-15 NOTE — PROGRESS NOTES
Milka Singh #[id number] (CSN:[id number]) (63 y.o. 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]   Referral Date 12/15/23   Significant Cardiovascular History Chronic atrial fibrillation   Co-morbidities --   Oxygen Saturation / Titration    Stages of Change --   Oxygen Intervention    Oxygen Use No  [she does not wear oxygen]   Individual Treatment Plan    ITP Visit Type Initial assessment   1st Date of Exercise 01/23/24   ITP Next Review Date 01/12/24   Visit #/Total Visits 1/36   EF% 55 %   FVC (Liters) 85 liters   FEV1 (%PRED) 96   FEV1/FVC 88   DLCO (mL/mmHg sec) 57 ml/mmHg sec   Risk Stratification Low   Pulmonary ITP --   Exercise    Maguire Total Score 25   Stages of Change Action   Exercise Test Six minute walk test   Distance Walked in mi   Distance Walked (ft) --   Distance Walked (miles) 0.12   Distance Walked in Feet (Calculated) 633.6 ft   Distance Walked in Meters --   Peak HR 96   Peak /84   Peak RPE 15   Peak Mets --   O2 Saturation 93   O2 Flow Rate (l/min) --   Stops --   SPO2 Range 93-97   DASI Total Score --   BMI (Calculated) --   FEV1 (%PRED) 96   RAMOS Total Score --   Exercise Prescription    Mode Treadmill, Bike, Stepper, Rower, Other (comment)   Frequency per week 2 times   Duration Per Session 60 minutes   Intensity METS       2-3   Progression moderate   SpO2 --   O2 Device Room air   O2 Flow Rate (l/min) --   FIO2 (%) --   O2 Temperature --   Comments --   Symptoms with Exercise Shortness of breath   Target Heart Rate    Resistance Training y   Assisted Devices none   Exercise Blood Pressures    Resting /82   Peak /83   Is BP WDL Yes   Exercise Intervention    Type --   Frequency --   Duration --   Resistance Training --   Comments --   Exercise Education

## 2024-01-16 ENCOUNTER — TELEPHONE (OUTPATIENT)
Age: 64
End: 2024-01-16

## 2024-01-16 NOTE — TELEPHONE ENCOUNTER
----- Message from Marlena VillaseñorSonaVibha sent at 1/16/2024  2:33 PM EST -----  Subject: Message to Provider    QUESTIONS  Information for Provider? patient called in she would like to be sent out   out for her colonscopy testing  ---------------------------------------------------------------------------  --------------  CALL BACK INFO  [phone]; OK to leave message on [employer],OK to respond with electronic   message via [facility] portal (only for patients who have registered [facility]   account)  ---------------------------------------------------------------------------  --------------  SCRIPT ANSWERS  Relationship to Patient? Self

## 2024-01-19 ENCOUNTER — ANTI-COAG VISIT (OUTPATIENT)
Age: 64
End: 2024-01-19
Payer: MEDICARE

## 2024-01-19 ENCOUNTER — TELEPHONE (OUTPATIENT)
Age: 64
End: 2024-01-19

## 2024-01-19 DIAGNOSIS — I48.91 ATRIAL FIBRILLATION, UNSPECIFIED TYPE (HCC): ICD-10-CM

## 2024-01-19 DIAGNOSIS — Z79.01 LONG TERM CURRENT USE OF ANTICOAGULANT THERAPY: Primary | ICD-10-CM

## 2024-01-19 LAB
POC INR: 2.2
PROTHROMBIN TIME, POC: 26.7

## 2024-01-19 PROCEDURE — 85610 PROTHROMBIN TIME: CPT | Performed by: NURSE PRACTITIONER

## 2024-01-19 NOTE — TELEPHONE ENCOUNTER
----- Message from Marlena VillaseñorSonaVibha sent at 1/16/2024  2:33 PM EST -----  Subject: Message to Provider    QUESTIONS  Information for Provider? patient called in she would like to be sent out   out for her colonscopy testing  ---------------------------------------------------------------------------  --------------  CALL BACK INFO  [phone]; OK to leave message on [employer],OK to respond with electronic   message via [facility] portal (only for patients who have registered [facility]   account)  ---------------------------------------------------------------------------  --------------  SCRIPT ANSWERS  Relationship to Patient? Self

## 2024-01-19 NOTE — TELEPHONE ENCOUNTER
Patient had FIT test 12/27/2022  No colonoscopy in patients chart.   Last OV 11/17/2023  Next OV 02/20/2024  Can you provide a GI referral for patient.

## 2024-01-22 DIAGNOSIS — Z12.11 COLON CANCER SCREENING: Primary | ICD-10-CM

## 2024-01-23 ENCOUNTER — HOSPITAL ENCOUNTER (OUTPATIENT)
Facility: HOSPITAL | Age: 64
Setting detail: RECURRING SERIES
End: 2024-01-23
Payer: MEDICARE

## 2024-01-23 ENCOUNTER — HOSPITAL ENCOUNTER (OUTPATIENT)
Facility: HOSPITAL | Age: 64
Setting detail: RECURRING SERIES
Discharge: HOME | End: 2024-01-26
Payer: MEDICARE

## 2024-01-23 PROCEDURE — G0237 THERAPEUTIC PROCD STRG ENDUR: HCPCS

## 2024-01-23 NOTE — PROGRESS NOTES
Pulmonary/RT Note    Visit #    2/36           Milka Singh is a 63 y.o. female presented today for pulmonary rehab. She state that there have been no changes in medication or health since the last visit.          She has a target heart rate of . She tolerated the exercise session well and has a pain level of 0 although she had pain in her ankle all day yesterday. Patient states RPE for session today was 14 and RPD was a 3. Today the pt did:      Nustep:7 min w/workload of 4  Arm bike: 6 min 0 mo  Resistance bands:  min   Breathing retraining and PLB, MDI w/spacer education: 30 min               Physician available for emergencies: Dr. Jovita Jimenez RCP 1/23/2024 8:27 AM

## 2024-01-25 ENCOUNTER — HOSPITAL ENCOUNTER (OUTPATIENT)
Facility: HOSPITAL | Age: 64
Setting detail: RECURRING SERIES
Discharge: HOME | End: 2024-01-28
Payer: MEDICARE

## 2024-01-25 ENCOUNTER — APPOINTMENT (OUTPATIENT)
Facility: HOSPITAL | Age: 64
End: 2024-01-25
Payer: MEDICARE

## 2024-01-25 PROCEDURE — G0237 THERAPEUTIC PROCD STRG ENDUR: HCPCS

## 2024-01-25 NOTE — PROGRESS NOTES
Pulmonary/RT Note    Visit #   3/36            Milka Singh is a 63 y.o. female presented today for pulmonary rehab. She state that there have been no changes in medication or health since the last visit.          She has a target heart rate of . She tolerated the exercise session well and has a pain level of 0. Patient states RPE for session today was 14 and RPD was a 3. Today the pt did:      Nustep:15  min w/workload of 4  Arm bike: 15 min 0 mo  Breathing retraining,energy conservation and self pacing edu: 20 min               Physician available for emergencies: Dr. Jovita Jimenez, SANDI 1/25/2024 8:36 AM

## 2024-01-27 NOTE — PROGRESS NOTES
Essentially normal labs  There is mild elevation of one of the protein levels, will check further with protein electrophoresis. Order placed. pls notify pt.
Patient identified with 2 identifiers (name and ).   Patient aware of Essentially normal labs  There is mild elevation of one of the protein levels, will check further with protein electrophoresis
106.1

## 2024-01-30 ENCOUNTER — APPOINTMENT (OUTPATIENT)
Facility: HOSPITAL | Age: 64
End: 2024-01-30
Payer: MEDICARE

## 2024-01-30 ENCOUNTER — HOSPITAL ENCOUNTER (OUTPATIENT)
Facility: HOSPITAL | Age: 64
Setting detail: RECURRING SERIES
Discharge: HOME | End: 2024-02-02
Payer: MEDICARE

## 2024-01-30 PROCEDURE — G0237 THERAPEUTIC PROCD STRG ENDUR: HCPCS

## 2024-01-30 NOTE — PROGRESS NOTES
Pulmonary/RT Note    Visit #       4        Milka Singh is a 63 y.o. female presented today for pulmonary rehab. She state that there have been no changes in medication or health since the last visit.          She has a target heart rate of . She tolerated the exercise session well and has a pain level of 0. Patient states RPE for session today was 14 and RPD was a 3. Today the pt did:      Nustep: 15 min w/workload of 4  Arm bike: 15 min 5 mo  Bodyweight:  min             Physician available for emergencies: Dr. Jovita Jimenez, SANDI 1/30/2024 9:35 AM

## 2024-02-01 ENCOUNTER — HOSPITAL ENCOUNTER (OUTPATIENT)
Facility: HOSPITAL | Age: 64
Setting detail: RECURRING SERIES
Discharge: HOME | End: 2024-02-04
Payer: MEDICARE

## 2024-02-01 PROCEDURE — G0237 THERAPEUTIC PROCD STRG ENDUR: HCPCS

## 2024-02-01 PROCEDURE — G0239 OTH RESP PROC, GROUP: HCPCS

## 2024-02-01 NOTE — PROGRESS NOTES
Pulmonary/RT Note    Visit #       5/36        Milka Singh is a 63 y.o. female presented today for pulmonary rehab. She state that there have been no changes in medication or health since the last visit. She will get allergies shots this afternoon.         She has a target heart rate of . She tolerated the exercise session well and has a pain level of 6-7 in her left lower leg. Patient states RPE for session today was 14 and RPD was a 3. Today the pt did:      Nustep: 15 min w/workload of 4  Arm bike: 15 min 10 mo  Breathing retraining: 15 min             Physician available for emergencies: Dr Gena Jimenez, SANDI 2/1/2024 9:34 AM

## 2024-02-06 ENCOUNTER — APPOINTMENT (OUTPATIENT)
Facility: HOSPITAL | Age: 64
End: 2024-02-06
Payer: MEDICARE

## 2024-02-06 ENCOUNTER — OFFICE VISIT (OUTPATIENT)
Age: 64
End: 2024-02-06
Payer: MEDICARE

## 2024-02-06 VITALS — BODY MASS INDEX: 30.82 KG/M2 | HEIGHT: 67 IN

## 2024-02-06 DIAGNOSIS — M25.571 ACUTE BILATERAL ANKLE PAIN: ICD-10-CM

## 2024-02-06 DIAGNOSIS — M79.672 BILATERAL FOOT PAIN: ICD-10-CM

## 2024-02-06 DIAGNOSIS — M79.671 BILATERAL FOOT PAIN: ICD-10-CM

## 2024-02-06 DIAGNOSIS — R22.41 LOCALIZED SWELLING OF RIGHT FOOT: Primary | ICD-10-CM

## 2024-02-06 DIAGNOSIS — M25.572 ACUTE BILATERAL ANKLE PAIN: ICD-10-CM

## 2024-02-06 DIAGNOSIS — M79.672 LEFT FOOT PAIN: ICD-10-CM

## 2024-02-06 DIAGNOSIS — M76.72 PERONEAL TENDINITIS OF LEFT LOWER EXTREMITY: ICD-10-CM

## 2024-02-06 DIAGNOSIS — M25.572 ACUTE LEFT ANKLE PAIN: ICD-10-CM

## 2024-02-06 PROCEDURE — 1123F ACP DISCUSS/DSCN MKR DOCD: CPT | Performed by: ORTHOPAEDIC SURGERY

## 2024-02-06 PROCEDURE — 3017F COLORECTAL CA SCREEN DOC REV: CPT | Performed by: ORTHOPAEDIC SURGERY

## 2024-02-06 PROCEDURE — 73600 X-RAY EXAM OF ANKLE: CPT | Performed by: ORTHOPAEDIC SURGERY

## 2024-02-06 PROCEDURE — 1090F PRES/ABSN URINE INCON ASSESS: CPT | Performed by: ORTHOPAEDIC SURGERY

## 2024-02-06 PROCEDURE — G8484 FLU IMMUNIZE NO ADMIN: HCPCS | Performed by: ORTHOPAEDIC SURGERY

## 2024-02-06 PROCEDURE — G8417 CALC BMI ABV UP PARAM F/U: HCPCS | Performed by: ORTHOPAEDIC SURGERY

## 2024-02-06 PROCEDURE — 73630 X-RAY EXAM OF FOOT: CPT | Performed by: ORTHOPAEDIC SURGERY

## 2024-02-06 PROCEDURE — 1036F TOBACCO NON-USER: CPT | Performed by: ORTHOPAEDIC SURGERY

## 2024-02-06 PROCEDURE — 99214 OFFICE O/P EST MOD 30 MIN: CPT | Performed by: ORTHOPAEDIC SURGERY

## 2024-02-06 PROCEDURE — G8399 PT W/DXA RESULTS DOCUMENT: HCPCS | Performed by: ORTHOPAEDIC SURGERY

## 2024-02-06 PROCEDURE — G8428 CUR MEDS NOT DOCUMENT: HCPCS | Performed by: ORTHOPAEDIC SURGERY

## 2024-02-06 NOTE — PROGRESS NOTES
history of fall and traumas to the ankle. The left foot/ankle pain is worse when walking and at night, she has difficulty walking to the bathroom at night due to the pain and often uses the wall for support. The left ankle has gotten gradually worse over time. The right foot/ankle is not as painful as the left but tends to swell more as of this past weekend. She recalls wearing heels on Sunday for [employer] and having pain/swelling afterwards. Of note, her diet has been relatively the same and has not changed recently.     She has past medical history of osteopenia, arthritis in multiple sites, rheumatic valvular disease, congestive heart failure, NSTEMI, heart problems, CAD, hypertension, long-term anticoagulant use, and hyperlipidemia. She currently takes fluid pills, Warfarin blood thinner, and Spiriva Respimat for asthma. Denies history of lupus, psoriasis, gout, stomach problems, back problems, or pain radiating from the buttocks down the legs. She has had previous injections in both knees and the left hip due to her arthritis, she sees REINIER Bauman for her knees.     Patient is currently on disability.     Ht 1.702 m (5' 7\")   BMI 30.82 kg/m²     Appearance: Alert, well appearing and pleasant patient who is in no distress, oriented to person, place/time, and who follows commands. Normal dress/motor activity/thought processes/memory. This patient presents in the examination room by herself. Patient arrives to office via: without assistive device. Footwear: causal shoes    Psychiatric:  Normal Affect/mood.  Judgement, behavior, and conduct are appropriate. Speech normal in context and clarity, memory intact grossly, no involuntary movements - tremors.   H EENT (2): Head normocephalic & atraumatic.  Eye: pupils are round// EOM are intact // Neck: ROM WNL  // Hearings Intact  Respiratory: Breathing non labored     BILATERAL ANKLE/FOOT    Gait: slight limping gait with startup   Tenderness: LEFT: moderate tenderness

## 2024-02-08 ENCOUNTER — HOSPITAL ENCOUNTER (OUTPATIENT)
Facility: HOSPITAL | Age: 64
Setting detail: RECURRING SERIES
Discharge: HOME | End: 2024-02-11
Payer: MEDICARE

## 2024-02-08 PROCEDURE — G0237 THERAPEUTIC PROCD STRG ENDUR: HCPCS

## 2024-02-08 RX ORDER — POTASSIUM CHLORIDE 20 MEQ/1
20 TABLET, EXTENDED RELEASE ORAL DAILY
Qty: 90 TABLET | Refills: 1 | Status: SHIPPED | OUTPATIENT
Start: 2024-02-08

## 2024-02-09 ENCOUNTER — HOSPITAL ENCOUNTER (OUTPATIENT)
Facility: HOSPITAL | Age: 64
End: 2024-02-09
Payer: MEDICARE

## 2024-02-09 DIAGNOSIS — E78.00 PURE HYPERCHOLESTEROLEMIA: ICD-10-CM

## 2024-02-09 LAB
ALBUMIN SERPL-MCNC: 3.6 G/DL (ref 3.4–5)
ALBUMIN/GLOB SERPL: 0.8 (ref 0.8–1.7)
ALP SERPL-CCNC: 97 U/L (ref 45–117)
ALT SERPL-CCNC: 33 U/L (ref 13–56)
ANION GAP SERPL CALC-SCNC: 4 MMOL/L (ref 3–18)
AST SERPL-CCNC: 38 U/L (ref 10–38)
BILIRUB SERPL-MCNC: 0.6 MG/DL (ref 0.2–1)
BUN SERPL-MCNC: 17 MG/DL (ref 7–18)
BUN/CREAT SERPL: 17 (ref 12–20)
CALCIUM SERPL-MCNC: 9.4 MG/DL (ref 8.5–10.1)
CHLORIDE SERPL-SCNC: 105 MMOL/L (ref 100–111)
CHOLEST SERPL-MCNC: 159 MG/DL
CO2 SERPL-SCNC: 31 MMOL/L (ref 21–32)
CREAT SERPL-MCNC: 1.02 MG/DL (ref 0.6–1.3)
GLOBULIN SER CALC-MCNC: 4.5 G/DL (ref 2–4)
GLUCOSE SERPL-MCNC: 94 MG/DL (ref 74–99)
HDLC SERPL-MCNC: 79 MG/DL (ref 40–60)
HDLC SERPL: 2 (ref 0–5)
LDLC SERPL CALC-MCNC: 66.2 MG/DL (ref 0–100)
LIPID PANEL: ABNORMAL
POTASSIUM SERPL-SCNC: 4.2 MMOL/L (ref 3.5–5.5)
PROT SERPL-MCNC: 8.1 G/DL (ref 6.4–8.2)
SODIUM SERPL-SCNC: 140 MMOL/L (ref 136–145)
TRIGL SERPL-MCNC: 69 MG/DL
VLDLC SERPL CALC-MCNC: 13.8 MG/DL

## 2024-02-09 PROCEDURE — 80053 COMPREHEN METABOLIC PANEL: CPT

## 2024-02-09 PROCEDURE — 80061 LIPID PANEL: CPT

## 2024-02-09 PROCEDURE — 36415 COLL VENOUS BLD VENIPUNCTURE: CPT

## 2024-02-12 NOTE — PROGRESS NOTES
Pulmonary/RT Note    Visit #               Milka Singh is a 63 y.o. female presented today for pulmonary rehab. She state that there have been no changes in medication or health since the last visit.          She has a target heart rate of . She tolerated the exercise session well and has a pain level of 2 in both feet today more on the left. She came in wearing a left foot brace and a right compression sock. Lower extremity exercises will be limited until her follow up with her doctor. Patient states RPE for session today was 14 and RPD was a 3. Today the pt did:        Arm bike: 15 min  Breathing retrainin min               Physician available for emergencies: Dr. Jovita Jimenez, SANDI 2024 8:31 AM  
  Nutrition Education    Education DM & CAD relationship, Limit added sugars, Reduced calorie/portion controlled diet, Overall healthy eating pattern that emphasizes vegetables, fruits, whole grains, healthy proteins and non-tropical oils, Other (comment)   Comments --   Nutrition Target Goals    Target Goals Triglycerides less than 150, Weight loss of 5-10%, Waist less than 40 inches males, 35 for females, LDL-C less than 70 and non-HDL-C <100 for those with ASCVD   Patient Stated Nutrition Goals --   Education    Learning Barrier Ready to learn   Pulmonary Total Score --   Cardiac Knowledge Total Score --   Education Intervention    Education Schedule Given --   Patient Education    Education Activities of daily living, Bronchial hygiene/controlled cough, Breaking the dyspnea cycle, DM & lung disease, Heart/lung association, Med compliance, Nebulizer use, Preventing infection, Pulmonary A&P, lung/gas exchange, Pulmonary medications, Signs/symptoms of angina, Signs/symptoms of hypo/hyperglycemia, Tobacco triggers, Other (comment)   Comments --   Education Target Goals    Target Goals Correct demonstration of MDI use and care, Correct demonstration of breathing techniques   Patient Stated Education Goals --   Physician Response    MD Response --   Add or Change to Rehab Plan --   MD Signature --     Goals Comments   1. Walk [place] for 10-15 min with less shortness of breath    [x] initial  [] met                             [] not met  [] progressing     2. Shop for over half an hour with less shortness of breath    [x] initial  [] met                             [] not met  [] progressing       MD Signature: _______________________________________________        Date/Time:  _______________________________        Pharmacist Signature:_________________________________________        Date/Time: _______________________________

## 2024-02-13 ENCOUNTER — HOSPITAL ENCOUNTER (OUTPATIENT)
Facility: HOSPITAL | Age: 64
Setting detail: RECURRING SERIES
Discharge: HOME | End: 2024-02-16
Payer: MEDICARE

## 2024-02-13 PROCEDURE — G0237 THERAPEUTIC PROCD STRG ENDUR: HCPCS

## 2024-02-13 PROCEDURE — G0239 OTH RESP PROC, GROUP: HCPCS

## 2024-02-13 NOTE — PROGRESS NOTES
Pulmonary/RT Note    Visit #               Milka Signh is a 63 y.o. female presented today for pulmonary rehab. She state that there have been no changes in medication or health since the last visit.          She has a target heart rate of . She tolerated the exercise session well and has a pain level of 0. Patient states RPE for session today was 14 and RPD was a 3. Today the pt did:        Arm bike: 15 min  Weights: 5 min 3 lbs bicep curls w/coordinated breathing 3x10  Breathing retrainin min               Physician available for emergencies: Dr. Jovita Jimenez, BETHP 2024 8:46 AM

## 2024-02-15 ENCOUNTER — TELEPHONE (OUTPATIENT)
Age: 64
End: 2024-02-15

## 2024-02-15 NOTE — TELEPHONE ENCOUNTER
Patient identified with 2 identifiers (name and ).  Patient aware to take another 5 mg of Norvasc at night to see if her BP improves in the am. Also advised to take her BP and bring her BP log to appt  on 2024

## 2024-02-15 NOTE — TELEPHONE ENCOUNTER
Patient called stating that her BP has been elevated when she wakes up in the morning. Before taking her medication.   180/90, 156/106, 143/107  Patient states that after taking her medication and she checks her BP her BP has come down to 118/73, 130/70 range.   No chest pain, blurred vision, dizziness, has had a mild headache on and off over the last several days. Tylenol relieves the pain.   Reviewed patients medication.   Taking Norvasc 5 mg daily  Losartan 100 mg daily  Metoprolol  mg daily  Patient has appt on 02/20/2024 with you.   ER precautions have been reviewed with patient. Patient verbalizes understanding  Please advise

## 2024-02-17 ENCOUNTER — HOSPITAL ENCOUNTER (EMERGENCY)
Facility: HOSPITAL | Age: 64
Discharge: HOME | End: 2024-02-17
Payer: MEDICARE

## 2024-02-17 ENCOUNTER — APPOINTMENT (OUTPATIENT)
Facility: HOSPITAL | Age: 64
End: 2024-02-17
Payer: MEDICARE

## 2024-02-17 VITALS
HEART RATE: 74 BPM | TEMPERATURE: 98.3 F | BODY MASS INDEX: 29.82 KG/M2 | RESPIRATION RATE: 15 BRPM | HEIGHT: 67 IN | WEIGHT: 190 LBS | DIASTOLIC BLOOD PRESSURE: 83 MMHG | OXYGEN SATURATION: 100 % | SYSTOLIC BLOOD PRESSURE: 147 MMHG

## 2024-02-17 DIAGNOSIS — R79.89 ELEVATED TROPONIN: ICD-10-CM

## 2024-02-17 DIAGNOSIS — I15.9 SECONDARY HYPERTENSION: Primary | ICD-10-CM

## 2024-02-17 LAB
ALBUMIN SERPL-MCNC: 3.5 G/DL (ref 3.4–5)
ALBUMIN/GLOB SERPL: 0.8 (ref 0.8–1.7)
ALP SERPL-CCNC: 94 U/L (ref 45–117)
ALT SERPL-CCNC: 32 U/L (ref 13–56)
ANION GAP SERPL CALC-SCNC: 3 MMOL/L (ref 3–18)
APPEARANCE UR: CLEAR
AST SERPL-CCNC: 35 U/L (ref 10–38)
BASOPHILS # BLD: 0.1 K/UL (ref 0–0.1)
BASOPHILS NFR BLD: 1 % (ref 0–2)
BILIRUB SERPL-MCNC: 0.5 MG/DL (ref 0.2–1)
BILIRUB UR QL: NEGATIVE
BUN SERPL-MCNC: 14 MG/DL (ref 7–18)
BUN/CREAT SERPL: 15 (ref 12–20)
CALCIUM SERPL-MCNC: 9.3 MG/DL (ref 8.5–10.1)
CHLORIDE SERPL-SCNC: 104 MMOL/L (ref 100–111)
CO2 SERPL-SCNC: 31 MMOL/L (ref 21–32)
COLOR UR: YELLOW
CREAT SERPL-MCNC: 0.93 MG/DL (ref 0.6–1.3)
DIFFERENTIAL METHOD BLD: ABNORMAL
EOSINOPHIL # BLD: 0.1 K/UL (ref 0–0.4)
EOSINOPHIL NFR BLD: 2 % (ref 0–5)
ERYTHROCYTE [DISTWIDTH] IN BLOOD BY AUTOMATED COUNT: 13.3 % (ref 11.6–14.5)
GLOBULIN SER CALC-MCNC: 4.5 G/DL (ref 2–4)
GLUCOSE SERPL-MCNC: 85 MG/DL (ref 74–99)
GLUCOSE UR STRIP.AUTO-MCNC: NEGATIVE MG/DL
HCT VFR BLD AUTO: 38 % (ref 35–45)
HGB BLD-MCNC: 12 G/DL (ref 12–16)
HGB UR QL STRIP: NEGATIVE
IMM GRANULOCYTES # BLD AUTO: 0 K/UL (ref 0–0.04)
IMM GRANULOCYTES NFR BLD AUTO: 0 % (ref 0–0.5)
KETONES UR QL STRIP.AUTO: NEGATIVE MG/DL
LEUKOCYTE ESTERASE UR QL STRIP.AUTO: NEGATIVE
LYMPHOCYTES # BLD: 2.1 K/UL (ref 0.9–3.6)
LYMPHOCYTES NFR BLD: 30 % (ref 21–52)
MAGNESIUM SERPL-MCNC: 2 MG/DL (ref 1.6–2.6)
MCH RBC QN AUTO: 27 PG (ref 24–34)
MCHC RBC AUTO-ENTMCNC: 31.6 G/DL (ref 31–37)
MCV RBC AUTO: 85.4 FL (ref 78–100)
MONOCYTES # BLD: 1 K/UL (ref 0.05–1.2)
MONOCYTES NFR BLD: 14 % (ref 3–10)
NEUTS SEG # BLD: 3.7 K/UL (ref 1.8–8)
NEUTS SEG NFR BLD: 53 % (ref 40–73)
NITRITE UR QL STRIP.AUTO: NEGATIVE
NRBC # BLD: 0 K/UL (ref 0–0.01)
NRBC BLD-RTO: 0 PER 100 WBC
NT PRO BNP: 565 PG/ML (ref 0–900)
PH UR STRIP: 5.5 (ref 5–8)
PLATELET # BLD AUTO: 201 K/UL (ref 135–420)
PMV BLD AUTO: 11.6 FL (ref 9.2–11.8)
POTASSIUM SERPL-SCNC: 3.9 MMOL/L (ref 3.5–5.5)
PROT SERPL-MCNC: 8 G/DL (ref 6.4–8.2)
PROT UR STRIP-MCNC: NEGATIVE MG/DL
RBC # BLD AUTO: 4.45 M/UL (ref 4.2–5.3)
SODIUM SERPL-SCNC: 138 MMOL/L (ref 136–145)
SP GR UR REFRACTOMETRY: 1.02 (ref 1–1.03)
TROPONIN I SERPL HS-MCNC: 213 NG/L (ref 0–54)
TROPONIN I SERPL HS-MCNC: 214 NG/L (ref 0–54)
UROBILINOGEN UR QL STRIP.AUTO: 1 EU/DL (ref 0.2–1)
WBC # BLD AUTO: 7 K/UL (ref 4.6–13.2)

## 2024-02-17 PROCEDURE — 6360000002 HC RX W HCPCS: Performed by: EMERGENCY MEDICINE

## 2024-02-17 PROCEDURE — 93005 ELECTROCARDIOGRAM TRACING: CPT | Performed by: EMERGENCY MEDICINE

## 2024-02-17 PROCEDURE — 83880 ASSAY OF NATRIURETIC PEPTIDE: CPT

## 2024-02-17 PROCEDURE — 80053 COMPREHEN METABOLIC PANEL: CPT

## 2024-02-17 PROCEDURE — 83735 ASSAY OF MAGNESIUM: CPT

## 2024-02-17 PROCEDURE — 81003 URINALYSIS AUTO W/O SCOPE: CPT

## 2024-02-17 PROCEDURE — 99285 EMERGENCY DEPT VISIT HI MDM: CPT

## 2024-02-17 PROCEDURE — 84484 ASSAY OF TROPONIN QUANT: CPT

## 2024-02-17 PROCEDURE — 85025 COMPLETE CBC W/AUTO DIFF WBC: CPT

## 2024-02-17 PROCEDURE — 6370000000 HC RX 637 (ALT 250 FOR IP): Performed by: EMERGENCY MEDICINE

## 2024-02-17 PROCEDURE — 71045 X-RAY EXAM CHEST 1 VIEW: CPT

## 2024-02-17 PROCEDURE — 96374 THER/PROPH/DIAG INJ IV PUSH: CPT

## 2024-02-17 RX ORDER — FUROSEMIDE 10 MG/ML
40 INJECTION INTRAMUSCULAR; INTRAVENOUS
Status: COMPLETED | OUTPATIENT
Start: 2024-02-17 | End: 2024-02-17

## 2024-02-17 RX ORDER — ASPIRIN 81 MG/1
324 TABLET, CHEWABLE ORAL
Status: COMPLETED | OUTPATIENT
Start: 2024-02-17 | End: 2024-02-17

## 2024-02-17 RX ADMIN — ASPIRIN 81 MG CHEWABLE TABLET 324 MG: 81 TABLET CHEWABLE at 16:12

## 2024-02-17 RX ADMIN — FUROSEMIDE 40 MG: 10 INJECTION, SOLUTION INTRAMUSCULAR; INTRAVENOUS at 16:07

## 2024-02-17 ASSESSMENT — PAIN - FUNCTIONAL ASSESSMENT: PAIN_FUNCTIONAL_ASSESSMENT: NONE - DENIES PAIN

## 2024-02-17 NOTE — ED TRIAGE NOTES
Pt complaining of hypertension and headache started this AM , pt said her BP was 149/104, pt took her BP pill @8am

## 2024-02-17 NOTE — DISCHARGE INSTRUCTIONS
Please call Dr. Loja's office first thing on Monday morning to arrange for outpatient stress testing.      You do not have to return to the emergency department unless you are having new chest pain shortness of breath lightheadedness or dizziness.  In that is the case return immediately.      Continue taking your blood pressure medications as instructed.

## 2024-02-17 NOTE — ED PROVIDER NOTES
[facility] EMERGENCY DEPT  EMERGENCY DEPARTMENT ENCOUNTER      Pt Name: Milka Singh  MRN: [id number]  Birthdate 1960  Date of evaluation: 2/17/2024  Provider: ALFREDO Morfin  6:05 PM    CHIEF COMPLAINT       Chief Complaint   Patient presents with    Hypertension         HISTORY OF PRESENT ILLNESS    Milka Singh is a 63 y.o. female who presents to the emergency department with a complaint of elevated blood pressures.  She has been going to pulmonary therapy for her asthma and she noted that her blood pressure with a systolic in the 180s.  This is not normal for her.  She became concerned and started taking a log of her blood pressure recordings.  Has an appointment with her PCP on Tuesday.  Denies any changes in her medications chest pain shortness of breath.  She has not really felt fatigued either but reportedly did have a little headache this morning and took Tylenol for it.    HPI    Nursing Notes were reviewed.    REVIEW OF SYSTEMS       Review of Systems   Constitutional:  Negative for fever.   HENT: Negative.     Eyes:  Negative for visual disturbance.   Respiratory:  Negative for shortness of breath.    Cardiovascular:  Negative for chest pain.   Gastrointestinal:  Negative for abdominal pain, nausea and vomiting.   Endocrine: Negative.    Genitourinary: Negative.  Negative for flank pain.   Musculoskeletal:  Negative for gait problem.   Skin: Negative.    Allergic/Immunologic: Negative.    Neurological:  Positive for headaches. Negative for dizziness, syncope, speech difficulty, weakness, light-headedness and numbness.   Psychiatric/Behavioral: Negative.  Negative for confusion.        Except as noted above the remainder of the review of systems was reviewed and negative.       PAST MEDICAL HISTORY     Past Medical History:   Diagnosis Date    Aortic valve disorders 1/13/2011    Arthritis     Atrial fibrillation (HCC) 1/13/2011    Congestive heart failure, unspecified March 2005    Cleared quickly with  [facility] [id number]  [phone]    If symptoms worsen or unable to obtain follow up, return immediately    Anderson Loja MD  [address]  [address]  [facility] [address] [phone]            DISCHARGE MEDICATIONS:  New Prescriptions    No medications on file     Controlled Substances Monitoring:          No data to display                (Please note that portions of this note were completed with a voice recognition program.  Efforts were made to edit the dictations but occasionally words are mis-transcribed.)    ALFREDO Morfin (electronically signed)  Attending Emergency Physician           Chely Badillo PA  02/17/24 [id number]

## 2024-02-18 LAB
EKG DIAGNOSIS: NORMAL
EKG Q-T INTERVAL: 452 MS
EKG QRS DURATION: 150 MS
EKG QTC CALCULATION (BAZETT): 477 MS
EKG R AXIS: 90 DEGREES
EKG T AXIS: 49 DEGREES
EKG VENTRICULAR RATE: 67 BPM

## 2024-02-18 PROCEDURE — 93010 ELECTROCARDIOGRAM REPORT: CPT | Performed by: INTERNAL MEDICINE

## 2024-02-20 ENCOUNTER — APPOINTMENT (OUTPATIENT)
Facility: HOSPITAL | Age: 64
End: 2024-02-20
Payer: MEDICARE

## 2024-02-20 ENCOUNTER — ANTI-COAG VISIT (OUTPATIENT)
Age: 64
End: 2024-02-20
Payer: MEDICARE

## 2024-02-20 DIAGNOSIS — R79.89 ELEVATED TROPONIN: ICD-10-CM

## 2024-02-20 DIAGNOSIS — Z79.01 LONG TERM CURRENT USE OF ANTICOAGULANT THERAPY: Primary | ICD-10-CM

## 2024-02-20 DIAGNOSIS — R06.02 SHORTNESS OF BREATH: ICD-10-CM

## 2024-02-20 DIAGNOSIS — I48.91 ATRIAL FIBRILLATION, UNSPECIFIED TYPE (HCC): ICD-10-CM

## 2024-02-20 LAB
POC INR: 2.2
PROTHROMBIN TIME, POC: 26.7

## 2024-02-20 PROCEDURE — 85610 PROTHROMBIN TIME: CPT | Performed by: NURSE PRACTITIONER

## 2024-02-20 NOTE — PATIENT INSTRUCTIONS
Pharmacologic nuclear stress : Dx:  SOB, elevated troponin on 2/17/24  Echocardiogram:  Dx: SOB, elevated troponin on 2/17/24, and hx of mitral stenosis and regurgitation

## 2024-02-20 NOTE — PROGRESS NOTES
The INR is stable and therapeutic.   Continue Coumadin 5mg daily except 2.5mg on Tues-Thurs-Sat.   Recheck INR in 4 weeks    Was in ER on 2/17/24 for elevated blood pressures.  Troponin was elevated at 214 and then 213.  Cardiology was consulted and they spoke with Servando Cornelius NP (on call).  She recommended admission for stress testing but patient deferred and said that she would contact her primary cardiologist's office.      She mentioned her ER visit today and her ER records were reviewed.  She denies chest pain but has dyspnea on exertion (chronic) and is followed by pulmonology.  Will order pharmacologic nuclear stress test and echo for further evaluation.

## 2024-02-22 ENCOUNTER — APPOINTMENT (OUTPATIENT)
Facility: HOSPITAL | Age: 64
End: 2024-02-22
Payer: MEDICARE

## 2024-02-27 ENCOUNTER — APPOINTMENT (OUTPATIENT)
Facility: HOSPITAL | Age: 64
End: 2024-02-27
Payer: MEDICARE

## 2024-02-29 ENCOUNTER — HOSPITAL ENCOUNTER (OUTPATIENT)
Facility: HOSPITAL | Age: 64
Setting detail: RECURRING SERIES
End: 2024-02-29
Payer: MEDICARE

## 2024-02-29 ENCOUNTER — APPOINTMENT (OUTPATIENT)
Facility: HOSPITAL | Age: 64
End: 2024-02-29
Payer: MEDICARE

## 2024-02-29 PROCEDURE — G0237 THERAPEUTIC PROCD STRG ENDUR: HCPCS

## 2024-02-29 PROCEDURE — G0239 OTH RESP PROC, GROUP: HCPCS

## 2024-02-29 NOTE — PROGRESS NOTES
Pulmonary/RT Note    Visit #      8/36         Milka Singh is a 63 y.o. female presented today for pulmonary rehab. She state that there have been  changes in medication or health since the last visit.       She went to the ER for hypertension, was give lasix and now she takes Amlodipine 2x/day now, she has a f/u appointment today.         She has a target heart rate of . She tolerated the exercise session well and has a pain level of 6 in her left foot which has a brace, she denies chest pain. Patient states RPE for session today was 12 and RPD was a 3. Today the pt did:      Arm bike:15  min  Breathing retraining and Asthma action plan: 20 min             Physician available for emergencies: Dr. Jovita Jimenez, SANDI 2/29/2024 10:04 AM

## 2024-03-04 NOTE — TELEPHONE ENCOUNTER
This patient contacted the office for the following prescriptions to be refilled:    Medication requested :   Requested Prescriptions     Pending Prescriptions Disp Refills    busPIRone (BUSPAR) 5 MG tablet [Pharmacy Med Name: BUSPIRONE HCL 5 MG TABLET] 60 tablet 5     Sig: TAKE 1 TABLET BY MOUTH TWICE A DAY    sertraline (ZOLOFT) 100 MG tablet [Pharmacy Med Name: SERTRALINE  MG TABLET] 30 tablet 5     Sig: TAKE 1 TABLET BY MOUTH EVERY DAY       Last refilled: 10/09/2023, 90 tabs, 1 RF Zoloft  Buspar 09/06/2023 60 tabs, 5 RF  PCP: Leda Nguyen MD  LOV: 11/17/2023  NOV:03/08/2024  FUTURE APPT:   Future Appointments   Date Time Provider Department Center   3/5/2024  8:30 AM [facility] PULM EXERCISE [facility]   3/7/2024  8:30 AM [facility]ULM [facility]ERC[facility]   3/8/2024 10:40 AM Zac Brand MD [facility] AMB   3/12/2024  8:30 AM [facility]ULM EXERCISE [facility]   3/14/2024  8:30 AM [facility]ULM [facility]ERC[facility]   3/19/2024  8:30 AM [facility]ULM [facility]ERC[facility]   3/19/2024  9:30 AM [facility] PT INR HV [facility] AMB   3/21/2024  8:30 AM [facility]ULM EXERCISE [facility]   3/26/2024  8:00 AM [facility]ULM EXERC[facility]   3/26/2024  8:30 AM [facility] HV NM ROOM [facility] AMB   3/28/2024  8:30 AM [facility]ULM EXERCISE [facility]   4/2/2024  8:30 AM [facility]ULM EXERCISE [facility]   4/4/2024  8:30 AM [facility]ULM EXERC[facility]   4/9/2024  8:30 AM [facility]ULM EXERCISE [facility]   4/11/2024  8:30 AM [facility]ULM EXERCISE [facility]   4/16/2024  8:00 AM [facility]M [facility]ERCISE [facility]   4/16/2024  8:45 AM Andreas Ordoñez DO [facility] BS AMB   4/18/2024  8:30 AM [facility]ULM EXERCISE [facility]     LABS:       Thank you.

## 2024-03-05 ENCOUNTER — HOSPITAL ENCOUNTER (OUTPATIENT)
Facility: HOSPITAL | Age: 64
Setting detail: RECURRING SERIES
Discharge: HOME | End: 2024-03-08

## 2024-03-05 RX ORDER — SERTRALINE HYDROCHLORIDE 100 MG/1
100 TABLET, FILM COATED ORAL DAILY
Qty: 30 TABLET | Refills: 5 | Status: SHIPPED | OUTPATIENT
Start: 2024-03-05

## 2024-03-05 RX ORDER — BUSPIRONE HYDROCHLORIDE 5 MG/1
TABLET ORAL
Qty: 60 TABLET | Refills: 5 | Status: SHIPPED | OUTPATIENT
Start: 2024-03-05

## 2024-03-05 NOTE — PROGRESS NOTES
Pulmonary/RT Note    Pt arrived of pulmonary rehab today, she did not exercise, she was advised and plans to resume rehab after speaking with her doctor.

## 2024-03-07 ENCOUNTER — APPOINTMENT (OUTPATIENT)
Facility: HOSPITAL | Age: 64
End: 2024-03-07
Payer: MEDICARE

## 2024-03-12 ENCOUNTER — TELEPHONE (OUTPATIENT)
Age: 64
End: 2024-03-12

## 2024-03-12 DIAGNOSIS — I11.0 HYPERTENSIVE HEART DISEASE WITH HEART FAILURE (HCC): ICD-10-CM

## 2024-03-12 RX ORDER — SERTRALINE HYDROCHLORIDE 100 MG/1
100 TABLET, FILM COATED ORAL DAILY
Qty: 90 TABLET | Refills: 1 | Status: SHIPPED | OUTPATIENT
Start: 2024-03-12

## 2024-03-12 RX ORDER — METOPROLOL SUCCINATE 100 MG/1
TABLET, EXTENDED RELEASE ORAL
Qty: 90 TABLET | Refills: 1 | Status: SHIPPED | OUTPATIENT
Start: 2024-03-12

## 2024-03-12 NOTE — TELEPHONE ENCOUNTER
Pt states that she needs a referral to pulmonary Dr Andreas Ordoñez for her asthma. She was seen at [facility] on 2/17/2024 and they would like her to f/u with him.     Patient is requesting (New  Updated) referral to the following office:    Speciality: pulmonary  Specialist Name: Andreas Ordoñez  Office Location: [address] [place] [id number]  Phone (if available): [phone]  Fax (if available): [phone]  Diagnosis:  asthma Continue care   Date of appointment (if scheduled): [employer]

## 2024-03-13 DIAGNOSIS — J45.40 MODERATE PERSISTENT ASTHMA WITHOUT COMPLICATION: Primary | ICD-10-CM

## 2024-03-14 ENCOUNTER — HOSPITAL ENCOUNTER (OUTPATIENT)
Facility: HOSPITAL | Age: 64
Setting detail: RECURRING SERIES
End: 2024-03-14
Payer: MEDICARE

## 2024-03-14 ASSESSMENT — EXERCISE STRESS TEST
PEAK_RPE: 15
PEAK_BP: 145/84
PEAK_HR: 96
PEAK_BP: 136/85

## 2024-03-14 ASSESSMENT — PULMONARY FUNCTION TESTS
FEV1 (%PREDICTED): 96
FEV1/FVC: 88
FVC (LITERS): 85

## 2024-03-14 ASSESSMENT — LIFESTYLE VARIABLES
ALCOHOL_USE: SPECIAL
ALCOHOL_AMOUNT: RARELY
ALCOHOL_TYPE: BEER

## 2024-03-14 ASSESSMENT — EJECTION FRACTION: EF_VALUE: 55

## 2024-03-14 NOTE — PROGRESS NOTES
sugars, Overall healthy eating pattern that emphasizes vegetables, fruits, whole grains, healthy proteins and non-tropical oils, Reduced calorie/portion controlled diet, Other (comment)   Comments --   Nutrition Target Goals    Target Goals Triglycerides less than 150, Weight loss of 5-10%, Waist less than 40 inches males, 35 for females, LDL-C less than 70 and non-HDL-C <100 for those with ASCVD   Patient Stated Nutrition Goals --   Education    Learning Barrier Ready to learn   Pulmonary Total Score --   Cardiac Knowledge Total Score --   Education Intervention    Education Schedule Given --   Patient Education    Education Activities of daily living, Breaking the dyspnea cycle, Bronchial hygiene/controlled cough, Heart/lung association, DM & lung disease, Med compliance, Nebulizer use, Preventing infection, Pulmonary A&P, lung/gas exchange, Pulmonary medications, Other (comment), Tobacco triggers, Signs/symptoms of hypo/hyperglycemia   Comments --   Education Target Goals    Target Goals Correct demonstration of MDI use and care, Correct demonstration of breathing techniques   Patient Stated Education Goals --   Physician Response    MD Response --   Add or Change to Rehab Plan --   MD Signature --     Goals Comments   1. Walk [place] for 10-15 min with less shortness of breath    [x] initial  [] met                             [] not met  [] progressing     2. Shop for over half an hour with less shortness of breath    [x] initial  [] met                             [] not met  [] progressing       MD Signature: _______________________________________________        Date/Time:  _______________________________        Pharmacist Signature:_________________________________________        Date/Time: _______________________________

## 2024-03-14 NOTE — TELEPHONE ENCOUNTER
Patient aware that referral had been placed to pulmonary as requested. Patient has address an phone # to call and schedule her appt.

## 2024-03-19 ENCOUNTER — HOSPITAL ENCOUNTER (OUTPATIENT)
Facility: HOSPITAL | Age: 64
Setting detail: RECURRING SERIES
Discharge: HOME | End: 2024-03-22
Payer: MEDICARE

## 2024-03-19 PROCEDURE — G0237 THERAPEUTIC PROCD STRG ENDUR: HCPCS

## 2024-03-19 NOTE — PROGRESS NOTES
Pulmonary/RT Note    Visit #               Milka Singh is a 63 y.o. female presented today for pulmonary rehab. She state that there have been no changes in medication or health since the last visit. She received clearance from her doctor to resume. She often gets hoarse after taking her inhalers. We discussed avoiding triggers, she enjoys taking neighborhood walks.         She has a target heart rate of . She tolerated the exercise session well and has a pain level of 0 today but has had knee aches over the past week. Patient states RPE for session today was 14 and RPD was a 3. Today the pt did:      Arm bike: 15 min, required a 2 min recovery period to decreased SOB  Breathing retrainin min  Recovery/monitoring: 15 min               Physician available for emergencies: Dr. Jovita Jimenez, SANDI 3/19/2024 8:17 AM   18

## 2024-03-21 ENCOUNTER — HOSPITAL ENCOUNTER (OUTPATIENT)
Facility: HOSPITAL | Age: 64
Setting detail: RECURRING SERIES
End: 2024-03-21
Payer: MEDICARE

## 2024-03-26 ENCOUNTER — APPOINTMENT (OUTPATIENT)
Facility: HOSPITAL | Age: 64
End: 2024-03-26
Payer: MEDICARE

## 2024-03-26 ENCOUNTER — ANTI-COAG VISIT (OUTPATIENT)
Age: 64
End: 2024-03-26
Payer: MEDICARE

## 2024-03-26 DIAGNOSIS — Z79.01 LONG TERM CURRENT USE OF ANTICOAGULANT THERAPY: Primary | ICD-10-CM

## 2024-03-26 DIAGNOSIS — I48.91 ATRIAL FIBRILLATION, UNSPECIFIED TYPE (HCC): ICD-10-CM

## 2024-03-26 LAB
POC INR: 3.3
PROTHROMBIN TIME, POC: 39.7

## 2024-03-26 PROCEDURE — 85610 PROTHROMBIN TIME: CPT | Performed by: NURSE PRACTITIONER

## 2024-03-26 NOTE — PROGRESS NOTES
The INR is above the therapeutic range.  Ask the patient about bleeding complications.  Please make the following adjustments to Coumadin dosing: Hold today then Continue Coumadin 5mg daily except 2.5mg on Tues-Thurs-Sat..  Repeat the INR in 4 weeks.

## 2024-03-28 ENCOUNTER — HOSPITAL ENCOUNTER (OUTPATIENT)
Facility: HOSPITAL | Age: 64
Setting detail: RECURRING SERIES
Discharge: HOME | End: 2024-03-31
Payer: MEDICARE

## 2024-03-28 PROCEDURE — G0237 THERAPEUTIC PROCD STRG ENDUR: HCPCS

## 2024-03-28 ASSESSMENT — EJECTION FRACTION: EF_VALUE: 53

## 2024-03-28 NOTE — PROGRESS NOTES
Pulmonary/RT Note    Visit #    10/36           Milka Singh is a 63 y.o. female presented today for pulmonary rehab. She state that there have been no changes in medication or health since the last visit.    She had an asthma trigger yesterday evening from exposure to perfume. Her symptoms have improved and she's still recovering.  She has routinely taken her rescue inhaler every 4-6 hour and her maintenance medications.     We discussed symptoms to watch out for as she is familiar and managing and an action plan, she was given a peak flow meter to use once she recovers from her flare up, exercises were limited today, she is also waiting for her stress test results.       She has a target heart rate of . She tolerated the exercise session well and has a pain level of 5 today in both knees. Patient states RPE for session today was 11 and RPD was a 1. Today the pt did:      Breathing retraining: 15 min  Recovery/monitoring: 15 min               Physician available for emergencies: Dr. Rosa Jimenez, SANDI 3/28/2024 8:58 AM

## 2024-04-01 PROBLEM — F33.1 MODERATE EPISODE OF RECURRENT MAJOR DEPRESSIVE DISORDER (HCC): Status: ACTIVE | Noted: 2024-04-01

## 2024-04-01 PROBLEM — I25.119 ATHEROSCLEROSIS OF NATIVE CORONARY ARTERY OF NATIVE HEART WITH ANGINA PECTORIS (HCC): Status: ACTIVE | Noted: 2020-11-03

## 2024-04-01 RX ORDER — CYCLOBENZAPRINE HCL 10 MG
10 TABLET ORAL NIGHTLY PRN
Qty: 90 TABLET | Refills: 1 | OUTPATIENT
Start: 2024-04-01

## 2024-04-02 ENCOUNTER — TELEPHONE (OUTPATIENT)
Age: 64
End: 2024-04-02

## 2024-04-02 ENCOUNTER — APPOINTMENT (OUTPATIENT)
Facility: HOSPITAL | Age: 64
End: 2024-04-02
Payer: MEDICARE

## 2024-04-02 NOTE — TELEPHONE ENCOUNTER
----- Message from JOSE Carlson NP sent at 3/29/2024 11:21 AM EDT -----  Please call and let her know that her stress test was negative. It showed normal LV perfusion and no evidence of inducible ischemia.    Whit Angeles  ----- Message -----  From: Zca Brand MD  Sent: 3/26/2024   1:08 PM EDT  To: JOSE Carlson NP

## 2024-04-04 ENCOUNTER — HOSPITAL ENCOUNTER (OUTPATIENT)
Facility: HOSPITAL | Age: 64
Setting detail: RECURRING SERIES
End: 2024-04-04
Payer: MEDICARE

## 2024-04-09 ENCOUNTER — APPOINTMENT (OUTPATIENT)
Facility: HOSPITAL | Age: 64
End: 2024-04-09
Payer: MEDICARE

## 2024-04-11 ENCOUNTER — HOSPITAL ENCOUNTER (OUTPATIENT)
Facility: HOSPITAL | Age: 64
Setting detail: RECURRING SERIES
End: 2024-04-11
Payer: MEDICARE

## 2024-04-15 RX ORDER — BUSPIRONE HYDROCHLORIDE 5 MG/1
TABLET ORAL
Qty: 180 TABLET | Refills: 1 | Status: SHIPPED | OUTPATIENT
Start: 2024-04-15

## 2024-04-16 ENCOUNTER — HOSPITAL ENCOUNTER (OUTPATIENT)
Facility: HOSPITAL | Age: 64
Setting detail: RECURRING SERIES
End: 2024-04-16
Payer: MEDICARE

## 2024-04-17 ENCOUNTER — OFFICE VISIT (OUTPATIENT)
Age: 64
End: 2024-04-17
Payer: MEDICARE

## 2024-04-17 ENCOUNTER — ANTI-COAG VISIT (OUTPATIENT)
Age: 64
End: 2024-04-17
Payer: MEDICARE

## 2024-04-17 VITALS
HEIGHT: 67 IN | WEIGHT: 198 LBS | HEART RATE: 70 BPM | OXYGEN SATURATION: 95 % | BODY MASS INDEX: 31.08 KG/M2 | DIASTOLIC BLOOD PRESSURE: 80 MMHG | SYSTOLIC BLOOD PRESSURE: 132 MMHG

## 2024-04-17 DIAGNOSIS — I48.91 ATRIAL FIBRILLATION, UNSPECIFIED TYPE (HCC): ICD-10-CM

## 2024-04-17 DIAGNOSIS — R79.89 ELEVATED TROPONIN: ICD-10-CM

## 2024-04-17 DIAGNOSIS — I48.91 ATRIAL FIBRILLATION, UNSPECIFIED TYPE (HCC): Primary | ICD-10-CM

## 2024-04-17 DIAGNOSIS — R06.02 SHORTNESS OF BREATH: ICD-10-CM

## 2024-04-17 DIAGNOSIS — I11.0 HYPERTENSIVE HEART DISEASE WITH HEART FAILURE (HCC): ICD-10-CM

## 2024-04-17 DIAGNOSIS — Z79.01 LONG TERM CURRENT USE OF ANTICOAGULANT THERAPY: Primary | ICD-10-CM

## 2024-04-17 DIAGNOSIS — Z79.01 LONG TERM CURRENT USE OF ANTICOAGULANT THERAPY: ICD-10-CM

## 2024-04-17 LAB — INTERNATIONAL NORMALIZATION RATIO, POC: 2.1

## 2024-04-17 PROCEDURE — G8427 DOCREV CUR MEDS BY ELIG CLIN: HCPCS | Performed by: INTERNAL MEDICINE

## 2024-04-17 PROCEDURE — 1090F PRES/ABSN URINE INCON ASSESS: CPT | Performed by: INTERNAL MEDICINE

## 2024-04-17 PROCEDURE — G8417 CALC BMI ABV UP PARAM F/U: HCPCS | Performed by: INTERNAL MEDICINE

## 2024-04-17 PROCEDURE — 85610 PROTHROMBIN TIME: CPT | Performed by: INTERNAL MEDICINE

## 2024-04-17 PROCEDURE — 1036F TOBACCO NON-USER: CPT | Performed by: INTERNAL MEDICINE

## 2024-04-17 PROCEDURE — 3075F SYST BP GE 130 - 139MM HG: CPT | Performed by: INTERNAL MEDICINE

## 2024-04-17 PROCEDURE — 93000 ELECTROCARDIOGRAM COMPLETE: CPT | Performed by: INTERNAL MEDICINE

## 2024-04-17 PROCEDURE — 3079F DIAST BP 80-89 MM HG: CPT | Performed by: INTERNAL MEDICINE

## 2024-04-17 PROCEDURE — 1123F ACP DISCUSS/DSCN MKR DOCD: CPT | Performed by: INTERNAL MEDICINE

## 2024-04-17 PROCEDURE — 3017F COLORECTAL CA SCREEN DOC REV: CPT | Performed by: INTERNAL MEDICINE

## 2024-04-17 PROCEDURE — G8399 PT W/DXA RESULTS DOCUMENT: HCPCS | Performed by: INTERNAL MEDICINE

## 2024-04-17 PROCEDURE — 99214 OFFICE O/P EST MOD 30 MIN: CPT | Performed by: INTERNAL MEDICINE

## 2024-04-17 ASSESSMENT — PATIENT HEALTH QUESTIONNAIRE - PHQ9
1. LITTLE INTEREST OR PLEASURE IN DOING THINGS: NOT AT ALL
7. TROUBLE CONCENTRATING ON THINGS, SUCH AS READING THE NEWSPAPER OR WATCHING TELEVISION: NOT AT ALL
SUM OF ALL RESPONSES TO PHQ QUESTIONS 1-9: 0
SUM OF ALL RESPONSES TO PHQ QUESTIONS 1-9: 0
5. POOR APPETITE OR OVEREATING: NOT AT ALL
10. IF YOU CHECKED OFF ANY PROBLEMS, HOW DIFFICULT HAVE THESE PROBLEMS MADE IT FOR YOU TO DO YOUR WORK, TAKE CARE OF THINGS AT HOME, OR GET ALONG WITH OTHER PEOPLE: NOT DIFFICULT AT ALL
9. THOUGHTS THAT YOU WOULD BE BETTER OFF DEAD, OR OF HURTING YOURSELF: NOT AT ALL
6. FEELING BAD ABOUT YOURSELF - OR THAT YOU ARE A FAILURE OR HAVE LET YOURSELF OR YOUR FAMILY DOWN: NOT AT ALL
8. MOVING OR SPEAKING SO SLOWLY THAT OTHER PEOPLE COULD HAVE NOTICED. OR THE OPPOSITE, BEING SO FIGETY OR RESTLESS THAT YOU HAVE BEEN MOVING AROUND A LOT MORE THAN USUAL: NOT AT ALL
SUM OF ALL RESPONSES TO PHQ QUESTIONS 1-9: 0
SUM OF ALL RESPONSES TO PHQ9 QUESTIONS 1 & 2: 0
3. TROUBLE FALLING OR STAYING ASLEEP: NOT AT ALL
2. FEELING DOWN, DEPRESSED OR HOPELESS: NOT AT ALL
4. FEELING TIRED OR HAVING LITTLE ENERGY: NOT AT ALL
SUM OF ALL RESPONSES TO PHQ QUESTIONS 1-9: 0

## 2024-04-17 NOTE — PROGRESS NOTES
Milka Singh presents today for   Chief Complaint   Patient presents with    Follow-up     After testing    Edema     Right foot       Milka Singh preferred language for health care discussion is english/other.    Is someone accompanying this pt? no    Is the patient using any DME equipment during OV? no    Depression Screening:  Depression: Not at risk (4/17/2024)    PHQ-2     PHQ-2 Score: 0        Learning Assessment:  Who is the primary learner? Patient    What is the preferred language for health care of the primary learner? ENGLISH    How does the primary learner prefer to learn new concepts? DEMONSTRATION    Answered By patient    Relationship to Learner SELF           Pt currently taking Anticoagulant therapy? no    Pt currently taking Antiplatelet therapy ? Aspirin  warfarin      Coordination of Care:  1. Have you been to the ER, urgent care clinic since your last visit? Hospitalized since your last visit? no    2. Have you seen or consulted any other health care providers outside of the [facility] since your last visit? Include any pap smears or colon screening. no    
EVERY DAY 18 g 4    cyclobenzaprine (FLEXERIL) 10 MG tablet Take 1 tablet by mouth 3 times daily as needed for Muscle spasms 30 tablet 0    amLODIPine (NORVASC) 5 MG tablet TAKE 1 TABLET BY MOUTH EVERY DAY 90 tablet 3    famotidine (PEPCID) 40 MG tablet Take 1 tablet by mouth daily 90 tablet 3    acetaminophen (TYLENOL) 500 MG tablet Take 650 mg by mouth every 6 hours as needed      aspirin 81 MG EC tablet Take 1 tablet by mouth daily      budesonide (RINOCORT AQUA) 32 MCG/ACT nasal spray       melatonin 5 MG TABS tablet Take 2 tablets by mouth      polyethylene glycol (GLYCOLAX) 17 GM/SCOOP powder Take 17 g by mouth daily       No current facility-administered medications for this visit.       The patient has a family history of    Social History     Tobacco Use    Smoking status: Former     Current packs/day: 0.00     Average packs/day: 0.5 packs/day for 26.0 years (13.0 ttl pk-yrs)     Types: Cigarettes     Start date: 1982     Quit date: 2008     Years since quittin.3     Passive exposure: Never    Smokeless tobacco: Never   Vaping Use    Vaping Use: Never used   Substance Use Topics    Alcohol use: Not Currently     Alcohol/week: 1.0 standard drink of alcohol    Drug use: Never       Lab Results   Component Value Date/Time    CHOL 159 2024 09:34 AM    HDL 79 2024 09:34 AM        BP Readings from Last 3 Encounters:   24 126/80   24 (!) 142/80   24 (!) 147/83        Pulse Readings from Last 3 Encounters:   24 88   24 74   24 74       Wt Readings from Last 3 Encounters:   24 88.9 kg (196 lb)   24 86.2 kg (190 lb)   24 86.2 kg (190 lb)         EKG: atrial fibrillation, rate 70 bpm, RBBB, unchanged from previous tracings.     Zac Brand MD   2024

## 2024-04-18 ENCOUNTER — HOSPITAL ENCOUNTER (OUTPATIENT)
Facility: HOSPITAL | Age: 64
Setting detail: RECURRING SERIES
Discharge: HOME | End: 2024-04-21
Payer: MEDICARE

## 2024-04-18 PROCEDURE — G0237 THERAPEUTIC PROCD STRG ENDUR: HCPCS

## 2024-04-18 ASSESSMENT — PULMONARY FUNCTION TESTS
FVC (LITERS): 85
FEV1/FVC: 88
FEV1 (%PREDICTED): 96

## 2024-04-18 ASSESSMENT — LIFESTYLE VARIABLES
ALCOHOL_AMOUNT: RARELY
ALCOHOL_USE: SPECIAL
ALCOHOL_TYPE: BEER

## 2024-04-18 ASSESSMENT — EXERCISE STRESS TEST
PEAK_RPE: 15
PEAK_BP: 128/81
PEAK_BP: 145/84
PEAK_HR: 96

## 2024-04-18 ASSESSMENT — EJECTION FRACTION: EF_VALUE: 55

## 2024-04-18 NOTE — PROGRESS NOTES
Pulmonary/RT Note    Visit #     11/36          Milka Singh is a 63 y.o. female presented today for pulmonary rehab. She state that there have been no changes in medication or health since the last visit.     She's resuming rehab after having her follow-up with her cardiologist.         She has a target heart rate of . She tolerated the exercise session well and has a pain level of 0. Patient states RPE for session today was 14 and RPD was a 3. Today the pt did:      Arm bike: 15 min 5-10 mo  Breathing retraining: 15 min  Recovery/monitoring: 15 min               Physician available for emergencies: Dr. Jovita Jimenez, SANDI 4/18/2024 8:40 AM

## 2024-04-18 NOTE — Clinical Note
Resting /79   Peak /81   Is BP WDL Yes   Exercise Intervention    Type --   Frequency --   Duration --   Resistance Training --   Comments --   Exercise Education    Education --   Exercise Target Group    Target Goal(s) --   Patient Stated Exercise Goals --   Psychosocial    Stages of Change Maintenance   Family Support --   [facility] Total Score --   Comments --   Psychosocial Intervention    Interventions No intervention indicated   Consults --   Resources Provided --   Currently Taking Psychotropic Meds Yes   Medication Changes --   Psychosocial Education    Education Advanced directives, Environmental triggers, Impact self care behaviors on health, Stress management class   Psychosocial Target Goals    Target Goal(s) --   Uses Stress Mgmt Techniques --   Patient Stated Psychosocial Goals --   Tobacco    Stages of Change --   Tobacco Use No   Quit Greater than 6 month   Date Started --   Date Quit --   Quit Date Set --   # Cigarette Smoked/Day 11 pk years   Smokeless Tobacco Use --   Amount --   Tobacco Cessation Intervention    Smoking Cessation Referral --   Tobacco Adjunct --   Tobacco Education    Resource Information Provided --   Tobacco Triggers --   Target Goal    Target Goal --   Patient Stated Tobacco Goals --   Nutrition    Stages of Change Maintenance   Diabetes No   FBS --   FBS Date --   HgbA1c --   HgbA1c Date --   BG at Home No   BG Frequency --   Diabetes Med(s) --   Diabetes Med(s) Change --   BG in Range --   Random BG --   Lipids    Date Lipids Drawn 03/20/23   Total 140   HDL 70   LDL 48   Triglycerides 109   Lipid Med(s) --   Lipid Med Change(s) --   Weight Management    Weight 89.4 kg (197 lb)   Height 1.702 m (5' 7\")   BMI 30.92   Percent Body Fat --   Weight Goal 138 lb   Waist Circumference 45 in   Alcohol Special   Type beer   Amount rarely   Nutrition Assessment Tool RYP   Rate Your Plate Total Score --   Nutrition Intervention    Dietitian Consult --   Nurse/Patient

## 2024-04-22 RX ORDER — FAMOTIDINE 40 MG/1
40 TABLET, FILM COATED ORAL DAILY
Qty: 30 TABLET | Refills: 11 | Status: SHIPPED | OUTPATIENT
Start: 2024-04-22

## 2024-04-23 ENCOUNTER — HOSPITAL ENCOUNTER (OUTPATIENT)
Facility: HOSPITAL | Age: 64
Setting detail: RECURRING SERIES
Discharge: HOME | End: 2024-04-26
Payer: MEDICARE

## 2024-04-23 PROCEDURE — G0237 THERAPEUTIC PROCD STRG ENDUR: HCPCS

## 2024-04-23 NOTE — PROGRESS NOTES
Milka Singh #[id number] (CSN:[id number]) (63 y.o. 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]   Referral Date 12/15/23   Significant Cardiovascular History Chronic atrial fibrillation   Co-morbidities --   Oxygen Saturation / Titration    Stages of Change --   Oxygen Intervention    Oxygen Use No  [she does not wear oxygen]   Individual Treatment Plan    ITP Visit Type Re-assessment   1st Date of Exercise 01/23/24   ITP Next Review Date 05/13/24   Visit #/Total Visits 11/36   EF% 55 %   FVC (Liters) 85 liters   FEV1 (%PRED) 96   FEV1/FVC 88   DLCO (mL/mmHg sec) 57 ml/mmHg sec   Risk Stratification Low   Pulmonary ITP --   Exercise    Maguire Total Score --   Stages of Change Action   Exercise Test Six minute walk test   Distance Walked in mi   Distance Walked (ft) --   Distance Walked (miles) 0.12   Distance Walked in Feet (Calculated) 633.6 ft   Distance Walked in Meters --   Peak HR 96   Peak /84   Peak RPE 15   Peak Mets --   O2 Saturation 93   O2 Flow Rate (l/min) --   Stops 0   SPO2 Range 93-97   DASI Total Score --   BMI (Calculated) --   FEV1 (%PRED) 96   RAMOS Total Score --   Exercise Prescription    Mode Treadmill, Bike, Stepper, Rower, Other (comment)   Frequency per week 2 times   Duration Per Session 60 minutes   Intensity METS       2-3   Progression moderate   SpO2 --   O2 Device Room air   O2 Flow Rate (l/min) --   FIO2 (%) --   O2 Temperature --   Comments --   Symptoms with Exercise Shortness of breath   Target Heart Rate    Resistance Training --   Weight --   Reps --   Assisted Devices --   Exercise Blood Pressures    Resting /79   Peak /81   Is BP WDL Yes   Exercise Intervention    Type Strengthening   Frequency 10   Duration 20 minutes   Resistance Training --   Comments

## 2024-04-23 NOTE — PROGRESS NOTES
Pulmonary/RT Note    Visit #       12/36       Milka Singh is a 63 y.o. female presented today for pulmonary rehab. She state that there have been no changes in medication or health since the last visit.          She has a target heart rate of . She tolerated the exercise session well and has a pain level of 0. Patient states RPE for session today was 14 and RPD was a 3. Today the pt did:      Nustep: 15 min w/ workload of 4  Arm bike: 15 min 10 mo  Breathing retraining:15 min               Physician available for emergencies: Dr. Que Jimenez, SANDI 4/23/2024 8:15 AM

## 2024-04-25 ENCOUNTER — HOSPITAL ENCOUNTER (OUTPATIENT)
Facility: HOSPITAL | Age: 64
Setting detail: RECURRING SERIES
Discharge: HOME | End: 2024-04-28
Payer: MEDICARE

## 2024-04-25 PROCEDURE — G0237 THERAPEUTIC PROCD STRG ENDUR: HCPCS

## 2024-04-25 NOTE — PROGRESS NOTES
Pulmonary/RT Note    Visit #    13/36           Milka Singh is a 63 y.o. female presented today for pulmonary rehab. She state that there have been no changes in medication or health since the last visit.     She had a mild flare up that Tuesday that caused more frequent coughing, it resolved later that day by her limiting her activity and continuing her routine medication regimen. She got allergy shots the week prior to.      We discussed the use, benefits of and when to take her peak flow meter values in relation to the action plan.          She has a target heart rate of . She tolerated the exercise session well and has a pain level of 1 in her knees today. Patient states RPE for session today was 11 and RPD was a 1. Today the pt did:      Nustep:8 min  Arm bike: 15 min  Breathing retraining and PLB ed: 20 min  Recovery: 15 min               Physician available for emergencies: Dr. Sybil Jimenez, SANDI 4/25/2024 8:21 AM

## 2024-04-29 ENCOUNTER — TELEPHONE (OUTPATIENT)
Age: 64
End: 2024-04-29

## 2024-04-30 ENCOUNTER — HOSPITAL ENCOUNTER (OUTPATIENT)
Facility: HOSPITAL | Age: 64
Setting detail: RECURRING SERIES
End: 2024-04-30
Payer: MEDICARE

## 2024-04-30 RX ORDER — FLUTICASONE PROPIONATE AND SALMETEROL 500; 50 UG/1; UG/1
1 POWDER RESPIRATORY (INHALATION) EVERY 12 HOURS
Qty: 60 EACH | Refills: 5 | Status: SHIPPED | OUTPATIENT
Start: 2024-04-30

## 2024-05-02 ENCOUNTER — HOSPITAL ENCOUNTER (OUTPATIENT)
Facility: HOSPITAL | Age: 64
Setting detail: RECURRING SERIES
Discharge: HOME | End: 2024-05-05
Payer: MEDICARE

## 2024-05-02 PROCEDURE — G0237 THERAPEUTIC PROCD STRG ENDUR: HCPCS

## 2024-05-02 NOTE — PROGRESS NOTES
Pulmonary/RT Note    Visit #   14/36            Milka Singh is a 63 y.o. female presented today for pulmonary rehab. She state that there have been no changes in medication or health since the last visit. She has been more short of breath as a result of an active weekend and in recovery.          She has a target heart rate of . She tolerated the exercise session well and has a pain level of 3 in both knees. Patient states RPE for session today was 11 and RPD was a 1. Today the pt did:      Nustep: 5 min w/workload of 4  Arm bike: 18 min  Breathing retraining: 15 min  Recovery: 15 min               Physician available for emergencies: Dr. Jovita Jimenez, SANDI 5/2/2024 8:57 AM

## 2024-05-07 ENCOUNTER — HOSPITAL ENCOUNTER (OUTPATIENT)
Facility: HOSPITAL | Age: 64
Setting detail: RECURRING SERIES
Discharge: HOME | End: 2024-05-10
Payer: MEDICARE

## 2024-05-07 PROCEDURE — G0237 THERAPEUTIC PROCD STRG ENDUR: HCPCS

## 2024-05-07 PROCEDURE — G0239 OTH RESP PROC, GROUP: HCPCS

## 2024-05-07 NOTE — PROGRESS NOTES
Pulmonary/RT Note    Visit #    15/36           Milka Singh is a 63 y.o. female presented today for pulmonary rehab. She state that there have been no changes in medication or health since the last visit.          She has a target heart rate of . He tolerated the exercise session well and has a pain level of 2 in both knees. Patient states RPE for session today was 12 and RPD was a 2. Today the pt did:      Nustep:10 min w/workload of 4  Arm bike: 15 min 5-10 mo  Walkin min 1.0 mph  Breathing retraining:15 min             Physician available for emergencies: Dr. Sybil Jimenez, SANDI 2024 8:40 AM

## 2024-05-09 ENCOUNTER — HOSPITAL ENCOUNTER (OUTPATIENT)
Facility: HOSPITAL | Age: 64
Setting detail: RECURRING SERIES
Discharge: HOME | End: 2024-05-12
Payer: MEDICARE

## 2024-05-09 PROCEDURE — G0237 THERAPEUTIC PROCD STRG ENDUR: HCPCS

## 2024-05-09 PROCEDURE — G0239 OTH RESP PROC, GROUP: HCPCS

## 2024-05-09 NOTE — PROGRESS NOTES
Pulmonary/RT Note    Visit #        16/36       Milka Singh is a 63 y.o. female presented today for pulmonary rehab. She state that there have been no changes in medication or health since the last visit.     She has been coughing at night and has woke up coughing more frequent over past few days and she took her inhaler this morning after waking up and was advised to take it every four hours over the next 24 hours. She went shopping and was triggered by perfume, we discussed ways to avoid triggers such as shopping during the mornings or evening when there's typically less crowds. We discussed steps to take in her action plan and when to call the doctor.     She demonstrated good understanding and technique of peak flowmeter use, she will begin journaling numbers to determine personal best, her triggers and symptoms.         She has a target heart rate of . She tolerated the exercise session well and has a pain level of 7 in knees. Patient states RPE for session today was 14 and RPD was a 3. Today the pt did:      Nustep: 10 min  Arm bike:15  min  Breathing retraining: 15 min  Education: 5 min  Peak flow re-education, Avoiding Smoking/ triggers   Recovery: 15 min             Physician available for emergencies:           Venice Jimenez RCP 5/9/2024 10:16 AM

## 2024-05-13 RX ORDER — AMLODIPINE BESYLATE 5 MG/1
TABLET ORAL
Qty: 90 TABLET | Refills: 3 | Status: SHIPPED | OUTPATIENT
Start: 2024-05-13

## 2024-05-14 ENCOUNTER — APPOINTMENT (OUTPATIENT)
Facility: HOSPITAL | Age: 64
End: 2024-05-14
Payer: MEDICARE

## 2024-05-16 ENCOUNTER — APPOINTMENT (OUTPATIENT)
Facility: HOSPITAL | Age: 64
End: 2024-05-16
Payer: MEDICARE

## 2024-05-21 ENCOUNTER — APPOINTMENT (OUTPATIENT)
Facility: HOSPITAL | Age: 64
End: 2024-05-21
Payer: MEDICARE

## 2024-05-22 RX ORDER — CYCLOBENZAPRINE HCL 10 MG
10 TABLET ORAL NIGHTLY PRN
Qty: 90 TABLET | Refills: 1 | Status: SHIPPED | OUTPATIENT
Start: 2024-05-22

## 2024-05-23 ENCOUNTER — HOSPITAL ENCOUNTER (OUTPATIENT)
Facility: HOSPITAL | Age: 64
Setting detail: RECURRING SERIES
Discharge: HOME | End: 2024-05-26
Payer: MEDICARE

## 2024-05-23 PROCEDURE — G0239 OTH RESP PROC, GROUP: HCPCS

## 2024-05-23 PROCEDURE — G0237 THERAPEUTIC PROCD STRG ENDUR: HCPCS

## 2024-05-23 NOTE — PROGRESS NOTES
Pulmonary/RT Note    Visit #        17       Milka Singh is a 63 y.o. female presented today for pulmonary rehab. She state that there have been no changes in medication or health since the last visit.          She has a target heart rate of . She tolerated the exercise session well and has a pain level of 0. Patient states RPE for session today was 13 and RPD was a 3. Today the pt did:      Nustep: 5 min Workload 4  Arm bike: 15 min  Walkin min  Resistance bands: 0 min   Breathing retraining: 15 min  Bodyweight:  0 min  Recovery: 20 min             Physician available for emergencies: Dr. Que Schofield, RT 2024 8:54 AM

## 2024-05-24 ENCOUNTER — OFFICE VISIT (OUTPATIENT)
Age: 64
End: 2024-05-24

## 2024-05-24 DIAGNOSIS — M17.11 PRIMARY OSTEOARTHRITIS OF RIGHT KNEE: Primary | ICD-10-CM

## 2024-05-24 DIAGNOSIS — M17.12 PRIMARY OSTEOARTHRITIS OF LEFT KNEE: ICD-10-CM

## 2024-05-24 RX ORDER — LIDOCAINE HYDROCHLORIDE 10 MG/ML
3 INJECTION, SOLUTION INFILTRATION; PERINEURAL ONCE
Status: COMPLETED | OUTPATIENT
Start: 2024-05-24 | End: 2024-05-24

## 2024-05-24 RX ORDER — BETAMETHASONE SODIUM PHOSPHATE AND BETAMETHASONE ACETATE 3; 3 MG/ML; MG/ML
12 INJECTION, SUSPENSION INTRA-ARTICULAR; INTRALESIONAL; INTRAMUSCULAR; SOFT TISSUE ONCE
Status: COMPLETED | OUTPATIENT
Start: 2024-05-24 | End: 2024-05-24

## 2024-05-24 RX ADMIN — LIDOCAINE HYDROCHLORIDE 3 ML: 10 INJECTION, SOLUTION INFILTRATION; PERINEURAL at 10:40

## 2024-05-24 RX ADMIN — BETAMETHASONE SODIUM PHOSPHATE AND BETAMETHASONE ACETATE 12 MG: 3; 3 INJECTION, SUSPENSION INTRA-ARTICULAR; INTRALESIONAL; INTRAMUSCULAR; SOFT TISSUE at 10:39

## 2024-05-24 NOTE — PROGRESS NOTES
Patient: Milka Singh                MRN: [id number]       SSN: [id number]  YOB: 1960        AGE: 63 y.o.        SEX: female  There is no height or weight on file to calculate BMI.    PCP: Leda Nguyen MD  05/24/24            REVIEW OF SYSTEMS:  Constitutional: Negative for fever, chills, weight loss and malaise/fatigue.   HENT: Negative.    Eyes: Negative.    Respiratory: Negative.   Cardiovascular: Negative.   Gastrointestinal: No bowel incontinence or constipation.  Genitourinary: No bladder incontinence or saddle anesthesia.  Skin: Negative.   Neurological: Negative.    Endo/Heme/Allergies: Negative.    Psychiatric/Behavioral: Negative.  Musculoskeletal: As per HPI above.     Past Medical History:   Diagnosis Date    Aortic valve disorders 1/13/2011    Arthritis     Atrial fibrillation (HCC) 1/13/2011    Congestive heart failure, unspecified March 2005    Cleared quickly with Lasix therapy    Echocardiogram abnormal 01/10/2012    A-fib.  EF 45%.  Mild RVE.  RVSP 45-50.  Massive LAE.  Mod MARIA G.  Mod MS.  Severe MR (mean grad 10).  Mild-mod AI.  Mild TR.  Pulmonic systolic flow reversal.  Mild IVCE.    Hives     Hypertension     Iron deficiency anemias     Long term (current) use of anticoagulants 2/23/2011    Murmur 1/2011    Grade I-II/VI systolic ejection murmur along left sternal border, with radiation to the pulmonic area.    S/P cardiac cath 02/24/2012    Minimal coronary artery disease.  Mild LVE.  EF 55%.  Mod MR.  Mod-severe MS.  RA 15.  RV 55/16.  PA 58/34.  W 40.  CO/CI 3.5/1.9 (TD); 3.61/1.9 (Estefani).  R to L shunt suggested.    Wears glasses     Weight gain          Current Outpatient Medications:     cyclobenzaprine (FLEXERIL) 10 MG tablet, TAKE 1 TABLET BY MOUTH NIGHTLY AS NEEDED FOR MUSCLE SPASMS., Disp: 90 tablet, Rfl: 1    amLODIPine (NORVASC) 5 MG tablet, TAKE 1 TABLET BY MOUTH EVERY DAY, Disp: 90 tablet, Rfl: 3    fluticasone-salmeterol (WIXELA INHUB) 500-50 MCG/ACT

## 2024-05-28 ENCOUNTER — HOSPITAL ENCOUNTER (OUTPATIENT)
Facility: HOSPITAL | Age: 64
Setting detail: RECURRING SERIES
Discharge: HOME | End: 2024-05-31
Payer: MEDICARE

## 2024-05-28 PROCEDURE — G0237 THERAPEUTIC PROCD STRG ENDUR: HCPCS

## 2024-05-28 PROCEDURE — G0239 OTH RESP PROC, GROUP: HCPCS

## 2024-05-28 NOTE — PROGRESS NOTES
Pulmonary/RT Note    Visit #   18/36            Milka Singh is a 63 y.o. female presented today for pulmonary rehab. She state that there have been no changes in medication or health since the last visit. She has been more fatigued this week.         She has a target heart rate of . She tolerated the exercise session well and has a pain level of 1-knee. Patient states RPE for session today was 7 and RPD was a 1. Today the pt did:      Nustep:5 min w/workload of 4  Arm bike:15  min  Breathing retraining:15 min  Recovery: 15 min  Education: 5 min energy conservation and pacing             Physician available for emergencies: Dr. Sybil Jimenez, SANDI 5/28/2024 9:14 AM

## 2024-05-30 ENCOUNTER — APPOINTMENT (OUTPATIENT)
Facility: HOSPITAL | Age: 64
End: 2024-05-30
Payer: MEDICARE

## 2024-05-31 ENCOUNTER — OFFICE VISIT (OUTPATIENT)
Age: 64
End: 2024-05-31
Payer: MEDICARE

## 2024-05-31 ENCOUNTER — ANTI-COAG VISIT (OUTPATIENT)
Age: 64
End: 2024-05-31
Payer: MEDICARE

## 2024-05-31 DIAGNOSIS — M70.62 TROCHANTERIC BURSITIS, LEFT HIP: Primary | ICD-10-CM

## 2024-05-31 DIAGNOSIS — Z79.01 LONG TERM CURRENT USE OF ANTICOAGULANT THERAPY: Primary | ICD-10-CM

## 2024-05-31 DIAGNOSIS — I48.91 ATRIAL FIBRILLATION, UNSPECIFIED TYPE (HCC): ICD-10-CM

## 2024-05-31 LAB
POC INR: 2.6
PROTHROMBIN TIME, POC: 31.8

## 2024-05-31 PROCEDURE — 99214 OFFICE O/P EST MOD 30 MIN: CPT | Performed by: PHYSICIAN ASSISTANT

## 2024-05-31 PROCEDURE — 96372 THER/PROPH/DIAG INJ SC/IM: CPT | Performed by: PHYSICIAN ASSISTANT

## 2024-05-31 PROCEDURE — 1090F PRES/ABSN URINE INCON ASSESS: CPT | Performed by: PHYSICIAN ASSISTANT

## 2024-05-31 PROCEDURE — 3017F COLORECTAL CA SCREEN DOC REV: CPT | Performed by: PHYSICIAN ASSISTANT

## 2024-05-31 PROCEDURE — 85610 PROTHROMBIN TIME: CPT | Performed by: NURSE PRACTITIONER

## 2024-05-31 PROCEDURE — G8427 DOCREV CUR MEDS BY ELIG CLIN: HCPCS | Performed by: PHYSICIAN ASSISTANT

## 2024-05-31 PROCEDURE — G8417 CALC BMI ABV UP PARAM F/U: HCPCS | Performed by: PHYSICIAN ASSISTANT

## 2024-05-31 PROCEDURE — 20611 DRAIN/INJ JOINT/BURSA W/US: CPT | Performed by: PHYSICIAN ASSISTANT

## 2024-05-31 PROCEDURE — 1123F ACP DISCUSS/DSCN MKR DOCD: CPT | Performed by: PHYSICIAN ASSISTANT

## 2024-05-31 PROCEDURE — 1036F TOBACCO NON-USER: CPT | Performed by: PHYSICIAN ASSISTANT

## 2024-05-31 PROCEDURE — G8399 PT W/DXA RESULTS DOCUMENT: HCPCS | Performed by: PHYSICIAN ASSISTANT

## 2024-05-31 RX ORDER — BETAMETHASONE SODIUM PHOSPHATE AND BETAMETHASONE ACETATE 3; 3 MG/ML; MG/ML
6 INJECTION, SUSPENSION INTRA-ARTICULAR; INTRALESIONAL; INTRAMUSCULAR; SOFT TISSUE ONCE
Status: COMPLETED | OUTPATIENT
Start: 2024-05-31 | End: 2024-05-31

## 2024-05-31 RX ORDER — LIDOCAINE 50 MG/G
1 PATCH TOPICAL DAILY
Qty: 30 PATCH | Refills: 3 | Status: SHIPPED | OUTPATIENT
Start: 2024-05-31 | End: 2024-09-28

## 2024-05-31 RX ORDER — LIDOCAINE HYDROCHLORIDE 10 MG/ML
3 INJECTION, SOLUTION INFILTRATION; PERINEURAL ONCE
Status: COMPLETED | OUTPATIENT
Start: 2024-05-31 | End: 2024-05-31

## 2024-05-31 RX ADMIN — LIDOCAINE HYDROCHLORIDE 3 ML: 10 INJECTION, SOLUTION INFILTRATION; PERINEURAL at 09:55

## 2024-05-31 RX ADMIN — BETAMETHASONE SODIUM PHOSPHATE AND BETAMETHASONE ACETATE 6 MG: 3; 3 INJECTION, SUSPENSION INTRA-ARTICULAR; INTRALESIONAL; INTRAMUSCULAR; SOFT TISSUE at 09:55

## 2024-05-31 NOTE — PROGRESS NOTES
The INR is stable and therapeutic.  Coumadin 5mg daily except 2.5mg on Tues-Thurs-Sat.  Recheck INR in 4 weeks

## 2024-05-31 NOTE — PROGRESS NOTES
Patient: Milka Singh                MRN: [id number]       SSN: [id number]  YOB: 1960        AGE: 63 y.o.        SEX: female  There is no height or weight on file to calculate BMI.    PCP: Leda Nguyen MD  05/31/24            REVIEW OF SYSTEMS:  Constitutional: Negative for fever, chills, weight loss and malaise/fatigue.   HENT: Negative.    Eyes: Negative.    Respiratory: Negative.   Cardiovascular: Negative.   Gastrointestinal: No bowel incontinence or constipation.  Genitourinary: No bladder incontinence or saddle anesthesia.  Skin: Negative.   Neurological: Negative.    Endo/Heme/Allergies: Negative.    Psychiatric/Behavioral: Negative.  Musculoskeletal: As per HPI above.     Past Medical History:   Diagnosis Date    Aortic valve disorders 1/13/2011    Arthritis     Atrial fibrillation (HCC) 1/13/2011    Congestive heart failure, unspecified March 2005    Cleared quickly with Lasix therapy    Echocardiogram abnormal 01/10/2012    A-fib.  EF 45%.  Mild RVE.  RVSP 45-50.  Massive LAE.  Mod MARIA G.  Mod MS.  Severe MR (mean grad 10).  Mild-mod AI.  Mild TR.  Pulmonic systolic flow reversal.  Mild IVCE.    Hives     Hypertension     Iron deficiency anemias     Long term (current) use of anticoagulants 2/23/2011    Murmur 1/2011    Grade I-II/VI systolic ejection murmur along left sternal border, with radiation to the pulmonic area.    S/P cardiac cath 02/24/2012    Minimal coronary artery disease.  Mild LVE.  EF 55%.  Mod MR.  Mod-severe MS.  RA 15.  RV 55/16.  PA 58/34.  W 40.  CO/CI 3.5/1.9 (TD); 3.61/1.9 (Estefani).  R to L shunt suggested.    Wears glasses     Weight gain          Current Outpatient Medications:     cyclobenzaprine (FLEXERIL) 10 MG tablet, TAKE 1 TABLET BY MOUTH NIGHTLY AS NEEDED FOR MUSCLE SPASMS., Disp: 90 tablet, Rfl: 1    amLODIPine (NORVASC) 5 MG tablet, TAKE 1 TABLET BY MOUTH EVERY DAY, Disp: 90 tablet, Rfl: 3    fluticasone-salmeterol (WIXELA INHUB) 500-50 MCG/ACT

## 2024-06-03 DIAGNOSIS — E78.5 HYPERLIPIDEMIA, UNSPECIFIED: ICD-10-CM

## 2024-06-03 DIAGNOSIS — I11.0 HYPERTENSIVE HEART DISEASE WITH HEART FAILURE (HCC): ICD-10-CM

## 2024-06-03 RX ORDER — WARFARIN SODIUM 5 MG/1
TABLET ORAL
Qty: 135 TABLET | Refills: 3 | Status: SHIPPED | OUTPATIENT
Start: 2024-06-03

## 2024-06-03 RX ORDER — ATORVASTATIN CALCIUM 40 MG/1
40 TABLET, FILM COATED ORAL DAILY
Qty: 90 TABLET | Refills: 3 | Status: SHIPPED | OUTPATIENT
Start: 2024-06-03

## 2024-06-03 RX ORDER — FUROSEMIDE 40 MG/1
60 TABLET ORAL DAILY
Qty: 135 TABLET | Refills: 3 | Status: SHIPPED | OUTPATIENT
Start: 2024-06-03

## 2024-06-04 ENCOUNTER — APPOINTMENT (OUTPATIENT)
Facility: HOSPITAL | Age: 64
End: 2024-06-04
Payer: MEDICARE

## 2024-06-04 RX ORDER — METOPROLOL SUCCINATE 100 MG/1
100 TABLET, EXTENDED RELEASE ORAL DAILY
Qty: 90 TABLET | Refills: 1 | Status: SHIPPED | OUTPATIENT
Start: 2024-06-04

## 2024-06-04 RX ORDER — SERTRALINE HYDROCHLORIDE 100 MG/1
100 TABLET, FILM COATED ORAL DAILY
Qty: 90 TABLET | Refills: 1 | Status: SHIPPED | OUTPATIENT
Start: 2024-06-04

## 2024-06-06 ENCOUNTER — APPOINTMENT (OUTPATIENT)
Facility: HOSPITAL | Age: 64
End: 2024-06-06
Payer: MEDICARE

## 2024-06-06 ENCOUNTER — TELEPHONE (OUTPATIENT)
Age: 64
End: 2024-06-06

## 2024-06-06 NOTE — TELEPHONE ENCOUNTER
Patient came into the office today wanting a refill for lasix. Per her rx: she is supposed to be taking Take 1.5 tablets by mouth daily, Disp-135 tablet, R-3. She let me know that she had been to the pharmacy, but they refused because it was too early for a refill. The pharmacy won't refill the prescription until 6/25/2024. Patient stated that she didn't have any more pills left. I asked her if she was taking more than 1.5 tablets a day. She stated that she was only take the 1.5 tablets and hadn't taken any more than that. I will speak with Whit to see if she finds it appropriate to send a rx over to the pharmacy that will cover her until the 2/25/2024.

## 2024-06-07 ENCOUNTER — HOSPITAL ENCOUNTER (OUTPATIENT)
Facility: HOSPITAL | Age: 64
Setting detail: RECURRING SERIES
Discharge: HOME | End: 2024-06-10
Payer: MEDICARE

## 2024-06-07 PROCEDURE — G0237 THERAPEUTIC PROCD STRG ENDUR: HCPCS

## 2024-06-07 NOTE — PROGRESS NOTES
Pulmonary/RT Note    Visit #               Milka Singh is a 63 y.o. female presented today for pulmonary rehab. She state that there have been no changes in medication or health since the last visit.          She has a target heart rate of . She tolerated the exercise session well and has a pain level of 0. Patient states RPE for session today was 11 and RPD was a 1. Today the pt did:      Nustep: 0 min  Arm bike: 15 min  Walkin min  Resistance bands:0  min   Breathing retraining: 15 min  Bodyweight: 0 min  Recovery: 15 min               Physician available for emergencies: Dr.Khanna Mary Kate Schofield, RT 2024 8:19 AM

## 2024-06-10 DIAGNOSIS — I11.0 HYPERTENSIVE HEART DISEASE WITH HEART FAILURE (HCC): ICD-10-CM

## 2024-06-10 RX ORDER — METOPROLOL SUCCINATE 100 MG/1
100 TABLET, EXTENDED RELEASE ORAL DAILY
Qty: 90 TABLET | Refills: 1 | Status: CANCELLED | OUTPATIENT
Start: 2024-06-10

## 2024-06-11 ENCOUNTER — HOSPITAL ENCOUNTER (OUTPATIENT)
Facility: HOSPITAL | Age: 64
Setting detail: RECURRING SERIES
Discharge: HOME | End: 2024-06-14
Payer: MEDICARE

## 2024-06-11 PROCEDURE — G0237 THERAPEUTIC PROCD STRG ENDUR: HCPCS

## 2024-06-11 RX ORDER — MONTELUKAST SODIUM 10 MG/1
10 TABLET ORAL DAILY
Qty: 90 TABLET | Refills: 3 | Status: SHIPPED | OUTPATIENT
Start: 2024-06-11

## 2024-06-11 NOTE — PROGRESS NOTES
Pulmonary/RT Note    Visit #               Milka Singh is a 63 y.o. female presented today for pulmonary rehab. She state that there have been no changes in medication or health since the last visit.          She has a target heart rate of . She tolerated the exercise session well and has a pain level of 0. Patient states RPE for session today was 11 and RPD was a 1. Today the pt did:      Arm bike: 15 min  Walkin min -  . 7-.8 mph  Breathing retraining:15 min  Recovery and monitoring:  15 min                 Physician available for emergencies: Jovita Jimenez RCP 2024 8:28 AM

## 2024-06-18 ENCOUNTER — HOSPITAL ENCOUNTER (OUTPATIENT)
Facility: HOSPITAL | Age: 64
Setting detail: RECURRING SERIES
End: 2024-06-18
Payer: MEDICARE

## 2024-06-19 ENCOUNTER — TELEPHONE (OUTPATIENT)
Facility: CLINIC | Age: 64
End: 2024-06-19

## 2024-06-19 NOTE — TELEPHONE ENCOUNTER
Pt ccalled stating she has not been feeling well since Saturday. Pt c/o cough, wheezing, rattling in chest, and not being able to sleep. Pt states she has been using her inhaler but it is not working. Pt was offered an appt for tomorrow morning but declined due to the time. Pt advised a message would be sent to her provider. Please advise.

## 2024-06-20 ENCOUNTER — TELEMEDICINE (OUTPATIENT)
Facility: CLINIC | Age: 64
End: 2024-06-20
Payer: MEDICARE

## 2024-06-20 DIAGNOSIS — J45.20 MILD INTERMITTENT ASTHMA WITHOUT COMPLICATION: ICD-10-CM

## 2024-06-20 DIAGNOSIS — R05.1 ACUTE COUGH: ICD-10-CM

## 2024-06-20 DIAGNOSIS — J06.9 UPPER RESPIRATORY TRACT INFECTION, UNSPECIFIED TYPE: Primary | ICD-10-CM

## 2024-06-20 DIAGNOSIS — R06.02 SOB (SHORTNESS OF BREATH): ICD-10-CM

## 2024-06-20 PROCEDURE — G8427 DOCREV CUR MEDS BY ELIG CLIN: HCPCS | Performed by: FAMILY MEDICINE

## 2024-06-20 PROCEDURE — 3017F COLORECTAL CA SCREEN DOC REV: CPT | Performed by: FAMILY MEDICINE

## 2024-06-20 PROCEDURE — G8399 PT W/DXA RESULTS DOCUMENT: HCPCS | Performed by: FAMILY MEDICINE

## 2024-06-20 PROCEDURE — G8417 CALC BMI ABV UP PARAM F/U: HCPCS | Performed by: FAMILY MEDICINE

## 2024-06-20 PROCEDURE — 1090F PRES/ABSN URINE INCON ASSESS: CPT | Performed by: FAMILY MEDICINE

## 2024-06-20 PROCEDURE — 99214 OFFICE O/P EST MOD 30 MIN: CPT | Performed by: FAMILY MEDICINE

## 2024-06-20 PROCEDURE — 1036F TOBACCO NON-USER: CPT | Performed by: FAMILY MEDICINE

## 2024-06-20 PROCEDURE — 1123F ACP DISCUSS/DSCN MKR DOCD: CPT | Performed by: FAMILY MEDICINE

## 2024-06-20 RX ORDER — DOXYCYCLINE HYCLATE 100 MG
100 TABLET ORAL 2 TIMES DAILY
Qty: 14 TABLET | Refills: 0 | Status: SHIPPED | OUTPATIENT
Start: 2024-06-20 | End: 2024-06-27

## 2024-06-20 RX ORDER — BENZONATATE 200 MG/1
200 CAPSULE ORAL 3 TIMES DAILY PRN
Qty: 30 CAPSULE | Refills: 0 | Status: SHIPPED | OUTPATIENT
Start: 2024-06-20 | End: 2024-06-27

## 2024-06-20 SDOH — ECONOMIC STABILITY: FOOD INSECURITY: WITHIN THE PAST 12 MONTHS, YOU WORRIED THAT YOUR FOOD WOULD RUN OUT BEFORE YOU GOT MONEY TO BUY MORE.: NEVER TRUE

## 2024-06-20 SDOH — ECONOMIC STABILITY: FOOD INSECURITY: WITHIN THE PAST 12 MONTHS, THE FOOD YOU BOUGHT JUST DIDN'T LAST AND YOU DIDN'T HAVE MONEY TO GET MORE.: NEVER TRUE

## 2024-06-20 SDOH — ECONOMIC STABILITY: INCOME INSECURITY: HOW HARD IS IT FOR YOU TO PAY FOR THE VERY BASICS LIKE FOOD, HOUSING, MEDICAL CARE, AND HEATING?: NOT HARD AT ALL

## 2024-06-20 ASSESSMENT — PULMONARY FUNCTION TESTS
FEV1 (%PREDICTED): 96
FEV1/FVC: 88
FVC (LITERS): 85

## 2024-06-20 ASSESSMENT — PATIENT HEALTH QUESTIONNAIRE - PHQ9
SUM OF ALL RESPONSES TO PHQ QUESTIONS 1-9: 0
SUM OF ALL RESPONSES TO PHQ QUESTIONS 1-9: 0
1. LITTLE INTEREST OR PLEASURE IN DOING THINGS: NOT AT ALL
4. FEELING TIRED OR HAVING LITTLE ENERGY: NOT AT ALL
5. POOR APPETITE OR OVEREATING: NOT AT ALL
SUM OF ALL RESPONSES TO PHQ QUESTIONS 1-9: 0
9. THOUGHTS THAT YOU WOULD BE BETTER OFF DEAD, OR OF HURTING YOURSELF: NOT AT ALL
7. TROUBLE CONCENTRATING ON THINGS, SUCH AS READING THE NEWSPAPER OR WATCHING TELEVISION: NOT AT ALL
10. IF YOU CHECKED OFF ANY PROBLEMS, HOW DIFFICULT HAVE THESE PROBLEMS MADE IT FOR YOU TO DO YOUR WORK, TAKE CARE OF THINGS AT HOME, OR GET ALONG WITH OTHER PEOPLE: NOT DIFFICULT AT ALL
8. MOVING OR SPEAKING SO SLOWLY THAT OTHER PEOPLE COULD HAVE NOTICED. OR THE OPPOSITE, BEING SO FIGETY OR RESTLESS THAT YOU HAVE BEEN MOVING AROUND A LOT MORE THAN USUAL: NOT AT ALL
3. TROUBLE FALLING OR STAYING ASLEEP: NOT AT ALL
6. FEELING BAD ABOUT YOURSELF - OR THAT YOU ARE A FAILURE OR HAVE LET YOURSELF OR YOUR FAMILY DOWN: NOT AT ALL
SUM OF ALL RESPONSES TO PHQ9 QUESTIONS 1 & 2: 0
SUM OF ALL RESPONSES TO PHQ QUESTIONS 1-9: 0
2. FEELING DOWN, DEPRESSED OR HOPELESS: NOT AT ALL

## 2024-06-20 ASSESSMENT — ENCOUNTER SYMPTOMS
COUGH: 1
EYE PAIN: 0
CONSTIPATION: 0
SHORTNESS OF BREATH: 1
DIARRHEA: 0
ABDOMINAL PAIN: 0
WHEEZING: 1
RHINORRHEA: 0
SINUS PAIN: 0
CHEST TIGHTNESS: 0
SORE THROAT: 0
SINUS PRESSURE: 0

## 2024-06-20 ASSESSMENT — EXERCISE STRESS TEST
PEAK_BP: 145/84
PEAK_HR: 96
PEAK_BP: 130/84
PEAK_RPE: 15

## 2024-06-20 ASSESSMENT — LIFESTYLE VARIABLES
ALCOHOL_AMOUNT: RARELY
ALCOHOL_TYPE: BEER
ALCOHOL_USE: SPECIAL

## 2024-06-20 ASSESSMENT — EJECTION FRACTION: EF_VALUE: 55

## 2024-06-20 NOTE — PROGRESS NOTES
Milka Singh #[id number] (CSN:[id number]) (63 y.o. 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]   Referral Date 12/15/23   Significant Cardiovascular History Chronic atrial fibrillation   Co-morbidities --   Oxygen Saturation / Titration    Stages of Change --   Oxygen Intervention    Oxygen Use No  [she does not wear oxygen]   Individual Treatment Plan    ITP Visit Type Re-assessment   1st Date of Exercise 01/23/24   ITP Next Review Date 05/17/24   Visit #/Total Visits 16/36   EF% 55 %   FVC (Liters) 85 liters   FEV1 (%PRED) 96   FEV1/FVC 88   DLCO (mL/mmHg sec) 57 ml/mmHg sec   Risk Stratification Low   Pulmonary ITP --   Exercise    Maguire Total Score --   Stages of Change Action   Exercise Test Six minute walk test   Distance Walked in mi   Distance Walked (ft) --   Distance Walked (miles) --   Distance Walked in Feet (Calculated) --   Distance Walked in Meters --   Peak HR 96   Peak /84   Peak RPE 15   Peak Mets --   O2 Saturation --   O2 Flow Rate (l/min) --   Stops 0   SPO2 Range 93-97   DASI Total Score --   BMI (Calculated) --   FEV1 (%PRED) 96   RAMOS Total Score --   Exercise Prescription    Mode Treadmill, Bike, Stepper, Rower, Other (comment)   Frequency per week 2 times   Duration Per Session 60 minutes   Intensity METS       2-3   Progression moderate   SpO2 --   O2 Device --   O2 Flow Rate (l/min) --   FIO2 (%) --   O2 Temperature --   Comments --   Symptoms with Exercise Shortness of breath   Target Heart Rate    Exercise Blood Pressures    Resting /83   Peak /84   Is BP WDL Yes   Exercise Intervention    Type --   Frequency --   Duration --   Resistance Training --   Comments --   Exercise Education    Education --   Exercise Target Group    Target Goal(s) --   Patient Stated Exercise

## 2024-06-20 NOTE — PROGRESS NOTES
\"Have you been to the ER, urgent care clinic since your last visit?  Hospitalized since your last visit?\"    NO    “Have you seen or consulted any other health care providers outside of [facility] since your last visit?”    NO      Symptoms starting Sunday evening, taking OTC cough and cold medication.       Click Here for Release of Records Request  
Act, 1135 waiver authority and the Coronavirus Preparedness and Response Supplemental Appropriations Act, this Virtual  Visit was conducted, with patient's consent, to reduce the patient's risk of exposure to COVID-19 and provide continuity of care for an established patient.    Services were provided through a video synchronous discussion virtually to substitute for in-person clinic visit.    Milka Singh, was evaluated through a synchronous (real-time) audio-video encounter. The patient (or guardian if applicable) is aware that this is a billable service, which includes applicable co-pays. This Virtual Visit was conducted with patient's (and/or legal guardian's) consent. Patient identification was verified, and a caregiver was present when appropriate.   The patient was located at Home: [address] [place] [id number]  Provider was located at Facility (Appt Dept): [address] [place] [phone]  Confirm you are appropriately licensed, registered, or certified to deliver care in the state where the patient is located as indicated above. If you are not or unsure, please re-schedule the visit: Yes, I confirm.

## 2024-06-21 ENCOUNTER — HOSPITAL ENCOUNTER (OUTPATIENT)
Facility: HOSPITAL | Age: 64
Setting detail: RECURRING SERIES
End: 2024-06-21
Payer: MEDICARE

## 2024-06-24 ENCOUNTER — HOSPITAL ENCOUNTER (OUTPATIENT)
Facility: HOSPITAL | Age: 64
Discharge: HOME | End: 2024-06-27
Payer: MEDICARE

## 2024-06-24 ENCOUNTER — TELEPHONE (OUTPATIENT)
Facility: CLINIC | Age: 64
End: 2024-06-24

## 2024-06-24 DIAGNOSIS — R05.1 ACUTE COUGH: ICD-10-CM

## 2024-06-24 DIAGNOSIS — R06.02 SOB (SHORTNESS OF BREATH): ICD-10-CM

## 2024-06-24 DIAGNOSIS — J06.9 UPPER RESPIRATORY TRACT INFECTION, UNSPECIFIED TYPE: ICD-10-CM

## 2024-06-24 PROCEDURE — 71046 X-RAY EXAM CHEST 2 VIEWS: CPT

## 2024-06-24 NOTE — TELEPHONE ENCOUNTER
Pt had a VV appt with Dr Gorman and she states that she is still taking it. She states that she is not getting any sleep with the coughing and SOB. Pt had chest Xray done today and she states that she is feeling worse. Please advise.

## 2024-06-25 ENCOUNTER — TELEPHONE (OUTPATIENT)
Facility: CLINIC | Age: 64
End: 2024-06-25

## 2024-06-25 ENCOUNTER — TELEMEDICINE (OUTPATIENT)
Facility: CLINIC | Age: 64
End: 2024-06-25
Payer: MEDICARE

## 2024-06-25 DIAGNOSIS — J18.9 PNEUMONIA OF LEFT LOWER LOBE DUE TO INFECTIOUS ORGANISM: ICD-10-CM

## 2024-06-25 DIAGNOSIS — J18.9 PNEUMONIA OF LEFT LOWER LOBE DUE TO INFECTIOUS ORGANISM: Primary | ICD-10-CM

## 2024-06-25 PROCEDURE — G8417 CALC BMI ABV UP PARAM F/U: HCPCS | Performed by: FAMILY MEDICINE

## 2024-06-25 PROCEDURE — 1090F PRES/ABSN URINE INCON ASSESS: CPT | Performed by: FAMILY MEDICINE

## 2024-06-25 PROCEDURE — 99214 OFFICE O/P EST MOD 30 MIN: CPT | Performed by: FAMILY MEDICINE

## 2024-06-25 PROCEDURE — 3017F COLORECTAL CA SCREEN DOC REV: CPT | Performed by: FAMILY MEDICINE

## 2024-06-25 PROCEDURE — G8399 PT W/DXA RESULTS DOCUMENT: HCPCS | Performed by: FAMILY MEDICINE

## 2024-06-25 PROCEDURE — 1123F ACP DISCUSS/DSCN MKR DOCD: CPT | Performed by: FAMILY MEDICINE

## 2024-06-25 PROCEDURE — G8427 DOCREV CUR MEDS BY ELIG CLIN: HCPCS | Performed by: FAMILY MEDICINE

## 2024-06-25 PROCEDURE — 1036F TOBACCO NON-USER: CPT | Performed by: FAMILY MEDICINE

## 2024-06-25 RX ORDER — CEFPODOXIME PROXETIL 200 MG/1
200 TABLET, FILM COATED ORAL 2 TIMES DAILY
Qty: 10 TABLET | Refills: 0 | Status: SHIPPED | OUTPATIENT
Start: 2024-06-25 | End: 2024-06-30

## 2024-06-25 RX ORDER — CODEINE PHOSPHATE/GUAIFENESIN 10-100MG/5
5 LIQUID (ML) ORAL 3 TIMES DAILY PRN
Qty: 118 ML | Refills: 0 | Status: SHIPPED | OUTPATIENT
Start: 2024-06-25 | End: 2024-07-02

## 2024-06-25 RX ORDER — CODEINE PHOSPHATE/GUAIFENESIN 10-100MG/5
5 LIQUID (ML) ORAL 3 TIMES DAILY PRN
Qty: 118 ML | Refills: 0 | Status: SHIPPED | OUTPATIENT
Start: 2024-06-25 | End: 2024-06-25 | Stop reason: SDUPTHER

## 2024-06-25 RX ORDER — AZITHROMYCIN 250 MG/1
TABLET, FILM COATED ORAL
Qty: 6 TABLET | Refills: 0 | Status: SHIPPED | OUTPATIENT
Start: 2024-06-25 | End: 2024-07-05

## 2024-06-25 ASSESSMENT — PATIENT HEALTH QUESTIONNAIRE - PHQ9
7. TROUBLE CONCENTRATING ON THINGS, SUCH AS READING THE NEWSPAPER OR WATCHING TELEVISION: NOT AT ALL
9. THOUGHTS THAT YOU WOULD BE BETTER OFF DEAD, OR OF HURTING YOURSELF: NOT AT ALL
10. IF YOU CHECKED OFF ANY PROBLEMS, HOW DIFFICULT HAVE THESE PROBLEMS MADE IT FOR YOU TO DO YOUR WORK, TAKE CARE OF THINGS AT HOME, OR GET ALONG WITH OTHER PEOPLE: NOT DIFFICULT AT ALL
SUM OF ALL RESPONSES TO PHQ QUESTIONS 1-9: 0
SUM OF ALL RESPONSES TO PHQ QUESTIONS 1-9: 0
4. FEELING TIRED OR HAVING LITTLE ENERGY: NOT AT ALL
3. TROUBLE FALLING OR STAYING ASLEEP: NOT AT ALL
2. FEELING DOWN, DEPRESSED OR HOPELESS: NOT AT ALL
SUM OF ALL RESPONSES TO PHQ QUESTIONS 1-9: 0
8. MOVING OR SPEAKING SO SLOWLY THAT OTHER PEOPLE COULD HAVE NOTICED. OR THE OPPOSITE, BEING SO FIGETY OR RESTLESS THAT YOU HAVE BEEN MOVING AROUND A LOT MORE THAN USUAL: NOT AT ALL
6. FEELING BAD ABOUT YOURSELF - OR THAT YOU ARE A FAILURE OR HAVE LET YOURSELF OR YOUR FAMILY DOWN: NOT AT ALL
5. POOR APPETITE OR OVEREATING: NOT AT ALL
SUM OF ALL RESPONSES TO PHQ9 QUESTIONS 1 & 2: 0
1. LITTLE INTEREST OR PLEASURE IN DOING THINGS: NOT AT ALL
SUM OF ALL RESPONSES TO PHQ QUESTIONS 1-9: 0

## 2024-06-25 ASSESSMENT — ENCOUNTER SYMPTOMS
CHEST TIGHTNESS: 0
EYE PAIN: 0
CONSTIPATION: 0
WHEEZING: 1
RHINORRHEA: 0
COUGH: 1
DIARRHEA: 0
ABDOMINAL PAIN: 0
SORE THROAT: 0
SINUS PAIN: 0
SINUS PRESSURE: 0
SHORTNESS OF BREATH: 0

## 2024-06-25 NOTE — TELEPHONE ENCOUNTER
[facility] is out of pt's RX guaiFENesin-codeine (TUSSI-ORGANIDIN NR) 100-10 MG/5ML syrup and pt would anahi to know if it can be sent to the [facility]'s on [facility] . Please advise. Went sent.

## 2024-06-25 NOTE — PROGRESS NOTES
\"Have you been to the ER, urgent care clinic since your last visit?  Hospitalized since your last visit?\"    NO    “Have you seen or consulted any other health care providers outside of [facility] since your last visit?”    NO      The patient reports a non-productive cough, feels like she is choking on mucus, reports scant amount of grey mucus         Click Here for Release of Records Request  
Rash on visible skin    [x] Cranial Nerves II-XII grossly intact    [] Motor grossly intact in visible upper extremities    [] Motor grossly intact in visible lower extremities    [x] Normal Mood  [] Anxious appearing    [] Depressed appearing  [] Confused appearing      Due to this being a TeleHealth encounter, evaluation of the following organ systems is limited: Vitals/Constitutional/EENT/Resp/CV/GI//MS/Neuro/Skin/Heme-Lymph-Imm.    ASSESSMENT/PLAN:  1. Pneumonia of left lower lobe due to infectious organism  Reviewed CXR results  Patient has not turned the corner and I'm worried she needs more abx.   Will stop doxy and per UTD, switch to azithromycin and cefpodoxime for 5 days (penicillin allergy so augmentin is not an option)  Follow up in 2-3 days to gauge improvement.   RTC or ED precautions discussed.   - azithromycin (ZITHROMAX) 250 MG tablet; 500mg on day 1 followed by 250mg on days 2 - 5  Dispense: 6 tablet; Refill: 0  - cefpodoxime (VANTIN) 200 MG tablet; Take 1 tablet by mouth 2 times daily for 5 days  Dispense: 10 tablet; Refill: 0  - guaiFENesin-codeine (TUSSI-ORGANIDIN NR) 100-10 MG/5ML syrup; Take 5 mLs by mouth 3 times daily as needed for Cough for up to 7 days. Max Daily Amount: 15 mLs  Dispense: 118 mL; Refill: 0      No follow-ups on file.    Future Appointments   Date Time Provider Department Center   6/28/2024  9:30 AM CSI PT INR [facility]   7/9/2024  9:45 AM Leda Nguyen MD HR [facility]   8/30/2024  9:10 AM Kareem Bauman PA-C [facility]   10/22/2024  9:40 AM Zac Brand MD [facility]   12/18/2024  9:45 AM Andreas Ordoñez DO [facility]       An  electronic signature was used to authenticate this note.    --Reyna Gorman MD on 6/25/2024 at 2:53 PM    {Coding Help -- Use CPT 56010-49064 with EM elements or Time rules for Office Visits.:    >20 minutes were spent on the digital evaluation and management of this patient.    Pursuant to the emergency declaration under

## 2024-06-25 NOTE — TELEPHONE ENCOUNTER
Juwan Warren,     This should've been sent to [facility].   She can pend the med to the proper pharmacy.     It has been sent.     Thank you,   Dr. STERLING

## 2024-06-25 NOTE — TELEPHONE ENCOUNTER
Thank you, sorry I was meaning to send it high priority and we are suppose to send that to both Dr and Nurse. Trying to go to fast.

## 2024-06-27 NOTE — PATIENT INSTRUCTIONS
[employer], [facility] disclaims any warranty or liability for your use of this information.       Patient Education        DASH Diet: Care Instructions  Your Care Instructions     The DASH diet is an eating plan that can help lower your blood pressure. DASH stands for Dietary Approaches to Stop Hypertension. Hypertension is high blood pressure.  The DASH diet focuses on eating foods that are high in calcium, potassium, and magnesium. These nutrients can lower blood pressure. The foods that are highest in these nutrients are fruits, vegetables, low-fat dairy products, nuts, seeds, and legumes. But taking calcium, potassium, and magnesium supplements instead of eating foods that are high in those nutrients does not have the same effect. The DASH diet also includes whole grains, fish, and poultry.  The DASH diet is one of several lifestyle changes your doctor may recommend to lower your high blood pressure. Your doctor may also want you to decrease the amount of sodium in your diet. Lowering sodium while following the DASH diet can lower blood pressure even further than just the DASH diet alone.  Follow-up care is a key part of your treatment and safety. Be sure to make and go to all appointments, and call your doctor if you are having problems. It's also a good idea to know your test results and keep a list of the medicines you take.  How can you care for yourself at home?  Following the DASH diet  Eat 4 to 5 servings of fruit each day. A serving is 1 medium-sized piece of fruit, 1/2 cup raw or canned fruit, 1/4 cup dried fruit, or 4 ounces (1/2 cup) of fruit juice. Choose fruit more often than fruit juice.  Eat 4 to 5 servings of vegetables each day. A serving is 1 cup of lettuce or raw leafy vegetables, 1/2 cup of chopped or cooked vegetables, or 4 ounces (1/2 cup) of vegetable juice. Choose vegetables more often than vegetable juice.  Get 2 to 3 servings of low-fat and fat-free dairy each day. A serving is 8

## 2024-06-28 ENCOUNTER — TELEPHONE (OUTPATIENT)
Facility: CLINIC | Age: 64
End: 2024-06-28

## 2024-06-28 NOTE — TELEPHONE ENCOUNTER
Called to speak with the patient in regards to the message below.    - there is potential evidence of pneumonia on CXR.   - please ask patient if she is feeling better on the antibiotic.   - if she is not, then we need to treat with more medication.   Let me know     Thank you!   Dr. STERLING

## 2024-07-01 ENCOUNTER — TELEPHONE (OUTPATIENT)
Facility: CLINIC | Age: 64
End: 2024-07-01

## 2024-07-01 NOTE — TELEPHONE ENCOUNTER
Pt states that she is out of the is still feeling bad and is out of the guaiFENesin-codeine (TUSSI-ORGANIDIN NR) 100-10 MG/5ML syrup   azithromycin (ZITHROMAX) 250 MG tablet   cefpodoxime (VANTIN) 200 MG tablet and she would like to know if she needs another round of medication. She states siva there cough is still really bad. Please advise.

## 2024-07-03 ENCOUNTER — TELEPHONE (OUTPATIENT)
Facility: CLINIC | Age: 64
End: 2024-07-03

## 2024-07-03 DIAGNOSIS — R06.02 SOB (SHORTNESS OF BREATH): ICD-10-CM

## 2024-07-03 DIAGNOSIS — R05.1 ACUTE COUGH: ICD-10-CM

## 2024-07-03 DIAGNOSIS — J06.9 UPPER RESPIRATORY TRACT INFECTION, UNSPECIFIED TYPE: Primary | ICD-10-CM

## 2024-07-03 DIAGNOSIS — J18.9 PNEUMONIA OF LEFT LOWER LOBE DUE TO INFECTIOUS ORGANISM: ICD-10-CM

## 2024-07-03 NOTE — TELEPHONE ENCOUNTER
Patient returned call, verified with 2 identifiers.  The patient reports that she is still coughing and feels weak, the guaiFENesin-codeine  gave her a headache and the benzonatate (TESSALON) offered little to no relief, she has completed the ABT course and is interested in something else being sent to the [facility] pharmacy on file.

## 2024-07-03 NOTE — TELEPHONE ENCOUNTER
Please make sure the respiratory panel ordered contains influenza A/B and COVID 19.   If not, I can order a COVID swab separately but I was having trouble ordering any influenza test to be completed in the lab.     She can always come in for a visit and we can test her here...    Thank you!  Dr. STERLING

## 2024-07-03 NOTE — TELEPHONE ENCOUNTER
Spoke with the patient and she wants to know if you will order COVID and FLU so that she can go to [facility] lab on Friday and have them done instead of coming in for a visit or going to an Urgent Care.

## 2024-07-09 ENCOUNTER — OFFICE VISIT (OUTPATIENT)
Facility: CLINIC | Age: 64
End: 2024-07-09
Payer: MEDICARE

## 2024-07-09 ENCOUNTER — HOSPITAL ENCOUNTER (OUTPATIENT)
Facility: HOSPITAL | Age: 64
Discharge: HOME | End: 2024-07-12
Payer: MEDICARE

## 2024-07-09 ENCOUNTER — ANTI-COAG VISIT (OUTPATIENT)
Age: 64
End: 2024-07-09
Payer: MEDICARE

## 2024-07-09 VITALS
SYSTOLIC BLOOD PRESSURE: 158 MMHG | HEART RATE: 68 BPM | WEIGHT: 195.4 LBS | TEMPERATURE: 98.6 F | RESPIRATION RATE: 16 BRPM | HEIGHT: 67 IN | DIASTOLIC BLOOD PRESSURE: 82 MMHG | BODY MASS INDEX: 30.67 KG/M2 | OXYGEN SATURATION: 96 %

## 2024-07-09 DIAGNOSIS — I10 ESSENTIAL HYPERTENSION: ICD-10-CM

## 2024-07-09 DIAGNOSIS — J18.9 COMMUNITY ACQUIRED PNEUMONIA OF LEFT LOWER LOBE OF LUNG: ICD-10-CM

## 2024-07-09 DIAGNOSIS — I48.91 ATRIAL FIBRILLATION, UNSPECIFIED TYPE (HCC): ICD-10-CM

## 2024-07-09 DIAGNOSIS — Z79.01 LONG TERM CURRENT USE OF ANTICOAGULANT THERAPY: Primary | ICD-10-CM

## 2024-07-09 DIAGNOSIS — J45.40 MODERATE PERSISTENT ASTHMA WITHOUT COMPLICATION: ICD-10-CM

## 2024-07-09 DIAGNOSIS — J18.9 COMMUNITY ACQUIRED PNEUMONIA OF LEFT LOWER LOBE OF LUNG: Primary | ICD-10-CM

## 2024-07-09 LAB
POC INR: 2.6
PROTHROMBIN TIME, POC: 31.4

## 2024-07-09 PROCEDURE — G8427 DOCREV CUR MEDS BY ELIG CLIN: HCPCS | Performed by: FAMILY MEDICINE

## 2024-07-09 PROCEDURE — 1090F PRES/ABSN URINE INCON ASSESS: CPT | Performed by: FAMILY MEDICINE

## 2024-07-09 PROCEDURE — G8399 PT W/DXA RESULTS DOCUMENT: HCPCS | Performed by: FAMILY MEDICINE

## 2024-07-09 PROCEDURE — 1036F TOBACCO NON-USER: CPT | Performed by: FAMILY MEDICINE

## 2024-07-09 PROCEDURE — 3079F DIAST BP 80-89 MM HG: CPT | Performed by: FAMILY MEDICINE

## 2024-07-09 PROCEDURE — 99214 OFFICE O/P EST MOD 30 MIN: CPT | Performed by: FAMILY MEDICINE

## 2024-07-09 PROCEDURE — G8417 CALC BMI ABV UP PARAM F/U: HCPCS | Performed by: FAMILY MEDICINE

## 2024-07-09 PROCEDURE — 71046 X-RAY EXAM CHEST 2 VIEWS: CPT

## 2024-07-09 PROCEDURE — 1123F ACP DISCUSS/DSCN MKR DOCD: CPT | Performed by: FAMILY MEDICINE

## 2024-07-09 PROCEDURE — 3017F COLORECTAL CA SCREEN DOC REV: CPT | Performed by: FAMILY MEDICINE

## 2024-07-09 PROCEDURE — 3077F SYST BP >= 140 MM HG: CPT | Performed by: FAMILY MEDICINE

## 2024-07-09 PROCEDURE — 85610 PROTHROMBIN TIME: CPT | Performed by: NURSE PRACTITIONER

## 2024-07-09 RX ORDER — HYDROCODONE POLISTIREX AND CHLORPHENIRAMINE POLISTIREX 10; 8 MG/5ML; MG/5ML
5 SUSPENSION, EXTENDED RELEASE ORAL EVERY 12 HOURS PRN
Qty: 50 ML | Refills: 0 | Status: SHIPPED | OUTPATIENT
Start: 2024-07-09 | End: 2024-07-14

## 2024-07-09 RX ORDER — METHYLPREDNISOLONE 4 MG/1
TABLET ORAL
Qty: 1 KIT | Refills: 0 | Status: SHIPPED | OUTPATIENT
Start: 2024-07-09 | End: 2024-07-15

## 2024-07-09 RX ORDER — LEVOFLOXACIN 500 MG/1
500 TABLET, FILM COATED ORAL DAILY
Qty: 10 TABLET | Refills: 0 | Status: SHIPPED | OUTPATIENT
Start: 2024-07-09 | End: 2024-07-19

## 2024-07-09 NOTE — PROGRESS NOTES
The INR is stable and therapeutic.  Was on antibiotics (2) as she had pneumonia.    Coumadin 5mg daily except 2.5mg on Tues-Thurs-Sat.   Recheck INR in 4 weeks

## 2024-07-09 NOTE — PROGRESS NOTES
\"Have you been to the ER, urgent care clinic since your last visit?  Hospitalized since your last visit?\"    NO    “Have you seen or consulted any other health care providers outside of [facility] since your last visit?”    NO            Click Here for Release of Records Request  
Problems Paternal Aunt     No Known Problems Paternal Uncle     No Known Problems Maternal Grandmother     No Known Problems Maternal Grandfather     Heart Disease Paternal Grandmother             No Known Problems Paternal Grandfather     No Known Problems Other          OBJECTIVE    Physical Exam:     BP (!) 158/82 (Site: Left Upper Arm, Position: Sitting, Cuff Size: Medium Adult)   Pulse 68   Temp 98.6 °F (37 °C) (Skin)   Resp 16   Ht 1.702 m (5' 7\")   Wt 88.6 kg (195 lb 6.4 oz)   SpO2 96%   BMI 30.60 kg/m²     General: alert, well-appearing, , in no apparent distress or pain  HEENT: EAC patent, TM intact, throat and pharynx normal  Neck: supple, no adenopathy palpated  CVS: normal rate, regular rhythm, distinct S1 and S2  Lungs:clear to ausculation bilaterally, mildly reduced L base BS, no crackles, wheezing or rhonchi noted  Abdomen: normoactive bowel sounds, soft, non-tender  Extremities: no edema, no cyanosis, MSK grossly normal  Skin: warm, no lesions, rashes noted  Psych:  mood and affect normal        ASSESSMENT/PLAN  Milka was seen today for hypertension, anxiety and pneumonia.    Diagnoses and all orders for this visit:    Community acquired pneumonia of left lower lobe of lung  Improving, however with persistent cough  Add medrol dose pack and tussionex for disruptive cough  Repeat CXR, if no improvement/resolution will add levaquin for broader coverage.   Cont wixela, spiriva, prn albuterol  -     XR CHEST (2 VIEWS); Future  -     levoFLOXacin (LEVAQUIN) 500 MG tablet; Take 1 tablet by mouth daily for 10 days  -     HYDROcodone-chlorpheniramine (TUSSIONEX) 10-8 MG/5ML SUER; Take 5 mLs by mouth every 12 hours as needed (cough) for up to 5 days. Max Daily Amount: 10 mLs    Essential hypertension  Elevated today , monitoring    Moderate persistent asthma without complication    Other orders  -     methylPREDNISolone (MEDROL DOSEPACK) 4 MG tablet; Take by mouth.      Ff-up in 2 weeks or

## 2024-07-26 ENCOUNTER — OFFICE VISIT (OUTPATIENT)
Facility: CLINIC | Age: 64
End: 2024-07-26
Payer: MEDICARE

## 2024-07-26 VITALS
HEART RATE: 91 BPM | DIASTOLIC BLOOD PRESSURE: 80 MMHG | SYSTOLIC BLOOD PRESSURE: 138 MMHG | BODY MASS INDEX: 30.83 KG/M2 | WEIGHT: 196.4 LBS | TEMPERATURE: 96.9 F | HEIGHT: 67 IN | RESPIRATION RATE: 16 BRPM | OXYGEN SATURATION: 96 %

## 2024-07-26 DIAGNOSIS — J30.9 ALLERGIC RHINITIS, UNSPECIFIED SEASONALITY, UNSPECIFIED TRIGGER: ICD-10-CM

## 2024-07-26 DIAGNOSIS — J45.20 MILD INTERMITTENT ASTHMA WITHOUT COMPLICATION: ICD-10-CM
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
## 2024-07-26 DIAGNOSIS — R73.01 IFG (IMPAIRED FASTING GLUCOSE): Primary | ICD-10-CM

## 2024-07-26 DIAGNOSIS — I10 ESSENTIAL HYPERTENSION: ICD-10-CM

## 2024-07-26 PROCEDURE — 3079F DIAST BP 80-89 MM HG: CPT | Performed by: FAMILY MEDICINE

## 2024-07-26 PROCEDURE — 99214 OFFICE O/P EST MOD 30 MIN: CPT | Performed by: FAMILY MEDICINE

## 2024-07-26 PROCEDURE — G8399 PT W/DXA RESULTS DOCUMENT: HCPCS | Performed by: FAMILY MEDICINE

## 2024-07-26 PROCEDURE — 3075F SYST BP GE 130 - 139MM HG: CPT | Performed by: FAMILY MEDICINE

## 2024-07-26 PROCEDURE — 1090F PRES/ABSN URINE INCON ASSESS: CPT | Performed by: FAMILY MEDICINE

## 2024-07-26 PROCEDURE — G8417 CALC BMI ABV UP PARAM F/U: HCPCS | Performed by: FAMILY MEDICINE

## 2024-07-26 PROCEDURE — 1036F TOBACCO NON-USER: CPT | Performed by: FAMILY MEDICINE

## 2024-07-26 PROCEDURE — 3017F COLORECTAL CA SCREEN DOC REV: CPT | Performed by: FAMILY MEDICINE

## 2024-07-26 PROCEDURE — G8427 DOCREV CUR MEDS BY ELIG CLIN: HCPCS | Performed by: FAMILY MEDICINE

## 2024-07-26 PROCEDURE — 1123F ACP DISCUSS/DSCN MKR DOCD: CPT | Performed by: FAMILY MEDICINE

## 2024-07-26 RX ORDER — TRIAMCINOLONE ACETONIDE 0.25 MG/G
OINTMENT TOPICAL
Qty: 15 G | Refills: 0 | Status: SHIPPED | OUTPATIENT
Start: 2024-07-26 | End: 2024-08-02

## 2024-07-26 NOTE — PROGRESS NOTES
Milka Singh, 64 y.o.,  female    SUBJECTIVE  Pneumonia/asthma follow-up    Patient reports cough and fever have resolved, reviewed cxr bibasal infiltrates slight improvement. She has completed medrol dose pack and antibiotics. She continues to use wixela and spiriva regularly, also requesting refill on budesonide nasal spray for allergic rhinitis has been effective for her.    C/o bilateral leg itchy papules, says possible bug bites from neighbors cat. No fever, no discharge, has applied otc benadryl ointment without much improvement    Has hx preDM/HTN/CAD, forgot to have labs drawn, due for ff-up.     ROS:  See HPI, all others negative        Patient Active Problem List   Diagnosis    Elevated serum globulin level    Rheumatic valvular disease    IFG (impaired fasting glucose)    Hyperlipidemia    Chest pain    Chronic sinusitis    Essential hypertension    Adjustment disorder with anxious mood    Mild intermittent asthma without complication    Other allergic rhinitis    Atherosclerosis of native coronary artery of native heart with angina pectoris (HCC)    Benign hypertensive heart disease with heart failure (HCC)    Anemia of chronic disease    Long term current use of anticoagulant therapy    Recurrent depression (HCC)    Mitral valve stenosis    NSTEMI (non-ST elevated myocardial infarction) (HCC)    Normocytic anemia    Vasomotor rhinitis    Perennial allergic rhinitis with seasonal variation    Anxiety    Atrial fibrillation (HCC)    Allergic rhinitis due to pollen    Chronic diastolic congestive heart failure (HCC)    Advanced directives, counseling/discussion    Chronic systolic (congestive) heart failure    Secondary hypercoagulable state (HCC)    Osteopenia    Arthritis    Asthma    Moderate episode of recurrent major depressive disorder (HCC)       Current Outpatient Medications   Medication Sig Dispense Refill    budesonide (RINOCORT AQUA) 32 MCG/ACT nasal spray 2 sprays by Each Nostril route

## 2024-08-02 NOTE — TELEPHONE ENCOUNTER
Pt called in [facility]. Pls advise

## 2024-08-03 RX ORDER — ALBUTEROL SULFATE 90 UG/1
AEROSOL, METERED RESPIRATORY (INHALATION)
Qty: 18 G | Refills: 4 | Status: SHIPPED | OUTPATIENT
Start: 2024-08-03

## 2024-08-05 ENCOUNTER — TELEPHONE (OUTPATIENT)
Age: 64
End: 2024-08-05

## 2024-08-05 NOTE — TELEPHONE ENCOUNTER
Pt would like to  speak with nurse in regards to albuterol pt stated that this medication is to expensive and would like to know if she can get something more affordable Please advise [phone]

## 2024-08-06 ENCOUNTER — HOSPITAL ENCOUNTER (OUTPATIENT)
Facility: HOSPITAL | Age: 64
Setting detail: RECURRING SERIES
Discharge: HOME | End: 2024-08-09
Payer: MEDICARE

## 2024-08-06 PROCEDURE — G0237 THERAPEUTIC PROCD STRG ENDUR: HCPCS

## 2024-08-06 PROCEDURE — G0239 OTH RESP PROC, GROUP: HCPCS

## 2024-08-06 NOTE — PROGRESS NOTES
Pulmonary/RT Note    Visit #               Milka Singh is a 64 y.o. female presented today for pulmonary rehab. She states that there have been changes to her health since the last visit. She had pneumonia with COVID in  and July with asthma flare up. Her next pulmonary doctor visit is in September. She has an INR appointment today. Patient's PCP cleared her for exercise.         She has a target heart rate of . She tolerated the exercise session well and has a pain level of 4 in her left knee. Patient states RPE for session today was 9 and RPD was a 1. Today the pt did:      Nustep: 5 min  Walkin min  Breathing retraining: 15 min  Recovery and monitoring: 15 min  Education: 30 min - watched video and given pamphlet on nutrition                 Physician available for emergencies: Dr Orlin Lopez RCP 2024 8:44 AM

## 2024-08-09 ENCOUNTER — TELEPHONE (OUTPATIENT)
Facility: HOSPITAL | Age: 64
End: 2024-08-09

## 2024-08-09 ENCOUNTER — ANTI-COAG VISIT (OUTPATIENT)
Age: 64
End: 2024-08-09
Payer: MEDICARE

## 2024-08-09 DIAGNOSIS — I48.91 ATRIAL FIBRILLATION, UNSPECIFIED TYPE (HCC): ICD-10-CM

## 2024-08-09 DIAGNOSIS — Z79.01 LONG TERM CURRENT USE OF ANTICOAGULANT THERAPY: Primary | ICD-10-CM

## 2024-08-09 LAB
POC INR: 3.8
PROTHROMBIN TIME, POC: 45.4

## 2024-08-09 PROCEDURE — 85610 PROTHROMBIN TIME: CPT | Performed by: NURSE PRACTITIONER

## 2024-08-09 NOTE — TELEPHONE ENCOUNTER
Cardiopulmonary Rehab Phone Note    Patient called today to  \"explain reason for not attending Pulmonary Rehab yesterday\" . Patient call was transferred to pulmonary staff.     RIYA Stoner-CONNIE   8/9/24 8:49 AM

## 2024-08-09 NOTE — TELEPHONE ENCOUNTER
Pt calling back.  She has been out of meds for 3 days.  Wants something cheaper.  Can another provider call in something else.

## 2024-08-09 NOTE — PROGRESS NOTES
The INR is above the therapeutic range.  Completed antibiotics  Ask the patient about bleeding complications.  Please make the following adjustments to Coumadin dosing: Hold today then continue Coumadin 5mg daily except 2.5mg on Tues-Thurs-Sat. .  Repeat the INR in 4 weeks.

## 2024-08-13 ENCOUNTER — HOSPITAL ENCOUNTER (OUTPATIENT)
Facility: HOSPITAL | Age: 64
Setting detail: RECURRING SERIES
Discharge: HOME | End: 2024-08-16
Payer: MEDICARE

## 2024-08-13 PROCEDURE — G0237 THERAPEUTIC PROCD STRG ENDUR: HCPCS

## 2024-08-13 NOTE — TELEPHONE ENCOUNTER
Spoke with the patient she is paying 55.00 currently advised if she fills at [facility] using [facility] it is 15.96.  Patient stated that she will have the RX transferred to [facility]

## 2024-08-16 ENCOUNTER — HOSPITAL ENCOUNTER (OUTPATIENT)
Facility: HOSPITAL | Age: 64
Setting detail: RECURRING SERIES
Discharge: HOME | End: 2024-08-19

## 2024-08-16 PROCEDURE — G0237 THERAPEUTIC PROCD STRG ENDUR: HCPCS

## 2024-08-16 NOTE — PROGRESS NOTES
Pulmonary/RT Note    Visit #        23/36       Milka Singh is a 64 y.o. female presented today for pulmonary rehab. She state that there have been no changes in medication or health since the last visit.          She has a target heart rate of 98*-114. She tolerated the exercise session well and has a pain level of 0. Patient states RPE for session today was 11 and RPD was a 2. Today the pt did:      Nustep: 10 min Workload 6  Arm bike: 15 min  Walking: 15 min  Resistance bands:  0 min   Breathing retraining: 15 min  Bodyweight:  0 min  Recovery: 5 min               Physician available for emergencies:Dr. Jovita Schofield, RT 8/16/2024 8:22 AM

## 2024-08-20 ENCOUNTER — HOSPITAL ENCOUNTER (OUTPATIENT)
Facility: HOSPITAL | Age: 64
Setting detail: RECURRING SERIES
Discharge: HOME | End: 2024-08-23
Payer: MEDICARE

## 2024-08-20 PROCEDURE — G0239 OTH RESP PROC, GROUP: HCPCS

## 2024-08-20 PROCEDURE — G0237 THERAPEUTIC PROCD STRG ENDUR: HCPCS

## 2024-08-20 ASSESSMENT — PULMONARY FUNCTION TESTS
FVC (LITERS): 85
FEV1 (%PREDICTED): 96
FEV1/FVC: 88

## 2024-08-20 ASSESSMENT — LIFESTYLE VARIABLES
ALCOHOL_USE: SPECIAL
ALCOHOL_AMOUNT: RARELY
ALCOHOL_TYPE: BEER

## 2024-08-20 ASSESSMENT — EXERCISE STRESS TEST
PEAK_BP: 139/85
PEAK_BP: 139/85
PEAK_HR: 96
PEAK_RPE: 15

## 2024-08-20 ASSESSMENT — EJECTION FRACTION: EF_VALUE: 55

## 2024-08-20 NOTE — PROGRESS NOTES
Milka Singh #[id number] (CSN:[id number]) (64 y.o. F) (Adm: 08/20/24)  [facility]    Flowsheet Row CARDIO PULMONARY REHAB from 8/20/2024 in [facility] PULMONARY RE[facility]AB   Treatment Diagnosis    Treatment Diagnosis 1 Asthma   Treatment Diagnosis 2 Other (Comments)  [restrictive lung disease]   Referral Date 12/15/23   Significant Cardiovascular History Chronic atrial fibrillation   Co-morbidities --   Oxygen Saturation / Titration    Stages of Change --   Oxygen Intervention    Oxygen Use No  [she does not wear oxygen]   Individual Treatment Plan    ITP Visit Type Re-assessment   1st Date of Exercise 01/23/24   ITP Next Review Date 09/16/24   Visit #/Total Visits 24/36   EF% 55 %   FVC (Liters) 85 liters   FEV1 (%PRED) 96   FEV1/FVC 88   DLCO (mL/mmHg sec) 57 ml/mmHg sec   Risk Stratification Low   Pulmonary ITP --   Exercise    [place] Total Score --   Stages of Change Action   Exercise Test Six minute walk test   Distance Walked in mi   Distance Walked (ft) --   Distance Walked (miles) --   Distance Walked in Feet (Calculated) --   Distance Walked in Meters --   Peak HR 96   Peak /85   Peak RPE 15   Peak Mets --   O2 Saturation --   O2 Flow Rate (l/min) --   Stops 0   SPO2 Range 93-97   DASI Total Score --   BMI (Calculated) --   FEV1 (%PRED) 96   RAMOS Total Score --   Exercise Prescription    Mode Track, Stepper, Ergometer, Other (comment)   Frequency per week 2 times   Duration Per Session 60 minutes   Intensity METS       2-3   Progression moderate   Symptoms with Exercise Shortness of breath   Target Heart Rate    Resistance Training --   Weight --   Reps --   Assisted Devices --   Exercise Blood Pressures    Resting /78   Peak /85   Is BP WDL Yes   Exercise Intervention N/A   Psychosocial    Stages of Change Maintenance   Family Support --   [facility] Total Score --   Comments --   Psychosocial Intervention    Interventions No intervention indicated   Consults --   Resources

## 2024-08-20 NOTE — PROGRESS NOTES
Pulmonary/RT Note    Visit #        24/36       Milka Singh is a 64 y.o. female presented today for pulmonary rehab. She states that there have been no changes in medication or health since the last visit.          She has a target heart rate of . She tolerated the exercise session well and has a pain level of 4 in her knees pre exercise, and 0 post exercise. Patient states RPE for session today was 11 and RPD was a 1. Today the pt did:      Nustep: 15 min  Arm bike: 15 min  Breathing retraining: 15 min  Recovery and monitoring: 15 min                 Physician available for emergencies: Dr Sybil Lopez RCP 8/20/2024 8:11 AM

## 2024-08-23 ENCOUNTER — HOSPITAL ENCOUNTER (OUTPATIENT)
Facility: HOSPITAL | Age: 64
Setting detail: RECURRING SERIES
Discharge: HOME | End: 2024-08-26
Payer: MEDICARE

## 2024-08-23 PROCEDURE — G0237 THERAPEUTIC PROCD STRG ENDUR: HCPCS

## 2024-08-23 NOTE — PROGRESS NOTES
Pulmonary/RT Note    Visit #        25/36       Milka Singh is a 64 y.o. female presented today for pulmonary rehab. She state that there have been no changes in medication or health since the last visit.          She has a target heart rate of . She tolerated the exercise session well and has a pain level of 0. Patient states RPE for session today was 13 and RPD was a 3. Today the pt did:      Nustep: 0 min  Arm bike:  15 min  Walking:  15 min  Resistance bands:  0 min   Breathing retraining: 15 min  Bodyweight:  0 min  Recovery:15  min             Physician available for emergencies: Dr. Sybil Schofield, RT 8/23/2024 8:29 AM

## 2024-08-27 ENCOUNTER — HOSPITAL ENCOUNTER (OUTPATIENT)
Facility: HOSPITAL | Age: 64
Discharge: HOME | End: 2024-08-30
Payer: MEDICARE

## 2024-08-27 ENCOUNTER — HOSPITAL ENCOUNTER (OUTPATIENT)
Facility: HOSPITAL | Age: 64
Setting detail: RECURRING SERIES
Discharge: HOME | End: 2024-08-30
Payer: MEDICARE

## 2024-08-27 DIAGNOSIS — R73.01 IFG (IMPAIRED FASTING GLUCOSE): ICD-10-CM

## 2024-08-27 LAB
ALBUMIN SERPL-MCNC: 3.6 G/DL (ref 3.4–5)
ALBUMIN/GLOB SERPL: 0.9 (ref 0.8–1.7)
ALP SERPL-CCNC: 84 U/L (ref 45–117)
ALT SERPL-CCNC: 24 U/L (ref 13–56)
ANION GAP SERPL CALC-SCNC: 6 MMOL/L (ref 3–18)
AST SERPL-CCNC: 27 U/L (ref 10–38)
BILIRUB SERPL-MCNC: 0.6 MG/DL (ref 0.2–1)
BUN SERPL-MCNC: 15 MG/DL (ref 7–18)
BUN/CREAT SERPL: 18 (ref 12–20)
CALCIUM SERPL-MCNC: 9.2 MG/DL (ref 8.5–10.1)
CHLORIDE SERPL-SCNC: 103 MMOL/L (ref 100–111)
CO2 SERPL-SCNC: 29 MMOL/L (ref 21–32)
CREAT SERPL-MCNC: 0.84 MG/DL (ref 0.6–1.3)
EST. AVERAGE GLUCOSE BLD GHB EST-MCNC: 94 MG/DL
GLOBULIN SER CALC-MCNC: 4 G/DL (ref 2–4)
GLUCOSE SERPL-MCNC: 85 MG/DL (ref 74–99)
HBA1C MFR BLD: 4.9 % (ref 4.2–5.6)
POTASSIUM SERPL-SCNC: 3.8 MMOL/L (ref 3.5–5.5)
PROT SERPL-MCNC: 7.6 G/DL (ref 6.4–8.2)
SODIUM SERPL-SCNC: 138 MMOL/L (ref 136–145)

## 2024-08-27 PROCEDURE — 83036 HEMOGLOBIN GLYCOSYLATED A1C: CPT

## 2024-08-27 PROCEDURE — 80053 COMPREHEN METABOLIC PANEL: CPT

## 2024-08-27 PROCEDURE — 36415 COLL VENOUS BLD VENIPUNCTURE: CPT

## 2024-08-27 PROCEDURE — G0237 THERAPEUTIC PROCD STRG ENDUR: HCPCS

## 2024-08-27 ASSESSMENT — PATIENT HEALTH QUESTIONNAIRE - PHQ9
1. LITTLE INTEREST OR PLEASURE IN DOING THINGS: NOT AT ALL
SUM OF ALL RESPONSES TO PHQ9 QUESTIONS 1 & 2: 0
3. TROUBLE FALLING OR STAYING ASLEEP: NOT AT ALL
4. FEELING TIRED OR HAVING LITTLE ENERGY: SEVERAL DAYS
SUM OF ALL RESPONSES TO PHQ QUESTIONS 1-9: 1
SUM OF ALL RESPONSES TO PHQ QUESTIONS 1-9: 1
5. POOR APPETITE OR OVEREATING: NOT AT ALL
2. FEELING DOWN, DEPRESSED OR HOPELESS: NOT AT ALL
10. IF YOU CHECKED OFF ANY PROBLEMS, HOW DIFFICULT HAVE THESE PROBLEMS MADE IT FOR YOU TO DO YOUR WORK, TAKE CARE OF THINGS AT HOME, OR GET ALONG WITH OTHER PEOPLE: NOT DIFFICULT AT ALL
SUM OF ALL RESPONSES TO PHQ QUESTIONS 1-9: 1
8. MOVING OR SPEAKING SO SLOWLY THAT OTHER PEOPLE COULD HAVE NOTICED. OR THE OPPOSITE, BEING SO FIGETY OR RESTLESS THAT YOU HAVE BEEN MOVING AROUND A LOT MORE THAN USUAL: NOT AT ALL
SUM OF ALL RESPONSES TO PHQ QUESTIONS 1-9: 1
7. TROUBLE CONCENTRATING ON THINGS, SUCH AS READING THE NEWSPAPER OR WATCHING TELEVISION: NOT AT ALL
9. THOUGHTS THAT YOU WOULD BE BETTER OFF DEAD, OR OF HURTING YOURSELF: NOT AT ALL
6. FEELING BAD ABOUT YOURSELF - OR THAT YOU ARE A FAILURE OR HAVE LET YOURSELF OR YOUR FAMILY DOWN: NOT AT ALL

## 2024-08-27 NOTE — PROGRESS NOTES
Pulmonary/RT Note    Visit #               Milka Singh is a 64 y.o. female presented today for pulmonary rehab. She is getting routine lab work today..          She has a target heart rate of . She tolerated the exercise session well and has a pain level of 0. Patient states RPE for session today was 8 and RPD was a 0.5. Today the pt did:      Walkin min  Breathing retrainin min  Recovery and monitoring: 15 min                 Physician available for emergencies: Dr. Rosa Lopez RCP 2024 8:26 AM

## 2024-08-30 ENCOUNTER — HOSPITAL ENCOUNTER (OUTPATIENT)
Facility: HOSPITAL | Age: 64
Setting detail: RECURRING SERIES
End: 2024-08-30
Payer: MEDICARE

## 2024-08-30 PROCEDURE — G0237 THERAPEUTIC PROCD STRG ENDUR: HCPCS

## 2024-08-30 NOTE — PROGRESS NOTES
Pulmonary/RT Note    Visit #               Milka Singh is a 64 y.o. female presented today for pulmonary rehab. She state that there have been no changes in medication or health since the last visit.          She has a target heart rate of . She tolerated the exercise session well and has a pain level of 0. Patient states RPE for session today was 13 and RPD was a 0. Today the pt did:      Nustep: 0 min  Arm bike: 0 min  Walkin min  Resistance bands:  0 min   Breathing retraining: 15 min  Bodyweight:  0 min  Recovery: 15 min             Physician available for emergencies: Dr. Rosa Schofield, RT 2024 8:31 AM

## 2024-09-03 ENCOUNTER — HOSPITAL ENCOUNTER (OUTPATIENT)
Facility: HOSPITAL | Age: 64
Setting detail: RECURRING SERIES
Discharge: HOME | End: 2024-09-06
Payer: MEDICARE

## 2024-09-03 ENCOUNTER — TELEPHONE (OUTPATIENT)
Age: 64
End: 2024-09-03

## 2024-09-03 PROCEDURE — G0237 THERAPEUTIC PROCD STRG ENDUR: HCPCS

## 2024-09-03 NOTE — TELEPHONE ENCOUNTER
Patient called and is asking to speak with  nurse.    Patient said that she is supposed to get a Covid Injection today.     Patient said she is scheduled to get the first Euflexxa injection from ALFREDO [facility]uman on 9/6/24 for both of her knees.   Patient would like to know if the Covid injection will interfere with the Euflexxa injections.    Patient is asking for a call back at tel. [phone].

## 2024-09-03 NOTE — PROGRESS NOTES
Pulmonary/RT Note    Visit #               Milka Singh is a 64 y.o. female presented today for pulmonary rehab. She states that she will be getting her COVID booster today, and knee injection (artificial cartilage) on Friday.         She has a target heart rate of . She tolerated the exercise session well and has a pain level of 0. Patient states RPE for session today was 13 and RPD was a 2. Today the pt did:      Walkin min  Breathing retraining: 15 min  Recovery and monitorin min                 Physician available for emergencies: Dr Jovita Lopez RCP 9/3/2024 8:16 AM

## 2024-09-06 ENCOUNTER — HOSPITAL ENCOUNTER (OUTPATIENT)
Facility: HOSPITAL | Age: 64
Setting detail: RECURRING SERIES
Discharge: HOME | End: 2024-09-09
Payer: MEDICARE

## 2024-09-06 ENCOUNTER — ANTI-COAG VISIT (OUTPATIENT)
Age: 64
End: 2024-09-06
Payer: MEDICARE

## 2024-09-06 ENCOUNTER — OFFICE VISIT (OUTPATIENT)
Age: 64
End: 2024-09-06

## 2024-09-06 DIAGNOSIS — I48.91 ATRIAL FIBRILLATION, UNSPECIFIED TYPE (HCC): ICD-10-CM

## 2024-09-06 DIAGNOSIS — M17.11 PRIMARY OSTEOARTHRITIS OF RIGHT KNEE: Primary | ICD-10-CM

## 2024-09-06 DIAGNOSIS — Z79.01 LONG TERM CURRENT USE OF ANTICOAGULANT THERAPY: Primary | ICD-10-CM

## 2024-09-06 DIAGNOSIS — M17.12 PRIMARY OSTEOARTHRITIS OF LEFT KNEE: ICD-10-CM

## 2024-09-06 LAB
POC INR: 1.6
PROTHROMBIN TIME, POC: 19.3

## 2024-09-06 PROCEDURE — G0239 OTH RESP PROC, GROUP: HCPCS

## 2024-09-06 PROCEDURE — 85610 PROTHROMBIN TIME: CPT | Performed by: NURSE PRACTITIONER

## 2024-09-06 PROCEDURE — G0237 THERAPEUTIC PROCD STRG ENDUR: HCPCS

## 2024-09-06 RX ORDER — HYALURONATE SODIUM 10 MG/ML
20 SYRINGE (ML) INTRAARTICULAR ONCE
Status: COMPLETED | OUTPATIENT
Start: 2024-09-06 | End: 2024-09-06

## 2024-09-06 RX ADMIN — Medication 20 MG: at 10:34

## 2024-09-06 RX ADMIN — Medication 20 MG: at 10:35

## 2024-09-06 NOTE — PROGRESS NOTES
Patient: Milka Singh                MRN: [id number]       SSN: [id number]  YOB: 1960        AGE: 64 y.o.        SEX: female  There is no height or weight on file to calculate BMI.    PCP: Leda Nguyen MD  09/06/24        Pt presents today for their 1st euflexxa  injection for bilateral knee .  There has been no recent fevers/chills, systemic changes, or injuries to report.    PE:  General A&Ox3, NAD  Exam of the knees reveals skin intact, no erythema, no ecchymosis, no signs for infection.  No cyanosis, clubbing, or edema present distally.  Neg calf tenderness, neg homans, no signs for DVT present.    A: bilateral knee OA    P: The bilateral knee was injected with 2ml euflexxa  with US guided assistance without complications.  Patient was instructed on post injection care.     Chart reviewed for the following:  Kareem BELLA PA-C, have reviewed the History, Physical and updated the Allergic reactions for Milka Singh?    TIME OUT performed immediately prior to start of procedure:  Kareem BELLA PA-C, have performed the following reviews on Milka Singh prior to the start of the procedure:  ????????  * Patient was identified by name and date of birth   * Agreement on procedure being performed was verified  * Risks and Benefits explained to the patient  * Procedure site verified and marked as necessary  * Patient was positioned for comfort  * Consent was signed and verified    Time: 10:31 AM    Body part: bilateral knee, intra-articular    Medication & Dose: 2ml euflexxa each knee    Date of procedure: 09/06/24    Procedure performed by: JR Mitul PA-C    Patient assisted by: self    How tolerated by patient: tolerated the procedure well with no complications    Post Procedural Pain Scale: 6    Comments:  [employer] using a frequency of 10MHz with a 12L-RS transducer head was used to confirm needle placement.  Ultrasound images captured using [employer] [facility]

## 2024-09-06 NOTE — PROGRESS NOTES
The INR is below the therapeutic range.  Please make the following adjustments to Coumadin dosin.5mg today then continue Coumadin 5mg daily except 2.5mg on Tues-Thurs-Sat. .  Repeat the INR in 3 weeks.

## 2024-09-10 ENCOUNTER — APPOINTMENT (OUTPATIENT)
Facility: HOSPITAL | Age: 64
End: 2024-09-10
Payer: MEDICARE

## 2024-09-10 ENCOUNTER — OFFICE VISIT (OUTPATIENT)
Facility: CLINIC | Age: 64
End: 2024-09-10
Payer: MEDICARE

## 2024-09-10 VITALS
DIASTOLIC BLOOD PRESSURE: 80 MMHG | RESPIRATION RATE: 16 BRPM | WEIGHT: 198.4 LBS | HEART RATE: 93 BPM | TEMPERATURE: 98.7 F | HEIGHT: 67 IN | SYSTOLIC BLOOD PRESSURE: 132 MMHG | BODY MASS INDEX: 31.14 KG/M2 | OXYGEN SATURATION: 98 %

## 2024-09-10 DIAGNOSIS — Z00.00 MEDICARE ANNUAL WELLNESS VISIT, SUBSEQUENT: Primary | ICD-10-CM

## 2024-09-10 DIAGNOSIS — F33.9 RECURRENT DEPRESSION (HCC): ICD-10-CM

## 2024-09-10 DIAGNOSIS — I48.91 ATRIAL FIBRILLATION, UNSPECIFIED TYPE (HCC): ICD-10-CM

## 2024-09-10 DIAGNOSIS — J45.40 MODERATE PERSISTENT ASTHMA WITHOUT COMPLICATION: ICD-10-CM

## 2024-09-10 DIAGNOSIS — F41.9 ANXIETY: ICD-10-CM

## 2024-09-10 DIAGNOSIS — E78.00 PURE HYPERCHOLESTEROLEMIA: ICD-10-CM

## 2024-09-10 DIAGNOSIS — D63.8 ANEMIA OF CHRONIC DISEASE: ICD-10-CM

## 2024-09-10 DIAGNOSIS — I50.32 CHRONIC DIASTOLIC CONGESTIVE HEART FAILURE (HCC): ICD-10-CM

## 2024-09-10 DIAGNOSIS — D68.69 SECONDARY HYPERCOAGULABLE STATE (HCC): ICD-10-CM

## 2024-09-10 DIAGNOSIS — Z23 ENCOUNTER FOR ADMINISTRATION OF VACCINE: ICD-10-CM

## 2024-09-10 DIAGNOSIS — M85.80 OSTEOPENIA, UNSPECIFIED LOCATION: ICD-10-CM

## 2024-09-10 DIAGNOSIS — Z79.01 LONG TERM CURRENT USE OF ANTICOAGULANT THERAPY: ICD-10-CM

## 2024-09-10 DIAGNOSIS — Z12.31 ENCOUNTER FOR SCREENING MAMMOGRAM FOR MALIGNANT NEOPLASM OF BREAST: ICD-10-CM

## 2024-09-10 DIAGNOSIS — I10 ESSENTIAL HYPERTENSION: ICD-10-CM

## 2024-09-10 DIAGNOSIS — I25.119 ATHEROSCLEROSIS OF NATIVE CORONARY ARTERY OF NATIVE HEART WITH ANGINA PECTORIS (HCC): ICD-10-CM

## 2024-09-10 DIAGNOSIS — I50.22 CHRONIC SYSTOLIC (CONGESTIVE) HEART FAILURE (HCC): ICD-10-CM

## 2024-09-10 PROBLEM — F43.22 ADJUSTMENT DISORDER WITH ANXIOUS MOOD: Status: RESOLVED | Noted: 2019-03-05 | Resolved: 2024-09-10

## 2024-09-10 PROBLEM — R07.9 CHEST PAIN: Status: RESOLVED | Noted: 2019-01-05 | Resolved: 2024-09-10

## 2024-09-10 PROBLEM — Z71.89 ADVANCED DIRECTIVES, COUNSELING/DISCUSSION: Status: RESOLVED | Noted: 2017-03-09 | Resolved: 2024-09-10

## 2024-09-10 PROCEDURE — 1123F ACP DISCUSS/DSCN MKR DOCD: CPT | Performed by: FAMILY MEDICINE

## 2024-09-10 PROCEDURE — G8417 CALC BMI ABV UP PARAM F/U: HCPCS | Performed by: FAMILY MEDICINE

## 2024-09-10 PROCEDURE — 3017F COLORECTAL CA SCREEN DOC REV: CPT | Performed by: FAMILY MEDICINE

## 2024-09-10 PROCEDURE — G0009 ADMIN PNEUMOCOCCAL VACCINE: HCPCS | Performed by: FAMILY MEDICINE

## 2024-09-10 PROCEDURE — G8399 PT W/DXA RESULTS DOCUMENT: HCPCS | Performed by: FAMILY MEDICINE

## 2024-09-10 PROCEDURE — 3079F DIAST BP 80-89 MM HG: CPT | Performed by: FAMILY MEDICINE

## 2024-09-10 PROCEDURE — 1036F TOBACCO NON-USER: CPT | Performed by: FAMILY MEDICINE

## 2024-09-10 PROCEDURE — G8427 DOCREV CUR MEDS BY ELIG CLIN: HCPCS | Performed by: FAMILY MEDICINE

## 2024-09-10 PROCEDURE — G0439 PPPS, SUBSEQ VISIT: HCPCS | Performed by: FAMILY MEDICINE

## 2024-09-10 PROCEDURE — 1090F PRES/ABSN URINE INCON ASSESS: CPT | Performed by: FAMILY MEDICINE

## 2024-09-10 PROCEDURE — 3075F SYST BP GE 130 - 139MM HG: CPT | Performed by: FAMILY MEDICINE

## 2024-09-10 PROCEDURE — 90677 PCV20 VACCINE IM: CPT | Performed by: FAMILY MEDICINE

## 2024-09-10 PROCEDURE — 99214 OFFICE O/P EST MOD 30 MIN: CPT | Performed by: FAMILY MEDICINE

## 2024-09-10 ASSESSMENT — PATIENT HEALTH QUESTIONNAIRE - PHQ9
1. LITTLE INTEREST OR PLEASURE IN DOING THINGS: NOT AT ALL
9. THOUGHTS THAT YOU WOULD BE BETTER OFF DEAD, OR OF HURTING YOURSELF: NOT AT ALL
10. IF YOU CHECKED OFF ANY PROBLEMS, HOW DIFFICULT HAVE THESE PROBLEMS MADE IT FOR YOU TO DO YOUR WORK, TAKE CARE OF THINGS AT HOME, OR GET ALONG WITH OTHER PEOPLE: NOT DIFFICULT AT ALL
7. TROUBLE CONCENTRATING ON THINGS, SUCH AS READING THE NEWSPAPER OR WATCHING TELEVISION: SEVERAL DAYS
SUM OF ALL RESPONSES TO PHQ QUESTIONS 1-9: 1
3. TROUBLE FALLING OR STAYING ASLEEP: NOT AT ALL
6. FEELING BAD ABOUT YOURSELF - OR THAT YOU ARE A FAILURE OR HAVE LET YOURSELF OR YOUR FAMILY DOWN: NOT AT ALL
5. POOR APPETITE OR OVEREATING: NOT AT ALL
2. FEELING DOWN, DEPRESSED OR HOPELESS: NOT AT ALL
SUM OF ALL RESPONSES TO PHQ9 QUESTIONS 1 & 2: 0
SUM OF ALL RESPONSES TO PHQ QUESTIONS 1-9: 1
4. FEELING TIRED OR HAVING LITTLE ENERGY: NOT AT ALL
8. MOVING OR SPEAKING SO SLOWLY THAT OTHER PEOPLE COULD HAVE NOTICED. OR THE OPPOSITE, BEING SO FIGETY OR RESTLESS THAT YOU HAVE BEEN MOVING AROUND A LOT MORE THAN USUAL: NOT AT ALL
SUM OF ALL RESPONSES TO PHQ QUESTIONS 1-9: 1
SUM OF ALL RESPONSES TO PHQ QUESTIONS 1-9: 1

## 2024-09-10 ASSESSMENT — LIFESTYLE VARIABLES
HOW MANY STANDARD DRINKS CONTAINING ALCOHOL DO YOU HAVE ON A TYPICAL DAY: 1 OR 2
HOW OFTEN DO YOU HAVE A DRINK CONTAINING ALCOHOL: MONTHLY OR LESS

## 2024-09-13 ENCOUNTER — OFFICE VISIT (OUTPATIENT)
Age: 64
End: 2024-09-13

## 2024-09-13 VITALS — HEIGHT: 67 IN | BODY MASS INDEX: 30.92 KG/M2 | WEIGHT: 197 LBS

## 2024-09-13 DIAGNOSIS — M17.12 PRIMARY OSTEOARTHRITIS OF LEFT KNEE: ICD-10-CM

## 2024-09-13 DIAGNOSIS — M17.11 PRIMARY OSTEOARTHRITIS OF RIGHT KNEE: Primary | ICD-10-CM

## 2024-09-13 PROCEDURE — 99024 POSTOP FOLLOW-UP VISIT: CPT | Performed by: PHYSICIAN ASSISTANT

## 2024-09-13 RX ORDER — HYALURONATE SODIUM 10 MG/ML
20 SYRINGE (ML) INTRAARTICULAR ONCE
Status: COMPLETED | OUTPATIENT
Start: 2024-09-13 | End: 2024-09-13

## 2024-09-13 RX ADMIN — Medication 20 MG: at 13:20

## 2024-09-13 RX ADMIN — Medication 20 MG: at 13:19

## 2024-09-17 ENCOUNTER — APPOINTMENT (OUTPATIENT)
Facility: HOSPITAL | Age: 64
End: 2024-09-17
Payer: MEDICARE

## 2024-09-20 ENCOUNTER — HOSPITAL ENCOUNTER (OUTPATIENT)
Facility: HOSPITAL | Age: 64
Setting detail: RECURRING SERIES
Discharge: HOME | End: 2024-09-23
Payer: MEDICARE

## 2024-09-20 ENCOUNTER — OFFICE VISIT (OUTPATIENT)
Age: 64
End: 2024-09-20
Payer: MEDICARE

## 2024-09-20 VITALS — HEIGHT: 67 IN | WEIGHT: 195 LBS | BODY MASS INDEX: 30.61 KG/M2

## 2024-09-20 DIAGNOSIS — M17.12 PRIMARY OSTEOARTHRITIS OF LEFT KNEE: ICD-10-CM

## 2024-09-20 DIAGNOSIS — M17.11 PRIMARY OSTEOARTHRITIS OF RIGHT KNEE: Primary | ICD-10-CM

## 2024-09-20 DIAGNOSIS — M54.50 LOW BACK PAIN, UNSPECIFIED BACK PAIN LATERALITY, UNSPECIFIED CHRONICITY, UNSPECIFIED WHETHER SCIATICA PRESENT: ICD-10-CM

## 2024-09-20 PROCEDURE — 20611 DRAIN/INJ JOINT/BURSA W/US: CPT | Performed by: PHYSICIAN ASSISTANT

## 2024-09-20 PROCEDURE — 90000 NO LOS: CPT | Performed by: PHYSICIAN ASSISTANT

## 2024-09-20 PROCEDURE — G0237 THERAPEUTIC PROCD STRG ENDUR: HCPCS

## 2024-09-20 RX ORDER — HYALURONATE SODIUM 10 MG/ML
20 SYRINGE (ML) INTRAARTICULAR ONCE
Status: COMPLETED | OUTPATIENT
Start: 2024-09-20 | End: 2024-09-20

## 2024-09-20 RX ADMIN — Medication 20 MG: at 10:11

## 2024-09-24 ENCOUNTER — HOSPITAL ENCOUNTER (OUTPATIENT)
Facility: HOSPITAL | Age: 64
Setting detail: RECURRING SERIES
Discharge: HOME | End: 2024-09-27
Payer: MEDICARE

## 2024-09-24 PROCEDURE — G0237 THERAPEUTIC PROCD STRG ENDUR: HCPCS

## 2024-09-26 ENCOUNTER — TELEPHONE (OUTPATIENT)
Age: 64
End: 2024-09-26

## 2024-09-26 ENCOUNTER — OFFICE VISIT (OUTPATIENT)
Age: 64
End: 2024-09-26

## 2024-09-26 VITALS
HEART RATE: 78 BPM | HEIGHT: 67 IN | TEMPERATURE: 96.4 F | SYSTOLIC BLOOD PRESSURE: 128 MMHG | OXYGEN SATURATION: 96 % | DIASTOLIC BLOOD PRESSURE: 75 MMHG | WEIGHT: 199 LBS | BODY MASS INDEX: 31.23 KG/M2 | RESPIRATION RATE: 16 BRPM

## 2024-09-26 DIAGNOSIS — J45.40 MODERATE PERSISTENT ASTHMA, UNCOMPLICATED: Primary | ICD-10-CM

## 2024-09-26 DIAGNOSIS — G47.19 EXCESSIVE DAYTIME SLEEPINESS: ICD-10-CM

## 2024-09-26 DIAGNOSIS — G47.10 HYPERSOMNIA: ICD-10-CM

## 2024-09-26 DIAGNOSIS — R06.83 SNORING: ICD-10-CM

## 2024-09-26 RX ORDER — FLUTICASONE FUROATE, UMECLIDINIUM BROMIDE AND VILANTEROL TRIFENATATE 200; 62.5; 25 UG/1; UG/1; UG/1
1 POWDER RESPIRATORY (INHALATION) DAILY
Qty: 1 EACH | Refills: 5 | Status: SHIPPED | OUTPATIENT
Start: 2024-09-26

## 2024-09-26 ASSESSMENT — PATIENT HEALTH QUESTIONNAIRE - PHQ9
3. TROUBLE FALLING OR STAYING ASLEEP: NOT AT ALL
SUM OF ALL RESPONSES TO PHQ QUESTIONS 1-9: 0
2. FEELING DOWN, DEPRESSED OR HOPELESS: NOT AT ALL
SUM OF ALL RESPONSES TO PHQ QUESTIONS 1-9: 0
6. FEELING BAD ABOUT YOURSELF - OR THAT YOU ARE A FAILURE OR HAVE LET YOURSELF OR YOUR FAMILY DOWN: NOT AT ALL
7. TROUBLE CONCENTRATING ON THINGS, SUCH AS READING THE NEWSPAPER OR WATCHING TELEVISION: NOT AT ALL
SUM OF ALL RESPONSES TO PHQ QUESTIONS 1-9: 0
8. MOVING OR SPEAKING SO SLOWLY THAT OTHER PEOPLE COULD HAVE NOTICED. OR THE OPPOSITE, BEING SO FIGETY OR RESTLESS THAT YOU HAVE BEEN MOVING AROUND A LOT MORE THAN USUAL: NOT AT ALL
5. POOR APPETITE OR OVEREATING: NOT AT ALL
SUM OF ALL RESPONSES TO PHQ QUESTIONS 1-9: 0
9. THOUGHTS THAT YOU WOULD BE BETTER OFF DEAD, OR OF HURTING YOURSELF: NOT AT ALL
1. LITTLE INTEREST OR PLEASURE IN DOING THINGS: NOT AT ALL
SUM OF ALL RESPONSES TO PHQ9 QUESTIONS 1 & 2: 0
4. FEELING TIRED OR HAVING LITTLE ENERGY: NOT AT ALL
10. IF YOU CHECKED OFF ANY PROBLEMS, HOW DIFFICULT HAVE THESE PROBLEMS MADE IT FOR YOU TO DO YOUR WORK, TAKE CARE OF THINGS AT HOME, OR GET ALONG WITH OTHER PEOPLE: NOT DIFFICULT AT ALL

## 2024-09-26 NOTE — PROGRESS NOTES
Milka Singh presents today for   Chief Complaint   Patient presents with    Asthma     Moderate, persistent, uncomplicated       Is someone accompanying this pt? no    Is the patient using any DME equipment during OV? no    -DME Company no    Depression Screenin/26/2024     9:51 AM   PHQ-9 Questionaire   Little interest or pleasure in doing things 0   Feeling down, depressed, or hopeless 0   Trouble falling or staying asleep, or sleeping too much 0   Feeling tired or having little energy 0   Poor appetite or overeating 0   Feeling bad about yourself - or that you are a failure or have let yourself or your family down 0   Trouble concentrating on things, such as reading the newspaper or watching television 0   Moving or speaking so slowly that other people could have noticed. Or the opposite - being so fidgety or restless that you have been moving around a lot more than usual 0   Thoughts that you would be better off dead, or of hurting yourself in some way 0   PHQ-9 Total Score 0   If you checked off any problems, how difficult have these problems made it for you to do your work, take care of things at home, or get along with other people? 0       Learning Assessment:    Failed to redirect to the Timeline version of the [employer] SmartLink.    Abuse Screening:         No data to display                Fall Risk    Failed to redirect to the Timeline version of the [employer] SmartLink.    Coordination of Care:    1. Have you been to the ER, urgent care clinic since your last visit? Hospitalized since your last visit? no    2. Have you seen or consulted any other health care providers outside of the [facility] since your last visit? Include any pap smears or colon screening. no    Medication list has been update per patient.

## 2024-09-26 NOTE — TELEPHONE ENCOUNTER
----- Message from SEFERINO ENRIQUEZ RN sent at 9/25/2024  2:47 PM EDT -----  Regarding: FW: med refill    ----- Message -----  From: Jose Robison  Sent: 9/25/2024   9:23 AM EDT  To: Purnima Hernandez RN  Subject: med refill                                       Patient called stating her pharmacy refilled her losartan to early and her insurance won't cover it until the 11th of October so is there an alternative for this med.

## 2024-09-26 NOTE — PATIENT INSTRUCTIONS
What is the plan?  -Start on Trelegy ellipta - take 1 puff once daily; rinse mouth after each use.   -STOP Wixella and Spiriva  -Continue Albuterol rescue inhaler as needed  -Complete Sleep study.

## 2024-09-26 NOTE — PROGRESS NOTES
[facility]                                                       Pulmonary, Critical Care, and Sleep Medicine      Pulmonary Office Progress Notes.    Name: Milka Singh     : 1960     Date: 2024        Subjective:   Chief complaint: SOB  Patient is a 64 y.o. female with a PMHx of Asthma, Afib (on Coumadin), HFpEF, Rheumatic valve disease - s/p Mechanical valve, HTN, MV stenosis, HLD, Anemia of chronic disease, Depression.    Patient is a [employer].     HPI(24):  Interim events: she states she had pneumonia in ; states this has lead to persistent cough. She was hospitalized and reports being treated with a Z-Alvin at some point.    Today in clinic, she states that she continues to have a cough at night.   States she is having a lot of difficulty affording her inhalers.   Has not had her Wixella and Spiriva in the past month.   States when she did have the inhaler, use led to improvement of her symptoms but they were not completely resolved.     Cough and phlegm production occurs mostly at night.  Admits to intermittent wheezing which is also worse at night.  She also notes wheezing and dyspnea with walking long distances. She has to make herself walk slower. No hemoptysis.  Usually wakes up with wheezing at about 2-3 am.  Hemoptysis: none  She denies acid reflux. She is on famotidine daily and avoids spicy foods.   She denies any recent change in weight or leg swelling.    Current inhalers and/or respiratory treatments:  -Wixella 500/50 & Spiriva respimat 1.25mcg - does not have it at this time.  -Albuterol rescue inhaler - using PRN.    Allergies: she continues to get allergy shots and feels like they are helping.    Sleep - states that she snores. Denies witnessed apneas. Admits to intermittent morning headaches. Notes she feels refreshed 1-2 days per week. Gets about 9-10 hours of sleep nightly.

## 2024-09-27 ENCOUNTER — TELEPHONE (OUTPATIENT)
Facility: HOSPITAL | Age: 64
End: 2024-09-27

## 2024-09-27 ENCOUNTER — HOSPITAL ENCOUNTER (OUTPATIENT)
Facility: HOSPITAL | Age: 64
Setting detail: RECURRING SERIES
End: 2024-09-27
Payer: MEDICARE

## 2024-10-01 ENCOUNTER — HOSPITAL ENCOUNTER (OUTPATIENT)
Facility: HOSPITAL | Age: 64
Setting detail: RECURRING SERIES
Discharge: HOME | End: 2024-10-04
Payer: MEDICARE

## 2024-10-01 PROCEDURE — G0237 THERAPEUTIC PROCD STRG ENDUR: HCPCS

## 2024-10-01 PROCEDURE — G0239 OTH RESP PROC, GROUP: HCPCS

## 2024-10-01 NOTE — PROGRESS NOTES
Pulmonary/RT Note    Visit #        32/36       Mikla Singh is a 64 y.o. female presented today for pulmonary rehab. She started taking Trelegy, her first dose was this morning.         She has a target heart rate of . She tolerated the exercise session well and has a pain level of 0. Patient states RPE for session today was 13 and RPD was a 2. Today the pt did:      Arm bike: 15 min  Walking: 15 min  Breathing retraining: 15 min  Recovery and monitoring: 15 min  Education: 30 mins - patient attended a group class on Pharmacy                 Physician available for emergencies: Dr Jovita Lopez RCP 10/1/2024 8:32 AM

## 2024-10-04 ENCOUNTER — HOSPITAL ENCOUNTER (OUTPATIENT)
Facility: HOSPITAL | Age: 64
Setting detail: RECURRING SERIES
Discharge: HOME | End: 2024-10-07
Payer: MEDICARE

## 2024-10-04 ENCOUNTER — ANTI-COAG VISIT (OUTPATIENT)
Age: 64
End: 2024-10-04
Payer: MEDICARE

## 2024-10-04 DIAGNOSIS — Z79.01 LONG TERM CURRENT USE OF ANTICOAGULANT THERAPY: Primary | ICD-10-CM

## 2024-10-04 DIAGNOSIS — I48.91 ATRIAL FIBRILLATION, UNSPECIFIED TYPE (HCC): ICD-10-CM

## 2024-10-04 LAB
POC INR: 3
PROTHROMBIN TIME, POC: 36.5

## 2024-10-04 PROCEDURE — G0237 THERAPEUTIC PROCD STRG ENDUR: HCPCS

## 2024-10-04 PROCEDURE — 85610 PROTHROMBIN TIME: CPT | Performed by: NURSE PRACTITIONER

## 2024-10-04 NOTE — PROGRESS NOTES
Pulmonary/RT Note    Visit #        33/36       Milka Singh is a 64 y.o. female presented today for pulmonary rehab. She state that there have been no changes in medication or health since the last visit.          She has a target heart rate of . She tolerated the exercise session well and has a pain level of 0. Patient states RPE for session today was 13 and RPD was a 0. Today the pt did:      Nustep:0 min  Arm bike: 5 min  Walking:  15 min  Resistance bands:  0 min   Breathing retraining:15 min  Bodyweight:0  min  Recovery: 15 min             Physician available for emergencies: Dr. Jovita Schofield, RT 10/4/2024 8:30 AM

## 2024-10-04 NOTE — PROGRESS NOTES
The INR is stable and therapeutic but in upper part of range.   Only 2.5mg today then continue Coumadin 5mg daily except 2.5mg on Tues-Thurs-Sat.   Recheck INR in 3 weeks with appt

## 2024-10-08 ENCOUNTER — TELEPHONE (OUTPATIENT)
Facility: HOSPITAL | Age: 64
End: 2024-10-08

## 2024-10-08 NOTE — TELEPHONE ENCOUNTER
Cardiopulmonary Rehab Phone Note    Patient called 8:06 AM 10/8/24 regarding appointment 10/8/24. Patient stated she will not be attending exercise today due to: no reason provided. Appropriate staff notified.     RIYA Stoner-CONNIE   8:04 AM 10/8/24

## 2024-10-10 NOTE — TELEPHONE ENCOUNTER
Call made to Pt using two identifiers. Pt made aware that her refill request for Lasix was declined. Pt stated that she has enough of the medication to last her until she she the doctor on Tues. She denies any excessive swelling or SOB. And advised to keep scheduled appointment. No

## 2024-10-14 ENCOUNTER — TELEPHONE (OUTPATIENT)
Dept: SLEEP MEDICINE | Facility: HOSPITAL | Age: 64
End: 2024-10-14

## 2024-10-15 ENCOUNTER — HOSPITAL ENCOUNTER (OUTPATIENT)
Facility: HOSPITAL | Age: 64
Setting detail: RECURRING SERIES
Discharge: HOME | End: 2024-10-18
Payer: MEDICARE

## 2024-10-15 PROCEDURE — G0237 THERAPEUTIC PROCD STRG ENDUR: HCPCS

## 2024-10-15 NOTE — PROGRESS NOTES
Pulmonary/RT Note    Visit #        34/36       Milka Singh is a 64 y.o. female presented today for pulmonary rehab. She states that she stopped taking Wixela, and now takes Trelegy.         She has a target heart rate of . She tolerated the exercise session well and has a pain level of 0. Patient states RPE for session today was 15 and RPD was a 1. Today the pt did:        Arm bike: 15 min  Walking: 15 min  Breathing retraining: 15 min  Recovery and monitorin min                 Physician available for emergencies: Dr Rosa Lopez RCP 10/15/2024 8:16 AM

## 2024-10-22 ENCOUNTER — ANTI-COAG VISIT (OUTPATIENT)
Age: 64
End: 2024-10-22
Payer: MEDICARE

## 2024-10-22 ENCOUNTER — OFFICE VISIT (OUTPATIENT)
Age: 64
End: 2024-10-22
Payer: MEDICARE

## 2024-10-22 ENCOUNTER — HOSPITAL ENCOUNTER (OUTPATIENT)
Facility: HOSPITAL | Age: 64
Setting detail: RECURRING SERIES
Discharge: HOME | End: 2024-10-25
Payer: MEDICARE

## 2024-10-22 VITALS
OXYGEN SATURATION: 95 % | HEART RATE: 79 BPM | BODY MASS INDEX: 30.92 KG/M2 | HEIGHT: 67 IN | SYSTOLIC BLOOD PRESSURE: 130 MMHG | DIASTOLIC BLOOD PRESSURE: 100 MMHG | WEIGHT: 197 LBS

## 2024-10-22 DIAGNOSIS — R06.02 SHORTNESS OF BREATH: ICD-10-CM

## 2024-10-22 DIAGNOSIS — I48.91 ATRIAL FIBRILLATION, UNSPECIFIED TYPE (HCC): ICD-10-CM

## 2024-10-22 DIAGNOSIS — Z79.01 LONG TERM CURRENT USE OF ANTICOAGULANT THERAPY: ICD-10-CM

## 2024-10-22 DIAGNOSIS — R79.89 ELEVATED TROPONIN: ICD-10-CM

## 2024-10-22 DIAGNOSIS — I11.0 HYPERTENSIVE HEART DISEASE WITH HEART FAILURE (HCC): ICD-10-CM

## 2024-10-22 DIAGNOSIS — I48.91 ATRIAL FIBRILLATION, UNSPECIFIED TYPE (HCC): Primary | ICD-10-CM

## 2024-10-22 DIAGNOSIS — Z79.01 LONG TERM CURRENT USE OF ANTICOAGULANT THERAPY: Primary | ICD-10-CM

## 2024-10-22 LAB
POC INR: 1.2
PROTHROMBIN TIME, POC: 14.2

## 2024-10-22 PROCEDURE — 1036F TOBACCO NON-USER: CPT | Performed by: INTERNAL MEDICINE

## 2024-10-22 PROCEDURE — 99214 OFFICE O/P EST MOD 30 MIN: CPT | Performed by: INTERNAL MEDICINE

## 2024-10-22 PROCEDURE — 93000 ELECTROCARDIOGRAM COMPLETE: CPT | Performed by: INTERNAL MEDICINE

## 2024-10-22 PROCEDURE — 3075F SYST BP GE 130 - 139MM HG: CPT | Performed by: INTERNAL MEDICINE

## 2024-10-22 PROCEDURE — 1123F ACP DISCUSS/DSCN MKR DOCD: CPT | Performed by: INTERNAL MEDICINE

## 2024-10-22 PROCEDURE — G8484 FLU IMMUNIZE NO ADMIN: HCPCS | Performed by: INTERNAL MEDICINE

## 2024-10-22 PROCEDURE — G0237 THERAPEUTIC PROCD STRG ENDUR: HCPCS

## 2024-10-22 PROCEDURE — 3017F COLORECTAL CA SCREEN DOC REV: CPT | Performed by: INTERNAL MEDICINE

## 2024-10-22 PROCEDURE — 1090F PRES/ABSN URINE INCON ASSESS: CPT | Performed by: INTERNAL MEDICINE

## 2024-10-22 PROCEDURE — G8427 DOCREV CUR MEDS BY ELIG CLIN: HCPCS | Performed by: INTERNAL MEDICINE

## 2024-10-22 PROCEDURE — 85610 PROTHROMBIN TIME: CPT | Performed by: NURSE PRACTITIONER

## 2024-10-22 PROCEDURE — G8399 PT W/DXA RESULTS DOCUMENT: HCPCS | Performed by: INTERNAL MEDICINE

## 2024-10-22 PROCEDURE — G8417 CALC BMI ABV UP PARAM F/U: HCPCS | Performed by: INTERNAL MEDICINE

## 2024-10-22 PROCEDURE — 3080F DIAST BP >= 90 MM HG: CPT | Performed by: INTERNAL MEDICINE

## 2024-10-22 ASSESSMENT — PATIENT HEALTH QUESTIONNAIRE - PHQ9
10. IF YOU CHECKED OFF ANY PROBLEMS, HOW DIFFICULT HAVE THESE PROBLEMS MADE IT FOR YOU TO DO YOUR WORK, TAKE CARE OF THINGS AT HOME, OR GET ALONG WITH OTHER PEOPLE: NOT DIFFICULT AT ALL
1. LITTLE INTEREST OR PLEASURE IN DOING THINGS: NOT AT ALL
9. THOUGHTS THAT YOU WOULD BE BETTER OFF DEAD, OR OF HURTING YOURSELF: NOT AT ALL
8. MOVING OR SPEAKING SO SLOWLY THAT OTHER PEOPLE COULD HAVE NOTICED. OR THE OPPOSITE, BEING SO FIGETY OR RESTLESS THAT YOU HAVE BEEN MOVING AROUND A LOT MORE THAN USUAL: NOT AT ALL
6. FEELING BAD ABOUT YOURSELF - OR THAT YOU ARE A FAILURE OR HAVE LET YOURSELF OR YOUR FAMILY DOWN: NOT AT ALL
5. POOR APPETITE OR OVEREATING: NOT AT ALL
3. TROUBLE FALLING OR STAYING ASLEEP: NOT AT ALL
SUM OF ALL RESPONSES TO PHQ QUESTIONS 1-9: 0
2. FEELING DOWN, DEPRESSED OR HOPELESS: NOT AT ALL
SUM OF ALL RESPONSES TO PHQ QUESTIONS 1-9: 0
4. FEELING TIRED OR HAVING LITTLE ENERGY: NOT AT ALL
SUM OF ALL RESPONSES TO PHQ QUESTIONS 1-9: 0
SUM OF ALL RESPONSES TO PHQ QUESTIONS 1-9: 0
7. TROUBLE CONCENTRATING ON THINGS, SUCH AS READING THE NEWSPAPER OR WATCHING TELEVISION: NOT AT ALL
SUM OF ALL RESPONSES TO PHQ9 QUESTIONS 1 & 2: 0

## 2024-10-22 NOTE — PROGRESS NOTES
Pulmonary/RT Note    Visit #        35/36       Milka Singh is a 64 y.o. female presented today for pulmonary rehab. She has a 6-month follow-up appointment with her cardiologist today.         She has a target heart rate of . She tolerated the exercise session well and has a pain level of 2 in her knees and left shoulder. Patient states RPE for session today was 15 and RPD was a 2. She states that she had to use her inhaler before she got on the treadmill this morning. Today the pt did:      Arm bike: 15 min  Walking: 15 min  Breathing retraining: 15 min  Recovery and monitoring: 15 min                 Physician available for emergencies: Dr Orlin Lopez RCP 10/22/2024 8:13 AM

## 2024-10-22 NOTE — PROGRESS NOTES
Verbal order and read back per JOSE Carlson NP     The INR is below the therapeutic range.  Take 7.5 mg 2x then continue Coumadin 5mg daily except 2.5mg on Tues-Thurs-Sat. Recheck in 2 weeks  This has been fully explained to the patient, who indicates understanding.

## 2024-10-24 ASSESSMENT — EXERCISE STRESS TEST
PEAK_BP: 139/85
PEAK_HR: 96
PEAK_RPE: 15

## 2024-10-24 ASSESSMENT — PULMONARY FUNCTION TESTS
FEV1 (%PREDICTED): 96
FVC (LITERS): 85
FEV1/FVC: 88

## 2024-10-24 ASSESSMENT — LIFESTYLE VARIABLES: ALCOHOL_USE: YES

## 2024-10-24 ASSESSMENT — EJECTION FRACTION: EF_VALUE: 55

## 2024-10-28 NOTE — TELEPHONE ENCOUNTER
This pharmacy faxed over request for the following prescriptions to be filled:    Medication requested :   levocetirizine (XYZAL) 5 MG tablet QTY 30 Refill 2  PCP: Patrick  Pharmacy or Print: [facility]   Mail order or Local pharmacy [facility]     Scheduled appointment if not seen by current providers in office: LOV 9/10/2024 FU 12/10/2024

## 2024-10-29 ENCOUNTER — HOSPITAL ENCOUNTER (OUTPATIENT)
Facility: HOSPITAL | Age: 64
Setting detail: RECURRING SERIES
Discharge: HOME | End: 2024-11-01
Payer: MEDICARE

## 2024-10-29 PROCEDURE — G0237 THERAPEUTIC PROCD STRG ENDUR: HCPCS

## 2024-10-29 PROCEDURE — 94618 PULMONARY STRESS TESTING: CPT

## 2024-10-29 ASSESSMENT — PATIENT HEALTH QUESTIONNAIRE - PHQ9
SUM OF ALL RESPONSES TO PHQ QUESTIONS 1-9: 0
5. POOR APPETITE OR OVEREATING: NOT AT ALL
SUM OF ALL RESPONSES TO PHQ QUESTIONS 1-9: 0
4. FEELING TIRED OR HAVING LITTLE ENERGY: NOT AT ALL
10. IF YOU CHECKED OFF ANY PROBLEMS, HOW DIFFICULT HAVE THESE PROBLEMS MADE IT FOR YOU TO DO YOUR WORK, TAKE CARE OF THINGS AT HOME, OR GET ALONG WITH OTHER PEOPLE: NOT DIFFICULT AT ALL
9. THOUGHTS THAT YOU WOULD BE BETTER OFF DEAD, OR OF HURTING YOURSELF: NOT AT ALL
7. TROUBLE CONCENTRATING ON THINGS, SUCH AS READING THE NEWSPAPER OR WATCHING TELEVISION: NOT AT ALL
1. LITTLE INTEREST OR PLEASURE IN DOING THINGS: NOT AT ALL
SUM OF ALL RESPONSES TO PHQ QUESTIONS 1-9: 0
2. FEELING DOWN, DEPRESSED OR HOPELESS: NOT AT ALL
SUM OF ALL RESPONSES TO PHQ QUESTIONS 1-9: 0
SUM OF ALL RESPONSES TO PHQ9 QUESTIONS 1 & 2: 0
6. FEELING BAD ABOUT YOURSELF - OR THAT YOU ARE A FAILURE OR HAVE LET YOURSELF OR YOUR FAMILY DOWN: NOT AT ALL
8. MOVING OR SPEAKING SO SLOWLY THAT OTHER PEOPLE COULD HAVE NOTICED. OR THE OPPOSITE, BEING SO FIGETY OR RESTLESS THAT YOU HAVE BEEN MOVING AROUND A LOT MORE THAN USUAL: NOT AT ALL
3. TROUBLE FALLING OR STAYING ASLEEP: NOT AT ALL

## 2024-10-29 ASSESSMENT — EJECTION FRACTION: EF_VALUE: 55

## 2024-10-29 ASSESSMENT — PULMONARY FUNCTION TESTS
FEV1/FVC: 88
FEV1 (%PREDICTED): 96
FVC (LITERS): 85

## 2024-10-29 ASSESSMENT — EXERCISE STRESS TEST
PEAK_RPE: 13
PEAK_BP: 138/82
PEAK_HR: 100

## 2024-10-29 NOTE — PROGRESS NOTES
Pulmonary/RT Note    Visit #        36/36       Milka Singh is a 64 y.o. female presented today for pulmonary rehab. She states that there have been no changes in medication or health since the last visit.          She has a target heart rate of . She tolerated the exercise session well and has a pain level of 1 pre exercise, 0 post exercise. Patient states RPE for session today was 13 and RPD was a 0. Today the pt did:      Nustep: 5 min  Walkin min  Breathing retraining: 15 min  Recovery and monitorin min                 Physician available for emergencies: Dr Que Lopez RCP 10/29/2024 9:22 AM  
Pulmonary Rehab Routine Discharge Psychosocial Note     Program Completed: Yes        Milka Singh has completed 36 /36  sessions for pulmonary rehab. She completed her six minute walk test, no increase in distance. She does notrequire supplemental oxygen with exercises. She is  tobacco free.      Milka Singh was encouraged to incorporate some for of home exercises at least three days per week and plans to attend the [employer].  She feels that pulmonary rehab has helped and the most significant change is her dyspnea score has drastically improved, she is able to shop with less stops to rest       Milka Singh #[id number] (CSN:[id number]) (64 y.o. F) (Adm: 10/29/24)  [facility]  [facility]    Flowsheet Row CARDIO PULMONARY REHAB from 10/29/2024 in [facility]  Most recent reading at 10/29/2024  8:00 AM   Treatment Diagnosis    Treatment Diagnosis 1 Asthma   Treatment Diagnosis 2 Other (Comments)  [restrictive lung disease]   Referral Date 12/15/23   Significant Cardiovascular History Chronic atrial fibrillation   Co-morbidities --   Oxygen Saturation / Titration Does not wear oxygen   Individual Treatment Plan    ITP Visit Type Discharge, completed program   1st Date of Exercise 01/23/24   ITP Next Review Date --   Visit #/Total Visits 36/36   EF% 55 %   FVC (Liters) 85 liters   FEV1 (%PRED) 96   FEV1/FVC 88   DLCO (mL/mmHg sec) 57 ml/mmHg sec   Risk Stratification Low   Pulmonary ITP --   Exercise    [place] Total Score --   Stages of Change Action   Exercise Test Six minute walk test   Distance Calculated in mi   Distance (Feet-Actual) --   Distance (miles) --   Distance (Feet-Calculated) --   Distance (meters) --   Peak    Peak /82   Peak RPE 13   Peak Mets --   SpO2 --   O2 Flow Rate (l/min) --   Stops 0   SPO2 Range 93-97   DASI Total Score --   BMI (Calculated) --   FEV1 (%PRED) 96   RAMOS Total Score --   Exercise Prescription    Mode Treadmill, Bike, Stepper, Ergometer, Other (comment) 
  LDL 66.2   Triglycerides 69   Weight Management Assessment   Weight Goal 138 lb   Nutrition Intervention   Diabetes Education Referral No   Lipid Clinic Referral No   Nutrition Class No   Weight Management Referral No   Nutrition Goals   Patient Target Goals Triglycerides less than 150;Weight loss of 5-10%;Waist less than 40 inches males, 35 for females;LDL-C less than 70 and non-HDL-C <100 for those with ASCVD   Education   Learning Barrier Ready to learn   Education Target Goals   Target Goals Correct demonstration of MDI use and care;Correct demonstration of breathing techniques   Other Core Component - Diabetes   Diabetes No     Goals Comments   1. 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 Shop for over half an hour with less shortness of breath    [] initial  [x] met                             [] not met  [] progressing  Patient enjoys shopping, and self paces and rests, when needed, she would however like to improve her stamina   Patient wants to lose 5% of her weight [] initial  [] met                             [] not met  [x] progressing New goal

## 2024-10-30 RX ORDER — LEVOCETIRIZINE DIHYDROCHLORIDE 5 MG/1
5 TABLET, FILM COATED ORAL DAILY
Qty: 30 TABLET | Refills: 11 | Status: SHIPPED | OUTPATIENT
Start: 2024-10-30

## 2024-11-01 NOTE — TELEPHONE ENCOUNTER
This pharmacy faxed over request for the following prescriptions to be filled:    Medication requested : potassium chloride (KLOR-CON M20) 20 MEQ extended release tablet QTY 90   PCP: Patrick  Pharmacy or Print: [facility]   Mail order or Local [id number] [facility]     Scheduled appointment if not seen by current providers in office:  LOV 9/10/2024 FU 12/10/2024

## 2024-11-03 RX ORDER — POTASSIUM CHLORIDE 1500 MG/1
20 TABLET, EXTENDED RELEASE ORAL DAILY
Qty: 90 TABLET | Refills: 1 | Status: SHIPPED | OUTPATIENT
Start: 2024-11-03

## 2024-11-04 RX ORDER — BUSPIRONE HYDROCHLORIDE 5 MG/1
5 TABLET ORAL 2 TIMES DAILY
Qty: 180 TABLET | Refills: 0 | Status: SHIPPED | OUTPATIENT
Start: 2024-11-04

## 2024-11-15 ENCOUNTER — ANTI-COAG VISIT (OUTPATIENT)
Age: 64
End: 2024-11-15
Payer: MEDICARE

## 2024-11-15 DIAGNOSIS — I48.91 ATRIAL FIBRILLATION, UNSPECIFIED TYPE (HCC): ICD-10-CM

## 2024-11-15 DIAGNOSIS — Z79.01 LONG TERM CURRENT USE OF ANTICOAGULANT THERAPY: Primary | ICD-10-CM

## 2024-11-15 LAB
POC INR: 1.8
PROTHROMBIN TIME, POC: 21.5

## 2024-11-15 PROCEDURE — 85610 PROTHROMBIN TIME: CPT | Performed by: NURSE PRACTITIONER

## 2024-11-15 NOTE — PROGRESS NOTES
The INR is below the therapeutic range.  Please make the following adjustments to Coumadin dosing: Take 7.5 mg today then continue Coumadin 5mg daily except 2.5mg on Tues-Thurs-Sat..  Repeat the INR in 1.5 weeks.

## 2024-11-26 NOTE — TELEPHONE ENCOUNTER
Last OV 09/10/2024  Next OV 12/10/2024  Last lab 08/27/2024 K+ 3.8 (order for CMP prior to 12/10/2024 )  Last filled 11/03/2024  # 90  1 RF    Placed call to patient to advise patient that her potassium was refilled and Approved 11/03/2024  # 90 with 1 RF/ was sent to [facility]

## 2024-12-02 RX ORDER — POTASSIUM CHLORIDE 1500 MG/1
20 TABLET, EXTENDED RELEASE ORAL DAILY
Qty: 90 TABLET | Refills: 1 | Status: SHIPPED | OUTPATIENT
Start: 2024-12-02

## 2025-02-06 RX ORDER — BUSPIRONE HYDROCHLORIDE 5 MG/1
5 TABLET ORAL 2 TIMES DAILY
Qty: 180 TABLET | Refills: 0 | Status: SHIPPED | OUTPATIENT
Start: 2025-02-06

## 2025-02-06 NOTE — TELEPHONE ENCOUNTER
This patient contacted the office for the following prescriptions to be refilled:    Medication requested :   Requested Prescriptions     Pending Prescriptions Disp Refills    busPIRone (BUSPAR) 5 MG tablet [Pharmacy Med Name: BUSPIRONE 5MG TABLETS] 180 tablet 0     Sig: TAKE 1 TABLET BY MOUTH TWICE DAILY        Last Refilled: 11/4/2024    Last Office Visit: 9/10/2024    Next Office Visit: Visit date not found    Last Labs:8/27/2024

## 2025-03-11 DIAGNOSIS — I11.0 HYPERTENSIVE HEART DISEASE WITH HEART FAILURE (HCC): ICD-10-CM

## 2025-03-11 RX ORDER — METOPROLOL SUCCINATE 100 MG/1
100 TABLET, EXTENDED RELEASE ORAL DAILY
Qty: 90 TABLET | Refills: 0 | Status: SHIPPED | OUTPATIENT
Start: 2025-03-11

## 2025-03-11 NOTE — TELEPHONE ENCOUNTER
This pharmacy faxed over request for the following prescriptions to be filled:    Medication requested : Metoprolol Succinate   PCP:  Dr. Nguyen   Pharmacy or Print: [facility]   Mail order or Local pharmacy     Scheduled appointment if not seen by current providers in office: Lov 12/10/2024, F/u 04/25/2025

## 2025-03-25 ENCOUNTER — TELEPHONE (OUTPATIENT)
Age: 65
End: 2025-03-25

## 2025-05-28 NOTE — TELEPHONE ENCOUNTER
Patient states she has a new insurance today [employer] choice. She called to get her Advair refilled and they advised it was expensive. She thinks all inhalers are not covered by this insurance.  I review the formulary and they are covered but I think she has to meet her deductible first. She is calling her insurance to discuss
Pt would like to speak with nurse in regards to medication and her insurance.  Please call [phone]
done

## 2025-06-17 RX ORDER — MONTELUKAST SODIUM 10 MG/1
10 TABLET ORAL DAILY
Qty: 90 TABLET | Refills: 3 | OUTPATIENT
Start: 2025-06-17

## 2025-07-09 DIAGNOSIS — I11.0 HYPERTENSIVE HEART DISEASE WITH HEART FAILURE (HCC): ICD-10-CM

## 2025-07-10 RX ORDER — FUROSEMIDE 40 MG/1
60 TABLET ORAL DAILY
Qty: 135 TABLET | Refills: 3 | Status: SHIPPED | OUTPATIENT
Start: 2025-07-10